# Patient Record
Sex: FEMALE | Race: WHITE | Employment: OTHER | URBAN - METROPOLITAN AREA
[De-identification: names, ages, dates, MRNs, and addresses within clinical notes are randomized per-mention and may not be internally consistent; named-entity substitution may affect disease eponyms.]

---

## 2017-01-02 NOTE — PROGRESS NOTES
[address]  [facility] [address]  [phone]  Dept: [phone]    PRE-OP EVALUATION:  Today's date: 2017    Milena Hamilton (: 1940) presents for pre-operative evaluation assessment as requested by Dr. Rodriguez.  She requires evaluation and anesthesia risk assessment prior to undergoing surgery/procedure for treatment of bilateral cataracts.  Proposed procedure: Cataract Surgery - Both Eyes    Date of Surgery/ Procedure:  and   Time of Surgery/ Procedure: to be determined  Hospital/Surgical Facility: [facility]  Primary Physician: Chuck Streeter  Type of Anesthesia Anticipated: to be determined    Patient has a Health Care Directive or Living Will:  YES    1. NO - Do you have a history of heart attack, stroke, stent, bypass or surgery on an artery in the head, neck, heart or legs?  2. NO - Do you ever have any pain or discomfort in your chest?  3. NO - Do you have a history of  Heart Failure?  4. NO - Are you troubled by shortness of breath when: walking on the level, up a slight hill or at night?  5. NO - Do you currently have a cold, bronchitis or other respiratory infection?  6. NO - Do you have a cough, shortness of breath or wheezing?  7. NO - Do you sometimes get pains in the calves of your legs when you walk?  8. NO - Do you or anyone in your family have previous history of blood clots?  9. NO - Do you or does anyone in your family have a serious bleeding problem such as prolonged bleeding following surgeries or cuts?  10. NO - Have you ever had problems with anemia or been told to take iron pills?  11. NO - Have you had any abnormal blood loss such as black, tarry or bloody stools, or abnormal vaginal bleeding?  12. NO - Have you ever had a blood transfusion?  13. NO - Have you or any of your relatives ever had problems with anesthesia?  14. NO - Do you have sleep apnea, excessive snoring or daytime drowsiness?  15. NO -  Do you have any prosthetic heart valves?  16. NO - Do you have prosthetic joints?  17. NO - Is there any chance that you may be pregnant?      HPI:                                                      Brief HPI related to upcoming procedure:   Patient will be undergoing cataract surgery on the left eye on 1/16/17 and the right eye on 1/30/16 due to worsening vision.     See problem list for active medical problems.  Problems all longstanding and stable, except as noted/documented.  See ROS for pertinent symptoms related to these conditions.                                                                                     MEDICAL HISTORY:                                                      Patient Active Problem List    Diagnosis Date Noted     Hypothyroidism, unspecified type 11/30/2016     Priority: Medium     Premature atrial beats 11/30/2016     Priority: Medium     Community acquired pneumonia 05/08/2015     Priority: Medium     Benign skin lesion 03/06/2015     Priority: Medium     History of diverticulitis - s/p left hemicolectomy 11/30/2013     Priority: Medium     Diarrhea 11/30/2013     Priority: Medium     Anxiety 10/24/2010     Priority: Medium     HTN, goal below 140/90 07/10/2014     Raynaud's disease 06/01/2014     Health Care Home 12/30/2013     Status:  Closed  Care Coordinator:  Tasha Vasquez RN    See Letters for H Care Plan             Chronic constipation 06/18/2013     Sacroiliac dysfunction 10/09/2012     Advanced directives, counseling/discussion 10/03/2011     Advance Directive Problem List Overview:   Name Relationship Phone    Primary Health Care Agent            Alternative Health Care Agent          Discussed advance care planning with patient; information given to patient to review. 10/3/2011          DDD (degenerative disc disease), cervical 08/10/2011     Hyperlipidemia LDL goal <130 09/29/2010     Osteopenia 04/01/2010     recheck DEXA in 2 years       Gastritis 03/15/2010      Panic disorder without agoraphobia 10/08/2004     Tinnitus 09/13/2004     Problem list name updated by automated process. Provider to review       Family history of diabetes mellitus 09/09/2002     Lichen planus       Past Medical History   Diagnosis Date     Lichen planus      vulvar     Pure hypercholesterolemia      low LDLs     Panic disorder without agoraphobia      Unspecified essential hypertension      Osteopenia 4/2010     recheck DEXA in 2 years     Raynaud's disease 6/2014     Past Surgical History   Procedure Laterality Date     Hc removal of ovary/tube(s)       Salpingo-Oophorectomy, bilateral     C appendectomy       C vag hyst,rmv tube/ovary  1984     cervix removed     C nonspecific procedure       Right inferior oblique recession, 14 mm     Colonoscopy  10/17/2007     normal, recheck in 10 years     C removal gallbladder  2/9/2010     lap     Eye surgery  8/01     right eye muscle repair     Hysterectomy, pap no longer indicated       Colonoscopy N/A 8/27/2014     Procedure: COLONOSCOPY;  Surgeon: Raffi Montiel MD;  Location: [facility]     Laparoscopic assisted colectomy left (descending) N/A 10/13/2014     Procedure: LAPAROSCOPIC ASSISTED COLECTOMY LEFT (DESCENDING);  Surgeon: Raffi Montiel MD;  Location: [facility]     Combined cystoscopy, insert catheter ureter Bilateral 10/13/2014     Procedure: COMBINED CYSTOSCOPY, INSERT CATHETER URETER;  Surgeon: Abdoulaye Odell MD;  Location: [facility]     Inject epidural transforaminal Right 6/9/2016     Procedure: INJECT EPIDURAL TRANSFORAMINAL;  Surgeon: Antonio Dasilva MD;  Location: [facility]     Inject epidural transforaminal Right 10/13/2016     Procedure: INJECT EPIDURAL TRANSFORAMINAL;  Surgeon: Antonio Dasilva MD;  Location: [facility]     Current Outpatient Prescriptions   Medication Sig Dispense Refill     simvastatin (ZOCOR) 20 MG tablet Take 0.5 tablets (10 mg) by mouth At Bedtime DUE FOR FASTING LABS 45 tablet 2     levothyroxine (SYNTHROID,  LEVOTHROID) 50 MCG tablet Take 1 tablet (50 mcg) by mouth daily 90 tablet 0     latanoprost (XALATAN) 0.005 % ophthalmic solution Place 1 drop into both eyes At Bedtime       ALPRAZolam (XANAX) 0.25 MG tablet Take 1 tablet (0.25 mg) by mouth nightly as needed for sleep or anxiety 30 tablet 3     omeprazole (PRILOSEC) 40 MG capsule TAKE ONE CAPSULE BY MOUTH ONCE DAILY 90 capsule 2     metoprolol (LOPRESSOR) 25 MG tablet Take 0.5 tablets (12.5 mg) by mouth daily 45 tablet 4     Probiotic Product (MISC INTESTINAL ERIC REGULAT) TABS Take 1 tablet by mouth 2 times daily (Patient taking differently: Take 1 tablet by mouth daily ) 30 tablet 0     Dentifrices (BIOTENE DRY MOUTH) PSTE Toothpaste and mouthwash       fish oil-omega-3 fatty acids (FISH OIL) 1000 MG capsule Take 2 g by mouth daily.       THERAPEUTIC MULTIVITAMIN OR 1 TABLET DAILY       ASPIRIN 81 MG OR TABS 1 TABLET DAILY       gabapentin (NEURONTIN) 100 MG capsule        sertraline (ZOLOFT) 25 MG tablet Take 1 tablet for 2 weeks and if tolerating well can increase to 2 tablets daily 60 tablet 2     OTC products: NSAIDS (Advil)    Allergies   Allergen Reactions     Azithromycin Diarrhea     Ciprofloxacin Other (See Comments)     Cipro, dizziness and abdominal pain     Penicillins      stomach upset, diarrhea     Sulfa Drugs      hives      Latex Allergy: NO    Social History   Substance Use Topics     Smoking status: Never Smoker      Smokeless tobacco: Never Used     Alcohol Use: No     History   Drug Use No     REVIEW OF SYSTEMS:                                                    C: NEGATIVE for fever, chills, change in weight  I: NEGATIVE for worrisome rashes, moles or lesions  E: +Worsening vision.   E/M: NEGATIVE for ear, mouth and throat problems  R: NEGATIVE for significant cough or SOB  CV: NEGATIVE for chest pain, palpitations or peripheral edema  GI: NEGATIVE for nausea, abdominal pain, heartburn, or change in bowel habits  : NEGATIVE for frequency,  "dysuria, or hematuria  M: +Low back pain.  N: NEGATIVE for weakness, dizziness or paresthesias  E: NEGATIVE for temperature intolerance, skin/hair changes  H: NEGATIVE for bleeding problems  P: NEGATIVE for changes in mood or affect    EXAM:                                                    /76 mmHg  Pulse 77  Temp(Src) 97.5  F (36.4  C) (Temporal)  Resp 12  Ht 5' 1.75\" (1.568 m)  Wt 138 lb (62.596 kg)  BMI 25.46 kg/m2  SpO2 99%    GENERAL APPEARANCE: healthy, alert and no distress     EYES: EOMI, - PERRL     HENT: ear canals and TM's normal and nose and mouth without ulcers or lesions     NECK: no adenopathy, no asymmetry, masses, or scars and thyroid normal to palpation     RESP: lungs clear to auscultation - no rales, rhonchi or wheezes     CV: regular rate and rhythm, normal S1 S2, no S3 or S4 and no murmur, click or rub     ABDOMEN:  soft, nontender, no HSM or masses and bowel sounds normal     MS: extremities normal- no gross deformities noted, no evidence of inflammation in joints, FROM in all extremities.     SKIN: no suspicious lesions or rashes     NEURO: Normal strength and tone, mentation intact and speech normal. Cranial nerves II-XII are grossly intact. DTRs are 2+/4 throughout and symmetric. Gait is stable.      PSYCH: mentation appears normal. and affect normal/bright     LYMPHATICS: No axillary, cervical, inguinal, or supraclavicular nodes    DIAGNOSTICS:                                                    No labs or EKG required for low risk surgery (cataract, skin procedure, breast biopsy, etc)    Recent Labs   Lab Test  03/25/16   0827  03/23/16   0625   HGB  13.0  13.2   PLT  172  160   NA  140  141   POTASSIUM  3.6  3.6   CR  0.69  0.72      IMPRESSION:                                                    Reason for surgery/procedure: bilateral cataracts  Diagnosis/reason for consult: pre-operative clearance    The proposed surgical procedure is considered LOW risk.    REVISED " CARDIAC RISK INDEX  The patient has the following serious cardiovascular risks for perioperative complications such as (MI, PE, VFib and 3  AV Block):  No serious cardiac risks  INTERPRETATION: 0 risks: Class I (very low risk - 0.4% complication rate)    The patient has the following additional risks for perioperative complications:  No identified additional risks      ICD-10-CM    1. Preop general physical exam Z01.818    2. Bilateral cataracts H26.9    3. Hypothyroidism, unspecified type E03.9    4. HTN, goal below 140/90 I10        RECOMMENDATIONS:                                                        Anticoagulant or Antiplatelet Medication Use  ASPIRIN: Discontinue ASA 7-10 days prior to procedure to reduce bleeding risk.  It should be resumed post-operatively.  NSAIDS: Ibuprofen (Motrin): Stop 5 days prior to surgery  Stop fish oil 10 days prior to surgery.      APPROVAL GIVEN to proceed with proposed procedure, without further diagnostic evaluation       Signed Electronically by: Chuck Streeter PA-C    Copy of this evaluation report is provided to requesting physician.    [facility] Preop Guidelines

## 2017-01-02 NOTE — PATIENT INSTRUCTIONS
Before Your Surgery      Call your surgeon if there is any change in your health. This includes signs of a cold or flu (such as a sore throat, runny nose, cough, rash or fever).    Do not smoke, drink alcohol or take over the counter medicine (unless your surgeon or primary care doctor tells you to) for the 24 hours before and after surgery.    If you take prescribed drugs: Follow your doctor s orders about which medicines to take and which to stop until after surgery. Stop the aspirin and fish oil until after surgery and hold the Advil 5 days before surgery.    Eating and drinking prior to surgery: follow the instructions from your surgeon    Take a shower or bath the night before surgery. Use the soap your surgeon gave you to gently clean your skin. If you do not have soap from your surgeon, use your regular soap. Do not shave or scrub the surgery site.  Wear clean pajamas and have clean sheets on your bed.

## 2017-01-06 ENCOUNTER — OFFICE VISIT (OUTPATIENT)
Dept: FAMILY MEDICINE | Facility: OTHER | Age: 77
End: 2017-01-06
Payer: COMMERCIAL

## 2017-01-06 VITALS
RESPIRATION RATE: 12 BRPM | HEIGHT: 62 IN | HEART RATE: 77 BPM | SYSTOLIC BLOOD PRESSURE: 128 MMHG | DIASTOLIC BLOOD PRESSURE: 76 MMHG | BODY MASS INDEX: 25.4 KG/M2 | OXYGEN SATURATION: 99 % | TEMPERATURE: 97.5 F | WEIGHT: 138 LBS

## 2017-01-06 DIAGNOSIS — H26.9 BILATERAL CATARACTS: ICD-10-CM

## 2017-01-06 DIAGNOSIS — Z01.818 PREOP GENERAL PHYSICAL EXAM: Primary | ICD-10-CM

## 2017-01-06 DIAGNOSIS — E03.9 HYPOTHYROIDISM, UNSPECIFIED TYPE: ICD-10-CM

## 2017-01-06 DIAGNOSIS — I10 HTN, GOAL BELOW 140/90: ICD-10-CM

## 2017-01-06 PROCEDURE — 99214 OFFICE O/P EST MOD 30 MIN: CPT | Performed by: PHYSICIAN ASSISTANT

## 2017-01-06 RX ORDER — GABAPENTIN 100 MG/1
CAPSULE ORAL
COMMUNITY
Start: 2016-05-24 | End: 2017-03-22

## 2017-01-06 ASSESSMENT — PAIN SCALES - GENERAL: PAINLEVEL: SEVERE PAIN (6)

## 2017-01-06 NOTE — MR AVS SNAPSHOT
After Visit Summary   1/6/2017    Milena Hamilton    MRN: [id number]           Patient Information     Date Of Birth          1940        Visit Information        Provider Department      1/6/2017 9:00 AM Chuck Streeter PA-C [facility]        Today's Diagnoses     Preop general physical exam    -  1       Care Instructions      Before Your Surgery      Call your surgeon if there is any change in your health. This includes signs of a cold or flu (such as a sore throat, runny nose, cough, rash or fever).    Do not smoke, drink alcohol or take over the counter medicine (unless your surgeon or primary care doctor tells you to) for the 24 hours before and after surgery.    If you take prescribed drugs: Follow your doctor s orders about which medicines to take and which to stop until after surgery. Stop the aspirin and fish oil until after surgery and hold the Advil 5 days before surgery.    Eating and drinking prior to surgery: follow the instructions from your surgeon    Take a shower or bath the night before surgery. Use the soap your surgeon gave you to gently clean your skin. If you do not have soap from your surgeon, use your regular soap. Do not shave or scrub the surgery site.  Wear clean pajamas and have clean sheets on your bed.         Follow-ups after your visit        Who to contact     If you have questions or need follow up information about today's clinic visit or your schedule please contact [facility] directly at [phone].  Normal or non-critical lab and imaging results will be communicated to you by MyChart, letter or phone within 4 business days after the clinic has received the results. If you do not hear from us within 7 days, please contact the clinic through MyChart or phone. If you have a critical or abnormal lab result, we will notify you by phone as soon as possible.  Submit refill requests through [facility] or call your pharmacy and  "they will forward the refill request to us. Please allow 3 business days for your refill to be completed.          Additional Information About Your Visit        MyChart Information     [email or web address] lets you send messages to your doctor, view your test results, renew your prescriptions, schedule appointments and more. To sign up, go to [email or web address] . Click on \"Log in\" on the left side of the screen, which will take you to the Welcome page. Then click on \"Sign up Now\" on the right side of the page.     You will be asked to enter the access code listed below, as well as some personal information. Please follow the directions to create your username and password.     Your access code is: [email or web address]  Expires: 2017  9:14 AM     Your access code will  in 90 days. If you need help or a new code, please call your [facility] clinic or [phone].        Care EveryWhere ID     This is your [facility] EveryWhere ID. This could be used by other organizations to access your [facility] medical records  [id number]        Your Vitals Were     Pulse Temperature Respirations Height BMI (Body Mass Index) Pulse Oximetry    77 97.5  F (36.4  C) (Temporal) 12 5' 1.75\" (1.568 m) 25.46 kg/m2 99%       Blood Pressure from Last 3 Encounters:   17 128/76   16 124/78   10/05/16 124/62    Weight from Last 3 Encounters:   17 138 lb (62.596 kg)   16 136 lb 6.4 oz (61.871 kg)   10/05/16 138 lb (62.596 kg)              Today, you had the following     No orders found for display         Today's Medication Changes          These changes are accurate as of: 17  9:14 AM.  If you have any questions, ask your nurse or doctor.               These medicines have changed or have updated prescriptions.        Dose/Directions    Misc Intestinal Lyndsey Regulat Tabs   This may have changed:  when to take this   Used for:  Diarrhea        Dose:  1 tablet   Take 1 tablet by mouth 2 times daily   Quantity:  30 tablet "   Refills:  0                Primary Care Provider Office Phone # Fax #    Chuck Ashwin Streeter PA-C [phone]       [facility] [address]  [facility] [address]        Thank you!     Thank you for choosing [facility]  for your care. Our goal is always to provide you with excellent care. Hearing back from our patients is one way we can continue to improve our services. Please take a few minutes to complete the written survey that you may receive in the mail after your visit with us. Thank you!             Your Updated Medication List - Protect others around you: Learn how to safely use, store and throw away your medicines at www.disposemymeds.org.          This list is accurate as of: 1/6/17  9:14 AM.  Always use your most recent med list.                   Brand Name Dispense Instructions for use    ALPRAZolam 0.25 MG tablet    XANAX    30 tablet    Take 1 tablet (0.25 mg) by mouth nightly as needed for sleep or anxiety       aspirin 81 MG tablet      1 TABLET DAILY       BIOTENE DRY MOUTH Pste      Toothpaste and mouthwash       fish oil-omega-3 fatty acids 1000 MG capsule      Take 2 g by mouth daily.       gabapentin 100 MG capsule    NEURONTIN         latanoprost 0.005 % ophthalmic solution    XALATAN     Place 1 drop into both eyes At Bedtime       levothyroxine 50 MCG tablet    SYNTHROID/LEVOTHROID    90 tablet    Take 1 tablet (50 mcg) by mouth daily       metoprolol 25 MG tablet    LOPRESSOR    45 tablet    Take 0.5 tablets (12.5 mg) by mouth daily       Misc Intestinal Lyndsey Regulat Tabs     30 tablet    Take 1 tablet by mouth 2 times daily       omeprazole 40 MG capsule    priLOSEC    90 capsule    TAKE ONE CAPSULE BY MOUTH ONCE DAILY       sertraline 25 MG tablet    ZOLOFT    60 tablet    Take 1 tablet for 2 weeks and if tolerating well can increase to 2 tablets daily       simvastatin 20 MG tablet    ZOCOR    45 tablet    Take 0.5 tablets (10 mg)  by mouth At Bedtime DUE FOR FASTING LABS       THERAPEUTIC MULTIVITAMIN PO      1 TABLET DAILY

## 2017-01-06 NOTE — NURSING NOTE
"Chief Complaint   Patient presents with     Pre-Op Exam     Panel Management     honoring choices       Initial /76 mmHg  Pulse 77  Temp(Src) 97.5  F (36.4  C) (Temporal)  Resp 12  Ht 5' 1.75\" (1.568 m)  Wt 138 lb (62.596 kg)  BMI 25.46 kg/m2  SpO2 99% Estimated body mass index is 25.46 kg/(m^2) as calculated from the following:    Height as of this encounter: 5' 1.75\" (1.568 m).    Weight as of this encounter: 138 lb (62.596 kg).  BP completed using cuff size: regular    Grace Manuel CMA      "

## 2017-01-24 DIAGNOSIS — F41.9 ANXIETY: Primary | ICD-10-CM

## 2017-01-24 NOTE — TELEPHONE ENCOUNTER
Alprazolam 0.25 mg      Last Written Prescription Date:  10/5/2016  Last Fill Quantity: 30,   # refills: 3  Last Office Visit with [email or web address] or  Health prescribing provider: 01/06/2017  Future Office visit:       Routing refill request to provider for review/approval because:  Drug not on the [facility], [facility] [[email or web address]] or  Health refill protocol or controlled substance

## 2017-01-25 RX ORDER — ALPRAZOLAM 0.25 MG
TABLET ORAL
Qty: 30 TABLET | Refills: 0 | Status: SHIPPED | OUTPATIENT
Start: 2017-01-25 | End: 2017-03-22

## 2017-01-25 NOTE — TELEPHONE ENCOUNTER
Will provide 1 refill but she needs to follow up on her anxiety in the clinic for further refills. Placed in bin.    Chuck Streeter PA-C

## 2017-01-30 DIAGNOSIS — I10 HTN, GOAL BELOW 140/90: Primary | ICD-10-CM

## 2017-01-30 NOTE — TELEPHONE ENCOUNTER
metoprolol (LOPRESSOR) 25 MG tablet      Last Written Prescription Date: 1/15/16  Last Fill Quantity: 45, # refills: 4    Last Office Visit with [facility] or [facility] prescribing provider:  11/30/16   Future Office Visit:        BP Readings from Last 3 Encounters:   01/06/17 128/76   11/30/16 124/78   10/05/16 124/62

## 2017-01-31 RX ORDER — METOPROLOL TARTRATE 25 MG/1
12.5 TABLET, FILM COATED ORAL DAILY
Qty: 45 TABLET | Refills: 2 | Status: SHIPPED | OUTPATIENT
Start: 2017-01-31 | End: 2017-10-24

## 2017-02-15 ENCOUNTER — OFFICE VISIT (OUTPATIENT)
Dept: FAMILY MEDICINE | Facility: OTHER | Age: 77
End: 2017-02-15
Payer: COMMERCIAL

## 2017-02-15 VITALS
TEMPERATURE: 97.7 F | RESPIRATION RATE: 20 BRPM | BODY MASS INDEX: 25.63 KG/M2 | DIASTOLIC BLOOD PRESSURE: 64 MMHG | HEART RATE: 98 BPM | WEIGHT: 139 LBS | OXYGEN SATURATION: 96 % | SYSTOLIC BLOOD PRESSURE: 120 MMHG

## 2017-02-15 DIAGNOSIS — B97.89 VIRAL RESPIRATORY ILLNESS: Primary | ICD-10-CM

## 2017-02-15 DIAGNOSIS — J98.8 VIRAL RESPIRATORY ILLNESS: Primary | ICD-10-CM

## 2017-02-15 PROCEDURE — 99213 OFFICE O/P EST LOW 20 MIN: CPT | Performed by: PHYSICIAN ASSISTANT

## 2017-02-15 RX ORDER — BENZONATATE 100 MG/1
100 CAPSULE ORAL 3 TIMES DAILY PRN
Qty: 30 CAPSULE | Refills: 0 | Status: SHIPPED | OUTPATIENT
Start: 2017-02-15 | End: 2017-03-22

## 2017-02-15 ASSESSMENT — PAIN SCALES - GENERAL: PAINLEVEL: NO PAIN (0)

## 2017-02-15 NOTE — NURSING NOTE
"No chief complaint on file.      Initial /64 (BP Location: Right arm, Patient Position: Chair, Cuff Size: Adult Regular)  Pulse 98  Temp 97.7  F (36.5  C) (Temporal)  Resp 20  Wt 139 lb (63 kg)  SpO2 96%  BMI 25.63 kg/m2 Estimated body mass index is 25.63 kg/(m^2) as calculated from the following:    Height as of 1/6/17: 5' 1.75\" (1.568 m).    Weight as of this encounter: 139 lb (63 kg).  Medication Reconciliation: complete     Grace Manuel CMA      "

## 2017-02-15 NOTE — PATIENT INSTRUCTIONS
I believe your symptoms are due to a virus.  Drink plenty of fluids.  Take a daily antihistamine like Claritin or Zyrtec to help dry up the watery eyes.  Get plenty of rest.  Can use over the counter Mucinex daily to help with congestion.  May use ibuprofen and/or Tylenol to help with fevers/pain.  Will prescribe Tessalon pearls to help with cough.  Honey mixed with warm tea can also help with cough.   If symptoms are not improving within 7-10 days or you experience worsening fevers, chills, cough, or shortness of breath, you should be seen again.

## 2017-02-15 NOTE — MR AVS SNAPSHOT
After Visit Summary   2/15/2017    Milena Hamilton    MRN: [id number]           Patient Information     Date Of Birth          1940        Visit Information        Provider Department      2/15/2017 9:00 AM Chuck Streeter PA-C [facility]        Today's Diagnoses     Viral respiratory illness    -  1      Care Instructions    I believe your symptoms are due to a virus.  Drink plenty of fluids.  Take a daily antihistamine like Claritin or Zyrtec to help dry up the watery eyes.  Get plenty of rest.  Can use over the counter Mucinex daily to help with congestion.  May use ibuprofen and/or Tylenol to help with fevers/pain.  Will prescribe Tessalon pearls to help with cough.  Honey mixed with warm tea can also help with cough.   If symptoms are not improving within 7-10 days or you experience worsening fevers, chills, cough, or shortness of breath, you should be seen again.          Follow-ups after your visit        Who to contact     If you have questions or need follow up information about today's clinic visit or your schedule please contact [facility] directly at [phone].  Normal or non-critical lab and imaging results will be communicated to you by Enefgyhart, letter or phone within 4 business days after the clinic has received the results. If you do not hear from us within 7 days, please contact the clinic through Enefgyhart or phone. If you have a critical or abnormal lab result, we will notify you by phone as soon as possible.  Submit refill requests through Beroomers or call your pharmacy and they will forward the refill request to us. Please allow 3 business days for your refill to be completed.          Additional Information About Your Visit        MyChart Information     Beroomers lets you send messages to your doctor, view your test results, renew your prescriptions, schedule appointments and more. To sign up, go to [email or web address] . Click on  "\"Log in\" on the left side of the screen, which will take you to the Welcome page. Then click on \"Sign up Now\" on the right side of the page.     You will be asked to enter the access code listed below, as well as some personal information. Please follow the directions to create your username and password.     Your access code is: [email or web address]  Expires: 2017  9:14 AM     Your access code will  in 90 days. If you need help or a new code, please call your [place] clinic or [phone].        Care EveryWhere ID     This is your Care EveryWhere ID. This could be used by other organizations to access your [facility] medical records  [id number]        Your Vitals Were     Pulse Temperature Respirations Pulse Oximetry BMI (Body Mass Index)       98 97.7  F (36.5  C) (Temporal) 20 96% 25.63 kg/m2        Blood Pressure from Last 3 Encounters:   02/15/17 120/64   17 128/76   16 124/78    Weight from Last 3 Encounters:   02/15/17 139 lb (63 kg)   17 138 lb (62.6 kg)   16 136 lb 6.4 oz (61.9 kg)              Today, you had the following     No orders found for display         Today's Medication Changes          These changes are accurate as of: 2/15/17  9:20 AM.  If you have any questions, ask your nurse or doctor.               Start taking these medicines.        Dose/Directions    benzonatate 100 MG capsule   Commonly known as:  TESSALON   Used for:  Viral respiratory illness   Started by:  Chuck Streeter PA-C        Dose:  100 mg   Take 1 capsule (100 mg) by mouth 3 times daily as needed   Quantity:  30 capsule   Refills:  0         These medicines have changed or have updated prescriptions.        Dose/Directions    Misc Intestinal Lyndsey Regulat Tabs   This may have changed:  when to take this   Used for:  Diarrhea        Dose:  1 tablet   Take 1 tablet by mouth 2 times daily   Quantity:  30 tablet   Refills:  0            Where to get your medicines      These medications were " sent to [facility] - [place], [place] - [address]  [address], [facility] [address]     Phone:  [phone]     benzonatate 100 MG capsule                Primary Care Provider Office Phone # Fax #    Chuck Streeter PA-C [phone]       [facility] [address]  [facility] [address]        Thank you!     Thank you for choosing [facility]  for your care. Our goal is always to provide you with excellent care. Hearing back from our patients is one way we can continue to improve our services. Please take a few minutes to complete the written survey that you may receive in the mail after your visit with us. Thank you!             Your Updated Medication List - Protect others around you: Learn how to safely use, store and throw away your medicines at www.disposemymeds.org.          This list is accurate as of: 2/15/17  9:20 AM.  Always use your most recent med list.                   Brand Name Dispense Instructions for use    ALPRAZolam 0.25 MG tablet    XANAX    30 tablet    TAKE ONE TABLET BY MOUTH NIGHTLY AS NEEDED FOR SLEEP OR FOR ANXIETY       aspirin 81 MG tablet      1 TABLET DAILY       benzonatate 100 MG capsule    TESSALON    30 capsule    Take 1 capsule (100 mg) by mouth 3 times daily as needed       BIOTENE DRY MOUTH Pste      Toothpaste and mouthwash       fish oil-omega-3 fatty acids 1000 MG capsule      Take 2 g by mouth daily.       gabapentin 100 MG capsule    NEURONTIN         latanoprost 0.005 % ophthalmic solution    XALATAN     Place 1 drop into both eyes At Bedtime       levothyroxine 50 MCG tablet    SYNTHROID/LEVOTHROID    90 tablet    Take 1 tablet (50 mcg) by mouth daily       metoprolol 25 MG tablet    LOPRESSOR    45 tablet    Take 0.5 tablets (12.5 mg) by mouth daily       Misc Intestinal Lyndsey Regulat Tabs     30 tablet    Take 1 tablet by mouth 2 times daily       omeprazole 40 MG capsule    priLOSEC    90  capsule    TAKE ONE CAPSULE BY MOUTH ONCE DAILY       sertraline 25 MG tablet    ZOLOFT    60 tablet    Take 1 tablet for 2 weeks and if tolerating well can increase to 2 tablets daily       simvastatin 20 MG tablet    ZOCOR    45 tablet    Take 0.5 tablets (10 mg) by mouth At Bedtime DUE FOR FASTING LABS       THERAPEUTIC MULTIVITAMIN PO      1 TABLET DAILY

## 2017-02-15 NOTE — PROGRESS NOTES
SUBJECTIVE:                                                    Milena Hamilton is a 76 year old female who presents to clinic today for the following health issues:    HPI    Acute Illness   Acute illness concerns: cough  Onset: Since Friday    Fever: no    Chills/Sweats: no    Headache (location?): no    Sinus Pressure:no    Conjunctivitis:  no    Ear Pain: YES- starting to itch    Rhinorrhea: YES    Congestion: YES    Sore Throat: YES- due to coughing     Cough: YES-non-productive    Wheeze: no    Decreased Appetite: YES    Nausea: no    Vomiting: no    Diarrhea:  no    Dysuria/Freq.: no    Fatigue/Achiness: YES    Sick/Strep Exposure: YES- colds     Therapies Tried and outcome: robitussin DM, tylenol    Cough was initially productive but no anymore.  Robitussin helping somewhat.    Problem list and histories reviewed & adjusted, as indicated.  Additional history: none    Problem list, Medication list, Allergies, and Medical/Social/Surgical histories reviewed in EPIC and updated as appropriate.    ROS:  GENERAL: Denies fever, fatigue, weakness, weight gain, or weight loss.  HEENT: Eyes-Denies pain, redness, loss of vision, double or blurred vision.     Ears/Nose- +rhinorrhea, ears itching. Denies tinnitus, loss of hearing, epistaxis, decreased sense of smell. Denies loss of sense of taste, dry mouth, or sore throat.   CARDIOVASCULAR: Denies chest pain, shortness of breath, irregular heartbeats,  palpitations, or edema.  RESPIRATORY: +Non-productive cough. Denies hemoptysis and shortness of breath.    OBJECTIVE:                                                    /64 (BP Location: Right arm, Patient Position: Chair, Cuff Size: Adult Regular)  Pulse 98  Temp 97.7  F (36.5  C) (Temporal)  Resp 20  Wt 139 lb (63 kg)  SpO2 96%  BMI 25.63 kg/m2  Body mass index is 25.63 kg/(m^2).  GENERAL: healthy, alert and no distress  EYES: Eyes grossly normal to inspection, PERRL and conjunctivae and sclerae normal  HENT:  ear canals and TM's normal with small amount of serous fluid behind left TM, nasal mucosa is mildly erythematous  NECK: no adenopathy, no asymmetry, masses, or scars and thyroid normal to palpation  RESP: lungs clear to auscultation - no rales, rhonchi or wheezes  CV: regular rate and rhythm, normal S1 S2, no S3 or S4, no murmur, click or rub       ASSESSMENT/PLAN:                                                        ICD-10-CM    1. Viral respiratory illness J98.8 benzonatate (TESSALON) 100 MG capsule    B97.89        Symptoms consistent with a viral illness.  Encouraged to drink plenty of fluids.  Can take a daily antihistamine like Claritin or Zyrtec to help dry up the watery eyes and fluid in ears.  Get plenty of rest.  Can use over the counter Mucinex daily to help with congestion.  May use ibuprofen and/or Tylenol to help with fevers/pain.  Will prescribe Tessalon pearls to help with cough.  Honey mixed with warm tea can also help with cough.   Follow up if symptoms worsen or do not improve.     Chuck Streeter PA-C  [facility]

## 2017-02-20 ENCOUNTER — TELEPHONE (OUTPATIENT)
Dept: FAMILY MEDICINE | Facility: OTHER | Age: 77
End: 2017-02-20

## 2017-02-20 NOTE — TELEPHONE ENCOUNTER
S-(situation): Patient is calling today with continued nasal congestion and cough    B-(background): Was seen in clinic on 2/15/17 and diagnosed with a viral infection.    A-(assessment): Patient states she has yellow nasal discharge with right sided sinus pressure since Saturday. She continues to have a productive cough, but does not know the color of the sputum. She has been taking Claritin and using the honey with warm tea. She did not start Mucinex. She has not been doing a mitul pot. She has had one sinus infection in the past. She does feel slightly improved since last week, but continues to feel fatigued.     R-(recommendations):  Home care advice - Start Mucinex and [place] Pot. Continue to rest and push fluids. Call if symptoms worsen or fail to improve by Thursday.  Will comply with recommendation: YES   If further questions/concerns or if Sx do not improve, worsen or new Sx develop, call your PCP or [facility] as soon as possible.    NOTES:  Disposition was determined by the first positive assessment question, therefore all previous assessment questions were negative.     Guideline used:  Telephone Triage Protocols for Nurses, Fourth Edition, Kirsty Del Cid, RN, BSN

## 2017-02-20 NOTE — TELEPHONE ENCOUNTER
Spoke with the patient regarding the different brands of Mucinex. Discussed there are name brands and generic brands. No further questions at this time. Aislinn Castro RN

## 2017-02-20 NOTE — TELEPHONE ENCOUNTER
[facility] phone call message- patient reporting a symptom:    Symptom or request: cold sx     Duration (how long have symptoms been present): ongoing   Have you been treated for this before? Yes    Additional comments: Pt was seen last week and is not feeling any better. She is wondering if she could have a sinus inf? Please call.       Call taken on 2/20/2017 at 9:53 AM by Yumiko Brar

## 2017-02-23 ENCOUNTER — OFFICE VISIT (OUTPATIENT)
Dept: FAMILY MEDICINE | Facility: OTHER | Age: 77
End: 2017-02-23
Payer: COMMERCIAL

## 2017-02-23 ENCOUNTER — TELEPHONE (OUTPATIENT)
Dept: FAMILY MEDICINE | Facility: OTHER | Age: 77
End: 2017-02-23

## 2017-02-23 VITALS
WEIGHT: 138 LBS | TEMPERATURE: 97.5 F | DIASTOLIC BLOOD PRESSURE: 70 MMHG | HEART RATE: 79 BPM | OXYGEN SATURATION: 98 % | BODY MASS INDEX: 25.45 KG/M2 | RESPIRATION RATE: 16 BRPM | SYSTOLIC BLOOD PRESSURE: 136 MMHG

## 2017-02-23 DIAGNOSIS — J01.90 ACUTE SINUSITIS WITH SYMPTOMS > 10 DAYS: Primary | ICD-10-CM

## 2017-02-23 PROCEDURE — 99213 OFFICE O/P EST LOW 20 MIN: CPT | Performed by: PHYSICIAN ASSISTANT

## 2017-02-23 RX ORDER — DOXYCYCLINE 100 MG/1
100 CAPSULE ORAL 2 TIMES DAILY
Qty: 20 CAPSULE | Refills: 0 | Status: SHIPPED | OUTPATIENT
Start: 2017-02-23 | End: 2017-03-22

## 2017-02-23 RX ORDER — PREDNISONE 20 MG/1
20 TABLET ORAL DAILY
Qty: 3 TABLET | Refills: 0 | Status: SHIPPED | OUTPATIENT
Start: 2017-02-23 | End: 2017-03-22

## 2017-02-23 ASSESSMENT — PAIN SCALES - GENERAL: PAINLEVEL: SEVERE PAIN (6)

## 2017-02-23 NOTE — MR AVS SNAPSHOT
"              After Visit Summary   2/23/2017    Milena Hamilton    MRN: [id number]           Patient Information     Date Of Birth          1940        Visit Information        Provider Department      2/23/2017 3:00 PM Chuck Streeter PA-C [facility]        Today's Diagnoses     Acute sinusitis with symptoms > 10 days    -  1      Care Instructions    Will prescribe doxycycline to take twice daily for 10 days. Take a daily probiotic or yogurt while you are on this.  Will also prescribe a steroid to take once daily for 3 days to help with the sinus congestion/pressure. Take in the mornings with food.   Drink plenty of fluids.  You can try Flonase nasal spray to help with the nasal congestion as well.   Follow up if symptoms are not improving.          Follow-ups after your visit        Who to contact     If you have questions or need follow up information about today's clinic visit or your schedule please contact [facility] directly at [phone].  Normal or non-critical lab and imaging results will be communicated to you by PollVaultrhart, letter or phone within 4 business days after the clinic has received the results. If you do not hear from us within 7 days, please contact the clinic through Pepper Networkst or phone. If you have a critical or abnormal lab result, we will notify you by phone as soon as possible.  Submit refill requests through Uptake or call your pharmacy and they will forward the refill request to us. Please allow 3 business days for your refill to be completed.          Additional Information About Your Visit        PollVaultrharNewfield Design Information     Uptake lets you send messages to your doctor, view your test results, renew your prescriptions, schedule appointments and more. To sign up, go to [email or web address] . Click on \"Log in\" on the left side of the screen, which will take you to the Welcome page. Then click on \"Sign up Now\" on the right side of the page. "     You will be asked to enter the access code listed below, as well as some personal information. Please follow the directions to create your username and password.     Your access code is: [email or web address]  Expires: 2017  9:14 AM     Your access code will  in 90 days. If you need help or a new code, please call your [facility] clinic or [phone].        Care EveryWhere ID     This is your Care EveryWhere ID. This could be used by other organizations to access your [facility] medical records  [id number]        Your Vitals Were     Pulse Temperature Respirations Pulse Oximetry BMI (Body Mass Index)       79 97.5  F (36.4  C) (Temporal) 16 98% 25.45 kg/m2        Blood Pressure from Last 3 Encounters:   17 136/70   02/15/17 120/64   17 128/76    Weight from Last 3 Encounters:   17 138 lb (62.6 kg)   02/15/17 139 lb (63 kg)   17 138 lb (62.6 kg)              Today, you had the following     No orders found for display         Today's Medication Changes          These changes are accurate as of: 17  3:33 PM.  If you have any questions, ask your nurse or doctor.               Start taking these medicines.        Dose/Directions    doxycycline 100 MG capsule   Commonly known as:  VIBRAMYCIN   Used for:  Acute sinusitis with symptoms > 10 days   Started by:  Chuck Streeter PA-C        Dose:  100 mg   Take 1 capsule (100 mg) by mouth 2 times daily   Quantity:  20 capsule   Refills:  0       predniSONE 20 MG tablet   Commonly known as:  DELTASONE   Used for:  Acute sinusitis with symptoms > 10 days   Started by:  Chuck Streeter PA-C        Dose:  20 mg   Take 1 tablet (20 mg) by mouth daily   Quantity:  3 tablet   Refills:  0         These medicines have changed or have updated prescriptions.        Dose/Directions    Misc Intestinal Lyndsey Regulat Tabs   This may have changed:  when to take this   Used for:  Diarrhea        Dose:  1 tablet   Take 1 tablet by mouth 2 times  daily   Quantity:  30 tablet   Refills:  0            Where to get your medicines      These medications were sent to [facility] - [place], [place] - [address]  [address], [facility] [address]     Phone:  [phone]     doxycycline 100 MG capsule    predniSONE 20 MG tablet                Primary Care Provider Office Phone # Fax #    Chuck Streeter PA-C [phone]       [facility] [address]  [facility] [address]        Thank you!     Thank you for choosing [facility]  for your care. Our goal is always to provide you with excellent care. Hearing back from our patients is one way we can continue to improve our services. Please take a few minutes to complete the written survey that you may receive in the mail after your visit with us. Thank you!             Your Updated Medication List - Protect others around you: Learn how to safely use, store and throw away your medicines at www.disposemymeds.org.          This list is accurate as of: 2/23/17  3:33 PM.  Always use your most recent med list.                   Brand Name Dispense Instructions for use    ALPRAZolam 0.25 MG tablet    XANAX    30 tablet    TAKE ONE TABLET BY MOUTH NIGHTLY AS NEEDED FOR SLEEP OR FOR ANXIETY       aspirin 81 MG tablet      1 TABLET DAILY       benzonatate 100 MG capsule    TESSALON    30 capsule    Take 1 capsule (100 mg) by mouth 3 times daily as needed       BIOTENE DRY MOUTH Pste      Toothpaste and mouthwash       doxycycline 100 MG capsule    VIBRAMYCIN    20 capsule    Take 1 capsule (100 mg) by mouth 2 times daily       fish oil-omega-3 fatty acids 1000 MG capsule      Take 2 g by mouth daily.       gabapentin 100 MG capsule    NEURONTIN         latanoprost 0.005 % ophthalmic solution    XALATAN     Place 1 drop into both eyes At Bedtime       levothyroxine 50 MCG tablet    SYNTHROID/LEVOTHROID    90 tablet    Take 1 tablet (50 mcg) by mouth daily        metoprolol 25 MG tablet    LOPRESSOR    45 tablet    Take 0.5 tablets (12.5 mg) by mouth daily       Misc Intestinal Lyndsey Regulat Tabs     30 tablet    Take 1 tablet by mouth 2 times daily       omeprazole 40 MG capsule    priLOSEC    90 capsule    TAKE ONE CAPSULE BY MOUTH ONCE DAILY       predniSONE 20 MG tablet    DELTASONE    3 tablet    Take 1 tablet (20 mg) by mouth daily       sertraline 25 MG tablet    ZOLOFT    60 tablet    Take 1 tablet for 2 weeks and if tolerating well can increase to 2 tablets daily       simvastatin 20 MG tablet    ZOCOR    45 tablet    Take 0.5 tablets (10 mg) by mouth At Bedtime DUE FOR FASTING LABS       THERAPEUTIC MULTIVITAMIN PO      1 TABLET DAILY

## 2017-02-23 NOTE — PROGRESS NOTES
"  SUBJECTIVE:                                                    Milena Hamilton is a 76 year old female who presents to clinic today for the following health issues:    HPI    Patient was seen on 02/15 and diagnosed with a viral respiratory illness. She was given Tessalon Pearls, which patient states it \"made me sicker.\" She feels like she has not improved since her last visit. States she feels \"stuffed up in head, can't taste or smell anything.\" States she has no appetite, but eats \"because she is supposed to.\" Sinuses are tender with yellow drainage. Continues with a daily cough. Denies fevers or chills.     Problem list and histories reviewed & adjusted, as indicated.  Additional history: none    Problem list, Medication list, Allergies, and Medical/Social/Surgical histories reviewed in EPIC and updated as appropriate.    ROS:  GENERAL: Denies fever, fatigue, weakness, weight gain, or weight loss.  HEENT: Eyes-Denies pain, redness, loss of vision, double or blurred vision.     Ears/Nose- +sinus congestion/pressure. Denies tinnitus, loss of hearing, epistaxis, decreased sense of smell. Denies loss of sense of taste, dry mouth, or sore throat.   CARDIOVASCULAR: Denies chest pain, shortness of breath, irregular heartbeats,  palpitations, or edema.  RESPIRATORY: +Cough. Denies hemoptysis and shortness of breath.    OBJECTIVE:                                                    /70 (BP Location: Right arm, Patient Position: Chair, Cuff Size: Adult Regular)  Pulse 79  Temp 97.5  F (36.4  C) (Temporal)  Resp 16  Wt 138 lb (62.6 kg)  SpO2 98%  BMI 25.45 kg/m2  Body mass index is 25.45 kg/(m^2).  GENERAL: healthy, alert and no distress  EYES: Eyes grossly normal to inspection, PERRL and conjunctivae and sclerae normal  HENT: ear canals and TM's normal, nasal mucosa is erythematous and edematous with tenderness over the maxillary sinuses  NECK: no adenopathy, no asymmetry, masses, or scars and thyroid normal to " palpation  RESP: lungs clear to auscultation - no rales, rhonchi or wheezes  CV: regular rate and rhythm, normal S1 S2, no S3 or S4, no murmur, click or rub       ASSESSMENT/PLAN:                                                        ICD-10-CM    1. Acute sinusitis with symptoms > 10 days J01.90 doxycycline (VIBRAMYCIN) 100 MG capsule     predniSONE (DELTASONE) 20 MG tablet       Will prescribe doxycycline to take twice daily for 10 days. Instrructed to david a daily probiotic or yogurt while on this.  Will also prescribe prednisone to take once daily for 3 days to help with the sinus congestion/pressure. Instructed to take in the mornings with food.   Drink plenty of fluids.  Can  try Flonase nasal spray to help with the nasal congestion as well.   Follow up if symptoms are not improving.    Chuck Streeter PA-C  [facility]

## 2017-02-23 NOTE — NURSING NOTE
"Chief Complaint   Patient presents with     Cough       Initial /70 (BP Location: Right arm, Patient Position: Chair, Cuff Size: Adult Regular)  Pulse 79  Temp 97.5  F (36.4  C) (Temporal)  Resp 16  Wt 138 lb (62.6 kg)  SpO2 98%  BMI 25.45 kg/m2 Estimated body mass index is 25.45 kg/(m^2) as calculated from the following:    Height as of 1/6/17: 5' 1.75\" (1.568 m).    Weight as of this encounter: 138 lb (62.6 kg).  Medication Reconciliation: complete     Grace Manuel, BRIAN      "

## 2017-02-23 NOTE — PATIENT INSTRUCTIONS
Will prescribe doxycycline to take twice daily for 10 days. Take a daily probiotic or yogurt while you are on this.  Will also prescribe a steroid to take once daily for 3 days to help with the sinus congestion/pressure. Take in the mornings with food.   Drink plenty of fluids.  You can try Flonase nasal spray to help with the nasal congestion as well.   Follow up if symptoms are not improving.

## 2017-02-23 NOTE — TELEPHONE ENCOUNTER
I spoke with patient earlier this week - are you willing to work her in this afternoon for a recheck?    Stephani Del Cid, RN, BSN

## 2017-02-23 NOTE — TELEPHONE ENCOUNTER
Reason for call:  Same Day Appointment  Reason for Call:  Same Day Appointment, Requested Provider:  ITALIA Oliveros     PCP: Chuck Streeter    Reason for visit: sinus sx/cough    Duration of symptoms: 13 days    Have you been treated for this in the past? Yes    Additional comments: patient was seen last week still not feeling better would like to get worked in this afternoon.  Please call    Can we leave a detailed message on this number? YES    Phone number patient can be reached at: Cell number on file:    Telephone Information:   Mobile [phone]       Best Time: any    Call taken on 2/23/2017 at 9:31 AM by Inna Hodge

## 2017-03-21 NOTE — PROGRESS NOTES
SUBJECTIVE:                                                    Milena Hamilton is a 76 year old female who presents to clinic today for the following health issues:    HPI    Back Pain      Duration: 1.5 year        Specific cause: spinal stenosis     Description:   Location of pain: low back   Character of pain: leg feels like and has given out before. Pain in the back feels like someone has a knife in her back stabbing her.   Pain radiation:radiates into the right leg  New numbness or weakness in legs, not attributed to pain:  YES    Intensity: moderate    History:   Pain interferes with job: Not applicable  History of back problems: no prior back problems  Any previous MRI or X-rays: Yes- at [facility].    Sees a specialist for back pain:  [facility]   Therapies tried without relief: 2 JOSE's , chiropractor and Physical Therapy    Alleviating factors:   Improved by:at home exercises help but its only temporary, less than an hour     Precipitating factors:  Worsened by: Standing    Functional and Psychosocial Screen (Obdulia STarT Back):      Most recent score:    OBDULIA START BACK TOTAL SCORE 3/22/2017   Total Score (all 9) 8          Accompanying Signs & Symptoms:  Risk of Fracture:  None  Risk of Cauda Equina:  None  Risk of Infection:  None  Risk of Cancer:  None  Risk of Ankylosing Spondylitis:  Onset at age <35, male, AND morning back stiffness. no                  She has been seen for back pain for past mx years. She has been treated as stated with PT, Chiro, JOSE last done 10/16 stating moderate relief from these but there insurance has changed and is no longer able to go to [facility] for these. I will place referral for her to be seen by spine specialty through [facility]. She states that she has not wanted to investigate surgery however she is wanting to learn more about her choices for surgery.   She is using tylenol for pain states that her pain is manageable at this time.    Concern - collitis  "    Onset: Monday night    Description:   Pain, left side of abd. Feels like she needs to have a BM but hardly anything is there. Pain comes back in about 30 minutes or so     Intensity: moderate    Progression of Symptoms:  same         Previous history of similar problem:   Yes, had it in the past     Precipitating factors:   Worsened by: none     Alleviating factors:  Improved by: none        Therapies Tried and outcome: none    She has history of diverticulitis and subsequent left asya-colectomy. She has also had hyster, sanjuana, and appy. She was seen in ed 3/16 with dx of colitis and treated with flagyl with resolution of sx.   She is having 2 soft stools per day normally she has 4. Abdomen is soft, bowel sounds active, mild tenderness LLQ    She would also like to discuss anxiety today she states that she has been trying to wean off Xanax however has not been able to tolerate sertraline stated she did not take this medication for >2 wks before stopping. We reviewed that these longer acting medications need 4-6 weeks to produce relief of symptoms as this was discussed with her PCP during her visit on 11/30/16  She stated the sertraline caused stomach upset.       Problem list and histories reviewed & adjusted, as indicated.  Additional history: as documented    Labs reviewed in EPIC    ROS:  Constitutional, HEENT, cardiovascular, pulmonary, GI, , musculoskeletal, neuro, skin, endocrine and psych systems are negative, except as otherwise noted.    OBJECTIVE:                                                    /78 (BP Location: Left arm, Patient Position: Chair, Cuff Size: Adult Regular)  Pulse 62  Temp 98.2  F (36.8  C) (Oral)  Resp 16  Ht 5' 1.75\" (1.568 m)  Wt 135 lb (61.2 kg)  BMI 24.89 kg/m2  Body mass index is 24.89 kg/(m^2).  GENERAL: healthy, alert and no distress  EYES: Eyes grossly normal to inspection, PERRL and conjunctivae and sclerae normal  HENT: ear canals and TM's normal, nose and " mouth without ulcers or lesions  NECK: no adenopathy, no asymmetry, masses, or scars and thyroid normal to palpation  RESP: lungs clear to auscultation - no rales, rhonchi or wheezes  CV: regular rate and rhythm, normal S1 S2, no S3 or S4, no murmur, click or rub, no peripheral edema and peripheral pulses strong  ABDOMEN: soft, nontender, no hepatosplenomegaly, no masses and bowel sounds normal  MS: Lumber/Thoracic Spine Exam: Tender:  right parathoracic muscles, lumbar spinous processes, right para lumbar muscles  Range of Motion:  left lateral thoracic bending   decreased, painful, right lateral thoracic bending  full, left thoracic rotation  full, right thoracic rotation  full, lumbar flexion  full, lumbar extension  full, left lateral lumbar bending  full, right lateral lumbar bending  full, left lateral lumbar rotation  full, right lateral lumbar rotation  full  Strength:  5/5 full  Gait is stable  SKIN: no suspicious lesions or rashes  NEURO: Normal strength and tone, mentation intact and speech normal  BACK: no CVA tenderness, no paralumbar tenderness  PSYCH: mentation appears normal, affect normal/bright  LYMPH: no cervical, supraclavicular, axillary, or inguinal adenopathy    Diagnostic Test Results:  Results for orders placed or performed in visit on 03/22/17 (from the past 24 hour(s))   BASIC METABOLIC PANEL   Result Value Ref Range    Sodium 143 133 - 144 mmol/L    Potassium 4.3 3.4 - 5.3 mmol/L    Chloride 105 94 - 109 mmol/L    Carbon Dioxide 30 20 - 32 mmol/L    Anion Gap 8 3 - 14 mmol/L    Glucose 96 70 - 99 mg/dL    Urea Nitrogen 10 7 - 30 mg/dL    Creatinine 0.63 0.52 - 1.04 mg/dL    GFR Estimate >90  Non  GFR Calc   >60 mL/min/1.7m2    GFR Estimate If Black >90   GFR Calc   >60 mL/min/1.7m2    Calcium 8.8 8.5 - 10.1 mg/dL   CBC with platelets   Result Value Ref Range    WBC 4.3 4.0 - 11.0 10e9/L    RBC Count 4.23 3.8 - 5.2 10e12/L    Hemoglobin 13.1 11.7 - 15.7 g/dL     Hematocrit 41.5 35.0 - 47.0 %    MCV 98 78 - 100 fl    MCH 31.0 26.5 - 33.0 pg    MCHC 31.6 31.5 - 36.5 g/dL    RDW 13.0 10.0 - 15.0 %    Platelet Count 197 150 - 450 10e9/L   CRP, inflammation   Result Value Ref Range    CRP Inflammation <2.9 0.0 - 8.0 mg/L        ASSESSMENT/PLAN:                                                      1. Spinal stenosis of lumbar region  [facility] with possible epidural steroid injection and discussion regarding surgical choices  - ORTHO  REFERRAL    2. Diverticulosis of large intestine without hemorrhage  We'll check for current infection and inflammation due to history of diverticulosis.   - CBC with platelets  - CRP, inflammation  She was instructed to start on MiraLAX 1/4-1/2 capful daily in the morning until stool is soft.     3. MARINO (generalized anxiety disorder)  She is not interested in trying sertraline again. Discussed the length of time she will need to take this medication in order for it to be effective.   - busPIRone (BUSPAR) 7.5 MG tablet; Take 1 tablet (7.5 mg) by mouth 2 times daily  Dispense: 60 tablet; Refill: 0    4. Anxiety  She became tearful when discussing her plans of discontinuing or weaning off of Xanax that she has been on this medication for a long time we discussed that this is not a medication that is good for treatment for her anxiety for a longer period of time and that it would be best for her as she had very discussed with her PCP about weaning off.   ALPRAZolam (XANAX) 0.25 MG tablet; Take 1 tablet (0.25 mg) by mouth nightly as needed for anxiety (as needed for sleep)  Dispense: 30 tablet; Refill: 0    5. Hyperlipidemia LDL goal <130   she is due for screening this will be done today   BASIC METABOLIC PANEL    a total of 60 minutes was spent with patient today of which 50 minutes was spent talking about treatment plan related to her back pain anxiety and left lower quadrant pain she is not sure that  she will start taking the Buspar as this is a twice a day medication. She will follow up in clinic in 6 weeks with her primary.     See Patient Instructions    [address] [address].

## 2017-03-22 ENCOUNTER — OFFICE VISIT (OUTPATIENT)
Dept: FAMILY MEDICINE | Facility: OTHER | Age: 77
End: 2017-03-22
Payer: COMMERCIAL

## 2017-03-22 VITALS
RESPIRATION RATE: 16 BRPM | DIASTOLIC BLOOD PRESSURE: 78 MMHG | HEIGHT: 62 IN | BODY MASS INDEX: 24.84 KG/M2 | WEIGHT: 135 LBS | SYSTOLIC BLOOD PRESSURE: 120 MMHG | HEART RATE: 62 BPM | TEMPERATURE: 98.2 F

## 2017-03-22 DIAGNOSIS — I10 HTN, GOAL BELOW 140/90: ICD-10-CM

## 2017-03-22 DIAGNOSIS — E78.5 HYPERLIPIDEMIA LDL GOAL <130: ICD-10-CM

## 2017-03-22 DIAGNOSIS — K57.30 DIVERTICULOSIS OF LARGE INTESTINE WITHOUT HEMORRHAGE: ICD-10-CM

## 2017-03-22 DIAGNOSIS — M48.061 SPINAL STENOSIS OF LUMBAR REGION: Primary | ICD-10-CM

## 2017-03-22 DIAGNOSIS — F41.9 ANXIETY: ICD-10-CM

## 2017-03-22 DIAGNOSIS — F41.1 GAD (GENERALIZED ANXIETY DISORDER): ICD-10-CM

## 2017-03-22 LAB
ANION GAP SERPL CALCULATED.3IONS-SCNC: 8 MMOL/L (ref 3–14)
BUN SERPL-MCNC: 10 MG/DL (ref 7–30)
CALCIUM SERPL-MCNC: 8.8 MG/DL (ref 8.5–10.1)
CHLORIDE SERPL-SCNC: 105 MMOL/L (ref 94–109)
CO2 SERPL-SCNC: 30 MMOL/L (ref 20–32)
CREAT SERPL-MCNC: 0.63 MG/DL (ref 0.52–1.04)
CRP SERPL-MCNC: <2.9 MG/L (ref 0–8)
ERYTHROCYTE [DISTWIDTH] IN BLOOD BY AUTOMATED COUNT: 13 % (ref 10–15)
GFR SERPL CREATININE-BSD FRML MDRD: NORMAL ML/MIN/1.7M2
GLUCOSE SERPL-MCNC: 96 MG/DL (ref 70–99)
HCT VFR BLD AUTO: 41.5 % (ref 35–47)
HGB BLD-MCNC: 13.1 G/DL (ref 11.7–15.7)
MCH RBC QN AUTO: 31 PG (ref 26.5–33)
MCHC RBC AUTO-ENTMCNC: 31.6 G/DL (ref 31.5–36.5)
MCV RBC AUTO: 98 FL (ref 78–100)
PLATELET # BLD AUTO: 197 10E9/L (ref 150–450)
POTASSIUM SERPL-SCNC: 4.3 MMOL/L (ref 3.4–5.3)
RBC # BLD AUTO: 4.23 10E12/L (ref 3.8–5.2)
SODIUM SERPL-SCNC: 143 MMOL/L (ref 133–144)
WBC # BLD AUTO: 4.3 10E9/L (ref 4–11)

## 2017-03-22 PROCEDURE — 85027 COMPLETE CBC AUTOMATED: CPT | Performed by: NURSE PRACTITIONER

## 2017-03-22 PROCEDURE — 80048 BASIC METABOLIC PNL TOTAL CA: CPT | Performed by: NURSE PRACTITIONER

## 2017-03-22 PROCEDURE — 86140 C-REACTIVE PROTEIN: CPT | Performed by: NURSE PRACTITIONER

## 2017-03-22 PROCEDURE — 99215 OFFICE O/P EST HI 40 MIN: CPT | Performed by: NURSE PRACTITIONER

## 2017-03-22 PROCEDURE — 36415 COLL VENOUS BLD VENIPUNCTURE: CPT | Performed by: NURSE PRACTITIONER

## 2017-03-22 RX ORDER — BUSPIRONE HYDROCHLORIDE 7.5 MG/1
7.5 TABLET ORAL 3 TIMES DAILY
Qty: 90 TABLET | Refills: 1 | Status: SHIPPED | OUTPATIENT
Start: 2017-03-22 | End: 2017-03-22 | Stop reason: DRUGHIGH

## 2017-03-22 RX ORDER — BUSPIRONE HYDROCHLORIDE 7.5 MG/1
7.5 TABLET ORAL 2 TIMES DAILY
Qty: 60 TABLET | Refills: 0 | Status: SHIPPED | OUTPATIENT
Start: 2017-03-22 | End: 2017-05-03

## 2017-03-22 RX ORDER — ALPRAZOLAM 0.25 MG
0.25 TABLET ORAL
Qty: 30 TABLET | Refills: 0 | Status: SHIPPED | OUTPATIENT
Start: 2017-03-22 | End: 2017-07-11

## 2017-03-22 ASSESSMENT — PAIN SCALES - GENERAL: PAINLEVEL: SEVERE PAIN (6)

## 2017-03-22 NOTE — PATIENT INSTRUCTIONS
I will notify you of your lab results. Please start taking 1/2 capful of myralax for constipation until stools are soft consistency then may discontinue.     Please follow up with Amrik Streeter PA-C, in 6 weeks for evaluation of anxiety medication changes.     Thank you  Geni Delgado CNP

## 2017-03-22 NOTE — MR AVS SNAPSHOT
After Visit Summary   3/22/2017    Milena Hamilton    MRN: [id number]           Patient Information     Date Of Birth          1940        Visit Information        Provider Department      3/22/2017 8:00 AM Geni Delgado APRN CNP [facility]        Today's Diagnoses     Spinal stenosis of lumbar region    -  1    MARINO (generalized anxiety disorder)        Diverticulosis of large intestine without hemorrhage        Hyperlipidemia LDL goal <130        HTN, goal below 140/90        Anxiety          Care Instructions    I will notify you of your lab results. Please start taking 1/2 capful of myralax for constipation until stools are soft consistency then may discontinue.     Please follow up with Amrik Streeter PA-C, in 6 weeks for evaluation of anxiety medication changes.     Thank you  Geni Delgado CNP          Follow-ups after your visit        Additional Services     ORTHO  REFERRAL       [facility] is referring you to the Orthopedic  Services at [facility].       The  Representative will assist you in the coordination of your Orthopedic and Musculoskeletal Care as prescribed by your physician.    The  Representative will call you within 1 business day to help schedule your appointment, or you may contact the  Representative at:    All areas ~ [phone]     Type of Referral : Spine: Lumbar  **Choose Medical Spine Specialist (unless patient was seen by a Medical Spine Specialist within the past 6 months).**  Surgical Evaluation is advised if the patient presents with one or more of the following red flags: Evidence of Spinal Tumor, Infection or Fracture, Cauda Equina Syndrome, Sudden or Progressive Weakness, Loss of Bowel or Bladder Control, or any other documented emergent neurological condition resulting from a Lumbar Spinal Condition. Spine Surgeon        Timeframe requested: 1 -  "2 days    Coverage of these services is subject to the terms and limitations of your health insurance plan.  Please call member services at your health plan with any benefit or coverage questions.      If X-rays, CT or MRI's have been performed, please contact the facility where they were done to arrange for , prior to your scheduled appointment.  Please bring this referral request to your appointment and present it to your specialist.                  Who to contact     If you have questions or need follow up information about today's clinic visit or your schedule please contact [facility] [place] directly at [phone].  Normal or non-critical lab and imaging results will be communicated to you by Z[employer], letter or phone within 4 business days after the clinic has received the results. If you do not hear from us within 7 days, please contact the clinic through Zhaopinhart or phone. If you have a critical or abnormal lab result, we will notify you by phone as soon as possible.  Submit refill requests through [employer] or call your pharmacy and they will forward the refill request to us. Please allow 3 business days for your refill to be completed.          Additional Information About Your Visit        MyChart Information     [employer] lets you send messages to your doctor, view your test results, renew your prescriptions, schedule appointments and more. To sign up, go to [email or web address] . Click on \"Log in\" on the left side of the screen, which will take you to the Welcome page. Then click on \"Sign up Now\" on the right side of the page.     You will be asked to enter the access code listed below, as well as some personal information. Please follow the directions to create your username and password.     Your access code is: [email or web address]  Expires: 2017 10:14 AM     Your access code will  in 90 days. If you need help or a new code, please call your [facility] or [phone].      " "  Care EveryWhere ID     This is your Care EveryWhere ID. This could be used by other organizations to access your [facility] medical records  [id number]        Your Vitals Were     Pulse Temperature Respirations Height BMI (Body Mass Index)       62 98.2  F (36.8  C) (Oral) 16 5' 1.75\" (1.568 m) 24.89 kg/m2        Blood Pressure from Last 3 Encounters:   03/22/17 120/78   02/23/17 136/70   02/15/17 120/64    Weight from Last 3 Encounters:   03/22/17 135 lb (61.2 kg)   02/23/17 138 lb (62.6 kg)   02/15/17 139 lb (63 kg)              We Performed the Following     BASIC METABOLIC PANEL     CBC with platelets     CRP, inflammation     ORTHO  REFERRAL          Today's Medication Changes          These changes are accurate as of: 3/22/17  9:27 AM.  If you have any questions, ask your nurse or doctor.               Start taking these medicines.        Dose/Directions    busPIRone 7.5 MG tablet   Commonly known as:  BUSPAR   Used for:  MARINO (generalized anxiety disorder)   Started by:  Geni Delgado APRN CNP        Dose:  7.5 mg   Take 1 tablet (7.5 mg) by mouth 2 times daily   Quantity:  60 tablet   Refills:  0         These medicines have changed or have updated prescriptions.        Dose/Directions    ALPRAZolam 0.25 MG tablet   Commonly known as:  XANAX   This may have changed:  See the new instructions.   Used for:  Anxiety   Changed by:  Geni Delgado APRN CNP        Dose:  0.25 mg   Take 1 tablet (0.25 mg) by mouth nightly as needed for anxiety (as needed for sleep)   Quantity:  30 tablet   Refills:  0            Where to get your medicines      These medications [place] [facility] [place] [address]  [address] [address]     Phone:  [phone]     busPIRone 7.5 MG tablet         Some of these will need a paper prescription and others can be bought over the counter.  Ask your nurse if you have questions.     Bring a paper " prescription for each of these medications     ALPRAZolam 0.25 MG tablet                Primary Care Provider Office Phone # Fax #    Chuck Streeter PA-C [phone]       [facility] [address]  [place] [address]        Thank you!     Thank you for choosing [facility]  for your care. Our goal is always to provide you with excellent care. Hearing back from our patients is one way we can continue to improve our services. Please take a few minutes to complete the written survey that you may receive in the mail after your visit with us. Thank you!             Your Updated Medication List - Protect others around you: Learn how to safely use, store and throw away your medicines at www.disposemymeds.org.          This list is accurate as of: 3/22/17  9:27 AM.  Always use your most recent med list.                   Brand Name Dispense Instructions for use    ALPRAZolam 0.25 MG tablet    XANAX    30 tablet    Take 1 tablet (0.25 mg) by mouth nightly as needed for anxiety (as needed for sleep)       aspirin 81 MG tablet      1 TABLET DAILY       BIOTENE DRY MOUTH Pste      Toothpaste and mouthwash       busPIRone 7.5 MG tablet    BUSPAR    60 tablet    Take 1 tablet (7.5 mg) by mouth 2 times daily       fish oil-omega-3 fatty acids 1000 MG capsule      Take 2 g by mouth daily.       levothyroxine 50 MCG tablet    SYNTHROID/LEVOTHROID    90 tablet    Take 1 tablet (50 mcg) by mouth daily       metoprolol 25 MG tablet    LOPRESSOR    45 tablet    Take 0.5 tablets (12.5 mg) by mouth daily       Misc Intestinal Lyndsey Regulat Tabs     30 tablet    Take 1 tablet by mouth 2 times daily       simvastatin 20 MG tablet    ZOCOR    45 tablet    Take 0.5 tablets (10 mg) by mouth At Bedtime DUE FOR FASTING LABS       THERAPEUTIC MULTIVITAMIN PO      1 TABLET DAILY

## 2017-03-22 NOTE — NURSING NOTE
"Chief Complaint   Patient presents with     Back Pain     follow up     Panel Management     honoring j lusi cannon       Initial /78 (BP Location: Left arm, Patient Position: Chair, Cuff Size: Adult Regular)  Pulse 62  Temp 98.2  F (36.8  C) (Oral)  Resp 16  Ht 5' 1.75\" (1.568 m)  Wt 135 lb (61.2 kg)  BMI 24.89 kg/m2 Estimated body mass index is 24.89 kg/(m^2) as calculated from the following:    Height as of this encounter: 5' 1.75\" (1.568 m).    Weight as of this encounter: 135 lb (61.2 kg).  Medication Reconciliation: complete    "

## 2017-03-22 NOTE — PROGRESS NOTES
Please notify patient of test results. Your lab results are normal. Your blood count does not show active infection or inflammation. If you continue to have abdominal pain in the left lower area. Please return to clinic for further evaluation.     Thank you    Geni Delgado CNP

## 2017-03-23 ENCOUNTER — OFFICE VISIT (OUTPATIENT)
Dept: NEUROSURGERY | Facility: OTHER | Age: 77
End: 2017-03-23
Payer: COMMERCIAL

## 2017-03-23 VITALS — TEMPERATURE: 97.5 F | HEIGHT: 62 IN | WEIGHT: 135 LBS | BODY MASS INDEX: 24.84 KG/M2

## 2017-03-23 DIAGNOSIS — M54.16 LUMBAR RADICULOPATHY: Primary | ICD-10-CM

## 2017-03-23 PROCEDURE — 99214 OFFICE O/P EST MOD 30 MIN: CPT | Performed by: PHYSICIAN ASSISTANT

## 2017-03-23 RX ORDER — METHYLPREDNISOLONE 4 MG
TABLET, DOSE PACK ORAL
Qty: 21 TABLET | Refills: 0 | Status: SHIPPED | OUTPATIENT
Start: 2017-03-23 | End: 2017-05-03

## 2017-03-23 NOTE — NURSING NOTE
"Milena Hamilton is a 76 year old female who presents for:  Chief Complaint   Patient presents with     Consult     Neurologic Problem     spinal stenosis of lumbar spine        Initial Vitals:  Temp 97.5  F (36.4  C) (Skin)  Ht 5' 1.75\" (1.568 m)  Wt 135 lb (61.2 kg)  BMI 24.89 kg/m2 Estimated body mass index is 24.89 kg/(m^2) as calculated from the following:    Height as of this encounter: 5' 1.75\" (1.568 m).    Weight as of this encounter: 135 lb (61.2 kg).. Body surface area is 1.63 meters squared. BP completed using cuff size: NA (Not Taken)  Data Unavailable    Do you feel safe in your environment?  Yes  Do you need any refills today? No    Nursing Comments:         Addi Sheth    "

## 2017-03-23 NOTE — PROGRESS NOTES
Dr. Ru Cast  [facility]  Neurosurgery Clinic Visit      CC: right leg and low back pain    Primary care Provider: Chuck Streeter      Reason For Visit:   I was asked by Chuck Streeter PA-C to consult on the patient for low back and right leg pain.      HPI: Milena Hamilton is a 76 year old female who presents for evaluation of her chief complaint of low back and right leg pain. Symptoms have been present for more than 1 /12 years, with no event or injury. She describes severe low back pain, with pain radiating down the lateral aspect of her right thigh, as far as the ankle. Pain is present on a daily basis, and is exacerbated with ambulation, and relieved, slightly, with rest. She has had two injections, in June and October of 2016, with a couple weeks relief following each. Denies any changes in bowel or bladder function, or any new problems with balance or coordination.       Past Medical History:   Diagnosis Date     Lichen planus     vulvar     Osteopenia 4/2010    recheck DEXA in 2 years     Panic disorder without agoraphobia      Pure hypercholesterolemia     low LDLs     Raynaud's disease 6/2014     Unspecified essential hypertension        Past Medical History reviewed with patient during visit.    Past Surgical History:   Procedure Laterality Date     C APPENDECTOMY       C NONSPECIFIC PROCEDURE      Right inferior oblique recession, 14 mm     C REMOVAL GALLBLADDER  2/9/2010    lap     C VAG HYST,RMV TUBE/OVARY  1984    cervix removed     COLONOSCOPY  10/17/2007    normal, recheck in 10 years     COLONOSCOPY N/A 8/27/2014    Procedure: COLONOSCOPY;  Surgeon: Raffi Montiel MD;  Location:  GI     COMBINED CYSTOSCOPY, INSERT CATHETER URETER Bilateral 10/13/2014    Procedure: COMBINED CYSTOSCOPY, INSERT CATHETER URETER;  Surgeon: Abdoulaye Odell MD;  Location:  OR     EYE SURGERY  8/01    right eye muscle repair     HC REMOVAL OF OVARY/TUBE(S)       Salpingo-Oophorectomy, bilateral     HYSTERECTOMY, PAP NO LONGER INDICATED       INJECT EPIDURAL TRANSFORAMINAL Right 6/9/2016    Procedure: INJECT EPIDURAL TRANSFORAMINAL;  Surgeon: Antonio Dasilva MD;  Location: [facility] OR     INJECT EPIDURAL TRANSFORAMINAL Right 10/13/2016    Procedure: INJECT EPIDURAL TRANSFORAMINAL;  Surgeon: Antonio Dasilva MD;  Location: [facility] OR     LAPAROSCOPIC ASSISTED COLECTOMY LEFT (DESCENDING) N/A 10/13/2014    Procedure: LAPAROSCOPIC ASSISTED COLECTOMY LEFT (DESCENDING);  Surgeon: Raffi Montiel MD;  Location: [facility] OR     Past Surgical History reviewed with patient during visit.    Current Outpatient Prescriptions   Medication     methylPREDNISolone (MEDROL DOSEPAK) 4 MG tablet     ALPRAZolam (XANAX) 0.25 MG tablet     busPIRone (BUSPAR) 7.5 MG tablet     metoprolol (LOPRESSOR) 25 MG tablet     simvastatin (ZOCOR) 20 MG tablet     levothyroxine (SYNTHROID, LEVOTHROID) 50 MCG tablet     Probiotic Product (MISC INTESTINAL ERIC REGULAT) TABS     Dentifrices (BIOTENE DRY MOUTH) PSTE     fish oil-omega-3 fatty acids (FISH OIL) 1000 MG capsule     THERAPEUTIC MULTIVITAMIN OR     ASPIRIN 81 MG OR TABS     No current facility-administered medications for this visit.        Allergies   Allergen Reactions     Azithromycin Diarrhea     Ciprofloxacin Other (See Comments)     Cipro, dizziness and abdominal pain     Penicillins      stomach upset, diarrhea     Sulfa Drugs      hives       Social History     Social History     Marital status:      Spouse name: N/A     Number of children: 4     Years of education: N/A     Occupational History     dry cleaning      Social History Main Topics     Smoking status: Never Smoker     Smokeless tobacco: Never Used     Alcohol use No     Drug use: No     Sexual activity: No      Comment: not currently/hysterectomy     Other Topics Concern     Parent/Sibling W/ Cabg, Mi Or Angioplasty Before 65f 55m? No     Social History Narrative       Family History  "  Problem Relation Age of Onset     DIABETES Brother      HEART DISEASE Brother 50     AMI     DIABETES Brother      HEART DISEASE Brother      AMI, \"older\"     CEREBROVASCULAR DISEASE Brother      Psychotic Disorder Brother      , alcohol     CANCER Sister      breast cancer x2     Hypertension No family hx of      Breast Cancer No family hx of      Ovarian Cancer No family hx of      Prostate Cancer No family hx of      Mental Illness No family hx of      Anesthesia Reaction No family hx of      OSTEOPOROSIS No family hx of      Obesity No family hx of      Other Cancer No family hx of      Depression No family hx of      Anxiety Disorder No family hx of      MENTAL ILLNESS No family hx of      Substance Abuse No family hx of           ROS: 10 point ROS neg other than the symptoms noted above in the HPI.    Vital Signs: Temp 97.5  F (36.4  C) (Skin)  Ht 5' 1.75\" (1.568 m)  Wt 135 lb (61.2 kg)  BMI 24.89 kg/m2    Examination:  Constitutional:  Alert, well nourished, NAD.  HEENT: Normocephalic, atraumatic.   Pulmonary:  Without shortness of breath, normal effort.   Lymph: no lymphadenopathy to low back or LE.   Integumentary: Skin is free of rashes or lesions.   Cardiovascular:  No pitting edema of BLE.      Neurological:  Awake  Alert  Oriented x 3  Speech clear  Cranial nerves II - XII grossly intact  PERRL  EOMI  Face symmetric  Tongue midline  Motor exam   Shoulder Abduction:  Right:  5/5   Left:  5/5  Biceps:                      Right:  5/5   Left:  5/5  Triceps:                     Right:  5/5   Left:  5/5  Wrist Extensors:       Right:  5/5   Left:  5/5  Wrist Flexors:           Right:  5/5   Left:  5/5  Intrinsics:                   Right:  5/5   Left:  5/5  Hip Flexor:                Right: 5/5  Left:  5/5  Hip Adductor:             Right:  5/5  Left:  5/5  Hip Abductor:             Right:  5/5  Left:  5/5  Gastroc Soleus:        Right:  5/5  Left:  5/5  Tib/Ant:                      Right:  55  " Left:  5/5  EHL:                          Right:  5/5  Left:  5/5       Sensation normal to bilateral upper and lower extremities.    Reflexes are 2+ in the patellar and Achilles. There is no clonus. Downgoing Babinski.    Reflexes are 2+ in the brachial radialis and triceps. Negative Isabell sign bilaterally.    Finger to Nose smooth  Pronator drift negative  Musculoskeletal:  Gait: Able to stand from a seated position. Normal non-antalgic, non-myelopathic gait.  Able to heel/toe walk without loss of balance  Cervical examination reveals good range of motion.  No tenderness to palpation of the cervical spine or paraspinous muscles bilaterally.    Lumbar examination reveals no tenderness of the spine or paraspinous muscles.  Hip height is symmetrical. Negative SI joint, sciatic notch or greater trochanteric tenderness to palpation bilaterally.  Straight leg raise is negative bilaterally.      Imaging:   MRI of the lumbar spine from 3/16 was reviewed in the office today. It shows ddd at L4-5 and L5-S1, with disc collapse and foraminal stenosis at L5-S1 on the right.     Assessment/Plan:     Right leg radicular pain  Low back pain  L5-S1 disc collapse and foraminal stenosis    Milena Hamilton is a 76 year old female with at least one and a half years of right leg pain and low back pain. She has responded briefly to JOSE last year. Her MRI shows disc collapse at L4-5 and L5-S1, with foraminal stenosis at L5-S1 on the right. I did recommend a follow up with Dr. Cast to discuss surgical options. I also gave her a medrol dose pack. She voiced agreement and understanding.       Barrett Tomlinson PA-C  [facility]  [address] [address]    Tel [phone]  Pager [phone]

## 2017-03-23 NOTE — MR AVS SNAPSHOT
"              After Visit Summary   3/23/2017    Milena Hamilton    MRN: [id number]           Patient Information     Date Of Birth          1940        Visit Information        Provider Department      3/23/2017 9:50 AM Barrett Tomlinson PA-C [facility]        Today's Diagnoses     Lumbar radiculopathy    -  1       Follow-ups after your visit        Your next 10 appointments already scheduled     Mar 30, 2017 10:00 AM CDT   New Visit with Ru Cast MD   [facility]    [address], [address]  [facility] [id number] [phone]              Who to contact     If you have questions or need follow up information about today's clinic visit or your schedule please contact [facility] directly at [phone].  Normal or non-critical lab and imaging results will be communicated to you by MyChart, letter or phone within 4 business days after the clinic has received the results. If you do not hear from us within 7 days, please contact the clinic through MyChart or phone. If you have a critical or abnormal lab result, we will notify you by phone as soon as possible.  Submit refill requests through [employer] or call your pharmacy and they will forward the refill request to us. Please allow 3 business days for your refill to be completed.          Additional Information About Your Visit        MyChart Information     [employer] lets you send messages to your doctor, view your test results, renew your prescriptions, schedule appointments and more. To sign up, go to [email or web address] . Click on \"Log in\" on the left side of the screen, which will take you to the Welcome page. Then click on \"Sign up Now\" on the right side of the page.     You will be asked to enter the access code listed below, as well as some personal information. Please follow the directions to create your username and password.     Your access code is: " [email or web address]  Expires: 2017 10:14 AM     Your access code will  in 90 days. If you need help or a new code, please call your [place] clinic or [phone].        Care EveryWhere ID     This is your Care EveryWhere ID. This could be used by other organizations to access your [place] medical records  [id number]        Your Vitals Were     Temperature Height BMI (Body Mass Index)             97.5  F (36.4  C) (Skin) 5' 1.75\" (1.568 m) 24.89 kg/m2          Blood Pressure from Last 3 Encounters:   17 120/78   17 136/70   02/15/17 120/64    Weight from Last 3 Encounters:   17 135 lb (61.2 kg)   17 135 lb (61.2 kg)   17 138 lb (62.6 kg)              Today, you had the following     No orders found for display         Today's Medication Changes          These changes are accurate as of: 3/23/17 10:30 AM.  If you have any questions, ask your nurse or doctor.               Start taking these medicines.        Dose/Directions    methylPREDNISolone 4 MG tablet   Commonly known as:  MEDROL DOSEPAK   Used for:  Lumbar radiculopathy        Follow package instructions   Quantity:  21 tablet   Refills:  0            Where to get your medicines      These medications were sent to [facility] [address] [address]     Phone:  [phone]     methylPREDNISolone 4 MG tablet                Primary Care Provider Office Phone # Fax #    Chuck Streeter PA-C [phone]       [facility] [address]  [facility] [address]        Thank you!     Thank you for choosing [facility]  for your care. Our goal is always to provide you with excellent care. Hearing back from our patients is one way we can continue to improve our services. Please take a few minutes to complete the written survey that you may receive in the mail after your visit with us. Thank you!           "   Your Updated Medication List - Protect others around you: Learn how to safely use, store and throw away your medicines at www.disposemymeds.org.          This list is accurate as of: 3/23/17 10:30 AM.  Always use your most recent med list.                   Brand Name Dispense Instructions for use    ALPRAZolam 0.25 MG tablet    XANAX    30 tablet    Take 1 tablet (0.25 mg) by mouth nightly as needed for anxiety (as needed for sleep)       aspirin 81 MG tablet      1 TABLET DAILY       BIOTENE DRY MOUTH Pste      Toothpaste and mouthwash       busPIRone 7.5 MG tablet    BUSPAR    60 tablet    Take 1 tablet (7.5 mg) by mouth 2 times daily       fish oil-omega-3 fatty acids 1000 MG capsule      Take 2 g by mouth daily.       levothyroxine 50 MCG tablet    SYNTHROID/LEVOTHROID    90 tablet    Take 1 tablet (50 mcg) by mouth daily       methylPREDNISolone 4 MG tablet    MEDROL DOSEPAK    21 tablet    Follow package instructions       metoprolol 25 MG tablet    LOPRESSOR    45 tablet    Take 0.5 tablets (12.5 mg) by mouth daily       Misc Intestinal Lyndsey Regulat Tabs     30 tablet    Take 1 tablet by mouth 2 times daily       simvastatin 20 MG tablet    ZOCOR    45 tablet    Take 0.5 tablets (10 mg) by mouth At Bedtime DUE FOR FASTING LABS       THERAPEUTIC MULTIVITAMIN PO      1 TABLET DAILY

## 2017-03-24 ASSESSMENT — ANXIETY QUESTIONNAIRES
1. FEELING NERVOUS, ANXIOUS, OR ON EDGE: SEVERAL DAYS
7. FEELING AFRAID AS IF SOMETHING AWFUL MIGHT HAPPEN: MORE THAN HALF THE DAYS
3. WORRYING TOO MUCH ABOUT DIFFERENT THINGS: SEVERAL DAYS
6. BECOMING EASILY ANNOYED OR IRRITABLE: SEVERAL DAYS
IF YOU CHECKED OFF ANY PROBLEMS ON THIS QUESTIONNAIRE, HOW DIFFICULT HAVE THESE PROBLEMS MADE IT FOR YOU TO DO YOUR WORK, TAKE CARE OF THINGS AT HOME, OR GET ALONG WITH OTHER PEOPLE: NOT DIFFICULT AT ALL
GAD7 TOTAL SCORE: 10
5. BEING SO RESTLESS THAT IT IS HARD TO SIT STILL: SEVERAL DAYS
2. NOT BEING ABLE TO STOP OR CONTROL WORRYING: MORE THAN HALF THE DAYS

## 2017-03-24 ASSESSMENT — PATIENT HEALTH QUESTIONNAIRE - PHQ9: 5. POOR APPETITE OR OVEREATING: MORE THAN HALF THE DAYS

## 2017-03-25 ASSESSMENT — ANXIETY QUESTIONNAIRES: GAD7 TOTAL SCORE: 10

## 2017-03-29 ENCOUNTER — APPOINTMENT (OUTPATIENT)
Dept: CT IMAGING | Facility: CLINIC | Age: 77
End: 2017-03-29
Attending: FAMILY MEDICINE
Payer: MEDICARE

## 2017-03-29 ENCOUNTER — HOSPITAL ENCOUNTER (EMERGENCY)
Facility: CLINIC | Age: 77
Discharge: HOME | End: 2017-03-29
Attending: FAMILY MEDICINE | Admitting: FAMILY MEDICINE
Payer: MEDICARE

## 2017-03-29 VITALS
RESPIRATION RATE: 14 BRPM | HEIGHT: 62 IN | SYSTOLIC BLOOD PRESSURE: 132 MMHG | OXYGEN SATURATION: 97 % | HEART RATE: 71 BPM | WEIGHT: 134 LBS | BODY MASS INDEX: 24.66 KG/M2 | TEMPERATURE: 96.8 F | DIASTOLIC BLOOD PRESSURE: 68 MMHG

## 2017-03-29 DIAGNOSIS — K59.00 CONSTIPATION, UNSPECIFIED CONSTIPATION TYPE: ICD-10-CM

## 2017-03-29 LAB
ALBUMIN SERPL-MCNC: 3.9 G/DL (ref 3.4–5)
ALBUMIN UR-MCNC: NEGATIVE MG/DL
ALP SERPL-CCNC: 67 U/L (ref 40–150)
ALT SERPL W P-5'-P-CCNC: 28 U/L (ref 0–50)
ANION GAP SERPL CALCULATED.3IONS-SCNC: 10 MMOL/L (ref 3–14)
APPEARANCE UR: ABNORMAL
AST SERPL W P-5'-P-CCNC: 13 U/L (ref 0–45)
BACTERIA #/AREA URNS HPF: ABNORMAL /HPF
BASOPHILS # BLD AUTO: 0 10E9/L (ref 0–0.2)
BASOPHILS NFR BLD AUTO: 0.1 %
BILIRUB SERPL-MCNC: 0.5 MG/DL (ref 0.2–1.3)
BILIRUB UR QL STRIP: NEGATIVE
BUN SERPL-MCNC: 15 MG/DL (ref 7–30)
CALCIUM SERPL-MCNC: 9.1 MG/DL (ref 8.5–10.1)
CHLORIDE SERPL-SCNC: 102 MMOL/L (ref 94–109)
CO2 SERPL-SCNC: 30 MMOL/L (ref 20–32)
COLOR UR AUTO: YELLOW
CREAT SERPL-MCNC: 0.72 MG/DL (ref 0.52–1.04)
DIFFERENTIAL METHOD BLD: ABNORMAL
EOSINOPHIL # BLD AUTO: 0 10E9/L (ref 0–0.7)
EOSINOPHIL NFR BLD AUTO: 0.1 %
ERYTHROCYTE [DISTWIDTH] IN BLOOD BY AUTOMATED COUNT: 13.2 % (ref 10–15)
GFR SERPL CREATININE-BSD FRML MDRD: 79 ML/MIN/1.7M2
GLUCOSE SERPL-MCNC: 117 MG/DL (ref 70–99)
GLUCOSE UR STRIP-MCNC: NEGATIVE MG/DL
HCT VFR BLD AUTO: 44.7 % (ref 35–47)
HGB BLD-MCNC: 14.6 G/DL (ref 11.7–15.7)
HGB UR QL STRIP: NEGATIVE
IMM GRANULOCYTES # BLD: 0 10E9/L (ref 0–0.4)
IMM GRANULOCYTES NFR BLD: 0.3 %
KETONES UR STRIP-MCNC: NEGATIVE MG/DL
LEUKOCYTE ESTERASE UR QL STRIP: NEGATIVE
LIPASE SERPL-CCNC: 191 U/L (ref 73–393)
LYMPHOCYTES # BLD AUTO: 1.8 10E9/L (ref 0.8–5.3)
LYMPHOCYTES NFR BLD AUTO: 12.3 %
MCH RBC QN AUTO: 31.1 PG (ref 26.5–33)
MCHC RBC AUTO-ENTMCNC: 32.7 G/DL (ref 31.5–36.5)
MCV RBC AUTO: 95 FL (ref 78–100)
MONOCYTES # BLD AUTO: 1 10E9/L (ref 0–1.3)
MONOCYTES NFR BLD AUTO: 7.2 %
MUCOUS THREADS #/AREA URNS LPF: PRESENT /LPF
NEUTROPHILS # BLD AUTO: 11.4 10E9/L (ref 1.6–8.3)
NEUTROPHILS NFR BLD AUTO: 80 %
NITRATE UR QL: NEGATIVE
PH UR STRIP: 6 PH (ref 5–7)
PLATELET # BLD AUTO: 255 10E9/L (ref 150–450)
POTASSIUM SERPL-SCNC: 3.8 MMOL/L (ref 3.4–5.3)
PROT SERPL-MCNC: 7.5 G/DL (ref 6.8–8.8)
RBC # BLD AUTO: 4.7 10E12/L (ref 3.8–5.2)
RBC #/AREA URNS AUTO: <1 /HPF (ref 0–2)
SODIUM SERPL-SCNC: 142 MMOL/L (ref 133–144)
SP GR UR STRIP: 1.03 (ref 1–1.03)
URN SPEC COLLECT METH UR: ABNORMAL
UROBILINOGEN UR STRIP-MCNC: 0.2 MG/DL (ref 0–2)
WBC # BLD AUTO: 14.3 10E9/L (ref 4–11)
WBC #/AREA URNS AUTO: 1 /HPF (ref 0–2)

## 2017-03-29 PROCEDURE — 74177 CT ABD & PELVIS W/CONTRAST: CPT

## 2017-03-29 PROCEDURE — 83690 ASSAY OF LIPASE: CPT | Performed by: FAMILY MEDICINE

## 2017-03-29 PROCEDURE — 96374 THER/PROPH/DIAG INJ IV PUSH: CPT | Mod: 59

## 2017-03-29 PROCEDURE — 25000125 ZZHC RX 250: Performed by: FAMILY MEDICINE

## 2017-03-29 PROCEDURE — 25500064 ZZH RX 255 OP 636: Performed by: FAMILY MEDICINE

## 2017-03-29 PROCEDURE — 25000128 H RX IP 250 OP 636: Performed by: FAMILY MEDICINE

## 2017-03-29 PROCEDURE — 99283 EMERGENCY DEPT VISIT LOW MDM: CPT | Performed by: FAMILY MEDICINE

## 2017-03-29 PROCEDURE — 80053 COMPREHEN METABOLIC PANEL: CPT | Performed by: FAMILY MEDICINE

## 2017-03-29 PROCEDURE — 99285 EMERGENCY DEPT VISIT HI MDM: CPT | Mod: 25

## 2017-03-29 PROCEDURE — 96375 TX/PRO/DX INJ NEW DRUG ADDON: CPT

## 2017-03-29 PROCEDURE — 81001 URINALYSIS AUTO W/SCOPE: CPT | Performed by: FAMILY MEDICINE

## 2017-03-29 PROCEDURE — 85025 COMPLETE CBC W/AUTO DIFF WBC: CPT | Performed by: FAMILY MEDICINE

## 2017-03-29 RX ORDER — ONDANSETRON 4 MG/1
4 TABLET, ORALLY DISINTEGRATING ORAL EVERY 8 HOURS PRN
Qty: 10 TABLET | Refills: 0 | Status: SHIPPED | OUTPATIENT
Start: 2017-03-29 | End: 2017-04-01

## 2017-03-29 RX ORDER — SODIUM CHLORIDE 9 MG/ML
INJECTION, SOLUTION INTRAVENOUS ONCE
Status: DISCONTINUED | OUTPATIENT
Start: 2017-03-29 | End: 2017-03-29 | Stop reason: HOSPADM

## 2017-03-29 RX ORDER — HYDROMORPHONE HYDROCHLORIDE 1 MG/ML
0.5 INJECTION, SOLUTION INTRAMUSCULAR; INTRAVENOUS; SUBCUTANEOUS
Status: DISCONTINUED | OUTPATIENT
Start: 2017-03-29 | End: 2017-03-29 | Stop reason: HOSPADM

## 2017-03-29 RX ORDER — IOPAMIDOL 755 MG/ML
500 INJECTION, SOLUTION INTRAVASCULAR ONCE
Status: COMPLETED | OUTPATIENT
Start: 2017-03-29 | End: 2017-03-29

## 2017-03-29 RX ORDER — ONDANSETRON 2 MG/ML
4 INJECTION INTRAMUSCULAR; INTRAVENOUS
Status: COMPLETED | OUTPATIENT
Start: 2017-03-29 | End: 2017-03-29

## 2017-03-29 RX ORDER — POLYETHYLENE GLYCOL 3350 17 G/17G
1 POWDER, FOR SOLUTION ORAL DAILY
Qty: 527 G | Refills: 0 | COMMUNITY
Start: 2017-03-29 | End: 2017-04-28

## 2017-03-29 RX ORDER — LIDOCAINE 40 MG/G
CREAM TOPICAL
Status: DISCONTINUED | OUTPATIENT
Start: 2017-03-29 | End: 2017-03-29 | Stop reason: HOSPADM

## 2017-03-29 RX ADMIN — IOPAMIDOL 66 ML: 755 INJECTION, SOLUTION INTRAVENOUS at 13:23

## 2017-03-29 RX ADMIN — ONDANSETRON 4 MG: 2 INJECTION, SOLUTION INTRAMUSCULAR; INTRAVENOUS at 12:25

## 2017-03-29 RX ADMIN — SODIUM CHLORIDE 60 ML: 9 INJECTION, SOLUTION INTRAVENOUS at 13:24

## 2017-03-29 RX ADMIN — HYDROMORPHONE HYDROCHLORIDE 0.5 MG: 1 INJECTION, SOLUTION INTRAMUSCULAR; INTRAVENOUS; SUBCUTANEOUS at 13:01

## 2017-03-29 NOTE — ED NOTES
Presents with complaints of LLQ abdominal pain. Patient reports nausea and vomiting. Brenda Cao RN

## 2017-03-29 NOTE — ED AVS SNAPSHOT
[facility] Emergency Department    [address] [place] [address]    Phone:  [phone]    Fax:  [phone]                                       Milena Hamilton   MRN: [id number]    Department:  [facility] Emergency Department   Date of Visit:  3/29/2017           Patient Information     Date Of Birth          1940        Your diagnoses for this visit were:     Constipation, unspecified constipation type        You were seen by Fritz oWod MD.      Follow-up Information     Schedule an appointment as soon as possible for a visit with Raffi Montiel MD.    Specialty:  General Surgery    Why:  For follow up on your ED stay    Contact information:    [place] [id number] [place] [id number] [phone]          Discharge Instructions       1.  Thank you so much for allowing us to care for you today.  I hope you start to feel better soon.    2.  Use some MiraLAX when you get home and repeat this in the morning if you have not had good results.  3.  Follow-up with Dr. Montiel next week if things are not improving.      Constipation (Adult)  Constipation means that you have bowel movements that are less frequent than usual. Stools often become very hard and difficult to pass.  Constipation is very common. At some point in life it affects almost everyone. Since everyone's bowel habits are different, what is constipation to one person may not be to another. Your healthcare provider may do tests to diagnose constipation. It depends on what he or she finds when evaluating you.    Symptoms of constipation include:    Abdominal pain    Bloating    Vomiting    Painful bowel movements    Itching, swelling, bleeding, or pain around the anus  Causes  Constipation can have many causes. These include:    Diet low in fiber    Too much dairy    Not drinking enough liquids    Lack of exercise or physical activity. This is especially true for older  adults.    Changes in lifestyle or daily routine, including pregnancy, aging, work, and travel    Frequent use or misuse of laxatives    Ignoring the urge to have a bowel movement or delaying it until later    Medicines, such as certain prescription pain medicines, iron supplements, antacids, certain antidepressants, and calcium supplements    Diseases like irritable bowel syndrome, bowel obstructions, stroke, diabetes, thyroid disease, Parkinson disease, hemorrhoids, and colon cancer  Complications  Potential complications of constipation can include:    Hemorrhoids    Rectal bleeding from hemorrhoids or anal fissures (skin tears)    Hernias    Dependency on laxatives    Chronic constipation    Fecal impaction    Bowel obstruction or perforation  Home care  All treatment should be done after talking with your healthcare provider. This is especially true if you have another medical problems, are taking prescription medicines, or are an older adult. Treatment most often involves lifestyle changes. You may also need medicines. Your healthcare provider will tell you which will work best for you. Follow the advice below to help avoid this problem in the future.  Lifestyle changes  These lifestyle changes can help prevent constipation:    Diet. Eat a high-fiber diet, with fresh fruit and vegetables, and reduce dairy intake, meats, and processed foods    Fluids. It's important to get enough fluids each day. Drink plenty of water when you eat more fiber. If you are on diet that limits the amount of fluid you can have, talk about this with your healthcare provider.    Regular exercise. Check with your healthcare provider first.  Medications  Take any medicines as directed. Some laxatives are safe to use only every now and then. Others can be taken on a regular basis. Talk with your doctor or pharmacist if you have questions.  Prescription pain medicines can cause constipation. If you are taking this kind of medicine, ask  your healthcare provider if you should also take a stool softener.  Medicines you may take to treat constipation include:    Fiber supplements    Stool softeners    Laxatives    Enemas    Rectal suppositories  Follow-up care  Follow up with your healthcare provider if symptoms don't get better in the next few days. You may need to have more tests or see a specialist.  Call 911  Call 911 if any of these occur:    Trouble breathing    Stiff, rigid abdomen that is severely painful to touch    Confusion    Fainting or loss of consciousness    Rapid heart rate    Chest pain  When to seek medical advice  Call your healthcare provider right away if any of these occur:    Fever over 100.4 F (38 C)    Failure to resume normal bowel movements    Pain in your abdomen or back gets worse    Nausea or vomiting    Swelling in your abdomen    Blood in the stool    Black, tarry stool    Involuntary weight loss    Weakness    8766-3499 [employer]. [address] [place] [address]. All rights reserved. This information is not intended as a substitute for professional medical care. Always follow your healthcare professional's instructions.          Anatomy of the Digestive System  Food gives the body the energy needed for life. The digestive system breaks food down into basic nutrients that can be used by the body. The digestive tract is a long, muscular tube that extends from the mouth through the stomach and intestines to the anus. As food moves along the digestive tract, it is digested (changed into substances that can be absorbed into the bloodstream). Certain organs (such as the liver, gallbladder, and pancreas) help with this digestion. Parts of food that cannot be digested are turned into stool, which is waste material that is passed out of the body.  Digestive system      The mouth takes in food, breaks it into pieces, and begins the process of digestion.    The esophagus moves food from the mouth to the  stomach.    The stomach breaks food down into a liquid mixture.    The liver makes bile that helps digest fat.    The gallbladder stores bile.    The pancreas makes enzymes that help in digestion.    The small intestine digests food further and absorbs nutrients. What is left is passed on to the colon as liquid waste.    The large intestine (colon) absorbs water, salt, and minerals from the waste, forming a solid stool.    The rectum stores stool until a bowel movement occurs.    The anus is the opening where stool leaves the body.    1341-0966 [employer]. [address] [place] [address]. All rights reserved. This information is not intended as a substitute for professional medical care. Always follow your healthcare professional's instructions.          Future Appointments        Provider Department Dept Phone Center    3/30/2017 10:00 AM Ru Cast MD [facility] [phone] [facility]      24 Hour Appointment Hotline       To make an appointment at any [facility], call [phone] ([phone]). If you don't have a family doctor or clinic, we will help you find one. [facility] are conveniently located to serve the needs of you and your family.             Review of your medicines      START taking        Dose / Directions Last dose taken    ondansetron 4 MG ODT tab   Commonly known as:  ZOFRAN ODT   Dose:  4 mg   Quantity:  10 tablet        Take 1 tablet (4 mg) by mouth every 8 hours as needed   Refills:  0        polyethylene glycol powder   Commonly known as:  MIRALAX   Dose:  1 capful   Quantity:  527 g        Take 17 g (1 capful) by mouth daily   Refills:  0          Our records show that you are taking the medicines listed below. If these are incorrect, please call your family doctor or clinic.        Dose / Directions Last dose taken    ALPRAZolam 0.25 MG tablet   Commonly known as:  XANAX   Dose:  0.25 mg   Quantity:  30 tablet        Take 1  tablet (0.25 mg) by mouth nightly as needed for anxiety (as needed for sleep)   Refills:  0        aspirin 81 MG tablet        1 TABLET DAILY   Refills:  0        BIOTENE DRY MOUTH Pste        Toothpaste and mouthwash   Refills:  0        busPIRone 7.5 MG tablet   Commonly known as:  BUSPAR   Dose:  7.5 mg   Quantity:  60 tablet        Take 1 tablet (7.5 mg) by mouth 2 times daily   Refills:  0        fish oil-omega-3 fatty acids 1000 MG capsule   Dose:  2 g        Take 2 g by mouth daily.   Refills:  0        levothyroxine 50 MCG tablet   Commonly known as:  SYNTHROID/LEVOTHROID   Dose:  50 mcg   Quantity:  90 tablet        Take 1 tablet (50 mcg) by mouth daily   Refills:  0        methylPREDNISolone 4 MG tablet   Commonly known as:  MEDROL DOSEPAK   Quantity:  21 tablet        Follow package instructions   Refills:  0        metoprolol 25 MG tablet   Commonly known as:  LOPRESSOR   Dose:  12.5 mg   Quantity:  45 tablet        Take 0.5 tablets (12.5 mg) by mouth daily   Refills:  2        Misc Intestinal Lyndsey Regulat Tabs   Dose:  1 tablet   Quantity:  30 tablet        Take 1 tablet by mouth 2 times daily   Refills:  0        simvastatin 20 MG tablet   Commonly known as:  ZOCOR   Dose:  10 mg   Quantity:  45 tablet        Take 0.5 tablets (10 mg) by mouth At Bedtime DUE FOR FASTING LABS   Refills:  2        THERAPEUTIC MULTIVITAMIN PO        1 TABLET DAILY   Refills:  0                Prescriptions were sent or printed at these locations (2 Prescriptions)                   [facility] [place] [address] [address]    Telephone:  [phone]   Fax:  [phone]   Hours:                  E-Prescribed (1 of 2)         ondansetron (ZOFRAN ODT) 4 MG ODT tab                 Not Printed or Sent (1 of 2)         polyethylene glycol (MIRALAX) powder                Procedures and tests performed during your visit     CBC with platelets differential    CT  "Abdomen Pelvis w Contrast    Comprehensive metabolic panel    Lipase    Peripheral IV catheter    Peripheral IV: Standard    Soap suds enema    UA with Microscopic      Orders Needing Specimen Collection     None      Pending Results     Date and Time Order Name Status Description    3/29/2017 1149 CT Abdomen Pelvis w Contrast Preliminary             Pending Culture Results     No orders found from 3/27/2017 to 3/30/2017.            Thank you for choosing [facility]       Thank you for choosing [facility] for your care. Our goal is always to provide you with excellent care. Hearing back from our patients is one way we can continue to improve our services. Please take a few minutes to complete the written survey that you may receive in the mail after you visit with us. Thank you!        [employer] Information     [facility] lets you send messages to your doctor, view your test results, renew your prescriptions, schedule appointments and more. To sign up, go to [email or web address] . Click on \"Log in\" on the left side of the screen, which will take you to the Welcome page. Then click on \"Sign up Now\" on the right side of the page.     You will be asked to enter the access code listed below, as well as some personal information. Please follow the directions to create your username and password.     Your access code is: [email or web address]  Expires: 2017 10:14 AM     Your access code will  in 90 days. If you need help or a new code, please call your [facility] clinic or [phone].        Care EveryWhere ID     This is your Care EveryWhere ID. This could be used by other organizations to access your [facility] medical records  [id number]        After Visit Summary       This is your record. Keep this with you and show to your community pharmacist(s) and doctor(s) at your next visit.                  "

## 2017-03-29 NOTE — ED AVS SNAPSHOT
[facility] Emergency Department    [address] [place] [address]    Phone:  [phone]    Fax:  [phone]                                       Milena Hamilton   MRN: [id number]    Department:  [facility] Emergency Department   Date of Visit:  3/29/2017           After Visit Summary Signature Page     I have received my discharge instructions, and my questions have been answered. I have discussed any challenges I see with this plan with the nurse or doctor.    ..........................................................................................................................................  Patient/Patient Representative Signature      ..........................................................................................................................................  Patient Representative Print Name and Relationship to Patient    ..................................................               ................................................  Date                                            Time    ..........................................................................................................................................  Reviewed by Signature/Title    ...................................................              ..............................................  Date                                                            Time

## 2017-03-29 NOTE — ED PROVIDER NOTES
"  History     Chief Complaint   Patient presents with     Abdominal Pain     Left sided abdominal pain.      HPI  Milena Hamilton is a 76 year old female who presents with left lower quadrant abdominal pain and she's had some nausea and vomiting today.  Patient states that this was similar to when she had diverticulitis and colitis in the past.  She denies any fevers or chills.  She denies any back pain.  She denies any dysuria or hematuria.    I have reviewed the Medications, Allergies, Past Medical and Surgical History, and Social History in the Epic system.    Review of Systems   All other systems reviewed and are negative.      Physical Exam   BP: 128/65  Pulse: 78  Temp: 97.5  F (36.4  C)  Resp: 16  Height: 156.8 cm (5' 1.75\")  Weight: 60.8 kg (134 lb)  SpO2: 99 %  Physical Exam   Constitutional: She appears well-developed and well-nourished. No distress.   HENT:   Head: Normocephalic and atraumatic.   Mouth/Throat: Oropharynx is clear and moist. No oropharyngeal exudate.   Eyes: Conjunctivae are normal.   Neck: Normal range of motion. Neck supple.   Cardiovascular: Normal rate, regular rhythm and normal heart sounds.  Exam reveals no gallop and no friction rub.    No murmur heard.  Pulmonary/Chest: Effort normal and breath sounds normal. No respiratory distress. She has no wheezes. She has no rales.   Abdominal: Soft. Bowel sounds are normal. She exhibits no distension. There is tenderness (llq). There is no rebound.   Skin: She is not diaphoretic.   Nursing note and vitals reviewed.      ED Course     ED Course     Procedures         Results for orders placed or performed during the hospital encounter of 03/29/17   CT Abdomen Pelvis w Contrast    Narrative    CT ABDOMEN AND PELVIS WITH CONTRAST 3/29/2017 1:33 PM    TECHNIQUE: Images from diaphragm to pubic symphysis 66 mL Isovue-370  IV contrast.  Radiation dose for this scan was reduced using automated exposure  control, adjustment of the mA and/or kV " according to patient size, or  iterative reconstruction technique.    HISTORY: Left lower quadrant abdominal pain. History of colitis and  diverticulitis.     COMPARISON: 3/25/2016 CT abdomen and pelvis.    FINDINGS:   Abdomen and Pelvis: Redemonstrated postoperative change with  anastomosis at the sigmoid colon. This segment of colon at the  anastomosis is now moderately distended with stool. The descending  colon now appears normal without evidence for bowel wall thickening.  There is some fluid identified in the cecum and ascending colon  without bowel wall thickening.    Lung bases clear. Few low dense liver lesions on series 2 image 20 are  stable since 12/24/2013 compatible with probable small hepatic cysts.  A tiny 0.3 cm low dense lesion at the peripheral anterior liver image  16 is too small to characterize but may be an additional cyst,  unchanged from 3/25/2016. Cholecystectomy clips. Normal-appearing  spleen, pancreas, adrenal glands and kidneys.    No periaortic or pelvic adenopathy or free fluid. Lumbar scoliosis  convex right, degenerative changes lower thoracic and lower lumbar  spine.      Impression    IMPRESSION:   1. No evidence for acute bowel inflammation or obstruction. There is  some focal prominent stool distending the sigmoid colon at the level  of the anastomosis. At this level the stool-filled sigmoid measures  7.9 cm transverse, 5.3 cm craniocaudal and 5.2 cm AP dimension. The  descending colon proximal to this level is not distended.  2. Stable small liver lesions since 3/25/2016, compatible with hepatic  cysts. Small anterior lesion is too small to characterize but likely a  tiny cyst.   UA with Microscopic   Result Value Ref Range    Color Urine Yellow     Appearance Urine Cloudy     Glucose Urine Negative NEG mg/dL    Bilirubin Urine Negative NEG    Ketones Urine Negative NEG mg/dL    Specific Gravity Urine 1.030 1.003 - 1.035    Blood Urine Negative NEG    pH Urine 6.0 5.0 - 7.0 pH     Protein Albumin Urine Negative NEG mg/dL    Urobilinogen mg/dL 0.2 0.0 - 2.0 mg/dL    Nitrite Urine Negative NEG    Leukocyte Esterase Urine Negative NEG    Source Midstream Urine     WBC Urine 1 0 - 2 /HPF    RBC Urine <1 0 - 2 /HPF    Bacteria Urine Few (A) NEG /HPF    Mucous Urine Present (A) NEG /LPF   CBC with platelets differential   Result Value Ref Range    WBC 14.3 (H) 4.0 - 11.0 10e9/L    RBC Count 4.70 3.8 - 5.2 10e12/L    Hemoglobin 14.6 11.7 - 15.7 g/dL    Hematocrit 44.7 35.0 - 47.0 %    MCV 95 78 - 100 fl    MCH 31.1 26.5 - 33.0 pg    MCHC 32.7 31.5 - 36.5 g/dL    RDW 13.2 10.0 - 15.0 %    Platelet Count 255 150 - 450 10e9/L    Diff Method Automated Method     % Neutrophils 80.0 %    % Lymphocytes 12.3 %    % Monocytes 7.2 %    % Eosinophils 0.1 %    % Basophils 0.1 %    % Immature Granulocytes 0.3 %    Absolute Neutrophil 11.4 (H) 1.6 - 8.3 10e9/L    Absolute Lymphocytes 1.8 0.8 - 5.3 10e9/L    Absolute Monocytes 1.0 0.0 - 1.3 10e9/L    Absolute Eosinophils 0.0 0.0 - 0.7 10e9/L    Absolute Basophils 0.0 0.0 - 0.2 10e9/L    Abs Immature Granulocytes 0.0 0 - 0.4 10e9/L   Comprehensive metabolic panel   Result Value Ref Range    Sodium 142 133 - 144 mmol/L    Potassium 3.8 3.4 - 5.3 mmol/L    Chloride 102 94 - 109 mmol/L    Carbon Dioxide 30 20 - 32 mmol/L    Anion Gap 10 3 - 14 mmol/L    Glucose 117 (H) 70 - 99 mg/dL    Urea Nitrogen 15 7 - 30 mg/dL    Creatinine 0.72 0.52 - 1.04 mg/dL    GFR Estimate 79 >60 mL/min/1.7m2    GFR Estimate If Black >90   GFR Calc   >60 mL/min/1.7m2    Calcium 9.1 8.5 - 10.1 mg/dL    Bilirubin Total 0.5 0.2 - 1.3 mg/dL    Albumin 3.9 3.4 - 5.0 g/dL    Protein Total 7.5 6.8 - 8.8 g/dL    Alkaline Phosphatase 67 40 - 150 U/L    ALT 28 0 - 50 U/L    AST 13 0 - 45 U/L   Lipase   Result Value Ref Range    Lipase 191 73 - 393 U/L     Medications   sodium chloride (PF) 0.9% PF flush 3 mL (not administered)   0.9% sodium chloride infusion (not administered)    lidocaine 1 % 1 mL (not administered)   lidocaine (LMX4) kit (not administered)   sodium chloride (PF) 0.9% PF flush 3 mL (not administered)   sodium chloride (PF) 0.9% PF flush 3 mL (not administered)   HYDROmorphone (PF) (DILAUDID) injection 0.5 mg (0.5 mg Intravenous Given 3/29/17 1301)   ondansetron (ZOFRAN) injection 4 mg (4 mg Intravenous Given 3/29/17 1225)   sodium chloride (PF) 0.9% PF flush 3 mL (3 mLs Intravenous Given 3/29/17 1323)   iopamidol (ISOVUE-370) solution 500 mL (66 mLs Intravenous Given 3/29/17 1323)   sodium chloride 0.9 % for CT scan flush dose 500 mL (60 mLs Intravenous Given 3/29/17 1324)     labs reviewed and were unremarkable except for a slightly elevated white blood cell count.  Because of her history addict proceed with a CT of her abdomen which was relatively unremarkable.  They did see an area where the anastomosis was that looked relatively dilated.  I then consult the patient surgeon, Dr. Montiel, he personally reviewed the CT and felt that dilation was secondary to the side to side anastomosis that he did for the surgery.  He states that it will look chronically wide open and it should be wide open in that area.  He recommended given the patient an enema here and discharging the patient on stool softener's.  I will have the patient follow-up next week there is no improvement.  I will also discharge the patient home with some Zofran.    Assessments & Plan (with Medical Decision Making)  constipation      I have reviewed the nursing notes.    I have reviewed the findings, diagnosis, plan and need for follow up with the patient.        3/29/2017   [facility] EMERGENCY DEPARTMENT     Fritz Wood MD  03/29/17 9154

## 2017-03-29 NOTE — DISCHARGE INSTRUCTIONS
1.  Thank you so much for allowing us to care for you today.  I hope you start to feel better soon.    2.  Use some MiraLAX when you get home and repeat this in the morning if you have not had good results.  3.  Follow-up with Dr. Montiel next week if things are not improving.      Constipation (Adult)  Constipation means that you have bowel movements that are less frequent than usual. Stools often become very hard and difficult to pass.  Constipation is very common. At some point in life it affects almost everyone. Since everyone's bowel habits are different, what is constipation to one person may not be to another. Your healthcare provider may do tests to diagnose constipation. It depends on what he or she finds when evaluating you.    Symptoms of constipation include:    Abdominal pain    Bloating    Vomiting    Painful bowel movements    Itching, swelling, bleeding, or pain around the anus  Causes  Constipation can have many causes. These include:    Diet low in fiber    Too much dairy    Not drinking enough liquids    Lack of exercise or physical activity. This is especially true for older adults.    Changes in lifestyle or daily routine, including pregnancy, aging, work, and travel    Frequent use or misuse of laxatives    Ignoring the urge to have a bowel movement or delaying it until later    Medicines, such as certain prescription pain medicines, iron supplements, antacids, certain antidepressants, and calcium supplements    Diseases like irritable bowel syndrome, bowel obstructions, stroke, diabetes, thyroid disease, Parkinson disease, hemorrhoids, and colon cancer  Complications  Potential complications of constipation can include:    Hemorrhoids    Rectal bleeding from hemorrhoids or anal fissures (skin tears)    Hernias    Dependency on laxatives    Chronic constipation    Fecal impaction    Bowel obstruction or perforation  Home care  All treatment should be done after talking with your healthcare  provider. This is especially true if you have another medical problems, are taking prescription medicines, or are an older adult. Treatment most often involves lifestyle changes. You may also need medicines. Your healthcare provider will tell you which will work best for you. Follow the advice below to help avoid this problem in the future.  Lifestyle changes  These lifestyle changes can help prevent constipation:    Diet. Eat a high-fiber diet, with fresh fruit and vegetables, and reduce dairy intake, meats, and processed foods    Fluids. It's important to get enough fluids each day. Drink plenty of water when you eat more fiber. If you are on diet that limits the amount of fluid you can have, talk about this with your healthcare provider.    Regular exercise. Check with your healthcare provider first.  Medications  Take any medicines as directed. Some laxatives are safe to use only every now and then. Others can be taken on a regular basis. Talk with your doctor or pharmacist if you have questions.  Prescription pain medicines can cause constipation. If you are taking this kind of medicine, ask your healthcare provider if you should also take a stool softener.  Medicines you may take to treat constipation include:    Fiber supplements    Stool softeners    Laxatives    Enemas    Rectal suppositories  Follow-up care  Follow up with your healthcare provider if symptoms don't get better in the next few days. You may need to have more tests or see a specialist.  Call 911  Call 911 if any of these occur:    Trouble breathing    Stiff, rigid abdomen that is severely painful to touch    Confusion    Fainting or loss of consciousness    Rapid heart rate    Chest pain  When to seek medical advice  Call your healthcare provider right away if any of these occur:    Fever over 100.4 F (38 C)    Failure to resume normal bowel movements    Pain in your abdomen or back gets worse    Nausea or vomiting    Swelling in your  abdomen    Blood in the stool    Black, tarry stool    Involuntary weight loss    Weakness    3056-5043 [facility]. [address] [place] [address] 12619. All rights reserved. This information is not intended as a substitute for professional medical care. Always follow your healthcare professional's instructions.          Anatomy of the Digestive System  Food gives the body the energy needed for life. The digestive system breaks food down into basic nutrients that can be used by the body. The digestive tract is a long, muscular tube that extends from the mouth through the stomach and intestines to the anus. As food moves along the digestive tract, it is digested (changed into substances that can be absorbed into the bloodstream). Certain organs (such as the liver, gallbladder, and pancreas) help with this digestion. Parts of food that cannot be digested are turned into stool, which is waste material that is passed out of the body.  Digestive system      The mouth takes in food, breaks it into pieces, and begins the process of digestion.    The esophagus moves food from the mouth to the stomach.    The stomach breaks food down into a liquid mixture.    The liver makes bile that helps digest fat.    The gallbladder stores bile.    The pancreas makes enzymes that help in digestion.    The small intestine digests food further and absorbs nutrients. What is left is passed on to the colon as liquid waste.    The large intestine (colon) absorbs water, salt, and minerals from the waste, forming a solid stool.    The rectum stores stool until a bowel movement occurs.    The anus is the opening where stool leaves the body.    4458-6241 [facility]. [address] [place] [address]117. All rights reserved. This information is not intended as a substitute for professional medical care. Always follow your healthcare professional's instructions.

## 2017-03-30 ENCOUNTER — OFFICE VISIT (OUTPATIENT)
Dept: NEUROSURGERY | Facility: OTHER | Age: 77
End: 2017-03-30
Payer: COMMERCIAL

## 2017-03-30 VITALS — TEMPERATURE: 97.9 F | HEIGHT: 62 IN | BODY MASS INDEX: 24.84 KG/M2 | WEIGHT: 135 LBS

## 2017-03-30 DIAGNOSIS — M51.9 DISORDER OF INTERVERTEBRAL DISC OF LUMBAR SPINE: Primary | ICD-10-CM

## 2017-03-30 PROCEDURE — 99213 OFFICE O/P EST LOW 20 MIN: CPT | Performed by: NEUROLOGICAL SURGERY

## 2017-03-30 NOTE — PROGRESS NOTES
HPI: Milena Hamilton is a 76 year old female who presents for evaluation of her chief complaint of low back and right leg pain. Symptoms have been present for more than 1 /12 years, with no event or injury. She describes severe low back pain, with pain radiating down the lateral aspect of her right thigh, as far as the ankle. Pain is present on a daily basis, and is exacerbated with ambulation, and relieved, slightly, with rest. She has had two injections, in June and October of 2016, with a couple weeks relief following each. Denies any changes in bowel or bladder function, or any new problems with balance or coordination.     She presents today for follow-up.  She continues to have intermittent, 8/10 sharp right-sided leg pain, with radiation to the dorsal foot.  Only alleviated by rest.      Past Medical History:   Diagnosis Date     Lichen planus     vulvar     Osteopenia 4/2010    recheck DEXA in 2 years     Panic disorder without agoraphobia      Pure hypercholesterolemia     low LDLs     Raynaud's disease 6/2014     Unspecified essential hypertension        Past Medical History reviewed with patient during visit.    Past Surgical History:   Procedure Laterality Date     C APPENDECTOMY       C NONSPECIFIC PROCEDURE      Right inferior oblique recession, 14 mm     C REMOVAL GALLBLADDER  2/9/2010    lap     C VAG HYST,RMV TUBE/OVARY  1984    cervix removed     COLONOSCOPY  10/17/2007    normal, recheck in 10 years     COLONOSCOPY N/A 8/27/2014    Procedure: COLONOSCOPY;  Surgeon: Raffi Montiel MD;  Location:  GI     COMBINED CYSTOSCOPY, INSERT CATHETER URETER Bilateral 10/13/2014    Procedure: COMBINED CYSTOSCOPY, INSERT CATHETER URETER;  Surgeon: Abdoulaye Odell MD;  Location:  OR     EYE SURGERY  8/01    right eye muscle repair     HC REMOVAL OF OVARY/TUBE(S)      Salpingo-Oophorectomy, bilateral     HYSTERECTOMY, PAP NO LONGER INDICATED       INJECT EPIDURAL TRANSFORAMINAL Right 6/9/2016     Procedure: INJECT EPIDURAL TRANSFORAMINAL;  Surgeon: Antonio Dasilva MD;  Location: [facility] OR     INJECT EPIDURAL TRANSFORAMINAL Right 10/13/2016    Procedure: INJECT EPIDURAL TRANSFORAMINAL;  Surgeon: Antonio Dasilva MD;  Location: [facility] OR     LAPAROSCOPIC ASSISTED COLECTOMY LEFT (DESCENDING) N/A 10/13/2014    Procedure: LAPAROSCOPIC ASSISTED COLECTOMY LEFT (DESCENDING);  Surgeon: Raffi Montiel MD;  Location: [facility] OR     Past Surgical History reviewed with patient during visit.    Current Outpatient Prescriptions   Medication     polyethylene glycol (MIRALAX) powder     ondansetron (ZOFRAN ODT) 4 MG ODT tab     methylPREDNISolone (MEDROL DOSEPAK) 4 MG tablet     ALPRAZolam (XANAX) 0.25 MG tablet     busPIRone (BUSPAR) 7.5 MG tablet     metoprolol (LOPRESSOR) 25 MG tablet     simvastatin (ZOCOR) 20 MG tablet     levothyroxine (SYNTHROID, LEVOTHROID) 50 MCG tablet     Probiotic Product (MISC INTESTINAL ERIC REGULAT) TABS     Dentifrices (BIOTENE DRY MOUTH) PSTE     fish oil-omega-3 fatty acids (FISH OIL) 1000 MG capsule     THERAPEUTIC MULTIVITAMIN OR     ASPIRIN 81 MG OR TABS     No current facility-administered medications for this visit.        Allergies   Allergen Reactions     Azithromycin Diarrhea     Ciprofloxacin Other (See Comments)     Cipro, dizziness and abdominal pain     Penicillins      stomach upset, diarrhea     Sulfa Drugs      hives       Social History     Social History     Marital status:      Spouse name: N/A     Number of children: 4     Years of education: N/A     Occupational History     dry cleaning      Social History Main Topics     Smoking status: Never Smoker     Smokeless tobacco: Never Used     Alcohol use No     Drug use: No     Sexual activity: No      Comment: not currently/hysterectomy     Other Topics Concern     Parent/Sibling W/ Cabg, Mi Or Angioplasty Before 65f 55m? No     Social History Narrative       Family History   Problem Relation Age of Onset     DIABETES Brother   "    HEART DISEASE Brother 50     AMI     DIABETES Brother      HEART DISEASE Brother      AMI, \"older\"     CEREBROVASCULAR DISEASE Brother      Psychotic Disorder Brother      , alcohol     CANCER Sister      breast cancer x2     Hypertension No family hx of      Breast Cancer No family hx of      Ovarian Cancer No family hx of      Prostate Cancer No family hx of      Mental Illness No family hx of      Anesthesia Reaction No family hx of      OSTEOPOROSIS No family hx of      Obesity No family hx of      Other Cancer No family hx of      Depression No family hx of      Anxiety Disorder No family hx of      MENTAL ILLNESS No family hx of      Substance Abuse No family hx of           ROS: 10 point ROS neg other than the symptoms noted above in the HPI.    Vital Signs: Temp 97.9  F (36.6  C) (Skin)  Ht 5' 1.75\" (1.568 m)  Wt 135 lb (61.2 kg)  BMI 24.89 kg/m2    Examination:  Constitutional:  Alert, well nourished, NAD.  HEENT: Normocephalic, atraumatic.   Pulmonary:  Without shortness of breath, normal effort.   Lymph: no lymphadenopathy to low back or LE.   Integumentary: Skin is free of rashes or lesions.   Cardiovascular:  No pitting edema of BLE.      Neurological:  Awake  Alert  Oriented x 3  Speech clear  Cranial nerves II - XII grossly intact  PERRL  EOMI  Face symmetric  Tongue midline  Motor exam   Shoulder Abduction:  Right:  5/5   Left:  55  Biceps:                      Right:  5   Left:  55  Triceps:                     Right:  5/5   Left:  5/5  Wrist Extensors:       Right:  5/5   Left:  5/5  Wrist Flexors:           Right:  5   Left:  5  Intrinsics:                   Right:  5   Left:  55  Hip Flexor:                Right: 55  Left:  55  Hip Adductor:             Right:  5/5  Left:  55  Hip Abductor:             Right:  5/5  Left:  5/5  Gastroc Soleus:        Right:  5  Left:  5  Tib/Ant:                      Right:  5  Left:  5  EHL:                          Right:    " Left:  5/5       Sensation normal to bilateral upper and lower extremities.    Reflexes are 2+ in the patellar and Achilles. There is no clonus. Downgoing Babinski.    Reflexes are 2+ in the brachial radialis and triceps. Negative Isabell sign bilaterally.    Finger to Nose smooth  Pronator drift negative  Musculoskeletal:  Gait: Able to stand from a seated position. Normal non-antalgic, non-myelopathic gait.  Able to heel/toe walk without loss of balance  Cervical examination reveals good range of motion.  No tenderness to palpation of the cervical spine or paraspinous muscles bilaterally.    Lumbar examination reveals no tenderness of the spine or paraspinous muscles.  Hip height is symmetrical. Negative SI joint, sciatic notch or greater trochanteric tenderness to palpation bilaterally.  Straight leg raise is negative bilaterally.      Imaging:   MRI of the lumbar spine from 3/16 was reviewed in the office today. It shows ddd at L4-5 and L5-S1, with disc collapse and foraminal stenosis at L5-S1 on the right.     Assessment/Plan:     Right leg radicular pain  Low back pain  L5-S1 disc collapse and foraminal stenosis    Milena Hamilton is a 76 year old female with at least one and a half years of right leg pain and low back pain. She has responded briefly to JOSE last year. Her MRI shows disc collapse at L4-5 and L5-S1, with foraminal stenosis at L5-S1 on the right.    We discussed her thoracolumbar scoliosis, with the severe degeneration from L4-S1  We discussed that due to her osteopenia, I think she is not a good surgical candidate for a larger corrective operation for her scoliosis  We discussed that if she desires surgical intervention, I would recommend an L4 to S1 anterior and posterior spinal fusion  We discussed the risk profile of such a surgery  She'll consider the severity of her pain, and if she wishes to proceed

## 2017-03-30 NOTE — NURSING NOTE
"Milena Hamilton is a 76 year old female who presents for:  Chief Complaint   Patient presents with     RECHECK     Neurologic Problem     lumbar, discuss surgical options        Initial Vitals:  Temp 97.9  F (36.6  C) (Skin)  Ht 5' 1.75\" (1.568 m)  Wt 135 lb (61.2 kg)  BMI 24.89 kg/m2 Estimated body mass index is 24.89 kg/(m^2) as calculated from the following:    Height as of this encounter: 5' 1.75\" (1.568 m).    Weight as of this encounter: 135 lb (61.2 kg).. Body surface area is 1.63 meters squared. BP completed using cuff size: NA (Not Taken)  Data Unavailable    Do you feel safe in your environment?  Yes  Do you need any refills today? No    Nursing Comments:         Addi Sheth    "

## 2017-03-30 NOTE — MR AVS SNAPSHOT
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  Normal or non-critical lab and imaging results will be communicated to you by Sanovashart, letter or phone within 4 business days after the clinic has received the results. If you do not hear from us within 7 days, please contact the clinic through Sanovashart or phone. If you have a critical or abnormal lab result, we will notify you by phone as soon as possible.  Submit refill requests through Pirate[employer]D or call your pharmacy and they will forward the refill request to us. Please allow 3 business days for your refill to be completed.          Additional Information About Your Visit        MyChart Information     [email or web address] lets you send messages to your doctor, view your test results, renew your prescriptions, schedule appointments and more. To sign up, go to [email or web address] . Click on \"Log in\" on the left side of the screen, which will take you to the Welcome page. Then click on \"Sign up Now\" on the right side of the page.     You will be asked to enter the access code listed below, as well as some personal information. Please follow the directions to create your username and password.     Your access code is: [email or web address]  Expires: 2017 10:14 AM     Your access code will  in 90 days. If you need help or a new code, please call your [facility] or [phone].        Care EveryWhere ID     This is your Care EveryWhere ID. This " "could be used by other organizations to access your [place] medical records  [id number]        Your Vitals Were     Temperature Height BMI (Body Mass Index)             97.9  F (36.6  C) (Skin) 5' 1.75\" (1.568 m) 24.89 kg/m2          Blood Pressure from Last 3 Encounters:   03/29/17 132/68   03/22/17 120/78   02/23/17 136/70    Weight from Last 3 Encounters:   03/30/17 135 lb (61.2 kg)   03/29/17 134 lb (60.8 kg)   03/23/17 135 lb (61.2 kg)              Today, you had the following     No orders found for display       Primary Care Provider Office Phone # Fax #    Chuck Streeter PA-C [phone]       [facility] [address]  [facility] [address]        Thank you!     Thank you for choosing [facility]  for your care. Our goal is always to provide you with excellent care. Hearing back from our patients is one way we can continue to improve our services. Please take a few minutes to complete the written survey that you may receive in the mail after your visit with us. Thank you!             Your Updated Medication List - Protect others around you: Learn how to safely use, store and throw away your medicines at www.disposemymeds.org.          This list is accurate as of: 3/30/17  1:18 PM.  Always use your most recent med list.                   Brand Name Dispense Instructions for use    ALPRAZolam 0.25 MG tablet    XANAX    30 tablet    Take 1 tablet (0.25 mg) by mouth nightly as needed for anxiety (as needed for sleep)       aspirin 81 MG tablet      1 TABLET DAILY       BIOTENE DRY MOUTH Pste      Toothpaste and mouthwash       busPIRone 7.5 MG tablet    BUSPAR    60 tablet    Take 1 tablet (7.5 mg) by mouth 2 times daily       fish oil-omega-3 fatty acids 1000 MG capsule      Take 2 g by mouth daily.       levothyroxine 50 MCG tablet    SYNTHROID/LEVOTHROID    90 tablet    Take 1 tablet (50 mcg) by mouth daily       methylPREDNISolone 4 MG tablet    " MEDROL DOSEPAK    21 tablet    Follow package instructions       metoprolol 25 MG tablet    LOPRESSOR    45 tablet    Take 0.5 tablets (12.5 mg) by mouth daily       Misc Intestinal Lyndsey Regulat Tabs     30 tablet    Take 1 tablet by mouth 2 times daily       ondansetron 4 MG ODT tab    ZOFRAN ODT    10 tablet    Take 1 tablet (4 mg) by mouth every 8 hours as needed       polyethylene glycol powder    MIRALAX    527 g    Take 17 g (1 capful) by mouth daily       simvastatin 20 MG tablet    ZOCOR    45 tablet    Take 0.5 tablets (10 mg) by mouth At Bedtime DUE FOR FASTING LABS       THERAPEUTIC MULTIVITAMIN PO      1 TABLET DAILY

## 2017-04-06 ENCOUNTER — TELEPHONE (OUTPATIENT)
Dept: PALLIATIVE MEDICINE | Facility: CLINIC | Age: 77
End: 2017-04-06

## 2017-04-06 NOTE — TELEPHONE ENCOUNTER
Incoming fax from [facility] for a SESI. Routing to scheduling coordinators to schedule with Dr Elana Rico at [place] location.    Telma [place]    [facility] [place]

## 2017-04-06 NOTE — TELEPHONE ENCOUNTER
Pre-screening Questions for Radiology Injections:    Injection to be done at which interventional clinic site? [facility]    Procedure ordered by [facility]    Procedure ordered? Lumbar Epidural Steroid Injection    What insurance would patient like us to bill for this procedure? [employer]      Worker's comp-Any injection DO NOT SCHEDULE and route to Carline Vasquez.      [employer] insurance - For SI joint injections, DO NOT SCHEDULE and route Carline Vasquez.      [employer]- MBB's must be scheduled at LEAST two weeks apart      [employer] - Any injection besides hip/shoulder/knee joint DO NOT SCHEDULE and route to Carline Vasquez. She will obtain PA and call pt back to schedule procedure or notify pt of denial.     Any chance of pregnancy? NO   If YES, do NOT schedule and route to RN pool    Is an  needed? No     Patient has a drive home? (mandatory) YES:     Is patient taking any blood thinners (plavix, coumadin, jantoven, warfarin, heparin, pradaxa or dabigatran )? No   If hold needed, do NOT schedule, route to RN pool     Is patient taking any aspirin products? Yes - Pt takes 81mg daily; instructed to hold 0 day(s) prior to procedure.      If more than 325mg/day do NOT schedule; route to RN pool     For CERVICAL procedures, hold all aspirin products for 6 days.      Does the patient have a bleeding or clotting disorder? No     If yes, okay to schedule AND route to RN nurse pool    **For any patients with platelet count <100, must be forwarded to provider**    Is patient diabetic?  No  If YES, have them bring their glucometer.    Does patient have an active infection or treated for one within the past week? No     Is patient currently taking any antibiotics?  No     For patients on chronic, preventative, or prophylactic antibiotics, procedures may be scheduled.     For patients on antibiotics for active or recent infection:    Hipolito Palomino [place], [place]-antibiotic course must have  been completed for 4 days    Kieran Cristobal-antibiotic course must have been completed for 7 days    Is patient currently taking any steroid medications? (i.e. Prednisone, Medrol)  No     For patients on steroid medications:    Hipolito Palomino [place], [place]-steroid course must have been completed for 4 days    Kieran Cristobal-steroid course must have been completed for 7 days    Reviewed with patient:  If you are started on any steroids or antibiotics between now and your appointment, you must contact us because it may affect our ability to perform your procedure.  Yes    Is patient actively being treated for cancer or immunocompromised, including the spleen having been removed? No    If YES, do NOT schedule and route to RN pool     **For Dr. Whitt patients without spleens should have the chart sent to her**    Are you able to get on and off an exam table with minimal or no assistance? Yes  If NO, do NOT schedule and route to RN pool    Are you able to roll over and lay on your stomach with minimal or no assistance? Yes  If NO, do NOT schedule and route to RN pool     Any allergies to contrast dye, iodine, shellfish, or numbing and steroid medications? No  If YES, route to RN pool AND add allergy information to appointment notes    Allergies: Azithromycin; Ciprofloxacin; Penicillins; and Sulfa drugs        Has the patient had a flu shot or any other vaccinations within 7 days before or after the procedure.  No       Does patient have an MRI/CT?  YES: MRI  (SI joint, hip injections, lumbar sympathetic blocks, and stellate ganglion blocks do not require an MRI)    Was the MRI done w/in the last 3 years?  Yes    Was MRI done at [facility]? Yes      If not, where was it done? N/A       If MRI was not done at [facility], [facility] or [facility] do NOT schedule and route to nursing.  If pt has an imaging disc, the injection may be scheduled but pt has to bring disc to appt. If they show up w/out disc the  injection cannot be done    Reminders (please tell patient if applicable):       Instructed pt to arrive 30 minutes early for IV start if this is for a cervical procedure, ALL sympathetic (stellate ganglion, hypogastric, or lumbar sympathetic block) and all sedation procedures (RFA, spinal cord stimulation trials).  Not Applicable    -IVs are not routinely placed for [place] and [place] cervical case       If NPO for sedation, informed patient that it is okay to take medications with sips of water (except if they are to hold blood thinners).  Not Applicable   *DO take blood pressure medication if it is prescribed*      If this is for a cervical JOSE, informed patient that aspirin needs to be held for 6 days.   Not Applicable      For all patients not having spinal cord stimulator (SCS) trials or radiofrequency ablations (RFAs), informed patient:  IV sedation is not provided for this procedure.  If you feel that an oral anti-anxiety medication is needed, you can discuss this further with your referring provider or primary care provider.  The Pain Clinic provider will discuss specifics of what the procedure includes at your appointment.  Most procedures last 10-20 minutes.  We use numbing medications to help with any discomfort during the procedure.  Not Applicable      Do not schedule procedures requiring IV placement in the first appointment of the day or first appointment after lunch.       For patients 85 or older we recommend having an adult stay w/ them for the remainder of the day.       Does the patient have any questions?  NO  Laurie Patel  [facility]

## 2017-04-10 ENCOUNTER — SURGERY (OUTPATIENT)
Age: 77
End: 2017-04-10

## 2017-04-10 ENCOUNTER — HOSPITAL ENCOUNTER (OUTPATIENT)
Dept: GENERAL RADIOLOGY | Facility: CLINIC | Age: 77
End: 2017-04-10
Attending: ANESTHESIOLOGY | Admitting: ANESTHESIOLOGY
Payer: MEDICARE

## 2017-04-10 ENCOUNTER — HOSPITAL ENCOUNTER (OUTPATIENT)
Facility: CLINIC | Age: 77
Discharge: HOME | End: 2017-04-10
Attending: ANESTHESIOLOGY | Admitting: ANESTHESIOLOGY
Payer: MEDICARE

## 2017-04-10 VITALS
SYSTOLIC BLOOD PRESSURE: 153 MMHG | TEMPERATURE: 98.2 F | DIASTOLIC BLOOD PRESSURE: 87 MMHG | OXYGEN SATURATION: 97 % | RESPIRATION RATE: 15 BRPM

## 2017-04-10 DIAGNOSIS — M48.061 LUMBAR STENOSIS: ICD-10-CM

## 2017-04-10 PROCEDURE — 64484 NJX AA&/STRD TFRM EPI L/S EA: CPT | Mod: RT | Performed by: ANESTHESIOLOGY

## 2017-04-10 PROCEDURE — S0020 INJECTION, BUPIVICAINE HYDRO: HCPCS | Performed by: ANESTHESIOLOGY

## 2017-04-10 PROCEDURE — 25000125 ZZHC RX 250: Performed by: ANESTHESIOLOGY

## 2017-04-10 PROCEDURE — 77003 FLUOROGUIDE FOR SPINE INJECT: CPT | Mod: TC

## 2017-04-10 PROCEDURE — 62323 NJX INTERLAMINAR LMBR/SAC: CPT | Performed by: ANESTHESIOLOGY

## 2017-04-10 PROCEDURE — 64483 NJX AA&/STRD TFRM EPI L/S 1: CPT | Mod: RT | Performed by: ANESTHESIOLOGY

## 2017-04-10 PROCEDURE — 25000128 H RX IP 250 OP 636: Performed by: ANESTHESIOLOGY

## 2017-04-10 PROCEDURE — 25500064 ZZH RX 255 OP 636: Performed by: ANESTHESIOLOGY

## 2017-04-10 RX ORDER — IOPAMIDOL 612 MG/ML
INJECTION, SOLUTION INTRATHECAL PRN
Status: DISCONTINUED | OUTPATIENT
Start: 2017-04-10 | End: 2017-04-10 | Stop reason: HOSPADM

## 2017-04-10 RX ORDER — DEXAMETHASONE SODIUM PHOSPHATE 10 MG/ML
INJECTION, SOLUTION INTRAMUSCULAR; INTRAVENOUS PRN
Status: DISCONTINUED | OUTPATIENT
Start: 2017-04-10 | End: 2017-04-10 | Stop reason: HOSPADM

## 2017-04-10 RX ORDER — METHYLPREDNISOLONE ACETATE 40 MG/ML
INJECTION, SUSPENSION INTRA-ARTICULAR; INTRALESIONAL; INTRAMUSCULAR; SOFT TISSUE PRN
Status: DISCONTINUED | OUTPATIENT
Start: 2017-04-10 | End: 2017-04-10 | Stop reason: HOSPADM

## 2017-04-10 RX ORDER — BUPIVACAINE HYDROCHLORIDE 2.5 MG/ML
INJECTION, SOLUTION EPIDURAL; INFILTRATION; INTRACAUDAL PRN
Status: DISCONTINUED | OUTPATIENT
Start: 2017-04-10 | End: 2017-04-10 | Stop reason: HOSPADM

## 2017-04-10 RX ADMIN — METHYLPREDNISOLONE ACETATE 40 MG: 40 INJECTION, SUSPENSION INTRA-ARTICULAR; INTRALESIONAL; INTRAMUSCULAR; SOFT TISSUE at 08:00

## 2017-04-10 RX ADMIN — BUPIVACAINE HYDROCHLORIDE 1 ML: 2.5 INJECTION, SOLUTION EPIDURAL; INFILTRATION; INTRACAUDAL; PERINEURAL at 08:00

## 2017-04-10 RX ADMIN — LIDOCAINE HYDROCHLORIDE 1 ML: 10 INJECTION, SOLUTION EPIDURAL; INFILTRATION; INTRACAUDAL; PERINEURAL at 08:05

## 2017-04-10 RX ADMIN — IOPAMIDOL 1 ML: 612 INJECTION, SOLUTION INTRATHECAL at 08:01

## 2017-04-10 RX ADMIN — LIDOCAINE HYDROCHLORIDE 1 ML: 10; .005 INJECTION, SOLUTION EPIDURAL; INFILTRATION; INTRACAUDAL; PERINEURAL at 08:00

## 2017-04-10 RX ADMIN — DEXAMETHASONE SODIUM PHOSPHATE 10 MG: 10 INJECTION, SOLUTION INTRAMUSCULAR; INTRAVENOUS at 08:00

## 2017-04-10 NOTE — IP AVS SNAPSHOT
MRN:[id number]                      After Visit Summary   4/10/2017    Milena Hamilton    MRN: [id number]           Thank you!     Thank you for choosing [facility] for your care. Our goal is always to provide you with excellent care. Hearing back from our patients is one way we can continue to improve our services. Please take a few minutes to complete the written survey that you may receive in the mail after you visit with us. Thank you!        Patient Information     Date Of Birth          1940        About your hospital stay     You were admitted on:  April 10, 2017 You last received care in the:  [facility] OR    You were discharged on:  April 10, 2017       Who to Call     For medical emergencies, please call 911.  For non-urgent questions about your medical care, please call your primary care provider or clinic, [phone]  For questions related to your surgery, please call your surgery clinic        Attending Provider     Provider Specialty    Raffi Wright MD ANESTHESIOLOGY-PAIN MEDICINE       Primary Care Provider Office Phone # Fax #    Chuck Ashwin Streeter PA-C [phone]       [facility] [address]  [facility] [address]        After Care Instructions     Discharge Instructions       [facility]   Procedure Discharge Instructions    Today you saw:    Dr. Raffi Whitt, Dr. Kathy Zuniga    You had an:  Epidural steroid injection   sacro-iliac joint injection   facet joint injection  hip injection  piriformis injection     Medications used:  Lidocaine   Bupivacaine   Dexamethasone Depo-medrol  Omnipaque  Ropivicaine   Kenalog   Omniscan   Normal saline        If you have received sedation before, during, or after your procedure, for the next 24 hours you shall NOT:   -Drive  -Operate machinery  -Drink alcohol  -Sign any  legal documents  Be cautious when walking. Numbness and/or weakness in the lower extremities may occur up to 6-8 hours after the procedure due to effect of the local anesthetic  Do not drive for 6 hours. The effect of the local anesthetic could slow your reflexes.   You may resume your regular activities after 24 hours  Avoid strenuous activity for the first 24 hours  You may shower, however avoid swimming, tub baths or hot tubs for 24 hours following your procedure  You may have a mild to moderate increase in pain for several days following the injection.  It may take up to 14 days for the steroid medication to start working although you may feel the effect as early as a few days after the procedure.     You may use ice packs for 10-15 minutes, 3 to 4 times a day at the injection site for comfort  Do not use heat to painful areas for 6 to 8 hours. This will give the local anesthetic time to wear off and prevent you from accidentally burning your skin.   You may use anti-inflammatory medications (such as Ibuprofen or Aleve or Advil) or Tylenol for pain control if necessary  If you were fasting, you may resume your normal diet and medications after the procedure  If you have diabetes, check your blood sugar more frequently than usual as your blood sugar may be higher than normal for 10-14 days following a steroid injection. Contact your doctor who manages your diabetes if your blood sugar is higher than usual  If you experience any of the following, call the pain center nursing line during work hours at [phone] or the after hours provider line at [phone]:  -Fever over 100 degree F  -Swelling, bleeding, redness, drainage, warmth at the injection site  -Progressive weakness or numbness in your legs or arms  -Loss of bowel or bladder function  -Unusual headache that is not relieved by Tylenol  -Unusual new onset of pain that is not improving    Phone #s:  Appointment line: [phone];  Nurse line:  "[phone]                  Pending Results     No orders found from 2017 to 2017.            Admission Information     Date & Time Provider Department Dept. Phone    4/10/2017 Raffi Wright MD [facility] [phone]      Your Vitals Were     Blood Pressure Temperature Respirations             151/79 98.2  F (36.8  C) (Oral) 18         MyChart Information     [employer] lets you send messages to your doctor, view your test results, renew your prescriptions, schedule appointments and more. To sign up, go to [email or web address] . Click on \"Log in\" on the left side of the screen, which will take you to the Welcome page. Then click on \"Sign up Now\" on the right side of the page.     You will be asked to enter the access code listed below, as well as some personal information. Please follow the directions to create your username and password.     Your access code is: [email or web address]  Expires: 2017  7:53 AM     Your access code will  in 90 days. If you need help or a new code, please call your [facility] clinic or [phone].        Care EveryWhere ID     This is your Care EveryWhere ID. This could be used by other organizations to access your [facility] medical records  [id number]           Review of your medicines      UNREVIEWED medicines. Ask your doctor about these medicines        Dose / Directions    ALPRAZolam 0.25 MG tablet   Commonly known as:  XANAX   Used for:  Anxiety        Dose:  0.25 mg   Take 1 tablet (0.25 mg) by mouth nightly as needed for anxiety (as needed for sleep)   Quantity:  30 tablet   Refills:  0       aspirin 81 MG tablet        1 TABLET DAILY   Refills:  0       busPIRone 7.5 MG tablet   Commonly known as:  BUSPAR   Used for:  MARINO (generalized anxiety disorder)        Dose:  7.5 mg   Take 1 tablet (7.5 mg) by mouth 2 times daily   Quantity:  60 tablet   Refills:  0       fish oil-omega-3 fatty acids 1000 MG capsule        Dose:  2 g   Take 2 g by mouth " daily.   Refills:  0       levothyroxine 50 MCG tablet   Commonly known as:  SYNTHROID/LEVOTHROID   Used for:  Acquired hypothyroidism        Dose:  50 mcg   Take 1 tablet (50 mcg) by mouth daily   Quantity:  90 tablet   Refills:  0       methylPREDNISolone 4 MG tablet   Commonly known as:  MEDROL DOSEPAK   Used for:  Lumbar radiculopathy        Follow package instructions   Quantity:  21 tablet   Refills:  0       metoprolol 25 MG tablet   Commonly known as:  LOPRESSOR   Used for:  HTN, goal below 140/90        Dose:  12.5 mg   Take 0.5 tablets (12.5 mg) by mouth daily   Quantity:  45 tablet   Refills:  2       Misc Intestinal Lyndsey Regulat Tabs   Used for:  Diarrhea        Dose:  1 tablet   Take 1 tablet by mouth 2 times daily   Quantity:  30 tablet   Refills:  0       polyethylene glycol powder   Commonly known as:  MIRALAX        Dose:  1 capful   Take 17 g (1 capful) by mouth daily   Quantity:  527 g   Refills:  0       simvastatin 20 MG tablet   Commonly known as:  ZOCOR   Used for:  Hyperlipidemia LDL goal <130        Dose:  10 mg   Take 0.5 tablets (10 mg) by mouth At Bedtime DUE FOR FASTING LABS   Quantity:  45 tablet   Refills:  2       THERAPEUTIC MULTIVITAMIN PO        1 TABLET DAILY   Refills:  0         CONTINUE these medicines which have NOT CHANGED        Dose / Directions    BIOTENE DRY MOUTH Pste        Toothpaste and mouthwash   Refills:  0                Protect others around you: Learn how to safely use, store and throw away your medicines at www.disposemymeds.org.             Medication List: This is a list of all your medications and when to take them. Check marks below indicate your daily home schedule. Keep this list as a reference.      Medications           Morning Afternoon Evening Bedtime As Needed    ALPRAZolam 0.25 MG tablet   Commonly known as:  XANAX   Take 1 tablet (0.25 mg) by mouth nightly as needed for anxiety (as needed for sleep)                                aspirin 81 MG  tablet   1 TABLET DAILY                                BIOTENE DRY MOUTH Pste   Toothpaste and mouthwash                                busPIRone 7.5 MG tablet   Commonly known as:  BUSPAR   Take 1 tablet (7.5 mg) by mouth 2 times daily                                fish oil-omega-3 fatty acids 1000 MG capsule   Take 2 g by mouth daily.                                levothyroxine 50 MCG tablet   Commonly known as:  SYNTHROID/LEVOTHROID   Take 1 tablet (50 mcg) by mouth daily                                methylPREDNISolone 4 MG tablet   Commonly known as:  MEDROL DOSEPAK   Follow package instructions                                metoprolol 25 MG tablet   Commonly known as:  LOPRESSOR   Take 0.5 tablets (12.5 mg) by mouth daily                                Misc Intestinal Lyndsey Regulat Tabs   Take 1 tablet by mouth 2 times daily                                polyethylene glycol powder   Commonly known as:  MIRALAX   Take 17 g (1 capful) by mouth daily                                simvastatin 20 MG tablet   Commonly known as:  ZOCOR   Take 0.5 tablets (10 mg) by mouth At Bedtime DUE FOR FASTING LABS                                THERAPEUTIC MULTIVITAMIN PO   1 TABLET DAILY

## 2017-04-10 NOTE — OP NOTE
Pre procedure Diagnosis: lumbar radiculopathy, lumbar degenerative disc disease   Post procedure Diagnosis: Same  Procedure performed: lumbar transforaminal epidural steroid injection at L4-5 and L5-S1 right, fluoroscopically guided, contrast controlled  Anesthesia: none  Complications: none  Operators: Raffi Rico MD     Indications:   Milena Hamilton is a 76 year old female was sent by Dr. Ru Cast for lumbar epidural steroid injection.  They have a history of chronic lower back pain with pain radiating down the right lower extremity laterally to the ankle.  Exam shows lumbar tenderness and a positive straight leg raise on the right with intact lower extremity strength and they have tried conservative treatment including physical therapy, activity modifications, medications, and previous interventional procedures.    MRI lumbar spine was done on 3/11/16 which showed   FINDINGS: The report is dictated assuming five lumbar-type vertebral bodies. Sagittal images demonstrate normal vertebral body height. Bone marrow signal is unremarkable. Tip of the conus medullaris and cauda equina are unremarkable. Mild-to-moderate right convex lumbar scoliosis centered at the L2-L3 level.     T12-L1: No disc herniation or stenosis. Facet joints are  unremarkable.     L1-L2: No disc herniation or stenosis. Facet joints are unremarkable.        L2-L3: No disc herniation or stenosis. Mild facet degenerative  changes.     L3-L4: Mild broad-based disc bulge. Mild central stenosis. Neural foramina are patent. Mild facet degenerative changes.     L4-L5: Mild-to-moderate facet degenerative changes. Broad-based disc bulge. Mild-to-moderate central stenosis. Mild right foraminal stenosis. Left neural foramen is patent.     L5-S1: Advanced degenerative disc disease with mild disc bulge. No central stenosis. Moderate to severe right foraminal stenosis. Left neural foramen is patent.     Paraspinous soft tissues:  Unremarkable.         IMPRESSION:   1. At L3-L4 there is mild central stenosis.  2. At L4-L5 there is mild-to-moderate central stenosis. Mild right  foraminal stenosis.  3. At L5-S1 there is moderate-to-severe right foraminal stenosis.  4. Mild-to-moderate right convex lumbar scoliosis centered at the L2-L3 level.    Options/alternatives, benefits and risks were discussed with the patient including bleeding, infection, tissue trauma, numbness, weakness, paralysis, spinal cord injury, radiation exposure, headache and reaction to medications. Questions were answered to her satisfaction and she agrees to proceed. Voluntary informed consent was obtained and signed.     Vitals were reviewed: Yes  /87  Temp 98.2  F (36.8  C) (Oral)  Resp 15  SpO2 97%  Allergies were reviewed:  Yes   Medications were reviewed:  Yes   Pre-procedure pain score: 8/10    Procedure:  After getting informed consent, patient was brought into the procedure suite and was placed in a prone position on the procedure table.   A Pause for the Cause was performed.  Patient was prepped and draped in sterile fashion.     After identifying the right L4-5 and L5-S1 neuroforamen, the C-arm was rotated to a right lateral oblique angle.  A total of 1 ml of Lidocaine 1% was used to anesthetize the skin and the needle track at both skin entry sites coaxial with the fluoroscopy beam, and overriding the superior aspect of the neuroforamen at both levels.  27 gauge 3.5 inch spinal needles were advanced under intermittent fluoroscopy until they entered the foramen superiorly beneath the pedicles at both levels.  Aspiration was negative for blood or CSF.    The needle positions were then inspected from anteroposterior and lateral views, and the needles adjusted appropriately.  A total of 1.5 ml of Isovue-300 was injected, confirming appropriate position, with spread into the nerve root sheath and the epidural space, with no intravascular uptake. 1.5 ml was  wasted    Then, after repeated negative aspiration, each level was injected with 2.5 ml of a combination of Depomedrol 40 mg, Decadron 5 mg, 0.5% bupivacaine 1 ml diluted with with 2.5 ml of normal saline for a total injectate volume of 5 ml and the needles were flushed with lidocaine and removed.    During the procedure, there was a paresthesia described as a pressure sensation in the normal pain distribution with instillation of medication.  Hemostasis was achieved, the area was cleaned, and bandaids were placed when appropriate.  The patient tolerated the procedure well, and was taken to the recovery room.    Images were saved to PACS.    Post-procedure pain score: 0/10  Follow-up includes:   -f/u phone call in one week  -f/u with referring provider    Raffi Rico MD  [facility]

## 2017-04-10 NOTE — IP AVS SNAPSHOT
[facility] OR    [address] [place] [address] [address] [place] [address]    Phone:  [phone]                                       After Visit Summary   4/10/2017    Milena Hamilton    MRN: [id number]           After Visit Summary Signature Page     I have received my discharge instructions, and my questions have been answered. I have discussed any challenges I see with this plan with the nurse or doctor.    ..........................................................................................................................................  Patient/Patient Representative Signature      ..........................................................................................................................................  Patient Representative Print Name and Relationship to Patient    ..................................................               ................................................  Date                                            Time    ..........................................................................................................................................  Reviewed by Signature/Title    ...................................................              ..............................................  Date                                                            Time

## 2017-04-25 DIAGNOSIS — K21.9 GASTROESOPHAGEAL REFLUX DISEASE, ESOPHAGITIS PRESENCE NOT SPECIFIED: Primary | ICD-10-CM

## 2017-04-25 DIAGNOSIS — E03.9 ACQUIRED HYPOTHYROIDISM: ICD-10-CM

## 2017-04-25 NOTE — TELEPHONE ENCOUNTER
synthroid     Last Written Prescription Date: 10/26/16  Last Quantity: 90, # refills: 0  Last Office Visit with [facility], [facility] or [facility] prescribing provider: 03/22/17        TSH   Date Value Ref Range Status   11/30/2016 1.37 0.40 - 4.00 mU/L Final

## 2017-04-27 RX ORDER — LEVOTHYROXINE SODIUM 50 UG/1
TABLET ORAL
Qty: 90 TABLET | Refills: 1 | Status: SHIPPED | OUTPATIENT
Start: 2017-04-27 | End: 2017-10-24

## 2017-04-27 NOTE — TELEPHONE ENCOUNTER
Left message for patient to call back. Please see message below and clarify if patient is still taking Synthroid.  Grace Manuel, CMA

## 2017-04-27 NOTE — TELEPHONE ENCOUNTER
I recommend she continue with the current dose and complete updated thyroid labs in 2 months. I have no problem with her decreasing omeprazole to 20 mg, thanks.    Chuck Streeter PA-C

## 2017-04-27 NOTE — TELEPHONE ENCOUNTER
Pt returned the call and was read the message.  She needs the omeprazole new 20mg dose called to her pharmacy.

## 2017-04-27 NOTE — TELEPHONE ENCOUNTER
Patient still taking synthroid.  She is wondering if she can decrease the dosage?   Also, she is wondering if she can decrease the dosage of omeprazole from 40 mg to 20 mg.  Please advise.  Thank you

## 2017-04-27 NOTE — TELEPHONE ENCOUNTER
Routing refill request to provider for review/approval because:  A break in medication  Vivi Machado RN

## 2017-04-28 PROBLEM — E03.9 ACQUIRED HYPOTHYROIDISM: Status: ACTIVE | Noted: 2017-04-28

## 2017-04-28 NOTE — TELEPHONE ENCOUNTER
Omperazole 20mg      Last Written Prescription Date: 7/7/16  Last Fill Quantity: 90,  # refills: 2   Last Office Visit with [facility], [facility] or [facility] prescribing provider: 3/22/17

## 2017-05-03 ENCOUNTER — OFFICE VISIT (OUTPATIENT)
Dept: FAMILY MEDICINE | Facility: OTHER | Age: 77
End: 2017-05-03
Payer: COMMERCIAL

## 2017-05-03 VITALS
RESPIRATION RATE: 16 BRPM | DIASTOLIC BLOOD PRESSURE: 68 MMHG | TEMPERATURE: 98 F | HEIGHT: 62 IN | WEIGHT: 136 LBS | SYSTOLIC BLOOD PRESSURE: 118 MMHG | HEART RATE: 68 BPM | BODY MASS INDEX: 25.03 KG/M2

## 2017-05-03 DIAGNOSIS — K59.00 CONSTIPATION, UNSPECIFIED CONSTIPATION TYPE: Primary | ICD-10-CM

## 2017-05-03 DIAGNOSIS — K64.4 EXTERNAL HEMORRHOIDS: ICD-10-CM

## 2017-05-03 PROCEDURE — 99213 OFFICE O/P EST LOW 20 MIN: CPT | Performed by: NURSE PRACTITIONER

## 2017-05-03 RX ORDER — POLYETHYLENE GLYCOL 3350 17 G/17G
1 POWDER, FOR SOLUTION ORAL DAILY PRN
Qty: 510 G | Refills: 0 | Status: SHIPPED | OUTPATIENT
Start: 2017-05-03 | End: 2017-05-03

## 2017-05-03 ASSESSMENT — PAIN SCALES - GENERAL: PAINLEVEL: MODERATE PAIN (4)

## 2017-05-03 NOTE — PATIENT INSTRUCTIONS
Please start miralax today 1 capful (tsp) adjust dose for soft stool consistency then stay at that amount. Continue to use preparation H and may use tucks pads for discomfort or pain.     Call if symptoms worsen or do not improve with this treatment plan and I will refer you to GI or you may see Dr. Montiel for further evaluation as we discussed.     Thank you  Geni Delgado CNP

## 2017-05-03 NOTE — MR AVS SNAPSHOT
"              After Visit Summary   5/3/2017    Milena Hamilton    MRN: [id number]           Patient Information     Date Of Birth          1940        Visit Information        Provider Department      5/3/2017 12:00 PM Geni Delgado APRN CNP [facility]        Today's Diagnoses     Constipation, unspecified constipation type    -  1    External hemorrhoids          Care Instructions    Please start miralax today 1 capful (tsp) adjust dose for soft stool consistency then stay at that amount. Continue to use preparation H and may use tucks pads for discomfort or pain.     Call if symptoms worsen or do not improve with this treatment plan and I will refer you to GI or you may see Dr. Montiel for further evaluation as we discussed.     Thank you  Geni Delgado CNP           Follow-ups after your visit        Who to contact     If you have questions or need follow up information about today's clinic visit or your schedule please contact [facility] directly at [phone].  Normal or non-critical lab and imaging results will be communicated to you by [employer], letter or phone within 4 business days after the clinic has received the results. If you do not hear from us within 7 days, please contact the clinic through [employer] or phone. If you have a critical or abnormal lab result, we will notify you by phone as soon as possible.  Submit refill requests through [employer] or call your pharmacy and they will forward the refill request to us. Please allow 3 business days for your refill to be completed.          Additional Information About Your Visit        [employer] Information     [employer] lets you send messages to your doctor, view your test results, renew your prescriptions, schedule appointments and more. To sign up, go to [email or web address] . Click on \"Log in\" on the left side of the screen, which will take you to the Welcome page. Then click on \"Sign up Now\" on the " "right side of the page.     You will be asked to enter the access code listed below, as well as some personal information. Please follow the directions to create your username and password.     Your access code is: [email or web address]  Expires: 2017  7:53 AM     Your access code will  in 90 days. If you need help or a new code, please call your [place] clinic or [phone].        Care EveryWhere ID     This is your Care EveryWhere ID. This could be used by other organizations to access your [place] medical records  [id number]        Your Vitals Were     Pulse Temperature Respirations Height BMI (Body Mass Index)       68 98  F (36.7  C) (Oral) 16 5' 1.75\" (1.568 m) 25.08 kg/m2        Blood Pressure from Last 3 Encounters:   17 118/68   04/10/17 153/87   17 132/68    Weight from Last 3 Encounters:   17 136 lb (61.7 kg)   17 135 lb (61.2 kg)   17 134 lb (60.8 kg)              Today, you had the following     No orders found for display       Primary Care Provider Office Phone # Fax #    Chuck Streeter PA-C [phone]       [address]  [facility] [address]        Thank you!     Thank you for choosing [facility]  for your care. Our goal is always to provide you with excellent care. Hearing back from our patients is one way we can continue to improve our services. Please take a few minutes to complete the written survey that you may receive in the mail after your visit with us. Thank you!             Your Updated Medication List - Protect others around you: Learn how to safely use, store and throw away your medicines at www.disposemymeds.org.          This list is accurate as of: 5/3/17 12:34 PM.  Always use your most recent med list.                   Brand Name Dispense Instructions for use    ALPRAZolam 0.25 MG tablet    XANAX    30 tablet    Take 1 tablet (0.25 mg) by mouth nightly as needed for anxiety " (as needed for sleep)       aspirin 81 MG tablet      1 TABLET DAILY       BIOTENE DRY MOUTH Pste      Toothpaste and mouthwash       fish oil-omega-3 fatty acids 1000 MG capsule      Take 2 g by mouth daily.       levothyroxine 50 MCG tablet    SYNTHROID/LEVOTHROID    90 tablet    TAKE 1 TABLET (50 MCG) BY MOUTH DAILY       metoprolol 25 MG tablet    LOPRESSOR    45 tablet    Take 0.5 tablets (12.5 mg) by mouth daily       Misc Intestinal Lyndsey Regulat Tabs     30 tablet    Take 1 tablet by mouth 2 times daily       omeprazole 20 MG CR capsule    priLOSEC    90 capsule    TAKE ONE CAPSULE BY MOUTH ONCE DAILY       simvastatin 20 MG tablet    ZOCOR    45 tablet    Take 0.5 tablets (10 mg) by mouth At Bedtime DUE FOR FASTING LABS       THERAPEUTIC MULTIVITAMIN PO      1 TABLET DAILY

## 2017-05-03 NOTE — PROGRESS NOTES
"  SUBJECTIVE:                                                    Milena Hamilton is a 77 year old female who presents to clinic today for the following health issues:      HPI    Hemorrhoids     Onset: Monday     Description:   Pain: YES and burning   Itching: no     Accompanying Signs & Symptoms:  Blood streaked toilet paper: YES- much more blood than just streaks  Blood in stool: no   Changes in stool pattern: no, stools are very back and forth    History:   Any previous GI studies done:Colonoscopy-normal  Family History of colon cancer: no     Precipitating factors:   Has a history of hemorrhoids off and on through adult maldonado. May have lifted something to heavy over the weekend     Alleviating factors:  Preparation H suppositories and cream      Therapies Tried and outcome: Preparation H suppositories and cream     Blood is on toilet paper not present in stool or toilet    Recent constipation, hard stools     Has had surgery for diverticulitis, colectomy left descending 2014 has been doing well with no further diverticulitis symptoms     Problem list and histories reviewed & adjusted, as indicated.  Additional history: as documented    ROS:  Constitutional, HEENT, cardiovascular, pulmonary, GI, , musculoskeletal, neuro, skin, endocrine and psych systems are negative, except as otherwise noted.    OBJECTIVE:                                                    /68 (BP Location: Left arm, Patient Position: Chair, Cuff Size: Adult Regular)  Pulse 68  Temp 98  F (36.7  C) (Oral)  Resp 16  Ht 5' 1.75\" (1.568 m)  Wt 136 lb (61.7 kg)  BMI 25.08 kg/m2  Body mass index is 25.08 kg/(m^2).  GENERAL: healthy, alert and no distress  NECK: no adenopathy, no asymmetry, masses, or scars and thyroid normal to palpation  RESP: lungs clear to auscultation - no rales, rhonchi or wheezes  CV: regular rate and rhythm, normal S1 S2, no S3 or S4, no murmur, click or rub, no peripheral edema and peripheral pulses " strong  ABDOMEN: soft, nontender, no hepatosplenomegaly, no masses and bowel sounds normal   (female): rectal exam normal without masses and rectal exam small 1.5 mm red papule no oozing, bleeding or internal rectal mass, fissures noted.  MS: no gross musculoskeletal defects noted, no edema    Diagnostic Test Results:  none      ASSESSMENT/PLAN:                                                      1. Constipation, unspecified constipation type  Will have her start stool softener miralax as she has this at home starting with 1 capful daily and adjusting dose as needed to soft stool consistency.      2. External hemorrhoids  Continue preparation H may use tucks if needed.     If she continues symptoms will refer her to Dr. Montiel (gen surg) she liked Dr. Montiel or GI for further evaluation may need flexible sigmoidoscopy to rule out internal hemorrhoids.       Patient Instructions   Please start miralax today 1 capful (tsp) adjust dose for soft stool consistency then stay at that amount. Continue to use preparation H and may use tucks pads for discomfort or pain.     Call if symptoms worsen or do not improve with this treatment plan and I will refer you to GI or you may see Dr. Montiel for further evaluation as we discussed.     Thank you  Geni Delgado, [facility]

## 2017-07-11 ENCOUNTER — OFFICE VISIT (OUTPATIENT)
Dept: FAMILY MEDICINE | Facility: OTHER | Age: 77
End: 2017-07-11
Payer: COMMERCIAL

## 2017-07-11 VITALS
RESPIRATION RATE: 16 BRPM | HEART RATE: 73 BPM | OXYGEN SATURATION: 96 % | WEIGHT: 137.6 LBS | BODY MASS INDEX: 25.37 KG/M2 | TEMPERATURE: 98 F | SYSTOLIC BLOOD PRESSURE: 146 MMHG | DIASTOLIC BLOOD PRESSURE: 76 MMHG

## 2017-07-11 DIAGNOSIS — M53.3 SACROILIAC DYSFUNCTION: ICD-10-CM

## 2017-07-11 DIAGNOSIS — F41.9 ANXIETY: ICD-10-CM

## 2017-07-11 DIAGNOSIS — M50.30 DDD (DEGENERATIVE DISC DISEASE), CERVICAL: Primary | ICD-10-CM

## 2017-07-11 PROCEDURE — 99214 OFFICE O/P EST MOD 30 MIN: CPT | Performed by: PHYSICIAN ASSISTANT

## 2017-07-11 RX ORDER — ESCITALOPRAM OXALATE 10 MG/1
10 TABLET ORAL DAILY
Qty: 30 TABLET | Refills: 1 | Status: SHIPPED | OUTPATIENT
Start: 2017-07-11 | End: 2017-08-10

## 2017-07-11 RX ORDER — METHYLPREDNISOLONE 4 MG
TABLET, DOSE PACK ORAL
Qty: 21 TABLET | Refills: 0 | Status: SHIPPED | OUTPATIENT
Start: 2017-07-11 | End: 2017-08-10

## 2017-07-11 RX ORDER — ALPRAZOLAM 0.25 MG
0.25 TABLET ORAL
Qty: 30 TABLET | Refills: 0 | Status: SHIPPED | OUTPATIENT
Start: 2017-07-11 | End: 2018-01-15

## 2017-07-11 ASSESSMENT — PAIN SCALES - GENERAL: PAINLEVEL: MODERATE PAIN (5)

## 2017-07-11 NOTE — PROGRESS NOTES
SUBJECTIVE:                                                    Milena Hamilton is a 77 year old female who presents to clinic today for the following health issues:      HPI    Back Pain       Duration: a year         Specific cause: none    Description:   Location of pain: low back right  Character of pain: sharp  Pain radiation:radiates into the right buttocks, radiates into the right leg and radiates into the right foot  New numbness or weakness in legs, not attributed to pain:  no     Intensity: Currently5/10, At its worst 10/10, walk 10/10 laying 2/10 sititng 5/10     History:   Pain interferes with job: Not applicable  History of back problems: no prior back problems  Any previous MRI or X-rays: Yes- at [place].  Date March 11,2016  Sees a specialist for back pain:  , [facility] orthopedic   Therapies tried without relief: chiropractor and Physical Therapy    Alleviating factors:   Improved by: ibuprofen fo 10 days , cold and heat      Precipitating factors:  Worsened by: Sitting and Walking    Functional and Psychosocial Screen (Obdulia STarT Back):         - Prednisone helped in the past   - Was supposed to get follow up with neurosurgery         Accompanying Signs & Symptoms:  Risk of Fracture:  None  Risk of Cauda Equina:  None  Risk of Infection:  None  Risk of Cancer:  None  Risk of Ankylosing Spondylitis:  Onset at age <35, male, AND morning back stiffness. no         Anxiety Follow-Up    Status since last visit: Worsened     Other associated symptoms: panic attack, hyperventilating     Complicating factors:   Significant life event: son and grandson living with them   Current substance abuse: None  Depression symptoms: No    - Xanax, doesn't use daily, only as needed   - PCP wanted to take her off   - Saturday night had a very bad attack   - Maybe 3/week     - Sertraline was very expensive, upset stomach   - Buspar didn't help       MARINO-7 SCORE 10/5/2016 11/30/2016 3/24/2017   Total Score -  - -   Total Score 4 12 10       GAD7        Problem list and histories reviewed & adjusted, as indicated.  Additional history: as documented    {ACUTE Problem SUPERLIST - brief histories:621339}    {HIST REVIEW/ LINKS 2:119874}    {PROVIDER CHARTING PREFERENCE:084442}

## 2017-07-11 NOTE — NURSING NOTE
"Chief Complaint   Patient presents with     Back Pain     Anxiety       Initial /76  Pulse 73  Temp 98  F (36.7  C) (Oral)  Resp 16  Wt 137 lb 9.6 oz (62.4 kg)  SpO2 96%  BMI 25.37 kg/m2 Estimated body mass index is 25.37 kg/(m^2) as calculated from the following:    Height as of 5/3/17: 5' 1.75\" (1.568 m).    Weight as of this encounter: 137 lb 9.6 oz (62.4 kg).  Medication Reconciliation: complete    "

## 2017-07-11 NOTE — MR AVS SNAPSHOT
"              After Visit Summary   7/11/2017    Milena Hamilton    MRN: [id number]           Patient Information     Date Of Birth          1940        Visit Information        Provider Department      7/11/2017 2:00 PM Sandy Alvarado, [facility]        Today's Diagnoses     DDD (degenerative disc disease), cervical    -  1    Sacroiliac dysfunction        Anxiety          Care Instructions    - Start Lexapro 1 tablet in the morning   - Recheck ~4 weeks   - Xanax as needed           Follow-ups after your visit        Who to contact     If you have questions or need follow up information about today's clinic visit or your schedule please contact [facility] directly at [phone].  Normal or non-critical lab and imaging results will be communicated to you by [employer], letter or phone within 4 business days after the clinic has received the results. If you do not hear from us within 7 days, please contact the clinic through [employer] or phone. If you have a critical or abnormal lab result, we will notify you by phone as soon as possible.  Submit refill requests through [employer] or call your pharmacy and they will forward the refill request to us. Please allow 3 business days for your refill to be completed.          Additional Information About Your Visit        [employer] Information     [employer] lets you send messages to your doctor, view your test results, renew your prescriptions, schedule appointments and more. To sign up, go to [email or web address] . Click on \"Log in\" on the left side of the screen, which will take you to the Welcome page. Then click on \"Sign up Now\" on the right side of the page.     You will be asked to enter the access code listed below, as well as some personal information. Please follow the directions to create your username and password.     Your access code is: [email or web address]  Expires: 10/9/2017  2:43 PM     Your access code will "  in 90 days. If you need help or a new code, please call your [place] clinic or [phone].        Care EveryWhere ID     This is your Care EveryWhere ID. This could be used by other organizations to access your [place] medical records  [id number]        Your Vitals Were     Pulse Temperature Respirations Pulse Oximetry BMI (Body Mass Index)       73 98  F (36.7  C) (Oral) 16 96% 25.37 kg/m2        Blood Pressure from Last 3 Encounters:   17 146/76   17 118/68   04/10/17 153/87    Weight from Last 3 Encounters:   17 137 lb 9.6 oz (62.4 kg)   17 136 lb (61.7 kg)   17 135 lb (61.2 kg)              Today, you had the following     No orders found for display         Today's Medication Changes          These changes are accurate as of: 17  2:43 PM.  If you have any questions, ask your nurse or doctor.               Start taking these medicines.        Dose/Directions    escitalopram 10 MG tablet   Commonly known as:  LEXAPRO   Used for:  Anxiety   Started by:  Sandy Alvarado PA-C        Dose:  10 mg   Take 1 tablet (10 mg) by mouth daily   Quantity:  30 tablet   Refills:  1       methylPREDNISolone 4 MG tablet   Commonly known as:  MEDROL DOSEPAK   Used for:  DDD (degenerative disc disease), cervical, Sacroiliac dysfunction   Started by:  Sandy Alvarado PA-C        Follow package instructions   Quantity:  21 tablet   Refills:  0         These medicines have changed or have updated prescriptions.        Dose/Directions    ALPRAZolam 0.25 MG tablet   Commonly known as:  XANAX   This may have changed:  reasons to take this   Used for:  Anxiety   Changed by:  Sandy Alvarado PA-C        Dose:  0.25 mg   Take 1 tablet (0.25 mg) by mouth nightly as needed for anxiety   Quantity:  30 tablet   Refills:  0            Where to get your medicines      These medications were sent to [facility], [facility], [place] -  [address]  [address], [facility] [address]     Phone:  [phone]     escitalopram 10 MG tablet    methylPREDNISolone 4 MG tablet         Some of these will need a paper prescription and others can be bought over the counter.  Ask your nurse if you have questions.     Bring a paper prescription for each of these medications     ALPRAZolam 0.25 MG tablet                Primary Care Provider Office Phone # Fax #    Chuck Streeter PA-C [phone]       [facility] [place] [address]  [facility] [address]        Equal Access to Services     [facility] LARISSA : Hadii dorothea ku hadasho Soomaali, waaxda luqadaha, qaybta kaalmada adeegyameghan, michelle jones . So [facility] [phone].    ATENCIÓN: Si habla español, tiene a miller disposición servicios gratuitos de asistencia lingüística. [facility] [phone].    We comply with applicable federal civil rights laws and [place] laws. We do not discriminate on the basis of race, color, national origin, age, disability sex, sexual orientation or gender identity.            Thank you!     Thank you for choosing [facility]  for your care. Our goal is always to provide you with excellent care. Hearing back from our patients is one way we can continue to improve our services. Please take a few minutes to complete the written survey that you may receive in the mail after your visit with us. Thank you!             Your Updated Medication List - Protect others around you: Learn how to safely use, store and throw away your medicines at www.disposemymeds.org.          This list is accurate as of: 7/11/17  2:43 PM.  Always use your most recent med list.                   Brand Name Dispense Instructions for use Diagnosis    ALPRAZolam 0.25 MG tablet    XANAX    30 tablet    Take 1 tablet (0.25 mg) by mouth nightly as needed for anxiety    Anxiety       aspirin 81 MG tablet      1 TABLET DAILY        BIOTENE DRY  MOUTH Pste      Toothpaste and mouthwash        escitalopram 10 MG tablet    LEXAPRO    30 tablet    Take 1 tablet (10 mg) by mouth daily    Anxiety       fish oil-omega-3 fatty acids 1000 MG capsule      Take 2 g by mouth daily.        levothyroxine 50 MCG tablet    SYNTHROID/LEVOTHROID    90 tablet    TAKE 1 TABLET (50 MCG) BY MOUTH DAILY    Acquired hypothyroidism       methylPREDNISolone 4 MG tablet    MEDROL DOSEPAK    21 tablet    Follow package instructions    DDD (degenerative disc disease), cervical, Sacroiliac dysfunction       metoprolol 25 MG tablet    LOPRESSOR    45 tablet    Take 0.5 tablets (12.5 mg) by mouth daily    HTN, goal below 140/90       Misc Intestinal Lyndsey Regulat Tabs     30 tablet    Take 1 tablet by mouth 2 times daily    Diarrhea       omeprazole 20 MG CR capsule    priLOSEC    90 capsule    TAKE ONE CAPSULE BY MOUTH ONCE DAILY    Gastroesophageal reflux disease, esophagitis presence not specified       simvastatin 20 MG tablet    ZOCOR    45 tablet    Take 0.5 tablets (10 mg) by mouth At Bedtime DUE FOR FASTING LABS    Hyperlipidemia LDL goal <130       THERAPEUTIC MULTIVITAMIN PO      1 TABLET DAILY

## 2017-07-13 NOTE — PROGRESS NOTES
SUBJECTIVE:                                                    Milena Hamilton is a 77 year old female who presents to clinic today for the following health issues:  HPI    Back Pain       Duration: a year         Specific cause: none    Description:   Location of pain: low back right  Character of pain: sharp  Pain radiation:radiates into the right buttocks, radiates into the right leg and radiates into the right foot  New numbness or weakness in legs, not attributed to pain:  no     Intensity: Currently5/10, At its worst 10/10, walk 10/10 laying 2/10 sititng 5/10     History:   Pain interferes with job: Not applicable  History of back problems: no prior back problems  Any previous MRI or X-rays: Yes- at [place].  Date March 11,2016  Sees a specialist for back pain:  , [facility] orthopedic   Therapies tried without relief: chiropractor and Physical Therapy    Alleviating factors:   Improved by: ibuprofen fo 10 days , cold and heat      Precipitating factors:  Worsened by: Sitting and Walking    Functional and Psychosocial Screen (Obdulia STarT Back):         - Prednisone helped in the past   - Was supposed to get follow up with neurosurgery         Accompanying Signs & Symptoms:  Risk of Fracture:  None  Risk of Cauda Equina:  None  Risk of Infection:  None  Risk of Cancer:  None  Risk of Ankylosing Spondylitis:  Onset at age <35, male, AND morning back stiffness. no         Anxiety Follow-Up    Status since last visit: Worsened     Other associated symptoms: panic attack, hyperventilating     Complicating factors:   Significant life event: son and grandson living with them   Current substance abuse: None  Depression symptoms: No    - Xanax, doesn't use daily, only as needed   - PCP wanted to take her off   - Saturday night had a very bad attack   - Maybe 3/week     - Sertraline was very expensive, upset stomach   - Buspar didn't help       MARINO-7 SCORE 10/5/2016 11/30/2016 3/24/2017   Total Score - - -    Total Score 4 12 10       GAD7    Problem list and histories reviewed & adjusted, as indicated.  Additional history: as documented    ROS:  Constitutional, HEENT, cardiovascular, pulmonary, gi and gu systems are negative, except as otherwise noted.    OBJECTIVE:   /76  Pulse 73  Temp 98  F (36.7  C) (Oral)  Resp 16  Wt 137 lb 9.6 oz (62.4 kg)  SpO2 96%  BMI 25.37 kg/m2  Body mass index is 25.37 kg/(m^2).  GENERAL APPEARANCE: healthy, alert and no distress  EYES: Eyes grossly normal to inspection, PERRLA, conjunctivae and sclerae without injection or discharge, EOM intact   MS: No musculoskeletal defects are noted and gait is age appropriate without ataxia   SKIN: No suspicious lesions or rashes, hydration status appears adeuqate with normal skin turgor   BACK: Deferred   PSYCH: Alert and oriented x3; speech- coherent , normal rate and volume; able to articulate logical thoughts, able to abstract reason, no tangential thoughts, no hallucinations or delusions, mentation appears normal, Mood is euthymic. Affect is anxious. Thought content is free of suicidal ideation, hallucinations, and delusions. Dress is adequate and upkept. Eye contact is good during conversation.       Diagnostic Test Results:  none     ASSESSMENT/PLAN:       ICD-10-CM    1. DDD (degenerative disc disease), cervical M50.30 methylPREDNISolone (MEDROL DOSEPAK) 4 MG tablet   2. Sacroiliac dysfunction M53.3 methylPREDNISolone (MEDROL DOSEPAK) 4 MG tablet   3. Anxiety F41.9 escitalopram (LEXAPRO) 10 MG tablet     ALPRAZolam (XANAX) 0.25 MG tablet     1 & 2. Back pain  - Discussed with patient that there is very little we can do for her in family practice now, she needs to continue to call and follow with her back specialist  - Will give small medrol dose back as this has helped patient in the past and hasn't had it since March, discussed risks of chronic use, reviewed use and side effects     3. Anxiety   - Reviewed with patient that this  is not a short term issue with her, chart reviewed shows chronic use of Xanax going years and years back, used to be on it daily  - Patient continues to state she doesn't see the problem of using 3 xanax per week, discussed risks of chronic benzo use including sedation and addiction  - Discussed need for daily controller medication, discussed SSRI use and side effects, patient has only failed on Buspar, did not  Zoloft due to cost (was $35 and states that #30 of Xanax is $4-5)   - Will have patient start Lexapro (low dose) discussed use and side effects   - Recheck in 1 month with me or PCP  - Discussed will refill Xanax but should be use as a bridge until a daily controller medication is working and tapered   - Refilled Xanax, reviewed use and side effects, ok for refill X1, should use <3 per week as she has been, do not increase use     The patient indicates understanding of these issues and agrees with the plan.    Follow up: 1 month for mood recheck and with specialist for her back           Sandy Alvarado PA-C  [facility]

## 2017-07-22 DIAGNOSIS — I10 HTN, GOAL BELOW 140/90: ICD-10-CM

## 2017-07-24 NOTE — TELEPHONE ENCOUNTER
metoprolol      Last Written Prescription Date: 01/31/17  Last Fill Quantity: 45, # refills: 2    Last Office Visit with G, P or [facility] prescribing provider:  07/11/17   Future Office Visit:    NA    BP Readings from Last 3 Encounters:   07/11/17 146/76   05/03/17 118/68   04/10/17 153/87

## 2017-07-25 RX ORDER — METOPROLOL TARTRATE 25 MG/1
TABLET, FILM COATED ORAL
Qty: 45 TABLET | OUTPATIENT
Start: 2017-07-25

## 2017-07-26 DIAGNOSIS — E03.9 ACQUIRED HYPOTHYROIDISM: ICD-10-CM

## 2017-07-27 DIAGNOSIS — E78.5 HYPERLIPIDEMIA LDL GOAL <130: ICD-10-CM

## 2017-07-27 NOTE — TELEPHONE ENCOUNTER
simvastatin (ZOCOR) 20 MG tablet     Last Written Prescription Date: 12/1/16  Last Fill Quantity: 45, # refills: 2  Last Office Visit with G, P or [facility] prescribing provider: 7/11/17       Lab Results   Component Value Date    CHOL 188 11/30/2016     Lab Results   Component Value Date    HDL 81 11/30/2016     Lab Results   Component Value Date    LDL 91 11/30/2016     Lab Results   Component Value Date    TRIG 80 11/30/2016     Lab Results   Component Value Date    CHOLHDLRATIO 2.2 11/12/2014

## 2017-07-27 NOTE — TELEPHONE ENCOUNTER
RN please review patient should have enough meds through October. Last Rx 04/27/17 #90 with 1 refill.

## 2017-07-28 RX ORDER — LEVOTHYROXINE SODIUM 50 UG/1
TABLET ORAL
Qty: 90 TABLET | Refills: 0 | OUTPATIENT
Start: 2017-07-28

## 2017-07-28 NOTE — TELEPHONE ENCOUNTER
Pending Prescriptions:                       Disp   Refills    levothyroxine (SYNTHROID/LEVOTHROID) 50 M*90 tab*             Sig: TAKE 1 TABLET (50 MCG) BY MOUTH DAILY    TSH   Date Value Ref Range Status   11/30/2016 1.37 0.40 - 4.00 mU/L Final   ]   will needs labs in Nov.,    Abhijit Dumont, RN, BSN

## 2017-07-28 NOTE — TELEPHONE ENCOUNTER
Please call patient.  How is she taking this? Then flag for provider to review if different then prescribed.    Abhijit Dumont RN, BSN

## 2017-07-31 NOTE — TELEPHONE ENCOUNTER
Patient states she is taking 1/4 of a tablet 3-4 times per week. And is taking 1/2 tablet the other days.  Aware she needs fasting labs. Pended, please sign.    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  Grace Manuel, [facility]

## 2017-08-01 RX ORDER — SIMVASTATIN 20 MG
TABLET ORAL
Qty: 45 TABLET | Refills: 3 | Status: SHIPPED | OUTPATIENT
Start: 2017-08-01 | End: 2018-06-18

## 2017-08-01 NOTE — TELEPHONE ENCOUNTER
Medication(s) reviewed and approved. Rx. was signed and fasting order placed.      Please notify that this has been done.      Please close the encounter when finished with it.     Sandy Alvarado PA-C

## 2017-08-03 NOTE — PROGRESS NOTES
"  SUBJECTIVE:                                                    Milena Hamilton is a 77 year old female who presents to clinic today for the following health issues:      HPI    Hyperlipidemia Follow-Up  Discuss taking simvastatin, worse after a certain age?    Rate your low fat/cholesterol diet?: fair    Taking statin?  Yes, possible muscle aches from statin and harder to walk    Other lipid medications/supplements?:  Fish oil/Omega 3, without side effects    - Taking 1/2 tablet (10 mg), can decrease to 1/4 (5 mg?)       Anxiety Follow-Up    Status since last visit: No change    Other associated symptoms:None    Complicating factors:   Significant life event: No   Current substance abuse: None  Depression symptoms: No  MARINO-7 SCORE 10/5/2016 11/30/2016 3/24/2017   Total Score - - -   Total Score 4 12 10     - Taking in night before bed   - Stomach upset sometimes, improving   - Xanax, taken once in the last month , couldn't sleep can't dturn brain off       Medication Followup of Omeprazole     Taking Medication as prescribed: yes    Side Effects:  None    Medication Helping Symptoms:  Yes    - Been on for years and years   - Had \"xrays\" but denies ever having scope/EGD  - States never did trial off, was put on and never came off   - Worried about long term side effects of this medication as well, things she heard on the news              Plan from last visit on 7/11/17  1 & 2. Back pain  - Discussed with patient that there is very little we can do for her in family practice now, she needs to continue to call and follow with her back specialist  - Will give small medrol dose back as this has helped patient in the past and hasn't had it since March, discussed risks of chronic use, reviewed use and side effects      3. Anxiety   - Reviewed with patient that this is not a short term issue with her, chart reviewed shows chronic use of Xanax going years and years back, used to be on it daily  - Patient continues to " state she doesn't see the problem of using 3 xanax per week, discussed risks of chronic benzo use including sedation and addiction  - Discussed need for daily controller medication, discussed SSRI use and side effects, patient has only failed on Buspar, did not  Zoloft due to cost (was $35 and states that #30 of Xanax is $4-5)   - Will have patient start Lexapro (low dose) discussed use and side effects   - Recheck in 1 month with me or PCP  - Discussed will refill Xanax but should be use as a bridge until a daily controller medication is working and tapered   - Refilled Xanax, reviewed use and side effects, ok for refill X1, should use <3 per week as she has been, do not increase use       Problem list and histories reviewed & adjusted, as indicated.  Additional history: as documented      Labs reviewed in EPIC    ROS:  Constitutional, HEENT, cardiovascular, pulmonary, gi and gu systems are negative, except as otherwise noted.      OBJECTIVE:   /58 (BP Location: Right arm, Patient Position: Chair, Cuff Size: Adult Regular)  Pulse 71  Temp 97.9  F (36.6  C) (Oral)  Resp 12  Wt 141 lb (64 kg)  SpO2 98%  BMI 26 kg/m2  Body mass index is 26 kg/(m^2).  GENERAL APPEARANCE: healthy, alert and no distress  EYES: Eyes grossly normal to inspection, PERRLA, conjunctivae and sclerae without injection or discharge, EOM intact    MS: No musculoskeletal defects are noted and gait is age appropriate without ataxia   SKIN: No suspicious lesions or rashes, hydration status appears adeuqate with normal skin turgor   PSYCH: Alert and oriented x3; speech- coherent , normal rate and volume; able to articulate logical thoughts, able to abstract reason, no tangential thoughts, no hallucinations or delusions, mentation appears normal, Mood is euthymic. Affect is appropriate for this mood state and bright. Thought content is free of suicidal ideation, hallucinations, and delusions. Dress is adequate and upkept. Eye  contact is good during conversation.       Diagnostic Test Results:  none     ASSESSMENT/PLAN:       ICD-10-CM    1. Anxiety F41.9 escitalopram (LEXAPRO) 10 MG tablet   2. Hyperlipidemia LDL goal <130 E78.5 CANCELED: Lipid panel reflex to direct LDL   3. Gastroesophageal reflux disease, esophagitis presence not specified K21.9      1. Anxiety   - Reviewed MARINO scores with patient, did show signs of improvement, additionally has been able to decrease Xanax use from 3-4x/week to once in the past month   - She continues to be in denial that this may be a chronic issue   - Was able to convince her that Lexapro was a safer choice even through has to be taken daily   - Reviewed use and side effects of Lexapro, patient agreed to continue   - Discussed can continue to use Xanax occasionally, expect no more than 3x/month, has #29 at home, so shouldn't need a refill, but if does, ok x1, reviewed use and side effects including sedation and addiction   - Will consider her now stable, recheck in 6 months     2. Lipids  - Patient continues to have a hard time admitting she needs any medications on a daily basis, reviewed with her lipids before the statin and what her last ones were last November 2016, LDL was at 90, at that time was taking 1/2 tablet Simvastatin 20 mg (or 10 mg total), wondering if she can go down to 1/4 tablet (5 mg) discussed that I need to await her lipids that were drawn today, if LDL <70 may consider decrease to 5 mg but need to see, discussed risks of uncontrolled lipids including heart attack and stroke   - Discussed safety profile of statin medication at length   - Will need refill of her statin once labs are back     3. GERD   - Appears patient was started on Omeprazole a long time ago for GERD but was never fully evaluated   - Recommend we do a trial off Omeprazole for 1-2 weeks to see if symptoms return   - May consider EGD if symptoms return, could also consider increased dose to 40 mg for 12 weeks and  trial off again or switch to H2RA blocker    The patient indicates understanding of these issues and agrees with the plan.    Follow up: pending labs, likely 6 months         Sandy Alvarado PA-C  [facility]

## 2017-08-10 ENCOUNTER — OFFICE VISIT (OUTPATIENT)
Dept: FAMILY MEDICINE | Facility: OTHER | Age: 77
End: 2017-08-10
Payer: COMMERCIAL

## 2017-08-10 VITALS
RESPIRATION RATE: 12 BRPM | BODY MASS INDEX: 26 KG/M2 | HEART RATE: 71 BPM | TEMPERATURE: 97.9 F | WEIGHT: 141 LBS | OXYGEN SATURATION: 98 % | DIASTOLIC BLOOD PRESSURE: 58 MMHG | SYSTOLIC BLOOD PRESSURE: 128 MMHG

## 2017-08-10 DIAGNOSIS — K21.9 GASTROESOPHAGEAL REFLUX DISEASE, ESOPHAGITIS PRESENCE NOT SPECIFIED: ICD-10-CM

## 2017-08-10 DIAGNOSIS — F41.9 ANXIETY: Primary | ICD-10-CM

## 2017-08-10 DIAGNOSIS — E78.5 HYPERLIPIDEMIA LDL GOAL <130: ICD-10-CM

## 2017-08-10 DIAGNOSIS — E03.9 ACQUIRED HYPOTHYROIDISM: ICD-10-CM

## 2017-08-10 LAB
CHOLEST SERPL-MCNC: 217 MG/DL
HDLC SERPL-MCNC: 96 MG/DL
LDLC SERPL CALC-MCNC: 107 MG/DL
NONHDLC SERPL-MCNC: 121 MG/DL
TRIGL SERPL-MCNC: 72 MG/DL
TSH SERPL DL<=0.005 MIU/L-ACNC: 1.89 MU/L (ref 0.4–4)

## 2017-08-10 PROCEDURE — 36415 COLL VENOUS BLD VENIPUNCTURE: CPT | Performed by: PHYSICIAN ASSISTANT

## 2017-08-10 PROCEDURE — 80061 LIPID PANEL: CPT | Performed by: PHYSICIAN ASSISTANT

## 2017-08-10 PROCEDURE — 99214 OFFICE O/P EST MOD 30 MIN: CPT | Performed by: PHYSICIAN ASSISTANT

## 2017-08-10 PROCEDURE — 84443 ASSAY THYROID STIM HORMONE: CPT | Performed by: PHYSICIAN ASSISTANT

## 2017-08-10 RX ORDER — ESCITALOPRAM OXALATE 10 MG/1
10 TABLET ORAL DAILY
Qty: 90 TABLET | Refills: 3 | Status: SHIPPED | OUTPATIENT
Start: 2017-08-10 | End: 2017-12-06

## 2017-08-10 ASSESSMENT — PAIN SCALES - GENERAL: PAINLEVEL: NO PAIN (0)

## 2017-08-10 ASSESSMENT — ANXIETY QUESTIONNAIRES
2. NOT BEING ABLE TO STOP OR CONTROL WORRYING: SEVERAL DAYS
6. BECOMING EASILY ANNOYED OR IRRITABLE: NOT AT ALL
7. FEELING AFRAID AS IF SOMETHING AWFUL MIGHT HAPPEN: NOT AT ALL
GAD7 TOTAL SCORE: 2
IF YOU CHECKED OFF ANY PROBLEMS ON THIS QUESTIONNAIRE, HOW DIFFICULT HAVE THESE PROBLEMS MADE IT FOR YOU TO DO YOUR WORK, TAKE CARE OF THINGS AT HOME, OR GET ALONG WITH OTHER PEOPLE: NOT DIFFICULT AT ALL
3. WORRYING TOO MUCH ABOUT DIFFERENT THINGS: SEVERAL DAYS
1. FEELING NERVOUS, ANXIOUS, OR ON EDGE: NOT AT ALL
5. BEING SO RESTLESS THAT IT IS HARD TO SIT STILL: NOT AT ALL

## 2017-08-10 ASSESSMENT — PATIENT HEALTH QUESTIONNAIRE - PHQ9
5. POOR APPETITE OR OVEREATING: NOT AT ALL
SUM OF ALL RESPONSES TO PHQ QUESTIONS 1-9: 1

## 2017-08-10 NOTE — MR AVS SNAPSHOT
"              After Visit Summary   8/10/2017    Milena Hamilton    MRN: [id number]           Patient Information     Date Of Birth          1940        Visit Information        Provider Department      8/10/2017 9:45 AM Sandy Alvarado, [facility]        Today's Diagnoses     Anxiety    -  1    Hyperlipidemia LDL goal <130          Care Instructions    - Omeprazole (Prilosec) - trial off medication for 1-2 weeks      If heartburn symptoms return, can restart     - Recheck 6 months             Follow-ups after your visit        Who to contact     If you have questions or need follow up information about today's clinic visit or your schedule please contact [facility] directly at [phone].  Normal or non-critical lab and imaging results will be communicated to you by MyChart, letter or phone within 4 business days after the clinic has received the results. If you do not hear from us within 7 days, please contact the clinic through MyChart or phone. If you have a critical or abnormal lab result, we will notify you by phone as soon as possible.  Submit refill requests through GlucoVista or call your pharmacy and they will forward the refill request to us. Please allow 3 business days for your refill to be completed.          Additional Information About Your Visit        MyChart Information     GlucoVista lets you send messages to your doctor, view your test results, renew your prescriptions, schedule appointments and more. To sign up, go to [email or web address] . Click on \"Log in\" on the left side of the screen, which will take you to the Welcome page. Then click on \"Sign up Now\" on the right side of the page.     You will be asked to enter the access code listed below, as well as some personal information. Please follow the directions to create your username and password.     Your access code is: [email or web address]  Expires: 10/9/2017  2:43 PM     Your access code " will  in 90 days. If you need help or a new code, please call your [place] clinic or [phone].        Care EveryWhere ID     This is your Care EveryWhere ID. This could be used by other organizations to access your [place] medical records  [id number]        Your Vitals Were     Pulse Temperature Respirations Pulse Oximetry BMI (Body Mass Index)       71 97.9  F (36.6  C) (Oral) 12 98% 26 kg/m2        Blood Pressure from Last 3 Encounters:   08/10/17 128/58   17 146/76   17 118/68    Weight from Last 3 Encounters:   08/10/17 141 lb (64 kg)   17 137 lb 9.6 oz (62.4 kg)   17 136 lb (61.7 kg)              Today, you had the following     No orders found for display         Where to get your medicines      These medications were sent to [facility] [address] [facility]  [address] [facility] [address]     Phone:  [phone]     escitalopram 10 MG tablet          Primary Care Provider Office Phone # Fax #    Chuck Streeter PA-C [phone]       [address] [facility] [address]  [facility] [address]        Equal Access to Services     ARNOLD DIAS : Hadii aad ku hadasho Soomaali, waaxda luqadaha, qaybta kaalmada adeegyada, waxay rylee hayjesus jones . So [facility] [phone].    ATENCIÓN: Si habla español, tiene a miller disposición servicios gratuitos de asistencia lingüística. donald al [phone].    We comply with applicable federal civil rights laws and [place] laws. We do not discriminate on the basis of race, color, national origin, age, disability sex, sexual orientation or gender identity.            Thank you!     Thank you for choosing [facility]  for your care. Our goal is always to provide you with excellent care. Hearing back from our patients is one way we can continue to improve our services. Please take a few minutes to complete the written survey that you may receive in the mail after your  visit with us. Thank you!             Your Updated Medication List - Protect others around you: Learn how to safely use, store and throw away your medicines at www.disposemymeds.org.          This list is accurate as of: 8/10/17 10:22 AM.  Always use your most recent med list.                   Brand Name Dispense Instructions for use Diagnosis    ALPRAZolam 0.25 MG tablet    XANAX    30 tablet    Take 1 tablet (0.25 mg) by mouth nightly as needed for anxiety    Anxiety       aspirin 81 MG tablet      1 TABLET DAILY        BIOTENE DRY MOUTH Pste      Toothpaste and mouthwash        escitalopram 10 MG tablet    LEXAPRO    90 tablet    Take 1 tablet (10 mg) by mouth daily    Anxiety       fish oil-omega-3 fatty acids 1000 MG capsule      Take 2 g by mouth daily.        levothyroxine 50 MCG tablet    SYNTHROID/LEVOTHROID    90 tablet    TAKE 1 TABLET (50 MCG) BY MOUTH DAILY    Acquired hypothyroidism       metoprolol 25 MG tablet    LOPRESSOR    45 tablet    Take 0.5 tablets (12.5 mg) by mouth daily    HTN, goal below 140/90       Misc Intestinal Lyndsey Regulat Tabs     30 tablet    Take 1 tablet by mouth 2 times daily    Diarrhea       omeprazole 20 MG CR capsule    priLOSEC    90 capsule    TAKE ONE CAPSULE BY MOUTH ONCE DAILY    Gastroesophageal reflux disease, esophagitis presence not specified       simvastatin 20 MG tablet    ZOCOR    45 tablet    TAKE 0.5 TABLETS (10 MG) BY MOUTH AT BEDTIME (DUE FOR FASTING LABS)    Hyperlipidemia LDL goal <130       THERAPEUTIC MULTIVITAMIN PO      1 TABLET DAILY

## 2017-08-10 NOTE — PATIENT INSTRUCTIONS
- Omeprazole (Prilosec) - trial off medication for 1-2 weeks      If heartburn symptoms return, can restart     - Recheck 6 months

## 2017-08-10 NOTE — NURSING NOTE
"Chief Complaint   Patient presents with     MOOD CHANGES     Panel Management     honoring daquan cannon       Initial /58 (BP Location: Right arm, Patient Position: Chair, Cuff Size: Adult Regular)  Pulse 71  Temp 97.9  F (36.6  C) (Oral)  Resp 12  Wt 141 lb (64 kg)  SpO2 98%  BMI 26 kg/m2 Estimated body mass index is 26 kg/(m^2) as calculated from the following:    Height as of 5/3/17: 5' 1.75\" (1.568 m).    Weight as of this encounter: 141 lb (64 kg).  Medication Reconciliation: complete  "

## 2017-08-11 ASSESSMENT — ANXIETY QUESTIONNAIRES: GAD7 TOTAL SCORE: 2

## 2017-09-26 ENCOUNTER — ALLIED HEALTH/NURSE VISIT (OUTPATIENT)
Dept: FAMILY MEDICINE | Facility: OTHER | Age: 77
End: 2017-09-26
Payer: COMMERCIAL

## 2017-09-26 DIAGNOSIS — Z23 NEED FOR PROPHYLACTIC VACCINATION AND INOCULATION AGAINST INFLUENZA: Primary | ICD-10-CM

## 2017-09-26 PROCEDURE — 90662 IIV NO PRSV INCREASED AG IM: CPT

## 2017-09-26 PROCEDURE — 99207 ZZC NO CHARGE NURSE ONLY: CPT

## 2017-09-26 PROCEDURE — 90471 IMMUNIZATION ADMIN: CPT

## 2017-09-26 NOTE — PROGRESS NOTES
Injectable Influenza Immunization Documentation    1.  Is the person to be vaccinated sick today?   No    2. Does the person to be vaccinated have an allergy to a component   of the vaccine?   No    3. Has the person to be vaccinated ever had a serious reaction   to influenza vaccine in the past?   No    4. Has the person to be vaccinated ever had Guillain-Barré syndrome?   No    Form completed by Ingrid Ha MA

## 2017-09-26 NOTE — MR AVS SNAPSHOT
"              After Visit Summary   2017    Milena Hamilton    MRN: [id number]           Patient Information     Date Of Birth          1940        Visit Information        Provider Department      2017 9:00 AM NL FLU SHOT [facility]        Today's Diagnoses     Need for prophylactic vaccination and inoculation against influenza    -  1       Follow-ups after your visit        Who to contact     If you have questions or need follow up information about today's clinic visit or your schedule please contact [facility] directly at [phone].  Normal or non-critical lab and imaging results will be communicated to you by MatrixVisionhart, letter or phone within 4 business days after the clinic has received the results. If you do not hear from us within 7 days, please contact the clinic through Mobile Pulset or phone. If you have a critical or abnormal lab result, we will notify you by phone as soon as possible.  Submit refill requests through Sanghvi or call your pharmacy and they will forward the refill request to us. Please allow 3 business days for your refill to be completed.          Additional Information About Your Visit        MyChart Information     Sanghvi lets you send messages to your doctor, view your test results, renew your prescriptions, schedule appointments and more. To sign up, go to [email or web address] . Click on \"Log in\" on the left side of the screen, which will take you to the Welcome page. Then click on \"Sign up Now\" on the right side of the page.     You will be asked to enter the access code listed below, as well as some personal information. Please follow the directions to create your username and password.     Your access code is: [email or web address]  Expires: 10/9/2017  2:43 PM     Your access code will  in 90 days. If you need help or a new code, please call your [facility] or [phone].        Care EveryWhere ID     This is your Care " EveryWhere ID. This could be used by other organizations to access your [place] medical records  [id number]         Blood Pressure from Last 3 Encounters:   08/10/17 128/58   07/11/17 146/76   05/03/17 118/68    Weight from Last 3 Encounters:   08/10/17 141 lb (64 kg)   07/11/17 137 lb 9.6 oz (62.4 kg)   05/03/17 136 lb (61.7 kg)              We Performed the Following     FLU VACCINE, INCREASED ANTIGEN, PRESV FREE, AGE 65+ [82507]     Vaccine Administration, Initial [85025]        Primary Care Provider Office Phone # Fax #    Chuck Streeter PA-C [phone]       [address] [address]        Equal Access to Services     ARNOLD DIAS : Hadii dorothea arnoldo Sotraci, waaxda luqadaha, qaybta kaalmada adeegyada, michelle jones . So [facility] [phone].    ATENCIÓN: Si habla español, tiene a miller disposición servicios gratuitos de asistencia lingüística. Llame al [phone].    We comply with applicable federal civil rights laws and [place] laws. We do not discriminate on the basis of race, color, national origin, age, disability sex, sexual orientation or gender identity.            Thank you!     Thank you for choosing [facility]  for your care. Our goal is always to provide you with excellent care. Hearing back from our patients is one way we can continue to improve our services. Please take a few minutes to complete the written survey that you may receive in the mail after your visit with us. Thank you!             Your Updated Medication List - Protect others around you: Learn how to safely use, store and throw away your medicines at www.disposemymeds.org.          This list is accurate as of: 9/26/17  9:26 AM.  Always use your most recent med list.                   Brand Name Dispense Instructions for use Diagnosis    ALPRAZolam 0.25 MG tablet    XANAX    30 tablet    Take 1 tablet (0.25 mg) by mouth nightly as needed for  anxiety    Anxiety       aspirin 81 MG tablet      1 TABLET DAILY        BIOTENE DRY MOUTH Pste      Toothpaste and mouthwash        escitalopram 10 MG tablet    LEXAPRO    90 tablet    Take 1 tablet (10 mg) by mouth daily    Anxiety       fish oil-omega-3 fatty acids 1000 MG capsule      Take 2 g by mouth daily.        levothyroxine 50 MCG tablet    SYNTHROID/LEVOTHROID    90 tablet    TAKE 1 TABLET (50 MCG) BY MOUTH DAILY    Acquired hypothyroidism       metoprolol 25 MG tablet    LOPRESSOR    45 tablet    Take 0.5 tablets (12.5 mg) by mouth daily    HTN, goal below 140/90       Misc Intestinal Lyndsey Regulat Tabs     30 tablet    Take 1 tablet by mouth 2 times daily    Diarrhea       omeprazole 20 MG CR capsule    priLOSEC    90 capsule    TAKE ONE CAPSULE BY MOUTH ONCE DAILY    Gastroesophageal reflux disease, esophagitis presence not specified       simvastatin 20 MG tablet    ZOCOR    45 tablet    TAKE 0.5 TABLETS (10 MG) BY MOUTH AT BEDTIME (DUE FOR FASTING LABS)    Hyperlipidemia LDL goal <130       THERAPEUTIC MULTIVITAMIN PO      1 TABLET DAILY

## 2017-09-29 ENCOUNTER — TRANSFERRED RECORDS (OUTPATIENT)
Dept: HEALTH INFORMATION MANAGEMENT | Facility: CLINIC | Age: 77
End: 2017-09-29

## 2017-10-24 DIAGNOSIS — I10 HTN, GOAL BELOW 140/90: ICD-10-CM

## 2017-10-24 DIAGNOSIS — E03.9 ACQUIRED HYPOTHYROIDISM: ICD-10-CM

## 2017-10-26 RX ORDER — METOPROLOL TARTRATE 25 MG/1
TABLET, FILM COATED ORAL
Qty: 45 TABLET | Refills: 0 | Status: SHIPPED | OUTPATIENT
Start: 2017-10-26 | End: 2017-12-06

## 2017-10-26 RX ORDER — LEVOTHYROXINE SODIUM 50 UG/1
TABLET ORAL
Qty: 90 TABLET | Refills: 0 | Status: SHIPPED | OUTPATIENT
Start: 2017-10-26 | End: 2018-01-22

## 2017-10-26 NOTE — TELEPHONE ENCOUNTER
levothyroxine (SYNTHROID/LEVOTHROID) 50 MCG tablet  Prescription approved per [facility] [place].    TSH   Date Value Ref Range Status   08/10/2017 1.89 0.40 - 4.00 mU/L Final     metoprolol (LOPRESSOR) 25 MG tablet  Prescription approved per [facility] [place].    BP Readings from Last 3 Encounters:   08/10/17 128/58   07/11/17 146/76   05/03/17 118/68     Per LOV 08/10/2017, due for follow up in 6 months, around 02/2018    Aislinn Castro RN, BSN

## 2017-11-30 ENCOUNTER — RADIANT APPOINTMENT (OUTPATIENT)
Dept: MAMMOGRAPHY | Facility: OTHER | Age: 77
End: 2017-11-30
Payer: COMMERCIAL

## 2017-11-30 DIAGNOSIS — Z12.31 VISIT FOR SCREENING MAMMOGRAM: ICD-10-CM

## 2017-11-30 PROCEDURE — G0202 SCR MAMMO BI INCL CAD: HCPCS | Mod: TC

## 2017-11-30 NOTE — PROGRESS NOTES
"SUBJECTIVE:   Milena Hamilton is a 77 year old female who presents for Preventive Visit.  Are you in the first 12 months of your Medicare coverage?  No    Physical   Annual:     Getting at least 3 servings of Calcium per day::  Yes    Bi-annual eye exam::  Yes    Dental care twice a year::  NO    Sleep apnea or symptoms of sleep apnea::  None    Diet::  Regular (no restrictions)    Frequency of exercise::  4-5 days/week    Duration of exercise::  15-30 minutes    Taking medications regularly::  Yes    Medication side effects::  None    Additional concerns today::  YES      She continues to have low back pain with right leg pain. She is following with orthopedics and will be undergoing a right SI joint injection in the near future.    She has occasional \"hot flashes\" for the past month or so. This happens only a few times per week and only lasts a few seconds. She had a hysterectomy in her 40's.     She has occasional vertigo that has happened twice over the past few weeks. The first time was when walking after awaking in the middle of the night and she felt lightheaded. The second time was when she looked up and started to notice a spinning sensation. She denies any nausea during these episodes.     COGNITIVE SCREEN  1) Repeat 3 items (Banana, Sunrise, Chair)    2) Clock draw: NORMAL  3) 3 item recall: Recalls 3 objects  Results: 3 items recalled: COGNITIVE IMPAIRMENT LESS LIKELY    Mini-CogTM Copyright S Monica. Licensed by the author for use in [facility]; reprinted with permission (sarah@.[facility]). All rights reserved.      Reviewed and updated as needed this visit by clinical staff  Tobacco  Allergies  Meds  Problems  Med Hx  Surg Hx  Fam Hx  Soc Hx          Reviewed and updated as needed this visit by Provider  Allergies  Problems        Social History   Substance Use Topics     Smoking status: Never Smoker     Smokeless tobacco: Never Used     Alcohol use No       The patient does not " drink >3 drinks per day nor >7 drinks per week.    Today's PHQ-2 Score:   PHQ-2 ( 1999 Pfizer) 12/6/2017   Q1: Little interest or pleasure in doing things 0   Q2: Feeling down, depressed or hopeless 0   PHQ-2 Score 0   Q1: Little interest or pleasure in doing things Not at all   Q2: Feeling down, depressed or hopeless Not at all   PHQ-2 Score 0       Do you feel safe in your environment - Yes    Do you have a Health Care Directive?: Yes: Patient states has Advance Directive and will bring in a copy to clinic.    Current providers sharing in care for this patient include:   Patient Care Team:  Chuck Streeter PA-C as PCP - General (Physician Assistant)      Hearing impairment: No    Ability to successfully perform activities of daily living: Yes, no assistance needed     Fall risk:  Fallen 2 or more times in the past year?: No  Any fall with injury in the past year?: No      Home safety:  none identified      The following health maintenance items are reviewed in Epic and correct as of today:  Health Maintenance   Topic Date Due     ADVANCE DIRECTIVE PLANNING Q5 YRS  10/03/2016     FALL RISK ASSESSMENT  11/30/2017     BMP Q1 YR  03/29/2018     LIPID MONITORING Q1 YEAR  08/10/2018     TETANUS IMMUNIZATION (SYSTEM ASSIGNED)  09/26/2018     DEXA Q2 YR  12/09/2018     INFLUENZA VACCINE (SYSTEM ASSIGNED)  Completed     PNEUMOCOCCAL  Completed       Review of Systems   Constitutional: Negative for chills and fever.   HENT: Negative for congestion, ear pain, hearing loss and sore throat.    Eyes: Negative for pain and visual disturbance.   Respiratory: Negative for cough and shortness of breath.    Cardiovascular: Negative for chest pain, palpitations and peripheral edema.   Gastrointestinal: Negative for abdominal pain, constipation, diarrhea, heartburn, hematochezia and nausea.   Genitourinary: Negative for dysuria, frequency, genital sores, hematuria, pelvic pain, urgency, vaginal bleeding and vaginal discharge.  "  Musculoskeletal: Positive for myalgias. Negative for arthralgias and joint swelling.   Skin: Negative for rash.   Neurological: Positive for dizziness. Negative for weakness, headaches and paresthesias.   Psychiatric/Behavioral: Negative for mood changes. The patient is nervous/anxious.      OBJECTIVE:   /64  Pulse 60  Temp 97.6  F (36.4  C) (Oral)  Resp 14  Ht 5' 2.21\" (1.58 m)  Wt 137 lb (62.1 kg)  BMI 24.89 kg/m2 Estimated body mass index is 24.89 kg/(m^2) as calculated from the following:    Height as of this encounter: 5' 2.21\" (1.58 m).    Weight as of this encounter: 137 lb (62.1 kg).  Physical Exam  GENERAL APPEARANCE: healthy, alert and no distress  EYES: Eyes grossly normal to inspection, PERRL and conjunctivae and sclerae normal  HENT: ear canals and TM's normal, nose and mouth without ulcers or lesions, oropharynx clear and oral mucous membranes moist  NECK: no adenopathy, no asymmetry, masses, or scars and thyroid normal to palpation  CV: regular rate and rhythm, normal S1 S2, no S3 or S4, no murmur, click or rub, no peripheral edema and peripheral pulses strong  ABDOMEN: soft, nontender, no hepatosplenomegaly, no masses and bowel sounds normal  MS: no musculoskeletal defects are noted. Moderate tenderness over the right SI joint.   SKIN: no suspicious lesions or rashes. Round, skin colored 4 mm papule over right nasal ala.   NEURO: Normal strength and tone, mentation intact and speech normal. Cranial nerves II-XII are grossly intact. DTRs are 2+/4 throughout and symmetric. Gait is mildly antalgic.   PSYCH: mentation appears normal and affect normal/bright      Results for orders placed or performed in visit on 12/06/17   TSH with free T4 reflex   Result Value Ref Range    TSH 1.18 0.40 - 4.00 mU/L       ASSESSMENT / PLAN:       ICD-10-CM    1. Encounter for routine adult health examination without abnormal findings Z00.00    2. HTN, goal below 140/90 I10 metoprolol (LOPRESSOR) 25 MG " tablet   3. Anxiety F41.9    4. Hot flashes R23.2 TSH with free T4 reflex     OFFICE/OUTPT VISIT,EST,LEVL II   5. Hypothyroidism, unspecified type E03.9 TSH with free T4 reflex   6. Lumbar back pain with radiculopathy affecting right lower extremity M54.17 CHIROPRACTIC REFERRAL     meloxicam (MOBIC) 7.5 MG tablet     OFFICE/OUTPT VISIT,EST,LEVL II   7. Benign paroxysmal positional vertigo, unspecified laterality H81.10 OFFICE/OUTPT VISIT,EST,LEVL II   8. Gastroesophageal reflux disease, esophagitis presence not specified K21.9    9. Skin lesion L98.9        2. Stable, continue current medications.    3. She had diarrhea with the Lexapro so she stopped this and her anxiety has been stable. There are still occasional episodes of anxiety where she takes a Xanax but this is rare, maybe 1-2 times per month and she had ~20 left. Will refill as needed as long as she continues to use sparingly. Long acting medication not necessary at this time.    4-5. TSH is stable. I recommend trying oral vitamin E to see if this helps her hot flashes. If not improving, she will let me know and we can discuss other options. Estrogen replacement would be a last resort.    6. She will continue to follow with [facility] for management of her back pain including an upcoming SI injection. I think she would benefit from daily Mobic instead of NSAIDs throughout the day so will try this. I will also send her for chiropractic treatment as this was previously helpful.    7. Modified Epley maneuver instructions given to patient. If symptoms not improving, she will let me know.    8. Continue with omeprazole as this seems to be the only medication that works for her reflex. Possible long-term side effects discussed.     9. Nasal lesion stable for many years and she was seen by a specialist in 2015 and biopsy felt to be unnecessary. She will continue to monitor for any changes.     Follow up annually.       End of Life Planning:  Patient currently has an  "advanced directive: Yes.  Practitioner is supportive of decision.    COUNSELING:  Reviewed preventive health counseling, as reflected in patient instructions       Regular exercise       Healthy diet/nutrition    BP Screening:   Last 3 BP Readings:    BP Readings from Last 3 Encounters:   12/06/17 130/64   08/10/17 128/58   07/11/17 146/76       Estimated body mass index is 24.89 kg/(m^2) as calculated from the following:    Height as of this encounter: 5' 2.21\" (1.58 m).    Weight as of this encounter: 137 lb (62.1 kg).     reports that she has never smoked. She has never used smokeless tobacco.        Appropriate preventive services were discussed with this patient, including applicable screening as appropriate for cardiovascular disease, diabetes, osteopenia/osteoporosis, and glaucoma.  As appropriate for age/gender, discussed screening for colorectal cancer, prostate cancer, breast cancer, and cervical cancer. Checklist reviewing preventive services available has been given to the patient.    Reviewed patients plan of care and provided an AVS. The Basic Care Plan (routine screening as documented in Health Maintenance) for Milena meets the Care Plan requirement. This Care Plan has been established and reviewed with the Patient.    Counseling Resources:  ATP IV Guidelines  Pooled Cohorts Equation Calculator  Breast Cancer Risk Calculator  FRAX Risk Assessment  ICSI Preventive Guidelines  Dietary Guidelines for Americans, 2010  USDA's MyPlate  ASA Prophylaxis  Lung CA Screening    Chuck Streeter PA-C  [facility] for HPI/ROS submitted by the patient on 12/6/2017   PHQ-2 Score: 0    "

## 2017-11-30 NOTE — PATIENT INSTRUCTIONS
Preventive Health Recommendations    Female Ages 65 +    Yearly exam:     See your health care provider every year in order to  o Review health changes.   o Discuss preventive care.    o Review your medicines if your doctor has prescribed any.      You no longer need a yearly Pap test unless you've had an abnormal Pap test in the past 10 years. If you have vaginal symptoms, such as bleeding or discharge, be sure to talk with your provider about a Pap test.      Every 1 to 2 years, have a mammogram.  If you are over 69, talk with your health care provider about whether or not you want to continue having screening mammograms.      Every 10 years, have a colonoscopy. Or, have a yearly FIT test (stool test). These exams will check for colon cancer.       Have a cholesterol test every 5 years, or more often if your doctor advises it.       Have a diabetes test (fasting glucose) every three years. If you are at risk for diabetes, you should have this test more often.       At age 65, have a bone density scan (DEXA) to check for osteoporosis (brittle bone disease).    Shots:    Get a flu shot each year.    Get a tetanus shot every 10 years.    Talk to your doctor about your pneumonia vaccines. There are now two you should receive - Pneumovax (PPSV 23) and Prevnar (PCV 13).    Talk to your doctor about the shingles vaccine.    Talk to your doctor about the hepatitis B vaccine.    Nutrition:     Eat at least 5 servings of fruits and vegetables each day.      Eat whole-grain bread, whole-wheat pasta and brown rice instead of white grains and rice.      Talk to your provider about Calcium and Vitamin D.     Lifestyle    Exercise at least 150 minutes a week (30 minutes a day, 5 days a week). This will help you control your weight and prevent disease.      Limit alcohol to one drink per day.      No smoking.       Wear sunscreen to prevent skin cancer.       See your dentist twice a year for an exam and cleaning.      See your  eye doctor every 1 to 2 years to screen for conditions such as glaucoma, macular degeneration and cataracts.      Skin lesion appears normal but let me know if anything changes.    For the hot flashes, will check thyroid. I also recommend a daily vitamin E supplement.  Continue with calcium + vitamin D.    Will try a daily anti-inflammatory called Mobic to take 1-2 tablets daily every morning to help with the back pain. Do not take ibuprofen or Aleve while on this.    Try the maneuvers to help with your dizzy spells. Make sure you are drinking plenty of water daily.    Follow up annually or as needed.

## 2017-12-06 ENCOUNTER — OFFICE VISIT (OUTPATIENT)
Dept: FAMILY MEDICINE | Facility: OTHER | Age: 77
End: 2017-12-06
Payer: COMMERCIAL

## 2017-12-06 VITALS
TEMPERATURE: 97.6 F | WEIGHT: 137 LBS | RESPIRATION RATE: 14 BRPM | HEART RATE: 60 BPM | DIASTOLIC BLOOD PRESSURE: 64 MMHG | BODY MASS INDEX: 25.21 KG/M2 | SYSTOLIC BLOOD PRESSURE: 130 MMHG | HEIGHT: 62 IN

## 2017-12-06 DIAGNOSIS — Z00.00 ENCOUNTER FOR ROUTINE ADULT HEALTH EXAMINATION WITHOUT ABNORMAL FINDINGS: Primary | ICD-10-CM

## 2017-12-06 DIAGNOSIS — K21.9 GASTROESOPHAGEAL REFLUX DISEASE, ESOPHAGITIS PRESENCE NOT SPECIFIED: ICD-10-CM

## 2017-12-06 DIAGNOSIS — F41.9 ANXIETY: ICD-10-CM

## 2017-12-06 DIAGNOSIS — L98.9 SKIN LESION: ICD-10-CM

## 2017-12-06 DIAGNOSIS — E03.9 HYPOTHYROIDISM, UNSPECIFIED TYPE: ICD-10-CM

## 2017-12-06 DIAGNOSIS — I10 HTN, GOAL BELOW 140/90: ICD-10-CM

## 2017-12-06 DIAGNOSIS — M54.16 LUMBAR BACK PAIN WITH RADICULOPATHY AFFECTING RIGHT LOWER EXTREMITY: ICD-10-CM

## 2017-12-06 DIAGNOSIS — H81.10 BENIGN PAROXYSMAL POSITIONAL VERTIGO, UNSPECIFIED LATERALITY: ICD-10-CM

## 2017-12-06 DIAGNOSIS — R23.2 HOT FLASHES: ICD-10-CM

## 2017-12-06 LAB — TSH SERPL DL<=0.005 MIU/L-ACNC: 1.18 MU/L (ref 0.4–4)

## 2017-12-06 PROCEDURE — 84443 ASSAY THYROID STIM HORMONE: CPT | Performed by: PHYSICIAN ASSISTANT

## 2017-12-06 PROCEDURE — 36415 COLL VENOUS BLD VENIPUNCTURE: CPT | Performed by: PHYSICIAN ASSISTANT

## 2017-12-06 PROCEDURE — 99212 OFFICE O/P EST SF 10 MIN: CPT | Mod: 25 | Performed by: PHYSICIAN ASSISTANT

## 2017-12-06 PROCEDURE — 99397 PER PM REEVAL EST PAT 65+ YR: CPT | Performed by: PHYSICIAN ASSISTANT

## 2017-12-06 RX ORDER — MELOXICAM 7.5 MG/1
7.5-15 TABLET ORAL DAILY
Qty: 60 TABLET | Refills: 1 | Status: SHIPPED | OUTPATIENT
Start: 2017-12-06 | End: 2018-02-27

## 2017-12-06 RX ORDER — METOPROLOL TARTRATE 25 MG/1
TABLET, FILM COATED ORAL
Qty: 45 TABLET | Refills: 3 | Status: SHIPPED | OUTPATIENT
Start: 2017-12-06 | End: 2018-12-12

## 2017-12-06 ASSESSMENT — ENCOUNTER SYMPTOMS
NERVOUS/ANXIOUS: 1
HEMATOCHEZIA: 0
PARESTHESIAS: 0
CHILLS: 0
DIZZINESS: 1
FREQUENCY: 0
SORE THROAT: 0
NAUSEA: 0
ARTHRALGIAS: 0
JOINT SWELLING: 0
COUGH: 0
MYALGIAS: 1
HEMATURIA: 0
PALPITATIONS: 0
EYE PAIN: 0
HEADACHES: 0
CONSTIPATION: 0
SHORTNESS OF BREATH: 0
DYSURIA: 0
ABDOMINAL PAIN: 0
HEARTBURN: 0
WEAKNESS: 0
FEVER: 0
DIARRHEA: 0

## 2017-12-06 ASSESSMENT — PAIN SCALES - GENERAL: PAINLEVEL: EXTREME PAIN (8)

## 2017-12-06 NOTE — NURSING NOTE
"Chief Complaint   Patient presents with     Physical     Panel Management     mychart, height, fall risk, honoring choices        Initial /64  Pulse 60  Temp 97.6  F (36.4  C) (Oral)  Resp 14  Ht 5' 2.21\" (1.58 m)  Wt 137 lb (62.1 kg)  BMI 24.89 kg/m2 Estimated body mass index is 24.89 kg/(m^2) as calculated from the following:    Height as of this encounter: 5' 2.21\" (1.58 m).    Weight as of this encounter: 137 lb (62.1 kg).  Medication Reconciliation: complete  "

## 2017-12-06 NOTE — MR AVS SNAPSHOT
After Visit Summary   12/6/2017    Milena Hamilton    MRN: [id number]           Patient Information     Date Of Birth          1940        Visit Information        Provider Department      12/6/2017 9:30 AM Chuck Streeter PA-C [facility]        Today's Diagnoses     Encounter for routine adult health examination without abnormal findings    -  1    HTN, goal below 140/90        Anxiety        Hot flashes        Hypothyroidism, unspecified type        Lumbar back pain with radiculopathy affecting right lower extremity        Benign paroxysmal positional vertigo, unspecified laterality        Gastroesophageal reflux disease, esophagitis presence not specified          Care Instructions      Preventive Health Recommendations    Female Ages 65 +    Yearly exam:     See your health care provider every year in order to  o Review health changes.   o Discuss preventive care.    o Review your medicines if your doctor has prescribed any.      You no longer need a yearly Pap test unless you've had an abnormal Pap test in the past 10 years. If you have vaginal symptoms, such as bleeding or discharge, be sure to talk with your provider about a Pap test.      Every 1 to 2 years, have a mammogram.  If you are over 69, talk with your health care provider about whether or not you want to continue having screening mammograms.      Every 10 years, have a colonoscopy. Or, have a yearly FIT test (stool test). These exams will check for colon cancer.       Have a cholesterol test every 5 years, or more often if your doctor advises it.       Have a diabetes test (fasting glucose) every three years. If you are at risk for diabetes, you should have this test more often.       At age 65, have a bone density scan (DEXA) to check for osteoporosis (brittle bone disease).    Shots:    Get a flu shot each year.    Get a tetanus shot every 10 years.    Talk to your doctor about your pneumonia vaccines.  There are now two you should receive - Pneumovax (PPSV 23) and Prevnar (PCV 13).    Talk to your doctor about the shingles vaccine.    Talk to your doctor about the hepatitis B vaccine.    Nutrition:     Eat at least 5 servings of fruits and vegetables each day.      Eat whole-grain bread, whole-wheat pasta and brown rice instead of white grains and rice.      Talk to your provider about Calcium and Vitamin D.     Lifestyle    Exercise at least 150 minutes a week (30 minutes a day, 5 days a week). This will help you control your weight and prevent disease.      Limit alcohol to one drink per day.      No smoking.       Wear sunscreen to prevent skin cancer.       See your dentist twice a year for an exam and cleaning.      See your eye doctor every 1 to 2 years to screen for conditions such as glaucoma, macular degeneration and cataracts.      Skin lesion appears normal but let me know if anything changes.    For the hot flashes, will check thyroid. I also recommend a daily vitamin E supplement.  Continue with calcium + vitamin D.    Will try a daily anti-inflammatory called Mobic to take 1-2 tablets daily every morning to help with the back pain. Do not take ibuprofen or Aleve while on this.    Try the maneuvers to help with your dizzy spells. Make sure you are drinking plenty of water daily.    Follow up annually or as needed.               Follow-ups after your visit        Additional Services     CHIROPRACTIC REFERRAL       Your provider has referred you to: [facility]    Please be aware that coverage of these services is subject to the terms and limitations of your health insurance plan.  Call member services at your health plan with any benefit or coverage questions.      Please bring the following to your appointment:    >>   Any x-rays, CTs or MRIs which have been performed.  Contact the facility where they were done to arrange for  prior to your scheduled appointment.    >>   List of current  "medications   >>   This referral request   >>   Any documents/labs given to you for this referral                  Who to contact     If you have questions or need follow up information about today's clinic visit or your schedule please contact [facility] directly at [phone].  Normal or non-critical lab and imaging results will be communicated to you by MyChart, letter or phone within 4 business days after the clinic has received the results. If you do not hear from us within 7 days, please contact the clinic through MyChart or phone. If you have a critical or abnormal lab result, we will notify you by phone as soon as possible.  Submit refill requests through [employer] or call your pharmacy and they will forward the refill request to us. Please allow 3 business days for your refill to be completed.          Additional Information About Your Visit        MyChart Information     [employer] lets you send messages to your doctor, view your test results, renew your prescriptions, schedule appointments and more. To sign up, go to [email or web address] . Click on \"Log in\" on the left side of the screen, which will take you to the Welcome page. Then click on \"Sign up Now\" on the right side of the page.     You will be asked to enter the access code listed below, as well as some personal information. Please follow the directions to create your username and password.     Your access code is: [email or web address]  Expires: 3/6/2018 10:14 AM     Your access code will  in 90 days. If you need help or a new code, please call your [facility] or [phone].        Care EveryWhere ID     This is your Care EveryWhere ID. This could be used by other organizations to access your [place] medical records  [id number]        Your Vitals Were     Pulse Temperature Respirations Height BMI (Body Mass Index)       60 97.6  F (36.4  C) (Oral) 14 5' 2.21\" (1.58 m) 24.89 kg/m2        Blood Pressure from Last 3 " Encounters:   12/06/17 130/64   08/10/17 128/58   07/11/17 146/76    Weight from Last 3 Encounters:   12/06/17 137 lb (62.1 kg)   08/10/17 141 lb (64 kg)   07/11/17 137 lb 9.6 oz (62.4 kg)              We Performed the Following     CHIROPRACTIC REFERRAL     TSH with free T4 reflex          Today's Medication Changes          These changes are accurate as of: 12/6/17 10:18 AM.  If you have any questions, ask your nurse or doctor.               Start taking these medicines.        Dose/Directions    meloxicam 7.5 MG tablet   Commonly known as:  MOBIC   Used for:  Lumbar back pain with radiculopathy affecting right lower extremity   Started by:  Chuck Srteeter PA-C        Dose:  7.5-15 mg   Take 1-2 tablets (7.5-15 mg) by mouth daily   Quantity:  60 tablet   Refills:  1         These medicines have changed or have updated prescriptions.        Dose/Directions    metoprolol 25 MG tablet   Commonly known as:  LOPRESSOR   This may have changed:  See the new instructions.   Used for:  HTN, goal below 140/90   Changed by:  Chuck Streeter PA-C        TAKE 1/2 TABLET (12.5 MG) BY MOUTH DAILY   Quantity:  45 tablet   Refills:  3         Stop taking these medicines if you haven't already. Please contact your care team if you have questions.     escitalopram 10 MG tablet   Commonly known as:  LEXAPRO   Stopped by:  Chuck Streeter PA-C                Where to get your medicines      These medications were sent to [address] [address]     Phone:  [phone]     meloxicam 7.5 MG tablet    metoprolol 25 MG tablet                Primary Care Provider Office Phone # Fax #    Chuck Streeter PA-C [phone]       [address]  [facility] [address]        Equal Access to Services     ARNOLD DIAS AH: Mekhi Rivera, waaxda luqadaha, qaybta michelle blackwell  alen jones ah. So [facility] [phone].    ATENCIÓN: Si antoinette montez, tiene a miller disposición servicios gratuitos de asistencia lingüística. Mignon valerio [phone].    We comply with applicable federal civil rights laws and [place] laws. We do not discriminate on the basis of race, color, national origin, age, disability, sex, sexual orientation, or gender identity.            Thank you!     Thank you for choosing [facility]  for your care. Our goal is always to provide you with excellent care. Hearing back from our patients is one way we can continue to improve our services. Please take a few minutes to complete the written survey that you may receive in the mail after your visit with us. Thank you!             Your Updated Medication List - Protect others around you: Learn how to safely use, store and throw away your medicines at www.disposemymeds.org.          This list is accurate as of: 12/6/17 10:18 AM.  Always use your most recent med list.                   Brand Name Dispense Instructions for use Diagnosis    ALPRAZolam 0.25 MG tablet    XANAX    30 tablet    Take 1 tablet (0.25 mg) by mouth nightly as needed for anxiety    Anxiety       aspirin 81 MG tablet      1 TABLET DAILY        BIOTENE DRY MOUTH Pste      Toothpaste and mouthwash        fish oil-omega-3 fatty acids 1000 MG capsule      Take 2 g by mouth daily.        levothyroxine 50 MCG tablet    SYNTHROID/LEVOTHROID    90 tablet    TAKE 1 TABLET (50 MCG) BY MOUTH DAILY    Acquired hypothyroidism       meloxicam 7.5 MG tablet    MOBIC    60 tablet    Take 1-2 tablets (7.5-15 mg) by mouth daily    Lumbar back pain with radiculopathy affecting right lower extremity       metoprolol 25 MG tablet    LOPRESSOR    45 tablet    TAKE 1/2 TABLET (12.5 MG) BY MOUTH DAILY    HTN, goal below 140/90       Misc Intestinal Lyndsey Regulat Tabs     30 tablet    Take 1 tablet by mouth 2 times daily    Diarrhea       omeprazole 20 MG CR capsule     priLOSEC    90 capsule    TAKE ONE CAPSULE BY MOUTH ONCE DAILY    Gastroesophageal reflux disease, esophagitis presence not specified       simvastatin 20 MG tablet    ZOCOR    45 tablet    TAKE 0.5 TABLETS (10 MG) BY MOUTH AT BEDTIME (DUE FOR FASTING LABS)    Hyperlipidemia LDL goal <130       THERAPEUTIC MULTIVITAMIN PO      1 TABLET DAILY

## 2018-01-15 ENCOUNTER — TELEPHONE (OUTPATIENT)
Dept: FAMILY MEDICINE | Facility: OTHER | Age: 78
End: 2018-01-15

## 2018-01-15 DIAGNOSIS — F41.9 ANXIETY: ICD-10-CM

## 2018-01-15 RX ORDER — ALPRAZOLAM 0.25 MG
0.25 TABLET ORAL
Qty: 30 TABLET | Refills: 0 | Status: SHIPPED | OUTPATIENT
Start: 2018-01-15 | End: 2018-03-02

## 2018-01-15 NOTE — TELEPHONE ENCOUNTER
Reason for Call:  Medication or medication refill:    Do you use a [facility]?  Name of the pharmacy and phone number for the current request:  [facility] - [phone]    Name of the medication requested: alprazolam    Other request: was told to contact clinic regarding getting refill.    Can we leave a detailed message on this number? YES    Phone number patient can be reached at: Home number on file [phone] (home)    Best Time: any    Call taken on 1/15/2018 at 2:29 PM by Katerin Chávez

## 2018-01-22 DIAGNOSIS — E03.9 ACQUIRED HYPOTHYROIDISM: ICD-10-CM

## 2018-01-24 RX ORDER — LEVOTHYROXINE SODIUM 50 UG/1
TABLET ORAL
Qty: 90 TABLET | Refills: 2 | Status: SHIPPED | OUTPATIENT
Start: 2018-01-24 | End: 2018-10-17

## 2018-01-24 NOTE — TELEPHONE ENCOUNTER
Synthroid:  Prescription approved per [facility] Refill Protocol.    Stephani Del Cid, RN, BSN

## 2018-01-31 ENCOUNTER — HOSPITAL ENCOUNTER (OUTPATIENT)
Dept: MRI IMAGING | Facility: CLINIC | Age: 78
Discharge: HOME | End: 2018-01-31
Attending: ORTHOPAEDIC SURGERY | Admitting: ORTHOPAEDIC SURGERY
Payer: MEDICARE

## 2018-01-31 DIAGNOSIS — M43.06 SPONDYLOLYSIS, LUMBAR REGION: ICD-10-CM

## 2018-01-31 DIAGNOSIS — M48.061 SPINAL STENOSIS, LUMBAR REGION, WITHOUT NEUROGENIC CLAUDICATION: ICD-10-CM

## 2018-01-31 DIAGNOSIS — M51.16 NEURITIS OR RADICULITIS DUE TO RUPTURE OF LUMBAR INTERVERTEBRAL DISC: ICD-10-CM

## 2018-01-31 PROCEDURE — 72148 MRI LUMBAR SPINE W/O DYE: CPT

## 2018-02-27 ENCOUNTER — APPOINTMENT (OUTPATIENT)
Dept: PHYSICAL THERAPY | Facility: CLINIC | Age: 78
End: 2018-02-27
Attending: EMERGENCY MEDICINE
Payer: MEDICARE

## 2018-02-27 ENCOUNTER — HOSPITAL ENCOUNTER (OUTPATIENT)
Facility: CLINIC | Age: 78
Setting detail: OBSERVATION
Discharge: HOME | End: 2018-02-28
Attending: EMERGENCY MEDICINE | Admitting: FAMILY MEDICINE
Payer: MEDICARE

## 2018-02-27 ENCOUNTER — APPOINTMENT (OUTPATIENT)
Dept: GENERAL RADIOLOGY | Facility: CLINIC | Age: 78
End: 2018-02-27
Attending: EMERGENCY MEDICINE
Payer: MEDICARE

## 2018-02-27 DIAGNOSIS — R11.2 INTRACTABLE VOMITING WITH NAUSEA, UNSPECIFIED VOMITING TYPE: ICD-10-CM

## 2018-02-27 DIAGNOSIS — K52.9 GASTROENTERITIS: ICD-10-CM

## 2018-02-27 DIAGNOSIS — R30.0 DYSURIA: ICD-10-CM

## 2018-02-27 PROBLEM — R11.10 INTRACTABLE VOMITING: Status: ACTIVE | Noted: 2018-02-27

## 2018-02-27 LAB
ALBUMIN SERPL-MCNC: 4.1 G/DL (ref 3.4–5)
ALBUMIN UR-MCNC: NEGATIVE MG/DL
ALP SERPL-CCNC: 67 U/L (ref 40–150)
ALT SERPL W P-5'-P-CCNC: 14 U/L (ref 0–50)
ANION GAP SERPL CALCULATED.3IONS-SCNC: 7 MMOL/L (ref 3–14)
APPEARANCE UR: CLEAR
AST SERPL W P-5'-P-CCNC: 10 U/L (ref 0–45)
BASOPHILS # BLD AUTO: 0 10E9/L (ref 0–0.2)
BASOPHILS NFR BLD AUTO: 0.4 %
BILIRUB SERPL-MCNC: 0.8 MG/DL (ref 0.2–1.3)
BILIRUB UR QL STRIP: NEGATIVE
BUN SERPL-MCNC: 9 MG/DL (ref 7–30)
CALCIUM SERPL-MCNC: 9.1 MG/DL (ref 8.5–10.1)
CHLORIDE SERPL-SCNC: 104 MMOL/L (ref 94–109)
CO2 SERPL-SCNC: 29 MMOL/L (ref 20–32)
COLOR UR AUTO: YELLOW
CREAT SERPL-MCNC: 0.72 MG/DL (ref 0.52–1.04)
CRP SERPL-MCNC: <2.9 MG/L (ref 0–8)
DIFFERENTIAL METHOD BLD: ABNORMAL
EOSINOPHIL # BLD AUTO: 0.1 10E9/L (ref 0–0.7)
EOSINOPHIL NFR BLD AUTO: 2.1 %
ERYTHROCYTE [DISTWIDTH] IN BLOOD BY AUTOMATED COUNT: 12.5 % (ref 10–15)
GFR SERPL CREATININE-BSD FRML MDRD: 79 ML/MIN/1.7M2
GLUCOSE SERPL-MCNC: 110 MG/DL (ref 70–99)
GLUCOSE UR STRIP-MCNC: NEGATIVE MG/DL
HCT VFR BLD AUTO: 48.1 % (ref 35–47)
HGB BLD-MCNC: 14.8 G/DL (ref 11.7–15.7)
HGB UR QL STRIP: ABNORMAL
IMM GRANULOCYTES # BLD: 0 10E9/L (ref 0–0.4)
IMM GRANULOCYTES NFR BLD: 0.2 %
KETONES UR STRIP-MCNC: NEGATIVE MG/DL
LEUKOCYTE ESTERASE UR QL STRIP: ABNORMAL
LIPASE SERPL-CCNC: 136 U/L (ref 73–393)
LYMPHOCYTES # BLD AUTO: 2.1 10E9/L (ref 0.8–5.3)
LYMPHOCYTES NFR BLD AUTO: 40.4 %
MCH RBC QN AUTO: 30.3 PG (ref 26.5–33)
MCHC RBC AUTO-ENTMCNC: 30.8 G/DL (ref 31.5–36.5)
MCV RBC AUTO: 99 FL (ref 78–100)
MONOCYTES # BLD AUTO: 0.5 10E9/L (ref 0–1.3)
MONOCYTES NFR BLD AUTO: 8.6 %
MUCOUS THREADS #/AREA URNS LPF: PRESENT /LPF
NEUTROPHILS # BLD AUTO: 2.5 10E9/L (ref 1.6–8.3)
NEUTROPHILS NFR BLD AUTO: 48.3 %
NITRATE UR QL: NEGATIVE
PH UR STRIP: 6 PH (ref 5–7)
PLATELET # BLD AUTO: 219 10E9/L (ref 150–450)
POTASSIUM SERPL-SCNC: 3.4 MMOL/L (ref 3.4–5.3)
PROT SERPL-MCNC: 7.8 G/DL (ref 6.8–8.8)
RBC # BLD AUTO: 4.88 10E12/L (ref 3.8–5.2)
RBC #/AREA URNS AUTO: <1 /HPF (ref 0–2)
SODIUM SERPL-SCNC: 140 MMOL/L (ref 133–144)
SOURCE: ABNORMAL
SP GR UR STRIP: 1.01 (ref 1–1.03)
UROBILINOGEN UR STRIP-MCNC: 0 MG/DL (ref 0–2)
WBC # BLD AUTO: 5.3 10E9/L (ref 4–11)
WBC #/AREA URNS AUTO: 3 /HPF (ref 0–2)

## 2018-02-27 PROCEDURE — 96376 TX/PRO/DX INJ SAME DRUG ADON: CPT | Performed by: EMERGENCY MEDICINE

## 2018-02-27 PROCEDURE — G0378 HOSPITAL OBSERVATION PER HR: HCPCS

## 2018-02-27 PROCEDURE — 97161 PT EVAL LOW COMPLEX 20 MIN: CPT | Mod: GP

## 2018-02-27 PROCEDURE — 96376 TX/PRO/DX INJ SAME DRUG ADON: CPT

## 2018-02-27 PROCEDURE — 81001 URINALYSIS AUTO W/SCOPE: CPT | Performed by: EMERGENCY MEDICINE

## 2018-02-27 PROCEDURE — 96375 TX/PRO/DX INJ NEW DRUG ADDON: CPT | Performed by: EMERGENCY MEDICINE

## 2018-02-27 PROCEDURE — 87506 IADNA-DNA/RNA PROBE TQ 6-11: CPT | Performed by: FAMILY MEDICINE

## 2018-02-27 PROCEDURE — 96374 THER/PROPH/DIAG INJ IV PUSH: CPT | Performed by: EMERGENCY MEDICINE

## 2018-02-27 PROCEDURE — 25000128 H RX IP 250 OP 636: Performed by: EMERGENCY MEDICINE

## 2018-02-27 PROCEDURE — 87086 URINE CULTURE/COLONY COUNT: CPT | Performed by: EMERGENCY MEDICINE

## 2018-02-27 PROCEDURE — 80053 COMPREHEN METABOLIC PANEL: CPT | Performed by: EMERGENCY MEDICINE

## 2018-02-27 PROCEDURE — 96375 TX/PRO/DX INJ NEW DRUG ADDON: CPT

## 2018-02-27 PROCEDURE — 25000132 ZZH RX MED GY IP 250 OP 250 PS 637: Mod: GY | Performed by: EMERGENCY MEDICINE

## 2018-02-27 PROCEDURE — 99285 EMERGENCY DEPT VISIT HI MDM: CPT | Mod: 25 | Performed by: EMERGENCY MEDICINE

## 2018-02-27 PROCEDURE — 40000193 ZZH STATISTIC PT WARD VISIT

## 2018-02-27 PROCEDURE — 83690 ASSAY OF LIPASE: CPT | Performed by: EMERGENCY MEDICINE

## 2018-02-27 PROCEDURE — 99219 ZZC INITIAL OBSERVATION CARE,LEVL II: CPT | Performed by: FAMILY MEDICINE

## 2018-02-27 PROCEDURE — 74019 RADEX ABDOMEN 2 VIEWS: CPT | Mod: TC

## 2018-02-27 PROCEDURE — 25000132 ZZH RX MED GY IP 250 OP 250 PS 637: Performed by: FAMILY MEDICINE

## 2018-02-27 PROCEDURE — 96361 HYDRATE IV INFUSION ADD-ON: CPT

## 2018-02-27 PROCEDURE — A9270 NON-COVERED ITEM OR SERVICE: HCPCS | Mod: GY | Performed by: EMERGENCY MEDICINE

## 2018-02-27 PROCEDURE — 99285 EMERGENCY DEPT VISIT HI MDM: CPT | Mod: Z6 | Performed by: EMERGENCY MEDICINE

## 2018-02-27 PROCEDURE — 85025 COMPLETE CBC W/AUTO DIFF WBC: CPT | Performed by: EMERGENCY MEDICINE

## 2018-02-27 PROCEDURE — 96361 HYDRATE IV INFUSION ADD-ON: CPT | Performed by: EMERGENCY MEDICINE

## 2018-02-27 PROCEDURE — 86140 C-REACTIVE PROTEIN: CPT | Performed by: EMERGENCY MEDICINE

## 2018-02-27 RX ORDER — METOCLOPRAMIDE HYDROCHLORIDE 5 MG/ML
5 INJECTION INTRAMUSCULAR; INTRAVENOUS ONCE
Status: COMPLETED | OUTPATIENT
Start: 2018-02-27 | End: 2018-02-27

## 2018-02-27 RX ORDER — METOCLOPRAMIDE 5 MG/1
5 TABLET ORAL EVERY 6 HOURS PRN
Status: DISCONTINUED | OUTPATIENT
Start: 2018-02-27 | End: 2018-02-28 | Stop reason: HOSPADM

## 2018-02-27 RX ORDER — NALOXONE HYDROCHLORIDE 0.4 MG/ML
.1-.4 INJECTION, SOLUTION INTRAMUSCULAR; INTRAVENOUS; SUBCUTANEOUS
Status: DISCONTINUED | OUTPATIENT
Start: 2018-02-27 | End: 2018-02-28 | Stop reason: HOSPADM

## 2018-02-27 RX ORDER — PROCHLORPERAZINE 25 MG
12.5 SUPPOSITORY, RECTAL RECTAL EVERY 12 HOURS PRN
Status: DISCONTINUED | OUTPATIENT
Start: 2018-02-27 | End: 2018-02-28 | Stop reason: HOSPADM

## 2018-02-27 RX ORDER — ONDANSETRON 2 MG/ML
4 INJECTION INTRAMUSCULAR; INTRAVENOUS EVERY 6 HOURS PRN
Status: DISCONTINUED | OUTPATIENT
Start: 2018-02-27 | End: 2018-02-28 | Stop reason: HOSPADM

## 2018-02-27 RX ORDER — METOPROLOL TARTRATE 25 MG/1
25 TABLET, FILM COATED ORAL 2 TIMES DAILY
Status: DISCONTINUED | OUTPATIENT
Start: 2018-02-27 | End: 2018-02-27

## 2018-02-27 RX ORDER — PROCHLORPERAZINE MALEATE 5 MG
5 TABLET ORAL EVERY 6 HOURS PRN
Status: DISCONTINUED | OUTPATIENT
Start: 2018-02-27 | End: 2018-02-28 | Stop reason: HOSPADM

## 2018-02-27 RX ORDER — LEVOFLOXACIN 500 MG/1
500 TABLET, FILM COATED ORAL DAILY
Qty: 7 TABLET | Refills: 0 | Status: SHIPPED | OUTPATIENT
Start: 2018-02-27 | End: 2018-02-28

## 2018-02-27 RX ORDER — LEVOTHYROXINE SODIUM 50 UG/1
50 TABLET ORAL DAILY
Status: DISCONTINUED | OUTPATIENT
Start: 2018-02-27 | End: 2018-02-28 | Stop reason: HOSPADM

## 2018-02-27 RX ORDER — HYDROMORPHONE HYDROCHLORIDE 1 MG/ML
0.2 INJECTION, SOLUTION INTRAMUSCULAR; INTRAVENOUS; SUBCUTANEOUS
Status: DISCONTINUED | OUTPATIENT
Start: 2018-02-27 | End: 2018-02-28 | Stop reason: HOSPADM

## 2018-02-27 RX ORDER — ACETAMINOPHEN 650 MG/1
650 SUPPOSITORY RECTAL EVERY 4 HOURS PRN
Status: DISCONTINUED | OUTPATIENT
Start: 2018-02-27 | End: 2018-02-28 | Stop reason: HOSPADM

## 2018-02-27 RX ORDER — HYDROMORPHONE HYDROCHLORIDE 1 MG/ML
0.5 INJECTION, SOLUTION INTRAMUSCULAR; INTRAVENOUS; SUBCUTANEOUS
Status: COMPLETED | OUTPATIENT
Start: 2018-02-27 | End: 2018-02-27

## 2018-02-27 RX ORDER — ONDANSETRON 4 MG/1
4 TABLET, ORALLY DISINTEGRATING ORAL EVERY 6 HOURS PRN
Status: DISCONTINUED | OUTPATIENT
Start: 2018-02-27 | End: 2018-02-28 | Stop reason: HOSPADM

## 2018-02-27 RX ORDER — ONDANSETRON 2 MG/ML
4 INJECTION INTRAMUSCULAR; INTRAVENOUS EVERY 30 MIN PRN
Status: DISCONTINUED | OUTPATIENT
Start: 2018-02-27 | End: 2018-02-27

## 2018-02-27 RX ORDER — ONDANSETRON 4 MG/1
4 TABLET, ORALLY DISINTEGRATING ORAL EVERY 8 HOURS PRN
Qty: 10 TABLET | Refills: 0 | Status: SHIPPED | OUTPATIENT
Start: 2018-02-27 | End: 2019-02-19

## 2018-02-27 RX ORDER — ASPIRIN 81 MG/1
81 TABLET ORAL DAILY
Status: DISCONTINUED | OUTPATIENT
Start: 2018-02-27 | End: 2018-02-28 | Stop reason: HOSPADM

## 2018-02-27 RX ORDER — ALPRAZOLAM 0.25 MG
0.25 TABLET ORAL
Status: DISCONTINUED | OUTPATIENT
Start: 2018-02-27 | End: 2018-02-28 | Stop reason: HOSPADM

## 2018-02-27 RX ORDER — SODIUM CHLORIDE 9 MG/ML
1000 INJECTION, SOLUTION INTRAVENOUS CONTINUOUS
Status: DISCONTINUED | OUTPATIENT
Start: 2018-02-27 | End: 2018-02-28

## 2018-02-27 RX ORDER — SODIUM CHLORIDE 9 MG/ML
INJECTION, SOLUTION INTRAVENOUS CONTINUOUS
Status: DISCONTINUED | OUTPATIENT
Start: 2018-02-27 | End: 2018-02-27

## 2018-02-27 RX ORDER — METOCLOPRAMIDE HYDROCHLORIDE 5 MG/ML
5 INJECTION INTRAMUSCULAR; INTRAVENOUS EVERY 6 HOURS PRN
Status: DISCONTINUED | OUTPATIENT
Start: 2018-02-27 | End: 2018-02-28 | Stop reason: HOSPADM

## 2018-02-27 RX ORDER — SIMVASTATIN 20 MG
20 TABLET ORAL AT BEDTIME
Status: DISCONTINUED | OUTPATIENT
Start: 2018-02-27 | End: 2018-02-27

## 2018-02-27 RX ORDER — ACETAMINOPHEN 325 MG/1
650 TABLET ORAL EVERY 4 HOURS PRN
Status: DISCONTINUED | OUTPATIENT
Start: 2018-02-27 | End: 2018-02-28 | Stop reason: HOSPADM

## 2018-02-27 RX ADMIN — SODIUM CHLORIDE 1000 ML: 9 INJECTION, SOLUTION INTRAVENOUS at 19:42

## 2018-02-27 RX ADMIN — ONDANSETRON 4 MG: 2 INJECTION INTRAMUSCULAR; INTRAVENOUS at 05:01

## 2018-02-27 RX ADMIN — METOPROLOL TARTRATE 12.5 MG: 25 TABLET ORAL at 21:23

## 2018-02-27 RX ADMIN — SODIUM CHLORIDE 500 ML: 9 INJECTION, SOLUTION INTRAVENOUS at 06:07

## 2018-02-27 RX ADMIN — HYDROMORPHONE HYDROCHLORIDE 0.5 MG: 1 INJECTION, SOLUTION INTRAMUSCULAR; INTRAVENOUS; SUBCUTANEOUS at 05:05

## 2018-02-27 RX ADMIN — PROCHLORPERAZINE EDISYLATE 5 MG: 5 INJECTION INTRAMUSCULAR; INTRAVENOUS at 14:34

## 2018-02-27 RX ADMIN — ONDANSETRON 4 MG: 2 INJECTION INTRAMUSCULAR; INTRAVENOUS at 06:03

## 2018-02-27 RX ADMIN — ALPRAZOLAM 0.25 MG: 0.25 TABLET ORAL at 21:23

## 2018-02-27 RX ADMIN — ONDANSETRON 4 MG: 2 INJECTION INTRAMUSCULAR; INTRAVENOUS at 13:50

## 2018-02-27 RX ADMIN — SODIUM CHLORIDE: 9 INJECTION, SOLUTION INTRAVENOUS at 10:36

## 2018-02-27 RX ADMIN — SODIUM CHLORIDE 1000 ML: 9 INJECTION, SOLUTION INTRAVENOUS at 05:01

## 2018-02-27 RX ADMIN — METOCLOPRAMIDE 5 MG: 5 INJECTION, SOLUTION INTRAMUSCULAR; INTRAVENOUS at 06:50

## 2018-02-27 ASSESSMENT — ENCOUNTER SYMPTOMS
DIARRHEA: 1
MYALGIAS: 1
FREQUENCY: 1
LIGHT-HEADEDNESS: 1
FEVER: 0
BACK PAIN: 1
DIAPHORESIS: 1
VOMITING: 0
ABDOMINAL PAIN: 1
HEADACHES: 1
CHILLS: 1
NAUSEA: 1
ABDOMINAL DISTENTION: 1

## 2018-02-27 NOTE — PLAN OF CARE
Problem: Patient Care Overview  Goal: Plan of Care/Patient Progress Review  Discharge Planner PT    Patient plan for discharge: return home    Current status: Pt is indep with ambulation short distances right now. She was able to maneuver her IV pole indep.10 to 15 feet x 2. She had stomach issues so further distance was not good for her. She toileted herself and then back tp bed per self.    Barriers to return to prior living situation: none    Recommendations for discharge: home with     Rationale for recommendations: She is close to baseline right now she is just nauseated and has stomach soreness. Her low back is baseline she states. Recommended a walker if she gets weaker especially at night. They agreed and said that they could get one in [facility]. No further PT Rx needed.         Entered by: Meg Menezes 02/27/2018 4:43 PM

## 2018-02-27 NOTE — PROGRESS NOTES
S-(situation): Patient registered to Observation. Patient arrived to room 265 via cart from ED @ 0830.    B-(background): N/V/D    A-(assessment): see VS f/s. Pt cautiously having ice chips only, refusing AM meds at this time.  Received Zofran this afternoon for nausea. Up to BR with SBA, unable to obtain stool sample due to urine contamination, had small formed BM.     R-(recommendations): Orders and observation goals reviewed with pt and spouse.    Nursing Observation criteria listed below was met:    Skin issues/needs documented:NA  Isolation needs addressed, if appropriate: Yes  Fall Prevention: Education given and documented: Yes  Education Assessment documented:Yes  Education Documented (Pre-existing chronic infection such as, MRSA/VRE need education on admission): Yes  New medication patient education completed and documented (Possible Side Effects of Common Medications handout): Yes  Home medications if not able to send immediately home with family stored here: NA  Reminder note placed in discharge instructions: NA  Patient has discharge needs (If yes, please explain): No

## 2018-02-27 NOTE — IP AVS SNAPSHOT
MRN:[id number]                      After Visit Summary   2/27/2018    Milena Hamilton    MRN: [id number]           Thank you!     Thank you for choosing [facility] for your care. Our goal is always to provide you with excellent care. Hearing back from our patients is one way we can continue to improve our services. Please take a few minutes to complete the written survey that you may receive in the mail after you visit with us. Thank you!        Patient Information     Date Of Birth          1940        About your hospital stay     You were admitted on:  February 27, 2018 You last received care in the:  [address] [facility]    You were discharged on:  February 28, 2018        Reason for your hospital stay       Nausea, vomiting, diarrhea thought to be caused by a viral infection with symptoms starting to improve and you are now keeping down fluids without difficulty                  Who to Call     For medical emergencies, please call 911.  For non-urgent questions about your medical care, please call your primary care provider or clinic, [phone]          Attending Provider     Provider Specialty    Jomar Wilson MD Emergency Medicine    Lenka Sanchez MD Family Practice       Primary Care Provider Office Phone # Fax #    Chuck Streeter PA-C [phone]      After Care Instructions     Activity       Your activity upon discharge: activity as tolerated            Diet       Follow this diet upon discharge:  Full Liquid Diet and bland, soft solid foods for the next 2-3 days, then advance slowly as you can tolerate                  Follow-up Appointments     Follow-up and recommended labs and tests        Follow up with primary care provider, Chuck Streeter, or one of his partners in 2 days for reassessment and determination if you can proceed with surgery next week.                  Your next 10 appointments already scheduled   "   Mar 02, 2018 10:00 AM CST   Pre-Op physical with FORREST Cabrera CNP   [facility]    [address]  [facility] [address]   [phone]              Pending Results     No orders found for last 3 day(s).            Statement of Approval     Ordered          18 1449  I have reviewed and agree with all the recommendations and orders detailed in this document.  EFFECTIVE NOW     Approved and electronically signed by:  Lenka Sanchez MD             Admission Information     Date & Time Provider Department Dept. Phone    2018 Lenka Sanchez MD [address] [facility] [phone]      Your Vitals Were     Blood Pressure Pulse Temperature Respirations Height Weight    150/70 (BP Location: Left arm) 75 98  F (36.7  C) (Oral) 20 1.575 m (5' 2.01\") 62.2 kg (137 lb 2 oz)    Pulse Oximetry BMI (Body Mass Index)                96% 25.07 kg/m2          [facility] Information     [facility] lets you send messages to your doctor, view your test results, renew your prescriptions, schedule appointments and more. To sign up, go to [email or web address] . Click on \"Log in\" on the left side of the screen, which will take you to the Welcome page. Then click on \"Sign up Now\" on the right side of the page.     You will be asked to enter the access code listed below, as well as some personal information. Please follow the directions to create your username and password.     Your access code is: [email or web address]  Expires: 3/6/2018 10:14 AM     Your access code will  in 90 days. If you need help or a new code, please call your [facility] clinic or [phone].        Care EveryWhere ID     This is your Care EveryWhere ID. This could be used by other organizations to access your [facility] medical records  [id number]        Equal Access to Services     ARNOLD DIAS AH: sheyla Galloway, anthony " michelle blackwell sabinejoanne jacquesrichard clineaan ah. Noni [facility] [phone].    ATENCIÓN: Si antoinette montez, tiene a miller disposición servicios gratuitos de asistencia lingüística. Mignon al [phone].    We comply with applicable federal civil rights laws and [place] laws. We do not discriminate on the basis of race, color, national origin, age, disability, sex, sexual orientation, or gender identity.               Review of your medicines      START taking        Dose / Directions    ondansetron 4 MG ODT tab   Commonly known as:  ZOFRAN ODT        Dose:  4 mg   Take 1 tablet (4 mg) by mouth every 8 hours as needed   Quantity:  10 tablet   Refills:  0       prochlorperazine 5 MG tablet   Commonly known as:  COMPAZINE        Dose:  5 mg   Take 1 tablet (5 mg) by mouth every 6 hours as needed for nausea or vomiting   Quantity:  5 tablet   Refills:  0         CONTINUE these medicines which have NOT CHANGED        Dose / Directions    ALPRAZolam 0.25 MG tablet   Commonly known as:  XANAX   Used for:  Anxiety        Dose:  0.25 mg   Take 1 tablet (0.25 mg) by mouth nightly as needed for anxiety   Quantity:  30 tablet   Refills:  0       aspirin 81 MG tablet        1 TABLET DAILY   Refills:  0       BIOTENE DRY MOUTH Pste        Toothpaste and mouthwash   Refills:  0       levothyroxine 50 MCG tablet   Commonly known as:  SYNTHROID/LEVOTHROID   Used for:  Acquired hypothyroidism        TAKE 1 TABLET (50 MCG) BY MOUTH DAILY   Quantity:  90 tablet   Refills:  2       metoprolol tartrate 25 MG tablet   Commonly known as:  LOPRESSOR   Used for:  HTN, goal below 140/90        TAKE 1/2 TABLET (12.5 MG) BY MOUTH DAILY   Quantity:  45 tablet   Refills:  3       omeprazole 20 MG CR capsule   Commonly known as:  priLOSEC   Used for:  Gastroesophageal reflux disease, esophagitis presence not specified        TAKE ONE CAPSULE BY MOUTH ONCE DAILY   Quantity:  90 capsule   Refills:  3       simvastatin 20 MG tablet   Commonly  known as:  ZOCOR   Used for:  Hyperlipidemia LDL goal <130        TAKE 0.5 TABLETS (10 MG) BY MOUTH AT BEDTIME (DUE FOR FASTING LABS)   Quantity:  45 tablet   Refills:  3       THERAPEUTIC MULTIVITAMIN PO        1 TABLET DAILY   Refills:  0            Where to get your medicines      These medications [place] [facility] [place] [address]  [address], [facility] [address]     Phone:  [phone]     prochlorperazine 5 MG tablet         Some of these will need a paper prescription and others can be bought over the counter. Ask your nurse if you have questions.     Bring a paper prescription for each of these medications     ondansetron 4 MG ODT tab                Protect others around you: Learn how to safely use, store and throw away your medicines at www.disposemymeds.org.             Medication List: This is a list of all your medications and when to take them. Check marks below indicate your daily home schedule. Keep this list as a reference.      Medications           Morning Afternoon Evening Bedtime As Needed    ALPRAZolam 0.25 MG tablet   Commonly known as:  XANAX   Take 1 tablet (0.25 mg) by mouth nightly as needed for anxiety   Last time this was given:  0.25 mg on 2/27/2018  9:23 PM                                   aspirin 81 MG tablet   1 TABLET DAILY                                   BIOTENE DRY MOUTH Pste   Toothpaste and mouthwash                                levothyroxine 50 MCG tablet   Commonly known as:  SYNTHROID/LEVOTHROID   TAKE 1 TABLET (50 MCG) BY MOUTH DAILY   Last time this was given:  50 mcg on 2/28/2018  6:03 AM                                   metoprolol tartrate 25 MG tablet   Commonly known as:  LOPRESSOR   TAKE 1/2 TABLET (12.5 MG) BY MOUTH DAILY   Last time this was given:  12.5 mg on 2/28/2018  9:05 AM                                   omeprazole 20 MG CR capsule   Commonly known as:  priLOSEC   TAKE ONE CAPSULE BY MOUTH ONCE DAILY  "  Last time this was given:  20 mg on 2/28/2018  9:05 AM                                   ondansetron 4 MG ODT tab   Commonly known as:  ZOFRAN ODT   Take 1 tablet (4 mg) by mouth every 8 hours as needed                                   prochlorperazine 5 MG tablet   Commonly known as:  COMPAZINE   Take 1 tablet (5 mg) by mouth every 6 hours as needed for nausea or vomiting                                   simvastatin 20 MG tablet   Commonly known as:  ZOCOR   TAKE 0.5 TABLETS (10 MG) BY MOUTH AT BEDTIME (DUE FOR FASTING LABS)                                   THERAPEUTIC MULTIVITAMIN PO   1 TABLET DAILY                                             More Information        Dysuria     Painful urination (dysuria) is often caused by a problem in the urinary tract.   Dysuria is pain felt during urination. It is often described as a burning. Learn more about this problem and how it can be treated.  What causes dysuria?  Possible causes include:    Infection with a bacteria or virus such as a urinary tract infection (UTI or a sexually transmitted infection (STI)    Sensitivity or allergy to chemicals such as those found in lotions and other products    Prostate or bladder problems    Radiation therapy to the pelvic area  How is dysuria diagnosed?  Your healthcare provider will examine you. He or she will ask about your symptoms and health. After talking with you and doing a physical exam, your healthcare provider may know what is causing your dysuria. He or she will usually request  a sample of your urine. Tests of your urine, or a \"urinalysis,\" are done. A urinalysis may include:    Looking at the urine sample (visual exam)    Checking for substances (chemical exam)    Looking at a small amount under a microscope (microscopic exam)  Some parts of the urinalysis may be done in the provider's office and some in a lab. And, the urine sample may be checked for bacteria and yeast (urine culture). Your healthcare provider " will tell you more about these tests if they are needed.  How is dysuria treated?  Treatment depends on the cause. If you have a bacterial infection, you may need antibiotics. You may be given medicines to make it easier for you to urinate and help relieve pain. Your healthcare provider can tell you more about your treatment options. Untreated, symptoms may get worse.  When to call your healthcare provider  Call the healthcare provider right away if you have any of the following:    Fever of 100.4 F (38 C) or higher     No improvement after three days of treatment    Trouble urinating because of pain    New or increased discharge from the vagina or penis    Rash or joint pain    Increased back or abdominal pain    Enlarged painful lymph nodes (lumps) in the groin   Date Last Reviewed: 1/1/2017 2000-2017 [employer]. [address] [place] [address]. All rights reserved. This information is not intended as a substitute for professional medical care. Always follow your healthcare professional's instructions.                Noninfectious Gastroenteritis (Ages 6 Years to Adult)    Gastroenteritis can cause nausea, vomiting, diarrhea, and abdominal cramping. This may occur as a result of food sensitivity, inflammation of your gastrointestinal tract, medicines, stress, or other causes not related to infection. Your symptoms will usually last from 1 to 3 days, but can last longer. Antibiotics are not effective, but simple home treatment will be helpful.  Home care  Medicine    You may use acetaminophen or NSAID medicines like ibuprofen or naproxen to control fever, unless another medicine is prescribed. (Note: If you have chronic liver or kidney disease, or ever had a stomach ulcer or gastrointestinalI bleeding, talk with your healthcare provider before using these medicines.) Aspirin should never be used in anyone under 18 years of age who is ill with a fever. It may cause severe liver damage.  Don't increase your NSAID medicines if you are already taking these medicines for another condition (like arthritis). Don't use NSAIDS if you are on aspirin (such as for heart disease, or after a stroke).    If medicines for diarrhea or vomiting are prescribed, take only as directed.  General care and preventing spread of the illness    If symptoms are severe, rest at home for the next 24 hours or until you feel better.    Hand washing with soap and water is the best way to prevent the spread of infection. Wash your hands after touching anyone who is sick.    Wash your hands after using the toilet and before meals. Clean the toilet after each use.    Caffeine, tobacco, and alcohol can make your diarrhea, cramping, and pain worse.  Diet    Water and clear liquids are important so you do not get dehydrated. Drink a small amount at a time.    Do not force yourself to eat, especially if you have cramps, vomiting, or diarrhea. When you finally decide to start eating, do not eat large amounts at a time, even if you are hungry.    If you eat, avoid fatty, greasy, spicy, or fried foods.    Do not eat dairy products if you have diarrhea; they can make the diarrhea worse.  During the first 24 hours (the first full day), follow the diet below:    Beverages: Water, clear liquids, soft drinks without caffeine, like ginger ale; mineral water (plain or flavored); decaffeinated tea and coffee.    Soups: Clear broth, consommé, and bouillon Sports drinks aren't a good choice because they have too much sugar and not enough electrolytes. In this case, commercially available products called oral rehydration solutions are best.    Desserts: Plain gelatin, popsicles, and fruit juice bars.  During the next 24 hours (the second day), you may add the following to the above if you have improved. If not, continue what you did the first day:    Hot cereal, plain toast, bread, rolls, crackers    Plain noodles, rice, mashed potatoes, chicken  noodle or rice soup    Unsweetened canned fruit (avoid pineapple), bananas    Limit caffeine and chocolate. No spices or seasonings except salt.  During the next 24 hours    Gradually resume a normal diet, as you feel better and your symptoms improve.    If at any time your symptoms start getting worse, go back to clear liquids until you feel better.  Food preparation    If you have diarrhea, you should not prepare food for others. When you  prepare food for yourself, wash your hands before and after.    Wash your hands after using cutting boards, countertops, and knives that have been in contact with raw food.    Keep uncooked meats away from cooked and ready-to-eat foods.  Follow-up care  Follow up with your healthcare provider if you are not improving over the next 2 to 3 days, or as advised. If a stool (diarrhea) sample was taken, call for the results as directed.  When to seek medical care  Call your healthcare provider right away if any of these occur:     Increasing abdominal pain or constant lower right abdominal pain    Continued vomiting (unable to keep liquids down)    Frequent diarrhea (more than 5 times a day)    Blood in vomit or stool (black or red color)    Inability to tolerate solid food after a few days.    Dark urine, reduced urine output    Weakness, dizziness    Drowsiness    Fever of 100.4 F (38.0 C) or higher, or as directed by your healthcare provider    New rash  Call 911  Call 911 if any of these occur:    Trouble breathing    Chest pain    Confusion    Severe drowsiness or trouble awakening    Seizure    Stiff neck  Date Last Reviewed: 11/16/2015 2000-2017 [employer]. [address] [place] [address], [place] [address]. All rights reserved. This information is not intended as a substitute for professional medical care. Always follow your healthcare professional's instructions.                Prochlorperazine tablets  Brand Name: Compazine  What is this medicine?  PROCHLORPERAZINE  (proe klor PER a zeen) helps to control severe nausea and vomiting. This medicine is also used to treat schizophrenia. It can also help patients who experience anxiety that is not due to psychological illness.  How should I use this medicine?  Take this medicine by mouth with a glass of water. Follow the directions on the prescription label. Take your doses at regular intervals. Do not take your medicine more often than directed. Do not stop taking this medicine suddenly. This can cause nausea, vomiting, and dizziness. Ask your doctor or health care professional for advice.  Talk to your pediatrician regarding the use of this medicine in children. Special care may be needed. While this drug may be prescribed for children as young as 2 years for selected conditions, precautions do apply.  What side effects may I notice from receiving this medicine?  Side effects that you should report to your doctor or health care professional as soon as possible:    blurred vision    breast enlargement in men or women    breast milk in women who are not breast-feeding    chest pain, fast or irregular heartbeat    confusion, restlessness    dark yellow or brown urine    difficulty breathing or swallowing    dizziness or fainting spells    drooling, shaking, movement difficulty (shuffling walk) or rigidity    fever, chills, sore throat    involuntary or uncontrollable movements of the eyes, mouth, head, arms, and legs    seizures    stomach area pain    unusually weak or tired    unusual bleeding or bruising    yellowing of skin or eyes  Side effects that usually do not require medical attention (report to your doctor or health care professional if they continue or are bothersome):    difficulty passing urine    difficulty sleeping    headache    sexual dysfunction    skin rash, or itching  What may interact with this medicine?  Do not take this medicine with any of the following medications:    amoxapine    antidepressants like  citalopram, escitalopram, fluoxetine, paroxetine, and sertraline    deferoxamine    dofetilide    maprotiline    tricyclic antidepressants like amitriptyline, clomipramine, imipramine, nortiptyline and others  This medicine may also interact with the following medications:    lithium    medicines for pain    phenytoin    propranolol    warfarin  What if I miss a dose?  If you miss a dose, take it as soon as you can. If it is almost time for your next dose, take only that dose. Do not take double or extra doses.  Where should I keep my medicine?  Keep out of the reach of children.  Store at room temperature between 15 and 30 degrees C (59 and 86 degrees F). Protect from light. Throw away any unused medicine after the expiration date.  What should I tell my health care provider before I take this medicine?  They need to know if you have any of these conditions:    blood disorders or disease    dementia    liver disease or jaundice    Parkinson's disease    uncontrollable movement disorder    an unusual or allergic reaction to prochlorperazine, other medicines, foods, dyes, or preservatives    pregnant or trying to get pregnant    breast-feeding  What should I watch for while using this medicine?  Visit your doctor or health care professional for regular checks on your progress.  You may get drowsy or dizzy. Do not drive, use machinery, or do anything that needs mental alertness until you know how this medicine affects you. Do not stand or sit up quickly, especially if you are an older patient. This reduces the risk of dizzy or fainting spells. Alcohol may interfere with the effect of this medicine. Avoid alcoholic drinks.  This medicine can reduce the response of your body to heat or cold. Dress warm in cold weather and stay hydrated in hot weather. If possible, avoid extreme temperatures like saunas, hot tubs, very hot or cold showers, or activities that can cause dehydration such as vigorous exercise.  This medicine  can make you more sensitive to the sun. Keep out of the sun. If you cannot avoid being in the sun, wear protective clothing and use sunscreen. Do not use sun lamps or tanning beds/booths.  Your mouth may get dry. Chewing sugarless gum or sucking hard candy, and drinking plenty of water may help. Contact your doctor if the problem does not go away or is severe.  NOTE:This sheet is a summary. It may not cover all possible information. If you have questions about this medicine, talk to your doctor, pharmacist, or health care provider. Copyright  2017 [employer]

## 2018-02-27 NOTE — ED PROVIDER NOTES
"  History     Chief Complaint   Patient presents with     Nausea, Vomiting, & Diarrhea     The history is provided by the patient, the spouse and medical records.     Milena Hamilton is a 77 year old female who sense to the ED for evaluation of abdominal pain, anorexia, nausea, diarrhea, and myalgias that have been ongoing for the last 4 days.  Patient denies any fever or chills but does have some lightheadedness.  She has had continuous nausea but has not vomited.  She has had some urinary frequency over the last 4 days.  Her abdominal pain is been primarily generalized and \"crampy\" in nature.    Problem List:    Patient Active Problem List    Diagnosis Date Noted     Acquired hypothyroidism 04/28/2017     Priority: Medium     Gastroesophageal reflux disease, esophagitis presence not specified 04/28/2017     Priority: Medium     Hypothyroidism, unspecified type 11/30/2016     Priority: Medium     Premature atrial beats 11/30/2016     Priority: Medium     Community acquired pneumonia 05/08/2015     Priority: Medium     Benign skin lesion 03/06/2015     Priority: Medium     HTN, goal below 140/90 07/10/2014     Priority: Medium     Raynaud's disease 06/01/2014     Priority: Medium     Health Care Home 12/30/2013     Priority: Medium     Status:  Closed  Care Coordinator:  Tasha Vasquez RN    See Letters for H Care Plan             History of diverticulitis - s/p left hemicolectomy 11/30/2013     Priority: Medium     Diarrhea 11/30/2013     Priority: Medium     Chronic constipation 06/18/2013     Priority: Medium     Sacroiliac dysfunction 10/09/2012     Priority: Medium     Advanced directives, counseling/discussion 10/03/2011     Priority: Medium     Advance Directive Problem List Overview:   Name Relationship Phone    Primary Health Care Agent            Alternative Health Care Agent          Discussed advance care planning with patient; information given to patient to review. 10/3/2011          DDD " (degenerative disc disease), cervical 08/10/2011     Priority: Medium     Anxiety 10/24/2010     Priority: Medium     Hyperlipidemia LDL goal <130 09/29/2010     Priority: Medium     Osteopenia 04/01/2010     Priority: Medium     recheck DEXA in 2 years       Gastritis 03/15/2010     Priority: Medium     Panic disorder without agoraphobia 10/08/2004     Priority: Medium     Tinnitus 09/13/2004     Priority: Medium     Problem list name updated by automated process. Provider to review       Family history of diabetes mellitus 09/09/2002     Priority: Medium     Lichen planus      Priority: Medium        Past Medical History:    Past Medical History:   Diagnosis Date     Lichen planus      Osteopenia 4/2010     Panic disorder without agoraphobia      Pure hypercholesterolemia      Raynaud's disease 6/2014     Unspecified essential hypertension        Past Surgical History:    Past Surgical History:   Procedure Laterality Date     C APPENDECTOMY       C NONSPECIFIC PROCEDURE      Right inferior oblique recession, 14 mm     C VAG HYST,RMV TUBE/OVARY  1984    cervix removed     COLONOSCOPY  10/17/2007    normal, recheck in 10 years     COLONOSCOPY N/A 8/27/2014    Procedure: COLONOSCOPY;  Surgeon: Raffi Montiel MD;  Location: [facility]     COMBINED CYSTOSCOPY, INSERT CATHETER URETER Bilateral 10/13/2014    Procedure: COMBINED CYSTOSCOPY, INSERT CATHETER URETER;  Surgeon: Abdoulaye Odell MD;  Location: [facility]     EYE SURGERY  8/01    right eye muscle repair     HC REMOVAL GALLBLADDER  2/9/2010    lap     HC REMOVAL OF OVARY/TUBE(S)      Salpingo-Oophorectomy, bilateral     HYSTERECTOMY, PAP NO LONGER INDICATED       INJECT EPIDURAL LUMBAR Right 4/10/2017    Procedure: INJECT EPIDURAL LUMBAR;  Surgeon: Raffi Wright MD;  Location: [facility]     INJECT EPIDURAL TRANSFORAMINAL Right 6/9/2016    Procedure: INJECT EPIDURAL TRANSFORAMINAL;  Surgeon: Antonio Dasilva MD;  Location: [facility]     INJECT EPIDURAL  "TRANSFORAMINAL Right 10/13/2016    Procedure: INJECT EPIDURAL TRANSFORAMINAL;  Surgeon: Antonio Dasilva MD;  Location: [facility]     LAPAROSCOPIC ASSISTED COLECTOMY LEFT (DESCENDING) N/A 10/13/2014    Procedure: LAPAROSCOPIC ASSISTED COLECTOMY LEFT (DESCENDING);  Surgeon: Raffi Montiel MD;  Location: [facility]       Family History:    Family History   Problem Relation Age of Onset     DIABETES Brother      HEART DISEASE Brother 50     AMI     DIABETES Brother      HEART DISEASE Brother      AMI, \"older\"     CEREBROVASCULAR DISEASE Brother      Psychotic Disorder Brother      , alcohol     CANCER Sister      breast cancer x2     Hypertension No family hx of      Breast Cancer No family hx of      Ovarian Cancer No family hx of      Prostate Cancer No family hx of      Mental Illness No family hx of      Anesthesia Reaction No family hx of      OSTEOPOROSIS No family hx of      Obesity No family hx of      Other Cancer No family hx of      Depression No family hx of      Anxiety Disorder No family hx of      MENTAL ILLNESS No family hx of      Substance Abuse No family hx of        Social History:  Marital Status:   [2]  Social History   Substance Use Topics     Smoking status: Never Smoker     Smokeless tobacco: Never Used     Alcohol use No        Medications:      levofloxacin (LEVAQUIN) 500 MG tablet   ondansetron (ZOFRAN ODT) 4 MG ODT tab   levothyroxine (SYNTHROID/LEVOTHROID) 50 MCG tablet   ALPRAZolam (XANAX) 0.25 MG tablet   metoprolol (LOPRESSOR) 25 MG tablet   simvastatin (ZOCOR) 20 MG tablet   omeprazole (PRILOSEC) 20 MG CR capsule   Dentifrices (BIOTENE DRY MOUTH) PSTE   THERAPEUTIC MULTIVITAMIN OR   ASPIRIN 81 MG OR TABS         Review of Systems   Constitutional: Positive for chills and diaphoresis. Negative for fever.   Gastrointestinal: Positive for abdominal distention, abdominal pain, diarrhea and nausea. Negative for vomiting.   Genitourinary: Positive for decreased urine volume and " "frequency.   Musculoskeletal: Positive for back pain and myalgias.   Neurological: Positive for light-headedness and headaches.   All other systems reviewed and are negative.      Physical Exam   BP: 164/83  Heart Rate: 86  Temp: 97.6  F (36.4  C)  Resp: 20  Height: 157.5 cm (5' 2\")  Weight: 60.3 kg (133 lb)  SpO2: 95 %      Physical Exam   Constitutional: She is oriented to person, place, and time. She appears well-developed. No distress.   HENT:   Head: Atraumatic.   Mouth/Throat: Mucous membranes are dry.   Eyes: EOM are normal. Pupils are equal, round, and reactive to light.   Neck: Normal range of motion. Neck supple.   Cardiovascular: Normal rate, normal heart sounds and intact distal pulses.    Pulmonary/Chest: Effort normal and breath sounds normal.   Abdominal: Soft. She exhibits no distension. Bowel sounds are increased. There is tenderness (Mild diffuse). There is no rebound and no guarding.   Musculoskeletal: Normal range of motion.   Lymphadenopathy:     She has no cervical adenopathy.   Neurological: She is alert and oriented to person, place, and time.   Skin: Skin is warm.   Psychiatric: She has a normal mood and affect.   Nursing note and vitals reviewed.      ED Course     ED Course     Procedures          Results for orders placed or performed during the hospital encounter of 02/27/18 (from the past 48 hour(s))   UA with Microscopic   Result Value Ref Range    Color Urine Yellow     Appearance Urine Clear     Glucose Urine Negative NEG^Negative mg/dL    Bilirubin Urine Negative NEG^Negative    Ketones Urine Negative NEG^Negative mg/dL    Specific Gravity Urine 1.006 1.003 - 1.035    Blood Urine Small (A) NEG^Negative    pH Urine 6.0 5.0 - 7.0 pH    Protein Albumin Urine Negative NEG^Negative mg/dL    Urobilinogen mg/dL 0.0 0.0 - 2.0 mg/dL    Nitrite Urine Negative NEG^Negative    Leukocyte Esterase Urine Trace (A) NEG^Negative    Source Midstream Urine     WBC Urine 3 (H) 0 - 2 /HPF    RBC Urine " <1 0 - 2 /HPF    Mucous Urine Present (A) NEG^Negative /LPF   CBC with platelets differential   Result Value Ref Range    WBC 5.3 4.0 - 11.0 10e9/L    RBC Count 4.88 3.8 - 5.2 10e12/L    Hemoglobin 14.8 11.7 - 15.7 g/dL    Hematocrit 48.1 (H) 35.0 - 47.0 %    MCV 99 78 - 100 fl    MCH 30.3 26.5 - 33.0 pg    MCHC 30.8 (L) 31.5 - 36.5 g/dL    RDW 12.5 10.0 - 15.0 %    Platelet Count 219 150 - 450 10e9/L    Diff Method Automated Method     % Neutrophils 48.3 %    % Lymphocytes 40.4 %    % Monocytes 8.6 %    % Eosinophils 2.1 %    % Basophils 0.4 %    % Immature Granulocytes 0.2 %    Absolute Neutrophil 2.5 1.6 - 8.3 10e9/L    Absolute Lymphocytes 2.1 0.8 - 5.3 10e9/L    Absolute Monocytes 0.5 0.0 - 1.3 10e9/L    Absolute Eosinophils 0.1 0.0 - 0.7 10e9/L    Absolute Basophils 0.0 0.0 - 0.2 10e9/L    Abs Immature Granulocytes 0.0 0 - 0.4 10e9/L   Comprehensive metabolic panel   Result Value Ref Range    Sodium 140 133 - 144 mmol/L    Potassium 3.4 3.4 - 5.3 mmol/L    Chloride 104 94 - 109 mmol/L    Carbon Dioxide 29 20 - 32 mmol/L    Anion Gap 7 3 - 14 mmol/L    Glucose 110 (H) 70 - 99 mg/dL    Urea Nitrogen 9 7 - 30 mg/dL    Creatinine 0.72 0.52 - 1.04 mg/dL    GFR Estimate 79 >60 mL/min/1.7m2    GFR Estimate If Black >90 >60 mL/min/1.7m2    Calcium 9.1 8.5 - 10.1 mg/dL    Bilirubin Total 0.8 0.2 - 1.3 mg/dL    Albumin 4.1 3.4 - 5.0 g/dL    Protein Total 7.8 6.8 - 8.8 g/dL    Alkaline Phosphatase 67 40 - 150 U/L    ALT 14 0 - 50 U/L    AST 10 0 - 45 U/L   Lipase   Result Value Ref Range    Lipase 136 73 - 393 U/L   CRP inflammation   Result Value Ref Range    CRP Inflammation <2.9 0.0 - 8.0 mg/L   XR Abdomen 2 Views    Narrative    XR ABDOMEN 2 VW  2/27/2018 5:36 AM     INDICATION: Vomiting, diarrhea.    COMPARISON: CT 3/29/2017.      Impression    IMPRESSION: Nonspecific bowel gas pattern. No evidence of bowel  obstruction or free air. Surgical clips in the right upper quadrant.  Scoliosis convex to the right in the  lumbar spine.    ELAINE COX MD                Assessments & Plan (with Medical Decision Making)  Milena Hamilton is a 77-year-old female presenting to the ED for evaluation of abdominal pain, distention, nausea, diarrhea, and myalgias over the last 4 days.  The patient reports that she has not eaten in the last 3 days because it seems to make her nausea much worse.  She has been trying to push fluids but even this can be challenging at times.  Initially when her symptoms began she was having some urinary frequency but this is since subsided.  It is unclear whether it has improved due to dehydration.  The  reports that the patient slept for most of the day yesterday and she feels significant fatigue, dizziness, body aches, and is quite nauseous although she has not vomited.  Her exam is remarkable for dry mucous membranes.  Her abdomen seems mildly distended with hyperactive bowel sounds.  There is generalized mild diffuse tenderness without guarding or rebound.  There is no CVA tenderness to percussion.  She currently is not tachycardic.  Labs were obtained and show normal CBC and conference of metabolic panel.  Her urinalysis is suggestive of UTI with positive leukocyte esterase and 3 WBCs with no RBCs.  A urine culture is pending.  This most likely represents a combination of problems including a viral gastroenteritis which we will treat with ongoing oral ondansetron and an early UTI which we will treat with levofloxacin.  I did inform her that a urine culture is pending so if it turns out that she is resistant to the levofloxacin she will be contacted with an alternative antibiotic.  Unfortunately, just at the time of discharge patient became more nauseated secondary to the Dilaudid which she received for pain and this necessitated additional ondansetron IV.  Despite this, she still has significant intractable vomiting necessitating the use of Reglan.  At this point, she still has intractable  vomiting and I have arranged for her to come in under observation for further care.  I discussed the case with Dr. Pineda who will pass it onto the day crew.  I placed orders for the patient to be admitted.  Patient is appreciative of this and agrees with the plan.     I have reviewed the nursing notes.    I have reviewed the findings, diagnosis, plan and need for follow up with the patient.       New Prescriptions    LEVOFLOXACIN (LEVAQUIN) 500 MG TABLET    Take 1 tablet (500 mg) by mouth daily    ONDANSETRON (ZOFRAN ODT) 4 MG ODT TAB    Take 1 tablet (4 mg) by mouth every 8 hours as needed       Final diagnoses:   Gastroenteritis   Dysuria   Intractable vomiting with nausea, unspecified vomiting type       2/27/2018   [facility] EMERGENCY DEPARTMENT     Jomar Wilson MD  02/27/18 [id number]

## 2018-02-27 NOTE — ED NOTES
ED Nursing criteria listed below was addressed during verbal handoff:     Abnormal vitals: Yes  Abnormal results: Yes  Med Reconciliation completed: Yes  Meds given in ED: Yes  Any Overdue Meds: Yes  Core Measures: N/A  Isolation: N/A  Special needs: N/A  Skin assessment: Yes    Observation Patient  Education provided: Yes

## 2018-02-27 NOTE — IP AVS SNAPSHOT
[address] [facility]    [address] [facility]     [place] [address]    Phone:  [phone]                                       After Visit Summary   2/27/2018    Milena Hamilton    MRN: [id number]           After Visit Summary Signature Page     I have received my discharge instructions, and my questions have been answered. I have discussed any challenges I see with this plan with the nurse or doctor.    ..........................................................................................................................................  Patient/Patient Representative Signature      ..........................................................................................................................................  Patient Representative Print Name and Relationship to Patient    ..................................................               ................................................  Date                                            Time    ..........................................................................................................................................  Reviewed by Signature/Title    ...................................................              ..............................................  Date                                                            Time

## 2018-02-28 VITALS
OXYGEN SATURATION: 96 % | BODY MASS INDEX: 25.23 KG/M2 | SYSTOLIC BLOOD PRESSURE: 150 MMHG | RESPIRATION RATE: 20 BRPM | DIASTOLIC BLOOD PRESSURE: 70 MMHG | HEART RATE: 75 BPM | TEMPERATURE: 98 F | WEIGHT: 137.13 LBS | HEIGHT: 62 IN

## 2018-02-28 LAB
ANION GAP SERPL CALCULATED.3IONS-SCNC: 6 MMOL/L (ref 3–14)
BACTERIA SPEC CULT: NO GROWTH
BUN SERPL-MCNC: 5 MG/DL (ref 7–30)
C COLI+JEJUNI+LARI FUSA STL QL NAA+PROBE: NOT DETECTED
CALCIUM SERPL-MCNC: 8.3 MG/DL (ref 8.5–10.1)
CHLORIDE SERPL-SCNC: 109 MMOL/L (ref 94–109)
CO2 SERPL-SCNC: 28 MMOL/L (ref 20–32)
CREAT SERPL-MCNC: 0.62 MG/DL (ref 0.52–1.04)
EC STX1 GENE STL QL NAA+PROBE: NOT DETECTED
EC STX2 GENE STL QL NAA+PROBE: NOT DETECTED
ENTERIC PATHOGEN COMMENT: NORMAL
ERYTHROCYTE [DISTWIDTH] IN BLOOD BY AUTOMATED COUNT: 12.4 % (ref 10–15)
GFR SERPL CREATININE-BSD FRML MDRD: >90 ML/MIN/1.7M2
GLUCOSE SERPL-MCNC: 100 MG/DL (ref 70–99)
HCT VFR BLD AUTO: 40.2 % (ref 35–47)
HGB BLD-MCNC: 12.4 G/DL (ref 11.7–15.7)
Lab: NORMAL
MCH RBC QN AUTO: 30.6 PG (ref 26.5–33)
MCHC RBC AUTO-ENTMCNC: 30.8 G/DL (ref 31.5–36.5)
MCV RBC AUTO: 99 FL (ref 78–100)
NOROV GI+II ORF1-ORF2 JNC STL QL NAA+PR: NOT DETECTED
PLATELET # BLD AUTO: 192 10E9/L (ref 150–450)
POTASSIUM SERPL-SCNC: 3.5 MMOL/L (ref 3.4–5.3)
RBC # BLD AUTO: 4.05 10E12/L (ref 3.8–5.2)
RVA NSP5 STL QL NAA+PROBE: NOT DETECTED
SALMONELLA SP RPOD STL QL NAA+PROBE: NOT DETECTED
SHIGELLA SP+EIEC IPAH STL QL NAA+PROBE: NOT DETECTED
SODIUM SERPL-SCNC: 143 MMOL/L (ref 133–144)
SPECIMEN SOURCE: NORMAL
V CHOL+PARA RFBL+TRKH+TNAA STL QL NAA+PR: NOT DETECTED
WBC # BLD AUTO: 6.1 10E9/L (ref 4–11)
Y ENTERO RECN STL QL NAA+PROBE: NOT DETECTED

## 2018-02-28 PROCEDURE — G0378 HOSPITAL OBSERVATION PER HR: HCPCS

## 2018-02-28 PROCEDURE — 99217 ZZC OBSERVATION CARE DISCHARGE: CPT | Performed by: FAMILY MEDICINE

## 2018-02-28 PROCEDURE — 80048 BASIC METABOLIC PNL TOTAL CA: CPT | Performed by: FAMILY MEDICINE

## 2018-02-28 PROCEDURE — 96361 HYDRATE IV INFUSION ADD-ON: CPT

## 2018-02-28 PROCEDURE — 85027 COMPLETE CBC AUTOMATED: CPT | Performed by: FAMILY MEDICINE

## 2018-02-28 PROCEDURE — 36415 COLL VENOUS BLD VENIPUNCTURE: CPT | Performed by: FAMILY MEDICINE

## 2018-02-28 PROCEDURE — 25000132 ZZH RX MED GY IP 250 OP 250 PS 637: Mod: GY | Performed by: EMERGENCY MEDICINE

## 2018-02-28 PROCEDURE — 25000128 H RX IP 250 OP 636: Performed by: EMERGENCY MEDICINE

## 2018-02-28 PROCEDURE — A9270 NON-COVERED ITEM OR SERVICE: HCPCS | Mod: GY | Performed by: EMERGENCY MEDICINE

## 2018-02-28 PROCEDURE — 25000132 ZZH RX MED GY IP 250 OP 250 PS 637: Performed by: FAMILY MEDICINE

## 2018-02-28 RX ORDER — PROCHLORPERAZINE MALEATE 5 MG
5 TABLET ORAL EVERY 6 HOURS PRN
Qty: 5 TABLET | Refills: 0 | Status: SHIPPED | OUTPATIENT
Start: 2018-02-28 | End: 2018-06-18

## 2018-02-28 RX ADMIN — METOPROLOL TARTRATE 12.5 MG: 25 TABLET ORAL at 09:05

## 2018-02-28 RX ADMIN — OMEPRAZOLE 20 MG: 20 CAPSULE, DELAYED RELEASE ORAL at 09:05

## 2018-02-28 RX ADMIN — SODIUM CHLORIDE 1000 ML: 9 INJECTION, SOLUTION INTRAVENOUS at 05:27

## 2018-02-28 RX ADMIN — ASPIRIN 81 MG: 81 TABLET, COATED ORAL at 09:05

## 2018-02-28 RX ADMIN — LEVOTHYROXINE SODIUM 50 MCG: 50 TABLET ORAL at 06:03

## 2018-02-28 NOTE — DISCHARGE SUMMARY
[facility] Discharge Summary    Milena Hamilton MRN# [id number]   Age: 77 year old YOB: 1940     Date of Admission:  2/27/2018  Date of Discharge::  2/28/2018  Admitting Physician:  Lenka Sanchez MD  Discharge Physician:  Lenka Sanchez MD    Home clinic: [facility]          Admission Diagnoses:   Dysuria [R30.0]  Gastroenteritis [K52.9]  Intractable vomiting with nausea, unspecified vomiting type [R11.2]          Discharge Diagnosis:   Active Problems:    Gastroenteritis    Assessment: Likely viral in nature, with symptoms starting to improve during this observation stay without intervention.  Patient is tolerating full liquid diet without difficulty and is able to maintain hydration with significant reduction in her stooling pattern.    Plan: Patient will discharge home with as needed antiemetics, she will push the fluids and will follow up in 2 days at the [facility] as previously arranged.  She is supposed to have surgery on her back next week, and discussed that she will need to have reevaluation and  full resolution of the symptoms prior to clearance for surgery being given      Hyperlipidemia LDL goal <130    Assessment:  On Zocor    Plan: Discharge without change      HTN, goal below 140/90    Assessment: Well-controlled on home Lopressor    Plan: Discharge without change      Hypothyroidism, unspecified type    Assessment: On Synthroid    Plan: Discharge without change    # Discharge Pain Plan:   - Patient currently has NO PAIN and is not being prescribed pain medications on discharge.            Procedures:   No procedures performed during this admission          Medications Prior to Admission:     No prescriptions prior to admission.             Discharge Medications:     Discharge Medication List as of 2/28/2018  3:03 PM      START taking these medications    Details   prochlorperazine (COMPAZINE) 5 MG tablet Take 1 tablet (5 mg) by  mouth every 6 hours as needed for nausea or vomiting, Disp-5 tablet, R-0, E-Prescribe      ondansetron (ZOFRAN ODT) 4 MG ODT tab Take 1 tablet (4 mg) by mouth every 8 hours as needed, Disp-10 tablet, R-0, Local Print         CONTINUE these medications which have NOT CHANGED    Details   levothyroxine (SYNTHROID/LEVOTHROID) 50 MCG tablet TAKE 1 TABLET (50 MCG) BY MOUTH DAILY, Disp-90 tablet, R-2, E-Prescribe      ALPRAZolam (XANAX) 0.25 MG tablet Take 1 tablet (0.25 mg) by mouth nightly as needed for anxiety, Disp-30 tablet, R-0, Local Print      metoprolol (LOPRESSOR) 25 MG tablet TAKE 1/2 TABLET (12.5 MG) BY MOUTH DAILY, Disp-45 tablet, R-3, E-PrescribeKeep on file      simvastatin (ZOCOR) 20 MG tablet TAKE 0.5 TABLETS (10 MG) BY MOUTH AT BEDTIME (DUE FOR FASTING LABS), Disp-45 tablet, R-3, E-PrescribePatient stated she is taking this medication differently per the doctor.  Please send updated prescription.      omeprazole (PRILOSEC) 20 MG CR capsule TAKE ONE CAPSULE BY MOUTH ONCE DAILY, Disp-90 capsule, R-3, E-Prescribe      Dentifrices (BIOTENE DRY MOUTH) PSTE Toothpaste and mouthwash, Historical      THERAPEUTIC MULTIVITAMIN OR 1 TABLET DAILY, Historical      ASPIRIN 81 MG OR TABS 1 TABLET DAILY, Historical                   Consultations:   No consultations were requested during this admission          Brief History of Illness:    Patient is a 77-year-old female who presented with a four-day history of nausea, vomiting, diarrhea consistent with viral gastroenteritis.  Workup revealed a possible urinary tract infection the patient did have multiple loose formed stools in the emergency department and was unable to take in any oral intake, therefore decision was made to place patient on observation status secondary to dehydration and inability to take adequate oral intake.            Hospital Course:   Patient was observed and given as needed antiemetics and did progressively improve during her observation stay  and was able to take a clear liquid diet and advance to full liquid diet without difficulty with resolution of nausea and vomiting and significant reduction of diarrhea.  Enteric stool testing was performed and was negative.  Urine culture has return no growth, indicating that UTI is not present and no further antibiotics are needed.  Patient is discharged home in stable condition.           Physical Exam:   Vitals were reviewed  Constitutional:   awake, alert, cooperative, no apparent distress, and appears stated age     Lungs:   No increased work of breathing, good air exchange, clear to auscultation bilaterally, no crackles or wheezing     Cardiovascular:   Normal apical impulse, regular rate and rhythm, normal S1 and S2, no S3 or S4, and no murmur noted     Abdomen:   Bowel sounds present and normal in auscultation, abdomen soft and non-tender     Neurologic:   Awake, alert, oriented to name, place and time.       Skin:   normal skin color, texture, turgor              Discharge Instructions and Follow-Up:   Discharge diet: Clear to full liquid diet for 2-3 days, then slowly advance to a regular diet as tolerated   Discharge activity: Activity as tolerated   Discharge follow-up: Follow up with [facility] in 2 days as scheduled           Discharge Disposition:   Discharged to home      Attestation:  I have reviewed today's vital signs, notes, medications, labs and imaging.    More than 30 minutes was spent on this discharge.      Lenka Sanchez MD    Note: Chart documentation done in part with Dragon Voice Recognition software. Although reviewed after completion, some word and grammatical errors may remain.

## 2018-02-28 NOTE — H&P
[facility]    History and Physical  Hospitalist       Date of Admission:  2/27/2018    Assessment & Plan   Milena Hamilton is a 77 year old female who presents with abdominal pain associated nausea, vomiting, diarrhea over the past 4 days.  In the emergency department workup was largely non-specific with normal lab work and abdominal x-ray showing no signs of obstruction.  She possibly might have a mild UTI.  Symptoms and presentation are consistent with some type of viral gastroenteritis.  This point she seems mildly dehydrated and will be registered observation for IV hydration, use of anti-emetics and control of pain.  Will place on enteric precautions and will attempt to get stool sample for culture.  For now we will not start any antibiotics and will hope that she improved over the next 24 hours and can be discharged home.    Active Problems:    Gastroenteritis    Assessment: Presenting with abdominal pain, nausea, vomiting, diarrhea worsening over the past 4 days.  She denies fever, chills, blood and lab work in the emergency department was largely  normal with x-ray not showing any signs of obstruction.    Plan: Registered observation with symptomatic care including IV fluids, antiemetics.  Will place on enteric precautions and will attempt to get stool sample for further studies.    HTN, goal below 140/90    Assessment: Controlled, taking Lopressor at home.    Plan: Continue    Hypothyroidism, unspecified type    Assessment: Taking thyroid replacement    Plan: No change    Hyperlipidemia LDL goal <130    Assessment: Taking Zocor    Plan: Hold until discharge  # Pain Assessment:   Current Pain Score 2/27/2018 4/10/2017 4/10/2017   Patient currently in pain? - denies denies   Pain score (0-10) 6 - -   Pain location - - -   Pain descriptors - - -   - Milena is experiencing pain due to gastroenteritis with abdominal cramping. Pain management was discussed with Milena and her  spouse and the plan was created in a collaborative fashion.  Milena's response to the current recommendations: engaged  - Opioid regimen: Has hydromorphone ordered.  - Response to opioid medications: Reduction of symptoms   - Bowel regimen: not needed  - Pharmacologic adjuvants: Acetaminophen      DVT Prophylaxis: Low Risk/Ambulatory with no VTE prophylaxis indicated  Code Status: Full Code    Disposition: Expected discharge in 1-2 days once symptoms improve, able to tolerate diet.    Kristian Pineda MD    Primary Care Physician   Chuck Streeter    Chief Complaint   77-year-old with four-day history of worsening abdominal pain associated nausea/vomiting and diarrhea    History is obtained from the patient, electronic health record, emergency department physician and patient's spouse    History of Present Illness   Milena Hamilton is a 77 year old female who presents with 4 day history of abdominal pain.  Described as a crampy epigastric hiatal pain.  Initially was associate with nausea which then progressed to vomiting and then has been having loose stools.  She denies any blood in the emesis or stools.  She denies fever, chills.  She is felt somewhat lightheaded and is been more fatigued.  Over the past several days also had some urinary frequency, but denies any dysuria.  She denies any recent travel, recent infectious exposure.  She denies any recent use of antibiotics.    Past Medical History    I have reviewed this patient's medical history and updated it with pertinent information if needed.   Past Medical History:   Diagnosis Date     Lichen planus     vulvar     Osteopenia 4/2010    recheck DEXA in 2 years     Panic disorder without agoraphobia      Pure hypercholesterolemia     low LDLs     Raynaud's disease 6/2014     Unspecified essential hypertension        Past Surgical History   I have reviewed this patient's surgical history and updated it with pertinent information if needed.  Past Surgical  History:   Procedure Laterality Date     C APPENDECTOMY       C NONSPECIFIC PROCEDURE      Right inferior oblique recession, 14 mm     C VAG HYST,RMV TUBE/OVARY  1984    cervix removed     COLONOSCOPY  10/17/2007    normal, recheck in 10 years     COLONOSCOPY N/A 2014    Procedure: COLONOSCOPY;  Surgeon: Raffi Montiel MD;  [facility]     COMBINED CYSTOSCOPY, INSERT CATHETER URETER Bilateral 10/13/2014    Procedure: COMBINED CYSTOSCOPY, INSERT CATHETER URETER;  Surgeon: Abdoulaye Odell MD;  [facility]     EYE SURGERY      right eye muscle repair     HC REMOVAL GALLBLADDER  2010    lap     HC REMOVAL OF OVARY/TUBE(S)      Salpingo-Oophorectomy, bilateral     HYSTERECTOMY, PAP NO LONGER INDICATED       INJECT EPIDURAL LUMBAR Right 4/10/2017    Procedure: INJECT EPIDURAL LUMBAR;  Surgeon: Raffi Wright MD;  [facility]     INJECT EPIDURAL TRANSFORAMINAL Right 2016    Procedure: INJECT EPIDURAL TRANSFORAMINAL;  Surgeon: Antonio Dasilva MD;  [facility]     INJECT EPIDURAL TRANSFORAMINAL Right 10/13/2016    Procedure: INJECT EPIDURAL TRANSFORAMINAL;  Surgeon: Antonio Dasilva MD;  [facility]     LAPAROSCOPIC ASSISTED COLECTOMY LEFT (DESCENDING) N/A 10/13/2014    Procedure: LAPAROSCOPIC ASSISTED COLECTOMY LEFT (DESCENDING);  Surgeon: Raffi Montiel MD;  [facility]       Prior to Admission Medications   Prior to Admission Medications   Prescriptions Last Dose Informant Patient Reported? Taking?   ALPRAZolam (XANAX) 0.25 MG tablet Past Week at Unknown time  No Yes   Sig: Take 1 tablet (0.25 mg) by mouth nightly as needed for anxiety   ASPIRIN 81 MG OR TABS Past Week at Unknown time  Yes Yes   Si TABLET DAILY   Dentifrices (BIOTENE DRY MOUTH) PSTE Past Week at Unknown time  Yes Yes   Sig: Toothpaste and mouthwash   THERAPEUTIC MULTIVITAMIN OR Past Week at Unknown time  Yes Yes   Si TABLET DAILY   levothyroxine (SYNTHROID/LEVOTHROID) 50 MCG tablet  "2018 at 0800  No Yes   Sig: TAKE 1 TABLET (50 MCG) BY MOUTH DAILY   metoprolol (LOPRESSOR) 25 MG tablet 2018 at 0800  No Yes   Sig: TAKE 1/2 TABLET (12.5 MG) BY MOUTH DAILY   omeprazole (PRILOSEC) 20 MG CR capsule Past Week at Unknown time  No Yes   Sig: TAKE ONE CAPSULE BY MOUTH ONCE DAILY   simvastatin (ZOCOR) 20 MG tablet Past Week at Unknown time  No Yes   Sig: TAKE 0.5 TABLETS (10 MG) BY MOUTH AT BEDTIME (DUE FOR FASTING LABS)      Facility-Administered Medications: None     Allergies   Allergies   Allergen Reactions     Azithromycin Diarrhea     Ciprofloxacin Other (See Comments)     Cipro, dizziness and abdominal pain     Penicillins      stomach upset, diarrhea     Sulfa Drugs      hives       Social History   I have reviewed this patient's social history and updated it with pertinent information if needed. Milena Hamilton  reports that she has never smoked. She has never used smokeless tobacco. She reports that she does not drink alcohol or use illicit drugs.    Family History   I have reviewed this patient's family history and updated it with pertinent information if needed.   Family History   Problem Relation Age of Onset     DIABETES Brother      HEART DISEASE Brother 50     AMI     DIABETES Brother      HEART DISEASE Brother      AMI, \"older\"     CEREBROVASCULAR DISEASE Brother      Psychotic Disorder Brother      , alcohol     CANCER Sister      breast cancer x2     Hypertension No family hx of      Breast Cancer No family hx of      Ovarian Cancer No family hx of      Prostate Cancer No family hx of      Mental Illness No family hx of      Anesthesia Reaction No family hx of      OSTEOPOROSIS No family hx of      Obesity No family hx of      Other Cancer No family hx of      Depression No family hx of      Anxiety Disorder No family hx of      MENTAL ILLNESS No family hx of      Substance Abuse No family hx of        Review of Systems   The 10 point Review of Systems is negative other " than noted in the HPI or here.     Physical Exam   Temp: 97.8  F (36.6  C) Temp src: Oral BP: 159/62 Pulse: 69 Heart Rate: 69 Resp: 20 SpO2: 95 % O2 Device: None (Room air)    Vital Signs with Ranges  Temp:  [96.2  F (35.7  C)-97.8  F (36.6  C)] 97.8  F (36.6  C)  Pulse:  [69-79] 69  Heart Rate:  [69-86] 69  Resp:  [20] 20  BP: (148-188)/() 159/62  SpO2:  [93 %-97 %] 95 %  137 lbs 2.02 oz    Constitutional: Lying in bed, looking mildly uncomfortable  Eyes: Pupils are equal reactive.  Normal EOM, no signs of scleral icterus  HEENT: Mouth and throat appear normal  Respiratory: Lungs are clear  Cardiovascular: Regular rate and rhythm, no murmur heard  GI: Slight tenderness in the epigastric region, no signs of rebound or guarding.  No masses  Lymph/Hematologic: Cervical nodes negative  Genitourinary: Not examined  Skin: No lesions or rashes  Musculoskeletal:  no signs of deformity.  Moves arms and legs easily  Neurologic: Alert, oriented, cranial nerves normal.  No signs of focal losses  Psychiatric: Alert, oriented, seems mildly depressed    Data   Data reviewed today:  I personally reviewed the abdominal x-ray image(s) showing No signs of obstruction, nonspecific pattern.    Recent Labs  Lab 02/27/18  0445   WBC 5.3   HGB 14.8   MCV 99         POTASSIUM 3.4   CHLORIDE 104   CO2 29   BUN 9   CR 0.72   ANIONGAP 7   ANU 9.1   *   ALBUMIN 4.1   PROTTOTAL 7.8   BILITOTAL 0.8   ALKPHOS 67   ALT 14   AST 10   LIPASE 136       Recent Results (from the past 24 hour(s))   XR Abdomen 2 Views    Narrative    XR ABDOMEN 2 VW  2/27/2018 5:36 AM     INDICATION: Vomiting, diarrhea.    COMPARISON: CT 3/29/2017.      Impression    IMPRESSION: Nonspecific bowel gas pattern. No evidence of bowel  obstruction or free air. Surgical clips in the right upper quadrant.  Scoliosis convex to the right in the lumbar spine.    ELAINE COX MD

## 2018-02-28 NOTE — PROGRESS NOTES
S-(situation): Patient discharged to home via w/c with .    B-(background): Observation goals met     A-(assessment): pt continues to tolerate full liquid diet without complications, pt verbalized understanding of slowly advancing diet in next 24hrs. No new concerns at this time.    R-(recommendations): Discharge instructions reviewed with pt and .. Listed belongings gathered and returned to patient.  Patient Education resolved: Yes  New medications-Pt. Has been educated about reason of use and side effects Yes  Home and hospital acquired medications returned to patient NA  Medication Bin checked and emptied on discharge Yes

## 2018-02-28 NOTE — PLAN OF CARE
Problem: Gastrointestinal Condition Comorbidity  Goal: Gastrointestinal Condition  Patient comorbidity will be monitored for signs and symptoms of Gastrointestinal condition.  Problems will be absent, minimized or managed by discharge/transition of care.   Outcome: Therapy, progress toward functional goals is gradual  Pt had episode of small loose mucousy stool, specimen obtained to lab. Pt up independently to BR. Continues to cautiously intake of clear liquids. Had emesis earlier, 50 ml of bile contents. Compazine given with relief. Pt did rest after. Will continue with POC.

## 2018-02-28 NOTE — PROGRESS NOTES
"[address]  [place] [address]  [phone]  Dept: [phone]    PRE-OP EVALUATION:  Today's date: 3/2/2018    Milena Hamilton (: 1940) presents for pre-operative evaluation assessment as requested by Dr. Broderick.  She requires evaluation and anesthesia risk assessment prior to undergoing surgery/procedure for treatment of lumbar and sacral spondylitis and stenosis.   Do not have information related to procedure other than what patient states \"procedure will be done with a scope\" procedure to be done at [facility].     Patient states time is at 1130 and she is to arrive at 1030.    Fax number for surgical facility: [phone]  Primary Physician: Chuck Streeter  Type of Anesthesia Anticipated: to be determined    Patient has a Health Care Directive or Living Will:  YES     Preop Questions 3/2/2018   Who is doing your surgery? dr broderick gabi   What are you having done? back   Date of Surgery/Procedure:    Facility or Hospital where procedure/surgery will be performed: [facility]   1.  Do you have a history of Heart attack, stroke, stent, coronary bypass surgery, or other heart surgery? No   2.  Do you ever have any pain or discomfort in your chest? No   3.  Do you have a history of  Heart Failure? No   4.   Are you troubled by shortness of breath when:  walking on a level surface, or up a slight hill, or at night? No   5.  Do you currently have a cold, bronchitis or other respiratory infection? No   6.  Do you have a cough, shortness of breath, or wheezing? No   7.  Do you sometimes get pains in the calves of your legs when you walk? No   8. Do you or anyone in your family have previous history of blood clots? No   9.  Do you or does anyone in your family have a serious bleeding problem such as prolonged bleeding following surgeries or cuts? No   10. Have you ever had problems with anemia or been told to take iron pills? No "   11. Have you had any abnormal blood loss such as black, tarry or bloody stools, or abnormal vaginal bleeding? No   12. Have you ever had a blood transfusion? No   13. Have you or any of your relatives ever had problems with anesthesia? No   14. Do you have sleep apnea, excessive snoring or daytime drowsiness? No   15. Do you have any prosthetic heart valves? No   16. Do you have prosthetic joints? No   17. Is there any chance that you may be pregnant? No         HPI:     HPI related to upcoming procedure: lumbar stenosis        See problem list for active medical problems.  Problems all longstanding and stable, except as noted/documented.  See ROS for pertinent symptoms related to these conditions.                                                                                                  .  HYPERTENSION - Patient has longstanding history of HTN , currently denies any symptoms referable to elevated blood pressure. Specifically denies chest pain, palpitations, dyspnea, orthopnea, PND or peripheral edema. Blood pressure readings have been in normal range. Current medication regimen is as listed below. Patient denies any side effects of medication.                                                                                                                                                                                          .  HYPERLIPIDEMIA - Patient has a long history of significant Hyperlipidemia requiring medication for treatment with recent good control. Patient reports no problems or side effects with the medication.                                                                                                                                                       .    MEDICAL HISTORY:     Patient Active Problem List    Diagnosis Date Noted     Intractable vomiting 02/27/2018     Priority: Medium     Gastroenteritis 02/27/2018     Priority: Medium     After cataract, left eye 09/18/2017     Priority:  Medium     Acquired hypothyroidism 04/28/2017     Priority: Medium     Gastroesophageal reflux disease, esophagitis presence not specified 04/28/2017     Priority: Medium     Cortical age-related cataract 01/16/2017     Priority: Medium     Cortical senile cataract 01/16/2017     Priority: Medium     Hypothyroidism, unspecified type 11/30/2016     Priority: Medium     Premature atrial beats 11/30/2016     Priority: Medium     Community acquired pneumonia 05/08/2015     Priority: Medium     Benign skin lesion 03/06/2015     Priority: Medium     HTN (hypertension) 07/10/2014     Priority: Medium     Raynaud's disease 06/01/2014     Priority: Medium     Health Care Home 12/30/2013     Priority: Medium     Status:  Closed  Care Coordinator:  Tasha Vasquez RN    See Letters for [facility] Care Plan             History of diverticulitis - s/p left hemicolectomy 11/30/2013     Priority: Medium     Diarrhea 11/30/2013     Priority: Medium     Chronic constipation 06/18/2013     Priority: Medium     Sacroiliac dysfunction 10/09/2012     Priority: Medium     Advanced directives, counseling/discussion 10/03/2011     Priority: Medium     Advance Directive Problem List Overview:   Name Relationship Phone    Primary Health Care Agent            Alternative Health Care Agent          Discussed advance care planning with patient; information given to patient to review. 10/3/2011          DDD (degenerative disc disease), cervical 08/10/2011     Priority: Medium     Anxiety 10/24/2010     Priority: Medium     Hyperlipidemia 09/29/2010     Priority: Medium     Osteopenia 04/01/2010     Priority: Medium     recheck DEXA in 2 years       Gastritis 03/15/2010     Priority: Medium     Panic disorder without agoraphobia 10/08/2004     Priority: Medium     Tinnitus 09/13/2004     Priority: Medium     Problem list name updated by automated process. Provider to review       Family history of diabetes mellitus 09/09/2002     Priority: Medium      Lichen planus      Priority: Medium      Past Medical History:   Diagnosis Date     Lichen planus     vulvar     Osteopenia 4/2010    recheck DEXA in 2 years     Panic disorder without agoraphobia      Pure hypercholesterolemia     low LDLs     Raynaud's disease 6/2014     Unspecified essential hypertension      Past Surgical History:   Procedure Laterality Date     C APPENDECTOMY       C NONSPECIFIC PROCEDURE      Right inferior oblique recession, 14 mm     C VAG HYST,RMV TUBE/OVARY  1984    cervix removed     COLONOSCOPY  10/17/2007    normal, recheck in 10 years     COLONOSCOPY N/A 8/27/2014    Procedure: COLONOSCOPY;  Surgeon: Raffi Montiel MD;  [facility]     COMBINED CYSTOSCOPY, INSERT CATHETER URETER Bilateral 10/13/2014    Procedure: COMBINED CYSTOSCOPY, INSERT CATHETER URETER;  Surgeon: Abdoluaye Odell MD;  [facility]     EYE SURGERY  8/01    right eye muscle repair     HC REMOVAL GALLBLADDER  2/9/2010    lap     HC REMOVAL OF OVARY/TUBE(S)      Salpingo-Oophorectomy, bilateral     HYSTERECTOMY, PAP NO LONGER INDICATED       INJECT EPIDURAL LUMBAR Right 4/10/2017    Procedure: INJECT EPIDURAL LUMBAR;  Surgeon: Raffi Wright MD;  [facility]     INJECT EPIDURAL TRANSFORAMINAL Right 6/9/2016    Procedure: INJECT EPIDURAL TRANSFORAMINAL;  Surgeon: Antonio Dasilva MD;  [facility]     INJECT EPIDURAL TRANSFORAMINAL Right 10/13/2016    Procedure: INJECT EPIDURAL TRANSFORAMINAL;  Surgeon: Antonio Dasilva MD;  [facility]     LAPAROSCOPIC ASSISTED COLECTOMY LEFT (DESCENDING) N/A 10/13/2014    Procedure: LAPAROSCOPIC ASSISTED COLECTOMY LEFT (DESCENDING);  Surgeon: Raffi Montiel MD;  [facility]     Current Outpatient Prescriptions   Medication Sig Dispense Refill     prochlorperazine (COMPAZINE) 5 MG tablet Take 1 tablet (5 mg) by mouth every 6 hours as needed for nausea or vomiting 5 tablet 0     ondansetron (ZOFRAN ODT) 4 MG ODT tab Take 1 tablet (4 mg) by mouth  "every 8 hours as needed 10 tablet 0     levothyroxine (SYNTHROID/LEVOTHROID) 50 MCG tablet TAKE 1 TABLET (50 MCG) BY MOUTH DAILY 90 tablet 2     ALPRAZolam (XANAX) 0.25 MG tablet Take 1 tablet (0.25 mg) by mouth nightly as needed for anxiety 30 tablet 0     metoprolol (LOPRESSOR) 25 MG tablet TAKE 1/2 TABLET (12.5 MG) BY MOUTH DAILY 45 tablet 3     simvastatin (ZOCOR) 20 MG tablet TAKE 0.5 TABLETS (10 MG) BY MOUTH AT BEDTIME (DUE FOR FASTING LABS) 45 tablet 3     omeprazole (PRILOSEC) 20 MG CR capsule TAKE ONE CAPSULE BY MOUTH ONCE DAILY 90 capsule 3     Dentifrices (BIOTENE DRY MOUTH) PSTE Toothpaste and mouthwash       THERAPEUTIC MULTIVITAMIN OR 1 TABLET DAILY       ASPIRIN 81 MG OR TABS 1 TABLET DAILY       OTC products: no recent use of OTC ASA, NSAIDS or Steroids and Aspirin    Allergies   Allergen Reactions     Azithromycin Diarrhea     Ciprofloxacin Other (See Comments)     Cipro, dizziness and abdominal pain     Penicillins      stomach upset, diarrhea     Sulfa Drugs      hives      Latex Allergy: NO    Social History   Substance Use Topics     Smoking status: Never Smoker     Smokeless tobacco: Never Used     Alcohol use No     History   Drug Use No       REVIEW OF SYSTEMS:   Constitutional, neuro, ENT, endocrine, pulmonary, cardiac, gastrointestinal, genitourinary, musculoskeletal, integument and psychiatric systems are negative, except as otherwise noted.    EXAM:   /70 (BP Location: Right arm, Patient Position: Chair, Cuff Size: Adult Regular)  Pulse 64  Temp 98.2  F (36.8  C) (Oral)  Resp 16  Ht 5' 1.02\" (1.55 m)  Wt 132 lb 6.4 oz (60.1 kg)  BMI 25 kg/m2    GENERAL APPEARANCE: healthy, alert and no distress     EYES: EOMI, PERRL     HENT: ear canals and TM's normal and nose and mouth without ulcers or lesions     NECK: no adenopathy, no asymmetry, masses, or scars and thyroid normal to palpation     RESP: lungs clear to auscultation - no rales, rhonchi or wheezes     CV: regular rates and " rhythm, normal S1 S2, no S3 or S4 and no murmur, click or rub     ABDOMEN:  soft, nontender, no HSM or masses and bowel sounds normal     MS: extremities normal- no gross deformities noted, normal muscle tone and gait limp due to radiculopathy of lumbar pain     SKIN: no suspicious lesions or rashes     NEURO: Normal strength and tone, sensory exam grossly normal, mentation intact and speech normal     PSYCH: mentation appears normal, anxious, judgment and insight intact and worried     LYMPHATICS: No cervical adenopathy    DIAGNOSTICS:   EKG: appears normal, NSR, normal axis, normal intervals, no acute ST/T changes c/w ischemia, no LVH by voltage criteria, unchanged from previous tracings    Recent Labs   Lab Test  02/28/18   0513  02/27/18   0445   HGB  12.4  14.8   PLT  192  219   NA  143  140   POTASSIUM  3.5  3.4   CR  0.62  0.72        IMPRESSION:   Reason for surgery/procedure: lumbar stenosis  Diagnosis/reason for consult: Clearance for anesthesia    The proposed surgical procedure is considered LOW risk.    REVISED CARDIAC RISK INDEX  The patient has the following serious cardiovascular risks for perioperative complications such as (MI, PE, VFib and 3  AV Block):  No serious cardiac risks  INTERPRETATION: 0 risks: Class I (very low risk - 0.4% complication rate)    The patient has the following additional risks for perioperative complications:  No identified additional risks      ICD-10-CM    1. Preop general physical exam Z01.818    2. Spinal stenosis of lumbar region, unspecified whether neurogenic claudication present M48.061        RECOMMENDATIONS:         --Patient is to take all scheduled medications on the day of surgery EXCEPT for modifications listed below. Starting today hold any ibuprofen, aleve, advil, or motrin, aspirin.   May take synthroid, metoprolol, simvastatin in am with a small sip of water if after 3:30 am.         APPROVAL GIVEN to proceed with proposed procedure, without further  diagnostic evaluation       Signed Electronically by: FORREST Arroyo CNP    Copy of this evaluation report is provided to requesting physician.    [facility]    [address] [id number] [phone]  Dept: [phone]

## 2018-02-28 NOTE — PLAN OF CARE
Problem: Gastrointestinal Condition Comorbidity  Goal: Gastrointestinal Condition  Patient comorbidity will be monitored for signs and symptoms of Gastrointestinal condition.  Problems will be absent, minimized or managed by discharge/transition of care.   Outcome: Improving  Tolerated toast for breakfast. Denies nausea/abd pain reported. Up ambulating in halls independently. Reported small loose stool x2 this morning.

## 2018-02-28 NOTE — PLAN OF CARE
Problem: Patient Care Overview  Goal: Plan of Care/Patient Progress Review  Outcome: Therapy, progress toward functional goals as expected  S-(situation): shift note    B-(background): Abdominal pain/nausea    A-(assessment): Pt is A&O.  VSS.   Afebrile.  Pt is up and walking in hallway.  Went to BR x3, had 2 stools, very small amount of yellowish, brown stool.  No nausea reported.  Denies abdominal pain.  Bowel sounds present.      R-(recommendations): Will cont to monitor the above.

## 2018-02-28 NOTE — PATIENT INSTRUCTIONS
Before Your Surgery      Call your surgeon if there is any change in your health. This includes signs of a cold or flu (such as a sore throat, runny nose, cough, rash or fever).    Do not smoke, drink alcohol or take over the counter medicine (unless your surgeon or primary care doctor tells you to) for the 24 hours before and after surgery.    If you take prescribed drugs: Follow your doctor s orders about which medicines to take and which to stop until after surgery.    Eating and drinking prior to surgery: follow the instructions from your surgeon    Take a shower or bath the night before surgery. Use the soap your surgeon gave you to gently clean your skin. If you do not have soap from your surgeon, use your regular soap. Do not shave or scrub the surgery site.  Wear clean pajamas and have clean sheets on your bed.     Before Your Surgery      Call your surgeon if there is any change in your health. This includes signs of a cold or flu (such as a sore throat, runny nose, cough, rash or fever).    Do not smoke, drink alcohol or take over the counter medicine (unless your surgeon or primary care doctor tells you to) for the 24 hours before and after surgery.    If you take prescribed drugs: Follow your doctor s orders about which medicines to take and which to stop until after surgery.    Eating and drinking prior to surgery: follow the instructions from your surgeon  Take a shower or bath the night before surgery. Use the soap your surgeon gave you to gently clean your skin. If you do not have soap from your surgeon, use your regular soap. Do not shave or scrub the surgery site.  Wear clean pajamas and have clean sheets on your bed.   Iron Supplements  Most people think of iron as a metal used to make pots, frying pans, and soup kettles. This same metal (or mineral) also plays a vital role in the body. Iron helps the blood cells carry oxygen. When you don t get enough iron, you may feel tired and lack energy.  Over time, without enough iron, your body makes fewer red blood cells. This can cause a condition called iron-deficiency anemia. Since your body doesn t make iron, you must get it from foods or supplements.     Food sources of iron  Iron is found in a few types of foods. Good sources include:  Red meat, poultry, fish, eggs  Dried fruits (especially raisins, prunes, figs)  Legumes such as dried beans and lentils  Breads and cereals with iron added  Blackstrap molasses  Spinach  Foods cooked in cast-iron pans. This is especially true of acidic foods, such as tomatoes and bacliio.   Why use a supplement?  Women often need additional iron because they lose blood during their menstrual period. But even grown men and boys may need a supplement at some point. You may need an iron supplement if any of the following is true for you:  I rarely eat foods that are high in iron (such as red meat, poultry, dried beans, and foods with iron added).  I am a vegetarian and I rarely eat legumes (dried beans, peas, lentils).  I have heavy menstrual periods.  I am pregnant or breastfeeding.  I have been diagnosed with iron-deficiency anemia.  If you take iron  Here are some tips to help you get the most from an iron supplement:  Take it with vitamin C for better absorption.  Don t take an iron supplement with milk. The calcium in milk limits iron absorption.  Iron supplements can cause constipation. To prevent this, drink plenty of water, eat high-fiber foods, and exercise often. Also try an iron supplement with an added stool softener.  Eat a healthy diet to get all the nutrients your body needs.  Recommended iron intake  The amount of iron each person needs is different, and varies by age. Women who are pregnant or breastfeeding need extra iron. But taking more than the suggested amount is not always healthy. Your healthcare provider can help you choose the right amount of iron for you.   Date Last Reviewed: 6/1/2017 2000-2017 The  [employer]. [address] [place] [address], [place] [address]. All rights reserved. This information is not intended as a substitute for professional medical care. Always follow your healthcare professional's instructions.      Starting today hold any ibuprofen, aleve, advil, or motrin, aspirin.   May take synthroid, metoprolol, simvastatin in am with a small sip of water if after 3:30 am.     Thank you  Geni Delgado CNP

## 2018-03-01 ENCOUNTER — TELEPHONE (OUTPATIENT)
Dept: FAMILY MEDICINE | Facility: OTHER | Age: 78
End: 2018-03-01

## 2018-03-01 NOTE — TELEPHONE ENCOUNTER
Next 5 appointments (look out 90 days)     Mar 02, 2018 10:00 AM CST   [facility] with [facility]   [facility]    [address] [place] [id number]   [phone]                No need for outreach at this time.     Stephani Del Cid, RN, BSN

## 2018-03-01 NOTE — PROGRESS NOTES
02/27/18 1300   Quick Adds   Type of Visit Initial PT Evaluation   Living Environment   Lives With spouse   Living Arrangements house   Home Accessibility no concerns   Number of Stairs to Enter Home 3   Stair Railings at Home outside, present on right side   Transportation Available car   Self-Care   Dominant Hand right   Usual Activity Tolerance moderate   Current Activity Tolerance fair   Regular Exercise yes   Exercise Amount/Frequency 20 mins   Equipment Currently Used at Home none   Functional Level Prior   Ambulation 0-->independent   Transferring 0-->independent   Toileting 0-->independent   Bathing 0-->independent   Dressing 0-->independent   Eating 0-->independent   Communication 0-->understands/communicates without difficulty   Fall history within last six months no   General Information   Onset of Illness/Injury or Date of Surgery - Date 02/23/18   Referring Physician Dr. Jomar Wilson   Patient/Family Goals Statement return home but feel better   Pertinent History of Current Problem (include personal factors and/or comorbidities that impact the POC) no previous history of this illness   Precautions/Limitations no known precautions/limitations   Cognitive Status Examination   Orientation orientation to person, place and time   Level of Consciousness alert   Follows Commands and Answers Questions 100% of the time   Personal Safety and Judgment intact   Memory intact   Pain Assessment   Patient Currently in Pain No   Integumentary/Edema   Integumentary/Edema no deficits were identifed   Posture    Posture Not impaired   Range of Motion (ROM)   ROM Quick Adds No deficits were identified   Strength   Strength Comments general weakness related ot illness but she has symetrical strength of all limbs   Bed Mobility   Bed Mobility Comments indep   Transfer Skills   Transfer Comments indep   Gait   Gait Comments indep without device   Balance   Balance Comments good   Sensory Examination   Sensory  "Perception Comments burning to the  ankle from low back issues   Coordination   Coordination no deficits were identified   Muscle Tone   Muscle Tone no deficits were identified   Clinical Impression   Criteria for Skilled Therapeutic Intervention evaluation only   Clinical Presentation Stable/Uncomplicated   Patient, Family & other staff in agreement with plan of care Yes   [facility] TM \"6 Clicks\"   2016, Trustees of [facility], under license to [employer].  All rights reserved.   6 Clicks Short Forms Basic Mobility Inpatient Short Form   [facility]  \"6 Clicks\" V.2 Basic Mobility Inpatient Short Form   1. Turning from your back to your side while in a flat bed without using bedrails? 4 - None   2. Moving from lying on your back to sitting on the side of a flat bed without using bedrails? 4 - None   3. Moving to and from a bed to a chair (including a wheelchair)? 4 - None   4. Standing up from a chair using your arms (e.g., wheelchair, or bedside chair)? 4 - None   5. To walk in hospital room? 4 - None   6. Climbing 3-5 steps with a railing? 3 - A Little   Basic Mobility Raw Score (Score out of 24.Lower scores equate to lower levels of function) 23   Total Evaluation Time   Total Evaluation Time (Minutes) 30     "

## 2018-03-01 NOTE — TELEPHONE ENCOUNTER
RN to call for hospital follow up:    Reason for follow up: Milena F Joy appeared on our list for being seen in an Emergency Room or a recent Hospital discharge.    Admitting date: 2/27/18  Discharge date: 2/28/18  Location: [facility]  Reason for visit: Gastroenteritis    May Kenyon RN

## 2018-03-02 ENCOUNTER — OFFICE VISIT (OUTPATIENT)
Dept: FAMILY MEDICINE | Facility: OTHER | Age: 78
End: 2018-03-02
Payer: COMMERCIAL

## 2018-03-02 VITALS
BODY MASS INDEX: 25 KG/M2 | HEART RATE: 64 BPM | TEMPERATURE: 98.2 F | SYSTOLIC BLOOD PRESSURE: 128 MMHG | RESPIRATION RATE: 16 BRPM | HEIGHT: 61 IN | DIASTOLIC BLOOD PRESSURE: 70 MMHG | WEIGHT: 132.4 LBS

## 2018-03-02 DIAGNOSIS — I10 ESSENTIAL HYPERTENSION: ICD-10-CM

## 2018-03-02 DIAGNOSIS — Z01.818 PREOP GENERAL PHYSICAL EXAM: Primary | ICD-10-CM

## 2018-03-02 DIAGNOSIS — F41.9 ANXIETY: ICD-10-CM

## 2018-03-02 DIAGNOSIS — M48.061 SPINAL STENOSIS OF LUMBAR REGION, UNSPECIFIED WHETHER NEUROGENIC CLAUDICATION PRESENT: ICD-10-CM

## 2018-03-02 PROBLEM — H26.40 AFTER CATARACT, LEFT EYE: Status: ACTIVE | Noted: 2017-09-18

## 2018-03-02 PROBLEM — H25.019 CORTICAL AGE-RELATED CATARACT: Status: ACTIVE | Noted: 2017-01-16

## 2018-03-02 PROBLEM — H25.019 CORTICAL SENILE CATARACT: Status: ACTIVE | Noted: 2017-01-16

## 2018-03-02 PROCEDURE — 99215 OFFICE O/P EST HI 40 MIN: CPT | Performed by: NURSE PRACTITIONER

## 2018-03-02 PROCEDURE — 93000 ELECTROCARDIOGRAM COMPLETE: CPT | Performed by: NURSE PRACTITIONER

## 2018-03-02 RX ORDER — ALPRAZOLAM 0.25 MG
0.25 TABLET ORAL
Qty: 15 TABLET | Refills: 0 | Status: SHIPPED | OUTPATIENT
Start: 2018-03-02 | End: 2018-04-25

## 2018-03-02 NOTE — MR AVS SNAPSHOT
After Visit Summary   3/2/2018    Milena Hamilton    MRN: [id number]           Patient Information     Date Of Birth          1940        Visit Information        Provider Department      3/2/2018 10:00 AM Geni Delgado APRN [facility]        Today's Diagnoses     Preop general physical exam    -  1    Spinal stenosis of lumbar region, unspecified whether neurogenic claudication present        Essential hypertension        Anxiety          Care Instructions      Before Your Surgery      Call your surgeon if there is any change in your health. This includes signs of a cold or flu (such as a sore throat, runny nose, cough, rash or fever).    Do not smoke, drink alcohol or take over the counter medicine (unless your surgeon or primary care doctor tells you to) for the 24 hours before and after surgery.    If you take prescribed drugs: Follow your doctor s orders about which medicines to take and which to stop until after surgery.    Eating and drinking prior to surgery: follow the instructions from your surgeon    Take a shower or bath the night before surgery. Use the soap your surgeon gave you to gently clean your skin. If you do not have soap from your surgeon, use your regular soap. Do not shave or scrub the surgery site.  Wear clean pajamas and have clean sheets on your bed.     Before Your Surgery      Call your surgeon if there is any change in your health. This includes signs of a cold or flu (such as a sore throat, runny nose, cough, rash or fever).    Do not smoke, drink alcohol or take over the counter medicine (unless your surgeon or primary care doctor tells you to) for the 24 hours before and after surgery.    If you take prescribed drugs: Follow your doctor s orders about which medicines to take and which to stop until after surgery.    Eating and drinking prior to surgery: follow the instructions from your surgeon  Take a shower or bath the night  before surgery. Use the soap your surgeon gave you to gently clean your skin. If you do not have soap from your surgeon, use your regular soap. Do not shave or scrub the surgery site.  Wear clean pajamas and have clean sheets on your bed.   Iron Supplements  Most people think of iron as a metal used to make pots, frying pans, and soup kettles. This same metal (or mineral) also plays a vital role in the body. Iron helps the blood cells carry oxygen. When you don t get enough iron, you may feel tired and lack energy. Over time, without enough iron, your body makes fewer red blood cells. This can cause a condition called iron-deficiency anemia. Since your body doesn t make iron, you must get it from foods or supplements.     Food sources of iron  Iron is found in a few types of foods. Good sources include:  Red meat, poultry, fish, eggs  Dried fruits (especially raisins, prunes, figs)  Legumes such as dried beans and lentils  Breads and cereals with iron added  Blackstrap molasses  Spinach  Foods cooked in cast-iron pans. This is especially true of acidic foods, such as tomatoes and bacilio.   Why use a supplement?  Women often need additional iron because they lose blood during their menstrual period. But even grown men and boys may need a supplement at some point. You may need an iron supplement if any of the following is true for you:  I rarely eat foods that are high in iron (such as red meat, poultry, dried beans, and foods with iron added).  I am a vegetarian and I rarely eat legumes (dried beans, peas, lentils).  I have heavy menstrual periods.  I am pregnant or breastfeeding.  I have been diagnosed with iron-deficiency anemia.  If you take iron  Here are some tips to help you get the most from an iron supplement:  Take it with vitamin C for better absorption.  Don t take an iron supplement with milk. The calcium in milk limits iron absorption.  Iron supplements can cause constipation. To prevent this, drink  plenty of water, eat high-fiber foods, and exercise often. Also try an iron supplement with an added stool softener.  Eat a healthy diet to get all the nutrients your body needs.  Recommended iron intake  The amount of iron each person needs is different, and varies by age. Women who are pregnant or breastfeeding need extra iron. But taking more than the suggested amount is not always healthy. Your healthcare provider can help you choose the right amount of iron for you.   Date Last Reviewed: 6/1/2017 2000-2017 [employer]. [address] [place] [address]. All rights reserved. This information is not intended as a substitute for professional medical care. Always follow your healthcare professional's instructions.      Starting today hold any ibuprofen, aleve, advil, or motrin, aspirin.   May take synthroid, metoprolol, simvastatin in am with a small sip of water if after 3:30 am.     Thank you  Geni Delgado CNP            Follow-ups after your visit        Who to contact     If you have questions or need follow up information about today's clinic visit or your schedule please contact [facility] directly at [phone].  Normal or non-critical lab and imaging results will be communicated to you by Kids Notehart, letter or phone within 4 business days after the clinic has received the results. If you do not hear from us within 7 days, please contact the clinic through [employer] Note[employer] or phone. If you have a critical or abnormal lab result, we will notify you by phone as soon as possible.  Submit refill requests through P[employer]eline or call your pharmacy and they will forward the refill request to us. Please allow 3 business days for your refill to be completed.          Additional Information About Your Visit        MyChart Information     P[employer]eline lets you send messages to your doctor, view your test results, renew your prescriptions, schedule appointments and more. To sign up, go to  [email or web address] . Click on \"Log in\" on the left side of the screen, which will take you to the Welcome page. Then click on \"Sign up Now\" on the right side of the page.     You will be asked to enter the access code listed below, as well as some personal information. Please follow the directions to create your username and password.     Your access code is: [email or web address]  Expires: 3/6/2018 10:14 AM     Your access code will  in 90 days. If you need help or a new code, please call your [facility] clinic or [phone].        Care EveryWhere ID     This is your Care EveryWhere ID. This could be used by other organizations to access your [facility] medical records  [id number]        Your Vitals Were     Pulse Temperature Respirations Height BMI (Body Mass Index)       64 98.2  F (36.8  C) (Oral) 16 5' 1.02\" (1.55 m) 25 kg/m2        Blood Pressure from Last 3 Encounters:   18 128/70   18 150/70   17 130/64    Weight from Last 3 Encounters:   18 132 lb 6.4 oz (60.1 kg)   18 137 lb 2 oz (62.2 kg)   17 137 lb (62.1 kg)              We Performed the Following     EKG 12-lead complete w/read - Clinics          Where to get your medicines      Some of these will need a paper prescription and others can be bought over the counter.  Ask your nurse if you have questions.     Bring a paper prescription for each of these medications     ALPRAZolam 0.25 MG tablet          Primary Care Provider Office Phone # Fax #    Chuck Streeter PA-C [phone]       [address] [facility] [address]  [facility] [address]        Equal Access to Services     [facility] LARISSA : Mekhi Rivera, sheyla young, michelle gauthier. [facility] [phone].    ATENCIÓN: Si habla español, tiene a miller disposición servicios gratuitos de asistencia lingüística. Llame al [phone].    We comply with applicable federal civil rights laws and " [place] laws. We do not discriminate on the basis of race, color, national origin, age, disability, sex, sexual orientation, or gender identity.            Thank you!     Thank you for choosing [facility]  for your care. Our goal is always to provide you with excellent care. Hearing back from our patients is one way we can continue to improve our services. Please take a few minutes to complete the written survey that you may receive in the mail after your visit with us. Thank you!             Your Updated Medication List - Protect others around you: Learn how to safely use, store and throw away your medicines at www.disposemymeds.org.          This list is accurate as of 3/2/18 11:01 AM.  Always use your most recent med list.                   Brand Name Dispense Instructions for use Diagnosis    ALPRAZolam 0.25 MG tablet    XANAX    15 tablet    Take 1 tablet (0.25 mg) by mouth nightly as needed for anxiety    Anxiety       aspirin 81 MG tablet      1 TABLET DAILY        BIOTENE DRY MOUTH Pste      Toothpaste and mouthwash        levothyroxine 50 MCG tablet    SYNTHROID/LEVOTHROID    90 tablet    TAKE 1 TABLET (50 MCG) BY MOUTH DAILY    Acquired hypothyroidism       metoprolol tartrate 25 MG tablet    LOPRESSOR    45 tablet    TAKE 1/2 TABLET (12.5 MG) BY MOUTH DAILY    HTN, goal below 140/90       omeprazole 20 MG CR capsule    priLOSEC    90 capsule    TAKE ONE CAPSULE BY MOUTH ONCE DAILY    Gastroesophageal reflux disease, esophagitis presence not specified       ondansetron 4 MG ODT tab    ZOFRAN ODT    10 tablet    Take 1 tablet (4 mg) by mouth every 8 hours as needed        prochlorperazine 5 MG tablet    COMPAZINE    5 tablet    Take 1 tablet (5 mg) by mouth every 6 hours as needed for nausea or vomiting    Gastroenteritis       simvastatin 20 MG tablet    ZOCOR    45 tablet    TAKE 0.5 TABLETS (10 MG) BY MOUTH AT BEDTIME (DUE FOR FASTING LABS)    Hyperlipidemia LDL goal <130        THERAPEUTIC MULTIVITAMIN PO      1 TABLET DAILY

## 2018-03-02 NOTE — NURSING NOTE
"Chief Complaint   Patient presents with     Pre-Op Exam       Initial /70 (BP Location: Right arm, Patient Position: Chair, Cuff Size: Adult Regular)  Pulse 64  Temp 98.2  F (36.8  C) (Oral)  Resp 16  Ht 5' 1.02\" (1.55 m)  Wt 132 lb 6.4 oz (60.1 kg)  BMI 25 kg/m2 Estimated body mass index is 25 kg/(m^2) as calculated from the following:    Height as of this encounter: 5' 1.02\" (1.55 m).    Weight as of this encounter: 132 lb 6.4 oz (60.1 kg).  Medication Reconciliation: complete    "

## 2018-03-02 NOTE — PROGRESS NOTES
Results discussed with patient in clinic. States understanding of these results.    Geni Delgado CNP

## 2018-03-26 ENCOUNTER — VIRTUAL VISIT (OUTPATIENT)
Dept: FAMILY MEDICINE | Facility: OTHER | Age: 78
End: 2018-03-26
Payer: COMMERCIAL

## 2018-03-26 DIAGNOSIS — R30.0 DYSURIA: Primary | ICD-10-CM

## 2018-03-26 DIAGNOSIS — R30.0 DYSURIA: ICD-10-CM

## 2018-03-26 LAB
ALBUMIN UR-MCNC: NEGATIVE MG/DL
APPEARANCE UR: CLEAR
BILIRUB UR QL STRIP: NEGATIVE
COLOR UR AUTO: YELLOW
GLUCOSE UR STRIP-MCNC: NEGATIVE MG/DL
HGB UR QL STRIP: NEGATIVE
KETONES UR STRIP-MCNC: NEGATIVE MG/DL
LEUKOCYTE ESTERASE UR QL STRIP: NEGATIVE
NITRATE UR QL: NEGATIVE
PH UR STRIP: 5 PH (ref 5–7)
SOURCE: NORMAL
SP GR UR STRIP: <=1.005 (ref 1–1.03)
UROBILINOGEN UR STRIP-ACNC: 0.2 EU/DL (ref 0.2–1)

## 2018-03-26 PROCEDURE — 98966 PH1 ASSMT&MGMT NQHP 5-10: CPT | Performed by: NURSE PRACTITIONER

## 2018-03-26 PROCEDURE — 81003 URINALYSIS AUTO W/O SCOPE: CPT | Performed by: NURSE PRACTITIONER

## 2018-03-26 NOTE — PROGRESS NOTES
"Milena Hamilton is a 77 year old female who is being evaluated via a telephone visit.      The patient has been notified of following:     \"This telephone visit will be conducted via a call between you and your physician/provider. We have found that certain health care needs can be provided without the need for a physical exam.  This service lets us provide the care you need with a short phone conversation.  If a prescription is necessary we can send it directly to your pharmacy.  If lab work is needed we can place an order for that and you can then stop by our lab to have the test done at a later time.    We will bill your insurance company for this service.  Please check with your medical insurance if this type of visit is covered. You may be responsible for the cost of this type of visit if insurance coverage is denied.  The typical cost is $30 (10min), $59 (11-20min) and $85 (21-30min).  Most often these visits are shorter than 10 minutes.    If during the course of the call the physician/provider feels a telephone visit is not appropriate, you will not be charged for this service.\"       Consent has been obtained for this service by care team member: yes.   See the scanned image in the medical record.    Milena Hamilton complains of  UTI      I have reviewed and updated the patient's Past Medical History, Social History, Family History and Medication List.    ALLERGIES  Azithromycin; Ciprofloxacin; Penicillins; and Sulfa drugs    Tanya Bhakta  (MA signature)    Additional provider notes: patient states she feels like she has to urinate frequently. Denies pain with urination, blood, or bowel changes. States frequency is her only symptoms. Urine analysis negative today. Recommend follow up in clinic if symptoms do not improve for further evaluation.      Assessment/Plan:  (R30.0) Dysuria  (primary encounter diagnosis)  Comment: negative U/A   Plan: *UA reflex to Microscopic and Culture ([facility]         and " [facility] (except [place] and         [place]), CANCELED: *UA reflex to Microscopic         and Culture ([facility] and [facility] (except        [place] and [place])              I have reviewed the note as documented above.  This accurately captures the substance of my conversation with the patient,  Geni Delgado CNP      Total time of call between patient and provider was 5 minutes

## 2018-03-26 NOTE — MR AVS SNAPSHOT
"              After Visit Summary   3/26/2018    Milena Hamilton    MRN: [id number]           Patient Information     Date Of Birth          1940        Visit Information        Provider Department      3/26/2018 5:10 PM Geni Delgado APRN CNP [facility]        Today's Diagnoses     Dysuria    -  1       Follow-ups after your visit        Follow-up notes from your care team     Return in about 6 weeks (around 5/7/2018).      Your next 10 appointments already scheduled     Mar 26, 2018  5:10 PM CDT   Telephone Visit with FORREST Cabrera CNP   [facility]    [address]  [facility] [address] [phone]           Note: this is not an onsite visit; there is no need to come to the facility.              Who to contact     If you have questions or need follow up information about today's clinic visit or your schedule please contact [facility] directly at [phone].  Normal or non-critical lab and imaging results will be communicated to you by [employer], letter or phone within 4 business days after the clinic has received the results. If you do not hear from us within 7 days, please contact the clinic through [employer] or phone. If you have a critical or abnormal lab result, we will notify you by phone as soon as possible.  Submit refill requests through [facility] or call your pharmacy and they will forward the refill request to us. Please allow 3 business days for your refill to be completed.          Additional Information About Your Visit        [employer] Information     [facility] lets you send messages to your doctor, view your test results, renew your prescriptions, schedule appointments and more. To sign up, go to [email or web address] . Click on \"Log in\" on the left side of the screen, which will take you to the Welcome page. Then click on \"Sign up Now\" on the right side of the page.     You " will be asked to enter the access code listed below, as well as some personal information. Please follow the directions to create your username and password.     Your access code is: [email or web address]  Expires: 2018  4:32 PM     Your access code will  in 90 days. If you need help or a new code, please call your [place] clinic or [phone].        Care EveryWhere ID     This is your Care EveryWhere ID. This could be used by other organizations to access your [place] medical records  [id number]         Blood Pressure from Last 3 Encounters:   18 128/70   18 150/70   17 130/64    Weight from Last 3 Encounters:   18 132 lb 6.4 oz (60.1 kg)   18 137 lb 2 oz (62.2 kg)   17 137 lb (62.1 kg)               Primary Care Provider Office Phone # Fax #    Chuck Streeter PA-C [phone]       [address] [place] [address]        Equal Access to Services     [facility]: Hadii aad ku hadasho Soomaali, waaxda luqadaha, qaybta kaalmada americo, michelle jones . So [facility] [phone].    ATENCIÓN: Si habla español, tiene a miller disposición servicios gratuitos de asistencia lingüística. Mignon al [phone].    We comply with applicable federal civil rights laws and [place] laws. We do not discriminate on the basis of race, color, national origin, age, disability, sex, sexual orientation, or gender identity.            Thank you!     Thank you for choosing [facility]  for your care. Our goal is always to provide you with excellent care. Hearing back from our patients is one way we can continue to improve our services. Please take a few minutes to complete the written survey that you may receive in the mail after your visit with us. Thank you!             Your Updated Medication List - Protect others around you: Learn how to safely use, store and throw away your medicines at www.disposemymeds.org.           This list is accurate as of 3/26/18  4:32 PM.  Always use your most recent med list.                   Brand Name Dispense Instructions for use Diagnosis    ALPRAZolam 0.25 MG tablet    XANAX    15 tablet    Take 1 tablet (0.25 mg) by mouth nightly as needed for anxiety    Anxiety       aspirin 81 MG tablet      1 TABLET DAILY        BIOTENE DRY MOUTH Pste      Toothpaste and mouthwash        levothyroxine 50 MCG tablet    SYNTHROID/LEVOTHROID    90 tablet    TAKE 1 TABLET (50 MCG) BY MOUTH DAILY    Acquired hypothyroidism       metoprolol tartrate 25 MG tablet    LOPRESSOR    45 tablet    TAKE 1/2 TABLET (12.5 MG) BY MOUTH DAILY    HTN, goal below 140/90       omeprazole 20 MG CR capsule    priLOSEC    90 capsule    TAKE ONE CAPSULE BY MOUTH ONCE DAILY    Gastroesophageal reflux disease, esophagitis presence not specified       prochlorperazine 5 MG tablet    COMPAZINE    5 tablet    Take 1 tablet (5 mg) by mouth every 6 hours as needed for nausea or vomiting    Gastroenteritis       simvastatin 20 MG tablet    ZOCOR    45 tablet    TAKE 0.5 TABLETS (10 MG) BY MOUTH AT BEDTIME (DUE FOR FASTING LABS)    Hyperlipidemia LDL goal <130       THERAPEUTIC MULTIVITAMIN PO      1 TABLET DAILY

## 2018-04-16 ENCOUNTER — HOSPITAL ENCOUNTER (EMERGENCY)
Facility: CLINIC | Age: 78
Discharge: HOME | End: 2018-04-16
Attending: FAMILY MEDICINE | Admitting: FAMILY MEDICINE
Payer: MEDICARE

## 2018-04-16 VITALS
HEART RATE: 72 BPM | RESPIRATION RATE: 16 BRPM | SYSTOLIC BLOOD PRESSURE: 150 MMHG | TEMPERATURE: 97.2 F | OXYGEN SATURATION: 99 % | DIASTOLIC BLOOD PRESSURE: 70 MMHG

## 2018-04-16 DIAGNOSIS — K59.03 DRUG-INDUCED CONSTIPATION: ICD-10-CM

## 2018-04-16 PROCEDURE — 25000132 ZZH RX MED GY IP 250 OP 250 PS 637: Mod: GY | Performed by: FAMILY MEDICINE

## 2018-04-16 PROCEDURE — 99283 EMERGENCY DEPT VISIT LOW MDM: CPT | Performed by: FAMILY MEDICINE

## 2018-04-16 PROCEDURE — 99283 EMERGENCY DEPT VISIT LOW MDM: CPT | Mod: Z6 | Performed by: FAMILY MEDICINE

## 2018-04-16 RX ORDER — POLYETHYLENE GLYCOL 3350 17 G/17G
POWDER, FOR SOLUTION ORAL
COMMUNITY
Start: 2018-04-16 | End: 2018-12-19

## 2018-04-16 RX ADMIN — SODIUM PHOSPHATE 1 ENEMA: 7; 19 ENEMA RECTAL at 02:41

## 2018-04-16 NOTE — ED AVS SNAPSHOT
[facility] Emergency Department    [address] [place] [address]    Phone:  [phone]    Fax:  [phone]                                       Milena Hamilton   MRN: [id number]    Department:  [facility] Emergency Department   Date of Visit:  4/16/2018           After Visit Summary Signature Page     I have received my discharge instructions, and my questions have been answered. I have discussed any challenges I see with this plan with the nurse or doctor.    ..........................................................................................................................................  Patient/Patient Representative Signature      ..........................................................................................................................................  Patient Representative Print Name and Relationship to Patient    ..................................................               ................................................  Date                                            Time    ..........................................................................................................................................  Reviewed by Signature/Title    ...................................................              ..............................................  Date                                                            Time

## 2018-04-16 NOTE — ED PROVIDER NOTES
History     Chief Complaint   Patient presents with     Constipation     HPI  Milena Hamilton is a 77 year old female who resents to the ED this morning with constipation.  She had back surgery about 5 weeks ago with Dr. Devin Lundberg.  She was on narcotic pain medication afterwards and has been off of those for about a week.  Did have some problems with constipation while on the meds.  Has only been passing small hard marbles.  Has not had any significant bowel movement since Friday, 3 days ago.  Her pain comes in waves.  She has had no nausea or vomiting.  No fevers.  Took a Dulcolax tablet last night and has been using stool softeners without relief.    Problem List:    Patient Active Problem List    Diagnosis Date Noted     Intractable vomiting 02/27/2018     Priority: Medium     Gastroenteritis 02/27/2018     Priority: Medium     After cataract, left eye 09/18/2017     Priority: Medium     Acquired hypothyroidism 04/28/2017     Priority: Medium     Gastroesophageal reflux disease, esophagitis presence not specified 04/28/2017     Priority: Medium     Cortical age-related cataract 01/16/2017     Priority: Medium     Cortical senile cataract 01/16/2017     Priority: Medium     Hypothyroidism, unspecified type 11/30/2016     Priority: Medium     Premature atrial beats 11/30/2016     Priority: Medium     Community acquired pneumonia 05/08/2015     Priority: Medium     Benign skin lesion 03/06/2015     Priority: Medium     HTN (hypertension) 07/10/2014     Priority: Medium     Raynaud's disease 06/01/2014     Priority: Medium     Health Care Home 12/30/2013     Priority: Medium     Status:  Closed  Care Coordinator:  Tasha Vasquez RN    See Letters for [facility] Care Plan             History of diverticulitis - s/p left hemicolectomy 11/30/2013     Priority: Medium     Diarrhea 11/30/2013     Priority: Medium     Chronic constipation 06/18/2013     Priority: Medium     Sacroiliac dysfunction 10/09/2012     Priority:  Medium     Advanced directives, counseling/discussion 10/03/2011     Priority: Medium     Advance Directive Problem List Overview:   Name Relationship Phone    Primary Health Care Agent            Alternative Health Care Agent          Discussed advance care planning with patient; information given to patient to review. 10/3/2011          DDD (degenerative disc disease), cervical 08/10/2011     Priority: Medium     Anxiety 10/24/2010     Priority: Medium     Hyperlipidemia 09/29/2010     Priority: Medium     Osteopenia 04/01/2010     Priority: Medium     recheck DEXA in 2 years       Gastritis 03/15/2010     Priority: Medium     Panic disorder without agoraphobia 10/08/2004     Priority: Medium     Tinnitus 09/13/2004     Priority: Medium     Problem list name updated by automated process. Provider to review       Family history of diabetes mellitus 09/09/2002     Priority: Medium     Lichen planus      Priority: Medium        Past Medical History:    Past Medical History:   Diagnosis Date     Lichen planus      Osteopenia 4/2010     Panic disorder without agoraphobia      Pure hypercholesterolemia      Raynaud's disease 6/2014     Unspecified essential hypertension        Past Surgical History:    Past Surgical History:   Procedure Laterality Date     C APPENDECTOMY       C NONSPECIFIC PROCEDURE      Right inferior oblique recession, 14 mm     C VAG HYST,RMV TUBE/OVARY  1984    cervix removed     COLONOSCOPY  10/17/2007    normal, recheck in 10 years     COLONOSCOPY N/A 8/27/2014    Procedure: COLONOSCOPY;  Surgeon: Raffi Montiel MD;  Location:  GI     COMBINED CYSTOSCOPY, INSERT CATHETER URETER Bilateral 10/13/2014    Procedure: COMBINED CYSTOSCOPY, INSERT CATHETER URETER;  Surgeon: Abdoulaye Odell MD;  Location:  OR     EYE SURGERY  8/01    right eye muscle repair     HC REMOVAL GALLBLADDER  2/9/2010    lap     HC REMOVAL OF OVARY/TUBE(S)      Salpingo-Oophorectomy, bilateral     HYSTERECTOMY, PAP  "NO LONGER INDICATED       INJECT EPIDURAL LUMBAR Right 4/10/2017    Procedure: INJECT EPIDURAL LUMBAR;  Surgeon: Raffi Wright MD;  Location: [facility] OR     INJECT EPIDURAL TRANSFORAMINAL Right 2016    Procedure: INJECT EPIDURAL TRANSFORAMINAL;  Surgeon: Antonio Dasilva MD;  Location: [facility] OR     INJECT EPIDURAL TRANSFORAMINAL Right 10/13/2016    Procedure: INJECT EPIDURAL TRANSFORAMINAL;  Surgeon: Antonio Dasilva MD;  Location: [facility] OR     LAPAROSCOPIC ASSISTED COLECTOMY LEFT (DESCENDING) N/A 10/13/2014    Procedure: LAPAROSCOPIC ASSISTED COLECTOMY LEFT (DESCENDING);  Surgeon: Raffi Montiel MD;  Location: [facility] OR       Family History:    Family History   Problem Relation Age of Onset     DIABETES Brother      HEART DISEASE Brother 50     AMI     DIABETES Brother      HEART DISEASE Brother      AMI, \"older\"     CEREBROVASCULAR DISEASE Brother      Psychotic Disorder Brother      , alcohol     CANCER Sister      breast cancer x2     Hypertension No family hx of      Breast Cancer No family hx of      Ovarian Cancer No family hx of      Prostate Cancer No family hx of      Mental Illness No family hx of      Anesthesia Reaction No family hx of      OSTEOPOROSIS No family hx of      Obesity No family hx of      Other Cancer No family hx of      Depression No family hx of      Anxiety Disorder No family hx of      MENTAL ILLNESS No family hx of      Substance Abuse No family hx of        Social History:  Marital Status:   [2]  Social History   Substance Use Topics     Smoking status: Never Smoker     Smokeless tobacco: Never Used     Alcohol use No        Medications:      polyethylene glycol (MIRALAX) powder   sodium phosphate (FLEET ENEMA) 7-19 GM/118ML rectal enema   ALPRAZolam (XANAX) 0.25 MG tablet   prochlorperazine (COMPAZINE) 5 MG tablet   levothyroxine (SYNTHROID/LEVOTHROID) 50 MCG tablet   metoprolol (LOPRESSOR) 25 MG tablet   simvastatin (ZOCOR) 20 MG tablet   omeprazole (PRILOSEC) 20 MG CR " capsule   Dentifrices (BIOTENE DRY MOUTH) PSTE   THERAPEUTIC MULTIVITAMIN OR   ASPIRIN 81 MG OR TABS         Review of Systems   All other systems reviewed and are negative.      Physical Exam   BP: (!) 166/93  Pulse: 103  Temp: 97.2  F (36.2  C)  Resp: 20  SpO2: 99 %      Physical Exam   Constitutional: She is oriented to person, place, and time. She appears well-developed and well-nourished. No distress.   HENT:   Head: Normocephalic.   Eyes: EOM are normal.   Neck: Neck supple.   Cardiovascular: Normal rate and regular rhythm.    No murmur heard.  Pulmonary/Chest: Effort normal. No respiratory distress.   Abdominal: Soft. There is no tenderness.   Musculoskeletal: She exhibits no edema or tenderness.        Legs:  Neurological: She is alert and oriented to person, place, and time. No cranial nerve deficit.   Skin: Skin is warm and dry. No rash noted.   Psychiatric: She has a normal mood and affect.       ED Course (with Medical Decision Making)      Her abdomen is soft and nontender.  We will start with a Fleet enema and see if that helps.  If not, we will try a pink lady.    She got a large plug out with the Fleet enema.  She already feels better and was wondering if she could just go home and use MiraLAX to finish things up.  I think that certainly fine.  She does have a bit of a reddened area right at the superior aspect of the gluteal cleft.  It does not hurt.  She could try some yeast cream on that area and watch that it does not develop into a pressure sore.  Her back incision looks good.         ED Course     Procedures               Critical Care time:  none               No results found for this or any previous visit (from the past 24 hour(s)).    Medications   sodium phosphate (FLEET ENEMA) 1 enema (1 enema Rectal Given 4/16/18 7660)       Assessments & Plan     I have reviewed the nursing notes.    I have reviewed the findings, diagnosis, plan and need for follow up with the patient.       New  "Prescriptions    POLYETHYLENE GLYCOL (MIRALAX) POWDER    1 capful mixed in 8 oz of fluid up to every 15 minutes as needed to \"clean out\".  Titrate to effect.    SODIUM PHOSPHATE (FLEET ENEMA) 7-19 GM/118ML RECTAL ENEMA    Place 1 Bottle (1 enema) rectally once as needed       Final diagnoses:   Drug-induced constipation - from pain meds after back surgery       4/16/2018   [facility] EMERGENCY DEPARTMENT     Brandon Bettencourt MD  04/16/18 0306       Brandon Bettencourt MD  04/16/18 0308    "

## 2018-04-16 NOTE — DISCHARGE INSTRUCTIONS
"You may use the MiraLAX as needed to \"clean out.    You can also repeat a Fleet enema if needed.  I am hopeful that this will no longer be a problem now that you are off of the narcotic pain medications.  Please recheck in clinic if persistent problems or return to the ED if worse/concerns.  You may apply some yeast cream to the red area on your back side twice a day.  Lamisil, Monistat, Lotrimin, etc.  Try to keep pressure off that area also.  It was nice visiting with both of you again this morning.  I am glad you are feeling better.    Thank you for choosing [facility]. We appreciate the opportunity to meet your urgent medical needs. Please let us know if we could have done anything to make your stay more satisfying.    After discharge, please closely monitor for any new or worsening symptoms. Return to the Emergency Department if you develop any acute worsening signs or symptoms.    If you had lab work, cultures or imaging studies done during your stay, the final results may still be pending. We will call you if your plan of care needs to change. However, if you are not improving as expected, please follow up with your primary care provider or clinic.     Start any prescription medications that were prescribed to you and take them as directed.     Please see additional handouts that may be pertinent to your condition.  \  \  "

## 2018-04-25 DIAGNOSIS — F41.9 ANXIETY: ICD-10-CM

## 2018-04-25 NOTE — TELEPHONE ENCOUNTER
Reason for Call:  Medication or medication refill:    Do you use a [facility]?  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 YES    Phone number patient can be reached at: Home number on file [phone] (home)    Best Time: any    Call taken on 4/25/2018 at 1:35 PM by [place]

## 2018-04-26 RX ORDER — ALPRAZOLAM 0.25 MG
0.25 TABLET ORAL
Qty: 15 TABLET | Refills: 0 | Status: SHIPPED | OUTPATIENT
Start: 2018-04-26 | End: 2018-06-05

## 2018-05-30 ENCOUNTER — THERAPY VISIT (OUTPATIENT)
Dept: PHYSICAL THERAPY | Facility: CLINIC | Age: 78
End: 2018-05-30
Payer: COMMERCIAL

## 2018-05-30 DIAGNOSIS — Z98.890 H/O LUMBOSACRAL SPINE SURGERY: ICD-10-CM

## 2018-05-30 DIAGNOSIS — G89.29 CHRONIC RIGHT-SIDED LOW BACK PAIN WITH RIGHT-SIDED SCIATICA: Primary | ICD-10-CM

## 2018-05-30 DIAGNOSIS — M54.41 CHRONIC RIGHT-SIDED LOW BACK PAIN WITH RIGHT-SIDED SCIATICA: Primary | ICD-10-CM

## 2018-05-30 PROCEDURE — 97110 THERAPEUTIC EXERCISES: CPT | Mod: GP | Performed by: PHYSICAL THERAPIST

## 2018-05-30 PROCEDURE — 97161 PT EVAL LOW COMPLEX 20 MIN: CPT | Mod: GP | Performed by: PHYSICAL THERAPIST

## 2018-05-30 NOTE — LETTER
[facility]  [address]  [facility] [address] [phone]    May 31, 2018    Re: Milena Hamilton   :   1940  MRN:  [id number]   REFERRING PHYSICIAN:   Gutierrez Broderick    [facility]  Date of Initial Evaluation:  18  Visits:  Rxs Used: 1  Reason for Referral:     Chronic right-sided low back pain with right-sided sciatica  H/O lumbosacral spine surgery    EVALUATION SUMMARY    [facility]  Subjective:  Patient is a 78 year old female presenting with rehab back hpi. The history is provided by the patient. No  was used.   Milena Hamilton is a 78 year old female with a lumbar condition.  Condition occurred with:  Other reason.  Condition occurred: for unknown reasons.  This is a chronic condition  Pt has had low back pain for at least 6 years; Had exacerbation of sx 2016. Trialed PT, Chiropractic, and cortisone injections w/o improvement. 3/9/18: S/P R L5 hemilaminectomy, medial facetectomy, and R L5-S1 and S1 foraminotomy; R L5-S1 foraminal mircrodiscectomy.    Patient reports pain:  SI joint right.  Radiates to:  Gluteals right, thigh right, knee right, lower leg right and foot right.  Pain is described as burning and is intermittent and reported as 5/10 (5/10 current/ worst).  Associated symptoms:  Loss of motion/stiffness, loss of strength, tingling and numbness. Pain is worse in the A.M..  Symptoms are exacerbated by bending (first walking after rest) and relieved by analgesics and rest.  Since onset symptoms are gradually improving (since surgery).  Special tests:  MRI.  Previous treatment includes chiropractic and physical therapy.  There was no improvement following previous treatment.  General health as reported by patient is fair.  Pertinent medical history includes:  High blood pressure and thyroid  problems.  Medical allergies: yes.  Surgical history: Other surgeries include:  Other (gallbladder removal 2010; L sided colon removal); Low back surgery in 1960.  Current medications:  Thyroid medication, high blood pressure medication and pain medication (Tylenol).  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        Barriers include:  None as reported by the patient.  Red flags:  None as reported by the patient.         Objective:  Standing Alignment:    Lumbar:  Convex scoliosis R           Lumbar/SI Evaluation  ROM:    AROM Lumbar:   Flexion:            Mid lower leg ++pull low back  Ext:                    Mod limitation +pain   Side Bend:        Left:  To mid thigh ++pull R    Right:  To knee  Rotation:           Left:  Mod limitation + pain R    Right:  Mod limitation  Side Glide:        Left:     Right:     Strength: Lower Abdominal MMT 2/5    Lumbar Myotomes:    L5 (Great Toe Ext): Right: 2   S1 (Toe Raise):  Left: 2    Right: 2       Hip Evaluation    Hip Strength:    Flexion:   Left: 4+/5   Pain:  Right: 3+/5   Pain:  Abduction:  Left: 4/5     Pain:Right: 3/5    Pain:    Assessment/Plan:    Patient is a 78 year old female with lumbar complaints.    Patient has the following significant findings with corresponding treatment plan.                Diagnosis 1:  LBP w/ lumbar radiculopathy S/P surgery  Pain -  hot/cold therapy, manual therapy, self management, education and home program  Decreased ROM/flexibility - manual therapy, therapeutic exercise, therapeutic activity and home program  Decreased strength - therapeutic exercise, therapeutic activities and home program  Impaired muscle performance - neuro re-education and home program  Decreased function - therapeutic activities and home program  Impaired posture - neuro re-education, therapeutic activities and home program    Therapy Evaluation  Codes:   1) History comprised of:   Personal factors that impact the plan of care:      Age and Time since onset of symptoms.    Comorbidity factors that impact the plan of care are:      High blood pressure.     Medications impacting care: High blood pressure.  2) Examination of Body Systems comprised of:   Body structures and functions that impact the plan of care:      Lumbar spine.   Activity limitations that impact the plan of care are:      Bending, Cooking, Driving, Lifting, Sitting, Standing, Walking and Working.  3) Clinical presentation characteristics are:   Stable/Uncomplicated.  4) Decision-Making    Low complexity using standardized patient assessment instrument and/or measureable assessment of functional outcome.  Cumulative Therapy Evaluation is: Low complexity.    Previous and current functional limitations:  (See Goal Flow Sheet for this information)    Short term and Long term goals: (See Goal Flow Sheet for this information)     Communication ability:  Patient appears to be able to clearly communicate and understand verbal and written communication and follow directions correctly.  Treatment Explanation - The following has been discussed with the patient:   RX ordered/plan of care  Anticipated outcomes  Possible risks and side effects  This patient would benefit from PT intervention to resume normal activities.   Rehab potential is good.    Frequency:  1 X week, once daily  Duration:  for 8 weeks  Discharge Plan:  Achieve all LTG.  Independent in home treatment program.  Reach maximal therapeutic benefit.      Thank you for your referral.    INQUIRIES  Therapist: Amanda Hilligoss DPRAFFAELE  [facility]  [address] [place] [address] [address]  [facility] [address]  Phone: [phone]  Fax: [phone]

## 2018-05-30 NOTE — MR AVS SNAPSHOT
After Visit Summary   5/30/2018    Milena Hamilton    MRN: [id number]           Patient Information     Date Of Birth          1940        Visit Information        Provider Department      5/30/2018 12:50 PM Hilligoss, Amanda K, PT [facility]        Today's Diagnoses     Chronic right-sided low back pain with right-sided sciatica    -  1    H/O lumbosacral spine surgery           Follow-ups after your visit        Your next 10 appointments already scheduled     Jun 06, 2018 12:50 PM CDT   CHEL Spine with Amanda K Hilligoss, PT   [facility] ([facility]  )    [address]  [facility] [id number] [phone]            Jun 13, 2018  2:10 PM CDT   CHEL Spine with Amanda K Hilligoss, PT   [facility] ([facility]  )    [address]  [facility] [id number] [phone]            Jun 19, 2018  1:30 PM CDT   CHEL Spine with Raine Roldan, PT   [facility] ([facility]  )    [address]  [facility] [id number] [phone]            Jun 27, 2018  1:30 PM CDT   CHEL Spine with Amanda K Hilligoss, PT   [facility] ([facility]  )    [address]  [facility] [id number] [phone]            Jul 05, 2018  1:50 PM CDT   CHEL Spine with Amanda K Hilligoss, PT   [facility]  [facility] ([facility]  )    [address]  [facility] [id number] [phone]              Who to contact     If you have questions or need follow up information about today's clinic visit or your schedule please contact [place] [facility] directly at [phone].  Normal or non-critical lab and imaging results will be  "communicated to you by MyChart, letter or phone within 4 business days after the clinic has received the results. If you do not hear from us within 7 days, please contact the clinic through [employer] or phone. If you have a critical or abnormal lab result, we will notify you by phone as soon as possible.  Submit refill requests through [employer] or call your pharmacy and they will forward the refill request to us. Please allow 3 business days for your refill to be completed.          Additional Information About Your Visit        [employer] Information     [employer] lets you send messages to your doctor, view your test results, renew your prescriptions, schedule appointments and more. To sign up, go to [email or web address] . Click on \"Log in\" on the left side of the screen, which will take you to the Welcome page. Then click on \"Sign up Now\" on the right side of the page.     You will be asked to enter the access code listed below, as well as some personal information. Please follow the directions to create your username and password.     Your access code is: [email or web address]  Expires: 2018  4:32 PM     Your access code will  in 90 days. If you need help or a new code, please call your [place] clinic or [phone].        Care EveryWhere ID     This is your Care EveryWhere ID. This could be used by other organizations to access your [place] medical records  [id number]         Blood Pressure from Last 3 Encounters:   18 150/70   18 128/70   18 150/70    Weight from Last 3 Encounters:   18 60.1 kg (132 lb 6.4 oz)   18 62.2 kg (137 lb 2 oz)   17 62.1 kg (137 lb)              We Performed the Following     HC PT EVAL, LOW COMPLEXITY     CHEL INITIAL EVAL REPORT     THERAPEUTIC EXERCISES        Primary Care Provider Office Phone # Fax #    Chuck Streeter PA-C [phone]       [address] [facility] [address]  [facility] [address]        Equal Access to Services     [facility] " GAAR : Hadii aad ku sudhir Rivera, waaxda luqadaha, qaybta kaalmada adecelestino, michelle rylee sabinejoanne march nainfrancesca jones . So [facility] [phone].    ATENCIÓN: Si habla español, tiene a miller disposición servicios gratuitos de asistencia lingüística. Llame al [phone].    We comply with applicable federal civil rights laws and [place] laws. We do not discriminate on the basis of race, color, national origin, age, disability, sex, sexual orientation, or gender identity.            Thank you!     Thank you for choosing [place] [facility]  for your care. Our goal is always to provide you with excellent care. Hearing back from our patients is one way we can continue to improve our services. Please take a few minutes to complete the written survey that you may receive in the mail after your visit with us. Thank you!             Your Updated Medication List - Protect others around you: Learn how to safely use, store and throw away your medicines at www.disposemymeds.org.          This list is accurate as of 5/30/18  8:08 PM.  Always use your most recent med list.                   Brand Name Dispense Instructions for use Diagnosis    ALPRAZolam 0.25 MG tablet    XANAX    15 tablet    Take 1 tablet (0.25 mg) by mouth nightly as needed for anxiety    Anxiety       aspirin 81 MG tablet      1 TABLET DAILY        BIOTENE DRY MOUTH Pste      Toothpaste and mouthwash        levothyroxine 50 MCG tablet    SYNTHROID/LEVOTHROID    90 tablet    TAKE 1 TABLET (50 MCG) BY MOUTH DAILY    Acquired hypothyroidism       metoprolol tartrate 25 MG tablet    LOPRESSOR    45 tablet    TAKE 1/2 TABLET (12.5 MG) BY MOUTH DAILY    HTN, goal below 140/90       omeprazole 20 MG CR capsule    priLOSEC    90 capsule    TAKE ONE CAPSULE BY MOUTH ONCE DAILY    Gastroesophageal reflux disease, esophagitis presence not specified       polyethylene glycol powder    MIRALAX     1 capful mixed in 8 oz of  "fluid up to every 15 minutes as needed to \"clean out\".  Titrate to effect.    Drug-induced constipation       prochlorperazine 5 MG tablet    COMPAZINE    5 tablet    Take 1 tablet (5 mg) by mouth every 6 hours as needed for nausea or vomiting    Gastroenteritis       simvastatin 20 MG tablet    ZOCOR    45 tablet    TAKE 0.5 TABLETS (10 MG) BY MOUTH AT BEDTIME (DUE FOR FASTING LABS)    Hyperlipidemia LDL goal <130       sodium phosphate 7-19 GM/118ML rectal enema      Place 1 Bottle (1 enema) rectally once as needed    Drug-induced constipation       THERAPEUTIC MULTIVITAMIN PO      1 TABLET DAILY          "

## 2018-05-30 NOTE — PROGRESS NOTES
[place] [facility] Initial Evaluation  Subjective:  Patient is a 78 year old female presenting with rehab back hpi. The history is provided by the patient. No  was used.   Milena Hamilton is a 78 year old female with a lumbar condition.  Condition occurred with:  Other reason.  Condition occurred: for unknown reasons.  This is a chronic condition  Pt has had low back pain for at least 6 years; Had exacerbation of sx February 2016. Trialed PT, Chiropractic, and cortisone injections w/o improvement. 3/9/18: S/P R L5 hemilaminectomy, medial facetectomy, and R L5-S1 and S1 foraminotomy; R L5-S1 foraminal mircrodiscectomy.    Patient reports pain:  SI joint right.  Radiates to:  Gluteals right, thigh right, knee right, lower leg right and foot right.  Pain is described as burning and is intermittent and reported as 5/10 (5/10 current/ worst).  Associated symptoms:  Loss of motion/stiffness, loss of strength, tingling and numbness. Pain is worse in the A.M..  Symptoms are exacerbated by bending (first walking after rest) and relieved by analgesics and rest.  Since onset symptoms are gradually improving (since surgery).  Special tests:  MRI.  Previous treatment includes chiropractic and physical therapy.  There was no improvement following previous treatment.  General health as reported by patient is fair.  Pertinent medical history includes:  High blood pressure and thyroid problems.  Medical allergies: yes.  Surgical history: Other surgeries include:  Other (gallbladder removal 2010; L sided colon removal); Low back surgery in 1960.  Current medications:  Thyroid medication, high blood pressure medication and pain medication (Tylenol).  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        Barriers include:  None as reported by the patient.    Red flags:  None as reported by the  patient.                        Objective:  Standing Alignment:        Lumbar:  Convex scoliosis R                           Lumbar/SI Evaluation  ROM:    AROM Lumbar:   Flexion:            Mid lower leg ++pull low back  Ext:                    Mod limitation +pain   Side Bend:        Left:  To mid thigh ++pull R    Right:  To knee  Rotation:           Left:  Mod limitation + pain R    Right:  Mod limitation  Side Glide:        Left:     Right:         Strength: Lower Abdominal MMT 2/5  Lumbar Myotomes:          L5 (Great Toe Ext): Right: 2   S1 (Toe Raise):  Left: 2    Right: 2                                                    Hip Evaluation    Hip Strength:    Flexion:   Left: 4+/5   Pain:  Right: 3+/5   Pain:                      Abduction:  Left: 4/5     Pain:Right: 3/5    Pain:                                 General     ROS    Assessment/Plan:    Patient is a 78 year old female with lumbar complaints.    Patient has the following significant findings with corresponding treatment plan.                Diagnosis 1:  LBP w/ lumbar radiculopathy S/P surgery  Pain -  hot/cold therapy, manual therapy, self management, education and home program  Decreased ROM/flexibility - manual therapy, therapeutic exercise, therapeutic activity and home program  Decreased strength - therapeutic exercise, therapeutic activities and home program  Impaired muscle performance - neuro re-education and home program  Decreased function - therapeutic activities and home program  Impaired posture - neuro re-education, therapeutic activities and home program    Therapy Evaluation Codes:   1) History comprised of:   Personal factors that impact the plan of care:      Age and Time since onset of symptoms.    Comorbidity factors that impact the plan of care are:      High blood pressure.     Medications impacting care: High blood pressure.  2) Examination of Body Systems comprised of:   Body structures and functions that impact the plan of  care:      Lumbar spine.   Activity limitations that impact the plan of care are:      Bending, Cooking, Driving, Lifting, Sitting, Standing, Walking and Working.  3) Clinical presentation characteristics are:   Stable/Uncomplicated.  4) Decision-Making    Low complexity using standardized patient assessment instrument and/or measureable assessment of functional outcome.  Cumulative Therapy Evaluation is: Low complexity.    Previous and current functional limitations:  (See Goal Flow Sheet for this information)    Short term and Long term goals: (See Goal Flow Sheet for this information)     Communication ability:  Patient appears to be able to clearly communicate and understand verbal and written communication and follow directions correctly.  Treatment Explanation - The following has been discussed with the patient:   RX ordered/plan of care  Anticipated outcomes  Possible risks and side effects  This patient would benefit from PT intervention to resume normal activities.   Rehab potential is good.    Frequency:  1 X week, once daily  Duration:  for 8 weeks  Discharge Plan:  Achieve all LTG.  Independent in home treatment program.  Reach maximal therapeutic benefit.    Please refer to the daily flowsheet for treatment today, total treatment time and time spent performing 1:1 timed codes.

## 2018-06-05 DIAGNOSIS — F41.9 ANXIETY: ICD-10-CM

## 2018-06-05 NOTE — TELEPHONE ENCOUNTER
Requested Prescriptions   Pending Prescriptions Disp Refills     ALPRAZolam (XANAX) 0.25 MG tablet 15 tablet 0     Sig: Take 1 tablet (0.25 mg) by mouth nightly as needed for anxiety    There is no refill protocol information for this order      ALPRAZolam (XANAX) 0.25 MG tablet  Routing refill request to provider for review/approval because:  Drug not on the [facility] refill protocol   Aislinn Castro RN, BSN

## 2018-06-06 ENCOUNTER — THERAPY VISIT (OUTPATIENT)
Dept: PHYSICAL THERAPY | Facility: CLINIC | Age: 78
End: 2018-06-06
Payer: COMMERCIAL

## 2018-06-06 DIAGNOSIS — Z98.890 H/O LUMBOSACRAL SPINE SURGERY: ICD-10-CM

## 2018-06-06 DIAGNOSIS — G89.29 CHRONIC RIGHT-SIDED LOW BACK PAIN WITH RIGHT-SIDED SCIATICA: ICD-10-CM

## 2018-06-06 DIAGNOSIS — M54.41 CHRONIC RIGHT-SIDED LOW BACK PAIN WITH RIGHT-SIDED SCIATICA: ICD-10-CM

## 2018-06-06 PROCEDURE — 97140 MANUAL THERAPY 1/> REGIONS: CPT | Mod: GP | Performed by: PHYSICAL THERAPIST

## 2018-06-06 PROCEDURE — 97112 NEUROMUSCULAR REEDUCATION: CPT | Mod: GP | Performed by: PHYSICAL THERAPIST

## 2018-06-06 PROCEDURE — 97110 THERAPEUTIC EXERCISES: CPT | Mod: GP | Performed by: PHYSICAL THERAPIST

## 2018-06-06 RX ORDER — ALPRAZOLAM 0.25 MG
0.25 TABLET ORAL
Qty: 10 TABLET | Refills: 0 | Status: SHIPPED | OUTPATIENT
Start: 2018-06-06 | End: 2018-06-18

## 2018-06-06 NOTE — TELEPHONE ENCOUNTER
Pharmacy called back, they did not receive fax. Rx information given to [facility] pharmacy.  Sandra Cadet, CMA

## 2018-06-06 NOTE — TELEPHONE ENCOUNTER
Short refill placed in bin but needs f/u for further refills as I have not seen her in 6 months.    Chuck Streeter PA-C

## 2018-06-07 DIAGNOSIS — K21.9 GASTROESOPHAGEAL REFLUX DISEASE, ESOPHAGITIS PRESENCE NOT SPECIFIED: ICD-10-CM

## 2018-06-07 NOTE — TELEPHONE ENCOUNTER
"Requested Prescriptions   Pending Prescriptions Disp Refills     omeprazole (PRILOSEC) 20 MG CR capsule [Pharmacy Med Name: OMEPRAZOLE 20 MG CAPSULE DR] 90 capsule      Sig: TAKE ONE CAPSULE BY MOUTH ONCE DAILY    PPI Protocol Passed    6/7/2018  8:00 AM       Passed - Not on Clopidogrel (unless Pantoprazole ordered)       Passed - No diagnosis of osteoporosis on record       Passed - Recent (12 mo) or future (30 days) visit within the authorizing provider's specialty    Patient had office visit in the last 12 months or has a visit in the next 30 days with authorizing provider or within the authorizing provider's specialty.  See \"Patient Info\" tab in inbasket, or \"Choose Columns\" in Meds & Orders section of the refill encounter.           Passed - Patient is age 18 or older       Passed - No active pregnacy on record       Passed - No positive pregnancy test in past 12 months        Prescription approved per [facility] Refill Protocol.    May Kenyon RN    "

## 2018-06-13 ENCOUNTER — THERAPY VISIT (OUTPATIENT)
Dept: PHYSICAL THERAPY | Facility: CLINIC | Age: 78
End: 2018-06-13
Payer: COMMERCIAL

## 2018-06-13 DIAGNOSIS — M54.41 CHRONIC RIGHT-SIDED LOW BACK PAIN WITH RIGHT-SIDED SCIATICA: ICD-10-CM

## 2018-06-13 DIAGNOSIS — G89.29 CHRONIC RIGHT-SIDED LOW BACK PAIN WITH RIGHT-SIDED SCIATICA: ICD-10-CM

## 2018-06-13 DIAGNOSIS — Z98.890 H/O LUMBOSACRAL SPINE SURGERY: ICD-10-CM

## 2018-06-13 PROCEDURE — 97110 THERAPEUTIC EXERCISES: CPT | Mod: GP | Performed by: PHYSICAL THERAPIST

## 2018-06-13 PROCEDURE — 97112 NEUROMUSCULAR REEDUCATION: CPT | Mod: GP | Performed by: PHYSICAL THERAPIST

## 2018-06-13 NOTE — PROGRESS NOTES
"  SUBJECTIVE:   Milena Hamilton is a 78 year old female who presents to clinic today for the following health issues:      History of Present Illness     Depression & Anxiety Follow-up:     Depression/Anxiety:  Anxiety only    Status since last visit::  Worsened    Other associated symptoms of anxiety::  YES (\"sometimes feels like i need to jump out of my skin when i'm laying in bed at night\")    Significant life event::  YES    Current substance use::  None    Depression symptoms::  None       Today's PHQ-9         PHQ-9 Total Score:         PHQ-9 Q9 Suicidal ideation:       Thoughts of suicide or self harm:      Self-harm Plan:        Self-harm Action:          Safety concerns for self or others:       MARINO-7 Total Score: (P) 6     She has been taking Xanax slightly more often over the past few months, usually 3 days weekly to help with anxiety associated insomnia at night as this will help her mind calm down so she can sleep. She states she has been under more stress recently due to her son getting  and helping out with her grand-daughter's graduation. She denies any depression symptoms or thoughts of self harm.      Answers for HPI/ROS submitted by the patient on 6/18/2018   MARINO 7 TOTAL SCORE: 6    Concern - Foot Pain  Onset: 1 month    Description:   Right foot/ankle    Intensity: 4/10    Progression of Symptoms:  worsening    Accompanying Signs & Symptoms:  Swelling  Pain  \"just starting to get feeling back in my toes after my back surgery\"    Previous history of similar problem:   no    Precipitating factors:   Worsened by: swelling is worse with walking around a lot     Alleviating factors:  Improved by: elevation    Therapies Tried and outcome: NA    She has had swelling at the end of the day in her right foot since her back surgery 3 months ago. There is very minimal, if any, pain but it is more of a pressure in the ankle. She denies any redness or warmth. No recent fall or injury to the area. "     Patient also wants bump on nose to be looked at. She states it has been there quite a while. She states she was seen by a specialist a few years ago who said it was fine but wants to be sure it does not need to be removed.    Problem list and histories reviewed & adjusted, as indicated.  Additional history: none    ROS:  GENERAL: Denies fever, fatigue, weakness, weight gain, or weight loss.  CARDIOVASCULAR: Denies chest pain, shortness of breath, irregular heartbeats,  palpitations, or edema.  RESPIRATORY: Denies cough, hemoptysis, and shortness of breath.  MUSCULOSKELETAL: +Intermittent right foot swelling and tightness.   NEUROLOGIC: Denies headache, fainting, dizziness, memory loss, numbness, tingling, or seizures.  PSYCHIATRIC: +Anxiety, difficulty sleeping. Denies anxiety, mood swings, and thoughts of suicide.    OBJECTIVE:     /70 (BP Location: Right arm, Patient Position: Chair, Cuff Size: Adult Regular)  Pulse 65  Temp 97.3  F (36.3  C) (Temporal)  Resp 16  Wt 133 lb (60.3 kg)  SpO2 99%  BMI 25.11 kg/m2  Body mass index is 25.11 kg/(m^2).  GENERAL: healthy, alert and no distress  RESP: lungs clear to auscultation - no rales, rhonchi or wheezes  CV: regular rate and rhythm, normal S1 S2, no S3 or S4, no murmur, click or rub, no peripheral edema and peripheral pulses strong  MS: no gross musculoskeletal defects noted, no edema. No foot tenderness with full ankle range of motion.   NEURO: Normal strength and tone, mentation intact and speech normal. Cranial nerves II-XII are grossly intact. DTRs are 2+/4 throughout and symmetric. Gait is stable.   PSYCH: mentation appears normal, affect normal/bright  SKIN: 0.5 x 0.3 cm skin colored papule with small telangiectasias over the right nasal ala.     ASSESSMENT/PLAN:       ICD-10-CM    1. Anxiety F41.9 ALPRAZolam (XANAX) 0.25 MG tablet   2. Skin lesion L98.9 DERMATOLOGY REFERRAL   3. Foot swelling M79.89    4. Hyperlipidemia LDL goal <130 E78.5  simvastatin (ZOCOR) 20 MG tablet   5. Gastroesophageal reflux disease, esophagitis presence not specified K21.9 ranitidine (ZANTAC) 150 MG tablet         1. Her anxiety seems to only occur at night before bed and Xanax has been really helpful so she would like to continue this. She does not want to be on a daily medication as she does not have symptoms every single day. I discussed the potential addictive nature of this medication but I am comfortable with the low dose she is on as she does not need it every day. I will prescribe 30/month with 2 refills and recommend f/u in 3 months for recheck. I have completed a new CSA.    2. Right nasal lesion has grown since initial assessment by Dr. Menezes of General surgery in 2015 and is suspicious for BCC. Will send to [facility] for further evaluation and biopsy/excision.    3. No obvious pain or swelling on exam. Only occurs at the end of the day so I recommend elevation and compression with an ACE bandage at the end of the day and she should consider OTC orthotics. If not improving, she will let me know.    4. Will refill simvastatin.    5. I recommend she try Zantac due to potential long-term side effects of omeprazole.     Follow up in 3 months.     Chuck Streeter PA-C  [facility]

## 2018-06-18 ENCOUNTER — OFFICE VISIT (OUTPATIENT)
Dept: FAMILY MEDICINE | Facility: OTHER | Age: 78
End: 2018-06-18
Payer: COMMERCIAL

## 2018-06-18 VITALS
BODY MASS INDEX: 25.11 KG/M2 | WEIGHT: 133 LBS | SYSTOLIC BLOOD PRESSURE: 134 MMHG | OXYGEN SATURATION: 99 % | RESPIRATION RATE: 16 BRPM | TEMPERATURE: 97.3 F | DIASTOLIC BLOOD PRESSURE: 70 MMHG | HEART RATE: 65 BPM

## 2018-06-18 DIAGNOSIS — L98.9 SKIN LESION: ICD-10-CM

## 2018-06-18 DIAGNOSIS — F41.9 ANXIETY: Primary | ICD-10-CM

## 2018-06-18 DIAGNOSIS — M79.89 FOOT SWELLING: ICD-10-CM

## 2018-06-18 DIAGNOSIS — E78.5 HYPERLIPIDEMIA LDL GOAL <130: ICD-10-CM

## 2018-06-18 DIAGNOSIS — K21.9 GASTROESOPHAGEAL REFLUX DISEASE, ESOPHAGITIS PRESENCE NOT SPECIFIED: ICD-10-CM

## 2018-06-18 PROCEDURE — 99214 OFFICE O/P EST MOD 30 MIN: CPT | Performed by: PHYSICIAN ASSISTANT

## 2018-06-18 RX ORDER — SIMVASTATIN 20 MG
TABLET ORAL
Qty: 45 TABLET | Refills: 3 | Status: SHIPPED | OUTPATIENT
Start: 2018-06-18 | End: 2019-06-06

## 2018-06-18 RX ORDER — ALPRAZOLAM 0.25 MG
0.25 TABLET ORAL
Qty: 30 TABLET | Refills: 2 | Status: SHIPPED | OUTPATIENT
Start: 2018-06-18 | End: 2018-12-17

## 2018-06-18 ASSESSMENT — ANXIETY QUESTIONNAIRES
4. TROUBLE RELAXING: SEVERAL DAYS
3. WORRYING TOO MUCH ABOUT DIFFERENT THINGS: SEVERAL DAYS
GAD7 TOTAL SCORE: 6
2. NOT BEING ABLE TO STOP OR CONTROL WORRYING: SEVERAL DAYS
1. FEELING NERVOUS, ANXIOUS, OR ON EDGE: SEVERAL DAYS
7. FEELING AFRAID AS IF SOMETHING AWFUL MIGHT HAPPEN: NOT AT ALL
GAD7 TOTAL SCORE: 6
GAD7 TOTAL SCORE: 6
5. BEING SO RESTLESS THAT IT IS HARD TO SIT STILL: SEVERAL DAYS
6. BECOMING EASILY ANNOYED OR IRRITABLE: SEVERAL DAYS
7. FEELING AFRAID AS IF SOMETHING AWFUL MIGHT HAPPEN: NOT AT ALL

## 2018-06-18 NOTE — PATIENT INSTRUCTIONS
Will refill Xanax to use daily as needed for sleep and anxiety.  Follow up in 3 months for a recheck.    Will send you to dermatology for further evaluation of the spot on your nose as this is a suspicious for a low grade skin cancer such as basal cell carcinoma.  They will call to set this up.    I recommend elevation and compression over the right foot as needed at the end of the day.

## 2018-06-18 NOTE — LETTER
[facility]    06/18/18    Patient: Milena Hamilton  YOB: 1940  Medical Record Number: [id number]                                                                  Controlled Substance Agreement  I understand that my care provider has prescribed controlled substances (narcotics, tranquilizers, and/or stimulants) to help manage my condition(s).  I am taking this medicine to help me function or work.  I know that this is strong medicine.  It could have serious side effects and even cause a dependency on the drug.  If I stop these medicines suddenly, I could have severe withdrawal symptoms.    The risks, benefits, and side effects of these medication(s) were explained to me.  I agree that:  1. I will take part in other treatments as advised by my provider.  This may be psychiatry or counseling, physical therapy, behavioral therapy, group treatment, or a referral to a pain clinic.  I will reduce or stop my medicine when my provider tells me to do so.   2. I will take my medicines as prescribed.  I will not change the dose or schedule unless my provider tells me to.  There will be no refills if I  run out early.   I may be contacted at any time without warning and asked to complete a drug test or pill count.   3. I will keep all my appointments at the clinic.  If I miss appointments or fail to follow instructions, my provider may stop my medicine.  4. I will not ask other providers to prescribe controlled substances. And I will not accept controlled substances from other people. If I need another prescribed controlled substance for a new reason, I will notify my provider within one business day.  5. If I enroll in the [place] Medical Marijuana program, I will tell my provider.  I will also sign an agreement to share my medical records with my provider.  6. I will use one pharmacy to fill all of my controlled substance prescriptions.  If my prescription is mailed to my pharmacy, it may take  5 to 7 days for my medicine to be ready.  7. I understand that my provider, clinic care team, and pharmacy can track controlled substance prescriptions from other providers through a central database (prescription monitoring program).  8. I will bring in my list of medications (or my medicine bottles) each time I come to the clinic.  REV- 04/2016                                                                                                                                            Page 1 of 2      [facility]    06/18/18    Patient: Milena Hamilton  YOB: 1940  Medical Record Number: [id number]    9. Refills of controlled substances will be made only during office hours.  It is up to me to make sure that I do not run out of my medicines on weekends or holidays.    10. I am responsible for my prescriptions.  If the medicine is lost or stolen, it will not be replaced.   I also agree not to share these medicines with anyone.  11. I agree to not use ANY illegal or recreational drugs.  This includes marijuana, cocaine, bath salts or other drugs.  I agree not to use alcohol unless my provider says I may.  I agree to give urine samples whenever asked.  If I fail to give a urine sample, the provider may stop my medicine.     12. I will tell my nurse or provider right away if I become pregnant or have a new medical problem treated outside of [facility].  13. I understand that this medicine can affect my thinking and judgment.  It may be unsafe for me to drive, use machinery and do dangerous tasks.  I will not do any of these things until I know how the medicine affects me.  If my dose changes, I will wait to see how it affects me.  I will contact my provider if I have concerns about medicine side effects.  I understand that if I do not follow any of the conditions above, my prescriptions or treatment may be stopped.    I agree that my provider, clinic care team, and pharmacy may work with  any city, state or federal law enforcement agency that investigates the misuse, sale, or other diversion of my controlled medicine. I will allow my provider to discuss my care with or share a copy of this agreement with any other treating provider, pharmacy or emergency room where I receive care.  I agree to give up (waive) any right of privacy or confidentiality with respect to these authorizations.   I have read this agreement and have asked questions about anything I did not understand.   ___________________________________    ___________________________  Patient Signature                                                           Date and Time  ___________________________________     ____________________________  Witness                                                                            Date and Time  ___________________________________  Chuck Streeter PA-C  REV-  04/2016                                                                                                                                                                 Page 2 of 2

## 2018-06-18 NOTE — NURSING NOTE
"Chief Complaint   Patient presents with     Anxiety     Panel Management     honoring daquan cannon        Initial /70 (BP Location: Right arm, Patient Position: Chair, Cuff Size: Adult Regular)  Pulse 65  Temp 97.3  F (36.3  C) (Temporal)  Resp 16  Wt 133 lb (60.3 kg)  SpO2 99%  BMI 25.11 kg/m2 Estimated body mass index is 25.11 kg/(m^2) as calculated from the following:    Height as of 3/2/18: 5' 1.02\" (1.55 m).    Weight as of this encounter: 133 lb (60.3 kg).  Medication Reconciliation: complete    Grace Manuel CMA      "

## 2018-06-18 NOTE — MR AVS SNAPSHOT
After Visit Summary   6/18/2018    Milena Hamilton    MRN: [id number]           Patient Information     Date Of Birth          1940        Visit Information        Provider Department      6/18/2018 2:00 PM Chuck Streeter PA-C [facility]        Today's Diagnoses     Anxiety    -  1    Skin lesion        Foot swelling        Hyperlipidemia LDL goal <130        Gastroesophageal reflux disease, esophagitis presence not specified          Care Instructions    Will refill Xanax to use daily as needed for sleep and anxiety.  Follow up in 3 months for a recheck.    Will send you to dermatology for further evaluation of the spot on your nose as this is a suspicious for a low grade skin cancer such as basal cell carcinoma.  They will call to set this up.    I recommend elevation and compression over the right foot as needed at the end of the day.            Follow-ups after your visit        Additional Services     DERMATOLOGY REFERRAL       Your provider has referred you to: [facility]    Growing right nasal lesion, concerning for BCC    Please be aware that coverage of these services is subject to the terms and limitations of your health insurance plan.  Call member services at your health plan with any benefit or coverage questions.      Please bring the following with you to your appointment:    (1) Any X-Rays, CTs or MRIs which have been performed.  Contact the facility where they were done to arrange for  prior to your scheduled appointment.    (2) List of current medications  (3) This referral request   (4) Any documents/labs given to you for this referral                  Your next 10 appointments already scheduled     Jun 19, 2018  1:30 PM CDT   [facility] with Raine Roldan, PT   [facility] [facility] ([facility]  )    [address]  [facility] [address]   [phone]            Jun 27, 2018  1:30 PM  "CDT   CHEL Spine with Amanda K Hilligoss, PT   [place] [facility] ([facility]  )    [address]  [facility] [id number] [phone]            2018  1:50 PM CDT   CHEL Spine with Amanda K Hilligoss, PT   [facility] ([facility]  )    [address]  [facility] [id number] [phone]              Who to contact     If you have questions or need follow up information about today's clinic visit or your schedule please contact [facility] directly at [phone].  Normal or non-critical lab and imaging results will be communicated to you by MyChart, letter or phone within 4 business days after the clinic has received the results. If you do not hear from us within 7 days, please contact the clinic through MyChart or phone. If you have a critical or abnormal lab result, we will notify you by phone as soon as possible.  Submit refill requests through [employer] or call your pharmacy and they will forward the refill request to us. Please allow 3 business days for your refill to be completed.          Additional Information About Your Visit        [employer] Information     [employer] lets you send messages to your doctor, view your test results, renew your prescriptions, schedule appointments and more. To sign up, go to [email or web address] . Click on \"Log in\" on the left side of the screen, which will take you to the Welcome page. Then click on \"Sign up Now\" on the right side of the page.     You will be asked to enter the access code listed below, as well as some personal information. Please follow the directions to create your username and password.     Your access code is: [email or web address]  Expires: 2018  1:48 PM     Your access code will  in 90 days. If you need help or a new code, please call your [place] clinic or [phone].        Care EveryWhere ID     This is " your Care EveryWhere ID. This could be used by other organizations to access your [place] medical records  [id number]        Your Vitals Were     Pulse Temperature Respirations Pulse Oximetry BMI (Body Mass Index)       65 97.3  F (36.3  C) (Temporal) 16 99% 25.11 kg/m2        Blood Pressure from Last 3 Encounters:   06/18/18 134/70   04/16/18 150/70   03/02/18 128/70    Weight from Last 3 Encounters:   06/18/18 133 lb (60.3 kg)   03/02/18 132 lb 6.4 oz (60.1 kg)   02/27/18 137 lb 2 oz (62.2 kg)              We Performed the Following     DERMATOLOGY REFERRAL          Today's Medication Changes          These changes are accurate as of 6/18/18  2:31 PM.  If you have any questions, ask your nurse or doctor.               Start taking these medicines.        Dose/Directions    ranitidine 150 MG tablet   Commonly known as:  ZANTAC   Used for:  Gastroesophageal reflux disease, esophagitis presence not specified   Started by:  Chuck Streeter PA-C        Dose:  150 mg   Take 1 tablet (150 mg) by mouth 2 times daily   Quantity:  60 tablet   Refills:  11         Stop taking these medicines if you haven't already. Please contact your care team if you have questions.     prochlorperazine 5 MG tablet   Commonly known as:  COMPAZINE   Stopped by:  Chuck Streeter PA-C                Where to get your medicines      These medications were sent to [address] [address]     Phone:  [phone]     ranitidine 150 MG tablet    simvastatin 20 MG tablet         Some of these will need a paper prescription and others can be bought over the counter.  Ask your nurse if you have questions.     Bring a paper prescription for each of these medications     ALPRAZolam 0.25 MG tablet                Primary Care Provider Office Phone # Fax #    Chuck Streeter PA-C [phone]       [address]  [facility] [address]         Equal Access to Services     [facility] CenterPRICILA : Hadii aad ku hadsirishadonal Nonitraci, waaxda luqadaha, qaybta kaalmameghan driscoll, michelle barr. So [facility] [phone].    ATENCIÓN: Si habla español, tiene a miller disposición servicios gratuitos de asistencia lingüística. Paramjitame al [phone].    We comply with applicable federal civil rights laws and [place] laws. We do not discriminate on the basis of race, color, national origin, age, disability, sex, sexual orientation, or gender identity.            Thank you!     Thank you for choosing [facility]  for your care. Our goal is always to provide you with excellent care. Hearing back from our patients is one way we can continue to improve our services. Please take a few minutes to complete the written survey that you may receive in the mail after your visit with us. Thank you!             Your Updated Medication List - Protect others around you: Learn how to safely use, store and throw away your medicines at www.disposemymeds.org.          This list is accurate as of 6/18/18  2:31 PM.  Always use your most recent med list.                   Brand Name Dispense Instructions for use Diagnosis    ALPRAZolam 0.25 MG tablet    XANAX    30 tablet    Take 1 tablet (0.25 mg) by mouth nightly as needed for anxiety    Anxiety       aspirin 81 MG tablet      1 TABLET DAILY        BIOTENE DRY MOUTH Pste      Toothpaste and mouthwash        levothyroxine 50 MCG tablet    SYNTHROID/LEVOTHROID    90 tablet    TAKE 1 TABLET (50 MCG) BY MOUTH DAILY    Acquired hypothyroidism       metoprolol tartrate 25 MG tablet    LOPRESSOR    45 tablet    TAKE 1/2 TABLET (12.5 MG) BY MOUTH DAILY    HTN, goal below 140/90       omeprazole 20 MG CR capsule    priLOSEC    90 capsule    TAKE ONE CAPSULE BY MOUTH ONCE DAILY    Gastroesophageal reflux disease, esophagitis presence not specified       polyethylene glycol powder    MIRALAX     1 capful mixed in 8 oz  "of fluid up to every 15 minutes as needed to \"clean out\".  Titrate to effect.    Drug-induced constipation       ranitidine 150 MG tablet    ZANTAC    60 tablet    Take 1 tablet (150 mg) by mouth 2 times daily    Gastroesophageal reflux disease, esophagitis presence not specified       simvastatin 20 MG tablet    ZOCOR    45 tablet    TAKE 0.5 TABLETS (10 MG) BY MOUTH AT BEDTIME (DUE FOR FASTING LABS)    Hyperlipidemia LDL goal <130       sodium phosphate 7-19 GM/118ML rectal enema      Place 1 Bottle (1 enema) rectally once as needed    Drug-induced constipation       THERAPEUTIC MULTIVITAMIN PO      1 TABLET DAILY          "

## 2018-06-19 ASSESSMENT — ANXIETY QUESTIONNAIRES: GAD7 TOTAL SCORE: 6

## 2018-06-21 ENCOUNTER — THERAPY VISIT (OUTPATIENT)
Dept: PHYSICAL THERAPY | Facility: CLINIC | Age: 78
End: 2018-06-21
Payer: COMMERCIAL

## 2018-06-21 DIAGNOSIS — Z98.890 H/O LUMBOSACRAL SPINE SURGERY: ICD-10-CM

## 2018-06-21 DIAGNOSIS — M54.41 CHRONIC RIGHT-SIDED LOW BACK PAIN WITH RIGHT-SIDED SCIATICA: ICD-10-CM

## 2018-06-21 DIAGNOSIS — G89.29 CHRONIC RIGHT-SIDED LOW BACK PAIN WITH RIGHT-SIDED SCIATICA: ICD-10-CM

## 2018-06-21 PROCEDURE — 97110 THERAPEUTIC EXERCISES: CPT | Mod: GP | Performed by: PHYSICAL THERAPIST

## 2018-06-21 PROCEDURE — 97112 NEUROMUSCULAR REEDUCATION: CPT | Mod: GP | Performed by: PHYSICAL THERAPIST

## 2018-06-22 ENCOUNTER — TELEPHONE (OUTPATIENT)
Dept: FAMILY MEDICINE | Facility: OTHER | Age: 78
End: 2018-06-22

## 2018-06-22 NOTE — TELEPHONE ENCOUNTER
Patient called to check status on dermatology referral. Please fax to [facility] [place].     Referral faxed

## 2018-06-22 NOTE — TELEPHONE ENCOUNTER
Referral was faxed to [facility] in [place] and detailed message left with patient explaining this and that they should be calling her.

## 2018-06-27 ENCOUNTER — THERAPY VISIT (OUTPATIENT)
Dept: PHYSICAL THERAPY | Facility: CLINIC | Age: 78
End: 2018-06-27
Payer: COMMERCIAL

## 2018-06-27 DIAGNOSIS — M54.41 CHRONIC RIGHT-SIDED LOW BACK PAIN WITH RIGHT-SIDED SCIATICA: ICD-10-CM

## 2018-06-27 DIAGNOSIS — Z98.890 H/O LUMBOSACRAL SPINE SURGERY: ICD-10-CM

## 2018-06-27 DIAGNOSIS — G89.29 CHRONIC RIGHT-SIDED LOW BACK PAIN WITH RIGHT-SIDED SCIATICA: ICD-10-CM

## 2018-06-27 PROCEDURE — 97112 NEUROMUSCULAR REEDUCATION: CPT | Mod: GP | Performed by: PHYSICAL THERAPIST

## 2018-06-27 PROCEDURE — 97110 THERAPEUTIC EXERCISES: CPT | Mod: GP | Performed by: PHYSICAL THERAPIST

## 2018-07-05 ENCOUNTER — THERAPY VISIT (OUTPATIENT)
Dept: PHYSICAL THERAPY | Facility: CLINIC | Age: 78
End: 2018-07-05
Payer: COMMERCIAL

## 2018-07-05 DIAGNOSIS — G89.29 CHRONIC RIGHT-SIDED LOW BACK PAIN WITH RIGHT-SIDED SCIATICA: ICD-10-CM

## 2018-07-05 DIAGNOSIS — Z98.890 H/O LUMBOSACRAL SPINE SURGERY: ICD-10-CM

## 2018-07-05 DIAGNOSIS — M54.41 CHRONIC RIGHT-SIDED LOW BACK PAIN WITH RIGHT-SIDED SCIATICA: ICD-10-CM

## 2018-07-05 PROCEDURE — 97110 THERAPEUTIC EXERCISES: CPT | Mod: GP | Performed by: PHYSICAL THERAPIST

## 2018-07-05 PROCEDURE — 97112 NEUROMUSCULAR REEDUCATION: CPT | Mod: GP | Performed by: PHYSICAL THERAPIST

## 2018-07-05 NOTE — PROGRESS NOTES
Subjective:  HPI  Oswestry Score: 20 %                 Objective:  System    Physical Exam    General     ROS    Assessment/Plan:    DISCHARGE REPORT    Progress reporting period is from 5/30/18 to 7/5/18.       SUBJECTIVE  Pt states she had less pain over pat week. Does still have some pain w/ transfers, and prolonged positions. Has been walking 15-20 minuets/day. Feels she has much less pain than prior to surgery. Is ready to continue progressing independently for now.    Current Pain level: 3/10.     Initial Pain level: 5/10.   Changes in function:  Yes (See Goal flowsheet attached for changes in current functional level)  Adverse reaction to treatment or activity: None    OBJECTIVE  Lumbar AROM Flex to mid lower leg, Ext Mod limitation, SB L to knee, R to knee; Hamstring length: lacking 20 deg B; Hip strength Flex 5-/5 B, Abd 4/5 B, SLS <3 sec B     ASSESSMENT/PLAN  Updated problem list and treatment plan: Diagnosis 1:  LBP w/ lumbar radiculopathy S/P surgery  Pain -  self management and home program  Decreased ROM/flexibility - home program  Decreased strength - home program  Impaired muscle performance - home program  Decreased function - home program  Impaired posture - home program  STG/LTGs have been met or progress has been made towards goals:  Yes (See Goal flow sheet completed today.)  Assessment of Progress: The patient's condition is improving.  Patient is meeting short term goals and is progressing towards long term goals.  Self Management Plans:  Patient is independent in a home treatment program.  Patient is independent in self management of symptoms.  I have re-evaluated this patient and find that the nature, scope, duration and intensity of the therapy is appropriate for the medical condition of the patient.  Milena continues to require the following intervention to meet STG and LTG's:  PT intervention is no longer required to meet STG/LTG.    Recommendations:  This patient is ready to be  discharged from therapy and continue their home treatment program.    Please refer to the daily flowsheet for treatment today, total treatment time and time spent performing 1:1 timed codes.

## 2018-07-05 NOTE — LETTER
[facility]  [address]  [facility] [id number] [phone]    2018    Re: Milena Hamilton   :   1940  MRN:  [id number]   REFERRING PHYSICIAN:   Gutierrez Broderick    [facility]  Date of Initial Evaluation:  18  Visits:  Rxs Used: 6  Reason for Referral:     Chronic right-sided low back pain with right-sided sciatica  H/O lumbosacral spine surgery    Oswestry Score: 20 %                 DISCHARGE REPORT  Progress reporting period is from 18 to 18.       SUBJECTIVE  Pt states she had less pain over pat week. Does still have some pain w/ transfers, and prolonged positions. Has been walking 15-20 minuets/day. Feels she has much less pain than prior to surgery. Is ready to continue progressing independently for now.    Current Pain level: 3/10.     Initial Pain level: 5/10.   Changes in function:  Yes (See Goal flowsheet attached for changes in current functional level)  Adverse reaction to treatment or activity: None    OBJECTIVE  Lumbar AROM Flex to mid lower leg, Ext Mod limitation, SB L to knee, R to knee; Hamstring length: lacking 20 deg B; Hip strength Flex 5-/5 B, Abd 4/5 B, SLS <3 sec B     ASSESSMENT/PLAN  Updated problem list and treatment plan: Diagnosis 1:  LBP w/ lumbar radiculopathy S/P surgery  Pain -  self management and home program  Decreased ROM/flexibility - home program  Decreased strength - home program  Impaired muscle performance - home program  Decreased function - home program  Impaired posture - home program  STG/LTGs have been met or progress has been made towards goals:  Yes (See Goal flow sheet completed today.)  Assessment of Progress: The patient's condition is improving.  Patient is meeting short term goals and is progressing towards long term goals.  Self Management Plans:  Patient is independent in a home treatment program.  Patient is  independent in self management of symptoms.  I have re-evaluated this patient and find that the nature, scope, duration and intensity of the therapy is appropriate for the medical condition of the patient.  Milena continues to require the following intervention to meet STG and LTG's:    PT intervention is no longer required to meet STG/LTG.    Recommendations:  This patient is ready to be discharged from therapy and continue their home treatment program.        Thank you for your referral.    INQUIRIES  Therapist: Amanda Hilligoss DPT  [facility]  [address] [place], [place] [address]  [facility] [address]  Phone: [phone]  Fax: [phone]

## 2018-07-05 NOTE — MR AVS SNAPSHOT
"              After Visit Summary   2018    Milena Hamilton    MRN: [id number]           Patient Information     Date Of Birth          1940        Visit Information        Provider Department      2018 1:50 PM Hilligoss, Amanda K, PT [facility]        Today's Diagnoses     Chronic right-sided low back pain with right-sided sciatica        H/O lumbosacral spine surgery           Follow-ups after your visit        Who to contact     If you have questions or need follow up information about today's clinic visit or your schedule please contact [facility] [employer] [facility] directly at [phone].  Normal or non-critical lab and imaging results will be communicated to you by ModoPaymentshart, letter or phone within 4 business days after the clinic has received the results. If you do not hear from us within 7 days, please contact the clinic through ModoPaymentshart or phone. If you have a critical or abnormal lab result, we will notify you by phone as soon as possible.  Submit refill requests through  or call your pharmacy and they will forward the refill request to us. Please allow 3 business days for your refill to be completed.          Additional Information About Your Visit        MyChart Information      lets you send messages to your doctor, view your test results, renew your prescriptions, schedule appointments and more. To sign up, go to [email or web address] . Click on \"Log in\" on the left side of the screen, which will take you to the Welcome page. Then click on \"Sign up Now\" on the right side of the page.     You will be asked to enter the access code listed below, as well as some personal information. Please follow the directions to create your username and password.     Your access code is: [email or web address]  Expires: 2018  1:48 PM     Your access code will  in 90 days. If you need help or a new code, please " call your [facility] clinic or [phone].        Care EveryWhere ID     This is your Care EveryWhere ID. This could be used by other organizations to access your [facility] medical records  [id number]         Blood Pressure from Last 3 Encounters:   06/18/18 134/70   04/16/18 150/70   03/02/18 128/70    Weight from Last 3 Encounters:   06/18/18 60.3 kg (133 lb)   03/02/18 60.1 kg (132 lb 6.4 oz)   02/27/18 62.2 kg (137 lb 2 oz)              We Performed the Following     CHEL PROGRESS NOTES REPORT     NEUROMUSCULAR RE-EDUCATION     THERAPEUTIC EXERCISES        Primary Care Provider Office Phone # Fax #    Chuck Streeter PA-C [phone]       [address] [place] [address]        Equal Access to Services     [facility] LARISSA : Hadii aad ku hadasho Sotraci, waaxda luqadaha, qaybta kaalmada aderichardyameghan, michelle jones . So [facility] [phone].    ATENCIÓN: Si habla español, tiene a miller disposición servicios gratuitos de asistencia lingüística. Mignon al [phone].    We comply with applicable federal civil rights laws and [place] laws. We do not discriminate on the basis of race, color, national origin, age, disability, sex, sexual orientation, or gender identity.            Thank you!     Thank you for choosing [facility]  for your care. Our goal is always to provide you with excellent care. Hearing back from our patients is one way we can continue to improve our services. Please take a few minutes to complete the written survey that you may receive in the mail after your visit with us. Thank you!             Your Updated Medication List - Protect others around you: Learn how to safely use, store and throw away your medicines at www.disposemymeds.org.          This list is accurate as of 7/5/18  4:28 PM.  Always use your most recent med list.                   Brand Name Dispense Instructions for use Diagnosis     "ALPRAZolam 0.25 MG tablet    XANAX    30 tablet    Take 1 tablet (0.25 mg) by mouth nightly as needed for anxiety    Anxiety       aspirin 81 MG tablet      1 TABLET DAILY        BIOTENE DRY MOUTH Pste      Toothpaste and mouthwash        levothyroxine 50 MCG tablet    SYNTHROID/LEVOTHROID    90 tablet    TAKE 1 TABLET (50 MCG) BY MOUTH DAILY    Acquired hypothyroidism       metoprolol tartrate 25 MG tablet    LOPRESSOR    45 tablet    TAKE 1/2 TABLET (12.5 MG) BY MOUTH DAILY    HTN, goal below 140/90       polyethylene glycol powder    MIRALAX     1 capful mixed in 8 oz of fluid up to every 15 minutes as needed to \"clean out\".  Titrate to effect.    Drug-induced constipation       ranitidine 150 MG tablet    ZANTAC    60 tablet    Take 1 tablet (150 mg) by mouth 2 times daily    Gastroesophageal reflux disease, esophagitis presence not specified       simvastatin 20 MG tablet    ZOCOR    45 tablet    TAKE 0.5 TABLETS (10 MG) BY MOUTH AT BEDTIME (DUE FOR FASTING LABS)    Hyperlipidemia LDL goal <130       sodium phosphate 7-19 GM/118ML rectal enema      Place 1 Bottle (1 enema) rectally once as needed    Drug-induced constipation       THERAPEUTIC MULTIVITAMIN PO      1 TABLET DAILY          "

## 2018-07-12 ENCOUNTER — TRANSFERRED RECORDS (OUTPATIENT)
Dept: HEALTH INFORMATION MANAGEMENT | Facility: CLINIC | Age: 78
End: 2018-07-12

## 2018-07-23 ENCOUNTER — TELEPHONE (OUTPATIENT)
Dept: FAMILY MEDICINE | Facility: OTHER | Age: 78
End: 2018-07-23

## 2018-07-23 DIAGNOSIS — K21.9 GASTROESOPHAGEAL REFLUX DISEASE, ESOPHAGITIS PRESENCE NOT SPECIFIED: Primary | ICD-10-CM

## 2018-07-23 NOTE — TELEPHONE ENCOUNTER
"Patient switched to Zantac on 06/18 - Omeprazole discontinued by JM on 06/18.    Per OV: \"I recommend she try Zantac due to potential long-term side effects of omeprazole. \"    Will route to JM to review/advise.  Grace Manuel CMA    "

## 2018-07-23 NOTE — TELEPHONE ENCOUNTER
Reason for Call:  Medication or medication refill:    Do you use a [facility]?  Name of the pharmacy and phone number for the current request:  [facility] - [phone]    Name of the medication requested: omeprazole 20 mg  Other request: would like to change back to omeprazole. The current medication is not working for her. Please call her when done or with any questions.    Can we leave a detailed message on this number? YES    Phone number patient can be reached at: Home number on file [phone] (home)    Best Time: any    Call taken on 7/23/2018 at 3:23 PM by Katerin Chávez

## 2018-10-01 ENCOUNTER — ALLIED HEALTH/NURSE VISIT (OUTPATIENT)
Dept: FAMILY MEDICINE | Facility: OTHER | Age: 78
End: 2018-10-01
Payer: COMMERCIAL

## 2018-10-01 ENCOUNTER — TELEPHONE (OUTPATIENT)
Dept: FAMILY MEDICINE | Facility: OTHER | Age: 78
End: 2018-10-01

## 2018-10-01 DIAGNOSIS — Z23 NEED FOR PROPHYLACTIC VACCINATION AND INOCULATION AGAINST INFLUENZA: Primary | ICD-10-CM

## 2018-10-01 PROCEDURE — 99207 ZZC NO CHARGE NURSE ONLY: CPT

## 2018-10-01 PROCEDURE — 90662 IIV NO PRSV INCREASED AG IM: CPT

## 2018-10-01 PROCEDURE — G0008 ADMIN INFLUENZA VIRUS VAC: HCPCS

## 2018-10-01 NOTE — PROGRESS NOTES

## 2018-10-01 NOTE — TELEPHONE ENCOUNTER
Called patient to clarify message.  Patient states that when she picked up her simvastatin there was a note stating she should take CO Q10.  She wasn't sure why it was recommended and wanted to get Amrik Mckeon option on it.  Patient states that she is also due to see him in November and said she would speak to him about it at that time.      Will forward for Provider to review.    Raine Polo, BRIAN  October 1, 2018

## 2018-10-01 NOTE — MR AVS SNAPSHOT
"              After Visit Summary   10/1/2018    Milena Hamilton    MRN: [id number]           Patient Information     Date Of Birth          1940        Visit Information        Provider Department      10/1/2018 9:00 AM NL FLU SHOT [facility]        Today's Diagnoses     Need for prophylactic vaccination and inoculation against influenza    -  1       Follow-ups after your visit        Who to contact     If you have questions or need follow up information about today's clinic visit or your schedule please contact [facility] directly at [phone].  Normal or non-critical lab and imaging results will be communicated to you by ShowMe.tvhart, letter or phone within 4 business days after the clinic has received the results. If you do not hear from us within 7 days, please contact the clinic through [employer] Social[employer] or phone. If you have a critical or abnormal lab result, we will notify you by phone as soon as possible.  Submit refill requests through Abigail Stewart or call your pharmacy and they will forward the refill request to us. Please allow 3 business days for your refill to be completed.          Additional Information About Your Visit        MyChart Information     Abigail Stewart lets you send messages to your doctor, view your test results, renew your prescriptions, schedule appointments and more. To sign up, go to [email or web address] . Click on \"Log in\" on the left side of the screen, which will take you to the Welcome page. Then click on \"Sign up Now\" on the right side of the page.     You will be asked to enter the access code listed below, as well as some personal information. Please follow the directions to create your username and password.     Your access code is: [email or web address]  Expires: 2018  9:00 AM     Your access code will  in 90 days. If you need help or a new code, please call your [facility] or [phone].        Care EveryWhere ID     This is your Care " EveryWhere ID. This could be used by other organizations to access your [facility] medical records  [id number]         Blood Pressure from Last 3 Encounters:   06/18/18 134/70   04/16/18 150/70   03/02/18 128/70    Weight from Last 3 Encounters:   06/18/18 133 lb (60.3 kg)   03/02/18 132 lb 6.4 oz (60.1 kg)   02/27/18 137 lb 2 oz (62.2 kg)              We Performed the Following     FLU VACCINE, INCREASED ANTIGEN, PRESV FREE, AGE 65+ [34415]     Vaccine Administration, Initial [[id number]]        Primary Care Provider Office Phone # Fax #    Chuck Streeter PA-C [phone]       [address] [place] [address]        Equal Access to Services     ARNOLD DIAS : Hadii dorothea peguero Sotraci, waaxda luqadaha, qaybta kaalmada adeegyameghan, michelle jones . So [facility] [phone].    ATENCIÓN: Si habla español, tiene a miller disposición servicios gratuitos de asistencia lingüística. Mignon al [phone].    We comply with applicable federal civil rights laws and [place] laws. We do not discriminate on the basis of race, color, national origin, age, disability, sex, sexual orientation, or gender identity.            Thank you!     Thank you for choosing [facility]  for your care. Our goal is always to provide you with excellent care. Hearing back from our patients is one way we can continue to improve our services. Please take a few minutes to complete the written survey that you may receive in the mail after your visit with us. Thank you!             Your Updated Medication List - Protect others around you: Learn how to safely use, store and throw away your medicines at www.disposemymeds.org.          This list is accurate as of 10/1/18  9:00 AM.  Always use your most recent med list.                   Brand Name Dispense Instructions for use Diagnosis    ALPRAZolam 0.25 MG tablet    XANAX    30 tablet    Take 1 tablet (0.25 mg) by mouth nightly as needed  "for anxiety    Anxiety       aspirin 81 MG tablet      1 TABLET DAILY        BIOTENE DRY MOUTH Pste      Toothpaste and mouthwash        levothyroxine 50 MCG tablet    SYNTHROID/LEVOTHROID    90 tablet    TAKE 1 TABLET (50 MCG) BY MOUTH DAILY    Acquired hypothyroidism       metoprolol tartrate 25 MG tablet    LOPRESSOR    45 tablet    TAKE 1/2 TABLET (12.5 MG) BY MOUTH DAILY    HTN, goal below 140/90       omeprazole 20 MG CR capsule    priLOSEC    30 capsule    Take 1 capsule (20 mg) by mouth daily    Gastroesophageal reflux disease, esophagitis presence not specified       polyethylene glycol powder    MIRALAX     1 capful mixed in 8 oz of fluid up to every 15 minutes as needed to \"clean out\".  Titrate to effect.    Drug-induced constipation       simvastatin 20 MG tablet    ZOCOR    45 tablet    TAKE 0.5 TABLETS (10 MG) BY MOUTH AT BEDTIME (DUE FOR FASTING LABS)    Hyperlipidemia LDL goal <130       sodium phosphate 7-19 GM/118ML rectal enema      Place 1 Bottle (1 enema) rectally once as needed    Drug-induced constipation       THERAPEUTIC MULTIVITAMIN PO      1 TABLET DAILY          "

## 2018-10-01 NOTE — TELEPHONE ENCOUNTER
Reason for Call:  Other prescription    Detailed comments: pt states picked up medication cholesterol medication last Tuesday and there was a note in there from pharmacist about taking this medication due to better side effects. Pt wants to clarify some things with medication.     Phone Number Patient can be reached at: Home number on file [phone] (home)    Best Time: ANY    Can we leave a detailed message on this number? YES    Call taken on 10/1/2018 at 6:27 PM by Carrie Morales

## 2018-10-02 NOTE — TELEPHONE ENCOUNTER
As long as she is tolerating the simvastatin well, I would recommend she continue with this. Co-Q 10 is fine too but I wouldn't recommend this as an alternative to the simvastatin. Thanks.    Chuck Streeter PA-C

## 2018-10-17 DIAGNOSIS — E03.9 ACQUIRED HYPOTHYROIDISM: ICD-10-CM

## 2018-10-18 RX ORDER — LEVOTHYROXINE SODIUM 50 UG/1
TABLET ORAL
Qty: 90 TABLET | Refills: 0 | Status: SHIPPED | OUTPATIENT
Start: 2018-10-18 | End: 2018-12-12

## 2018-10-18 NOTE — TELEPHONE ENCOUNTER
Synthroid:  Prescription approved per [facility] Refill Protocol.    Stephani Del Cid, RN, BSN

## 2018-12-04 ENCOUNTER — RADIANT APPOINTMENT (OUTPATIENT)
Dept: MAMMOGRAPHY | Facility: OTHER | Age: 78
End: 2018-12-04
Payer: COMMERCIAL

## 2018-12-04 DIAGNOSIS — Z12.31 VISIT FOR SCREENING MAMMOGRAM: ICD-10-CM

## 2018-12-04 PROCEDURE — 77067 SCR MAMMO BI INCL CAD: CPT | Mod: TC

## 2018-12-06 NOTE — PROGRESS NOTES
"SUBJECTIVE:   Milena Hamilton is a 78 year old female who presents for Preventive Visit.    Are you in the first 12 months of your Medicare coverage?  No    Annual Wellness Visit     In general, how would you rate your overall health?  Fair    Frequency of exercise:  6-7 days/week    Duration of exercise:  15-30 minutes    Do you usually eat at least 4 servings of fruit and vegetables a day, include whole grains    & fiber and avoid regularly eating high fat or \"junk\" foods?  No    Taking medications regularly:  Yes    Medication side effects:  None    Ability to successfully perform activities of daily living:  No assistance needed    Home Safety:  No safety concerns identified    Hearing Impairment:  No hearing concerns    In the past 6 months, have you been bothered by leaking of urine?  No    In general, how would you rate your overall mental or emotional health?  Fair    PHQ-2 Total Score: 0    Additional concerns today:  No    Do you feel safe in your environment? Yes    Do you have a Health Care Directive? Yes: Advance Directive has been received and scanned.    She had lumbar surgery in March and was initially feeling better but over the past few months, her pain has been worsening again in the right lower and mid back area with radiation into the right thigh with occasional numbness as well. She has been performing home stretching exercises without much benefit. She uses occasional Tylenol, ibuprofen or heat with minimal relief. She states physical therapy was helpful after surgery and is wondering if she can do this again. She denies any extremity weakness or loss of bowel/bladder control.     Her anxiety and insomnia have been much improved so she rarely used the Xanax anymore.    Fall risk  Fallen 2 or more times in the past year?: No  Any fall with injury in the past year?: No    Cognitive Screening   1) Repeat 3 items (Leader, Season, Table)    2) Clock draw: NORMAL  3) 3 item recall: Recalls 3 " objects  Results: 3 items recalled: COGNITIVE IMPAIRMENT LESS LIKELY    Mini-CogTM Copyright CLARISSA Anderson. Licensed by the author for use in [facility]; reprinted with permission (sarah@.[facility]). All rights reserved.      Do you have sleep apnea, excessive snoring or daytime drowsiness?: no    Reviewed and updated as needed this visit by clinical staff  Tobacco  Allergies  Meds  Problems  Med Hx  Surg Hx  Fam Hx  Soc Hx        Reviewed and updated as needed this visit by Provider  Tobacco  Allergies  Meds  Problems  Med Hx  Surg Hx  Fam Hx        Social History     Tobacco Use     Smoking status: Never Smoker     Smokeless tobacco: Never Used   Substance Use Topics     Alcohol use: No       Alcohol Use 12/12/2018   If you drink alcohol do you typically have greater than 3 drinks per day OR greater than 7 drinks per week? No   No flowsheet data found.      Current providers sharing in care for this patient include:   Patient Care Team:  Chuck Streeter PA-C as PCP - General (Physician Assistant)    The following health maintenance items are reviewed in Epic and correct as of today:  Health Maintenance   Topic Date Due     DTAP/TDAP/TD IMMUNIZATION (3 - Td) 03/26/2009     ZOSTER IMMUNIZATION (2 of 3) 02/28/2012     ADVANCE DIRECTIVE PLANNING Q5 YRS  10/03/2016     LIPID MONITORING Q1 YEAR  08/10/2018     PHQ-9 Q1YR  08/10/2018     DEXA Q2 YR  12/09/2018     FALL RISK ASSESSMENT  12/06/2018     BMP Q1 YR  02/28/2019     MARINO QUESTIONNAIRE 1 YEAR  06/18/2019     INFLUENZA VACCINE  Completed     PNEUMOVAX IMMUNIZATION 65+ HIGH/HIGHEST RISK  Completed     IPV IMMUNIZATION  Aged Out     MENINGITIS IMMUNIZATION  Aged Out     Review of Systems  GENERAL: Denies fever, fatigue, weakness, weight gain, or weight loss.  HEENT: Eyes-Denies pain, redness, loss of vision, double or blurred vision.     Ears/Nose-Denies tinnitus, loss of hearing, epistaxis, decreased sense of smell, nasal congestion. Denies  "loss of sense of taste, dry mouth, or sore throat.   CARDIOVASCULAR: Denies chest pain, shortness of breath, irregular heartbeats,  palpitations, or edema.  RESPIRATORY: Denies cough, hemoptysis, and shortness of breath.  GASTROINTESTINAL: Denies nausea, vomiting, change in appetite, abdominal pain, diarrhea, or constipation.  GENITOURINARY: Denies increased frequency, urgency, dysuria, hematuria, or incontinence.   MUSCULOSKELETAL: +right low back pain.   SKIN: Denies easy bruising, redness, rash, or dry skin.   NEUROLOGIC: Denies headache, fainting, dizziness, memory loss, numbness, tingling, or seizures.  PSYCHIATRIC: Denies depression, anxiety, mood swings, and thoughts of suicide.  ENDOCRINE: Denies heat and cold intolerance, polydipsia, or polyuria.   HEMATOLOGIC/LYMPHATIC: Denies easy bleeding or lymphadenopathy.   ALLERGIC/IMMUNOLOGIC: Denies rhinitis, asthma, skin sensitivity.     OBJECTIVE:   /68   Pulse 84   Temp 97.1  F (36.2  C) (Temporal)   Resp 16   Ht 1.549 m (5' 1\")   Wt 61.2 kg (135 lb)   SpO2 97%   BMI 25.51 kg/m   Estimated body mass index is 25.51 kg/m  as calculated from the following:    Height as of this encounter: 1.549 m (5' 1\").    Weight as of this encounter: 61.2 kg (135 lb).  Physical Exam  GENERAL APPEARANCE: healthy, alert and no distress  EYES: Eyes grossly normal to inspection, PERRL and conjunctivae and sclerae normal  HENT: ear canals and TM's normal, nose and mouth without ulcers or lesions, oropharynx clear and oral mucous membranes moist  NECK: no adenopathy, no asymmetry, masses, or scars and thyroid normal to palpation  RESP: lungs clear to auscultation - no rales, rhonchi or wheezes  CV: regular rate and rhythm, normal S1 S2, no S3 or S4, no murmur, click or rub, no peripheral edema and peripheral pulses strong  ABDOMEN: soft, nontender, no hepatosplenomegaly, no masses and bowel sounds normal  MS: Mild rightward lumbar scoliosis. Tenderness over the right " lumbar paraspinal musculature. Negative straight leg raise bilaterally.   SKIN: no suspicious lesions or rashes  NEURO: Normal strength and tone, mentation intact and speech normal. Cranial nerves II-XII are grossly intact. DTRs are 2+/4 throughout and symmetric. Gait is stable.   PSYCH: mentation appears normal and affect normal/bright    ASSESSMENT / PLAN:       ICD-10-CM    1. Encounter for routine adult health examination without abnormal findings Z00.00    2. Gastroesophageal reflux disease, esophagitis presence not specified K21.9 ranitidine (ZANTAC) 150 MG tablet   3. Hypothyroidism, unspecified type E03.9 TSH with free T4 reflex   4. Essential hypertension I10 Basic metabolic panel  (Ca, Cl, CO2, Creat, Gluc, K, Na, BUN)   5. Hyperlipidemia, unspecified hyperlipidemia type E78.5 Lipid panel reflex to direct LDL Fasting   6. Scoliosis of lumbar spine, unspecified scoliosis type M41.9 [facility] CHIR[facility]RACTIC REFERRAL     OFFICE/OUTPT VISIT,EST,LEVL III   7. Lumbar back pain with radiculopathy affecting right lower extremity M54.16 [facility], [facility] REFERRAL     OFFICE/OUTPT VISIT,EST,LEVL III   8. Osteopenia, unspecified location M85.80 DX Hip/Pelvis/Spine   9. HTN, goal below 140/90 I10 metoprolol tartrate (LOPRESSOR) 25 MG tablet   10. Asymptomatic menopausal state  Z78.0 DX Hip/Pelvis/Spine   11. Need for shingles vaccine Z23 ZOSTER VACCINE RECOMBINANT ADJUVANTED IM NJX   12. Need for prophylactic vaccination with tetanus-diphtheria (Td) Z23 TDAP, IM (10 - 64 YRS) - Adacel   13. Anxiety F41.9        2. I discussed the potential long-term side effects of omeprazole so will place her on Zantac instead which she states has worked well in the past.    3. Updated TSH ordered. Will continue current dose of levothyroxine as long as TSH is stable.    4, 9. Stable, continue metoprolol.    5. Updated fasting lipid panel ordered. Continue simvastatin.    6-7. Due to continued low back and right leg  "pain since her lumbar surgery in March, will send her back to PT and chiropractic for further evaluation and treatment. I recommend she continue with ice, heat, ibuprofen and Tylenol along with home stretching. If pain is still not improving, will order an updated MRI.    8, 10. Updated DEXA scan ordered. Continue with a daily calcium/vitamin D supplement.    11-12. Vaccines administered by nursing staff.    13. Anxiety improved without recent use of Xanax but can provide a short refill if needed. CSA on file.    Follow up annually.       End of Life Planning:  Patient currently has an advanced directive: Yes.  Practitioner is supportive of decision.    COUNSELING:  Reviewed preventive health counseling, as reflected in patient instructions       Regular exercise       Healthy diet/nutrition    BP Readings from Last 1 Encounters:   12/12/18 120/68     Estimated body mass index is 25.51 kg/m  as calculated from the following:    Height as of this encounter: 1.549 m (5' 1\").    Weight as of this encounter: 61.2 kg (135 lb).    BP Screening:   Last 3 BP Readings:    BP Readings from Last 3 Encounters:   12/12/18 120/68   06/18/18 134/70   04/16/18 150/70            reports that  has never smoked. she has never used smokeless tobacco.      Appropriate preventive services were discussed with this patient, including applicable screening as appropriate for cardiovascular disease, diabetes, osteopenia/osteoporosis, and glaucoma.  As appropriate for age/gender, discussed screening for colorectal cancer, prostate cancer, breast cancer, and cervical cancer. Checklist reviewing preventive services available has been given to the patient.    Reviewed patients plan of care and provided an AVS. The Basic Care Plan (routine screening as documented in Health Maintenance) for Milena meets the Care Plan requirement. This Care Plan has been established and reviewed with the Patient.    Counseling Resources:  ATP IV Guidelines  Pooled " Cohorts Equation Calculator  Breast Cancer Risk Calculator  FRAX Risk Assessment  ICSI Preventive Guidelines  Dietary Guidelines for Americans, 2010  USDA's MyPlate  ASA Prophylaxis  Lung CA Screening    Chuck Streeter PA-C  [facility]

## 2018-12-06 NOTE — PATIENT INSTRUCTIONS
Preventive Health Recommendations    See your health care provider every year to    Review health changes.     Discuss preventive care.      Review your medicines if your doctor has prescribed any.      You no longer need a yearly Pap test unless you've had an abnormal Pap test in the past 10 years. If you have vaginal symptoms, such as bleeding or discharge, be sure to talk with your provider about a Pap test.      Every 1 to 2 years, have a mammogram.  If you are over 69, talk with your health care provider about whether or not you want to continue having screening mammograms.      Every 10 years, have a colonoscopy. Or, have a yearly FIT test (stool test). These exams will check for colon cancer.       Have a cholesterol test every 5 years, or more often if your doctor advises it.       Have a diabetes test (fasting glucose) every three years. If you are at risk for diabetes, you should have this test more often.       At age 65, have a bone density scan (DEXA) to check for osteoporosis (brittle bone disease).    Shots:    Get a flu shot each year.    Get a tetanus shot every 10 years.    Talk to your doctor about your pneumonia vaccines. There are now two you should receive - Pneumovax (PPSV 23) and Prevnar (PCV 13).    Talk to your pharmacist about the shingles vaccine.    Talk to your doctor about the hepatitis B vaccine.    Nutrition:     Eat at least 5 servings of fruits and vegetables each day.      Eat whole-grain bread, whole-wheat pasta and brown rice instead of white grains and rice.      Get adequate Calcium and Vitamin D.     Lifestyle    Exercise at least 150 minutes a week (30 minutes a day, 5 days a week). This will help you control your weight and prevent disease.      Limit alcohol to one drink per day.      No smoking.       Wear sunscreen to prevent skin cancer.       See your dentist twice a year for an exam and cleaning.      See your eye doctor every 1 to 2 years to screen for conditions  such as glaucoma, macular degeneration and cataracts.    Personalized Prevention Plan  You are due for the preventive services outlined below.  Your care team is available to assist you in scheduling these services.  If you have already completed any of these items, please share that information with your care team to update in your medical record.  Health Maintenance Due   Topic Date Due     Discuss Advance Directive Planning  10/03/2016     Cholesterol Lab - yearly  08/10/2018     Depression Assessment - yearly  08/10/2018     Tetanus Vaccine - every 10 years  09/26/2018     FALL RISK ASSESSMENT  12/06/2018     Osteoporosis Screening (Dexa) - every 2 years   12/09/2018       Will send you to physical therapy and chiropractic to help with your low back pain. They will call to schedule  If not helping, let me know and will order updated imaging.  Continue with home stretching, tylenol and ibuprofen as needed.  You can ice or heat.    Continue current medications.    Follow up annually.

## 2018-12-12 ENCOUNTER — OFFICE VISIT (OUTPATIENT)
Dept: FAMILY MEDICINE | Facility: OTHER | Age: 78
End: 2018-12-12
Payer: COMMERCIAL

## 2018-12-12 ENCOUNTER — TELEPHONE (OUTPATIENT)
Dept: FAMILY MEDICINE | Facility: OTHER | Age: 78
End: 2018-12-12

## 2018-12-12 VITALS
DIASTOLIC BLOOD PRESSURE: 68 MMHG | WEIGHT: 135 LBS | OXYGEN SATURATION: 97 % | HEART RATE: 84 BPM | SYSTOLIC BLOOD PRESSURE: 120 MMHG | BODY MASS INDEX: 25.49 KG/M2 | RESPIRATION RATE: 16 BRPM | HEIGHT: 61 IN | TEMPERATURE: 97.1 F

## 2018-12-12 DIAGNOSIS — Z23 NEED FOR PROPHYLACTIC VACCINATION WITH TETANUS-DIPHTHERIA (TD): ICD-10-CM

## 2018-12-12 DIAGNOSIS — I10 ESSENTIAL HYPERTENSION: ICD-10-CM

## 2018-12-12 DIAGNOSIS — I10 HTN, GOAL BELOW 140/90: ICD-10-CM

## 2018-12-12 DIAGNOSIS — M85.80 OSTEOPENIA, UNSPECIFIED LOCATION: ICD-10-CM

## 2018-12-12 DIAGNOSIS — F41.9 ANXIETY: ICD-10-CM

## 2018-12-12 DIAGNOSIS — E03.9 HYPOTHYROIDISM, UNSPECIFIED TYPE: ICD-10-CM

## 2018-12-12 DIAGNOSIS — E03.9 ACQUIRED HYPOTHYROIDISM: ICD-10-CM

## 2018-12-12 DIAGNOSIS — Z78.0 ASYMPTOMATIC MENOPAUSAL STATE: ICD-10-CM

## 2018-12-12 DIAGNOSIS — M54.16 LUMBAR BACK PAIN WITH RADICULOPATHY AFFECTING RIGHT LOWER EXTREMITY: ICD-10-CM

## 2018-12-12 DIAGNOSIS — Z23 NEED FOR SHINGLES VACCINE: ICD-10-CM

## 2018-12-12 DIAGNOSIS — K21.9 GASTROESOPHAGEAL REFLUX DISEASE, ESOPHAGITIS PRESENCE NOT SPECIFIED: ICD-10-CM

## 2018-12-12 DIAGNOSIS — Z00.00 ENCOUNTER FOR ROUTINE ADULT HEALTH EXAMINATION WITHOUT ABNORMAL FINDINGS: Primary | ICD-10-CM

## 2018-12-12 DIAGNOSIS — E78.5 HYPERLIPIDEMIA, UNSPECIFIED HYPERLIPIDEMIA TYPE: ICD-10-CM

## 2018-12-12 DIAGNOSIS — M41.9 SCOLIOSIS OF LUMBAR SPINE, UNSPECIFIED SCOLIOSIS TYPE: ICD-10-CM

## 2018-12-12 LAB
ANION GAP SERPL CALCULATED.3IONS-SCNC: 6 MMOL/L (ref 3–14)
BUN SERPL-MCNC: 10 MG/DL (ref 7–30)
CALCIUM SERPL-MCNC: 9.1 MG/DL (ref 8.5–10.1)
CHLORIDE SERPL-SCNC: 104 MMOL/L (ref 94–109)
CHOLEST SERPL-MCNC: 190 MG/DL
CO2 SERPL-SCNC: 32 MMOL/L (ref 20–32)
CREAT SERPL-MCNC: 0.67 MG/DL (ref 0.52–1.04)
GFR SERPL CREATININE-BSD FRML MDRD: 86 ML/MIN/1.7M2
GLUCOSE SERPL-MCNC: 103 MG/DL (ref 70–99)
HDLC SERPL-MCNC: 82 MG/DL
LDLC SERPL CALC-MCNC: 89 MG/DL
NONHDLC SERPL-MCNC: 108 MG/DL
POTASSIUM SERPL-SCNC: 4.1 MMOL/L (ref 3.4–5.3)
SODIUM SERPL-SCNC: 142 MMOL/L (ref 133–144)
TRIGL SERPL-MCNC: 93 MG/DL
TSH SERPL DL<=0.005 MIU/L-ACNC: 1.43 MU/L (ref 0.4–4)

## 2018-12-12 PROCEDURE — 84443 ASSAY THYROID STIM HORMONE: CPT | Performed by: PHYSICIAN ASSISTANT

## 2018-12-12 PROCEDURE — 90750 HZV VACC RECOMBINANT IM: CPT | Performed by: PHYSICIAN ASSISTANT

## 2018-12-12 PROCEDURE — 90472 IMMUNIZATION ADMIN EACH ADD: CPT | Performed by: PHYSICIAN ASSISTANT

## 2018-12-12 PROCEDURE — 36415 COLL VENOUS BLD VENIPUNCTURE: CPT | Performed by: PHYSICIAN ASSISTANT

## 2018-12-12 PROCEDURE — 99213 OFFICE O/P EST LOW 20 MIN: CPT | Mod: 25 | Performed by: PHYSICIAN ASSISTANT

## 2018-12-12 PROCEDURE — 99397 PER PM REEVAL EST PAT 65+ YR: CPT | Mod: 25 | Performed by: PHYSICIAN ASSISTANT

## 2018-12-12 PROCEDURE — 80048 BASIC METABOLIC PNL TOTAL CA: CPT | Performed by: PHYSICIAN ASSISTANT

## 2018-12-12 PROCEDURE — 80061 LIPID PANEL: CPT | Performed by: PHYSICIAN ASSISTANT

## 2018-12-12 PROCEDURE — 90715 TDAP VACCINE 7 YRS/> IM: CPT | Performed by: PHYSICIAN ASSISTANT

## 2018-12-12 PROCEDURE — 90471 IMMUNIZATION ADMIN: CPT | Performed by: PHYSICIAN ASSISTANT

## 2018-12-12 RX ORDER — LEVOTHYROXINE SODIUM 50 UG/1
TABLET ORAL
Qty: 90 TABLET | Refills: 3 | Status: SHIPPED | OUTPATIENT
Start: 2018-12-12 | End: 2019-12-13

## 2018-12-12 RX ORDER — METOPROLOL TARTRATE 25 MG/1
TABLET, FILM COATED ORAL
Qty: 45 TABLET | Refills: 3 | Status: SHIPPED | OUTPATIENT
Start: 2018-12-12 | End: 2019-12-13

## 2018-12-12 ASSESSMENT — MIFFLIN-ST. JEOR: SCORE: 1034.74

## 2018-12-12 ASSESSMENT — ACTIVITIES OF DAILY LIVING (ADL): CURRENT_FUNCTION: NO ASSISTANCE NEEDED

## 2018-12-12 NOTE — NURSING NOTE
"Chief Complaint   Patient presents with     Physical     Panel Management     honoring choices, lipid, phq/carol, tetanus, fall risk, dexa       Initial /68   Pulse 84   Temp 97.1  F (36.2  C) (Temporal)   Resp 16   Ht 5' 1\" (1.549 m)   Wt 135 lb (61.2 kg)   SpO2 97%   BMI 25.51 kg/m   Estimated body mass index is 25.51 kg/m  as calculated from the following:    Height as of this encounter: 5' 1\" (1.549 m).    Weight as of this encounter: 135 lb (61.2 kg).  Medication Reconciliation: complete    Grace Manuel, BRIAN      "

## 2018-12-12 NOTE — ADDENDUM NOTE
Addended by: FANNY DOWNS on: 12/12/2018 02:48 PM     Modules accepted: Orders    
No significant past surgical history

## 2018-12-12 NOTE — NURSING NOTE
Screening Questionnaire for Adult Immunization    Are you sick today?   No   Do you have allergies to medications, food, a vaccine component or latex?   No   Have you ever had a serious reaction after receiving a vaccination?   No   Do you have a long-term health problem with heart disease, lung disease, asthma, kidney disease, metabolic disease (e.g. diabetes), anemia, or other blood disorder?   No   Do you have cancer, leukemia, HIV/AIDS, or any other immune system problem?   No   In the past 3 months, have you taken medications that affect  your immune system, such as prednisone, other steroids, or anticancer drugs; drugs for the treatment of rheumatoid arthritis, Crohn s disease, or psoriasis; or have you had radiation treatments?   No   Have you had a seizure, or a brain or other nervous system problem?   No   During the past year, have you received a transfusion of blood or blood     products, or been given immune (gamma) globulin or antiviral drug?   No   For women: Are you pregnant or is there a chance you could become        pregnant during the next month?   No   Have you received any vaccinations in the past 4 weeks?   No     Immunization questionnaire answers were all negative.      Per orders of CECILIO, injection of tdap, shingles given by Grace Manuel. Patient instructed to remain in clinic for 15 minutes afterwards, and to report any adverse reaction to me immediately.    Prior to injection verified patient identity using patient's name and date of birth. Grace Manuel, CMA    Screening performed by Grace Manuel on 12/12/2018 at 9:58 AM.

## 2018-12-12 NOTE — TELEPHONE ENCOUNTER
----- Message from Chuck Streeter PA-C sent at 12/12/2018  2:44 PM CST -----  Milena,    Your glucose was slightly elevated so continue to work on a low sugar, low carb diet with routine exercise. Your cholesterol, kidney function, electrolytes and TSH are all normal. We will continue with the current dose of levothyroxine and I will send over refills. Thanks!    Chuck Streeter PA-C

## 2018-12-12 NOTE — TELEPHONE ENCOUNTER
Patient left before getting AVS and information about Dexa. (mailed out AVS). Left message for patient to call back. See message below.    Patient is scheduled at [facility] on Wednesday, December 19th at 10:00 am (arrive at 9:45 AM).

## 2018-12-17 DIAGNOSIS — F41.9 ANXIETY: ICD-10-CM

## 2018-12-17 RX ORDER — ALPRAZOLAM 0.25 MG
TABLET ORAL
Qty: 30 TABLET | Refills: 0 | Status: SHIPPED | OUTPATIENT
Start: 2018-12-17 | End: 2018-12-19

## 2018-12-17 NOTE — TELEPHONE ENCOUNTER
Refilled for 30 tabs.Rx placed in bin. I saw her last week and she states she rarely takes this anymore so I would anticipate 30 tabs to last her 3-6 months.    Chuck Streeter PA-C

## 2018-12-17 NOTE — TELEPHONE ENCOUNTER
Requested Prescriptions   Pending Prescriptions Disp Refills     ALPRAZolam (XANAX) 0.25 MG tablet [Pharmacy Med Name: ALPRAZOLAM 0.25 MG TABLET] 30 tablet      Sig: TAKE ONE TABLET BY MOUTH nightly AT bedtime as needed FOR anxiety    There is no refill protocol information for this order      ALPRAZolam (XANAX) 0.25 MG tablet      Last Written Prescription Date:  06/18/2018  Last Fill Quantity: 30,   # refills: 2  Last Office Visit: 12/12/2018  Future Office visit:       Routing refill request to provider for review/approval because:  Drug not on the [facility], [facility] or [facility] refill protocol or controlled substance  Aislinn Castro RN, BSN

## 2018-12-18 ENCOUNTER — TELEPHONE (OUTPATIENT)
Dept: FAMILY MEDICINE | Facility: OTHER | Age: 78
End: 2018-12-18

## 2018-12-18 NOTE — TELEPHONE ENCOUNTER
Left detailed message for patient to return call and that due to timing probably not related.  However has she tried benadryl or a hydrocortisone cream for the itching?    Abhijit Dumont, RN, BSN

## 2018-12-18 NOTE — TELEPHONE ENCOUNTER
Reason for call:  Patient reporting a symptom    Symptom or request: rash on arm that itches    Duration (how long have symptoms been present): sunday    Have you been treated for this before? No    Additional comments: patient had some shots last Wednesday and is wondering if it is from that?     Phone Number patient can be reached at:  Home number on file [phone] (home)    Best Time:  any    Can we leave a detailed message on this number:  YES    Call taken on 12/18/2018 at 9:50 AM by Katerin Chávez

## 2018-12-19 ENCOUNTER — OFFICE VISIT (OUTPATIENT)
Dept: FAMILY MEDICINE | Facility: OTHER | Age: 78
End: 2018-12-19
Payer: COMMERCIAL

## 2018-12-19 ENCOUNTER — HOSPITAL ENCOUNTER (OUTPATIENT)
Dept: BONE DENSITY | Facility: CLINIC | Age: 78
Discharge: HOME | End: 2018-12-19
Attending: PHYSICIAN ASSISTANT | Admitting: PHYSICIAN ASSISTANT
Payer: MEDICARE

## 2018-12-19 VITALS
RESPIRATION RATE: 16 BRPM | WEIGHT: 135 LBS | HEART RATE: 82 BPM | SYSTOLIC BLOOD PRESSURE: 120 MMHG | TEMPERATURE: 97.7 F | HEIGHT: 61 IN | DIASTOLIC BLOOD PRESSURE: 60 MMHG | BODY MASS INDEX: 25.49 KG/M2

## 2018-12-19 DIAGNOSIS — M85.80 OSTEOPENIA, UNSPECIFIED LOCATION: ICD-10-CM

## 2018-12-19 DIAGNOSIS — L03.114 CELLULITIS OF LEFT UPPER EXTREMITY: Primary | ICD-10-CM

## 2018-12-19 DIAGNOSIS — Z78.0 ASYMPTOMATIC MENOPAUSAL STATE: ICD-10-CM

## 2018-12-19 PROCEDURE — 99213 OFFICE O/P EST LOW 20 MIN: CPT | Performed by: NURSE PRACTITIONER

## 2018-12-19 PROCEDURE — 77080 DXA BONE DENSITY AXIAL: CPT

## 2018-12-19 RX ORDER — CLINDAMYCIN HCL 300 MG
300 CAPSULE ORAL 3 TIMES DAILY
Qty: 30 CAPSULE | Refills: 0 | Status: SHIPPED | OUTPATIENT
Start: 2018-12-19 | End: 2019-02-19

## 2018-12-19 ASSESSMENT — MIFFLIN-ST. JEOR: SCORE: 1034.74

## 2018-12-19 ASSESSMENT — PAIN SCALES - GENERAL: PAINLEVEL: SEVERE PAIN (6)

## 2018-12-19 NOTE — TELEPHONE ENCOUNTER
Milena Hamilton is a 78 year old female who calls with rash.    NURSING ASSESSMENT:  Description:  I spoke with the pt who states she has a itchy rash. Started by should and now down to elbow. Was red but now faded. Slightly raised. Left arm. Had 2 shots in that arm. Rash starts below area she received the injection. Was warm to the touch. Pt thinks she got to injections in the left arm and one in the right but documentation only shows 2 in the left. Maybe the lab draw was done in right arm?  Onset/duration:  Sunday    Allergies:   Allergies   Allergen Reactions     Azithromycin Diarrhea     Ciprofloxacin Other (See Comments)     Cipro, dizziness and abdominal pain     Penicillins      stomach upset, diarrhea     Sulfa Drugs      hives     RECOMMENDED DISPOSITION:  Pt already had appt scheduled for today  Will comply with recommendation: Yes     Next 5 appointments (look out 90 days)    Dec 19, 2018  1:20 PM CST  [facility] [address] [facility] [address] [phone]        If further questions/concerns or if symptoms do not improve, worsen or new symptoms develop, call your PCP or [facility] as soon as possible.      Guideline used:  Telephone Triage Protocols for Nurses, Fifth Edition, Kirsty Ruiz RN

## 2018-12-19 NOTE — PATIENT INSTRUCTIONS
Patient Education     Discharge Instructions for Cellulitis  You have been diagnosed with cellulitis. This is an infection in the deepest layer of the skin. In some cases, the infection also affects the muscle. Cellulitis is caused by bacteria. The bacteria can enter the body through broken skin. This can happen with a cut, scratch, animal bite, or an insect bite that has been scratched. You may have been treated in the hospital with antibiotics and fluids. You will likely be given a prescription for antibiotics to take at home. This sheet will help you take care of yourself at home.  Home care  When you are home:    Take the prescribed antibiotic medicine you are given as directed until it is gone. Take it even if you feel better. It treats the infection and stops it from returning. Not taking all the medicine can make future infections hard to treat.    Keep the infected area clean.    When possible, raise the infected area above the level of your heart. This helps keep swelling down.    Talk with your healthcare provider if you are in pain. Ask what kind of over-the-counter medicine you can take for pain.    Apply clean bandages as advised.    Take your temperature once a day for a week.    Wash your hands often to prevent spreading the infection.  In the future, wash your hands before and after you touch cuts, scratches, or bandages. This will help prevent infection.   When to call your healthcare provider  Call your healthcare provider immediately if you have any of the following:    Difficulty or pain when moving the joints above or below the infected area    Discharge or pus draining from the area    Fever of 100.4 F (38 C) or higher, or as directed by your healthcare provider    Pain that gets worse in or around the infected     Redness that gets worse in or around the infected area, particularly if the area of redness expands to a wider area    Shaking chills    Swelling of the infected area    Vomiting    Date Last Reviewed: 8/1/2016 2000-2018 [employer]. [address] [place] [address], [place] [address]. All rights reserved. This information is not intended as a substitute for professional medical care. Always follow your healthcare professional's instructions.    Please take antibiotic with a probiotic or yogurt (3 small containers) daily not directly with the antibiotic for optimal good bacterial protection.

## 2018-12-19 NOTE — PROGRESS NOTES
"  SUBJECTIVE:   Milena Hamilton is a 78 year old female who presents to clinic today for the following health issues:      HPI  Rash-patient states she received vaccines last week at the clinic, there are redness in the left arm started 3 days later after the vaccines. Pt is unsure if related to the vaccines (given on 12/12/18).    Onset: 4 days now    Description:   Location: left arm   Character: red, intermittent pain in the left arm \"achy pain\"   Itching (Pruritis): YES    Progression of Symptoms:  Improving, the redness has decreased     Accompanying Signs & Symptoms:  Fever: no   Body aches or joint pain: no   Sore throat symptoms: no   Recent cold symptoms: no     History:   Previous similar rash: no     Precipitating factors:   Exposure to similar rash: no   New exposures:  Shingrix vaccine, tdap vaccine was given in the left arm   Recent travel: no     Alleviating factors:  None     Therapies Tried and outcome: anti-itching cream - it help with the itching, tylenol for the pain    Started with small area of erythema has now tripled in size mild pain arm feels \"funny\" denies fever or malaise.     Problem list and histories reviewed & adjusted, as indicated.  Additional history: as documented        Patient Active Problem List   Diagnosis     Lichen planus     Family history of diabetes mellitus     Tinnitus     Panic disorder without agoraphobia     Gastritis     Osteopenia     Hyperlipidemia     Anxiety     DDD (degenerative disc disease), cervical     Advanced directives, counseling/discussion     Sacroiliac dysfunction     Chronic constipation     History of diverticulitis - s/p left hemicolectomy     Diarrhea     Health Care Home     Raynaud's disease     HTN (hypertension)     Benign skin lesion     Community acquired pneumonia     Hypothyroidism, unspecified type     Premature atrial beats     Acquired hypothyroidism     Gastroesophageal reflux disease, esophagitis presence not specified     " Intractable vomiting     Gastroenteritis     After cataract, left eye     Cortical age-related cataract     Cortical senile cataract     Chronic right-sided low back pain with right-sided sciatica     H/O lumbosacral spine surgery     Scoliosis of lumbar spine, unspecified scoliosis type     Past Surgical History:   Procedure Laterality Date     C APPENDECTOMY       C NONSPECIFIC PROCEDURE      Right inferior oblique recession, 14 mm     C VAG HYST,RMV TUBE/OVARY  1984    cervix removed     COLONOSCOPY  10/17/2007    normal, recheck in 10 years     COLONOSCOPY N/A 8/27/2014    Procedure: COLONOSCOPY;  Surgeon: Raffi Montiel MD;  Location: [facility]     COMBINED CYSTOSCOPY, INSERT CATHETER URETER Bilateral 10/13/2014    Procedure: COMBINED CYSTOSCOPY, INSERT CATHETER URETER;  Surgeon: Abdoulaye Odell MD;  Location: [facility]     EYE SURGERY  8/01    right eye muscle repair     HC REMOVAL GALLBLADDER  2/9/2010    lap     HC REMOVAL OF OVARY/TUBE(S)      Salpingo-Oophorectomy, bilateral     HYSTERECTOMY, PAP NO LONGER INDICATED       INJECT EPIDURAL LUMBAR Right 4/10/2017    Procedure: INJECT EPIDURAL LUMBAR;  Surgeon: Raffi Wright MD;  Location: [facility]     INJECT EPIDURAL TRANSFORAMINAL Right 6/9/2016    Procedure: INJECT EPIDURAL TRANSFORAMINAL;  Surgeon: Antonio Dasilva MD;  Location: [facility]     INJECT EPIDURAL TRANSFORAMINAL Right 10/13/2016    Procedure: INJECT EPIDURAL TRANSFORAMINAL;  Surgeon: Antonio Dasilva MD;  Location: [facility]     LAPAROSCOPIC ASSISTED COLECTOMY LEFT (DESCENDING) N/A 10/13/2014    Procedure: LAPAROSCOPIC ASSISTED COLECTOMY LEFT (DESCENDING);  Surgeon: Raffi Montiel MD;  Location: [facility]       Social History     Tobacco Use     Smoking status: Never Smoker     Smokeless tobacco: Never Used   Substance Use Topics     Alcohol use: No     Family History   Problem Relation Age of Onset     Diabetes Brother      Heart Disease Brother 50        AMI     Diabetes Brother      Heart  "Disease Brother         AMI, \"older\"     Cerebrovascular Disease Brother      Psychotic Disorder Brother         , alcohol     Cancer Sister         breast cancer x2     Hypertension No family hx of      Breast Cancer No family hx of      Ovarian Cancer No family hx of      Prostate Cancer No family hx of      Mental Illness No family hx of      Anesthesia Reaction No family hx of      Osteoporosis No family hx of      Obesity No family hx of      Other Cancer No family hx of      Depression No family hx of      Anxiety Disorder No family hx of      Mental Illness No family hx of      Substance Abuse No family hx of          Current Outpatient Medications   Medication Sig Dispense Refill     ASPIRIN 81 MG OR TABS 1 TABLET DAILY       calcium carbonate-vitamin D (OSCAL W/D) 500-200 MG-UNIT tablet Take 1 tablet by mouth 2 times daily       clindamycin (CLEOCIN) 300 MG capsule Take 1 capsule (300 mg) by mouth 3 times daily for 10 days 30 capsule 0     Dentifrices (BIOTENE DRY MOUTH) PSTE Toothpaste and mouthwash       levothyroxine (SYNTHROID/LEVOTHROID) 50 MCG tablet TAKE 1 TABLET (50 MCG) BY MOUTH DAILY 90 tablet 3     metoprolol tartrate (LOPRESSOR) 25 MG tablet TAKE 1/2 TABLET (12.5 MG) BY MOUTH DAILY 45 tablet 3     ranitidine (ZANTAC) 150 MG tablet Take 1 tablet (150 mg) by mouth 2 times daily 180 tablet 3     simvastatin (ZOCOR) 20 MG tablet TAKE 0.5 TABLETS (10 MG) BY MOUTH AT BEDTIME (DUE FOR FASTING LABS) 45 tablet 3     THERAPEUTIC MULTIVITAMIN OR 1 TABLET DAILY       Allergies   Allergen Reactions     Azithromycin Diarrhea     Ciprofloxacin Other (See Comments)     Cipro, dizziness and abdominal pain     Penicillins      stomach upset, diarrhea     Sulfa Drugs      hives     BP Readings from Last 3 Encounters:   18 120/60   18 120/68   18 134/70    Wt Readings from Last 3 Encounters:   18 61.2 kg (135 lb)   18 61.2 kg (135 lb)   18 60.3 kg (133 lb)                " "  Labs reviewed in EPIC    ROS:  Constitutional, HEENT, cardiovascular, pulmonary, gi and gu systems are negative, except as otherwise noted.    OBJECTIVE:     /60   Pulse 82   Temp 97.7  F (36.5  C) (Oral)   Resp 16   Ht 1.549 m (5' 1\")   Wt 61.2 kg (135 lb)   BMI 25.51 kg/m    Body mass index is 25.51 kg/m .  GENERAL: healthy, alert and no distress  NECK: no adenopathy, no asymmetry, masses, or scars and thyroid normal to palpation  RESP: lungs clear to auscultation - no rales, rhonchi or wheezes  CV: regular rate and rhythm, normal S1 S2, no S3 or S4, no murmur, click or rub, no peripheral edema and peripheral pulses strong  MS: no gross musculoskeletal defects noted, no edema  SKIN: left upper extremity skin marker used area of erythema 3 inches by 5 inches, mild warmth, mild pruritis, mild inflammation. No drainage.     ASSESSMENT/PLAN:     1. Cellulitis of left upper extremity  Recommend treatment as prescribed reviewed home instructions noted if symptoms worsen and do not begin to improve in 24-48 hours return to clinic or if having fever and malaise ED.   - clindamycin (CLEOCIN) 300 MG capsule; Take 1 capsule (300 mg) by mouth 3 times daily for 10 days  Dispense: 30 capsule; Refill: 0    Patient Instructions       Patient Education     Discharge Instructions for Cellulitis  You have been diagnosed with cellulitis. This is an infection in the deepest layer of the skin. In some cases, the infection also affects the muscle. Cellulitis is caused by bacteria. The bacteria can enter the body through broken skin. This can happen with a cut, scratch, animal bite, or an insect bite that has been scratched. You may have been treated in the hospital with antibiotics and fluids. You will likely be given a prescription for antibiotics to take at home. This sheet will help you take care of yourself at home.  Home care  When you are home:    Take the prescribed antibiotic medicine you are given as directed " until it is gone. Take it even if you feel better. It treats the infection and stops it from returning. Not taking all the medicine can make future infections hard to treat.    Keep the infected area clean.    When possible, raise the infected area above the level of your heart. This helps keep swelling down.    Talk with your healthcare provider if you are in pain. Ask what kind of over-the-counter medicine you can take for pain.    Apply clean bandages as advised.    Take your temperature once a day for a week.    Wash your hands often to prevent spreading the infection.  In the future, wash your hands before and after you touch cuts, scratches, or bandages. This will help prevent infection.   When to call your healthcare provider  Call your healthcare provider immediately if you have any of the following:    Difficulty or pain when moving the joints above or below the infected area    Discharge or pus draining from the area    Fever of 100.4 F (38 C) or higher, or as directed by your healthcare provider    Pain that gets worse in or around the infected     Redness that gets worse in or around the infected area, particularly if the area of redness expands to a wider area    Shaking chills    Swelling of the infected area    Vomiting   Date Last Reviewed: 8/1/2016 2000-2018 [employer] [address] [place] [address], [place] [address]. All rights reserved. This information is not intended as a substitute for professional medical care. Always follow your healthcare professional's instructions.    Please take antibiotic with a probiotic or yogurt (3 small containers) daily not directly with the antibiotic for optimal good bacterial protection.                  FORREST [facility]

## 2018-12-28 ENCOUNTER — THERAPY VISIT (OUTPATIENT)
Dept: PHYSICAL THERAPY | Facility: CLINIC | Age: 78
End: 2018-12-28
Attending: PHYSICIAN ASSISTANT
Payer: COMMERCIAL

## 2018-12-28 DIAGNOSIS — M54.16 LUMBAR BACK PAIN WITH RADICULOPATHY AFFECTING RIGHT LOWER EXTREMITY: ICD-10-CM

## 2018-12-28 DIAGNOSIS — M41.9 SCOLIOSIS OF LUMBAR SPINE, UNSPECIFIED SCOLIOSIS TYPE: ICD-10-CM

## 2018-12-28 DIAGNOSIS — R29.3 POSTURE ABNORMALITY: ICD-10-CM

## 2018-12-28 DIAGNOSIS — M54.16 LUMBAR RADICULOPATHY: ICD-10-CM

## 2018-12-28 PROBLEM — M54.41 CHRONIC RIGHT-SIDED LOW BACK PAIN WITH RIGHT-SIDED SCIATICA: Status: RESOLVED | Noted: 2018-05-30 | Resolved: 2018-12-28

## 2018-12-28 PROBLEM — G89.29 CHRONIC RIGHT-SIDED LOW BACK PAIN WITH RIGHT-SIDED SCIATICA: Status: RESOLVED | Noted: 2018-05-30 | Resolved: 2018-12-28

## 2018-12-28 PROBLEM — Z98.890 H/O LUMBOSACRAL SPINE SURGERY: Status: RESOLVED | Noted: 2018-05-30 | Resolved: 2018-12-28

## 2018-12-28 PROCEDURE — 97110 THERAPEUTIC EXERCISES: CPT | Mod: GP | Performed by: PHYSICAL THERAPIST

## 2018-12-28 PROCEDURE — 97161 PT EVAL LOW COMPLEX 20 MIN: CPT | Mod: GP | Performed by: PHYSICAL THERAPIST

## 2018-12-28 PROCEDURE — 97112 NEUROMUSCULAR REEDUCATION: CPT | Mod: GP | Performed by: PHYSICAL THERAPIST

## 2018-12-28 NOTE — LETTER
[facility] ATHLETIC [facility] PHYSICAL THERAPY  [address]  [facility] [id number] [phone]    2018    Re: Milnea Hamilton   :   1940  MRN:  [id number]   REFERRING PHYSICIAN:   Chuck Streeter    [facility] ATHLETIC [facility]    Date of Initial Evaluation:  ***  Visits:  Rxs Used: 1  Reason for Referral:     Scoliosis of lumbar spine, unspecified scoliosis type  Lumbar back pain with radiculopathy affecting right lower extremity  Lumbar radiculopathy  Posture abnormality    EVALUATION SUMMARY    [facility] Initial Evaluation  Subjective:  The history is provided by the patient. No  was used.   Milnea Hamilton is a 78 year old female with a lumbar condition.  Condition occurred with:  Degenerative joint disease.  Condition occurred: other.  This is a chronic condition  Pt has had low back pain for at least 6 years; Had exacerbation of sx 2016. Trialed PT, Chiropractic, and cortisone injections w/o improvement. 3/9/18: S/P R L5 hemilaminectomy, medial facetectomy, and R L5-S1 and S1 foraminotomy; R L5-S1 foraminal mircrodiscectomy. Did PT after this and sx were manageable. Primary complaint is sidelean, intermittent sx down R LE, and occasional low back pain..    Patient reports pain:  SI joint right.  Radiates to:  Gluteals right and thigh right.  Pain is described as aching and is intermittent and reported as 4/10.  Associated symptoms:  Loss of motion/stiffness, loss of strength, loss of balance, numbness and tingling (numbness and tingling into R foot). Pain is worse in the P.M..  Symptoms are exacerbated by standing, walking and other (static positions) and relieved by rest and NSAID's.  Since onset symptoms are gradually improving.  Special testing: none since prior to surgery.  Previous treatment includes physical therapy, chiropractic and surgery.  There was moderate  improvement following previous treatment.                            General health as reported by patient is fair.  Pertinent medical history includes:  High blood pressure and thyroid problems.  Medical allergies: yes.  Surgical history: Other surgeries include:  Other (gallbladder removal 2010; L sided colon removal); Low back surgery in 1960.  Current medications:  Thyroid medication, high blood pressure medication and pain medication (Tylenol).  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         Barriers include:  None as reported by the patient.     Red flags:  None as reported by the patient.                    Objective:  Standing Alignment:        Lumbar:  Lateral shift R            Gait:      Deviations:  Lumbar:  Trunk lean RGeneral Deviations:  Base of support incr and stride length decr               Lumbar/SI Evaluation  ROM:    AROM Lumbar:   Flexion:            Mid lower leg ++pull R   Ext:                    75% limited   Side Bend:        Left:  To mid thigh    Right:  To knee  Rotation:           Left:  75% limited (compensates w/ pelvic rotation)    Right:  50% limitation  Side Glide:        Left:     Right:           Lumbar Myotomes:  normal                                                              Hip Evaluation    Hip Strength:    Flexion:   Left: 5/5   Pain:  Right: 5/5   Pain:                      Abduction:  Left: 5/5     Pain:Right: 4/5    Pain:                                 General     ROS    Assessment/Plan:    Patient is a 78 year old female with lumbar complaints.    Patient has the following significant findings with corresponding treatment plan.                Diagnosis 1:  R Sided low back pain w/ R sided radiculopathy  Pain -  hot/cold therapy, manual therapy, self management, education, directional preference exercise and home program  Decreased ROM/flexibility -  manual therapy, therapeutic exercise, therapeutic activity and home program  Decreased strength - therapeutic exercise, therapeutic activities and home program  Impaired gait - gait training and home program  Impaired muscle performance - neuro re-education and home program  Decreased function - therapeutic activities and home program  Impaired posture - neuro re-education, therapeutic activities and home program    Therapy Evaluation Codes:   1) History comprised of:   Personal factors that impact the plan of care:      Age, Living environment, Past/current experiences and Time since onset of symptoms.    Comorbidity factors that impact the plan of care are:      High blood pressure.     Medications impacting care: High blood pressure.  2) Examination of Body Systems comprised of:   Body structures and functions that impact the plan of care:      Lumbar spine.   Activity limitations that impact the plan of care are:      Bathing, Cooking, Dressing, Lifting, Sitting, Standing and Walking.  3) Clinical presentation characteristics are:   Stable/Uncomplicated.  4) Decision-Making    Low complexity using standardized patient assessment instrument and/or measureable assessment of functional outcome.  Cumulative Therapy Evaluation is: Low complexity.    Previous and current functional limitations:  (See Goal Flow Sheet for this information)    Short term and Long term goals: (See Goal Flow Sheet for this information)     Communication ability:  Patient appears to be able to clearly communicate and understand verbal and written communication and follow directions correctly.  Treatment Explanation - The following has been discussed with the patient:   RX ordered/plan of care  Anticipated outcomes  Possible risks and side effects  This patient would benefit from PT intervention to resume normal activities.   Rehab potential is good.    Frequency:  2 X a month, once daily  Duration:  for 2 months  Discharge Plan:  Achieve  all LTG.  Independent in home treatment program.  Reach maximal therapeutic benefit.    Please refer to the daily flowsheet for treatment today, total treatment time and time spent performing 1:1 timed codes.         Thank you for your referral.    INQUIRIES  Therapist:   [facility]  [address]  [facility] [address]  Phone: [phone]  Fax: [phone]

## 2018-12-28 NOTE — PROGRESS NOTES
[facility] Initial Evaluation  Subjective:  The history is provided by the patient. No  was used.   Milena Hamilton is a 78 year old female with a lumbar condition.  Condition occurred with:  Degenerative joint disease.  Condition occurred: other.  This is a chronic condition  Pt has had low back pain for at least 6 years; Had exacerbation of sx February 2016. Trialed PT, Chiropractic, and cortisone injections w/o improvement. 3/9/18: S/P R L5 hemilaminectomy, medial facetectomy, and R L5-S1 and S1 foraminotomy; R L5-S1 foraminal mircrodiscectomy. Did PT after this and sx were manageable. Primary complaint is sidelean, intermittent sx down R LE, and occasional low back pain..    Patient reports pain:  SI joint right.  Radiates to:  Gluteals right and thigh right.  Pain is described as aching and is intermittent and reported as 4/10.  Associated symptoms:  Loss of motion/stiffness, loss of strength, loss of balance, numbness and tingling (numbness and tingling into R foot). Pain is worse in the P.M..  Symptoms are exacerbated by standing, walking and other (static positions) and relieved by rest and NSAID's.  Since onset symptoms are gradually improving.  Special testing: none since prior to surgery.  Previous treatment includes physical therapy, chiropractic and surgery.  There was moderate improvement following previous treatment.                            General health as reported by patient is fair.  Pertinent medical history includes:  High blood pressure and thyroid problems.  Medical allergies: yes.  Surgical history: Other surgeries include:  Other (gallbladder removal 2010; L sided colon removal); Low back surgery in 1960.  Current medications:  Thyroid medication, high blood pressure medication and pain medication (Tylenol).  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         Barriers include:  None as reported by the patient.     Red flags:  None as reported by the patient.                    Objective:  Standing Alignment:        Lumbar:  Lateral shift R            Gait:      Deviations:  Lumbar:  Trunk lean RGeneral Deviations:  Base of support incr and stride length decr               Lumbar/SI Evaluation  ROM:    AROM Lumbar:   Flexion:            Mid lower leg ++pull R   Ext:                    75% limited   Side Bend:        Left:  To mid thigh    Right:  To knee  Rotation:           Left:  75% limited (compensates w/ pelvic rotation)    Right:  50% limitation  Side Glide:        Left:     Right:           Lumbar Myotomes:  normal                                                              Hip Evaluation    Hip Strength:    Flexion:   Left: 5/5   Pain:  Right: 5/5   Pain:                      Abduction:  Left: 5/5     Pain:Right: 4/5    Pain:                                 General     ROS    Assessment/Plan:    Patient is a 78 year old female with lumbar complaints.    Patient has the following significant findings with corresponding treatment plan.                Diagnosis 1:  R Sided low back pain w/ R sided radiculopathy  Pain -  hot/cold therapy, manual therapy, self management, education, directional preference exercise and home program  Decreased ROM/flexibility - manual therapy, therapeutic exercise, therapeutic activity and home program  Decreased strength - therapeutic exercise, therapeutic activities and home program  Impaired gait - gait training and home program  Impaired muscle performance - neuro re-education and home program  Decreased function - therapeutic activities and home program  Impaired posture - neuro re-education, therapeutic activities and home program    Therapy Evaluation Codes:   1) History comprised of:   Personal factors that impact the plan of care:      Age, Living environment, Past/current experiences and Time  since onset of symptoms.    Comorbidity factors that impact the plan of care are:      High blood pressure.     Medications impacting care: High blood pressure.  2) Examination of Body Systems comprised of:   Body structures and functions that impact the plan of care:      Lumbar spine.   Activity limitations that impact the plan of care are:      Bathing, Cooking, Dressing, Lifting, Sitting, Standing and Walking.  3) Clinical presentation characteristics are:   Stable/Uncomplicated.  4) Decision-Making    Low complexity using standardized patient assessment instrument and/or measureable assessment of functional outcome.  Cumulative Therapy Evaluation is: Low complexity.    Previous and current functional limitations:  (See Goal Flow Sheet for this information)    Short term and Long term goals: (See Goal Flow Sheet for this information)     Communication ability:  Patient appears to be able to clearly communicate and understand verbal and written communication and follow directions correctly.  Treatment Explanation - The following has been discussed with the patient:   RX ordered/plan of care  Anticipated outcomes  Possible risks and side effects  This patient would benefit from PT intervention to resume normal activities.   Rehab potential is good.    Frequency:  2 X a month, once daily  Duration:  for 2 months  Discharge Plan:  Achieve all LTG.  Independent in home treatment program.  Reach maximal therapeutic benefit.    Please refer to the daily flowsheet for treatment today, total treatment time and time spent performing 1:1 timed codes.

## 2019-01-11 ENCOUNTER — THERAPY VISIT (OUTPATIENT)
Dept: PHYSICAL THERAPY | Facility: CLINIC | Age: 79
End: 2019-01-11
Payer: COMMERCIAL

## 2019-01-11 DIAGNOSIS — M54.16 LUMBAR RADICULOPATHY: ICD-10-CM

## 2019-01-11 DIAGNOSIS — R29.3 POSTURE ABNORMALITY: ICD-10-CM

## 2019-01-11 DIAGNOSIS — M54.41 RIGHT-SIDED LOW BACK PAIN WITH RIGHT-SIDED SCIATICA, UNSPECIFIED CHRONICITY: ICD-10-CM

## 2019-01-11 PROCEDURE — 97530 THERAPEUTIC ACTIVITIES: CPT | Mod: GP | Performed by: PHYSICAL THERAPIST

## 2019-01-11 PROCEDURE — 97110 THERAPEUTIC EXERCISES: CPT | Mod: GP | Performed by: PHYSICAL THERAPIST

## 2019-01-11 PROCEDURE — 97112 NEUROMUSCULAR REEDUCATION: CPT | Mod: GP | Performed by: PHYSICAL THERAPIST

## 2019-01-21 ENCOUNTER — TELEPHONE (OUTPATIENT)
Dept: FAMILY MEDICINE | Facility: OTHER | Age: 79
End: 2019-01-21

## 2019-01-21 NOTE — PROGRESS NOTES
"  SUBJECTIVE:   Milena Hamilton is a 78 year old female who presents to clinic today for the following health issues:      HPI  {ACUTE Problem - superbrief histories:175583}  Problem list and histories reviewed & adjusted, as indicated.  Additional history: {NONE - AS DOCUMENTED:483420::\"as documented\"}    {ACUTE Problem SUPERLIST - brief histories:424670}    {HIST REVIEW/ LINKS 2:691040}    {PROVIDER CHARTING PREFERENCE:727176}  "

## 2019-01-22 NOTE — TELEPHONE ENCOUNTER
Patient has visit with me Wednesday for bladder troubles. Please triage. Should this wait until Wednesday?     FORREST Joyner CNP

## 2019-01-23 ENCOUNTER — OFFICE VISIT (OUTPATIENT)
Dept: FAMILY MEDICINE | Facility: OTHER | Age: 79
End: 2019-01-23
Payer: COMMERCIAL

## 2019-01-23 VITALS
TEMPERATURE: 98.2 F | SYSTOLIC BLOOD PRESSURE: 145 MMHG | HEART RATE: 72 BPM | RESPIRATION RATE: 16 BRPM | BODY MASS INDEX: 25.32 KG/M2 | WEIGHT: 134 LBS | DIASTOLIC BLOOD PRESSURE: 72 MMHG

## 2019-01-23 DIAGNOSIS — B35.1 TOENAIL FUNGUS: ICD-10-CM

## 2019-01-23 DIAGNOSIS — R10.2 VAGINAL PAIN: ICD-10-CM

## 2019-01-23 DIAGNOSIS — R30.0 DYSURIA: Primary | ICD-10-CM

## 2019-01-23 DIAGNOSIS — N81.11 CYSTOCELE, MIDLINE: ICD-10-CM

## 2019-01-23 PROCEDURE — 99214 OFFICE O/P EST MOD 30 MIN: CPT | Performed by: NURSE PRACTITIONER

## 2019-01-23 ASSESSMENT — PAIN SCALES - GENERAL: PAINLEVEL: NO PAIN (0)

## 2019-01-23 NOTE — TELEPHONE ENCOUNTER
Next 5 appointments (look out 90 days)    Jan 23, 2019  1:00 PM CST  Office Visit with FORREST Cruz CNP  [facility] ([facility] [address]  [facility] [address]  [phone]        Amy Ruiz, RN, BSN

## 2019-01-23 NOTE — PROGRESS NOTES
"  SUBJECTIVE:   Milena Hamilton is a 78 year old female who presents to clinic today for the following health issues:      HPI  Concern -   Prolapsed Bladder per patient  Onset:  4 years ago    Description:     \" something comes out of Vagina when I have a BM    Intensity: moderate    Progression of Symptoms:  worsening    Accompanying Signs & Symptoms:  None    Previous history of similar problem:     She was supposed to see GYN several years ago    Precipitating factors:   Worsened by: Bearing down    Alleviating factors:  None           Problem list and histories reviewed & adjusted, as indicated.  Additional history: as documented    Right Big Toe Nail --Discoloring toenail     Ongoing        Current Outpatient Medications   Medication Sig Dispense Refill     ASPIRIN 81 MG OR TABS 1 TABLET DAILY       calcium carbonate-vitamin D (OSCAL W/D) 500-200 MG-UNIT tablet Take 1 tablet by mouth 2 times daily       Dentifrices (BIOTENE DRY MOUTH) PSTE Toothpaste and mouthwash       levothyroxine (SYNTHROID/LEVOTHROID) 50 MCG tablet TAKE 1 TABLET (50 MCG) BY MOUTH DAILY 90 tablet 3     metoprolol tartrate (LOPRESSOR) 25 MG tablet TAKE 1/2 TABLET (12.5 MG) BY MOUTH DAILY 45 tablet 3     simvastatin (ZOCOR) 20 MG tablet TAKE 0.5 TABLETS (10 MG) BY MOUTH AT BEDTIME (DUE FOR FASTING LABS) 45 tablet 3     THERAPEUTIC MULTIVITAMIN OR 1 TABLET DAILY       ranitidine (ZANTAC) 150 MG tablet Take 1 tablet (150 mg) by mouth 2 times daily (Patient not taking: Reported on 2/19/2019) 180 tablet 3     BP Readings from Last 3 Encounters:   02/19/19 120/64   01/31/19 124/70   01/23/19 145/72    Wt Readings from Last 3 Encounters:   02/19/19 61.5 kg (135 lb 9.6 oz)   01/23/19 60.8 kg (134 lb)   12/19/18 61.2 kg (135 lb)                    ROS:  RESP: NEGATIVE for significant cough or SOB  CV: NEGATIVE for chest pain, palpitations or peripheral edema    OBJECTIVE:     /72   Pulse 72   Temp 98.2  F (36.8  C)   Resp 16   Wt 60.8 kg " (134 lb)   BMI 25.32 kg/m    Body mass index is 25.32 kg/m .  GENERAL: healthy, alert and no distress  RESP: lungs clear to auscultation - no rales, rhonchi or wheezes  CV: regular rate and rhythm, normal S1 S2, no S3 or S4, no murmur, click or rub, no peripheral edema and peripheral pulses strong  ABDOMEN: soft, nontender, no hepatosplenomegaly, no masses and bowel sounds normal   (female): possible cystocele present, normal external per area without any lesions or erythema.   SKIN: slight yellow discoloring of right big toe without signs of cellulitis or ingrown toenail  NEURO: Normal strength and tone, mentation intact and speech normal  PSYCH: mentation appears normal, affect normal/bright    Diagnostic Test Results:  none     ASSESSMENT/PLAN:         ICD-10-CM    1. Dysuria R30.0 CANCELED: *UA reflex to Microscopic and Culture ([facility] (except [place] and [place])     CANCELED: *UA reflex to Microscopic and Culture ([facility] (except [place] and [place])   2. Cystocele, midline N81.11 CANCELED: URO/GYN REFERRAL   3. Vaginal pain R10.2 OB/GYN REFERRAL   4. Toenail fungus B35.1      Possible Cystocele felt on vaginal exam, recommend further follow up with urology to evaluate as this could also be a prolapsed uterus although feels more of bladder in relation to symptoms. I had a long discussion with patient in regards to this and my concerns.     As for her toenail fungus I did give her some at home remedies for this, if continues to not improve return to clinic.         The patient indicates understanding of these issues and agrees with the plan.    Patient Instructions   For your Toes:   Recommend one of the two recommendations for soaking  Make a solution by mixing 1 part of white vinegar with 2 parts of warm water. Soak the infected toenail in this solution for 10 to 15 minutes. Rinse it off and rid the affected area of any remnant moisture by thoroughly drying  it. Repeat twice daily until the fungus is gone.    We could consider topical although this is usually not as effective as it often does not penetrate the nailbed.     Follow up with uro/gynecology for further evaluation.     FORREST Joyner CNP, APRN CNP  [facility]

## 2019-01-23 NOTE — PATIENT INSTRUCTIONS
For your Toes:   Recommend one of the two recommendations for soaking  Make a solution by mixing 1 part of white vinegar with 2 parts of warm water. Soak the infected toenail in this solution for 10 to 15 minutes. Rinse it off and rid the affected area of any remnant moisture by thoroughly drying it. Repeat twice daily until the fungus is gone.    We could consider topical although this is usually not as effective as it often does not penetrate the nailbed.     Follow up with uro/gynecology for further evaluation.     FORREST Joyner CNP

## 2019-01-24 ENCOUNTER — TELEPHONE (OUTPATIENT)
Dept: FAMILY MEDICINE | Facility: OTHER | Age: 79
End: 2019-01-24

## 2019-01-24 NOTE — TELEPHONE ENCOUNTER
Reason for Call:  She has questions about yesterday's appointment    Detailed comments: please call to discuss    Phone Number Patient can be reached at: Home number on file [phone] (home)    Best Time: anytime    Can we leave a detailed message on this number? YES    Call taken on 1/24/2019 at 8:20 AM by Lulu Romero

## 2019-01-24 NOTE — TELEPHONE ENCOUNTER
"She states she had a pelvic exam yesterday - she was wondering \"did Jannette say anything about Cancer? I can't remember.\"    Note is not completed yet, so not sure. Patient informed KV not in today and will return tomorrow to review this message.  Grace Manuel, [facility]    "

## 2019-01-25 ENCOUNTER — THERAPY VISIT (OUTPATIENT)
Dept: PHYSICAL THERAPY | Facility: CLINIC | Age: 79
End: 2019-01-25
Payer: COMMERCIAL

## 2019-01-25 DIAGNOSIS — R29.3 POSTURE ABNORMALITY: ICD-10-CM

## 2019-01-25 DIAGNOSIS — M54.41 RIGHT-SIDED LOW BACK PAIN WITH RIGHT-SIDED SCIATICA, UNSPECIFIED CHRONICITY: ICD-10-CM

## 2019-01-25 DIAGNOSIS — M54.16 LUMBAR RADICULOPATHY: ICD-10-CM

## 2019-01-25 PROCEDURE — 97110 THERAPEUTIC EXERCISES: CPT | Mod: GP | Performed by: PHYSICAL THERAPIST

## 2019-01-25 PROCEDURE — 97112 NEUROMUSCULAR REEDUCATION: CPT | Mod: GP | Performed by: PHYSICAL THERAPIST

## 2019-01-25 NOTE — TELEPHONE ENCOUNTER
I apologize for any confusion. There was no discussion of cancer and I do not have any concerns for this at this time.     Prolapse of our vaginal organs is not uncommon in females, in fact it happens quite frequently especially in women in her age category. Her straining of stool (constipation) definitely could be a contributor to this, history of a hysterectomy, weak muscles in that area are all things that can contribute to this. I would like her to see urology to make sure there are not other contributing factors such as bladder organ involvement. The urologist will evaluate for both vaginal and bladder involvement. I hope this helps.       FORREST Joyner CNP

## 2019-01-25 NOTE — TELEPHONE ENCOUNTER
Called and spoke with patient regarding message below.  Patient verbalized understanding.  Brenda Serrano CMA ([facility])

## 2019-01-31 ENCOUNTER — OFFICE VISIT (OUTPATIENT)
Dept: UROLOGY | Facility: OTHER | Age: 79
End: 2019-01-31
Payer: COMMERCIAL

## 2019-01-31 VITALS — RESPIRATION RATE: 16 BRPM | HEART RATE: 76 BPM | DIASTOLIC BLOOD PRESSURE: 70 MMHG | SYSTOLIC BLOOD PRESSURE: 124 MMHG

## 2019-01-31 DIAGNOSIS — R35.0 URINARY FREQUENCY: ICD-10-CM

## 2019-01-31 DIAGNOSIS — N81.11 CYSTOCELE, MIDLINE: Primary | ICD-10-CM

## 2019-01-31 PROCEDURE — 99203 OFFICE O/P NEW LOW 30 MIN: CPT | Performed by: UROLOGY

## 2019-01-31 NOTE — PROGRESS NOTES
S: Patient is a pleasant 78-year-old  female who is request to be seen by Jannette Smith for a consultation with regard to patient's vaginal prolapse.  Patient has had vaginal prolapse symptoms for last 4 years.  Lately, it seems to get little worse.  She notices vaginal bulging especially after bowel movements.  She has not required to push it back in yet.  She had no problem with bowel movements.  She has some urinary frequency however they are not bothersome.  She has no dysuria or hematuria.  She has no incontinence.  She is status post hysterectomy.  She had 4 vaginal delivery.  Current Outpatient Medications   Medication Sig Dispense Refill     ASPIRIN 81 MG OR TABS 1 TABLET DAILY       calcium carbonate-vitamin D (OSCAL W/D) 500-200 MG-UNIT tablet Take 1 tablet by mouth 2 times daily       Dentifrices (BIOTENE DRY MOUTH) PSTE Toothpaste and mouthwash       levothyroxine (SYNTHROID/LEVOTHROID) 50 MCG tablet TAKE 1 TABLET (50 MCG) BY MOUTH DAILY 90 tablet 3     metoprolol tartrate (LOPRESSOR) 25 MG tablet TAKE 1/2 TABLET (12.5 MG) BY MOUTH DAILY 45 tablet 3     ranitidine (ZANTAC) 150 MG tablet Take 1 tablet (150 mg) by mouth 2 times daily 180 tablet 3     simvastatin (ZOCOR) 20 MG tablet TAKE 0.5 TABLETS (10 MG) BY MOUTH AT BEDTIME (DUE FOR FASTING LABS) 45 tablet 3     THERAPEUTIC MULTIVITAMIN OR 1 TABLET DAILY       Allergies   Allergen Reactions     Azithromycin Diarrhea     Ciprofloxacin Other (See Comments)     Cipro, dizziness and abdominal pain     Penicillins      stomach upset, diarrhea     Sulfa Drugs      hives     Past Medical History:   Diagnosis Date     Lichen planus     vulvar     Osteopenia 4/2010    recheck DEXA in 2 years     Panic disorder without agoraphobia      Pure hypercholesterolemia     low LDLs     Raynaud's disease 6/2014     Unspecified essential hypertension      Past Surgical History:   Procedure Laterality Date     C APPENDECTOMY       C NONSPECIFIC PROCEDURE      Right  "inferior oblique recession, 14 mm     C VAG HYST,RMV TUBE/OVARY  1984    cervix removed     COLONOSCOPY  10/17/2007    normal, recheck in 10 years     COLONOSCOPY N/A 2014    Procedure: COLONOSCOPY;  Surgeon: Raffi Montiel MD;  Location: [facility]     COMBINED CYSTOSCOPY, INSERT CATHETER URETER Bilateral 10/13/2014    Procedure: COMBINED CYSTOSCOPY, INSERT CATHETER URETER;  Surgeon: Abdoulaye Odell MD;  Location[facility]     EYE SURGERY      right eye muscle repair     HC REMOVAL GALLBLADDER  2010    lap     HC REMOVAL OF OVARY/TUBE(S)      Salpingo-Oophorectomy, bilateral     HYSTERECTOMY, PAP NO LONGER INDICATED       INJECT EPIDURAL LUMBAR Right 4/10/2017    Procedure: INJECT EPIDURAL LUMBAR;  Surgeon: Raffi Wright MD;  [facility]     INJECT EPIDURAL TRANSFORAMINAL Right 2016    Procedure: INJECT EPIDURAL TRANSFORAMINAL;  Surgeon: Antonio Dasilva MD;  [facility]     INJECT EPIDURAL TRANSFORAMINAL Right 10/13/2016    Procedure: INJECT EPIDURAL TRANSFORAMINAL;  Surgeon: Antonio Dasilva MD;  [facility]     LAPAROSCOPIC ASSISTED COLECTOMY LEFT (DESCENDING) N/A 10/13/2014    Procedure: LAPAROSCOPIC ASSISTED COLECTOMY LEFT (DESCENDING);  Surgeon: Raffi Montiel MD;  [facility]      Family History   Problem Relation Age of Onset     Diabetes Brother      Heart Disease Brother 50        AMI     Diabetes Brother      Heart Disease Brother         AMI, \"older\"     Cerebrovascular Disease Brother      Psychotic Disorder Brother         , alcohol     Cancer Sister         breast cancer x2     Hypertension No family hx of      Breast Cancer No family hx of      Ovarian Cancer No family hx of      Prostate Cancer No family hx of      Mental Illness No family hx of      Anesthesia Reaction No family hx of      Osteoporosis No family hx of      Obesity No family hx of      Other Cancer No family hx of      Depression No family hx of      Anxiety Disorder No family hx of  "     Mental Illness No family hx of      Substance Abuse No family hx of      Social History     Socioeconomic History     Marital status:      Spouse name: None     Number of children: 4     Years of education: None     Highest education level: None   Social Needs     Financial resource strain: None     Food insecurity - worry: None     Food insecurity - inability: None     Transportation needs - medical: None     Transportation needs - non-medical: None   Occupational History     Occupation: dry cleaning   Tobacco Use     Smoking status: Never Smoker     Smokeless tobacco: Never Used   Substance and Sexual Activity     Alcohol use: No     Drug use: No     Sexual activity: No     Partners: Male     Birth control/protection: Surgical     Comment: not currently/hysterectomy   Other Topics Concern     Parent/sibling w/ CABG, MI or angioplasty before 65F 55M? No   Social History Narrative     None       REVIEW OF SYSTEMS  =================  C: NEGATIVE for fever, chills, change in weight  I: NEGATIVE for worrisome rashes, moles or lesions  E/M: NEGATIVE for ear, mouth and throat problems  R: NEGATIVE for significant cough or SHORTNESS OF BREATH  CV:  NEGATIVE for chest pain, palpitations or peripheral edema  GI: NEGATIVE for nausea, abdominal pain, heartburn, or change in bowel habits  NEURO: NEGATIVE numbness/weakness  : see HPI  PSYCH: NEGATIVE depression/anxiety  LYmph: no new enlarged lymph nodes  Ortho: no new trauma/movements      Physical Exam:  /70 (BP Location: Right arm, Patient Position: Chair, Cuff Size: Adult Large)   Pulse 76   Resp 16    Patient is pleasant, in no acute distress, good general condition.  Heart:  negative, PMI normal  Lung: no evidence of respiratory distress    Abdomen: Soft, nondistended, non tender. No masses. No rebound or guarding.   Exam: Normal urethra.  Grade 2-3 cystocele.  Atrophic vaginitis.  No vaginal discharge.  Bladder scan less than 100 mL.  Skin: Warm  and dry.  No redness.  Neuro: grossly normal  Musculaskeletal: moving all extremities  Psych normal mood and affect  Musculoskeletal  moving all extremities  Hematologic/Lymphatic/Immunologic: normal ant/post cervical, axillary, supraclavicular and inguinal nodes    Assessment/Plan:   Pleasant 78-year-old  female with cystocele and urinary frequency.  Symptoms are not bothersome.  I discussed treatment options with patient and  at length today.  Observation versus surgical options versus pessary were discussed.  She is not bothered by her symptoms right now therefore I would recommend observation.  She can follow-up to see me in the future if her prolapse worsen.  She was reassured.

## 2019-02-08 ENCOUNTER — THERAPY VISIT (OUTPATIENT)
Dept: PHYSICAL THERAPY | Facility: CLINIC | Age: 79
End: 2019-02-08
Payer: COMMERCIAL

## 2019-02-08 DIAGNOSIS — R29.3 POSTURE ABNORMALITY: ICD-10-CM

## 2019-02-08 DIAGNOSIS — M54.41 RIGHT-SIDED LOW BACK PAIN WITH RIGHT-SIDED SCIATICA, UNSPECIFIED CHRONICITY: ICD-10-CM

## 2019-02-08 DIAGNOSIS — M54.16 LUMBAR RADICULOPATHY: ICD-10-CM

## 2019-02-08 PROCEDURE — 97112 NEUROMUSCULAR REEDUCATION: CPT | Mod: GP | Performed by: PHYSICAL THERAPIST

## 2019-02-08 PROCEDURE — 97110 THERAPEUTIC EXERCISES: CPT | Mod: GP | Performed by: PHYSICAL THERAPIST

## 2019-02-08 PROCEDURE — 97530 THERAPEUTIC ACTIVITIES: CPT | Mod: GP | Performed by: PHYSICAL THERAPIST

## 2019-02-08 NOTE — PROGRESS NOTES
Subjective:  HPI                    Objective:  System    Physical Exam    General     ROS    Assessment/Plan:    DISCHARGE  REPORT    Progress reporting period is from 12/28/18 to 2/8/19.       SUBJECTIVE  Patient has completed 4 PT visits for this episode of care. She states she has been working on LE strengthening and increasing stride length. Has continued to perform sideglides regularly. Notes she can self correct posture but feel that this is fatiguing. Has not been having any radicular pain, and pain in low back has been minimal w/ her usual ADLs. Plans to continue independently w/ HEP for now.     Current pain level is 0/10  .     Initial Pain level: 4/10.   Changes in function:  Yes (See Goal flowsheet attached for changes in current functional level)  Adverse reaction to treatment or activity: None    OBJECTIVE  Continues to have R lateral trunk shift in standing and w/ ambulation, able to correct w/ cuing; Trunk positioning improves if she works on increasing gait speed. 6 min walk test: 1055 ft; sit<> 30 sec: >13     ASSESSMENT/PLAN  Updated problem list and treatment plan: Diagnosis 1:  R Sided Low back pain w/ R sided radiculopathy  Pain -  self management and home program  Decreased ROM/flexibility - home program  Decreased strength - home program  Impaired gait - home program  Impaired muscle performance - home program  Decreased function - home program  Impaired posture - home program  STG/LTGs have been met or progress has been made towards goals:  Yes (See Goal flow sheet completed today.)  Assessment of Progress: Patient is meeting short term goals and is progressing towards long term goals.  Self Management Plans:  Patient has been instructed in a home treatment program.  Patient is independent in self management of symptoms.  I have re-evaluated this patient and find that PT intervention is no longer required to meet STG/LTG if Milena continues to perform HEP regularly and continues to  work on posture correction with strategies given by PT.    Recommendations:  This patient is ready to be discharged from therapy and continue their home treatment program.    Please refer to the daily flowsheet for treatment today, total treatment time and time spent performing 1:1 timed codes.

## 2019-02-08 NOTE — LETTER
[facility]  [address]  [facility] [id number] [phone]    2019    Re: Milena Hamilton   :   1940  MRN:  [id number]   REFERRING PHYSICIAN:   Chuck Streeter    [facility]    Date of Initial Evaluation:  ***  Visits:  Rxs Used: 4  Reason for Referral:     Lumbar radiculopathy  Posture abnormality  Right-sided low back pain with right-sided sciatica, unspecified chronicity    EVALUATION SUMMARY    Subjective:  HPI                    Objective:  System    Physical Exam    General     ROS    Assessment/Plan:    DISCHARGE  REPORT    Progress reporting period is from 18 to 19.       SUBJECTIVE  Patient has completed 4 PT visits for this episode of care. She states she has been working on LE strengthening and increasing stride length. Has continued to perform sideglides regularly. Notes she can self correct posture but feel that this is fatiguing. Has not been having any radicular pain, and pain in low back has been minimal w/ her usual ADLs. Plans to continue independently w/ HEP for now.     Current pain level is 0/10  .     Initial Pain level: 4/10.   Changes in function:  Yes (See Goal flowsheet attached for changes in current functional level)  Adverse reaction to treatment or activity: None    OBJECTIVE  Continues to have R lateral trunk shift in standing and w/ ambulation, able to correct w/ cuing; Trunk positioning improves if she works on increasing gait speed. 6 min walk test: 1055 ft; sit<> 30 sec: >13     ASSESSMENT/PLAN  Updated problem list and treatment plan: Diagnosis 1:  R Sided Low back pain w/ R sided radiculopathy  Pain -  self management and home program  Decreased ROM/flexibility - home program  Decreased strength - home program  Impaired gait - home program  Impaired muscle performance - home program  Decreased function - home  program  Impaired posture - home program  STG/LTGs have been met or progress has been made towards goals:  Yes (See Goal flow sheet completed today.)  Assessment of Progress: Patient is meeting short term goals and is progressing towards long term goals.  Self Management Plans:  Patient has been instructed in a home treatment program.  Patient is independent in self management of symptoms.  I have re-evaluated this patient and find that PT intervention is no longer required to meet STG/LTG if Milena continues to perform HEP regularly and continues to work on posture correction with strategies given by PT.    Recommendations:  This patient is ready to be discharged from therapy and continue their home treatment program.      Thank you for your referral.    INQUIRIES  Therapist:    [facility]  [address] [place] [address] [address]  [facility] [address]  Phone: [phone]  Fax: [phone]

## 2019-02-19 ENCOUNTER — OFFICE VISIT (OUTPATIENT)
Dept: FAMILY MEDICINE | Facility: CLINIC | Age: 79
End: 2019-02-19
Payer: COMMERCIAL

## 2019-02-19 VITALS
DIASTOLIC BLOOD PRESSURE: 64 MMHG | HEIGHT: 61 IN | SYSTOLIC BLOOD PRESSURE: 120 MMHG | OXYGEN SATURATION: 98 % | HEART RATE: 67 BPM | TEMPERATURE: 97.8 F | BODY MASS INDEX: 25.6 KG/M2 | RESPIRATION RATE: 12 BRPM | WEIGHT: 135.6 LBS

## 2019-02-19 DIAGNOSIS — R07.0 THROAT PAIN: Primary | ICD-10-CM

## 2019-02-19 DIAGNOSIS — J06.9 UPPER RESPIRATORY TRACT INFECTION, UNSPECIFIED TYPE: ICD-10-CM

## 2019-02-19 LAB
DEPRECATED S PYO AG THROAT QL EIA: NORMAL
SPECIMEN SOURCE: NORMAL

## 2019-02-19 PROCEDURE — 87880 STREP A ASSAY W/OPTIC: CPT | Performed by: NURSE PRACTITIONER

## 2019-02-19 PROCEDURE — 99213 OFFICE O/P EST LOW 20 MIN: CPT | Performed by: NURSE PRACTITIONER

## 2019-02-19 PROCEDURE — 87081 CULTURE SCREEN ONLY: CPT | Performed by: NURSE PRACTITIONER

## 2019-02-19 ASSESSMENT — ANXIETY QUESTIONNAIRES
3. WORRYING TOO MUCH ABOUT DIFFERENT THINGS: NOT AT ALL
5. BEING SO RESTLESS THAT IT IS HARD TO SIT STILL: NOT AT ALL
4. TROUBLE RELAXING: NOT AT ALL
GAD7 TOTAL SCORE: 0
2. NOT BEING ABLE TO STOP OR CONTROL WORRYING: NOT AT ALL
7. FEELING AFRAID AS IF SOMETHING AWFUL MIGHT HAPPEN: NOT AT ALL
6. BECOMING EASILY ANNOYED OR IRRITABLE: NOT AT ALL
GAD7 TOTAL SCORE: 0
1. FEELING NERVOUS, ANXIOUS, OR ON EDGE: NOT AT ALL
7. FEELING AFRAID AS IF SOMETHING AWFUL MIGHT HAPPEN: NOT AT ALL
GAD7 TOTAL SCORE: 0

## 2019-02-19 ASSESSMENT — PATIENT HEALTH QUESTIONNAIRE - PHQ9
SUM OF ALL RESPONSES TO PHQ QUESTIONS 1-9: 0
10. IF YOU CHECKED OFF ANY PROBLEMS, HOW DIFFICULT HAVE THESE PROBLEMS MADE IT FOR YOU TO DO YOUR WORK, TAKE CARE OF THINGS AT HOME, OR GET ALONG WITH OTHER PEOPLE: NOT DIFFICULT AT ALL
SUM OF ALL RESPONSES TO PHQ QUESTIONS 1-9: 0

## 2019-02-19 ASSESSMENT — MIFFLIN-ST. JEOR: SCORE: 1037.46

## 2019-02-19 NOTE — PROGRESS NOTES
SUBJECTIVE:     History of Present Illness   Frequency of exercise:  6-7 days/week  Duration of exercise:  15-30 minutes  Taking medications regularly:  Yes  Medication side effects:  Other    Acute Illness   Acute illness concerns: swollen glands   Onset: 5 days     Fever: no     Chills/Sweats: YES, chills.     Headache (location?): no     Sinus Pressure:no    Conjunctivitis:  no    Ear Pain: YES, bilateral.     Rhinorrhea: no    Congestion: no     Sore Throat: YES, very dry and hurts to swallow.     Cough: YES, dry cough.     Wheeze: no     Decreased Appetite: no     Nausea: no     Vomiting: no     Diarrhea:  no     Dysuria/Freq.: no     Fatigue/Achiness: YES, both.     Sick/Strep Exposure: no      Therapies Tried and outcome: Ibuprofen prn      Hyperlipidemia Follow-Up      Rate your low fat/cholesterol diet?: fair    Taking statin?  Yes, no muscle aches from statin    Other lipid medications/supplements?:  none    Hypertension Follow-up      Outpatient blood pressures are not being checked.    Low Salt Diet: no added salt    Hypothyroidism Follow-up      Since last visit, patient describes the following symptoms: Weight stable, no hair loss, no skin changes, no constipation, no loose stools    Milena Hamilton is a 78 year old female who presents to clinic today for the following health issues:  She feels like there is something in her glands, she has had issues with her salivary glands in the past.  She noticed it last week when her mouth felt very dry, she had to drink a lot of water to be able to swallow food. Patient mentions she had her labs completed in December. She has a living will on file. She is hoping that she does not have strep throat. It especially hurts when she swallows. Positive for chills. Denies fevers. She tries to drink a lot of water during the day. She has been taking Ibuprofen as needed. Patient mentions that she manipulates her lymph and touches them a lot.     Problem list and  histories reviewed & adjusted, as indicated.  Additional history: as documented    Patient Active Problem List   Diagnosis     Lichen planus     Family history of diabetes mellitus     Tinnitus     Panic disorder without agoraphobia     Gastritis     Osteopenia     Hyperlipidemia     Anxiety     DDD (degenerative disc disease), cervical     Advanced directives, counseling/discussion     Sacroiliac dysfunction     Chronic constipation     History of diverticulitis - s/p left hemicolectomy     Diarrhea     Health Care Home     Raynaud's disease     HTN (hypertension)     Benign skin lesion     Community acquired pneumonia     Hypothyroidism, unspecified type     Premature atrial beats     Acquired hypothyroidism     Gastroesophageal reflux disease, esophagitis presence not specified     Intractable vomiting     Gastroenteritis     After cataract, left eye     Cortical age-related cataract     Cortical senile cataract     Scoliosis of lumbar spine, unspecified scoliosis type     Past Surgical History:   Procedure Laterality Date     C APPENDECTOMY       C NONSPECIFIC PROCEDURE      Right inferior oblique recession, 14 mm     C VAG HYST,RMV TUBE/OVARY  1984    cervix removed     COLONOSCOPY  10/17/2007    normal, recheck in 10 years     COLONOSCOPY N/A 8/27/2014    Procedure: COLONOSCOPY;  Surgeon: Raffi Montiel MD;  Location:  GI     COMBINED CYSTOSCOPY, INSERT CATHETER URETER Bilateral 10/13/2014    Procedure: COMBINED CYSTOSCOPY, INSERT CATHETER URETER;  Surgeon: Abdoulaye Odell MD;  Location:  OR     EYE SURGERY  8/01    right eye muscle repair     HC REMOVAL GALLBLADDER  2/9/2010    lap     HC REMOVAL OF OVARY/TUBE(S)      Salpingo-Oophorectomy, bilateral     HYSTERECTOMY, PAP NO LONGER INDICATED       INJECT EPIDURAL LUMBAR Right 4/10/2017    Procedure: INJECT EPIDURAL LUMBAR;  Surgeon: Raffi Wright MD;  Location:  OR     INJECT EPIDURAL TRANSFORAMINAL Right 6/9/2016    Procedure: INJECT  "EPIDURAL TRANSFORAMINAL;  Surgeon: Antonio Dasilva MD;  [facility]     INJECT EPIDURAL TRANSFORAMINAL Right 10/13/2016    Procedure: INJECT EPIDURAL TRANSFORAMINAL;  Surgeon: Antonio Dasilva MD;  [facility]     LAPAROSCOPIC ASSISTED COLECTOMY LEFT (DESCENDING) N/A 10/13/2014    Procedure: LAPAROSCOPIC ASSISTED COLECTOMY LEFT (DESCENDING);  Surgeon: Raffi Montiel MD;  [facility]       Social History     Tobacco Use     Smoking status: Never Smoker     Smokeless tobacco: Never Used   Substance Use Topics     Alcohol use: No     Family History   Problem Relation Age of Onset     Diabetes Brother      Heart Disease Brother 50        AMI     Diabetes Brother      Heart Disease Brother         AMI, \"older\"     Cerebrovascular Disease Brother      Psychotic Disorder Brother         , alcohol     Cancer Sister         breast cancer x2     Hypertension No family hx of      Breast Cancer No family hx of      Ovarian Cancer No family hx of      Prostate Cancer No family hx of      Mental Illness No family hx of      Anesthesia Reaction No family hx of      Osteoporosis No family hx of      Obesity No family hx of      Other Cancer No family hx of      Depression No family hx of      Anxiety Disorder No family hx of      Mental Illness No family hx of      Substance Abuse No family hx of          Current Outpatient Medications   Medication Sig Dispense Refill     ASPIRIN 81 MG OR TABS 1 TABLET DAILY       calcium carbonate-vitamin D (OSCAL W/D) 500-200 MG-UNIT tablet Take 1 tablet by mouth 2 times daily       Dentifrices (BIOTENE DRY MOUTH) PSTE Toothpaste and mouthwash       levothyroxine (SYNTHROID/LEVOTHROID) 50 MCG tablet TAKE 1 TABLET (50 MCG) BY MOUTH DAILY 90 tablet 3     metoprolol tartrate (LOPRESSOR) 25 MG tablet TAKE 1/2 TABLET (12.5 MG) BY MOUTH DAILY 45 tablet 3     simvastatin (ZOCOR) 20 MG tablet TAKE 0.5 TABLETS (10 MG) BY MOUTH AT BEDTIME (DUE FOR FASTING LABS) 45 tablet 3     THERAPEUTIC " MULTIVITAMIN OR 1 TABLET DAILY       ranitidine (ZANTAC) 150 MG tablet Take 1 tablet (150 mg) by mouth 2 times daily (Patient not taking: Reported on 2/19/2019) 180 tablet 3     Allergies   Allergen Reactions     Azithromycin Diarrhea     Ciprofloxacin Other (See Comments)     Cipro, dizziness and abdominal pain     Penicillins      stomach upset, diarrhea     Sulfa Drugs      hives     Recent Labs   Lab Test 12/12/18  0956 02/28/18  0513 02/27/18  0445 12/06/17  1029 08/10/17  0758 03/29/17  1145  11/30/16  1031  03/23/16  0625   LDL 89  --   --   --  107*  --   --  91  --   --    HDL 82  --   --   --  96  --   --  81  --   --    TRIG 93  --   --   --  72  --   --  80  --   --    ALT  --   --  14  --   --  28  --   --   --  19   CR 0.67 0.62 0.72  --   --  0.72   < >  --    < > 0.72   GFRESTIMATED 86 >90 79  --   --  79   < >  --    < > 79   GFRESTBLACK >90 >90 >90  --   --  >90  African American GFR Calc     < >  --    < > >90   GFR Calc     POTASSIUM 4.1 3.5 3.4  --   --  3.8   < >  --    < > 3.6   TSH 1.43  --   --  1.18 1.89  --   --  1.37  --   --     < > = values in this interval not displayed.      BP Readings from Last 3 Encounters:   02/19/19 120/64   01/31/19 124/70   01/23/19 145/72    Wt Readings from Last 3 Encounters:   02/19/19 61.5 kg (135 lb 9.6 oz)   01/23/19 60.8 kg (134 lb)   12/19/18 61.2 kg (135 lb)        Labs reviewed in EPIC    ROS:  Constitutional, neuro, ENT, endocrine, pulmonary, cardiac, gastrointestinal, genitourinary, musculoskeletal, integument and psychiatric systems are negative, except as otherwise noted.    This document serves as a record of the services and decisions personally performed and made by Rosemarie Cardoza CNP. It was created on his/her behalf by Misti Yan, junior medical scribe. The creation of this document is based the provider's statements to the medical scribes.    Danielle Yan 2:23 PM, February 19, 2019    OBJECTIVE:        "                                             /64   Pulse 67   Temp 97.8  F (36.6  C) (Oral)   Resp 12   Ht 1.549 m (5' 1\")   Wt 61.5 kg (135 lb 9.6 oz)   LMP  (LMP Unknown)   SpO2 98%   Breastfeeding? No   BMI 25.62 kg/m    Body mass index is 25.62 kg/m .  GENERAL APPEARANCE: healthy, alert and no distress  HENT: ear canals and TM's normal and nose and mouth without ulcers or lesions  NECK: no adenopathy, no asymmetry, masses, or scars and thyroid normal to palpation  RESP: lungs clear to auscultation - no rales, rhonchi or wheezes  CV: regular rates and rhythm, normal S1 S2, no S3 or S4 and no murmur, click or rub  LYMPHATICS: no cervical adenopathy  SKIN: no suspicious lesions or rashes  PSYCH: mentation appears normal and affect normal/bright    Diagnostic Test Results:  Results for orders placed or performed in visit on 02/19/19 (from the past 24 hour(s))   Strep, Rapid Screen   Result Value Ref Range    Specimen Description Throat     Rapid Strep A Screen       NEGATIVE: No Group A streptococcal antigen detected by immunoassay, await culture report.        ASSESSMENT/PLAN:                                                        ICD-10-CM    1. Throat pain R07.0 Strep, Rapid Screen     Beta strep group A culture   2. Upper respiratory tract infection, unspecified type J06.9      Throat pain- strep throat culture ordered -negative. Recommended watchful waiting. Advised home care.     Advised she discontinues manipulating her lymph nodes to help them heal quicker.     Recommended she takes Tylenol instead of Ibuprofen, as this has impact on kidneys.     Pt. Had questions about nocturia, discussed Detrol -like meds. Pt. Declines wanting further eval due to not wanting more medications/SA/meds. Denies UTI symptoms. Re-eval when pt. Requests.     Follow up with Provider in 1 year for a physical exam or PRN.    The information in this document, created by the medical scribe for me, accurately reflects " the services I personally performed and the decisions made by me. I have reviewed and approved this document for accuracy prior to leaving the patient care area.  Rosemarie Cardoza CNP  2:45 PM, February 19, 2019    FORREST Nolasco CNP  [facility]

## 2019-02-20 LAB
BACTERIA SPEC CULT: NORMAL
SPECIMEN SOURCE: NORMAL

## 2019-02-20 ASSESSMENT — ANXIETY QUESTIONNAIRES: GAD7 TOTAL SCORE: 0

## 2019-02-20 ASSESSMENT — PATIENT HEALTH QUESTIONNAIRE - PHQ9: SUM OF ALL RESPONSES TO PHQ QUESTIONS 1-9: 0

## 2019-04-05 NOTE — PROGRESS NOTES
SUBJECTIVE:   Milena Hamilton is a 78 year old female who presents to clinic today for the following health issues:    HPI     Back Pain       Duration: 2 years - but back has been bad for two weeks        Specific cause: none    Description:   Location of pain: low back   Character of pain: sharp  Pain radiation:radiates into the right leg  New numbness or weakness in legs, not attributed to pain:  YES    Intensity: Currently 8/10    History:   Pain interferes with job: Not applicable  History of back problems: YES - had surgery previously  Any previous MRI or X-rays: Yes- at [place].  Date 01/2018  Sees a specialist for back pain:  YES - Dr. Broderick at [facility]   Therapies tried without relief: MagniLife supplement, 4 per day for the past 2 days with some relief    Alleviating factors:   Improved by: opioids from Dr. Broderick - laying down helps     Precipitating factors:  Worsened by: Bending, Standing, Sitting and Walking      Accompanying Signs & Symptoms:  Risk of Fracture:  None  Risk of Cauda Equina:  None  Risk of Infection:  None  Risk of Cancer:  None  Risk of Ankylosing Spondylitis:  Onset at age <35, male, AND morning back stiffness. no       She had a right L5 hemilaminectomy with right L5-S1 foraminotomies and microdiscectomy with Dr. Gutierrez Broderick on March 8, 2018. She has done fairly well since then although has had some residual right low back pain and occasional right lower extremity pain. Over the past few weeks, the pain has intensified in her right low back with worsening pain, numbness and tingling down the right lower extremity to the foot. She is having difficulty walking at times due to the pain and it is waking her up at night. She had some pain medications left from her surgery last year that she has been taking which have helped but she ran out. She is not sure what medication it was. She also bought a MagniLife back pain supplement that she started on Saturday  "with minimal relief. She has been more stressed and anxious recently as her 's brother just passed away and they were both very close to him. She denies any recent fall or injury. She has been doing her home stretching exercises diligently.      Problem list and histories reviewed & adjusted, as indicated.  Additional history: none    ROS:  GENERAL: Denies fever, fatigue, weakness, weight gain, or weight loss.  CARDIOVASCULAR: Denies chest pain, shortness of breath, irregular heartbeats, palpitations, or edema.  RESPIRATORY: Denies cough, hemoptysis, and shortness of breath.  MUSCULOSKELETAL: +Right low back pain, right leg pain.   NEUROLOGIC: +Right leg numbness and tingling. Denies headache, fainting, dizziness, memory loss, or seizures.    OBJECTIVE:     /84   Pulse 67   Temp 97.8  F (36.6  C) (Temporal)   Resp 16   Ht 1.549 m (5' 1\")   Wt 60.8 kg (134 lb)   LMP  (LMP Unknown)   SpO2 100%   BMI 25.32 kg/m    Body mass index is 25.32 kg/m .  GENERAL: healthy, alert and no distress  MS: no gross musculoskeletal defects noted. Tenderness over the right lower lateral lumbar paraspinal area. Positive straight leg raise on the right. Negative Jennifer testing.   NEURO: Normal strength and tone, mentation intact and speech normal. Cranial nerves II-XII are grossly intact. DTRs are 2+/4 throughout and symmetric. Gait slowed and minimally antalgic but stable.     ASSESSMENT/PLAN:       ICD-10-CM    1. Chronic right-sided low back pain with right-sided sciatica M54.41 methylPREDNISolone (MEDROL DOSEPAK) 4 MG tablet therapy pack    G89.29 HYDROcodone-acetaminophen (NORCO) 5-325 MG tablet   2. Anxiety F41.9 ALPRAZolam (XANAX) 0.25 MG tablet         Patient with worsening right low back pain and right lower extremity pain over the past few weeks, similar in distribution to her previous pain prior to her lumbar surgery 1 year ago. She likely has some nerve inflammation/irritation which is exacerbated from her " increased stress due to the death of a loved one. Will prescribe a Medrol dose pack to take as directed with food to help with the pain and inflammation. I will also provide a short, one time prescription of Norco 5/325 to take as needed for severe pain. I recommend she continue with home stretching and strengthening exercises and also recommend heat to help loosen up her lower spinal muscles. If symptoms are not improving over the next few weeks, she will let me know and will order an updated lumbar MRI along with PT in either [place] or [place] as she did not feel the previous PT at [facility] was very helpful.    2. Xanax refilled to use as needed for sleep and anxiety. Her last 30 day prescription lasted over 3 months. CSA on file.      Chuck Streeter PA-C  [facility]

## 2019-04-08 ENCOUNTER — OFFICE VISIT (OUTPATIENT)
Dept: FAMILY MEDICINE | Facility: OTHER | Age: 79
End: 2019-04-08
Payer: COMMERCIAL

## 2019-04-08 VITALS
DIASTOLIC BLOOD PRESSURE: 84 MMHG | WEIGHT: 134 LBS | HEIGHT: 61 IN | TEMPERATURE: 97.8 F | SYSTOLIC BLOOD PRESSURE: 122 MMHG | OXYGEN SATURATION: 100 % | BODY MASS INDEX: 25.3 KG/M2 | RESPIRATION RATE: 16 BRPM | HEART RATE: 67 BPM

## 2019-04-08 DIAGNOSIS — F41.9 ANXIETY: ICD-10-CM

## 2019-04-08 DIAGNOSIS — M54.41 CHRONIC RIGHT-SIDED LOW BACK PAIN WITH RIGHT-SIDED SCIATICA: Primary | ICD-10-CM

## 2019-04-08 DIAGNOSIS — G89.29 CHRONIC RIGHT-SIDED LOW BACK PAIN WITH RIGHT-SIDED SCIATICA: Primary | ICD-10-CM

## 2019-04-08 PROCEDURE — 99214 OFFICE O/P EST MOD 30 MIN: CPT | Performed by: PHYSICIAN ASSISTANT

## 2019-04-08 RX ORDER — HYDROCODONE BITARTRATE AND ACETAMINOPHEN 5; 325 MG/1; MG/1
1 TABLET ORAL EVERY 6 HOURS PRN
Qty: 10 TABLET | Refills: 0 | Status: SHIPPED | OUTPATIENT
Start: 2019-04-08 | End: 2019-05-30

## 2019-04-08 RX ORDER — ALPRAZOLAM 0.25 MG
TABLET ORAL
Qty: 30 TABLET | Refills: 0 | Status: SHIPPED | OUTPATIENT
Start: 2019-04-08 | End: 2019-08-20

## 2019-04-08 RX ORDER — METHYLPREDNISOLONE 4 MG
TABLET, DOSE PACK ORAL
Qty: 21 TABLET | Refills: 0 | Status: SHIPPED | OUTPATIENT
Start: 2019-04-08 | End: 2019-05-30

## 2019-04-08 ASSESSMENT — PAIN SCALES - GENERAL: PAINLEVEL: EXTREME PAIN (8)

## 2019-04-08 ASSESSMENT — MIFFLIN-ST. JEOR: SCORE: 1030.2

## 2019-04-08 NOTE — PATIENT INSTRUCTIONS
Will place you on a steroid back to take as directed with food to help with the pain and inflammation.  I will also prescribe Norco to take as needed for severe pain but this will be a 1 time prescription as this can be addictive.  Do not take this with the alprazolam.  Continue with home stretching and I also recommend heat.  If not improving over the next few weeks, let me know and we may need to order an updated MRI or physical therapy.    Will refill alprazolam to use as needed for anxiety.

## 2019-04-22 ENCOUNTER — TELEPHONE (OUTPATIENT)
Dept: FAMILY MEDICINE | Facility: OTHER | Age: 79
End: 2019-04-22

## 2019-04-22 DIAGNOSIS — G89.29 CHRONIC RIGHT-SIDED LOW BACK PAIN WITH RIGHT-SIDED SCIATICA: Primary | ICD-10-CM

## 2019-04-22 DIAGNOSIS — M54.41 CHRONIC RIGHT-SIDED LOW BACK PAIN WITH RIGHT-SIDED SCIATICA: Primary | ICD-10-CM

## 2019-04-22 NOTE — TELEPHONE ENCOUNTER
Lm informing patient orders were placed and MRI for [place] [phone] or [place] [phone]- and that PT will call her to schedule for [place] or [place].

## 2019-04-22 NOTE — TELEPHONE ENCOUNTER
Reason for Call: Request for an order or referral:    Order or referral being requested: orders    Date needed: as soon as possible    Has the patient been seen by the PCP for this problem? YES    Additional comments: Patient was seen and was told if not improving to call you and you will order MRI and PT in [place] or  [place].  Her symptoms are not improving and would to do both of them.     Phone number Patient can be reached at:  Home number on file [phone] (home) or Cell number on file:    Telephone Information:   Mobile [phone]       Best Time:  any    Can we leave a detailed message on this number?  YES    Call taken on 4/22/2019 at 12:36 PM by Inna Hodge

## 2019-04-22 NOTE — TELEPHONE ENCOUNTER
Will route to  to review/place orders as appropriate   Thea Pickens CMA      Copied from LOV 4/8/19:     If symptoms are not improving over the next few weeks, she will let me know and will order an updated lumbar MRI along with PT in either [place] or [place] as she did not feel the previous PT at [facility] was very helpful.

## 2019-04-25 ENCOUNTER — TELEPHONE (OUTPATIENT)
Dept: FAMILY MEDICINE | Facility: OTHER | Age: 79
End: 2019-04-25

## 2019-04-25 ENCOUNTER — HOSPITAL ENCOUNTER (OUTPATIENT)
Dept: MRI IMAGING | Facility: CLINIC | Age: 79
Discharge: HOME | End: 2019-04-25
Attending: PHYSICIAN ASSISTANT | Admitting: PHYSICIAN ASSISTANT
Payer: COMMERCIAL

## 2019-04-25 DIAGNOSIS — M54.41 CHRONIC RIGHT-SIDED LOW BACK PAIN WITH RIGHT-SIDED SCIATICA: ICD-10-CM

## 2019-04-25 DIAGNOSIS — G89.29 CHRONIC RIGHT-SIDED LOW BACK PAIN WITH RIGHT-SIDED SCIATICA: ICD-10-CM

## 2019-04-25 LAB
CREAT BLD-MCNC: 0.7 MG/DL (ref 0.52–1.04)
GFR SERPL CREATININE-BSD FRML MDRD: 81 ML/MIN/{1.73_M2}

## 2019-04-25 PROCEDURE — 72158 MRI LUMBAR SPINE W/O & W/DYE: CPT

## 2019-04-25 PROCEDURE — 82565 ASSAY OF CREATININE: CPT

## 2019-04-25 PROCEDURE — 25500064 ZZH RX 255 OP 636: Performed by: RADIOLOGY

## 2019-04-25 PROCEDURE — A9585 GADOBUTROL INJECTION: HCPCS | Performed by: RADIOLOGY

## 2019-04-25 RX ORDER — GADOBUTROL 604.72 MG/ML
7.5 INJECTION INTRAVENOUS ONCE
Status: COMPLETED | OUTPATIENT
Start: 2019-04-25 | End: 2019-04-25

## 2019-04-25 RX ADMIN — GADOBUTROL 6 ML: 604.72 INJECTION INTRAVENOUS at 10:01

## 2019-04-25 NOTE — TELEPHONE ENCOUNTER
CECILIO:Please call pt.      pt called back extremely confused she sounded like she was in tears on the phone. She stated she didn't want to have back surgery I explained you wanted her to do PT like you guys had already discussed and seemed very concerned about having to see a surgeon as she has already seen one and she doesn't need another back surgery. Pt would feel most comfortable if JM called her as she states she trusts him he is her doctor.     Lesly Campoverde MA

## 2019-04-26 NOTE — TELEPHONE ENCOUNTER
I spoke with Milena and told her that the reason I said she should maybe see a surgeon again if pain is not improving is due to discuss possible injections again, not to necessarily undergo any more surgery because she would like to avoid more surgery and I would like her to avoid this as well. Will try PT, she starts Tuesday, and if not improving, will consider another JOSE with Dr. Dasilva.     Chuck Streeter PA-C

## 2019-04-30 ENCOUNTER — HOSPITAL ENCOUNTER (OUTPATIENT)
Dept: PHYSICAL THERAPY | Facility: CLINIC | Age: 79
Setting detail: THERAPIES SERIES
End: 2019-04-30
Attending: PHYSICIAN ASSISTANT
Payer: COMMERCIAL

## 2019-04-30 DIAGNOSIS — G89.29 CHRONIC RIGHT-SIDED LOW BACK PAIN WITH RIGHT-SIDED SCIATICA: ICD-10-CM

## 2019-04-30 DIAGNOSIS — M54.41 CHRONIC RIGHT-SIDED LOW BACK PAIN WITH RIGHT-SIDED SCIATICA: ICD-10-CM

## 2019-04-30 PROCEDURE — 97110 THERAPEUTIC EXERCISES: CPT | Mod: GP

## 2019-04-30 PROCEDURE — 97162 PT EVAL MOD COMPLEX 30 MIN: CPT | Mod: GP

## 2019-04-30 NOTE — PROGRESS NOTES
04/30/19 1526   General Information   Type of Visit Initial OP Ortho PT Evaluation   Start of Care Date 04/30/19   Referring Physician Chuck Streeter PA-C   Patient/Family Goals Statement To reduce her low back pain   Orders Evaluate and Treat   Date of Order 04/22/19   Insurance Type Medicare   Medical Diagnosis Chronic Right-Sided low back pain with right sided sciatica   Surgical/Medical history reviewed Yes   Precautions/Limitations no known precautions/limitations   General Information Comments Patient arrived to PT wanted answers regarding what her MRI showed, what was performed in her surgery and antomically/physiologically how can her pain be explained. Because so many questions were answered approximately 90% of todays visits was verbal discussion and education with limited physical tests being performed.       Present No   Body Part(s)   Body Part(s) Lumbar Spine/SI   Presentation and Etiology   Impairments A. Pain;D. Decreased ROM;E. Decreased flexibility;F. Decreased strength and endurance;G. Impaired balance;H. Impaired gait;J. Burning;K. Numbness;L. Tingling   Functional Limitations perform activities of daily living;perform desired leisure / sports activities;perform required work activities   Symptom Location Right side low back and down the right lower extremities   How/Where did it occur From insidious onset   Onset date of current episode/exacerbation 01/01/15   Chronicity Chronic   Pain rating (0-10 point scale) Worst (/10)   Worst (/10) 10   Pain quality A. Sharp;D. Burning;E. Shooting;F. Stabbing   Frequency of pain/symptoms A. Constant   Pain/symptoms exacerbated by A. Sitting;B. Walking;C. Lifting;D. Carrying;G. Certain positions;I. Bending;J. ADL;K. Home tasks;L. Work tasks   Pain/symptoms eased by I. OTC medication(s)   Progression of symptoms since onset: Worsened   Current / Previous Interventions   Diagnostic Tests: MRI   MRI Results Results   MRI results Four  lumbar vertebra, right L4-S1 lateral stenosis, degenerative disc disease and L3-L4/L4-S1 moderate facet arthropathy   Prior Level of Function   Prior Level of Function-Mobility Ind   Prior Level of Function-ADLs Ind   Current Level of Function   Current Community Support Family/friend caregiver   Fall Risk Screen   Fall screen completed by PT   Have you fallen 2 or more times in the past year? No   Have you fallen and had an injury in the past year? No   Is patient a fall risk? No   Lumbar Spine/SI Objective Findings   Observation Patient ambulates with a right lateral shift of the trunk/spine   Integumentary Unremarkable   Posture Patient ambulates with a right lateral shift of the spine. No evidence of this in sitting or supine   Gait/Locomotion Patient ambulates with a right lateral shift of the trunk/spine   Flexion ROM Limited   Extension ROM Limited   SLR Positive on RLE   Repeated Extension Prone Does not tolerate prone due to pain   Slump Test Positive on RLE   Planned Therapy Interventions   Planned Therapy Interventions ADL retraining;balance training;fine motor coordination training;gait training;joint mobilization;manual therapy;neuromuscular re-education;ROM;strengthening;stretching   Planned Therapy Interventions Comment Emphasis on Pain Reduction followed by restoring function   Planned Modality Interventions   Planned Modality Interventions Ultrasound;Traction;TENS;Hot packs;Electrical stimulation;Cryotherapy;Biofeedback   Planned Modality Interventions Comments PRN Only   Clinical Impression   Criteria for Skilled Therapeutic Interventions Met yes, treatment indicated   PT Diagnosis Right sided LBP   Influenced by the following impairments Right sided low back pain and radicular pain limiting her ROM   Functional limitations due to impairments Patient cannot perform the same task (sitting, standing, walking, etc) repetitively without experience eventual pain   Clinical Presentation Evolving/Changing    Clinical Presentation Rationale Based on observation, evaluation, medical history and clinical reasoning   Clinical Decision Making (Complexity) Moderate complexity   Therapy Frequency 1 time/week   Predicted Duration of Therapy Intervention (days/wks) 12-16 weeks   Risk & Benefits of therapy have been explained Yes   Patient, Family & other staff in agreement with plan of care Yes   Clinical Impression Comments Patient presents with chronic right sided low back pain that has no responded to chiropractic care, previous physical therapy, surgery, pain medication or steroids. Her symptoms are right sided and radiate down to her right toes down an L4 dermatome. Patient's MRI indicates severe lateral stenosis, severe degenerative disc disease, moderate facet arthropathy and only 4 lumbar spine vertebra. The evaluation consisted primarily of all verbal discussion about her condition and education on her surgery, MRI and symptoms.    ORTHO GOALS   PT Ortho Eval Goals 1;2;3   Ortho Goal 1   Goal Identifier Walking   Goal Description Patient will be able to walk one mile without experiencing an increase in pain   Target Date 07/28/19   Ortho Goal 2   Goal Identifier Obdulia/JOEY   Goal Description Patient's Obdulia/JOEY scores will reduce to 2 or less and 10 or less respectively.   Target Date 07/28/19   Ortho Goal 3   Goal Identifier ROM   Goal Description Patient will have full and painfree ROM in her lumbar spine   Target Date 07/28/19   Total Evaluation Time   PT Eval, Moderate Complexity Minutes (64341) 59   Therapy Certification   Certification date from 04/30/19   Certification date to 07/28/19   Medical Diagnosis Right sided low back pain with sciatica

## 2019-04-30 NOTE — PROGRESS NOTES
[facility]          OUTPATIENT PHYSICAL THERAPY ORTHOPEDIC EVALUATION  PLAN OF TREATMENT FOR OUTPATIENT REHABILITATION  (COMPLETE FOR INITIAL CLAIMS ONLY)  Patient's Last Name, First Name, M.I.  YOB: 1940  DanielzainMilena MAK    Provider s Name:  [facility]   Medical Record No.  [id number]   Start of Care Date:  04/30/19   Onset Date:  01/01/15   Type:     _X__PT   ___OT   ___SLP Medical Diagnosis:  Right sided low back pain with sciatica     PT Diagnosis:  Right sided LBP   Visits from SOC:  1      _________________________________________________________________________________  Plan of Treatment/Functional Goals:  ADL retraining, balance training, fine motor coordination training, gait training, joint mobilization, manual therapy, neuromuscular re-education, ROM, strengthening, stretching  Emphasis on Pain Reduction followed by restoring function  Ultrasound, Traction, TENS, Hot packs, Electrical stimulation, Cryotherapy, Biofeedback  PRN Only  Goals  Goal Identifier: Walking  Goal Description: Patient will be able to walk one mile without experiencing an increase in pain  Target Date: 07/28/19    Goal Identifier: Obdulia/JOEY  Goal Description: Patient's Obdulia/JOEY scores will reduce to 2 or less and 10 or less respectively.  Target Date: 07/28/19    Goal Identifier: ROM  Goal Description: Patient will have full and painfree ROM in her lumbar spine  Target Date: 07/28/19                                                           Therapy Frequency:  1 time/week  Predicted Duration of Therapy Intervention:  12-16 weeks    Dilshad Calix, PT                 I CERTIFY THE NEED FOR THESE SERVICES FURNISHED UNDER        THIS PLAN OF TREATMENT AND WHILE UNDER MY CARE     (Physician co-signature of this document indicates review and certification of the therapy plan).                       Certification Date From:  04/30/19   Certification Date To:   07/28/19    Referring Provider:  Chuck Streeter PA-C    Initial Assessment        See Epic Evaluation Start of Care Date: 04/30/19

## 2019-05-09 ENCOUNTER — HOSPITAL ENCOUNTER (OUTPATIENT)
Dept: PHYSICAL THERAPY | Facility: CLINIC | Age: 79
Setting detail: THERAPIES SERIES
End: 2019-05-09
Attending: PHYSICIAN ASSISTANT
Payer: COMMERCIAL

## 2019-05-09 PROCEDURE — 97530 THERAPEUTIC ACTIVITIES: CPT | Mod: GP

## 2019-05-09 PROCEDURE — 97110 THERAPEUTIC EXERCISES: CPT | Mod: GP

## 2019-05-10 ENCOUNTER — HOSPITAL ENCOUNTER (OUTPATIENT)
Dept: PHYSICAL THERAPY | Facility: CLINIC | Age: 79
Setting detail: THERAPIES SERIES
End: 2019-05-10
Attending: PHYSICIAN ASSISTANT
Payer: COMMERCIAL

## 2019-05-10 PROCEDURE — 97110 THERAPEUTIC EXERCISES: CPT | Mod: GP

## 2019-05-15 ENCOUNTER — HOSPITAL ENCOUNTER (OUTPATIENT)
Dept: PHYSICAL THERAPY | Facility: CLINIC | Age: 79
Setting detail: THERAPIES SERIES
End: 2019-05-15
Attending: PHYSICIAN ASSISTANT
Payer: COMMERCIAL

## 2019-05-15 PROCEDURE — 97112 NEUROMUSCULAR REEDUCATION: CPT | Mod: GP

## 2019-05-15 PROCEDURE — 97110 THERAPEUTIC EXERCISES: CPT | Mod: GP

## 2019-05-22 ENCOUNTER — HOSPITAL ENCOUNTER (OUTPATIENT)
Dept: PHYSICAL THERAPY | Facility: CLINIC | Age: 79
Setting detail: THERAPIES SERIES
End: 2019-05-22
Attending: PHYSICIAN ASSISTANT
Payer: COMMERCIAL

## 2019-05-22 PROCEDURE — 97110 THERAPEUTIC EXERCISES: CPT | Mod: GP

## 2019-05-22 PROCEDURE — 97010 HOT OR COLD PACKS THERAPY: CPT | Mod: GP

## 2019-05-24 ENCOUNTER — HOSPITAL ENCOUNTER (OUTPATIENT)
Dept: PHYSICAL THERAPY | Facility: CLINIC | Age: 79
Setting detail: THERAPIES SERIES
End: 2019-05-24
Attending: PHYSICIAN ASSISTANT
Payer: COMMERCIAL

## 2019-05-24 PROCEDURE — 97530 THERAPEUTIC ACTIVITIES: CPT | Mod: GP

## 2019-05-24 PROCEDURE — 97110 THERAPEUTIC EXERCISES: CPT | Mod: GP

## 2019-05-28 ENCOUNTER — HOSPITAL ENCOUNTER (OUTPATIENT)
Dept: PHYSICAL THERAPY | Facility: CLINIC | Age: 79
Setting detail: THERAPIES SERIES
End: 2019-05-28
Attending: PHYSICIAN ASSISTANT
Payer: COMMERCIAL

## 2019-05-28 PROCEDURE — 97110 THERAPEUTIC EXERCISES: CPT | Mod: GP

## 2019-05-28 PROCEDURE — 97010 HOT OR COLD PACKS THERAPY: CPT | Mod: GP

## 2019-05-30 ENCOUNTER — TELEPHONE (OUTPATIENT)
Dept: FAMILY MEDICINE | Facility: OTHER | Age: 79
End: 2019-05-30

## 2019-05-30 ENCOUNTER — HOSPITAL ENCOUNTER (OUTPATIENT)
Dept: PHYSICAL THERAPY | Facility: CLINIC | Age: 79
Setting detail: THERAPIES SERIES
End: 2019-05-30
Attending: PHYSICIAN ASSISTANT
Payer: COMMERCIAL

## 2019-05-30 ENCOUNTER — OFFICE VISIT (OUTPATIENT)
Dept: FAMILY MEDICINE | Facility: OTHER | Age: 79
End: 2019-05-30
Payer: COMMERCIAL

## 2019-05-30 VITALS
SYSTOLIC BLOOD PRESSURE: 130 MMHG | RESPIRATION RATE: 16 BRPM | OXYGEN SATURATION: 98 % | WEIGHT: 135.5 LBS | BODY MASS INDEX: 24.01 KG/M2 | HEART RATE: 76 BPM | DIASTOLIC BLOOD PRESSURE: 52 MMHG | HEIGHT: 63 IN | TEMPERATURE: 98.3 F

## 2019-05-30 DIAGNOSIS — R07.0 THROAT PAIN: Primary | ICD-10-CM

## 2019-05-30 DIAGNOSIS — J36 PERITONSILLAR ABSCESS: ICD-10-CM

## 2019-05-30 LAB
DEPRECATED S PYO AG THROAT QL EIA: NORMAL
SPECIMEN SOURCE: NORMAL

## 2019-05-30 PROCEDURE — 87081 CULTURE SCREEN ONLY: CPT | Performed by: FAMILY MEDICINE

## 2019-05-30 PROCEDURE — 97110 THERAPEUTIC EXERCISES: CPT | Mod: GP

## 2019-05-30 PROCEDURE — 99213 OFFICE O/P EST LOW 20 MIN: CPT | Performed by: FAMILY MEDICINE

## 2019-05-30 PROCEDURE — 87880 STREP A ASSAY W/OPTIC: CPT | Performed by: FAMILY MEDICINE

## 2019-05-30 PROCEDURE — 97750 PHYSICAL PERFORMANCE TEST: CPT | Mod: GP

## 2019-05-30 RX ORDER — CLINDAMYCIN HCL 300 MG
300 CAPSULE ORAL 4 TIMES DAILY
Qty: 40 CAPSULE | Refills: 0 | Status: SHIPPED | OUTPATIENT
Start: 2019-05-30 | End: 2019-06-13

## 2019-05-30 ASSESSMENT — MIFFLIN-ST. JEOR: SCORE: 1055.81

## 2019-05-30 NOTE — PROGRESS NOTES
Subjective     Milena Hamilton is a 79 year old female who presents to clinic today for the following health issues:    HPI   Concern - Swollen Gland on right side of neck   Onset: 2 weeks     Description:   Painful, warm to the touch, sore throat     Intensity: mild    Progression of Symptoms:  worsening    Accompanying Signs & Symptoms:  Patient states it hurts more as the day goes on.     Previous history of similar problem:   NA    Precipitating factors:   Worsened by: NA    Alleviating factors:  Improved by: Tylenol   Therapies Tried and outcome: Tylenol has helped     She has a sore and swollen neck on the right side from her mid-mandible to her right ear.     Patient Active Problem List   Diagnosis     Lichen planus     Family history of diabetes mellitus     Tinnitus     Panic disorder without agoraphobia     Gastritis     Osteopenia     Hyperlipidemia     Anxiety     DDD (degenerative disc disease), cervical     Advanced directives, counseling/discussion     Sacroiliac dysfunction     Chronic constipation     History of diverticulitis - s/p left hemicolectomy     Diarrhea     Health Care Home     Raynaud's disease     HTN (hypertension)     Benign skin lesion     Community acquired pneumonia     Hypothyroidism, unspecified type     Premature atrial beats     Acquired hypothyroidism     Gastroesophageal reflux disease, esophagitis presence not specified     Intractable vomiting     Gastroenteritis     After cataract, left eye     Cortical age-related cataract     Cortical senile cataract     Scoliosis of lumbar spine, unspecified scoliosis type     Past Surgical History:   Procedure Laterality Date     C APPENDECTOMY       C NONSPECIFIC PROCEDURE      Right inferior oblique recession, 14 mm     C VAG HYST,RMV TUBE/OVARY  1984    cervix removed     COLONOSCOPY  10/17/2007    normal, recheck in 10 years     COLONOSCOPY N/A 8/27/2014    Procedure: COLONOSCOPY;  Surgeon: Raffi Montiel MD;  Location:  GI  "    COMBINED CYSTOSCOPY, INSERT CATHETER URETER Bilateral 10/13/2014    Procedure: COMBINED CYSTOSCOPY, INSERT CATHETER URETER;  Surgeon: Abdoulaye Odell MD;  Location: [facility] OR     EYE SURGERY      right eye muscle repair     HC REMOVAL GALLBLADDER  2010    lap     HC REMOVAL OF OVARY/TUBE(S)      Salpingo-Oophorectomy, bilateral     HYSTERECTOMY, PAP NO LONGER INDICATED       INJECT EPIDURAL LUMBAR Right 4/10/2017    Procedure: INJECT EPIDURAL LUMBAR;  Surgeon: Raffi Wright MD;  Location: [facility] OR     INJECT EPIDURAL TRANSFORAMINAL Right 2016    Procedure: INJECT EPIDURAL TRANSFORAMINAL;  Surgeon: Antonio Dasilva MD;  Location: [facility] OR     INJECT EPIDURAL TRANSFORAMINAL Right 10/13/2016    Procedure: INJECT EPIDURAL TRANSFORAMINAL;  Surgeon: Antonio Dasilva MD;  Location: [facility] OR     LAPAROSCOPIC ASSISTED COLECTOMY LEFT (DESCENDING) N/A 10/13/2014    Procedure: LAPAROSCOPIC ASSISTED COLECTOMY LEFT (DESCENDING);  Surgeon: Raffi Montiel MD;  Location: [facility] OR       Social History     Tobacco Use     Smoking status: Never Smoker     Smokeless tobacco: Never Used   Substance Use Topics     Alcohol use: No     Family History   Problem Relation Age of Onset     Diabetes Brother      Heart Disease Brother 50        AMI     Diabetes Brother      Heart Disease Brother         AMI, \"older\"     Cerebrovascular Disease Brother      Psychotic Disorder Brother         , alcohol     Cancer Sister         breast cancer x2     Hypertension No family hx of      Breast Cancer No family hx of      Ovarian Cancer No family hx of      Prostate Cancer No family hx of      Mental Illness No family hx of      Anesthesia Reaction No family hx of      Osteoporosis No family hx of      Obesity No family hx of      Other Cancer No family hx of      Depression No family hx of      Anxiety Disorder No family hx of      Mental Illness No family hx of      Substance Abuse No family hx of            Reviewed and updated as " "needed this visit by Provider         Review of Systems   ROS COMP: Constitutional, HEENT, cardiovascular, pulmonary, GI, , musculoskeletal, neuro, skin, endocrine and psych systems are negative, except as otherwise noted.      Objective    /52 (BP Location: Right arm, Patient Position: Chair, Cuff Size: Adult Regular)   Pulse 76   Temp 98.3  F (36.8  C) (Temporal)   Resp 16   Ht 1.588 m (5' 2.5\")   Wt 61.5 kg (135 lb 8 oz)   LMP  (LMP Unknown)   SpO2 98%   Breastfeeding? No   BMI 24.39 kg/m    Body mass index is 24.39 kg/m .  Physical Exam   GENERAL: healthy, alert and no distress  EYES: Eyes grossly normal to inspection, conjunctivae and sclerae normal  HENT: ear canals and TM's normal, Mouth with small abscess and swelling noted on right peritonsillar region.  No uvela deviation, but still small abscess noted  NECK: moderate adenopathy R>L, no asymmetry, masses, or scars  MS: no gross musculoskeletal defects noted, no edema  SKIN: no suspicious lesions or rashes to visible skin  NEURO: Normal strength and tone, mentation intact and speech normal  PSYCH: mentation appears normal, affect normal/bright    Diagnostic Test Results:  Results for orders placed or performed in visit on 05/30/19 (from the past 24 hour(s))   Strep, Rapid Screen   Result Value Ref Range    Specimen Description Throat     Rapid Strep A Screen       NEGATIVE: No Group A streptococcal antigen detected by immunoassay, await culture report.         Assessment & Plan       ICD-10-CM    1. Throat pain R07.0 Strep, Rapid Screen     Beta strep group A culture   2. Peritonsillar abscess J36      Peritonsillar abscess:  Strep test performed today to identify if swelling is caused by an abscess from strep, the rapid result was negative. Will attempt a course of antibiotics to shrink or resolve the abscess as patient is terribly worried about discussion about surgery generally needed and urgency for ENT referral.  She wanted a chance to " try antibiotics due to mild symptoms, discussed if we try this, she needs to be prepared in case of worsening for the emergency department to help treat and that ENT referral would be made for early next week in case she is not improved which is my concern. Referral given to ENT for surgical intervention options, in case the antibiotics fail to improve the abscess. Clindamycin 300 mg every 6 hours prescribed today.     Return in about 2 weeks (around 6/13/2019) for follow-up if symptoms persist or fail to improve.    This document serves as a record of the services and decisions personally performed and made by Raine Tesfaye MD. It was created on her behalf by Long Lucero, a trained medical scribe. The creation of this document is based the provider's statements to the medical scribe.  Long Lucero, May 30, 2019 9:27 AM    The information in this document, created by the medical scribe for me, accurately reflects the services I personally performed and the decisions made by me. I have reviewed and approved this document for accuracy.    Raine Tesfaye MD, MD  [facility]

## 2019-05-30 NOTE — PROGRESS NOTES
Subjective     Milena Hamilton is a 79 year old female who presents to clinic today for the following health issues:    HPI     Concern - Swollen Gland right side of nec/peritonsillar abscess- Follow up  Onset: x 4 weeks    Description:   Better (had a root canal on Monday)    Intensity: mild    Progression of Symptoms:  improving    Accompanying Signs & Symptoms:  None    Previous history of similar problem:   NA    Precipitating factors:   Worsened by: NA    Alleviating factors:  Improved by: Tylenol    Therapies Tried and outcome: Marie, Did see Dentist on Monday.      She was diagnosed with a right sided peritonsillar abscess on 5/30 with Dr. Tesfaye and was given clindamycin for treatment and told to see ENT but she wanted to try the antibiotics first before seeing ENT. She finished the antibiotics on Saturday and states her symptoms have completely resolved. She did miss a few doses of antibiotics because she states it was causing diarrhea but this has since resolved. She denies any throat pain, swelling, fevers, or chills.     She has been noticing a decreased urinary stream over the past few months and it is taking longer to completely empty her bladder as she will often stop urinating and then start again. She denies any pain or incontinence. She does have a large cystocele and was seen for this by Dr. Wilson in January but elected continued observation at that time.     Reviewed and updated as needed this visit by Provider  Tobacco  Allergies  Meds  Problems  Med Hx  Surg Hx  Fam Hx      Review of Systems   GENERAL: Denies fever, fatigue, weakness, weight gain, or weight loss.  HEENT: Eyes-Denies pain, redness, loss of vision, double or blurred vision.     Ears/Nose-Denies tinnitus, loss of hearing, epistaxis, decreased sense of smell, nasal congestion. Denies loss of sense of taste, dry mouth, or sore throat.   CARDIOVASCULAR: Denies chest pain, shortness of breath, irregular heartbeats,   "palpitations, or edema.  RESPIRATORY: Denies cough, hemoptysis, and shortness of breath.  GASTROINTESTINAL: Denies nausea, vomiting, change in appetite, abdominal pain, diarrhea, or constipation.  GENITOURINARY: +Cystocele, urinary retention. Denies increased frequency, urgency, dysuria, hematuria, or incontinence.       Objective    /64 (BP Location: Right arm, Patient Position: Chair, Cuff Size: Adult Regular)   Pulse 70   Temp 97.5  F (36.4  C) (Temporal)   Resp 16   Ht 1.588 m (5' 2.5\")   Wt 60.7 kg (133 lb 12.8 oz)   LMP  (LMP Unknown)   SpO2 98%   BMI 24.08 kg/m    Body mass index is 24.08 kg/m .  Physical Exam   GENERAL: healthy, alert and no distress  ENT: +Pharynx is clear with no signs of swelling, erythema, or abscess. No cervical adenopathy.   RESP: lungs clear to auscultation - no rales, rhonchi or wheezes  CV: regular rate and rhythm, normal S1 S2, no S3 or S4, no murmur, click or rub  ABDOMEN: soft, nontender, no hepatosplenomegaly, no masses and bowel sounds normal  PSYCH: mentation appears normal, affect normal/bright        Assessment & Plan       ICD-10-CM    1. Peritonsillar abscess J36    2. Urinary retention R33.9    3. Cystocele, midline N81.11         1. Peritonsillar abscess has resolved completely with no evidence of any adenopathy. She will monitor for any recurrent symptosm.    2-3. Recent symptoms of urinary retention. She his able to empty her bladder completely but it takes longer. This is consistent with secondary symptoms due to her large cystocele. I recommend she stay well hydrated and make sure she continues to empty her bladder completely. She states it is not overly bothersome but if this becomes more of an issue, I recommend she see Dr. Wilson again to discuss other options such as surgery or a pessary as she previously discussed with him.       Chuck Streeter PA-C  [facility]"

## 2019-05-30 NOTE — PROGRESS NOTES
Outpatient Physical Therapy Progress Note     Patient: Milena Hamilton  : 1940    Beginning/End Dates of Reporting Period:  2019 to 2019    Referring Provider: Dr. Chuck Streeter    Therapy Diagnosis: Low Back Pain with Right Sided Radicular Pain     Client Self Report: Patient reports that the day following her last therapy session (Wednesday 3/29), patient reported waking up without pain for the first time in a long time. This did not sustain however and her back returned.    Objective Measurements:  Objective Measure: Obdulia  Details: 4  Objective Measure: JOEY  Details: 10  Objective Measure: Hart Balance  Details: 54/56                        Outcome Measures (most recent score):  Obdulia STarT Sub-Score (Q5-9): 2  Obdulia STarT Total Score (all 9): 4  Oswestry Score (%): 22.22 %    Goals:  Goal Identifier Walking   Goal Description Patient will be able to walk one mile without experiencing an increase in pain   Target Date 19   Date Met      Progress: Patient has not attempted to ambulate one mile yet, however still feels limited in her ambulation distance. She did ambulate 700 feet in therapy without symptoms.     Goal Identifier Obdulia/JOEY   Goal Description Patient's Obdulia/JOEY scores will reduce to 2 or less and 10 or less respectively.   Target Date 19   Date Met      Progress: Patient reduced Obdulia score to 4 and JOEY to 10 demonstrating functional improvement.     Goal Identifier ROM   Goal Description Patient will have full and painfree ROM in her lumbar spine   Target Date 19   Date Met      Progress: Patient has improved total AROM of the lumbar spine as well as only experiences some right sided pain with left side bending.        Progress Toward Goals:   Progress this reporting period: Patient overall has improved motion and improved function. She still has some low back pain and some radicular symptoms. She reports she isn't where she wants to be quite yet, however she  has demonstrated great progress and shows no signs of slowing down her progress.          Plan:  Continue therapy per current plan of care.    Discharge:  No

## 2019-05-30 NOTE — PATIENT INSTRUCTIONS
If your symptoms do not improve, or are worsening, follow-up with ENT.  If your symptoms improve, you can cancel your appointment with ENT

## 2019-05-30 NOTE — TELEPHONE ENCOUNTER
Patient is scheduled to see ENT on 6-5 at 900 am in [place]. She did not want to keep this appt as she stated she would see how she feels. I advised her to keep the appt and call on Tuesday if she did not think she needed to be seen. Patient agreed. Patient stated she will call either way to let us know.

## 2019-05-31 LAB
BACTERIA SPEC CULT: NORMAL
SPECIMEN SOURCE: NORMAL

## 2019-06-06 DIAGNOSIS — E78.5 HYPERLIPIDEMIA LDL GOAL <130: ICD-10-CM

## 2019-06-06 RX ORDER — SIMVASTATIN 20 MG
TABLET ORAL
Qty: 45 TABLET | Refills: 1 | Status: SHIPPED | OUTPATIENT
Start: 2019-06-06 | End: 2019-12-06

## 2019-06-06 NOTE — TELEPHONE ENCOUNTER
Zocor  Prescription approved per [facility].    Stephani Del Cid, RN, BSN

## 2019-06-12 ENCOUNTER — HOSPITAL ENCOUNTER (OUTPATIENT)
Dept: PHYSICAL THERAPY | Facility: CLINIC | Age: 79
Setting detail: THERAPIES SERIES
End: 2019-06-12
Attending: PHYSICIAN ASSISTANT
Payer: COMMERCIAL

## 2019-06-12 ENCOUNTER — TELEPHONE (OUTPATIENT)
Dept: FAMILY MEDICINE | Facility: OTHER | Age: 79
End: 2019-06-12

## 2019-06-12 PROCEDURE — 97110 THERAPEUTIC EXERCISES: CPT | Mod: GP

## 2019-06-12 NOTE — TELEPHONE ENCOUNTER
You placed a referral for patient to ENT on 5/30/19.  Patient has not scheduled as of yet.      Please review and forward to team if follow up with the patient is needed.     Thank you!  [facility]/Clinic Referrals [facility]

## 2019-06-13 ENCOUNTER — OFFICE VISIT (OUTPATIENT)
Dept: FAMILY MEDICINE | Facility: OTHER | Age: 79
End: 2019-06-13
Payer: COMMERCIAL

## 2019-06-13 VITALS
BODY MASS INDEX: 23.71 KG/M2 | RESPIRATION RATE: 16 BRPM | SYSTOLIC BLOOD PRESSURE: 122 MMHG | WEIGHT: 133.8 LBS | OXYGEN SATURATION: 98 % | DIASTOLIC BLOOD PRESSURE: 64 MMHG | HEIGHT: 63 IN | TEMPERATURE: 97.5 F | HEART RATE: 70 BPM

## 2019-06-13 DIAGNOSIS — R33.9 URINARY RETENTION: ICD-10-CM

## 2019-06-13 DIAGNOSIS — J36 PERITONSILLAR ABSCESS: Primary | ICD-10-CM

## 2019-06-13 DIAGNOSIS — N81.11 CYSTOCELE, MIDLINE: ICD-10-CM

## 2019-06-13 PROCEDURE — 99213 OFFICE O/P EST LOW 20 MIN: CPT | Performed by: PHYSICIAN ASSISTANT

## 2019-06-13 ASSESSMENT — MIFFLIN-ST. JEOR: SCORE: 1048.1

## 2019-06-13 ASSESSMENT — PAIN SCALES - GENERAL: PAINLEVEL: NO PAIN (0)

## 2019-06-13 NOTE — PATIENT INSTRUCTIONS
The throat infection is gone.    The urinary symptoms are likely due to your cystocele (prolapse of the bladder) that you have.  Stay well hydrated throughout the day and make sure you are completely emptying each time.    If worsening, will have you see Dr. Wilson again.    Follow up in 6 months for an annual visit.

## 2019-06-20 ENCOUNTER — HOSPITAL ENCOUNTER (OUTPATIENT)
Dept: PHYSICAL THERAPY | Facility: CLINIC | Age: 79
Setting detail: THERAPIES SERIES
End: 2019-06-20
Attending: PHYSICIAN ASSISTANT
Payer: COMMERCIAL

## 2019-06-20 PROCEDURE — 97140 MANUAL THERAPY 1/> REGIONS: CPT | Mod: GP

## 2019-06-20 PROCEDURE — 97110 THERAPEUTIC EXERCISES: CPT | Mod: GP

## 2019-06-26 ENCOUNTER — HOSPITAL ENCOUNTER (OUTPATIENT)
Dept: PHYSICAL THERAPY | Facility: CLINIC | Age: 79
Setting detail: THERAPIES SERIES
End: 2019-06-26
Attending: PHYSICIAN ASSISTANT
Payer: COMMERCIAL

## 2019-06-26 PROCEDURE — 97110 THERAPEUTIC EXERCISES: CPT | Mod: GP

## 2019-07-10 ENCOUNTER — HOSPITAL ENCOUNTER (OUTPATIENT)
Dept: PHYSICAL THERAPY | Facility: CLINIC | Age: 79
Setting detail: THERAPIES SERIES
End: 2019-07-10
Attending: PHYSICIAN ASSISTANT
Payer: COMMERCIAL

## 2019-07-10 PROCEDURE — 97110 THERAPEUTIC EXERCISES: CPT | Mod: GP

## 2019-07-10 PROCEDURE — 97530 THERAPEUTIC ACTIVITIES: CPT | Mod: GP

## 2019-07-12 NOTE — PROGRESS NOTES
Subjective     Milena Hamilton is a 79 year old female who presents to clinic today for the following health issues:    HPI   URINARY TRACT SYMPTOMS-- Frequency  Onset: 2 months    Description:   Painful urination (Dysuria): no   Blood in urine (Hematuria): no   Delay in urine (Hesitency): YES    Intensity: mild, moderate    Progression of Symptoms:  worsening    Accompanying Signs & Symptoms:  Fever/chills: no   Flank pain no   Nausea and vomiting: no   Any vaginal symptoms: none  Abdominal/Pelvic Pain: no     History:   History of frequent UTI's: YES  --  Just a few  History of kidney stones: no   Sexually Active: no   Possibility of pregnancy: No    Precipitating factors:   None    Feels it could be more overactive bladder. Last night or two she has been sleeping through the night previously was having issues. Saw Dr. Wilson for cystocele and urinary frequency. Unless bothersome not to treat. She notes it is not any worse just wanted to check for infection. She has noted to drink a lot of water lately so could be this as well. Urine looked ok. No blood.     Therapies Tried and outcome:   NA  Acute Illness   Acute illness concerns:   Sinus drainage  Onset:   2 weeks    Fever: no     Chills/Sweats: no     Headache (location?): no     Sinus Pressure:YES    Conjunctivitis:  no    Ear Pain:   Had a little blood in left ear    Rhinorrhea: YES    Congestion: no     Sore Throat: no      Cough: no    Wheeze: no     Decreased Appetite: no     Nausea: no     Vomiting: no     Diarrhea:  no     Dysuria/Freq.:  Yes frequency    Fatigue/Achiness: no     Sick/Strep Exposure: no      Therapies Tried and outcome: Claritin       Current Outpatient Medications   Medication Sig Dispense Refill     ALPRAZolam (XANAX) 0.25 MG tablet TAKE ONE TABLET BY MOUTH nightly AT bedtime as needed FOR anxiety 30 tablet 0     ASPIRIN 81 MG OR TABS 1 TABLET DAILY       calcium carbonate-vitamin D (OSCAL W/D) 500-200 MG-UNIT tablet Take 1 tablet by  mouth 2 times daily       Dentifrices (BIOTENE DRY MOUTH) PSTE Toothpaste and mouthwash       fluticasone (FLONASE) 50 MCG/ACT nasal spray Spray 2 sprays into both nostrils daily 9.9 mL 1     levothyroxine (SYNTHROID/LEVOTHROID) 50 MCG tablet TAKE 1 TABLET (50 MCG) BY MOUTH DAILY 90 tablet 3     loratadine (CLARITIN) 10 MG tablet Take 10 mg by mouth daily       metoprolol tartrate (LOPRESSOR) 25 MG tablet TAKE 1/2 TABLET (12.5 MG) BY MOUTH DAILY 45 tablet 3     omeprazole (PRILOSEC) 20 MG DR capsule Take 20 mg by mouth daily  11     simvastatin (ZOCOR) 20 MG tablet TAKE 1/2 TABLET (10mg) BY MOUTH AT BEDTIME 45 tablet 1     THERAPEUTIC MULTIVITAMIN OR 1 TABLET DAILY       BP Readings from Last 3 Encounters:   07/17/19 146/77   06/13/19 122/64   05/30/19 130/52    Wt Readings from Last 3 Encounters:   07/17/19 61 kg (134 lb 8 oz)   06/13/19 60.7 kg (133 lb 12.8 oz)   05/30/19 61.5 kg (135 lb 8 oz)                    Reviewed and updated as needed this visit by Provider         Review of Systems   Constitutional: Negative.    Respiratory: Negative.    Cardiovascular: Negative.             Objective    /77   Pulse 77   Temp 98  F (36.7  C)   Resp 16   Wt 61 kg (134 lb 8 oz)   LMP  (LMP Unknown)   SpO2 97%   BMI 24.21 kg/m    Body mass index is 24.21 kg/m .  Physical Exam   Constitutional: She is oriented to person, place, and time. She appears well-developed.   HENT:   Right Ear: Tympanic membrane, external ear and ear canal normal.   Left Ear: Tympanic membrane, external ear and ear canal normal.   Nose: Mucosal edema and rhinorrhea present. Right sinus exhibits no maxillary sinus tenderness and no frontal sinus tenderness. Left sinus exhibits no maxillary sinus tenderness and no frontal sinus tenderness.   Mouth/Throat: Oropharynx is clear and moist.   Eyes: Pupils are equal, round, and reactive to light.   Neck: Normal range of motion. Neck supple.   Cardiovascular: Normal rate, regular rhythm and normal  heart sounds.   Pulmonary/Chest: Effort normal and breath sounds normal.   Abdominal: Soft. Normal appearance. There is no tenderness.   Neurological: She is alert and oriented to person, place, and time.   Skin: Skin is warm and dry.        No results found for this or any previous visit (from the past 24 hour(s)).        Assessment & Plan     1. Urinary frequency  - UA negative  - Symptoms likely related to Cystocele. She saw urology back in January. Advised if these symptoms become worsening/bothersome to be seen by Dr. Wilson for interventions. She notes that she wanted to make sure she did not have an infection. I will culture her urine today and advised if any worsening symptoms, dizziness, back pain or fevers to go in to be evaluated. Patient was receptive to this plan today.   - *UA reflex to Microscopic and Culture ([facility] [place] (except [place] and [place])  - Urine Microscopic  - Urine Culture Aerobic Bacterial    2. Post-nasal drip  - Sinus infection that is improving. Continues to have some post nasal drainage  - Discussed Flonase and use. Discussed saline nasal rinse prior to flonase use.   - Offered antibiotics, she feels that this is likely only some post nasal drainage and would like to try this first.   - RTC if worsening symptoms.   - fluticasone (FLONASE) 50 MCG/ACT nasal spray; Spray 2 sprays into both nostrils daily  Dispense: 9.9 mL; Refill: 1       The patient indicates understanding of these issues and agrees with the plan.    Patient Instructions   - Start Flonase 2 sprays daily  - Recommend saline nasal rinse prior to flonase use  - Continue Claritin daily  - Recommend follow up with urology if your urinary symptoms start to become bothersome  - Will culture your urine today.       FORREST Joyner CNP  Questions or concerns please feel free to send me a [employer] message or call me  Phone : [phone]          Return in about 3 days (around 7/20/2019) for If not  improved.    FORREST Joyner [facility]

## 2019-07-17 ENCOUNTER — HOSPITAL ENCOUNTER (OUTPATIENT)
Dept: PHYSICAL THERAPY | Facility: CLINIC | Age: 79
Setting detail: THERAPIES SERIES
End: 2019-07-17
Attending: PHYSICIAN ASSISTANT
Payer: COMMERCIAL

## 2019-07-17 ENCOUNTER — OFFICE VISIT (OUTPATIENT)
Dept: FAMILY MEDICINE | Facility: OTHER | Age: 79
End: 2019-07-17
Payer: COMMERCIAL

## 2019-07-17 VITALS
BODY MASS INDEX: 24.21 KG/M2 | RESPIRATION RATE: 16 BRPM | TEMPERATURE: 98 F | DIASTOLIC BLOOD PRESSURE: 77 MMHG | WEIGHT: 134.5 LBS | SYSTOLIC BLOOD PRESSURE: 146 MMHG | OXYGEN SATURATION: 97 % | HEART RATE: 77 BPM

## 2019-07-17 DIAGNOSIS — R09.82 POST-NASAL DRIP: ICD-10-CM

## 2019-07-17 DIAGNOSIS — R35.0 URINARY FREQUENCY: Primary | ICD-10-CM

## 2019-07-17 LAB
ALBUMIN UR-MCNC: NEGATIVE MG/DL
APPEARANCE UR: CLEAR
BILIRUB UR QL STRIP: NEGATIVE
COLOR UR AUTO: YELLOW
GLUCOSE UR STRIP-MCNC: NEGATIVE MG/DL
HGB UR QL STRIP: NEGATIVE
KETONES UR STRIP-MCNC: NEGATIVE MG/DL
LEUKOCYTE ESTERASE UR QL STRIP: ABNORMAL
NITRATE UR QL: NEGATIVE
PH UR STRIP: 5 PH (ref 5–7)
RBC #/AREA URNS AUTO: NORMAL /HPF
SOURCE: ABNORMAL
SP GR UR STRIP: 1.01 (ref 1–1.03)
UROBILINOGEN UR STRIP-ACNC: 0.2 EU/DL (ref 0.2–1)
WBC #/AREA URNS AUTO: NORMAL /HPF

## 2019-07-17 PROCEDURE — 97110 THERAPEUTIC EXERCISES: CPT | Mod: GP

## 2019-07-17 PROCEDURE — 87086 URINE CULTURE/COLONY COUNT: CPT | Performed by: NURSE PRACTITIONER

## 2019-07-17 PROCEDURE — 97530 THERAPEUTIC ACTIVITIES: CPT | Mod: GP

## 2019-07-17 PROCEDURE — 81001 URINALYSIS AUTO W/SCOPE: CPT | Performed by: NURSE PRACTITIONER

## 2019-07-17 PROCEDURE — 99214 OFFICE O/P EST MOD 30 MIN: CPT | Performed by: NURSE PRACTITIONER

## 2019-07-17 RX ORDER — FLUTICASONE PROPIONATE 50 MCG
2 SPRAY, SUSPENSION (ML) NASAL DAILY
Qty: 9.9 ML | Refills: 1 | Status: SHIPPED | OUTPATIENT
Start: 2019-07-17 | End: 2020-12-28

## 2019-07-17 RX ORDER — LORATADINE 10 MG/1
10 TABLET ORAL DAILY
COMMUNITY
End: 2020-05-01

## 2019-07-17 ASSESSMENT — PAIN SCALES - GENERAL: PAINLEVEL: MODERATE PAIN (4)

## 2019-07-17 NOTE — PATIENT INSTRUCTIONS
- Start Flonase 2 sprays daily  - Recommend saline nasal rinse prior to flonase use  - Continue Claritin daily  - Recommend follow up with urology if your urinary symptoms start to become bothersome  - Will culture your urine today.       FORREST Joyner CNP  Questions or concerns please feel free to send me a [employer] message or call me  Phone : [phone]

## 2019-07-18 LAB
BACTERIA SPEC CULT: NORMAL
Lab: NORMAL
SPECIMEN SOURCE: NORMAL

## 2019-07-22 ASSESSMENT — ENCOUNTER SYMPTOMS
CONSTITUTIONAL NEGATIVE: 1
CARDIOVASCULAR NEGATIVE: 1
RESPIRATORY NEGATIVE: 1

## 2019-07-30 ENCOUNTER — HOSPITAL ENCOUNTER (OUTPATIENT)
Dept: PHYSICAL THERAPY | Facility: CLINIC | Age: 79
Setting detail: THERAPIES SERIES
End: 2019-07-30
Attending: PHYSICIAN ASSISTANT
Payer: COMMERCIAL

## 2019-07-30 PROCEDURE — 97110 THERAPEUTIC EXERCISES: CPT | Mod: GP

## 2019-07-30 PROCEDURE — 97530 THERAPEUTIC ACTIVITIES: CPT | Mod: GP

## 2019-08-07 ENCOUNTER — HOSPITAL ENCOUNTER (OUTPATIENT)
Dept: PHYSICAL THERAPY | Facility: CLINIC | Age: 79
Setting detail: THERAPIES SERIES
End: 2019-08-07
Attending: PHYSICIAN ASSISTANT
Payer: COMMERCIAL

## 2019-08-07 PROCEDURE — 97530 THERAPEUTIC ACTIVITIES: CPT | Mod: GP

## 2019-08-07 PROCEDURE — 97110 THERAPEUTIC EXERCISES: CPT | Mod: GP

## 2019-08-07 NOTE — ADDENDUM NOTE
Encounter addended by: Dilshad Calix, PT on: 8/7/2019 2:26 PM   Actions taken: Flowsheet data copied forward, Sign clinical note, Flowsheet accepted

## 2019-08-07 NOTE — PROGRESS NOTES
[facility]          OUTPATIENT PHYSICAL THERAPY ORTHOPEDIC EVALUATION  PLAN OF TREATMENT FOR OUTPATIENT REHABILITATION  (COMPLETE FOR INITIAL CLAIMS ONLY)  Patient's Last Name, First Name, M.I.  YOB: 1940  Milena Hamilton    Provider s Name:  [facility]   Medical Record No.  [id number]   Start of Care Date:   4/30/2019   Onset Date:   1/1/2015   Type:     _X__PT   ___OT   ___SLP Medical Diagnosis:   Right Sided Low Back Pain with Right Sciatica     PT Diagnosis:   Right Low Back Pain, Right Hip Pain and Right Sciatica   Visits from SOC:  1      _________________________________________________________________________________  Plan of Treatment/Functional Goals: Continue plan of care with emphasis on walking longer distances without symptoms as well as improving all functional abilities such as stair ambulation and fall prevention through the incorporate of dynamic/functional mobility exercises.              Goals  Goal Identifier: Walking  Goal Description: Patient will be able to walk one mile without experiencing an increase in pain  Target Date: 07/28/19    Goal Identifier: Obdulia/JOEY  Goal Description: Patient's Obdulia/JOEY scores will reduce to 2 or less and 10 or less respectively.  Target Date: 07/28/19    Goal Identifier: ROM  Goal Description: Patient will have full and painfree ROM in her lumbar spine  Target Date: 07/28/19                                                           Therapy Frequency:   1x a week  Predicted Duration of Therapy Intervention:   6-8 weeks    Dilshad Calix, PT                 I CERTIFY THE NEED FOR THESE SERVICES FURNISHED UNDER        THIS PLAN OF TREATMENT AND WHILE UNDER MY CARE     (Physician co-signature of this document indicates review and certification of the therapy plan).                       Certification Date From:    7/17/2019  Certification Date To:   9/17/2019     Referring Provider:  Chuck Streeter PA-C      Initial Assessment        See Epic Evaluation

## 2019-08-16 ENCOUNTER — HOSPITAL ENCOUNTER (OUTPATIENT)
Dept: PHYSICAL THERAPY | Facility: CLINIC | Age: 79
Setting detail: THERAPIES SERIES
End: 2019-08-16
Attending: PHYSICIAN ASSISTANT
Payer: COMMERCIAL

## 2019-08-16 PROCEDURE — 97530 THERAPEUTIC ACTIVITIES: CPT | Mod: GP

## 2019-08-16 PROCEDURE — 97110 THERAPEUTIC EXERCISES: CPT | Mod: GP

## 2019-08-20 ENCOUNTER — TELEPHONE (OUTPATIENT)
Dept: FAMILY MEDICINE | Facility: OTHER | Age: 79
End: 2019-08-20

## 2019-08-20 DIAGNOSIS — F41.9 ANXIETY: ICD-10-CM

## 2019-08-20 NOTE — LETTER
[address]  [facility] [address]  [phone]        August 22, 2019    Milena Hamilton  [address] [address] [place] [id number]          Dear Milena,    We received a refill request to fill your Xanax and Norco, the Xanax was sent to your pharmacy but an office visit would be required to receive a prescription for the Norco. Please contact the clinic to schedule an appointment or with any questions or concerns.     Sincerely,      Your [facility] Care Team

## 2019-08-20 NOTE — TELEPHONE ENCOUNTER
Norco      Last Written Prescription Date:  4/8/2019  Last Fill Quantity: 10,   # refills: 0  Last Office Visit: 7/17/2019  Future Office visit:       Routing refill request to provider for review/approval because:  Drug not on the [facility], [facility] or [facility] refill protocol or controlled substance  Drug not active on patient's medication list    Xanax      Last Written Prescription Date:  4/8/2019  Last Fill Quantity: 30,   # refills: 0  Last Office Visit: 7/17/2019  Future Office visit:       Routing refill request to provider for review/approval because:  Drug not on the [facility], [facility] or [facility] refill protocol or controlled substance    Amy Sara, RN, BSN

## 2019-08-21 ENCOUNTER — HOSPITAL ENCOUNTER (OUTPATIENT)
Dept: PHYSICAL THERAPY | Facility: CLINIC | Age: 79
Setting detail: THERAPIES SERIES
End: 2019-08-21
Attending: PHYSICIAN ASSISTANT
Payer: COMMERCIAL

## 2019-08-21 PROCEDURE — 97750 PHYSICAL PERFORMANCE TEST: CPT | Mod: GP

## 2019-08-21 PROCEDURE — 97530 THERAPEUTIC ACTIVITIES: CPT | Mod: GP

## 2019-08-21 PROCEDURE — 97110 THERAPEUTIC EXERCISES: CPT | Mod: GP

## 2019-08-21 RX ORDER — HYDROCODONE BITARTRATE AND ACETAMINOPHEN 5; 325 MG/1; MG/1
TABLET ORAL
Qty: 10 TABLET | OUTPATIENT
Start: 2019-08-21

## 2019-08-21 RX ORDER — ALPRAZOLAM 0.25 MG
TABLET ORAL
Qty: 30 TABLET | Refills: 0 | Status: SHIPPED | OUTPATIENT
Start: 2019-08-21 | End: 2020-01-31

## 2019-08-21 NOTE — TELEPHONE ENCOUNTER
Xanax sent electronically but cannot fill Norco without an Rx as this was a 1 time prescription.    Chuck Streeter PA-C

## 2019-08-22 NOTE — PROGRESS NOTES
"Subjective     Milena Hamilton is a 79 year old female who presents to clinic today for the following health issues:    HPI     Patient in to follow up on Norco. (This is not on her current medication list?) She is wondering if she can take Tylenol daily? She states her back pain is better, but it's still bothersome.    Chronic/Recurring Back Pain Follow Up      Where is your back pain located? (Select all that apply) low back     How would you describe your back pain?  \"when at a minimum, it's a dull ache. But sometimes it can be a sharp pain.\" She doesn' ttake the medication daily - sometimes she only takes a half tablet, sometimes a whole one.    Where does your back pain spread? the right knee    Since your last clinic visit for back pain, how has your pain changed? gradually improving    Does your back pain interfere with your job? Not applicable    Since your last visit, have you tried any new treatment? No    She continues to undergo physical therapy which has helped but there are still times where she has a lot of pain in the right lower back into the leg. She underwent a a right L4-S1 (only 4 lumbar vertebrae) hemilaminectomy and right L4-S1 foraminotomies with microdiscectomy with Dr. Broderick in 2018 and did quite well afterwards but then the pain started to return. She had an updated MRI in April which revealed continued severe right L4-S1 foraminal stenosis and severe L3-L3 degenerative disc disease. She has not found much relief with over the counter medications. She was prescribed a short course of Norco 5/325 mg in April which she uses sparingly, 1/2 tablet as needed which helps when she has severe pain. She still has 1/2 tablet left and is wondering if she can have a refill to use as needed. She has tried gabapentin but has side effects of diarrhea. She is Yarsani about her home therapy exercises. She denies any lower extremity numbness or tingling.     Patient also wants to discuss dry mouth. She " "has had dry mouth and dry eyes for many years but her dry mouth especially seems to be worse over the past few months. She will wake up in the middle of the night and can barely swallow because her mouth is so dry. She seems to have improved symptoms throughout the day. She has been using Biotene toothpaste and mouthwash twice daily with minimal relief. She uses as needed lubricating eye drops.  She denies increased thirst or urinary frequency.     Patient also wants left elbow to be looked at. There is a spot on it, similar to one she's had on her nose. \"It has probably gotten a little bigger and it has a white spot.\" She wants to make sure it is not cancer as she has a history of basal cell carcinoma of her nose removed last year.     Reviewed and updated as needed this visit by Provider  Tobacco  Allergies  Meds  Problems  Med Hx  Surg Hx  Fam Hx       Review of Systems   GENERAL: Denies fever, fatigue, weakness, weight gain, or weight loss.  ENT: +Dry mouth.   CARDIOVASCULAR: Denies chest pain, shortness of breath, irregular heartbeats, palpitations, or edema.  RESPIRATORY: Denies cough, hemoptysis, and shortness of breath.  MUSCULOSKELETAL: +Low back pain, right lower extremity pain.   SKIN: +Rough lesion of left below.   NEUROLOGIC: Denies headache, fainting, dizziness, memory loss, numbness, tingling, or seizures.  PSYCHIATRIC: Denies depression, anxiety, mood swings, and thoughts of suicide.      Objective    /76   Pulse 75   Temp 97.3  F (36.3  C) (Temporal)   Resp 16   Ht 1.588 m (5' 2.5\")   Wt 61.7 kg (136 lb)   LMP  (LMP Unknown)   SpO2 97%   BMI 24.48 kg/m    Body mass index is 24.48 kg/m .  Physical Exam   GENERAL: healthy, alert and no distress  HENT: ear canals and TM's normal, nose and mouth without ulcers or lesions. Normal salivary pooling noted below tongue.   RESP: lungs clear to auscultation - no rales, rhonchi or wheezes  CV: regular rate and rhythm, normal S1 S2, no S3 or " S4, no murmur, click or rub, no peripheral edema   MS: no gross musculoskeletal defects noted. Tenderness over the right lower lumbar paraspinal musculature. Negative straight leg raise bilaterally.   SKIN: 0.4 x 0.2 skin colored papule with scaly, rough white scabbing.   NEURO: Normal strength and tone, mentation intact and speech normal. Cranial nerves II-XII are grossly intact. DTRs are 2+/4 throughout and symmetric. Gait is stable.   PSYCH: mentation appears normal, affect normal/bright      Assessment & Plan       ICD-10-CM    1. Chronic right-sided low back pain with right-sided sciatica M54.41 HYDROcodone-acetaminophen (NORCO) 5-325 MG tablet    G89.29 Drug  Screen Comprehensive , Urine with Reported Meds ([facility]) (Pain Care Package)   2. Dry mouth R68.2 Anti Nuclear Marcia IgG by IFA with Reflex     SSA Ro ALBARO Antibody IgG     Rheumatoid factor     ESR: Erythrocyte sedimentation rate     CRP, inflammation     CBC with platelets and differential     Comprehensive metabolic panel (BMP + Alb, Alk Phos, ALT, AST, Total. Bili, TP)     TSH with free T4 reflex     SSB La ALBARO Antibody IgG   3. Dry eyes H04.123 Anti Nuclear Marcia IgG by IFA with Reflex     SSA Ro ALBARO Antibody IgG     Rheumatoid factor     ESR: Erythrocyte sedimentation rate     CRP, inflammation     CBC with platelets and differential     Comprehensive metabolic panel (BMP + Alb, Alk Phos, ALT, AST, Total. Bili, TP)     TSH with free T4 reflex     SSB La ALBARO Antibody IgG   4. Hypothyroidism, unspecified type E03.9 CBC with platelets and differential     TSH with free T4 reflex   5. Essential hypertension I10 CBC with platelets and differential   6. Actinic keratosis L57.0 DESTRUCT PREMALIGNANT LESION, FIRST        1. She has continued chronic low back pain with recent MRI revealing continued L4-S1 foraminal stenosis with severe DDD at L3-L4. Physical therapy has provided some benefit so I recommend she continue with home exercises. I discussed  non-narcotic medications but she does not tolerate gabapentin and I want to avoid amitriptyline due to the anti-cholinergic side effects. She has made 10 tablets of Norco 5/325 mg last from April until now. I am okay refill the Norco to take as needed for severe pain, 10 tablets ever 3 months. I have completed a new CSA with her today along with an updated UDS. PDMP is normal. If pain worsens, will refer back to neurosurgery versus try lumbar ESIs again. She is in agreement with this plan. Follow up in 3 months for annual physical and recheck.     2-3. She has a history of chronic dry mouth and dry eyes, worse recently. Her salivary pooling appears normal but given her chronic symptoms, Sjogren's/Sicca syndrome need to be ruled out so will order labs for further evaluation. In the meantime, she should continue with Biotene mouthwash along with staying well hydrated. She has lubricating eyedrops to use as needed.    4. Updated TSH ordered.    5. Stable, continue current medications.    6. Left elbow lesion consistent with an AK but cannot rule out a BCC or SCC. I discussed the premalignant nature of this lesion. This was treated with 2, 5 second freeze thaw cycles of cryotherapy. She was instructed on blister formation and if this is not gone in 1 month, she should be seen again for a biopsy.       Chuck Streeter PA-C  [facility]

## 2019-08-28 ENCOUNTER — HOSPITAL ENCOUNTER (OUTPATIENT)
Dept: PHYSICAL THERAPY | Facility: CLINIC | Age: 79
Setting detail: THERAPIES SERIES
End: 2019-08-28
Attending: PHYSICIAN ASSISTANT
Payer: COMMERCIAL

## 2019-08-28 PROCEDURE — 97140 MANUAL THERAPY 1/> REGIONS: CPT | Mod: GP

## 2019-08-28 PROCEDURE — 97110 THERAPEUTIC EXERCISES: CPT | Mod: GP

## 2019-09-11 ENCOUNTER — OFFICE VISIT (OUTPATIENT)
Dept: FAMILY MEDICINE | Facility: OTHER | Age: 79
End: 2019-09-11
Payer: COMMERCIAL

## 2019-09-11 VITALS
SYSTOLIC BLOOD PRESSURE: 122 MMHG | HEART RATE: 75 BPM | DIASTOLIC BLOOD PRESSURE: 76 MMHG | OXYGEN SATURATION: 97 % | WEIGHT: 136 LBS | RESPIRATION RATE: 16 BRPM | HEIGHT: 63 IN | TEMPERATURE: 97.3 F | BODY MASS INDEX: 24.1 KG/M2

## 2019-09-11 DIAGNOSIS — L57.0 ACTINIC KERATOSIS: ICD-10-CM

## 2019-09-11 DIAGNOSIS — R68.2 DRY MOUTH: ICD-10-CM

## 2019-09-11 DIAGNOSIS — G89.29 CHRONIC RIGHT-SIDED LOW BACK PAIN WITH RIGHT-SIDED SCIATICA: Primary | ICD-10-CM

## 2019-09-11 DIAGNOSIS — M54.41 CHRONIC RIGHT-SIDED LOW BACK PAIN WITH RIGHT-SIDED SCIATICA: Primary | ICD-10-CM

## 2019-09-11 DIAGNOSIS — H04.123 DRY EYES: ICD-10-CM

## 2019-09-11 DIAGNOSIS — I10 ESSENTIAL HYPERTENSION: ICD-10-CM

## 2019-09-11 DIAGNOSIS — E03.9 HYPOTHYROIDISM, UNSPECIFIED TYPE: ICD-10-CM

## 2019-09-11 LAB
ALBUMIN SERPL-MCNC: 3.8 G/DL (ref 3.4–5)
ALP SERPL-CCNC: 63 U/L (ref 40–150)
ALT SERPL W P-5'-P-CCNC: 18 U/L (ref 0–50)
ANION GAP SERPL CALCULATED.3IONS-SCNC: 8 MMOL/L (ref 3–14)
AST SERPL W P-5'-P-CCNC: 13 U/L (ref 0–45)
BASOPHILS # BLD AUTO: 0 10E9/L (ref 0–0.2)
BASOPHILS NFR BLD AUTO: 0.3 %
BILIRUB SERPL-MCNC: 0.5 MG/DL (ref 0.2–1.3)
BUN SERPL-MCNC: 13 MG/DL (ref 7–30)
CALCIUM SERPL-MCNC: 8.8 MG/DL (ref 8.5–10.1)
CHLORIDE SERPL-SCNC: 103 MMOL/L (ref 94–109)
CO2 SERPL-SCNC: 30 MMOL/L (ref 20–32)
CREAT SERPL-MCNC: 0.83 MG/DL (ref 0.52–1.04)
CRP SERPL-MCNC: <2.9 MG/L (ref 0–8)
DIFFERENTIAL METHOD BLD: NORMAL
EOSINOPHIL # BLD AUTO: 0.1 10E9/L (ref 0–0.7)
EOSINOPHIL NFR BLD AUTO: 1.7 %
ERYTHROCYTE [DISTWIDTH] IN BLOOD BY AUTOMATED COUNT: 12.5 % (ref 10–15)
ERYTHROCYTE [SEDIMENTATION RATE] IN BLOOD BY WESTERGREN METHOD: 9 MM/H (ref 0–30)
GFR SERPL CREATININE-BSD FRML MDRD: 68 ML/MIN/{1.73_M2}
GLUCOSE SERPL-MCNC: 103 MG/DL (ref 70–99)
HCT VFR BLD AUTO: 42 % (ref 35–47)
HGB BLD-MCNC: 13.4 G/DL (ref 11.7–15.7)
LYMPHOCYTES # BLD AUTO: 2.2 10E9/L (ref 0.8–5.3)
LYMPHOCYTES NFR BLD AUTO: 37.6 %
MCH RBC QN AUTO: 30.9 PG (ref 26.5–33)
MCHC RBC AUTO-ENTMCNC: 31.9 G/DL (ref 31.5–36.5)
MCV RBC AUTO: 97 FL (ref 78–100)
MONOCYTES # BLD AUTO: 0.4 10E9/L (ref 0–1.3)
MONOCYTES NFR BLD AUTO: 7.3 %
NEUTROPHILS # BLD AUTO: 3 10E9/L (ref 1.6–8.3)
NEUTROPHILS NFR BLD AUTO: 53.1 %
PLATELET # BLD AUTO: 209 10E9/L (ref 150–450)
POTASSIUM SERPL-SCNC: 4 MMOL/L (ref 3.4–5.3)
PROT SERPL-MCNC: 7.3 G/DL (ref 6.8–8.8)
RBC # BLD AUTO: 4.33 10E12/L (ref 3.8–5.2)
SODIUM SERPL-SCNC: 141 MMOL/L (ref 133–144)
TSH SERPL DL<=0.005 MIU/L-ACNC: 0.82 MU/L (ref 0.4–4)
WBC # BLD AUTO: 5.7 10E9/L (ref 4–11)

## 2019-09-11 PROCEDURE — 36415 COLL VENOUS BLD VENIPUNCTURE: CPT | Performed by: PHYSICIAN ASSISTANT

## 2019-09-11 PROCEDURE — 86038 ANTINUCLEAR ANTIBODIES: CPT | Performed by: PHYSICIAN ASSISTANT

## 2019-09-11 PROCEDURE — 84443 ASSAY THYROID STIM HORMONE: CPT | Performed by: PHYSICIAN ASSISTANT

## 2019-09-11 PROCEDURE — 99000 SPECIMEN HANDLING OFFICE-LAB: CPT | Performed by: PHYSICIAN ASSISTANT

## 2019-09-11 PROCEDURE — 80307 DRUG TEST PRSMV CHEM ANLYZR: CPT | Mod: 90 | Performed by: PHYSICIAN ASSISTANT

## 2019-09-11 PROCEDURE — 17000 DESTRUCT PREMALG LESION: CPT | Performed by: PHYSICIAN ASSISTANT

## 2019-09-11 PROCEDURE — 80053 COMPREHEN METABOLIC PANEL: CPT | Performed by: PHYSICIAN ASSISTANT

## 2019-09-11 PROCEDURE — 85652 RBC SED RATE AUTOMATED: CPT | Performed by: PHYSICIAN ASSISTANT

## 2019-09-11 PROCEDURE — 86431 RHEUMATOID FACTOR QUANT: CPT | Performed by: PHYSICIAN ASSISTANT

## 2019-09-11 PROCEDURE — 86235 NUCLEAR ANTIGEN ANTIBODY: CPT | Performed by: PHYSICIAN ASSISTANT

## 2019-09-11 PROCEDURE — 99214 OFFICE O/P EST MOD 30 MIN: CPT | Mod: 25 | Performed by: PHYSICIAN ASSISTANT

## 2019-09-11 PROCEDURE — 86140 C-REACTIVE PROTEIN: CPT | Performed by: PHYSICIAN ASSISTANT

## 2019-09-11 PROCEDURE — 85025 COMPLETE CBC W/AUTO DIFF WBC: CPT | Performed by: PHYSICIAN ASSISTANT

## 2019-09-11 RX ORDER — HYDROCODONE BITARTRATE AND ACETAMINOPHEN 5; 325 MG/1; MG/1
1 TABLET ORAL EVERY 6 HOURS PRN
Qty: 10 TABLET | Refills: 0 | Status: SHIPPED | OUTPATIENT
Start: 2019-09-11 | End: 2020-02-20

## 2019-09-11 ASSESSMENT — MIFFLIN-ST. JEOR: SCORE: 1058.08

## 2019-09-11 NOTE — LETTER
[facility]  09/11/19    Patient: Milena Hamilton  YOB: 1940  Medical Record Number: [id number]                                                                  Opioid / Opioid Plus Controlled Substance Agreement    I understand that my care provider has prescribed an opioid (narcotic) controlled substance to help manage my condition(s). I am taking this medicine to help me function or work. I know this is strong medicine, and that it can cause serious side effects. Opioid medicine can be sedating, addicting and may cause a dependency on the drug. They can affect my ability to drive or think, and cause depression. They need to be taken exactly as prescribed. Combining opioids with certain medicines or chemicals (such as cocaine, sedatives and tranquilizers, sleeping pills, meth) can be dangerous or even fatal. Also, if I stop opioids suddenly, I may have severe withdrawal symptoms. Last, I understand that opioids do not work for all types of pain nor for all patients. If not helpful, I may be asked to stop them.    Xanax 0.25 mg- 30 tabs q 3-6 months  Hydrocodone 5/325 mg- 10 tabs q 3 months    The risks, benefits, and side effects of these medicine(s) were explained to me. I agree that:    1. I will take part in other treatments as advised by my care team. This may be psychiatry or counseling, physical therapy, behavioral therapy, group treatment or a referral to a pain clinic. I will reduce or stop my medicine when my care team tells me to do so.  2. I will take my medicines as prescribed. I will not change the dose or schedule unless my care team tells me to. There will be no refills if I  run out early.   I may be contactedwithout warning and asked to complete a urine drug test or pill count at any time.   3. I will keep all my appointments, and understand this is part of the monitoring of opioids. My care team may require an office visit for EVERY opioid/controlled substance  refill. If I miss appointments or don t follow instructions, my care team may stop my medicine.  4. I will not ask other providers to prescribe controlled substances, and I will not accept controlled substances from other people. If I need another prescribed controlled substance for a new reason, I will tell my care team within 1 business day.  5. I will use one pharmacy to fill all of my controlled substance prescriptions, and it is up to me to make sure that I do not run out of my medicines on weekends or holidays. If my care team is willing to refill my opioid prescription without a visit, I must request refills only during office hours, refills may take up to 3 days to process, and it may take up to 5 to 7 days for my medicine to be mailed and ready at my pharmacy. Prescriptions will not be mailed anywhere except my pharmacy.        [id number]  Rev 12/18         Registration to scan to EHR                             Page 1 of 2               Controlled Substance Agreement [facility]  09/11/19  Patient: Milena Hamilton  YOB: 1940  Medical Record Number: [id number]                                                                  6. I am responsible for my prescriptions. If the medicine/prescription is lost or stolen, it will not be replaced. I also agree not to share controlled substance medicines with anyone.  7. I agree to not use ANY illegal or recreational drugs. This includes marijuana, cocaine, bath salts or other drugs. I agree not to use alcohol unless my care team says I may.          I agree to give urine samples whenever asked. If I don t give a urine sample, the care team may stop my medicine.    8. If I enroll in the [place] Medical Marijuana program, I will tell my care team. I will also sign an agreement to share my medical records with my care team.   9. I will bring in my list of medicines (or my medicine bottles) each time I come to the clinic.   10. I  will tell my care team right away if I become pregnant or have a new medical problem treated outside of my regular clinic.  11. I understand that this medicine can affect my thinking and judgment. It may be unsafe for me to drive, use machinery and do dangerous tasks. I will not do any of these things until I know how the medicine affects me. If my dose changes, I will wait to see how it affects me. I will contact my care team if I have concerns about medicine side effects.    I understand that if I do not follow any of the conditions above, my prescriptions or treatment may be stopped.      I agree that my provider, clinic care team, and pharmacy may work with any city, state or federal law enforcement agency that investigates the misuse, sale, or other diversion of my controlled medicine. I will allow my provider to discuss my care with or share a copy of this agreement with any other treating provider, pharmacy or emergency room where I receive care. I agree to give up (waive) any right of privacy or confidentiality with respect to these consents.     I have read this agreement and have asked questions about anything I did not understand.      ________________________________________________________________________  Patient signature - Date/Time -  Milena Hamilton                                      ________________________________________________________________________  Witness signature                                                            ________________________________________________________________________  Provider signature - Chuck Streeter PA-C      [id number]  Rev 12/18         Registration to scan to EHR                         Page 2 of 2                   Controlled Substance Agreement Opioid

## 2019-09-11 NOTE — PATIENT INSTRUCTIONS
Will order labs to further evaluate a potential cause for your dry eyes and dry mouth.  Continue with the Biotene mouthwash.  Drink plenty of fluids.    Will prescribe the Norco to use as needed, 1/2 tablet for severe low back pain.  Will not fill more than every 3 months.  Continue with home exercises along with heat or ice as needed.    The area on your arm may blister but allow it to pop on its own.  If the spot is not gone in 1 month, let me know.    Consider Pepcid or Zantac instead of omeprazole.    Follow up in 3 months.    Mammogram -   [place] - [phone]

## 2019-09-12 LAB
ENA SS-A IGG SER IA-ACNC: <0.2 AI (ref 0–0.9)
ENA SS-B IGG SER IA-ACNC: <0.2 AI (ref 0–0.9)
RHEUMATOID FACT SER NEPH-ACNC: <20 IU/ML (ref 0–20)

## 2019-09-13 ENCOUNTER — HOSPITAL ENCOUNTER (OUTPATIENT)
Dept: PHYSICAL THERAPY | Facility: CLINIC | Age: 79
Setting detail: THERAPIES SERIES
End: 2019-09-13
Attending: PHYSICIAN ASSISTANT
Payer: COMMERCIAL

## 2019-09-13 PROCEDURE — 97110 THERAPEUTIC EXERCISES: CPT | Mod: GP

## 2019-09-13 NOTE — PROGRESS NOTES
Outpatient Physical Therapy Progress Note     Patient: Milena Hamilton  : 1940    Beginning/End Dates of Reporting Period:  2019 to 2019    Referring Provider: Amrik Streeter PA-C    Therapy Diagnosis: Low Back Pain     Client Self Report: Patient reports she met with the MD and her visit went well.     Objective Measurements:  Objective Measure: Obdulia  Details: 3  Objective Measure: JOEY  Details: 10  Objective Measure: Hart Balance  Details: 53/56  Objective Measure: 6 MWT  Details: 1084  Objective Measure: AROM  Details: Full Lumbar Flexion, Full Lateral Trunk Flexion to the right, Mild Lateral Trunk Flexion limitation to the left and mild lumbar extension limitation  Objective Measure: Walking without rest  Details: Patient ambulated 15 minutes straight without walking      Outcome Measures (most recent score):  Obdulia STarT Sub-Score (Q5-9): 1  Obdulia STarT Total Score (all 9): 3  Oswestry Score (%): 20 %    Progress Toward Goals:   Progress this reporting period: Patient has improved her Obdulia score yet again and overall is managing her low back pain now. Her life is nearly unlimited due to her back pain except for lifting heavy objects. Therefor, therapy was extended for three more visits to improve the patient's tolerance to lifting heavy loads and improve core strengthening. Once this goal is accomplished she will be ready for discharge.          Plan:  Continue therapy per current plan of care.  Changes to goals: Patient will be able to lift 20 lbs off of the ground and carry it 25 feet without feeling pain, soreness or symptoms in her low back.    Discharge:  No

## 2019-09-14 DIAGNOSIS — K21.9 GASTROESOPHAGEAL REFLUX DISEASE, ESOPHAGITIS PRESENCE NOT SPECIFIED: ICD-10-CM

## 2019-09-14 LAB
ANA SER QL IF: NEGATIVE
PAIN DRUG SCR UR W RPTD MEDS: NORMAL

## 2019-09-16 ENCOUNTER — TELEPHONE (OUTPATIENT)
Dept: FAMILY MEDICINE | Facility: OTHER | Age: 79
End: 2019-09-16

## 2019-09-16 NOTE — TELEPHONE ENCOUNTER
[facility]  Routing refill request to provider for review/approval because:  Medication is reported/historical.     Next 5 appointments (look out 90 days)    Dec 13, 2019  1:00 PM CST  PHYSICAL with Chuck Streeter PA-C  [facility] [address] [place] [id number]  [phone]        Stephani Del Cid, RN, BSN

## 2019-09-16 NOTE — TELEPHONE ENCOUNTER
----- Message from Chuck Streeter PA-C sent at 9/16/2019  7:50 AM CDT -----  Please call and notify patient that all of her labs are normal. There is no evidence for an autoimmune disorder like Sjogren's to explain her dry mouth. If this continued worsen, will consider ENT referral.    Chuck Streeter PA-C

## 2019-09-18 DIAGNOSIS — M54.41 CHRONIC RIGHT-SIDED LOW BACK PAIN WITH RIGHT-SIDED SCIATICA: ICD-10-CM

## 2019-09-18 DIAGNOSIS — G89.29 CHRONIC RIGHT-SIDED LOW BACK PAIN WITH RIGHT-SIDED SCIATICA: ICD-10-CM

## 2019-09-18 RX ORDER — HYDROCODONE BITARTRATE AND ACETAMINOPHEN 5; 325 MG/1; MG/1
1 TABLET ORAL EVERY 6 HOURS PRN
Qty: 10 TABLET | OUTPATIENT
Start: 2019-09-18

## 2019-09-18 NOTE — TELEPHONE ENCOUNTER
Pending Prescriptions:                       Disp   Refills    HYDROcodone-acetaminophen (NORCO) 5-325 M*10 tab*             Sig: Take 1 tablet by mouth every 6 hours as needed           for pain        Last Written Prescription Date:  9/11/19  Last Fill Quantity: 10,   # refills: 0  Last Office Visit: 9/11/19  Future Office visit:    Next 5 appointments (look out 90 days)    Dec 13, 2019  1:00 PM CST  PHYSICAL with Chuck Streeter PA-C  [facility] [address] [place] [id number]  [phone]           Routing refill request to provider for review/approval because:  Drug not on the [facility], [facility] or  Health refill protocol or controlled substance    Jazzmine Mayers, RN, BSN

## 2019-09-18 NOTE — TELEPHONE ENCOUNTER
As we discussed at her last visit, this medication was prescribed to be used very sparingly, no more than 10 tablets every 3 months and it was just filled 1 week ago so cannot provide a refill at this time.    Chuck Streeter PA-C

## 2019-09-19 NOTE — TELEPHONE ENCOUNTER
LM for patient to return phone call to clinic about message below.  Brenda Serrano CMA ([facility])

## 2019-09-19 NOTE — TELEPHONE ENCOUNTER
Pt had called back and was given the message. She isn't sure why there was a refill request because she did not need one at the time.

## 2019-10-08 ENCOUNTER — HOSPITAL ENCOUNTER (OUTPATIENT)
Dept: PHYSICAL THERAPY | Facility: CLINIC | Age: 79
Setting detail: THERAPIES SERIES
End: 2019-10-08
Attending: PHYSICIAN ASSISTANT
Payer: COMMERCIAL

## 2019-10-08 PROCEDURE — 97110 THERAPEUTIC EXERCISES: CPT | Mod: GP

## 2019-10-08 PROCEDURE — 97530 THERAPEUTIC ACTIVITIES: CPT | Mod: GP

## 2019-10-10 ENCOUNTER — IMMUNIZATION (OUTPATIENT)
Dept: FAMILY MEDICINE | Facility: OTHER | Age: 79
End: 2019-10-10
Payer: COMMERCIAL

## 2019-10-10 PROCEDURE — 90662 IIV NO PRSV INCREASED AG IM: CPT

## 2019-10-10 PROCEDURE — G0008 ADMIN INFLUENZA VIRUS VAC: HCPCS

## 2019-10-22 ENCOUNTER — HOSPITAL ENCOUNTER (OUTPATIENT)
Dept: PHYSICAL THERAPY | Facility: CLINIC | Age: 79
Setting detail: THERAPIES SERIES
End: 2019-10-22
Attending: PHYSICIAN ASSISTANT
Payer: COMMERCIAL

## 2019-10-22 PROCEDURE — 97110 THERAPEUTIC EXERCISES: CPT | Mod: GP

## 2019-11-05 ENCOUNTER — HOSPITAL ENCOUNTER (OUTPATIENT)
Dept: PHYSICAL THERAPY | Facility: CLINIC | Age: 79
Setting detail: THERAPIES SERIES
End: 2019-11-05
Attending: PHYSICIAN ASSISTANT
Payer: COMMERCIAL

## 2019-11-05 PROCEDURE — 97750 PHYSICAL PERFORMANCE TEST: CPT | Mod: GP

## 2019-11-05 PROCEDURE — 97110 THERAPEUTIC EXERCISES: CPT | Mod: GP

## 2019-11-05 NOTE — PROGRESS NOTES
Outpatient Physical Therapy Discharge Note     Patient: Milena Hamilton  : 1940    Beginning/End Dates of Reporting Period:  2019 to 2019    Referring Provider: Amrik Streeter PA-C    Therapy Diagnosis: Low Back Pain with Scoliosis     Client Self Report: Patient reports she feels she's doing pretty good and she doesn't have too many complaints. She knows that if she overdoes something it will hurt, but she treats this as a signal to stop.    Objective Measurements:  Objective Measure: Obdulia  Details: 0  Objective Measure: JOEY  Details: 4  Objective Measure: Hart Balance  Details: 55/56  Objective Measure: 6 MWT  Details: 1040  Objective Measure: AROM  Details: Full Lumbar Extension, Full Lumbar Flexion, Full Lateral Trunk Flexion to the right, Mild Lateral Trunk Flexion limitation to the left and mild lumbar rotation to the left  Objective Measure: Walking without rest  Details: Patient ambulated 1500 feet without rest.      Outcome Measures (most recent score):  Obdulia STarT Sub-Score (Q5-9): 0  Obdulia STarT Total Score (all 9): 0  Oswestry Score (%): 8 %    Goals:  Goal Identifier Walking   Goal Description Patient will be able to walk one mile without experiencing an increase in pain   Target Date 10/28/19   Date Met      Progress:     Goal Identifier Obdulia/JOEY   Goal Description Patient's Obdulia/JOEY scores will reduce to 2 or less and 10 or less respectively.   Target Date 19   Date Met  19   Progress:     Goal Identifier ROM   Goal Description Patient will have full and painfree ROM in her lumbar spine   Target Date 10/28/19   Date Met  19   Progress:     Goal Identifier Lifting   Goal Description Patient will be able to lift 20 lbs off of the ground and carry it 25 feet without feeling pain, soreness or symptoms in her low back.   Target Date 10/22/19   Date Met  19   Progress:     Goal Identifier     Goal Description     Target Date     Date Met      Progress:     Goal  Identifier     Goal Description     Target Date     Date Met      Progress:     Goal Identifier     Goal Description     Target Date     Date Met      Progress:     Goal Identifier     Goal Description     Target Date     Date Met      Progress:     Progress Toward Goals:   Progress this reporting period: Overall, patient has improved function with nearly no limitations due to low back pain. She accomplished nearly all her goals except her ability to ambulate 1 mile but this is more so due to her right lower extremity fatigue. Patient is extremely pleased with her progress and is ready for discharge. Patient is also independent with her home program and self-care to handle her back pain on her own if she engages in a symptom-increasing activity.          Plan:  Discharge from therapy.    Discharge:    Reason for Discharge: Patient has met most goals. Patient no longer needs skilled therapy.    Equipment Issued: None    Discharge Plan: Patient to continue home program.

## 2019-11-29 NOTE — PATIENT INSTRUCTIONS
Preventive Health Recommendations    See your health care provider every year to    Review health changes.     Discuss preventive care.      Review your medicines if your doctor has prescribed any.      You no longer need a yearly Pap test unless you've had an abnormal Pap test in the past 10 years. If you have vaginal symptoms, such as bleeding or discharge, be sure to talk with your provider about a Pap test.      Every 1 to 2 years, have a mammogram.  If you are over 69, talk with your health care provider about whether or not you want to continue having screening mammograms.      Every 10 years, have a colonoscopy. Or, have a yearly FIT test (stool test). These exams will check for colon cancer.       Have a cholesterol test every 5 years, or more often if your doctor advises it.       Have a diabetes test (fasting glucose) every three years. If you are at risk for diabetes, you should have this test more often.       At age 65, have a bone density scan (DEXA) to check for osteoporosis (brittle bone disease).    Shots:    Get a flu shot each year.    Get a tetanus shot every 10 years.    Talk to your doctor about your pneumonia vaccines. There are now two you should receive - Pneumovax (PPSV 23) and Prevnar (PCV 13).    Talk to your pharmacist about the shingles vaccine.    Talk to your doctor about the hepatitis B vaccine.    Nutrition:     Eat at least 5 servings of fruits and vegetables each day.      Eat whole-grain bread, whole-wheat pasta and brown rice instead of white grains and rice.      Get adequate about Calcium and Vitamin D.     Lifestyle    Exercise at least 150 minutes a week (30 minutes a day, 5 days a week). This will help you control your weight and prevent disease.      Limit alcohol to one drink per day.      No smoking.       Wear sunscreen to prevent skin cancer.       See your dentist twice a year for an exam and cleaning.      See your eye doctor every 1 to 2 years to screen for  conditions such as glaucoma, macular degeneration, cataracts, etc.    Follow up in 6 months.

## 2019-11-29 NOTE — PROGRESS NOTES
"SUBJECTIVE:   Milena Hamilton is a 79 year old female who presents for Preventive Visit.    Are you in the first 12 months of your Medicare coverage?  No    Healthy Habits:    In general, how would you rate your overall health?  Good    Frequency of exercise:  2-3 days/week    Duration of exercise:  30-45 minutes    Do you usually eat at least 4 servings of fruit and vegetables a day, include whole grains    & fiber and avoid regularly eating high fat or \"junk\" foods?  Yes    Taking medications regularly:  Yes    Barriers to taking medications:  None    Medication side effects:  None    Ability to successfully perform activities of daily living:  No assistance needed    Home Safety:  No safety concerns identified    Hearing Impairment:  No hearing concerns    In the past 6 months, have you been bothered by leaking of urine?  No    In general, how would you rate your overall mental or emotional health?  Good      PHQ-2 Total Score:    Additional concerns today:  No    Her rib pain is improving from her fracture.    Do you feel safe in your environment? Yes    Have you ever done Advance Care Planning? (For example, a Health Directive, POLST, or a discussion with a medical provider or your loved ones about your wishes): Yes, advance care planning is on file.    Fall risk  Fallen 2 or more times in the past year?: No  Any fall with injury in the past year?: Yes    Cognitive Screening   1) Repeat 3 items (Leader, Season, Table)    2) Clock draw: NORMAL  3) 3 item recall: Recalls 3 objects  Results: 3 items recalled: COGNITIVE IMPAIRMENT LESS LIKELY    Mini-CogTM Copyright CLARISSA Anderson. Licensed by the author for use in [facility]; reprinted with permission ([email or web address]@.[email or web address]). All rights reserved.      Do you have sleep apnea, excessive snoring or daytime drowsiness?: no    Reviewed and updated as needed this visit by clinical staff  Tobacco  Allergies  Meds  Med Hx  Surg Hx  Fam Hx  Soc Hx        Reviewed and " updated as needed this visit by Provider  Tobacco  Allergies  Meds  Problems  Med Hx  Surg Hx  Fam Hx      Social History     Tobacco Use     Smoking status: Never Smoker     Smokeless tobacco: Never Used   Substance Use Topics     Alcohol use: No     If you drink alcohol do you typically have >3 drinks per day or >7 drinks per week? No    Alcohol Use 12/13/2019   Prescreen: >3 drinks/day or >7 drinks/week? -   Prescreen: >3 drinks/day or >7 drinks/week? No       Current providers sharing in care for this patient include:  Patient Care Team:  Chuck Streeter PA-C as PCP - General (Physician Assistant)  Chuck Streeter PA-C as Assigned PCP    The following health maintenance items are reviewed in Epic and correct as of today:  Health Maintenance   Topic Date Due     ADVANCE CARE PLANNING  10/03/2016     LIPID  12/12/2019     MEDICARE ANNUAL WELLNESS VISIT  12/12/2019     BMP  09/11/2020     URINE DRUG SCREEN  09/11/2020     FALL RISK ASSESSMENT  12/04/2020     DEXA  12/19/2020     DTAP/TDAP/TD IMMUNIZATION (4 - Td) 12/12/2028     PHQ-2  Completed     INFLUENZA VACCINE  Completed     PNEUMOCOCCAL IMMUNIZATION 65+ LOW/MEDIUM RISK  Completed     ZOSTER IMMUNIZATION  Completed     IPV IMMUNIZATION  Aged Out     MENINGITIS IMMUNIZATION  Aged Out       Review of Systems  GENERAL: Denies fever, fatigue, weakness, weight gain, or weight loss.  HEENT: Eyes-Denies pain, redness, loss of vision, double or blurred vision.     Ears/Nose-Denies tinnitus, loss of hearing, epistaxis, decreased sense of smell, nasal congestion. Denies loss of sense of taste, dry mouth, or sore throat.   CARDIOVASCULAR: Denies chest pain, shortness of breath, irregular heartbeats,  palpitations, or edema.  RESPIRATORY: Denies cough, hemoptysis, and shortness of breath.  GASTROINTESTINAL: Denies nausea, vomiting, change in appetite, abdominal pain, diarrhea, or constipation.  GENITOURINARY: Denies increased frequency, urgency,  "dysuria, hematuria, or incontinence.   MUSCULOSKELETAL: +Improving left lateral rib pain, chronic low back pain. Denies muscle weakness.  SKIN: Denies easy bruising, redness, rash, or dry skin.   NEUROLOGIC: Denies headache, fainting, dizziness, memory loss, numbness, tingling, or seizures.  PSYCHIATRIC: Denies depression, anxiety, mood swings, and thoughts of suicide.  ENDOCRINE: Denies heat and cold intolerance, polydipsia, or polyuria.   HEMATOLOGIC/LYMPHATIC: Denies easy bleeding or lymphadenopathy.   ALLERGIC/IMMUNOLOGIC: Denies rhinitis, asthma, skin sensitivity.     OBJECTIVE:   /80   Pulse 69   Temp 98  F (36.7  C) (Temporal)   Resp 16   Ht 1.588 m (5' 2.5\")   Wt 61.7 kg (136 lb)   LMP  (LMP Unknown)   SpO2 97%   BMI 24.48 kg/m   Estimated body mass index is 24.48 kg/m  as calculated from the following:    Height as of this encounter: 1.588 m (5' 2.5\").    Weight as of this encounter: 61.7 kg (136 lb).  Physical Exam  GENERAL APPEARANCE: healthy, alert and no distress  EYES: Eyes grossly normal to inspection, PERRL and conjunctivae and sclerae normal  HENT: ear canals and TM's normal, nose and mouth without ulcers or lesions, oropharynx clear and oral mucous membranes moist  NECK: no adenopathy, no asymmetry, masses, or scars and thyroid normal to palpation  RESP: lungs clear to auscultation - no rales, rhonchi or wheezes  CV: regular rate and rhythm, normal S1 S2, no S3 or S4, no murmur, click or rub, no peripheral edema and peripheral pulses strong  ABDOMEN: soft, nontender, no hepatosplenomegaly, no masses and bowel sounds normal  MS: no musculoskeletal defects are noted and gait is age appropriate without ataxia. Mild tenderness over the lumbar spinous processes and left, inferolateral ribs.   SKIN: no suspicious lesions or rashes  NEURO: Normal strength and tone, mentation intact and speech normal. Cranial nerves II-XII are grossly intact. DTRs are 2+/4 throughout and symmetric. Gait is " "stable.   PSYCH: mentation appears normal and affect normal/bright    ASSESSMENT / PLAN:       ICD-10-CM    1. Encounter for routine adult health examination without abnormal findings Z00.00    2. Acquired hypothyroidism E03.9 levothyroxine (SYNTHROID/LEVOTHROID) 50 MCG tablet   3. Hyperlipidemia LDL goal <130 E78.5 Lipid panel reflex to direct LDL Fasting     simvastatin (ZOCOR) 20 MG tablet   4. Chronic right-sided low back pain with right-sided sciatica M54.41     G89.29    5. HTN, goal below 140/90 I10 metoprolol tartrate (LOPRESSOR) 25 MG tablet       2. Continue current dose of levothyroxine.    3. Continue current dose of simvastatin. Updated fasting lipid panel ordered.    4. Continue with as needed Tylenol and ibuprofen with rare use of Norco. If should would like to pursue another lumbar JOSE, she will contact the clinic. CSA and UDS are up to date.    5. BP is stable. Continue metoprolol.    Follow up in 6 months for a pain recheck.     COUNSELING:  Reviewed preventive health counseling, as reflected in patient instructions       Regular exercise       Healthy diet/nutrition    Estimated body mass index is 24.48 kg/m  as calculated from the following:    Height as of this encounter: 1.588 m (5' 2.5\").    Weight as of this encounter: 61.7 kg (136 lb).         reports that she has never smoked. She has never used smokeless tobacco.      Appropriate preventive services were discussed with this patient, including applicable screening as appropriate for cardiovascular disease, diabetes, osteopenia/osteoporosis, and glaucoma.  As appropriate for age/gender, discussed screening for colorectal cancer, prostate cancer, breast cancer, and cervical cancer. Checklist reviewing preventive services available has been given to the patient.    Reviewed patients plan of care and provided an AVS. The Basic Care Plan (routine screening as documented in Health Maintenance) for Milena meets the Care Plan requirement. This " Care Plan has been established and reviewed with the Patient.    Counseling Resources:  ATP IV Guidelines  Pooled Cohorts Equation Calculator  Breast Cancer Risk Calculator  FRAX Risk Assessment  ICSI Preventive Guidelines  Dietary Guidelines for Americans, 2010  USDA's MyPlate  ASA Prophylaxis  Lung CA Screening    Chuck Streeter PA-C  [facility] [place]    Identified Health Risks:

## 2019-12-04 ENCOUNTER — ANCILLARY PROCEDURE (OUTPATIENT)
Dept: GENERAL RADIOLOGY | Facility: OTHER | Age: 79
End: 2019-12-04
Attending: PHYSICIAN ASSISTANT
Payer: COMMERCIAL

## 2019-12-04 ENCOUNTER — OFFICE VISIT (OUTPATIENT)
Dept: FAMILY MEDICINE | Facility: OTHER | Age: 79
End: 2019-12-04
Payer: COMMERCIAL

## 2019-12-04 VITALS
TEMPERATURE: 98.3 F | DIASTOLIC BLOOD PRESSURE: 84 MMHG | RESPIRATION RATE: 16 BRPM | OXYGEN SATURATION: 98 % | HEIGHT: 63 IN | HEART RATE: 87 BPM | SYSTOLIC BLOOD PRESSURE: 132 MMHG | WEIGHT: 136 LBS | BODY MASS INDEX: 24.1 KG/M2

## 2019-12-04 DIAGNOSIS — S22.32XA CLOSED FRACTURE OF ONE RIB OF LEFT SIDE, INITIAL ENCOUNTER: Primary | ICD-10-CM

## 2019-12-04 PROCEDURE — 99214 OFFICE O/P EST MOD 30 MIN: CPT | Performed by: PHYSICIAN ASSISTANT

## 2019-12-04 RX ORDER — HYDROCODONE BITARTRATE AND ACETAMINOPHEN 5; 325 MG/1; MG/1
1-2 TABLET ORAL EVERY 6 HOURS PRN
Qty: 10 TABLET | Refills: 0 | Status: SHIPPED | OUTPATIENT
Start: 2019-12-04 | End: 2020-02-20

## 2019-12-04 ASSESSMENT — MIFFLIN-ST. JEOR: SCORE: 1058.08

## 2019-12-04 ASSESSMENT — PAIN SCALES - GENERAL: PAINLEVEL: EXTREME PAIN (8)

## 2019-12-04 NOTE — PATIENT INSTRUCTIONS
You have a rib fracture.  Will prescribe a short course of Norco to take 1-2 tabs every 6 hours as needed for severe pain.  Can also take Tylenol and ibuprofen as needed.  Ice can be helpful.  Continue to take deep breaths.  Avoid bending and twisting.

## 2019-12-04 NOTE — PROGRESS NOTES
"Subjective     Milena Hamilton is a 79 year old female who presents to clinic today for the following health issues:    HPI   Concern - Side Pain  Onset: 10 days ago    Description:   Patient fell and hit left side on the bath tub - she said she was kneeling down on the floor and fell to the side    Intensity: 8/10    Progression of Symptoms:  same    Accompanying Signs & Symptoms:  Bruising  Pain right below ribs   \"When taking a deep breath, it hurts.\"  Nauseated     Previous history of similar problem:   no    Precipitating factors:   Worsened by: laying on it, bending    Alleviating factors:  Improved by: Tylenol     Therapies Tried and outcome: Tylenol with moderate relief, but when it wears off, the pain comes back. 1/2 tablet of Norco, ineffective.    Reviewed and updated as needed this visit by Provider   Allergies  Meds  Problems  Med Hx    Review of Systems   ROS COMP: Constitutional, HEENT, cardiovascular, pulmonary, gi and gu systems are negative, except as otherwise noted.      Objective    /84   Pulse 87   Temp 98.3  F (36.8  C) (Temporal)   Resp 16   Ht 1.588 m (5' 2.5\")   Wt 61.7 kg (136 lb)   LMP  (LMP Unknown)   SpO2 98%   BMI 24.48 kg/m    Body mass index is 24.48 kg/m .  Physical Exam   GENERAL: healthy, alert and no distress  RESP: lungs clear to auscultation - no rales, rhonchi or wheezes.   CV: regular rate and rhythm, normal S1 S2, no S3 or S4, no murmur, click or rub  ABDOMEN: soft, nontender, no hepatosplenomegaly, no masses and bowel sounds normal. LUQ pain along ribs.   MS: Ecchymosis and moderate/severe present over left anterolateral inferior ribs.     XR Chest/Ribs Left    IMPRESSION:   1. Mildly angulated anterolateral left ninth rib fracture. No other  fractures are seen. No pneumothorax.  2. No other evidence of acute cardiopulmonary disease is seen.         Assessment & Plan       ICD-10-CM    1. Closed fracture of one rib of left side, initial encounter S22.32XA " XR Ribs & Chest Left G/E 3 Views     HYDROcodone-acetaminophen (NORCO) 5-325 MG tablet     CANCELED: XR Ribs & Chest Left G/E 3 Views        1. X-ray reveals angulated fracture of left anterolateral 9th rib fracture. No pneumothorax. Treatment is supportive so will prescribe a short course of Norco to take every 6 hours as needed for severe pain. I recommend normal breathing, taking deep breaths when able along with rest, Tylenol, ibuprofen, and ice as needed. She should avoid bending and twisting.    Follow up in 1 week for annual physical.      Patient seen in conjunction with SHERLYN Hale PA-C  [facility]

## 2019-12-06 DIAGNOSIS — E78.5 HYPERLIPIDEMIA LDL GOAL <130: ICD-10-CM

## 2019-12-06 RX ORDER — SIMVASTATIN 20 MG
TABLET ORAL
Qty: 45 TABLET | Refills: 0 | Status: SHIPPED | OUTPATIENT
Start: 2019-12-06 | End: 2019-12-13

## 2019-12-06 NOTE — TELEPHONE ENCOUNTER
Pending Prescriptions:                       Disp   Refills    simvastatin (ZOCOR) 20 MG tablet [Pharmac*45 tab*0            Sig: TAKE 1/2 TABLET (10mg) BY MOUTH every night AT           BEDTIME    Prescription approved per [facility] Refill Protocol.    Amy Ruiz, RN, BSN

## 2019-12-13 ENCOUNTER — OFFICE VISIT (OUTPATIENT)
Dept: FAMILY MEDICINE | Facility: OTHER | Age: 79
End: 2019-12-13
Payer: COMMERCIAL

## 2019-12-13 VITALS
SYSTOLIC BLOOD PRESSURE: 132 MMHG | HEART RATE: 69 BPM | TEMPERATURE: 98 F | DIASTOLIC BLOOD PRESSURE: 80 MMHG | OXYGEN SATURATION: 97 % | WEIGHT: 136 LBS | BODY MASS INDEX: 24.1 KG/M2 | RESPIRATION RATE: 16 BRPM | HEIGHT: 63 IN

## 2019-12-13 DIAGNOSIS — I10 HTN, GOAL BELOW 140/90: ICD-10-CM

## 2019-12-13 DIAGNOSIS — M54.41 CHRONIC RIGHT-SIDED LOW BACK PAIN WITH RIGHT-SIDED SCIATICA: ICD-10-CM

## 2019-12-13 DIAGNOSIS — Z00.00 ENCOUNTER FOR ROUTINE ADULT HEALTH EXAMINATION WITHOUT ABNORMAL FINDINGS: Primary | ICD-10-CM

## 2019-12-13 DIAGNOSIS — G89.29 CHRONIC RIGHT-SIDED LOW BACK PAIN WITH RIGHT-SIDED SCIATICA: ICD-10-CM

## 2019-12-13 DIAGNOSIS — E78.5 HYPERLIPIDEMIA LDL GOAL <130: ICD-10-CM

## 2019-12-13 DIAGNOSIS — E03.9 ACQUIRED HYPOTHYROIDISM: ICD-10-CM

## 2019-12-13 LAB
CHOLEST SERPL-MCNC: 167 MG/DL
HDLC SERPL-MCNC: 77 MG/DL
LDLC SERPL CALC-MCNC: 68 MG/DL
NONHDLC SERPL-MCNC: 90 MG/DL
TRIGL SERPL-MCNC: 108 MG/DL

## 2019-12-13 PROCEDURE — 99397 PER PM REEVAL EST PAT 65+ YR: CPT | Performed by: PHYSICIAN ASSISTANT

## 2019-12-13 PROCEDURE — 36415 COLL VENOUS BLD VENIPUNCTURE: CPT | Performed by: PHYSICIAN ASSISTANT

## 2019-12-13 PROCEDURE — 80061 LIPID PANEL: CPT | Performed by: PHYSICIAN ASSISTANT

## 2019-12-13 RX ORDER — SIMVASTATIN 20 MG
TABLET ORAL
Qty: 45 TABLET | Refills: 3 | Status: SHIPPED | OUTPATIENT
Start: 2019-12-13 | End: 2020-12-09

## 2019-12-13 RX ORDER — METOPROLOL TARTRATE 25 MG/1
TABLET, FILM COATED ORAL
Qty: 45 TABLET | Refills: 3 | Status: SHIPPED | OUTPATIENT
Start: 2019-12-13 | End: 2020-12-09

## 2019-12-13 RX ORDER — LEVOTHYROXINE SODIUM 50 UG/1
TABLET ORAL
Qty: 90 TABLET | Refills: 3 | Status: SHIPPED | OUTPATIENT
Start: 2019-12-13 | End: 2020-12-21

## 2019-12-13 ASSESSMENT — ACTIVITIES OF DAILY LIVING (ADL): CURRENT_FUNCTION: NO ASSISTANCE NEEDED

## 2019-12-13 ASSESSMENT — MIFFLIN-ST. JEOR: SCORE: 1058.08

## 2019-12-18 ENCOUNTER — TELEPHONE (OUTPATIENT)
Dept: FAMILY MEDICINE | Facility: OTHER | Age: 79
End: 2019-12-18

## 2019-12-18 ENCOUNTER — ANCILLARY PROCEDURE (OUTPATIENT)
Dept: MAMMOGRAPHY | Facility: OTHER | Age: 79
End: 2019-12-18
Attending: PHYSICIAN ASSISTANT
Payer: COMMERCIAL

## 2019-12-18 DIAGNOSIS — Z12.31 VISIT FOR SCREENING MAMMOGRAM: ICD-10-CM

## 2019-12-18 DIAGNOSIS — R92.8 ABNORMAL MAMMOGRAM: Primary | ICD-10-CM

## 2019-12-18 PROCEDURE — 77063 BREAST TOMOSYNTHESIS BI: CPT | Mod: TC

## 2019-12-18 PROCEDURE — 77067 SCR MAMMO BI INCL CAD: CPT | Mod: TC

## 2019-12-18 NOTE — TELEPHONE ENCOUNTER
Patient informed and transferred to [facility] for scheduling. No one was available - she would like someone from here to schedule this tomorrow and call her with the date/time.     She prefers [facility]. Sometime after Alex - possibly Friday after Alex.

## 2019-12-18 NOTE — TELEPHONE ENCOUNTER
Please call and notify patient that there was a slight distortion in the left upper breast on mammogram so an ultrasound is recommended for further evaluation. Please help schedule.    Chuck Streeter PA-C

## 2019-12-19 NOTE — TELEPHONE ENCOUNTER
Notified patient we got her scheduled in [place] Monday 12/30 for diagnostic mammogram & ultrasound.

## 2019-12-30 ENCOUNTER — HOSPITAL ENCOUNTER (OUTPATIENT)
Dept: ULTRASOUND IMAGING | Facility: CLINIC | Age: 79
Discharge: HOME | End: 2019-12-30
Attending: PHYSICIAN ASSISTANT | Admitting: PHYSICIAN ASSISTANT
Payer: COMMERCIAL

## 2019-12-30 ENCOUNTER — HOSPITAL ENCOUNTER (OUTPATIENT)
Dept: MAMMOGRAPHY | Facility: CLINIC | Age: 79
End: 2019-12-30
Attending: PHYSICIAN ASSISTANT
Payer: COMMERCIAL

## 2019-12-30 DIAGNOSIS — R92.8 ABNORMAL MAMMOGRAM: ICD-10-CM

## 2019-12-30 PROCEDURE — 76642 ULTRASOUND BREAST LIMITED: CPT | Mod: LT

## 2019-12-30 PROCEDURE — G0279 TOMOSYNTHESIS, MAMMO: HCPCS

## 2020-01-31 DIAGNOSIS — F41.9 ANXIETY: ICD-10-CM

## 2020-01-31 RX ORDER — ALPRAZOLAM 0.25 MG
TABLET ORAL
Qty: 30 TABLET | Refills: 0 | Status: SHIPPED | OUTPATIENT
Start: 2020-01-31 | End: 2020-05-28

## 2020-01-31 NOTE — TELEPHONE ENCOUNTER
Pending Prescriptions:                       Disp   Refills    ALPRAZolam (XANAX) 0.25 MG tablet [Pharmac*30 tab*         Sig: TAKE ONE TABLET BY MOUTH Every night AT BEDTIME AS           NEEDED FOR anxiety    Last Written Prescription Date:  8/21/2019  Last Fill Quantity: 30,  # refills: 0   Last office visit: 12/13/2019 with prescribing provider:     Future Office Visit:      Routing refill request to provider for review/approval because:  Drug not on the G refill protocol     Amy Ruiz, MSN, RN

## 2020-02-13 ENCOUNTER — OFFICE VISIT (OUTPATIENT)
Dept: FAMILY MEDICINE | Facility: CLINIC | Age: 80
End: 2020-02-13
Payer: COMMERCIAL

## 2020-02-13 ENCOUNTER — TELEPHONE (OUTPATIENT)
Dept: FAMILY MEDICINE | Facility: OTHER | Age: 80
End: 2020-02-13

## 2020-02-13 VITALS
SYSTOLIC BLOOD PRESSURE: 132 MMHG | HEART RATE: 70 BPM | RESPIRATION RATE: 16 BRPM | DIASTOLIC BLOOD PRESSURE: 82 MMHG | OXYGEN SATURATION: 97 % | TEMPERATURE: 98 F

## 2020-02-13 DIAGNOSIS — K21.9 GASTROESOPHAGEAL REFLUX DISEASE, ESOPHAGITIS PRESENCE NOT SPECIFIED: ICD-10-CM

## 2020-02-13 DIAGNOSIS — I10 ESSENTIAL HYPERTENSION: ICD-10-CM

## 2020-02-13 DIAGNOSIS — M53.3 SI (SACROILIAC) JOINT DYSFUNCTION: Primary | ICD-10-CM

## 2020-02-13 PROCEDURE — 99214 OFFICE O/P EST MOD 30 MIN: CPT | Performed by: NURSE PRACTITIONER

## 2020-02-13 ASSESSMENT — PAIN SCALES - GENERAL: PAINLEVEL: WORST PAIN (10)

## 2020-02-13 NOTE — TELEPHONE ENCOUNTER
Patient calling to clarify if she should stop taking the Hydrocodone while taking the Aleve. Please advise

## 2020-02-13 NOTE — PATIENT INSTRUCTIONS
Use ice (not heat) on your right lower back. You can also use over the counter lidocaine patches on this area. There is also lidocaine cream that you can ask your pharmacist about if you do not want to use the patches.     You can take Aleve once per day in the morning with food. Do this for one week only and then stop, but you can stop sooner if it causes side effects or if you pain has improved.     I recommend you start physical therapy if symptoms don't improve within one week.

## 2020-02-13 NOTE — PROGRESS NOTES
"Subjective     Milena Hamilton is a 79 year old female who presents to clinic with her  today for the following health issues:    HPI   Back Pain     Duration: last 5 days         Specific cause: exercising     Description:   Location of pain: low back right  Character of pain: sharp and intermittent  Pain radiation:radiates into the right leg  New numbness or weakness in legs, not attributed to pain:  YES    Intensity: Currently 10/10    History:   Pain interferes with job: Not applicable - retired   History of back problems: previous spinal surgery - 2 years ago and disc disease   Any previous MRI or X-rays: Yes- at [facility].  Date 04/2019  Sees a specialist for back pain:  No  Therapies tried without relief: chiropractor and physical therapy (hasn't been to PT in 4 months)     Alleviating factors:   Improved by: acetaminophen (Tylenol), opioids and rest      Precipitating factors:  Worsened by: Bending, Standing and Walking    -Pt has arthritis and pain in her hips, had previous surgery. Finished physical therapy in November 2019. For the past month she has been doing some hip exercises and 5 days ago she was trying to \"overextend\" her hip to get a better stretch and she pulled something in her back. Is currently on hydrocodone for hip pain and uses a muscle relaxant prn. Applying warmth to her back helps. She has been taking Tylenol extra strength that seems to help just for a little bit. She is sleeping well.  -She has had lumbar epidurals in the past for pain management, hx of surgery on her lumbar spine. Has extensive back disease history.   -She states when she lays on her left hip it causes her right hip to hurt. Her  notes that when she walks her left hip seems higher than her right.  She seems to be able to participate in normal daily activities, but  notes that she had a hard time getting up from a seated position this morning.     -She has some numbness/tingling in her legs " originating from her lower back and radiating down. Notes that the past few days she has also felt a little nauseous, but is eating normally. Denies emesis.  -She walks daily, but isn't in any exercise program or gym.   -Denies urinary issues, bowel changes, recent long car rides  -She is not supposed to take Aleve because of her blood pressure.         Accompanying Signs & Symptoms:  Risk of Fracture:  Age >64  Risk of Cauda Equina:  None  Risk of Infection:  None  Risk of Cancer:  None  Risk of Ankylosing Spondylitis:  Onset at age <35, male, AND morning back stiffness. no       Patient Active Problem List   Diagnosis     Lichen planus     Family history of diabetes mellitus     Tinnitus     Panic disorder without agoraphobia     Gastritis     Osteopenia     Hyperlipidemia     Anxiety     DDD (degenerative disc disease), cervical     Advanced directives, counseling/discussion     Sacroiliac dysfunction     Chronic constipation     History of diverticulitis - s/p left hemicolectomy     Diarrhea     Health Care Home     Raynaud's disease     HTN (hypertension)     Benign skin lesion     Community acquired pneumonia     Hypothyroidism, unspecified type     Premature atrial beats     Acquired hypothyroidism     Gastroesophageal reflux disease, esophagitis presence not specified     Intractable vomiting     Gastroenteritis     After cataract, left eye     Cortical age-related cataract     Cortical senile cataract     Scoliosis of lumbar spine, unspecified scoliosis type     Past Surgical History:   Procedure Laterality Date     C APPENDECTOMY       C NONSPECIFIC PROCEDURE      Right inferior oblique recession, 14 mm     C VAG HYST,RMV TUBE/OVARY  1984    cervix removed     COLONOSCOPY  10/17/2007    normal, recheck in 10 years     COLONOSCOPY N/A 8/27/2014    Procedure: COLONOSCOPY;  Surgeon: Raffi Montiel MD;  [facility]     COMBINED CYSTOSCOPY, INSERT CATHETER URETER Bilateral 10/13/2014    Procedure:  "COMBINED CYSTOSCOPY, INSERT CATHETER URETER;  Surgeon: Abdoulaye Odell MD;  Location[facility]     EYE SURGERY      right eye muscle repair     HC REMOVAL GALLBLADDER  2010    lap     HC REMOVAL OF OVARY/TUBE(S)      Salpingo-Oophorectomy, bilateral     HYSTERECTOMY, PAP NO LONGER INDICATED       INJECT EPIDURAL LUMBAR Right 4/10/2017    Procedure: INJECT EPIDURAL LUMBAR;  Surgeon: Raffi Wright MD;  [facility]     INJECT EPIDURAL TRANSFORAMINAL Right 2016    Procedure: INJECT EPIDURAL TRANSFORAMINAL;  Surgeon: Antonio Dasilva MD;  [facility]     INJECT EPIDURAL TRANSFORAMINAL Right 10/13/2016    Procedure: INJECT EPIDURAL TRANSFORAMINAL;  Surgeon: Antonio Dasilva MD;  [facility]     LAPAROSCOPIC ASSISTED COLECTOMY LEFT (DESCENDING) N/A 10/13/2014    Procedure: LAPAROSCOPIC ASSISTED COLECTOMY LEFT (DESCENDING);  Surgeon: Raffi Montiel MD;  [facility]       Social History     Tobacco Use     Smoking status: Never Smoker     Smokeless tobacco: Never Used   Substance Use Topics     Alcohol use: No     Family History   Problem Relation Age of Onset     Diabetes Brother      Heart Disease Brother 50        AMI     Diabetes Brother      Heart Disease Brother         AMI, \"older\"     Cerebrovascular Disease Brother      Psychotic Disorder Brother         , alcohol     Cancer Sister         breast cancer x2     Hypertension No family hx of      Breast Cancer No family hx of      Ovarian Cancer No family hx of      Prostate Cancer No family hx of      Mental Illness No family hx of      Anesthesia Reaction No family hx of      Osteoporosis No family hx of      Obesity No family hx of      Other Cancer No family hx of      Depression No family hx of      Anxiety Disorder No family hx of      Mental Illness No family hx of      Substance Abuse No family hx of          Current Outpatient Medications   Medication Sig Dispense Refill     ALPRAZolam (XANAX) 0.25 MG tablet TAKE " ONE TABLET BY MOUTH Every night AT BEDTIME AS NEEDED FOR anxiety 30 tablet 0     Ascorbic Acid (VITAMIN C PO)        ASPIRIN 81 MG OR TABS 1 TABLET DAILY       calcium carbonate-vitamin D (OSCAL W/D) 500-200 MG-UNIT tablet Take 1 tablet by mouth 2 times daily       Dentifrices (BIOTENE DRY MOUTH) PSTE Toothpaste and mouthwash       HYDROcodone-acetaminophen (NORCO) 5-325 MG tablet Take 1 tablet by mouth every 6 hours as needed for pain 10 tablet 0     levothyroxine (SYNTHROID/LEVOTHROID) 50 MCG tablet TAKE 1 TABLET (50 MCG) BY MOUTH DAILY 90 tablet 3     loratadine (CLARITIN) 10 MG tablet Take 10 mg by mouth daily       metoprolol tartrate (LOPRESSOR) 25 MG tablet TAKE 1/2 TABLET (12.5 MG) BY MOUTH DAILY 45 tablet 3     omeprazole (PRILOSEC) 20 MG DR capsule Take 1 capsule by mouth daily 90 capsule 3     simvastatin (ZOCOR) 20 MG tablet TAKE 1/2 TABLET (10mg) BY MOUTH every night AT BEDTIME 45 tablet 3     THERAPEUTIC MULTIVITAMIN OR 1 TABLET DAILY       fluticasone (FLONASE) 50 MCG/ACT nasal spray Spray 2 sprays into both nostrils daily (Patient not taking: Reported on 2/13/2020) 9.9 mL 1     gabapentin (NEURONTIN) 100 MG capsule Take 1 capsule (100 mg) by mouth 3 times daily 20 capsule 0     omeprazole (PRILOSEC) 20 MG DR capsule Take 20 mg by mouth daily  11     oxyCODONE-acetaminophen (PERCOCET) 5-325 MG tablet 1 tablet twice a day for severe pain only 8 tablet 0     predniSONE (DELTASONE) 20 MG tablet Take 1 tablet (20 mg) by mouth 2 times daily for 5 days 10 tablet 0     Allergies   Allergen Reactions     Azithromycin Diarrhea     Ciprofloxacin Other (See Comments)     Cipro, dizziness and abdominal pain     Penicillins      stomach upset, diarrhea     Sulfa Drugs      hives     Recent Labs   Lab Test 12/13/19  1323 09/11/19  1427 04/25/19  0940 12/12/18  0956  02/27/18  0445  08/10/17  0758 03/29/17  1145   LDL 68  --   --  89  --   --   --  107*  --    HDL 77  --   --  82  --   --   --  96  --    TRIG 108   --   --  93  --   --   --  72  --    ALT  --  18  --   --   --  14  --   --  28   CR  --  0.83  --  0.67   < > 0.72  --   --  0.72   GFRESTIMATED  --  68 81 86   < > 79  --   --  79   GFRESTBLACK  --  79 >90 >90   < > >90  --   --  >90  African American GFR Calc     POTASSIUM  --  4.0  --  4.1   < > 3.4  --   --  3.8   TSH  --  0.82  --  1.43  --   --    < > 1.89  --     < > = values in this interval not displayed.      BP Readings from Last 3 Encounters:   02/16/20 (!) 156/76   02/13/20 132/82   12/13/19 132/80    Wt Readings from Last 3 Encounters:   12/13/19 61.7 kg (136 lb)   12/04/19 61.7 kg (136 lb)   09/11/19 61.7 kg (136 lb)                      Reviewed and updated as needed this visit by Provider  Tobacco  Allergies  Meds  Problems  Med Hx  Surg Hx  Fam Hx         Review of Systems   ROS COMP: Constitutional, HEENT, cardiovascular, pulmonary, gi and gu systems are negative, except as otherwise noted.    This document serves as a record of the services and decisions personally performed by RADHA MARI. It was created on his/her behalf by Joanne Coelho, a trained medical scribe. The creation of this document is based on the provider's statements to the medical scribe. Joanne Coelho, February 13, 2020 11:40 AM         Objective    /82 (BP Location: Left arm, Patient Position: Sitting, Cuff Size: Adult Regular)   Pulse 70   Temp 98  F (36.7  C) (Temporal)   Resp 16   LMP  (LMP Unknown)   SpO2 97%   There is no height or weight on file to calculate BMI.  Physical Exam   GENERAL: healthy, alert and no distress, pt using cane for ambulation   RESP: lungs clear to auscultation - no rales, rhonchi or wheezes  CV: regular rate and rhythm, normal S1 S2, no S3 or S4, no murmur, click or rub, no peripheral edema and peripheral pulses strong  MS: no gross musculoskeletal defects noted, no edema, right SI joint tenderness. Able to shift her weight and move in the exam room very well although she  asks for assistance with going from sitting to laying and laying from sitting, but utilizes her musculature very well.   BACK: no CVA tenderness, no paralumbar tenderness, no vertebral tenderness  NEURO: sensory exam grossly normal, mentation intact and speech normal, equal sensation to light touch bilateral LEs, patellar reflexes equal, strength and sensation in LEs normal, negative SLR on right   PSYCH: mentation appears normal, affect normal/bright      04/25/2019 MR Lumbar Spine   IMPRESSION:    1. There are actually only four lumbar type vertebral bodies, so the  operated level is L4-S1.  2. Status post right L4-S1 laminotomy, medial facetectomy and  foraminotomy, with expected postoperative enhancing granulation tissue  in the surgical bed. No evidence of disc herniation.  3. Subtle stress reaction in the right L4 pars interarticularis and  superior articular facet.  4. Right L4-S1 residual severe foraminal stenosis laterally.  5. L3-L4 severe degenerative disc disease, mild spinal canal stenosis  and moderate bilateral degenerative facet arthropathy.  6. L2-L3 mild degenerative disc disease and moderate bilateral  degenerative facet arthropathy.     PRASHANT BARR MD        Assessment & Plan       ICD-10-CM    1. SI (sacroiliac) joint dysfunction M53.3    2. Essential hypertension I10    3. Gastroesophageal reflux disease, esophagitis presence not specified K21.9       Discussed management of symptoms, including ice (not heat), lidocaine patches/cream, and Aleve once per day for 1 week. Start physical therapy if symptoms don't improve within one week. Pt and her  voice understanding of the plan.    Caution with meds due to BP and GERD. Pt. Concerned it is her spine, but has gait imbalance and discussed this contribution to her symptoms at length.     Low threshold for PT-patient was not wanting this at this time.     Patient Instructions   Use ice (not heat) on your right lower back. You can also use  over the counter lidocaine patches on this area. There is also lidocaine cream that you can ask your pharmacist about if you do not want to use the patches.     You can take Aleve once per day in the morning with food. Do this for one week only and then stop, but you can stop sooner if it causes side effects or if you pain has improved.     I recommend you start physical therapy if symptoms don't improve within one week.         Return in about 4 months (around 6/13/2020) for Recheck pain management with PCP .    The information in this document, created by the medical scribe for me, accurately reflects the services I personally performed and the decisions made by me. I have reviewed and approved this document for accuracy.   FORREST Nolasco CNP  [facility]

## 2020-02-16 ENCOUNTER — HOSPITAL ENCOUNTER (EMERGENCY)
Facility: CLINIC | Age: 80
Discharge: HOME | End: 2020-02-16
Attending: FAMILY MEDICINE | Admitting: FAMILY MEDICINE
Payer: COMMERCIAL

## 2020-02-16 VITALS
HEART RATE: 68 BPM | HEIGHT: 61 IN | DIASTOLIC BLOOD PRESSURE: 76 MMHG | OXYGEN SATURATION: 98 % | TEMPERATURE: 97.2 F | BODY MASS INDEX: 25.7 KG/M2 | RESPIRATION RATE: 16 BRPM | SYSTOLIC BLOOD PRESSURE: 156 MMHG

## 2020-02-16 DIAGNOSIS — M54.41 ACUTE RIGHT-SIDED LOW BACK PAIN WITH RIGHT-SIDED SCIATICA: ICD-10-CM

## 2020-02-16 PROCEDURE — 99283 EMERGENCY DEPT VISIT LOW MDM: CPT | Performed by: FAMILY MEDICINE

## 2020-02-16 PROCEDURE — 25000131 ZZH RX MED GY IP 250 OP 636 PS 637: Performed by: FAMILY MEDICINE

## 2020-02-16 PROCEDURE — 99284 EMERGENCY DEPT VISIT MOD MDM: CPT | Mod: Z6 | Performed by: FAMILY MEDICINE

## 2020-02-16 PROCEDURE — 25000132 ZZH RX MED GY IP 250 OP 250 PS 637: Performed by: FAMILY MEDICINE

## 2020-02-16 RX ORDER — OXYCODONE AND ACETAMINOPHEN 5; 325 MG/1; MG/1
TABLET ORAL
Qty: 8 TABLET | Refills: 0 | Status: SHIPPED | OUTPATIENT
Start: 2020-02-16 | End: 2020-05-01

## 2020-02-16 RX ORDER — OXYCODONE AND ACETAMINOPHEN 5; 325 MG/1; MG/1
1 TABLET ORAL ONCE
Status: COMPLETED | OUTPATIENT
Start: 2020-02-16 | End: 2020-02-16

## 2020-02-16 RX ORDER — PREDNISONE 20 MG/1
20 TABLET ORAL 2 TIMES DAILY
Qty: 10 TABLET | Refills: 0 | Status: SHIPPED | OUTPATIENT
Start: 2020-02-16 | End: 2020-03-05

## 2020-02-16 RX ORDER — PREDNISONE 20 MG/1
20 TABLET ORAL ONCE
Status: COMPLETED | OUTPATIENT
Start: 2020-02-16 | End: 2020-02-16

## 2020-02-16 RX ORDER — GABAPENTIN 100 MG/1
100 CAPSULE ORAL 3 TIMES DAILY
Qty: 20 CAPSULE | Refills: 0 | Status: SHIPPED | OUTPATIENT
Start: 2020-02-16 | End: 2020-05-01

## 2020-02-16 RX ADMIN — OXYCODONE HYDROCHLORIDE AND ACETAMINOPHEN 1 TABLET: 5; 325 TABLET ORAL at 12:01

## 2020-02-16 RX ADMIN — PREDNISONE 20 MG: 20 TABLET ORAL at 12:01

## 2020-02-16 ASSESSMENT — ENCOUNTER SYMPTOMS
BACK PAIN: 1
FEVER: 0
NECK PAIN: 0
FATIGUE: 0
CONFUSION: 0
WEAKNESS: 0
FLANK PAIN: 0
DIZZINESS: 0
FREQUENCY: 0
EYE REDNESS: 0
ABDOMINAL PAIN: 0
DIFFICULTY URINATING: 0
COUGH: 0
RHINORRHEA: 0
HEMATURIA: 0

## 2020-02-16 NOTE — ED NOTES
Patient went for a walk with nurse. Tolerated well. Reports mild discomfort on lower right lower back. Patients  went to the car and got her cane. Patient ambulated well with her cane. Patient feels ready to go home.   Updated Dr Verdugoor.

## 2020-02-16 NOTE — ED AVS SNAPSHOT
[facility] Emergency Department  [address] [place] [address]  Phone:  [phone]  Fax:  [phone]                                    Milena Hamilton   MRN: [id number]    Department:  [facility] Emergency Department   Date of Visit:  2/16/2020           After Visit Summary Signature Page    I have received my discharge instructions, and my questions have been answered. I have discussed any challenges I see with this plan with the nurse or doctor.    ..........................................................................................................................................  Patient/Patient Representative Signature      ..........................................................................................................................................  Patient Representative Print Name and Relationship to Patient    ..................................................               ................................................  Date                                   Time    ..........................................................................................................................................  Reviewed by Signature/Title    ...................................................              ..............................................  Date                                               Time          22EPIC Rev 08/18

## 2020-02-16 NOTE — ED PROVIDER NOTES
History     Chief Complaint   Patient presents with     Back Pain     HPI  Milena Hamilton is a 79 year old female who presents to the emergency department with intractable back pain.  The patient recently saw her doctor for this and he placed her on 1 Aleve a day and lidocaine patches.  She also has some hydrocodone at home which she has tried with no benefit.  She was doing some gentle stretching exercises when she had sudden severe right-sided low back pain which radiates down her right leg.  She has history of back surgery.  She is also had a history of epidural injections.  MRI shows degenerative disc disease, prior surgery,    Allergies:  Allergies   Allergen Reactions     Azithromycin Diarrhea     Ciprofloxacin Other (See Comments)     Cipro, dizziness and abdominal pain     Penicillins      stomach upset, diarrhea     Sulfa Drugs      hives       Problem List:      Patient Active Problem List   Diagnosis     Lichen planus     Family history of diabetes mellitus     Tinnitus     Panic disorder without agoraphobia     Gastritis     Osteopenia     Hyperlipidemia     Anxiety     DDD (degenerative disc disease), cervical     Advanced directives, counseling/discussion     Sacroiliac dysfunction     Chronic constipation     History of diverticulitis - s/p left hemicolectomy     Diarrhea     Health Care Home     Raynaud's disease     HTN (hypertension)     Benign skin lesion     Community acquired pneumonia     Hypothyroidism, unspecified type     Premature atrial beats     Acquired hypothyroidism     Gastroesophageal reflux disease, esophagitis presence not specified     Intractable vomiting     Gastroenteritis     After cataract, left eye     Cortical age-related cataract     Cortical senile cataract     Scoliosis of lumbar spine, unspecified scoliosis type          Past Medical History:    Past Medical History:   Diagnosis Date     Lichen planus      Osteopenia 4/2010     Panic disorder without agoraphobia   "    Pure hypercholesterolemia      Raynaud's disease 2014     Unspecified essential hypertension        Past Surgical History:    Past Surgical History:   Procedure Laterality Date     C APPENDECTOMY       C NONSPECIFIC PROCEDURE      Right inferior oblique recession, 14 mm     C VAG HYST,RMV TUBE/OVARY  1984    cervix removed     COLONOSCOPY  10/17/2007    normal, recheck in 10 years     COLONOSCOPY N/A 2014    Procedure: COLONOSCOPY;  Surgeon: Raffi Montiel MD;  [facility]     COMBINED CYSTOSCOPY, INSERT CATHETER URETER Bilateral 10/13/2014    Procedure: COMBINED CYSTOSCOPY, INSERT CATHETER URETER;  Surgeon: Abdoulaye Odell MD;  [facility]     EYE SURGERY      right eye muscle repair     HC REMOVAL GALLBLADDER  2010    lap     HC REMOVAL OF OVARY/TUBE(S)      Salpingo-Oophorectomy, bilateral     HYSTERECTOMY, PAP NO LONGER INDICATED       INJECT EPIDURAL LUMBAR Right 4/10/2017    Procedure: INJECT EPIDURAL LUMBAR;  Surgeon: Raffi Wright MD;  [facility]     INJECT EPIDURAL TRANSFORAMINAL Right 2016    Procedure: INJECT EPIDURAL TRANSFORAMINAL;  Surgeon: Antonio Dasilva MD;  [facility]     INJECT EPIDURAL TRANSFORAMINAL Right 10/13/2016    Procedure: INJECT EPIDURAL TRANSFORAMINAL;  Surgeon: Antonio Dasilva MD;  [facility]     LAPAROSCOPIC ASSISTED COLECTOMY LEFT (DESCENDING) N/A 10/13/2014    Procedure: LAPAROSCOPIC ASSISTED COLECTOMY LEFT (DESCENDING);  Surgeon: Raffi Montiel MD;  [facility]       Family History:    Family History   Problem Relation Age of Onset     Diabetes Brother      Heart Disease Brother 50        AMI     Diabetes Brother      Heart Disease Brother         AMI, \"older\"     Cerebrovascular Disease Brother      Psychotic Disorder Brother         , alcohol     Cancer Sister         breast cancer x2     Hypertension No family hx of      Breast Cancer No family hx of      Ovarian Cancer No family hx of      Prostate " Cancer No family hx of      Mental Illness No family hx of      Anesthesia Reaction No family hx of      Osteoporosis No family hx of      Obesity No family hx of      Other Cancer No family hx of      Depression No family hx of      Anxiety Disorder No family hx of      Mental Illness No family hx of      Substance Abuse No family hx of        Social History:  Marital Status:   [2]  Social History     Tobacco Use     Smoking status: Never Smoker     Smokeless tobacco: Never Used   Substance Use Topics     Alcohol use: No     Drug use: No        Medications:    gabapentin (NEURONTIN) 100 MG capsule  oxyCODONE-acetaminophen (PERCOCET) 5-325 MG tablet  predniSONE (DELTASONE) 20 MG tablet  ALPRAZolam (XANAX) 0.25 MG tablet  Ascorbic Acid (VITAMIN C PO)  ASPIRIN 81 MG OR TABS  calcium carbonate-vitamin D (OSCAL W/D) 500-200 MG-UNIT tablet  Dentifrices (BIOTENE DRY MOUTH) PSTE  fluticasone (FLONASE) 50 MCG/ACT nasal spray  HYDROcodone-acetaminophen (NORCO) 5-325 MG tablet  levothyroxine (SYNTHROID/LEVOTHROID) 50 MCG tablet  loratadine (CLARITIN) 10 MG tablet  metoprolol tartrate (LOPRESSOR) 25 MG tablet  omeprazole (PRILOSEC) 20 MG DR capsule  omeprazole (PRILOSEC) 20 MG DR capsule  simvastatin (ZOCOR) 20 MG tablet  THERAPEUTIC MULTIVITAMIN OR          Review of Systems   Constitutional: Negative for fatigue and fever.   HENT: Negative for rhinorrhea.    Eyes: Negative for redness.   Respiratory: Negative for cough.    Cardiovascular: Negative for chest pain.   Gastrointestinal: Negative for abdominal pain.   Genitourinary: Negative for difficulty urinating, flank pain, frequency and hematuria.   Musculoskeletal: Positive for back pain. Negative for neck pain.   Skin: Negative for rash.   Allergic/Immunologic: Negative for immunocompromised state.   Neurological: Negative for dizziness and weakness.   Psychiatric/Behavioral: Negative for confusion.   All other systems reviewed and are negative.      Physical Exam  "  BP: (!) 164/72  Pulse: 79  Temp: 97.2  F (36.2  C)  Resp: 16  Height: 154.9 cm (5' 1\")  SpO2: 98 %      Physical Exam  Vitals signs and nursing note reviewed.   Constitutional:       Appearance: She is normal weight.   HENT:      Head: Normocephalic and atraumatic.      Nose: Nose normal.   Eyes:      Conjunctiva/sclera: Conjunctivae normal.   Neck:      Musculoskeletal: Normal range of motion.   Cardiovascular:      Rate and Rhythm: Normal rate and regular rhythm.   Pulmonary:      Effort: Pulmonary effort is normal.      Breath sounds: Normal breath sounds.   Abdominal:      General: Abdomen is flat.   Musculoskeletal:      Comments: Some low back tenderness and right buttock tenderness on the right.  Strength to lower extremities is equal.  Good strong plantarflexion and plantar extension.  Sensation to light touch is a little less on the right leg as compared to the left especially the dorsum of the foot.  Knee flexion and extension is intact strong and equal.  Reflexes are brisk and equal   Skin:     General: Skin is warm and dry.      Capillary Refill: Capillary refill takes less than 2 seconds.   Neurological:      General: No focal deficit present.      Mental Status: She is alert and oriented to person, place, and time.   Psychiatric:         Mood and Affect: Mood normal.         Behavior: Behavior normal.         ED Course        Procedures               Critical Care time:  none               No results found for this or any previous visit (from the past 24 hour(s)).    Medications   oxyCODONE-acetaminophen (PERCOCET) 5-325 MG per tablet 1 tablet (1 tablet Oral Given 2/16/20 1201)   predniSONE (DELTASONE) tablet 20 mg (20 mg Oral Given 2/16/20 1201)       Assessments & Plan (with Medical Decision Making)   MDM--79-year-old female with a long history of back difficulties related to chronic degenerative disc disease and prior surgery at L4 S1.  She has a exacerbation of her back pain with right-sided " sciatica.  She is in requesting better pain control.  She was getting epidural injections prior to her surgery with good pain control.  She has had no real problems since her surgery 2 years ago.  She is experiencing some numbness with the sciatica in her right leg but there is no weakness and reflexes are brisk and equal.  Patient was given 20 mg of prednisone and a Percocet oral.  I discussed other pharmacological measures.  I recommended continuing the Aleve 1 pill a day for 1 week.  She can take plain Tylenol for mild to moderate pain.  She should only take the Percocet for severe pain and only twice a day.  She should increase her MiraLAX to 2 capfuls a day.  Also discussed and started her on gabapentin at a low starting dose of 100 mg.  She needs to contact her clinic to set up a epidural if she is not improving with these conservative measures.  Patient and  are comfortable with his evaluation treatment and discharge plan and she is discharged in somewhat improved condition.    I have reviewed the nursing notes.    I have reviewed the findings, diagnosis, plan and need for follow up with the patient.       New Prescriptions    GABAPENTIN (NEURONTIN) 100 MG CAPSULE    Take 1 capsule (100 mg) by mouth 3 times daily    OXYCODONE-ACETAMINOPHEN (PERCOCET) 5-325 MG TABLET    1 tablet twice a day for severe pain only    PREDNISONE (DELTASONE) 20 MG TABLET    Take 1 tablet (20 mg) by mouth 2 times daily for 5 days       Final diagnoses:   Acute right-sided low back pain with right-sided sciatica       2/16/2020   [facility] EMERGENCY DEPARTMENT     NahomyAntonio MD  02/16/20 2351       NahomyAntonio MD  02/16/20 1320       NahomyAntonio MD  02/16/20 [id number]

## 2020-02-16 NOTE — DISCHARGE INSTRUCTIONS
Continue Aleve 1 tablet for 1 week.  Take plain Tylenol for moderate pain.  Take Percocet-oxycodone for severe pain only up to twice a day.  Start gabapentin 100 mg 3 times a day.  Start prednisone 20 mg twice a day.

## 2020-02-16 NOTE — ED TRIAGE NOTES
Lower back pain more on the right. Radiating down right leg. Has history of back problems. Saw MD on Thursday and was given aleve and patches. Pain so bad this am it was almost impossible to walk. Pt needed much assist getting into bed. Stand and pivot from

## 2020-02-17 NOTE — PROGRESS NOTES
"Subjective     Milena Hoff is a 79 year old female who presents to clinic today for the following health issues:    HPI     ED/UC Followup:    Facility:  [facility]   Date of visit: 02/16/2020  Reason for visit: Back Pain  Current Status: Patient states that her pain has not subsided. Having trouble walking, also is having a very hard time getting out of the chair. Right leg has been going numb        Her pain has slightly improved since Sunday but she still has moderate/severe pain in the right low back with intermittent numbness down the lateral right leg to the foot. She is almost 2 years out from a right L4-S1 lumbar laminectomy, foraminotomy and microdiscectomy in March 2018 with Dr. Broderick. Pain is similar to pain she had before surgery and had overall been fairly well controlled after undergoing PT in [place] this past fall until this past weekend. She did see Rosemarie Cardoza in [place] recently for some right SI joint pain but lidocaine patches and Aleve have not been helping. She has been taking prednisone, Aleve and as needed Percocet as prescribed from the ED. It is slowly becoming easier to walk. She denies any loss of bowel/bladder control or any saddle anaesthesia. She is interested in trying physical therapy again and would also be interested in an lumbar epidural steroid injection if PT is not helpful.       ER note: \"Milena Hamilton is a 79 year old female who presents to the emergency department with intractable back pain.  The patient recently saw her doctor for this and he placed her on 1 Aleve a day and lidocaine patches.  She also has some hydrocodone at home which she has tried with no benefit.  She was doing some gentle stretching exercises when she had sudden severe right-sided low back pain which radiates down her right leg.  She has history of back surgery.  She is also had a history of epidural injections.  MRI shows degenerative disc disease, prior " "surgery,\"  \"MDM--79-year-old female with a long history of back difficulties related to chronic degenerative disc disease and prior surgery at L4 S1.  She has a exacerbation of her back pain with right-sided sciatica.  She is in requesting better pain control.  She was getting epidural injections prior to her surgery with good pain control.  She has had no real problems since her surgery 2 years ago.  She is experiencing some numbness with the sciatica in her right leg but there is no weakness and reflexes are brisk and equal.  Patient was given 20 mg of prednisone and a Percocet oral.  I discussed other pharmacological measures.  I recommended continuing the Aleve 1 pill a day for 1 week.  She can take plain Tylenol for mild to moderate pain.  She should only take the Percocet for severe pain and only twice a day.  She should increase her MiraLAX to 2 capfuls a day.  Also discussed and started her on gabapentin at a low starting dose of 100 mg.  She needs to contact her clinic to set up a epidural if she is not improving with these conservative measures.  Patient and  are comfortable with his evaluation treatment and discharge plan and she is discharged in somewhat improved condition.\"      Reviewed and updated as needed this visit by Provider  Allergies  Meds  Problems  Med Hx     Review of Systems   GENERAL: Denies fever, fatigue, weakness, weight gain, or weight loss.  RESPIRATORY: Denies cough, hemoptysis, and shortness of breath.  GASTROINTESTINAL: Denies nausea, vomiting, change in appetite, abdominal pain, diarrhea, or constipation.  GENITOURINARY: Denies increased frequency, urgency, dysuria, hematuria, or incontinence.   MUSCULOSKELETAL: +Right low back pain.   NEUROLOGIC: +Right leg numbness and tingling. Denies headache, fainting, dizziness, memory loss, or seizures.    Objective    /74 (BP Location: Right arm, Patient Position: Chair, Cuff Size: Adult Regular)   Pulse 68   Temp 98.8  F " (37.1  C) (Temporal)   Resp 16   Wt 62.4 kg (137 lb 9.6 oz)   LMP  (LMP Unknown)   SpO2 98%   Breastfeeding No   BMI 26.00 kg/m    Body mass index is 26 kg/m .  Physical Exam   GENERAL: healthy, alert and no distress  RESP: lungs clear to auscultation - no rales, rhonchi or wheezes  CV: regular rate and rhythm, normal S1 S2, no S3 or S4, no murmur, click or rub  MS: no gross musculoskeletal defects noted. Mild/moderate tenderness over the lower lumbar spinous processes and right SI joint. Positive Jennifer and straight leg testing on the right.   NEURO: Normal strength and tone, mentation intact and speech normal. Cranial nerves II-XII are grossly intact. DTRs are 2+/4 throughout and symmetric. Gait is slightly antalgic but stable.   PSYCH: mentation appears normal, affect normal/bright        Assessment & Plan       ICD-10-CM    1. SI (sacroiliac) joint dysfunction M53.3 gabapentin 300 MG PO capsule     PHYSICAL THERAPY REFERRAL     cyclobenzaprine 5 MG PO tablet   2. Lumbar radiculopathy M54.16 gabapentin 300 MG PO capsule     PHYSICAL THERAPY REFERRAL     cyclobenzaprine 5 MG PO tablet   3. DDD (degenerative disc disease), lumbar M51.36 gabapentin 300 MG PO capsule     PHYSICAL THERAPY REFERRAL     cyclobenzaprine 5 MG PO tablet        Exacerbation of chronic low back pain s/p lumbar laminectomy in 2018 as noted above. Lumbar MRI from last April revealed residual severe right lateral L4-S1 stenosis with degenerative disc disease at L2-L3 and L3-L4. She also has new right SI joint pain. I recommend she restart PT in [place] as this was previously beneficial so referral has been placed. She will finish up her prednisone and continue with as needed Percocet for severe pain. Will refill her previously prescribed Norco when needed but she was instructed to use this sparingly. She can also continue with Aleve although states this has not been very beneficial. Will increase gabapentin to 300 mg nightly for 1 week  (along with 100 mg morning dose) then 300 mg twice daily thereafter as she is tolerating the 100 mg TID dose without issues. Will plan on follow up in 8 weeks and if pain is not improving or worsening, will order a lumbar JOSE versus a right SI joint injection.    Addendum: She is medically cleared for a lumbar JOSE with Dr. Dasilva.     Greater than 50% of 25 minute visit spent on counseling and plan of care regarding the above.     Chuck Streeter PA-C  [facility]

## 2020-02-20 ENCOUNTER — OFFICE VISIT (OUTPATIENT)
Dept: FAMILY MEDICINE | Facility: OTHER | Age: 80
End: 2020-02-20
Payer: COMMERCIAL

## 2020-02-20 VITALS
RESPIRATION RATE: 16 BRPM | OXYGEN SATURATION: 98 % | HEART RATE: 68 BPM | WEIGHT: 137.6 LBS | TEMPERATURE: 98.8 F | BODY MASS INDEX: 26 KG/M2 | SYSTOLIC BLOOD PRESSURE: 132 MMHG | DIASTOLIC BLOOD PRESSURE: 74 MMHG

## 2020-02-20 DIAGNOSIS — M54.16 LUMBAR RADICULOPATHY: ICD-10-CM

## 2020-02-20 DIAGNOSIS — M53.3 SI (SACROILIAC) JOINT DYSFUNCTION: Primary | ICD-10-CM

## 2020-02-20 DIAGNOSIS — M51.369 DDD (DEGENERATIVE DISC DISEASE), LUMBAR: ICD-10-CM

## 2020-02-20 PROCEDURE — 99214 OFFICE O/P EST MOD 30 MIN: CPT | Performed by: PHYSICIAN ASSISTANT

## 2020-02-20 RX ORDER — GABAPENTIN 300 MG/1
CAPSULE ORAL
Qty: 60 CAPSULE | Refills: 2 | Status: SHIPPED | OUTPATIENT
Start: 2020-02-20 | End: 2020-05-01

## 2020-02-20 RX ORDER — CYCLOBENZAPRINE HCL 5 MG
5 TABLET ORAL
Qty: 30 TABLET | Refills: 1 | Status: SHIPPED | OUTPATIENT
Start: 2020-02-20 | End: 2020-05-01

## 2020-02-20 NOTE — PATIENT INSTRUCTIONS
Continue with the prednisone along with as needed Percocet. Do not mix this with the Norco. Will refill [place] if needed.  Continue with Aleve as needed.  Will change gabapentin to 300 mg nightly (along with 100 mg in the morning) for 1 week then increase to 300 mg twice daily. (cut out the afternoon 100 mg dose).  Will prescribe Flexeril to help loosen up the muscles. Take at night as it can cause drowsiness.  I recommend heat and home stretching.  Will restart physical therapy in [place]. They will call to schedule.     Follow up in 8 weeks for a recheck. If not improving, will order an injection.

## 2020-02-24 ENCOUNTER — TELEPHONE (OUTPATIENT)
Dept: FAMILY MEDICINE | Facility: OTHER | Age: 80
End: 2020-02-24

## 2020-02-24 DIAGNOSIS — M51.369 DDD (DEGENERATIVE DISC DISEASE), LUMBAR: ICD-10-CM

## 2020-02-24 DIAGNOSIS — M54.16 LUMBAR RADICULOPATHY: Primary | ICD-10-CM

## 2020-02-24 NOTE — TELEPHONE ENCOUNTER
"Patient on  schedule for Wednesday for steroid injection in back.  does not do steroid injections.     In last OV note, he said \"Will plan on follow up in 8 weeks and if pain is not improving or worsening, will order a lumbar JOSE versus a right SI joint injection.\"    Per CECILIO, he would like this triaged. (Likely to see if pain is worsening or having any new symptoms.)    We will have to reschedule her as well.  "

## 2020-02-25 NOTE — TELEPHONE ENCOUNTER
Returned patient's call. Pain is slightly improved, but not great.  Lower back, a bit off to the R low back  8/10 now, 10/10 when it shoots down her leg  Radiates down R leg, all the way to her toes intermittently  Triggers of shooting pain: bending over, walking, changing positions from standing to sitting and sitting to standing  Using a cane to walk to try prevent the shooting pain    Gabapentin makes her vision blurry, so she hasn't taken it often.  She has not taken Flexeril yet.  Last night, she took Tylenol and a Xanax to sleep, had first night of good sleep in 1 week  - reviewed instructions for gabapentin and flexiril, take PRN in evening  - encouraged her to take these as directed for her back pain    She takes Tylenol PRN during the day for the pain, thinks it is not helpful.  She would like to schedule an injection for ASAP.    Cancelled tomorrow's office visit with CECILIO, as she was expecting to receive an injection by CECILIO at this visit. Explained we will update CECILIO with her request for an injection order and contact her today to discuss the plan. She verbalizes understanding.    Routed to CECILIO.  Jazzmine Mayers, SHERRILLN, RN, PHN

## 2020-02-25 NOTE — TELEPHONE ENCOUNTER
Patient called and her pain is getting worse and she would like the injection- ordered. Will have RN call her to get more information.

## 2020-02-25 NOTE — TELEPHONE ENCOUNTER
Tried contacting [facility] to set up multiple times and line just puts you on never ending hold [phone] option 2. Will have to try again tomorrow.

## 2020-02-25 NOTE — TELEPHONE ENCOUNTER
Recommend lumbar JOSE (injection) with Dr. Dasilva in [place] as soon as possible. Please help schedule. Will addend recent OV note clearing her for this procedure.    Chuck Streeter PA-C

## 2020-02-26 ENCOUNTER — TELEPHONE (OUTPATIENT)
Dept: FAMILY MEDICINE | Facility: OTHER | Age: 80
End: 2020-02-26

## 2020-02-26 ENCOUNTER — TELEPHONE (OUTPATIENT)
Dept: SURGERY | Facility: CLINIC | Age: 80
End: 2020-02-26

## 2020-02-26 NOTE — TELEPHONE ENCOUNTER
Pt scheduled for LESI  Date:3/13/20  Time:1:30am  Dr. Dasilva    Instructed pt to have H&P and  for procedure.    *added to call wait list, if any cancellations patient wants a call.

## 2020-02-26 NOTE — TELEPHONE ENCOUNTER
Called Patient, there was a cancellation 2/27/20, Patient states she is available to come in.   New date/time     Date:2/27/20  Time:10:00am  Dr. Dasilva    Instructed pt to have H&P and  for procedure.

## 2020-02-26 NOTE — TELEPHONE ENCOUNTER
Please review/advise - are you able to addend this note for that or does she need to come in for a pre-op.  Grace Manuel, CMA

## 2020-02-26 NOTE — TELEPHONE ENCOUNTER
Reason for Call:  Other appointment    Detailed comments: patient has injection on 3-13 and is wondering if you she could have her exam from 2-20 as a pre op for it.    Phone Number Patient can be reached at: Home number on file [phone] (home)    Best Time: any    Can we leave a detailed message on this number? YES    Call taken on 2/26/2020 at 9:28 AM by Katerin Chávez

## 2020-02-27 ENCOUNTER — ANESTHESIA (OUTPATIENT)
Dept: SURGERY | Facility: CLINIC | Age: 80
End: 2020-02-27
Payer: COMMERCIAL

## 2020-02-27 ENCOUNTER — HOSPITAL ENCOUNTER (OUTPATIENT)
Dept: GENERAL RADIOLOGY | Facility: CLINIC | Age: 80
End: 2020-02-27
Attending: ANESTHESIOLOGY | Admitting: ANESTHESIOLOGY
Payer: COMMERCIAL

## 2020-02-27 ENCOUNTER — ANESTHESIA EVENT (OUTPATIENT)
Dept: SURGERY | Facility: CLINIC | Age: 80
End: 2020-02-27
Payer: COMMERCIAL

## 2020-02-27 ENCOUNTER — HOSPITAL ENCOUNTER (OUTPATIENT)
Facility: CLINIC | Age: 80
Discharge: HOME | End: 2020-02-27
Attending: ANESTHESIOLOGY | Admitting: ANESTHESIOLOGY
Payer: COMMERCIAL

## 2020-02-27 VITALS
HEART RATE: 84 BPM | RESPIRATION RATE: 16 BRPM | DIASTOLIC BLOOD PRESSURE: 85 MMHG | TEMPERATURE: 97.8 F | SYSTOLIC BLOOD PRESSURE: 148 MMHG

## 2020-02-27 DIAGNOSIS — M51.369 DDD (DEGENERATIVE DISC DISEASE), LUMBAR: ICD-10-CM

## 2020-02-27 DIAGNOSIS — M54.16 LUMBAR RADICULOPATHY: ICD-10-CM

## 2020-02-27 PROCEDURE — 25000128 H RX IP 250 OP 636: Performed by: ANESTHESIOLOGY

## 2020-02-27 PROCEDURE — 25000125 ZZHC RX 250: Performed by: NURSE ANESTHETIST, CERTIFIED REGISTERED

## 2020-02-27 PROCEDURE — 25000128 H RX IP 250 OP 636: Performed by: NURSE ANESTHETIST, CERTIFIED REGISTERED

## 2020-02-27 PROCEDURE — 64483 NJX AA&/STRD TFRM EPI L/S 1: CPT | Mod: RT | Performed by: ANESTHESIOLOGY

## 2020-02-27 PROCEDURE — 64484 NJX AA&/STRD TFRM EPI L/S EA: CPT | Mod: RT

## 2020-02-27 PROCEDURE — 37000008 ZZH ANESTHESIA TECHNICAL FEE, 1ST 30 MIN: Performed by: ANESTHESIOLOGY

## 2020-02-27 PROCEDURE — 40000277 XR SURGERY CARM FLUORO LESS THAN 5 MIN W STILLS: Mod: TC

## 2020-02-27 PROCEDURE — 62323 NJX INTERLAMINAR LMBR/SAC: CPT | Performed by: ANESTHESIOLOGY

## 2020-02-27 PROCEDURE — 64484 NJX AA&/STRD TFRM EPI L/S EA: CPT | Mod: RT | Performed by: ANESTHESIOLOGY

## 2020-02-27 RX ORDER — LIDOCAINE HYDROCHLORIDE 20 MG/ML
INJECTION, SOLUTION INFILTRATION; PERINEURAL PRN
Status: DISCONTINUED | OUTPATIENT
Start: 2020-02-27 | End: 2020-02-27

## 2020-02-27 RX ORDER — ONDANSETRON 4 MG/1
4 TABLET, ORALLY DISINTEGRATING ORAL EVERY 30 MIN PRN
Status: CANCELLED | OUTPATIENT
Start: 2020-02-27

## 2020-02-27 RX ORDER — ONDANSETRON 2 MG/ML
4 INJECTION INTRAMUSCULAR; INTRAVENOUS EVERY 30 MIN PRN
Status: CANCELLED | OUTPATIENT
Start: 2020-02-27

## 2020-02-27 RX ORDER — ALBUTEROL SULFATE 0.83 MG/ML
2.5 SOLUTION RESPIRATORY (INHALATION) EVERY 4 HOURS PRN
Status: CANCELLED | OUTPATIENT
Start: 2020-02-27

## 2020-02-27 RX ORDER — NALOXONE HYDROCHLORIDE 0.4 MG/ML
.1-.4 INJECTION, SOLUTION INTRAMUSCULAR; INTRAVENOUS; SUBCUTANEOUS
Status: CANCELLED | OUTPATIENT
Start: 2020-02-27 | End: 2020-02-28

## 2020-02-27 RX ORDER — IOPAMIDOL 612 MG/ML
INJECTION, SOLUTION INTRATHECAL PRN
Status: DISCONTINUED | OUTPATIENT
Start: 2020-02-27 | End: 2020-02-27 | Stop reason: HOSPADM

## 2020-02-27 RX ORDER — HYDRALAZINE HYDROCHLORIDE 20 MG/ML
2.5-5 INJECTION INTRAMUSCULAR; INTRAVENOUS EVERY 10 MIN PRN
Status: CANCELLED | OUTPATIENT
Start: 2020-02-27

## 2020-02-27 RX ORDER — SODIUM CHLORIDE, SODIUM LACTATE, POTASSIUM CHLORIDE, CALCIUM CHLORIDE 600; 310; 30; 20 MG/100ML; MG/100ML; MG/100ML; MG/100ML
INJECTION, SOLUTION INTRAVENOUS CONTINUOUS
Status: DISCONTINUED | OUTPATIENT
Start: 2020-02-27 | End: 2020-02-27 | Stop reason: HOSPADM

## 2020-02-27 RX ORDER — TRIAMCINOLONE ACETONIDE 40 MG/ML
INJECTION, SUSPENSION INTRA-ARTICULAR; INTRAMUSCULAR PRN
Status: DISCONTINUED | OUTPATIENT
Start: 2020-02-27 | End: 2020-02-27 | Stop reason: HOSPADM

## 2020-02-27 RX ORDER — PROPOFOL 10 MG/ML
INJECTION, EMULSION INTRAVENOUS PRN
Status: DISCONTINUED | OUTPATIENT
Start: 2020-02-27 | End: 2020-02-27

## 2020-02-27 RX ADMIN — PROPOFOL 30 MG: 10 INJECTION, EMULSION INTRAVENOUS at 10:44

## 2020-02-27 RX ADMIN — PROPOFOL 50 MG: 10 INJECTION, EMULSION INTRAVENOUS at 10:42

## 2020-02-27 RX ADMIN — PROPOFOL 30 MG: 10 INJECTION, EMULSION INTRAVENOUS at 10:47

## 2020-02-27 RX ADMIN — LIDOCAINE HYDROCHLORIDE 5 ML: 10 INJECTION, SOLUTION EPIDURAL; INFILTRATION; INTRACAUDAL; PERINEURAL at 09:54

## 2020-02-27 RX ADMIN — LIDOCAINE HYDROCHLORIDE 40 MG: 20 INJECTION, SOLUTION INFILTRATION; PERINEURAL at 10:42

## 2020-02-27 ASSESSMENT — LIFESTYLE VARIABLES: TOBACCO_USE: 0

## 2020-02-27 NOTE — OP NOTE
PRIMARY PROBLEM: Low back pain and right leg pain    PROCEDURE: Right L4-5 and right L5-S1  Transforaminal Epidural Steroid Injections with fluoroscopic guidance and contrast.     PROCEDURE DETAILS: After written informed consent was obtained from the patient, the patient was escorted to the procedure room.  The patient was placed in the prone position.  A  time out  was conducted to verify patient identity, procedure to be performed, side, site, allergies and any special requirements.  The skin over the thoracolumbar region was prepped and draped in normal sterile fashion. Fluoroscopy was used to identify the neural foramen in AP view and the skin was anesthetized with 2 mL of 1% lidocaine with bicarbonate buffer. A 22-gauge Quincke spinal needle was advanced through this location and advanced under fluoroscopic guidance towards the neural foramen.  The target zone was the 6 o clock position of the pedicle.   Prior to entering the foramen, the depth of the needle was gauged with a lateral view on fluoroscopy. While still in a lateral view, the needle was slowly advanced to avoid injury to the spinal nerve.  Then, in the oblique view (approximately 28 degrees), after negative aspiration, 1.5 mL of Omnipaque contrast dye was injected revealing epidural spread without evidence of intravascular or intrathecal spread.  Then a 3cc solution of 20 mg of Triamcinolone in 2.5 mL of  Preservative-Free saline was slowly injected into the epidural space at each segment.  There is very good flow of medication around the right L4-5 laminotomy into the central epidural space.  There was some obvious epidural scarring at the right L4-5 foraminal opening.  At the L5-S1 level there was a significant amount of degeneration at this level but I did have flow of medication over the L5 nerve root and into her central epidural space.  After injection of the medication, as the needle tip was withdrawn, it was flushed with local anesthetic.    The patient was monitored with blood pressure and pulse oximetry machines with the assistance of an RN throughout the procedure.  The patient was alert and responsive to questions throughout the procedure.   The patient tolerated the procedure well and was observed in the post-procedural area.  The patient was dismissed without apparent complications.     DIAGNOSIS:  1.  Right lumbosacral radiculopathy secondary to severe lateral recess stenosis at L4-5 and L5-S1    PLAN:  1. Performed right L4-5 and L5-S1  transforaminal epidural steroid injections.  2. The patient was instructed to follow-up per Dr. Dasilva's instructions.      Antonio Dasilva MD  Diplomate of the American Board of Anesthesiology, Pain Medicine

## 2020-02-27 NOTE — ANESTHESIA PREPROCEDURE EVALUATION
Anesthesia Pre-Procedure Evaluation    Patient: Milena Hamilton   MRN: [id number] : 1940          Preoperative Diagnosis: Lumbar radiculopathy [M54.16]  DDD (degenerative disc disease), lumbar [M51.36]    Procedure(s):  Right Lumbar 4- Sacral 1 Epidural Steroid Injection    Past Medical History:   Diagnosis Date     Lichen planus     vulvar     Osteopenia 2010    recheck DEXA in 2 years     Panic disorder without agoraphobia      Pure hypercholesterolemia     low LDLs     Raynaud's disease 2014     Unspecified essential hypertension      Past Surgical History:   Procedure Laterality Date     C APPENDECTOMY       C NONSPECIFIC PROCEDURE      Right inferior oblique recession, 14 mm     C VAG HYST,RMV TUBE/OVARY  1984    cervix removed     COLONOSCOPY  10/17/2007    normal, recheck in 10 years     COLONOSCOPY N/A 2014    Procedure: COLONOSCOPY;  Surgeon: Raffi Montiel MD;  Location:  GI     COMBINED CYSTOSCOPY, INSERT CATHETER URETER Bilateral 10/13/2014    Procedure: COMBINED CYSTOSCOPY, INSERT CATHETER URETER;  Surgeon: Abdoulaye Odell MD;  Location:  OR     EYE SURGERY      right eye muscle repair     HC REMOVAL GALLBLADDER  2010    lap     HC REMOVAL OF OVARY/TUBE(S)      Salpingo-Oophorectomy, bilateral     HYSTERECTOMY, PAP NO LONGER INDICATED       INJECT EPIDURAL LUMBAR Right 4/10/2017    Procedure: INJECT EPIDURAL LUMBAR;  Surgeon: Raffi Wright MD;  Location: [facility] OR     INJECT EPIDURAL TRANSFORAMINAL Right 2016    Procedure: INJECT EPIDURAL TRANSFORAMINAL;  Surgeon: Antonio Dasilva MD;  Location: [facility] OR     INJECT EPIDURAL TRANSFORAMINAL Right 10/13/2016    Procedure: INJECT EPIDURAL TRANSFORAMINAL;  Surgeon: Antonio Dasilva MD;  Location: [facility] OR     LAPAROSCOPIC ASSISTED COLECTOMY LEFT (DESCENDING) N/A 10/13/2014    Procedure: LAPAROSCOPIC ASSISTED COLECTOMY LEFT (DESCENDING);  Surgeon: Raffi Montiel MD;  Location: [facility] OR       Anesthesia Evaluation     .  Pt has had prior anesthetic. Type: General and MAC    No history of anesthetic complications          ROS/MED HX    ENT/Pulmonary:  - neg pulmonary ROS    (-) tobacco use   Neurologic:  - neg neurologic ROS     Cardiovascular: Comment: Raynaud's disease    (+) Dyslipidemia, hypertension----. : . . . :. . Previous cardiac testing date:results:date: results:ECG reviewed date:3-2-18 results:Appears SR with some underlying shaking date: results:          METS/Exercise Tolerance:     Hematologic:  - neg hematologic  ROS       Musculoskeletal:   (+)  other musculoskeletal- SI joint dysfunction, Lumbar radiculopathy, DDD      GI/Hepatic:     (+) GERD Asymptomatic on medication, Other GI/Hepatic history of diverticulitis s/p left hemicolectomy      Renal/Genitourinary:  - ROS Renal section negative       Endo:     (+) thyroid problem hypothyroidism, .      Psychiatric: Comment: Panic disorder with agoraphobia    (+) psychiatric history anxiety and other (comment)      Infectious Disease:  - neg infectious disease ROS       Malignancy:      - no malignancy   Other:    - neg other ROS                      Physical Exam  Normal systems: cardiovascular, pulmonary and dental    Airway   Mallampati: II  TM distance: >3 FB  Neck ROM: limited    Dental     Cardiovascular   Rhythm and rate: regular and normal      Pulmonary    breath sounds clear to auscultation            Lab Results   Component Value Date    WBC 5.7 09/11/2019    HGB 13.4 09/11/2019    HCT 42.0 09/11/2019     09/11/2019    CRP <2.9 09/11/2019    SED 9 09/11/2019     09/11/2019    POTASSIUM 4.0 09/11/2019    CHLORIDE 103 09/11/2019    CO2 30 09/11/2019    BUN 13 09/11/2019    CR 0.83 09/11/2019     (H) 09/11/2019    ANU 8.8 09/11/2019    PHOS 3.7 05/29/2015    MAG 2.1 05/29/2015    ALBUMIN 3.8 09/11/2019    PROTTOTAL 7.3 09/11/2019    ALT 18 09/11/2019    AST 13 09/11/2019    ALKPHOS 63 09/11/2019    BILITOTAL 0.5 09/11/2019    LIPASE 136  "02/27/2018    AMYLASE 59 05/29/2013    TSH 0.82 09/11/2019    T4 1.14 01/12/2016       Preop Vitals  BP Readings from Last 3 Encounters:   02/20/20 132/74   02/16/20 (!) 156/76   02/13/20 132/82    Pulse Readings from Last 3 Encounters:   02/20/20 68   02/16/20 68   02/13/20 70      Resp Readings from Last 3 Encounters:   02/20/20 16   02/16/20 16   02/13/20 16    SpO2 Readings from Last 3 Encounters:   02/20/20 98%   02/16/20 98%   02/13/20 97%      Temp Readings from Last 1 Encounters:   02/20/20 98.8  F (37.1  C) (Temporal)    Ht Readings from Last 1 Encounters:   02/16/20 1.549 m (5' 1\")      Wt Readings from Last 1 Encounters:   02/20/20 62.4 kg (137 lb 9.6 oz)    Estimated body mass index is 26 kg/m  as calculated from the following:    Height as of 2/16/20: 1.549 m (5' 1\").    Weight as of 2/20/20: 62.4 kg (137 lb 9.6 oz).       Anesthesia Plan      History & Physical Review  History and physical reviewed and following examination; no interval change.    ASA Status:  2 .    NPO Status:  > 8 hours    Plan for MAC with Propofol and Intravenous induction. Reason for MAC:  Deep or markedly invasive procedure (G8)         Postoperative Care      Consents  Anesthetic plan, risks, benefits and alternatives discussed with:  Patient or representative and Patient.  Use of blood products discussed: No .   .                 FORREST Monae CRNA  "

## 2020-02-27 NOTE — ANESTHESIA CARE TRANSFER NOTE
Patient: Milena Hamilton    Procedure(s):  Right Lumbar 4- Sacral 1 Epidural Steroid Injection    Diagnosis: Lumbar radiculopathy [M54.16]  DDD (degenerative disc disease), lumbar [M51.36]  Diagnosis Additional Information: No value filed.    Anesthesia Type:   MAC     Note:  Airway :Nasal Cannula  Patient transferred to:Phase II  Handoff Report: Identifed the Patient, Identified the Reponsible Provider, Reviewed the pertinent medical history, Discussed the surgical course, Reviewed Intra-OP anesthesia mangement and issues during anesthesia, Set expectations for post-procedure period and Allowed opportunity for questions and acknowledgement of understanding      Vitals: (Last set prior to Anesthesia Care Transfer)    CRNA VITALS  2/27/2020 1022 - 2/27/2020 1108      2/27/2020             Pulse:  90    SpO2:  (!) 88 %    Resp Rate (observed):  (!) 5                Electronically Signed By: FORREST Rendon CRNA  February 27, 2020  11:08 AM

## 2020-02-27 NOTE — OR NURSING
Verbal report given to iLlian HARMON who will assume phase 2 recoverry.      Pt post-op MAC for pain injection per .

## 2020-02-27 NOTE — ANESTHESIA POSTPROCEDURE EVALUATION
Patient: Milena Hamilton    Procedure(s):  Right Lumbar 4- Sacral 1 Epidural Steroid Injection    Diagnosis:Lumbar radiculopathy [M54.16]  DDD (degenerative disc disease), lumbar [M51.36]  Diagnosis Additional Information: No value filed.    Anesthesia Type:  MAC    Note:  Anesthesia Post Evaluation    Patient location during evaluation: Phase 2  Patient participation: Able to fully participate in evaluation  Level of consciousness: awake  Pain management: adequate  Airway patency: patent  Cardiovascular status: acceptable and hemodynamically stable  Respiratory status: acceptable, room air and nonlabored ventilation  Hydration status: stable  PONV: none     Anesthetic complications: None    Comments: Patient was happy with the anesthesia care received and no anesthesia related complications were noted.  I will follow up with the patient again if it is needed.        Last vitals:  Vitals:    02/27/20 0943 02/27/20 1104   BP: (!) 154/79    Resp: 16 16   Temp: 97.8  F (36.6  C)          Electronically Signed By: FORREST Rendon CRNA  February 27, 2020  11:08 AM

## 2020-02-27 NOTE — DISCHARGE INSTRUCTIONS
Home Care Instructions                Procedure: Epidural injection or joint injection    Activity:    Rest today    Do not work today    Resume normal activity tomorrow    Pain:    You may experience soreness at the injection site for 1 to 3 days.    You may use an ice pack for 20 minutes every 2 hours for the first 24 hours    You may use a heating pad after the first 24 hours    You may use Tylenol  (acetaminophen) every 4 hours or other pain medicines as directed by your physician    Safety  Sedation medicine, if given may remain active for many hours.    It is important for the next 24 hours that you do not:    Drive a car    Operate machines or power tools    Consume alcohol, including beer    Sign any important papers or legal documents    You may experience numbness radiating into your legs or arms, (depending on the procedure location)  This numbness may last several hours.  Until the numb sensation returns to normal please use caution in walking, climbing stairs, stepping out of your vehicle, etc.    Common side effects of steroids:  Not everyone will experience corticosteroid side effects. If side effects are experienced they will gradually subside in the 7-10 day period following an injection.    Most common side effects include:    Flushed face and/or chest    Feeling of warmth, particularly in face but could be overall feeling of warmth    Increased blood sugar in diabetic patients    Menstrual irregularities may occur.  If taking hormone based birth control an alternate method of birth control is recommended    Sleep disturbances and/or mood swings are possible    Leg cramps    Please contact us if you have:  Severe pain   Fever more than 101.5 degrees Fahrenheit  Signs of infection (redness, swelling or drainage)      If you have questions during normal business hours (8am-5pm Monday-Friday) contact the [facility] at [phone]. If you need help after hours, we recommend that you go to a  hospital emergency room or dial 911.

## 2020-03-05 ENCOUNTER — OFFICE VISIT (OUTPATIENT)
Dept: FAMILY MEDICINE | Facility: OTHER | Age: 80
End: 2020-03-05
Payer: COMMERCIAL

## 2020-03-05 VITALS
DIASTOLIC BLOOD PRESSURE: 74 MMHG | HEIGHT: 61 IN | TEMPERATURE: 97.9 F | SYSTOLIC BLOOD PRESSURE: 126 MMHG | WEIGHT: 136 LBS | HEART RATE: 80 BPM | RESPIRATION RATE: 14 BRPM | OXYGEN SATURATION: 94 % | BODY MASS INDEX: 25.68 KG/M2

## 2020-03-05 DIAGNOSIS — L60.0 INGROWN NAIL: Primary | ICD-10-CM

## 2020-03-05 DIAGNOSIS — M20.41 HAMMER TOE OF RIGHT FOOT: ICD-10-CM

## 2020-03-05 PROCEDURE — 99213 OFFICE O/P EST LOW 20 MIN: CPT | Performed by: PHYSICIAN ASSISTANT

## 2020-03-05 ASSESSMENT — MIFFLIN-ST. JEOR: SCORE: 1034.27

## 2020-03-05 ASSESSMENT — PAIN SCALES - GENERAL: PAINLEVEL: SEVERE PAIN (7)

## 2020-03-05 NOTE — PROGRESS NOTES
"Subjective     Milena Hamilton is a 79 year old female who presents to clinic today for the following health issues:    HPI     Gout/ single inflamed joint   Onset: years ago, worsening lately     Description:   Location: big toe - right foot  Joint Swelling: YES  Redness: YES- the tip of it   Pain: YES    Intensity: 7-8/10 most of the time    Progression of Symptoms:  worsening    Accompanying Signs & Symptoms:  Fevers: no     History:   Trauma to the area: YES- broke it years ago but didn't do anything about it   Previous history of gout: no    Recent illness:  no     Precipitating factors:   Diet-rich in purine: no  Alcohol use: no   Diuretic use: no     Alleviating factors:  NA    Therapies Tried and outcome: someone told her vinegar and hot water - sometimes it has given her relief but lately it doesn't do anything.    She has had these symptoms recur intermittently over the years and most recently, the right great toe has been painful since Saturday. She denies any recent injury to the area but does state her right second kris constantly rubs against her right great toe which caused pain. The great toe has some redness over the tip of the toe and is tender to touch. No warmth or drainage.     Reviewed and updated as needed this visit by Provider  Allergies  Meds  Problems     Review of Systems   GENERAL: Denies fever, fatigue, weakness, weight gain, or weight loss.  MUSCULOSKELETAL: +Right great toe pain.   SKIN: +Right great toe redness.     Objective    /74   Pulse 80   Temp 97.9  F (36.6  C) (Temporal)   Resp 14   Ht 1.549 m (5' 1\")   Wt 61.7 kg (136 lb)   LMP  (LMP Unknown)   SpO2 94%   BMI 25.70 kg/m    Body mass index is 25.7 kg/m .  Physical Exam   GENERAL: healthy, alert and no distress  MS/SKIN: Erythema without warmth over the distal right great toe and lateral great toe with moderate tenderness over these 2 areas. No tenderness over the nail or medial nail border. Minimally ingrowing " nail over the lateral great toe border. No discharge. Full toe range of motion. Right second toe with hammertoe deformity and constant rubbing against lateral right great toe.     Assessment & Plan       ICD-10-CM    1. Ingrown nail L60.0 PODIATRY/FOOT & ANKLE SURGERY REFERRAL   2. Hammer toe of right foot M20.41 PODIATRY/FOOT & ANKLE SURGERY REFERRAL        No obvious toe infection but there is a minimally ingrowing right great toenail over the lateral nail border along with a second toe hammertoe deformity causing constant rubbing which is a source of pain.  I recommend she see podiatry for further evaluation.  In the meantime, I recommend a gauze or bandage between the first and second toes so a folded 3 x 3 gauze was placed between the first and second toes to allow for padding to decrease pain.  I recommend soaking the foot/toe in warm, soapy water.  Continue with Tylenol as needed.         Chuck Streeter PA-C  [facility]

## 2020-03-05 NOTE — PATIENT INSTRUCTIONS
I am concerned you have an ingrowing toenail along with a hammer toe rubbing against the big toe.  No evidence for infection.  I recommend warm water or Epsom salt soaks.  Wear the gauze between the toes.  Will refer you to podiatry and they will call to schedule.

## 2020-04-30 ENCOUNTER — TELEPHONE (OUTPATIENT)
Dept: FAMILY MEDICINE | Facility: OTHER | Age: 80
End: 2020-04-30

## 2020-04-30 NOTE — TELEPHONE ENCOUNTER
Spoke with diana.  Blurred vision started pretty much right after she starting taking the increased gabapentin.  Dizziness started around that same time.  Does goes away during the day, but usually will be later in the afternoon. No falls during this time.     Started the increased gabapentin mid march. Blurriness is slowly getting worse.      She is currently taking Gabapentin: 1x 300 mg in am and 1x 300 mg at night.      She does have a phone visit scheduled for tomorrow but she I wondering if you just wanted her to decrease the gabapentin?    Please advise    Abhijit Dumont, RN, BSN

## 2020-04-30 NOTE — TELEPHONE ENCOUNTER
Attempted patient, unable to LM. Ultimately she needs a visit to discuss, but the blurry vision should be triaged.     Stephani Del Cid, RN, BSN

## 2020-04-30 NOTE — TELEPHONE ENCOUNTER
Okay to decrease gabapentin to just one daily for now and will discuss further weaning and her symptoms during phone visit tomorrow.    Chuck Streeter PA-C

## 2020-04-30 NOTE — TELEPHONE ENCOUNTER
Reason for Call:  Other call back    Detailed comments: Pt called and is wondering if she can stop taking the gabapentin. She said she is getting blurred vision and she doesn't think its really working. Please Advise thank you    Phone Number Patient can be reached at: Home number on file [phone] (home)    Best Time: anytime    Can we leave a detailed message on this number? YES    Call taken on 4/30/2020 at 9:35 AM by Tanya Licea  ;

## 2020-04-30 NOTE — PROGRESS NOTES
"Milena Hamilton is a 80 year old female who is being evaluated via a billable telephone visit.      The patient has been notified of following:     \"This telephone visit will be conducted via a call between you and your physician/provider. We have found that certain health care needs can be provided without the need for a physical exam.  This service lets us provide the care you need with a short phone conversation.  If a prescription is necessary we can send it directly to your pharmacy.  If lab work is needed we can place an order for that and you can then stop by our lab to have the test done at a later time.    Telephone visits are billed at different rates depending on your insurance coverage. During this emergency period, for some insurers they may be billed the same as an in-person visit.  Please reach out to your insurance provider with any questions.    If during the course of the call the physician/provider feels a telephone visit is not appropriate, you will not be charged for this service.\"    Patient has given verbal consent for Telephone visit?  Yes    How would you like to obtain your AVS? Mail a copy    Subjective      Milena Hamilton is a 80 year old female who presents to clinic today for the following health issues:     HPI    Social History     Tobacco Use     Smoking status: Never Smoker     Smokeless tobacco: Never Used   Substance Use Topics     Alcohol use: No     Drug use: No     MARINO-7 SCORE 8/10/2017 6/18/2018 2/19/2019   Total Score - - -   Total Score - 6 (mild anxiety) 0 (minimal anxiety)   Total Score 2 6 0     PHQ 10/5/2016 8/10/2017 2/19/2019   PHQ-9 Total Score 2 1 0   Q9: Thoughts of better off dead/self-harm past 2 weeks Not at all Not at all Not at all        Chronic Pain Follow-Up    Where in your body do you have pain? Low back pain- right side   How has your pain affected your ability to work? Not applicable  Which of these pain treatments have you tried since your last " clinic visit? Physical Therapy  How well are you sleeping? Fair  How has your mood been since your last visit? Better  Have you had a significant life event? No  Other aggravating factors: prolonged sitting  Taking medication as directed? Yes    Her back and right leg pain had improved after her lumbar JOSE in February but it is starting to come back again and can be moderate/severe at times. She has not been doing her home exercises like she knows she should and is interested in trying physical therapy again is this was previously beneficial. She has not been taking Flexeril recently but had noted benefit when taking this in the past. She rarely takes Norco 5/325 mg and is actually out of this but still has a few tablets of Percocet 5/325 mg from her ED visit in Februrary. She states that she has noticed fairly frequent blurred vision and lightheadedness/dizziness since increased her gabapentin to 300 mg twice daily in February. She had a recent eye exam with her optometrist and states everything looked great. She decreased the dose to one 300 mg tablet nightly yesterday and symptoms are somewhat improved today.       PHQ-9 SCORE 10/5/2016 8/10/2017 2/19/2019   PHQ-9 Total Score - - -   PHQ-9 Total Score MyChart - - 0   PHQ-9 Total Score 2 1 0     MARINO-7 SCORE 8/10/2017 6/18/2018 2/19/2019   Total Score - - -   Total Score - 6 (mild anxiety) 0 (minimal anxiety)   Total Score 2 6 0     No flowsheet data found.  Encounter-Level CSA - 06/18/2018:    Controlled Substance Agreement - Scan on 6/22/2018  1:52 PM: CONTROLLED SUBSTANCE AGREEMENT     Encounter-Level CSA - 10/05/2016:    Controlled Substance Agreement - Scan on 10/13/2016 11:51 AM: CONTROLLED SUBSTANCE AGREEMENT     Patient-Level CSA:    There are no patient-level csa.          Reviewed and updated as needed this visit by Provider  Allergies  Meds  Problems  Med Hx     Review of Systems   GENERAL: Denies fever, fatigue, weakness, weight gain, or weight  loss.  CARDIOVASCULAR: Denies chest pain, shortness of breath, irregular heartbeats, palpitations, or edema.  RESPIRATORY: Denies cough, hemoptysis, and shortness of breath.  MUSCULOSKELETAL: +Low back pain, intermittent right leg pain.   NEUROLOGIC: Denies headache, fainting, dizziness, memory loss, numbness, tingling, or seizures.  PSYCHIATRIC: Denies depression, anxiety, mood swings, and thoughts of suicide.      Objective   Reported vitals:  LMP  (LMP Unknown)    General: alert and no distress over the phone  PSYCH: Alert and oriented times 3; coherent speech, normal   rate and volume, able to articulate logical thoughts, able   to abstract reason, no tangential thoughts, no hallucinations   or delusions  Her affect is normal  RESP: No cough, no audible wheezing, able to talk in full sentences  Remainder of exam unable to be completed due to telephone visits    Assessment/Plan:    ICD-10-CM    1. Lumbar radiculopathy  M54.16 cyclobenzaprine (FLEXERIL) 5 MG tablet     [facility]   2. SI (sacroiliac) joint dysfunction  M53.3 cyclobenzaprine (FLEXERIL) 5 MG tablet     [facility]   3. DDD (degenerative disc disease), lumbar  M51.36 cyclobenzaprine (FLEXERIL) 5 MG tablet     [facility]   4. Blurred vision  H53.8        Right low back and lower extremity pain have been worsening lately as her JOSE has been wearing off.  Will refill Flexeril 5 mg to restart nightly as needed as this has previously been very beneficial for muscle spasms.  I recommend Tylenol, ice, and heat as needed along with restarting home stretching exercises.  Will place referral to [facility] to restart physical therapy as this was previously beneficial.  She does not want a refill of Norco at this time as she has a few tablets of Percocet left that she takes 1/2 tablet of for severe pain.  I recommend she avoid taking this within 4 hours of taking Xanax as she still  takes this occasionally at night.  Given her blurred vision and lightheadedness which began after her gabapentin increased, will continue at 1 capsule nightly for 1 week and then she can stop completely. If symptoms not improving, she will contact the clinic.  Follow up in 3 months for a clinic recheck.       Phone call duration:  14 minutes    Chuck Streeter PA-C

## 2020-05-01 ENCOUNTER — VIRTUAL VISIT (OUTPATIENT)
Dept: FAMILY MEDICINE | Facility: OTHER | Age: 80
End: 2020-05-01
Payer: COMMERCIAL

## 2020-05-01 DIAGNOSIS — M51.369 DDD (DEGENERATIVE DISC DISEASE), LUMBAR: ICD-10-CM

## 2020-05-01 DIAGNOSIS — M53.3 SI (SACROILIAC) JOINT DYSFUNCTION: ICD-10-CM

## 2020-05-01 DIAGNOSIS — M54.16 LUMBAR RADICULOPATHY: Primary | ICD-10-CM

## 2020-05-01 DIAGNOSIS — H53.8 BLURRED VISION: ICD-10-CM

## 2020-05-01 PROCEDURE — 99214 OFFICE O/P EST MOD 30 MIN: CPT | Mod: TEL | Performed by: PHYSICIAN ASSISTANT

## 2020-05-01 RX ORDER — CYCLOBENZAPRINE HCL 5 MG
5 TABLET ORAL
Qty: 30 TABLET | Refills: 1 | Status: SHIPPED | OUTPATIENT
Start: 2020-05-01 | End: 2021-06-14

## 2020-05-01 ASSESSMENT — PAIN SCALES - GENERAL: PAINLEVEL: MODERATE PAIN (5)

## 2020-05-01 NOTE — PATIENT INSTRUCTIONS
Will refill cyclobenzaprine (Flexeril) to take nightly as needed or muscle spasms.  Do not take this before driving.  I recommend restarting home stretching exercises along with use ice, heat and Tylenol as needed.    Will place a referral to [facility] physical therapy here in [place] and they will call to schedule.    Avoid taking the Percocet and Norco within 4 hours of taking Xanax.    Follow up in 3 months for a recheck.

## 2020-05-27 DIAGNOSIS — F41.9 ANXIETY: ICD-10-CM

## 2020-05-28 RX ORDER — ALPRAZOLAM 0.25 MG
TABLET ORAL
Qty: 30 TABLET | Refills: 0 | Status: SHIPPED | OUTPATIENT
Start: 2020-05-28 | End: 2020-07-30

## 2020-05-28 NOTE — TELEPHONE ENCOUNTER
LM refill sent- to contact pharmacy for .  Brenda Serrano CMA ([facility])

## 2020-05-28 NOTE — TELEPHONE ENCOUNTER
Routing refill request to provider for review/approval because:  Drug not on the [facility] refill protocol     Last Written Prescription Date:  1/31/20  Last Fill Quantity: 30,  # refills: 0   Last office visit: 3/5/2020 with prescribing provider:     Future Office Visit:   Next 5 appointments (look out 90 days)    Jul 30, 2020 11:00 AM CDT  Office Visit with Chuck Streeter PA-C  [facility] ([facility] [address] [place] [id number]  [phone]         Stephani Del Cid, RN, BSN

## 2020-06-02 ENCOUNTER — TELEPHONE (OUTPATIENT)
Dept: FAMILY MEDICINE | Facility: OTHER | Age: 80
End: 2020-06-02

## 2020-06-02 NOTE — TELEPHONE ENCOUNTER
ALPRAZolam (XANAX) 0.25 MG tablet  0 ordered               Summary: TAKE ONE TABLET BY MOUTH Every night AT BEDTIME AS NEEDED FOR anxiety, Disp-30 tablet,R-0, E-Prescribe   Start: 5/28/2020  Ord/Sold: 5/28/2020 (O)  Report  Adh:   Taking:   Long-term:   Pharmacy: [facility] - [place], [facility], [place] - [address] [place] [address] Dose History  Change       Patient Sig: TAKE ONE TABLET BY MOUTH Every night AT BEDTIME AS NEEDED FOR anxiety       Ordered on: 5/28/2020       Authorized by: FANNY DOWNS       Dispense: 30 tablet       Admin Instructions: TAKE ONE TABLET BY MOUTH Every night AT BEDTIME AS NEEDED FOR anxiety       Prior Authorization: Pending

## 2020-06-04 ENCOUNTER — OFFICE VISIT (OUTPATIENT)
Dept: PODIATRY | Facility: CLINIC | Age: 80
End: 2020-06-04
Payer: COMMERCIAL

## 2020-06-04 VITALS
DIASTOLIC BLOOD PRESSURE: 70 MMHG | SYSTOLIC BLOOD PRESSURE: 128 MMHG | HEIGHT: 61 IN | BODY MASS INDEX: 25.68 KG/M2 | TEMPERATURE: 97.5 F | WEIGHT: 136 LBS

## 2020-06-04 DIAGNOSIS — M20.5X1 HALLUX LIMITUS OF RIGHT FOOT: Primary | ICD-10-CM

## 2020-06-04 DIAGNOSIS — M20.41 HAMMER TOE OF RIGHT FOOT: ICD-10-CM

## 2020-06-04 PROCEDURE — 99203 OFFICE O/P NEW LOW 30 MIN: CPT | Performed by: PODIATRIST

## 2020-06-04 ASSESSMENT — PAIN SCALES - GENERAL: PAINLEVEL: MODERATE PAIN (5)

## 2020-06-04 ASSESSMENT — MIFFLIN-ST. JEOR: SCORE: 1029.27

## 2020-06-04 NOTE — PROGRESS NOTES
HPI:  Milena Hamilton is a 80 year old female who is seen in consultation at the request of Chuck Streeter PA-C.    Pt presents for eval of:   (Onset, Location, L/R, Character, Treatments, Injury if yes)     Ongoing for 5 years, ingrown lateral Right great toenail. Right 2nd toe is also starting to overlap onto lateral Right great toe causing pressure. Possible Right great toe fracture many years ago.  Constant, redness, throbbing, tender, pain 5.   Soaking w/epsom salt and vinegar    Retired.    Review of Systems:  Patient denies fever, chills, rash, wound, stiffness, limping, numbness, weakness, heart burn, blood in stool, chest pain with activity, calf pain when walking, shortness of breath with activity, chronic cough, easy bleeding/bruising, swelling of ankles, excessive thirst, fatigue, depression, anxiety.  Patient admits only to symptoms noted in history.     Patient Active Problem List   Diagnosis     Lichen planus     Family history of diabetes mellitus     Tinnitus     Panic disorder without agoraphobia     Gastritis     Osteopenia     Hyperlipidemia     Anxiety     DDD (degenerative disc disease), cervical     Advanced directives, counseling/discussion     Sacroiliac dysfunction     Chronic constipation     History of diverticulitis - s/p left hemicolectomy     Diarrhea     Health Care Home     Raynaud's disease     HTN (hypertension)     Benign skin lesion     Community acquired pneumonia     Hypothyroidism, unspecified type     Premature atrial beats     Acquired hypothyroidism     Gastroesophageal reflux disease, esophagitis presence not specified     Intractable vomiting     Gastroenteritis     After cataract, left eye     Cortical age-related cataract     Cortical senile cataract     Scoliosis of lumbar spine, unspecified scoliosis type     PAST MEDICAL HISTORY:   Past Medical History:   Diagnosis Date     Lichen planus     vulvar     Osteopenia 4/2010    recheck DEXA in 2 years     Panic  disorder without agoraphobia      Pure hypercholesterolemia     low LDLs     Raynaud's disease 6/2014     Unspecified essential hypertension      PAST SURGICAL HISTORY:   Past Surgical History:   Procedure Laterality Date     C APPENDECTOMY       C NONSPECIFIC PROCEDURE      Right inferior oblique recession, 14 mm     C VAG HYST,RMV TUBE/OVARY  1984    cervix removed     COLONOSCOPY  10/17/2007    normal, recheck in 10 years     COLONOSCOPY N/A 8/27/2014    Procedure: COLONOSCOPY;  Surgeon: Raffi Montiel MD;  Location: [facility]     COMBINED CYSTOSCOPY, INSERT CATHETER URETER Bilateral 10/13/2014    Procedure: COMBINED CYSTOSCOPY, INSERT CATHETER URETER;  Surgeon: Abdoulaye Odell MD;  Location: [facility]     EYE SURGERY  8/01    right eye muscle repair     HC REMOVAL GALLBLADDER  2/9/2010    lap     HC REMOVAL OF OVARY/TUBE(S)      Salpingo-Oophorectomy, bilateral     HYSTERECTOMY, PAP NO LONGER INDICATED       INJECT EPIDURAL LUMBAR Right 4/10/2017    Procedure: INJECT EPIDURAL LUMBAR;  Surgeon: Raffi Wright MD;  Location: [facility]     INJECT EPIDURAL LUMBAR Right 2/27/2020    Procedure: Right Lumbar 4- Sacral 1 Epidural Steroid Injection;  Surgeon: Antonio Dasilva MD;  Location: [facility]     INJECT EPIDURAL TRANSFORAMINAL Right 6/9/2016    Procedure: INJECT EPIDURAL TRANSFORAMINAL;  Surgeon: Antonio Dasilva MD;  [facility]     INJECT EPIDURAL TRANSFORAMINAL Right 10/13/2016    Procedure: INJECT EPIDURAL TRANSFORAMINAL;  Surgeon: Antonio Dasilva MD;  [facility]     LAPAROSCOPIC ASSISTED COLECTOMY LEFT (DESCENDING) N/A 10/13/2014    Procedure: LAPAROSCOPIC ASSISTED COLECTOMY LEFT (DESCENDING);  Surgeon: Raffi Montiel MD;  Location: [facility]     MEDICATIONS:   Current Outpatient Medications:      ALPRAZolam (XANAX) 0.25 MG tablet, TAKE ONE TABLET BY MOUTH Every night AT BEDTIME AS NEEDED FOR anxiety, Disp: 30 tablet, Rfl: 0     Ascorbic Acid (VITAMIN C PO), , Disp: , Rfl:      ASPIRIN 81 MG OR TABS,  1 TABLET DAILY, Disp: , Rfl:      calcium carbonate-vitamin D (OSCAL W/D) 500-200 MG-UNIT tablet, Take 1 tablet by mouth 2 times daily, Disp: , Rfl:      cyclobenzaprine (FLEXERIL) 5 MG tablet, Take 1 tablet (5 mg) by mouth nightly as needed for muscle spasms, Disp: 30 tablet, Rfl: 1     Dentifrices (BIOTENE DRY MOUTH) PSTE, Toothpaste and mouthwash, Disp: , Rfl:      fluticasone (FLONASE) 50 MCG/ACT nasal spray, Spray 2 sprays into both nostrils daily, Disp: 9.9 mL, Rfl: 1     levothyroxine (SYNTHROID/LEVOTHROID) 50 MCG tablet, TAKE 1 TABLET (50 MCG) BY MOUTH DAILY, Disp: 90 tablet, Rfl: 3     metoprolol tartrate (LOPRESSOR) 25 MG tablet, TAKE 1/2 TABLET (12.5 MG) BY MOUTH DAILY, Disp: 45 tablet, Rfl: 3     omeprazole (PRILOSEC) 20 MG DR capsule, Take 1 capsule by mouth daily, Disp: 90 capsule, Rfl: 3     simvastatin (ZOCOR) 20 MG tablet, TAKE 1/2 TABLET (10mg) BY MOUTH every night AT BEDTIME, Disp: 45 tablet, Rfl: 3     THERAPEUTIC MULTIVITAMIN OR, 1 TABLET DAILY, Disp: , Rfl:   ALLERGIES:    Allergies   Allergen Reactions     Azithromycin Diarrhea     Ciprofloxacin Other (See Comments)     Cipro, dizziness and abdominal pain     Gabapentin Dizziness and Visual Disturbance     Penicillins      stomach upset, diarrhea     Sulfa Drugs      hives     SOCIAL HISTORY:   Social History     Socioeconomic History     Marital status:      Spouse name: Not on file     Number of children: 4     Years of education: Not on file     Highest education level: Not on file   Occupational History     Occupation: dry cleaning   Social Needs     Financial resource strain: Not on file     Food insecurity     Worry: Not on file     Inability: Not on file     Transportation needs     Medical: Not on file     Non-medical: Not on file   Tobacco Use     Smoking status: Never Smoker     Smokeless tobacco: Never Used   Substance and Sexual Activity     Alcohol use: No     Drug use: No     Sexual activity: Never     Partners: Male      "Birth control/protection: Surgical     Comment: not currently/hysterectomy   Lifestyle     Physical activity     Days per week: Not on file     Minutes per session: Not on file     Stress: Not on file   Relationships     Social connections     Talks on phone: Not on file     Gets together: Not on file     Attends Protestant service: Not on file     Active member of club or organization: Not on file     Attends meetings of clubs or organizations: Not on file     Relationship status: Not on file     Intimate partner violence     Fear of current or ex partner: Not on file     Emotionally abused: Not on file     Physically abused: Not on file     Forced sexual activity: Not on file   Other Topics Concern     Parent/sibling w/ CABG, MI or angioplasty before 65F 55M? No   Social History Narrative     Not on file     FAMILY HISTORY:   Family History   Problem Relation Age of Onset     Diabetes Brother      Heart Disease Brother 50        AMI     Diabetes Brother      Heart Disease Brother         AMI, \"older\"     Cerebrovascular Disease Brother      Psychotic Disorder Brother         , alcohol     Cancer Sister         breast cancer x2     Hypertension No family hx of      Breast Cancer No family hx of      Ovarian Cancer No family hx of      Prostate Cancer No family hx of      Mental Illness No family hx of      Anesthesia Reaction No family hx of      Osteoporosis No family hx of      Obesity No family hx of      Other Cancer No family hx of      Depression No family hx of      Anxiety Disorder No family hx of      Mental Illness No family hx of      Substance Abuse No family hx of      EXAM:Vitals: /70 (BP Location: Left arm, Patient Position: Sitting, Cuff Size: Adult Regular)   Temp 97.5  F (36.4  C) (Temporal)   Ht 1.549 m (5' 1\")   Wt 61.7 kg (136 lb)   LMP  (LMP Unknown)   BMI 25.70 kg/m    BMI= Body mass index is 25.7 kg/m .    General appearance: Patient is alert and fully cooperative with history " & exam.  No sign of distress is noted during the visit.     Psychiatric: Affect is pleasant & appropriate.  Patient appears motivated to improve health.     Respiratory: Breathing is regular & unlabored while sitting.     HEENT: Hearing is intact to spoken word.  Speech is clear.  No gross evidence of visual impairment that would impact ambulation.     Vascular: DP 1/4 & PT 1/4 left & right.  CFT about 3 seconds, positive digital hair growth bilateral.     Neurologic: Normal plantar response bilateral.  Intact protective threshold plus 10/10 applications of a 5.07 monofilament.       Dermatologic: Mildly diminished texture turgor tone about the integument.  Very subtle hyperkeratosis plantar first metatarsal head.  Erythema surrounding the medial lateral border of the right hallux nail.  Abrasions noted.     Musculoskeletal: Patient is ambulatory without assistive device or brace.  There is semi reducible contracture of the lesser digits.  Severe crepitus and range of motion throughout the right first MPJ total of 10 to 20 degrees total range of motion.  Upon weightbearing the hallux sits under the second toe nearly touching third toe leaving no room for the second toe.  Lesser toes are contracted.    Radiographs: None available today    Hemoglobin A1C (%)   Date Value   09/26/2008 5.4     Creatinine (mg/dL)   Date Value   09/11/2019 0.83   04/25/2019 0.7   12/12/2018 0.67   02/28/2018 0.62   02/27/2018 0.72   03/29/2017 0.72   03/22/2017 0.63     ASSESSMENT:       ICD-10-CM    1. Hallux limitus of right foot  M20.5X1    2. Hammer toe of right foot  M20.41         PLAN:    6/4/2020  It is difficult to determine what is bothering her more today on her exam.  She clearly has hallux limitus and limited hallux motion with valgus deformity of this arthritic right great toe joint.  This pushes the second toe up out of the way and nearly causes pressure against the third toe.  We discussed padding and splinting this but  with any weightbearing this is likely to not resolve her alignment issues.  We also discussed appropriate shoe gear to accommodate her deformities.  We also discussed debridement of the toenail and how to manage that over time.  She will attempt these conservative options and if this remains symptomatic follow-up for radiographs.  Written instructions were dispensed.      RTC as needed with questions or concerns.    Nathen Moon DPM

## 2020-06-04 NOTE — PATIENT INSTRUCTIONS
[employer], or 7-625-PEDIFIX.  They have many types of pads and splints.  Reliable shoe stores: To maximize your experience and provide the best possible fit.  Be sure to show them your foot concerns and tell them Dr. Moon sent you.      Stores listed in bold have only athletic shoes, and stores that are not bold are mostly casual or variety of shoes    [employer]  [address] [place] [address]  [phone]     [employer]  [address] [place] [address]  [phone]     [employer] [facility]  [address] [place] [address]  [phone]    [employer]  [address] [facility] [address]  [phone]    [employer]  [address] [place] [address]  [phone]    [employer]  [address] [place] [address]  [phone]                         [employer] Locations:     [address] [place] [address]   [phone]     [address] [place] [address] [address] [place] [address]   [phone]     [address] [place] [address]   [phone]     [address] [facility] [place] [address]   [phone]     [address] [place] [address]   [phone]     [address] [facility] [place] [address]   [phone]     [address] [facility] [address]   [place] [address]   [phone]     [address] [facility]. [address]   [place], [place] [address] [phone]             How to find reasonable shoes          The correct width    Correct Fitting    Correct Length      Foot Distortion    Posture Distortion                          Torsional Rigidity      Grasp behind the heel and underneath the foot and twist      Bad    Excessive torsion/twist in midfoot     Less torsion/twist in midfoot is better                   Heel Counter Rigidity      Grasp just above   midsole and squeeze      Bad    Soft heel counter      Good    Rigid Heel Counter      Flexion Rigidity      Grasp shoe and bend from forefoot  to rearfoot

## 2020-06-04 NOTE — LETTER
6/4/2020         RE: Milena Hamilton  [address] [facility] [address]        Dear Colleague,    Thank you for referring your patient, Milena Hamilton, to the [facility]. Please see a copy of my visit note below.    HPI:  Milena Hamilton is a 80 year old female who is seen in consultation at the request of Chuck Streeter PA-C.    Pt presents for eval of:   (Onset, Location, L/R, Character, Treatments, Injury if yes)     Ongoing for 5 years, ingrown lateral Right great toenail. Right 2nd toe is also starting to overlap onto lateral Right great toe causing pressure. Possible Right great toe fracture many years ago.  Constant, redness, throbbing, tender, pain 5.   Soaking w/epsom salt and vinegar    Retired.    Review of Systems:  Patient denies fever, chills, rash, wound, stiffness, limping, numbness, weakness, heart burn, blood in stool, chest pain with activity, calf pain when walking, shortness of breath with activity, chronic cough, easy bleeding/bruising, swelling of ankles, excessive thirst, fatigue, depression, anxiety.  Patient admits only to symptoms noted in history.     Patient Active Problem List   Diagnosis     Lichen planus     Family history of diabetes mellitus     Tinnitus     Panic disorder without agoraphobia     Gastritis     Osteopenia     Hyperlipidemia     Anxiety     DDD (degenerative disc disease), cervical     Advanced directives, counseling/discussion     Sacroiliac dysfunction     Chronic constipation     History of diverticulitis - s/p left hemicolectomy     Diarrhea     Health Care Home     Raynaud's disease     HTN (hypertension)     Benign skin lesion     Community acquired pneumonia     Hypothyroidism, unspecified type     Premature atrial beats     Acquired hypothyroidism     Gastroesophageal reflux disease, esophagitis presence not specified     Intractable vomiting     Gastroenteritis     After cataract, left eye     Cortical age-related cataract      Cortical senile cataract     Scoliosis of lumbar spine, unspecified scoliosis type     PAST MEDICAL HISTORY:   Past Medical History:   Diagnosis Date     Lichen planus     vulvar     Osteopenia 4/2010    recheck DEXA in 2 years     Panic disorder without agoraphobia      Pure hypercholesterolemia     low LDLs     Raynaud's disease 6/2014     Unspecified essential hypertension      PAST SURGICAL HISTORY:   Past Surgical History:   Procedure Laterality Date     C APPENDECTOMY       C NONSPECIFIC PROCEDURE      Right inferior oblique recession, 14 mm     C VAG HYST,RMV TUBE/OVARY  1984    cervix removed     COLONOSCOPY  10/17/2007    normal, recheck in 10 years     COLONOSCOPY N/A 8/27/2014    Procedure: COLONOSCOPY;  Surgeon: Raffi Montiel MD;  Location: [facility]     COMBINED CYSTOSCOPY, INSERT CATHETER URETER Bilateral 10/13/2014    Procedure: COMBINED CYSTOSCOPY, INSERT CATHETER URETER;  Surgeon: Abdoulaye Odell MD;  Location: [facility]     EYE SURGERY  8/01    right eye muscle repair     HC REMOVAL GALLBLADDER  2/9/2010    lap     HC REMOVAL OF OVARY/TUBE(S)      Salpingo-Oophorectomy, bilateral     HYSTERECTOMY, PAP NO LONGER INDICATED       INJECT EPIDURAL LUMBAR Right 4/10/2017    Procedure: INJECT EPIDURAL LUMBAR;  Surgeon: Raffi Wright MD;  Location[facility]     INJECT EPIDURAL LUMBAR Right 2/27/2020    Procedure: Right Lumbar 4- Sacral 1 Epidural Steroid Injection;  Surgeon: Antonio Dasilva MD;  Location[facility]     INJECT EPIDURAL TRANSFORAMINAL Right 6/9/2016    Procedure: INJECT EPIDURAL TRANSFORAMINAL;  Surgeon: Antonio Dasilva MD;  [facility]     INJECT EPIDURAL TRANSFORAMINAL Right 10/13/2016    Procedure: INJECT EPIDURAL TRANSFORAMINAL;  Surgeon: Antonio Dasilva MD;  [facility]     LAPAROSCOPIC ASSISTED COLECTOMY LEFT (DESCENDING) N/A 10/13/2014    Procedure: LAPAROSCOPIC ASSISTED COLECTOMY LEFT (DESCENDING);  Surgeon: Raffi Montiel MD;  Location: [facility]     MEDICATIONS:    Current Outpatient Medications:      ALPRAZolam (XANAX) 0.25 MG tablet, TAKE ONE TABLET BY MOUTH Every night AT BEDTIME AS NEEDED FOR anxiety, Disp: 30 tablet, Rfl: 0     Ascorbic Acid (VITAMIN C PO), , Disp: , Rfl:      ASPIRIN 81 MG OR TABS, 1 TABLET DAILY, Disp: , Rfl:      calcium carbonate-vitamin D (OSCAL W/D) 500-200 MG-UNIT tablet, Take 1 tablet by mouth 2 times daily, Disp: , Rfl:      cyclobenzaprine (FLEXERIL) 5 MG tablet, Take 1 tablet (5 mg) by mouth nightly as needed for muscle spasms, Disp: 30 tablet, Rfl: 1     Dentifrices (BIOTENE DRY MOUTH) PSTE, Toothpaste and mouthwash, Disp: , Rfl:      fluticasone (FLONASE) 50 MCG/ACT nasal spray, Spray 2 sprays into both nostrils daily, Disp: 9.9 mL, Rfl: 1     levothyroxine (SYNTHROID/LEVOTHROID) 50 MCG tablet, TAKE 1 TABLET (50 MCG) BY MOUTH DAILY, Disp: 90 tablet, Rfl: 3     metoprolol tartrate (LOPRESSOR) 25 MG tablet, TAKE 1/2 TABLET (12.5 MG) BY MOUTH DAILY, Disp: 45 tablet, Rfl: 3     omeprazole (PRILOSEC) 20 MG DR capsule, Take 1 capsule by mouth daily, Disp: 90 capsule, Rfl: 3     simvastatin (ZOCOR) 20 MG tablet, TAKE 1/2 TABLET (10mg) BY MOUTH every night AT BEDTIME, Disp: 45 tablet, Rfl: 3     THERAPEUTIC MULTIVITAMIN OR, 1 TABLET DAILY, Disp: , Rfl:   ALLERGIES:    Allergies   Allergen Reactions     Azithromycin Diarrhea     Ciprofloxacin Other (See Comments)     Cipro, dizziness and abdominal pain     Gabapentin Dizziness and Visual Disturbance     Penicillins      stomach upset, diarrhea     Sulfa Drugs      hives     SOCIAL HISTORY:   Social History     Socioeconomic History     Marital status:      Spouse name: Not on file     Number of children: 4     Years of education: Not on file     Highest education level: Not on file   Occupational History     Occupation: dry cleaning   Social Needs     Financial resource strain: Not on file     Food insecurity     Worry: Not on file     Inability: Not on file     Transportation needs      "Medical: Not on file     Non-medical: Not on file   Tobacco Use     Smoking status: Never Smoker     Smokeless tobacco: Never Used   Substance and Sexual Activity     Alcohol use: No     Drug use: No     Sexual activity: Never     Partners: Male     Birth control/protection: Surgical     Comment: not currently/hysterectomy   Lifestyle     Physical activity     Days per week: Not on file     Minutes per session: Not on file     Stress: Not on file   Relationships     Social connections     Talks on phone: Not on file     Gets together: Not on file     Attends Religion service: Not on file     Active member of club or organization: Not on file     Attends meetings of clubs or organizations: Not on file     Relationship status: Not on file     Intimate partner violence     Fear of current or ex partner: Not on file     Emotionally abused: Not on file     Physically abused: Not on file     Forced sexual activity: Not on file   Other Topics Concern     Parent/sibling w/ CABG, MI or angioplasty before 65F 55M? No   Social History Narrative     Not on file     FAMILY HISTORY:   Family History   Problem Relation Age of Onset     Diabetes Brother      Heart Disease Brother 50        AMI     Diabetes Brother      Heart Disease Brother         AMI, \"older\"     Cerebrovascular Disease Brother      Psychotic Disorder Brother         , alcohol     Cancer Sister         breast cancer x2     Hypertension No family hx of      Breast Cancer No family hx of      Ovarian Cancer No family hx of      Prostate Cancer No family hx of      Mental Illness No family hx of      Anesthesia Reaction No family hx of      Osteoporosis No family hx of      Obesity No family hx of      Other Cancer No family hx of      Depression No family hx of      Anxiety Disorder No family hx of      Mental Illness No family hx of      Substance Abuse No family hx of      EXAM:Vitals: /70 (BP Location: Left arm, Patient Position: Sitting, Cuff Size: " "Adult Regular)   Temp 97.5  F (36.4  C) (Temporal)   Ht 1.549 m (5' 1\")   Wt 61.7 kg (136 lb)   LMP  (LMP Unknown)   BMI 25.70 kg/m    BMI= Body mass index is 25.7 kg/m .    General appearance: Patient is alert and fully cooperative with history & exam.  No sign of distress is noted during the visit.     Psychiatric: Affect is pleasant & appropriate.  Patient appears motivated to improve health.     Respiratory: Breathing is regular & unlabored while sitting.     HEENT: Hearing is intact to spoken word.  Speech is clear.  No gross evidence of visual impairment that would impact ambulation.     Vascular: DP 1/4 & PT 1/4 left & right.  CFT about 3 seconds, positive digital hair growth bilateral.     Neurologic: Normal plantar response bilateral.  Intact protective threshold plus 10/10 applications of a 5.07 monofilament.       Dermatologic: Mildly diminished texture turgor tone about the integument.  Very subtle hyperkeratosis plantar first metatarsal head.  Erythema surrounding the medial lateral border of the right hallux nail.  Abrasions noted.     Musculoskeletal: Patient is ambulatory without assistive device or brace.  There is semi reducible contracture of the lesser digits.  Severe crepitus and range of motion throughout the right first MPJ total of 10 to 20 degrees total range of motion.  Upon weightbearing the hallux sits under the second toe nearly touching third toe leaving no room for the second toe.  Lesser toes are contracted.    Radiographs: None available today    Hemoglobin A1C (%)   Date Value   09/26/2008 5.4     Creatinine (mg/dL)   Date Value   09/11/2019 0.83   04/25/2019 0.7   12/12/2018 0.67   02/28/2018 0.62   02/27/2018 0.72   03/29/2017 0.72   03/22/2017 0.63     ASSESSMENT:       ICD-10-CM    1. Hallux limitus of right foot  M20.5X1    2. Hammer toe of right foot  M20.41         PLAN:    6/4/2020  It is difficult to determine what is bothering her more today on her exam.  She clearly " has hallux limitus and limited hallux motion with valgus deformity of this arthritic right great toe joint.  This pushes the second toe up out of the way and nearly causes pressure against the third toe.  We discussed padding and splinting this but with any weightbearing this is likely to not resolve her alignment issues.  We also discussed appropriate shoe gear to accommodate her deformities.  We also discussed debridement of the toenail and how to manage that over time.  She will attempt these conservative options and if this remains symptomatic follow-up for radiographs.  Written instructions were dispensed.      RTC as needed with questions or concerns.    Nathen Moon DPM          Again, thank you for allowing me to participate in the care of your patient.        Sincerely,        Nathen Moon DPM

## 2020-07-01 ENCOUNTER — THERAPY VISIT (OUTPATIENT)
Dept: PHYSICAL THERAPY | Facility: CLINIC | Age: 80
End: 2020-07-01
Attending: PHYSICIAN ASSISTANT
Payer: COMMERCIAL

## 2020-07-01 DIAGNOSIS — M54.16 LUMBAR RADICULOPATHY: ICD-10-CM

## 2020-07-01 DIAGNOSIS — M54.41 CHRONIC RIGHT-SIDED LOW BACK PAIN WITH RIGHT-SIDED SCIATICA: ICD-10-CM

## 2020-07-01 DIAGNOSIS — G89.29 CHRONIC RIGHT-SIDED LOW BACK PAIN WITH RIGHT-SIDED SCIATICA: ICD-10-CM

## 2020-07-01 DIAGNOSIS — M53.3 SI (SACROILIAC) JOINT DYSFUNCTION: ICD-10-CM

## 2020-07-01 DIAGNOSIS — M51.369 DDD (DEGENERATIVE DISC DISEASE), LUMBAR: ICD-10-CM

## 2020-07-01 PROCEDURE — 97161 PT EVAL LOW COMPLEX 20 MIN: CPT | Mod: GP | Performed by: PHYSICAL THERAPIST

## 2020-07-01 PROCEDURE — 97110 THERAPEUTIC EXERCISES: CPT | Mod: GP | Performed by: PHYSICAL THERAPIST

## 2020-07-01 NOTE — PROGRESS NOTES
[facility] Initial Evaluation  Subjective:  The history is provided by the patient. No  was used.   Patient Health History  Milena Hamilton being seen for low back.     Problem began: 5/1/2020 (MD order).   Problem occurred: unknown   Pain is reported as 6/10 on pain scale.  General health as reported by patient is fair.  Pertinent medical history includes: cancer, high blood pressure and thyroid problems.   Red flags:  Cold/hot extremity.   Other medical allergies details: sulfa.   Surgeries include:  None.    Current medications:  High blood pressure medication and thyroid medication.    Current occupation is retired.      Other job/home tasks details: home tasks.                Therapist Generated HPI Evaluation  Problem details: She has been dealing with pain in her low back since 2016.  She is always having pain.  She used to have pain down her R leg to her lateral foot (not currently).  She does have some numbness in her R toes. Pain is worse with sitting, bending, walking too much (<1000').  No issues with sleeping.  Pain with first several steps of walking after getting out of bed.  Her pain will get better with walking a couple of minutes.  Denies pain with cough/sneezing.  Feels better with use of heat and Tylenol.  She had an epidural injection in Feb 2020 with 1 month relief  .         Type of problem:  Lumbar.    This is a chronic condition.  Condition occurred with:  Insidious onset.  Where condition occurred: for unknown reasons.  Patient reports pain:  Lumbar spine right.  Pain is described as sharp and aching and is constant.  Pain radiates to:  Gluteals right, lower leg right and foot right. Pain is the same all the time.  Since onset symptoms are unchanged.  Associated symptoms:  Loss of motion/stiffness, loss of strength and tingling. Symptoms are exacerbated by bending, lifting, sitting, standing and walking  and relieved by heat.    Previous treatment  includes physical therapy. There was moderate improvement following previous treatment.  Barriers include:  Stairs.                        Objective:  Milena Hamilton , : 1940, MRN: [id number]    Physical Therapy Objective Findings  Subjective information, goals, clinical impression, daily documentation and other information found in EPISODES tab.  Objective:     Lumbar Pain    Posture: lateral shift of shoulders to R, trunk flexed mildly  Gait:  antalgic and trunk flexed and leaned to R   Lumbar Range of Motion:  Flexion                                               90% - pain                                                                                                                            Extension 10% - feels better   Right Side Bending 33%   Left Side Bending 10%   Repeated extension- standing    Repeated flexion- standing    Repeated side glide hips to R     Correcting of lateral shift, improving lumbar ext ROM     Pelvic screen:                                                                         Positive                                            Negative                                             Standing Forward Bend  x   Gillet(March)  x   Supine to sit     Sacroiliac provocation test     Pubic symphysis provocation            -resisted hip add at 46     Other:       Hip Screen:                                                                  Positive                                             Negative                                             Hip ROM     Junito VALLEJO     Other:     Manual Muscle Testing (graded 0-5, measured at 0 degrees unless otherwise noted):                                                                              Right                                  Left                                                     Transversus Abdominus     -Navin Leg Lowering (deg)     Hip Flex L2 4- 4-   Hip Abd     Hip Add     Hip Ext     Knee Flex 4+ 4+   Knee  Ext L3 5 5   Ankle Dorsiflexion L4 4 5   Great Toe Extension L5 4 5   Ankle Plantar Flexion S1 4 5   Other:     (+ mild pain, ++ moderate pain, +++ severe pain)    Special Tests:                                                                     Positive                                             Negative                                             Sign of Buttock  x   SLR X R    Conor Test X B    Ely Test     Prone instability Test  x   Crossover SLR     Repeated extension prone     Other: LAURITA x 3 min    peripheralizing of pain into R buttock and leg     Flexibility:                                                              Right                                                 Left                                                      Hamstring SLR 60  SLR 70   Hip flexor Mod tightness Mod tightness   Quadricep Mod tightness Mod tightness   Ashely's     piriformis normal normal   Other:            Segmental Mobility: pain PA L5    Palpation: level ASIS, level PSIS    -If symptoms past hip, must do neuro testing  Dermatome/Sensory  Testing: decreased R L3, significant decreased R L4, L5, S1  Reflex Testing:                                                                 Right                                                  Left                                                     Patellar Tendon normal normal   Achilles Tendon decreased normal   Babinski         Assessment/Plan:    Patient is a 80 year old female with lumbar complaints.    Patient has the following significant findings with corresponding treatment plan.                Diagnosis 1:  Low back pain consistent with L5-S1 disc irritation, made worse by scoliosis    Pain -  hot/cold therapy, manual therapy, self management, education, directional preference exercise and home program  Decreased ROM/flexibility - manual therapy, therapeutic exercise, therapeutic activity and home program  Decreased strength - therapeutic exercise, therapeutic activities  and home program  Impaired gait - gait training and home program  Decreased function - therapeutic activities and home program    Therapy Evaluation Codes:   1) History comprised of:   Personal factors that impact the plan of care:      Age, Past/current experiences and Time since onset of symptoms.    Comorbidity factors that impact the plan of care are:      Cancer and High blood pressure.     Medications impacting care: High blood pressure.  2) Examination of Body Systems comprised of:   Body structures and functions that impact the plan of care:      Lumbar spine.   Activity limitations that impact the plan of care are:      Bathing, Bending, Dressing, Lifting, Sitting, Standing, Walking and Working.  3) Clinical presentation characteristics are:   Stable/Uncomplicated.  4) Decision-Making    Low complexity using standardized patient assessment instrument and/or measureable assessment of functional outcome.  Cumulative Therapy Evaluation is: Low complexity.    Previous and current functional limitations:  (See Goal Flow Sheet for this information)    Short term and Long term goals: (See Goal Flow Sheet for this information)     Communication ability:  Patient appears to be able to clearly communicate and understand verbal and written communication and follow directions correctly.  Treatment Explanation - The following has been discussed with the patient:   RX ordered/plan of care  Anticipated outcomes  Possible risks and side effects  This patient would benefit from PT intervention to resume normal activities.   Rehab potential is good.    Frequency:  1 X week, once daily  Duration:  for 6 weeks  Discharge Plan:  Achieve all LTG.  Independent in home treatment program.  Reach maximal therapeutic benefit.    Please refer to the daily flowsheet for treatment today, total treatment time and time spent performing 1:1 timed codes.     Jose Finley,PT, DPT, OCS

## 2020-07-09 ENCOUNTER — THERAPY VISIT (OUTPATIENT)
Dept: PHYSICAL THERAPY | Facility: CLINIC | Age: 80
End: 2020-07-09
Payer: COMMERCIAL

## 2020-07-09 DIAGNOSIS — G89.29 CHRONIC RIGHT-SIDED LOW BACK PAIN WITH RIGHT-SIDED SCIATICA: ICD-10-CM

## 2020-07-09 DIAGNOSIS — M54.41 CHRONIC RIGHT-SIDED LOW BACK PAIN WITH RIGHT-SIDED SCIATICA: ICD-10-CM

## 2020-07-09 PROCEDURE — 97110 THERAPEUTIC EXERCISES: CPT | Mod: GP | Performed by: PHYSICAL THERAPIST

## 2020-07-09 PROCEDURE — 97112 NEUROMUSCULAR REEDUCATION: CPT | Mod: GP | Performed by: PHYSICAL THERAPIST

## 2020-07-16 ENCOUNTER — THERAPY VISIT (OUTPATIENT)
Dept: PHYSICAL THERAPY | Facility: CLINIC | Age: 80
End: 2020-07-16
Payer: COMMERCIAL

## 2020-07-16 DIAGNOSIS — G89.29 CHRONIC RIGHT-SIDED LOW BACK PAIN WITH RIGHT-SIDED SCIATICA: ICD-10-CM

## 2020-07-16 DIAGNOSIS — M54.41 CHRONIC RIGHT-SIDED LOW BACK PAIN WITH RIGHT-SIDED SCIATICA: ICD-10-CM

## 2020-07-16 PROCEDURE — 97112 NEUROMUSCULAR REEDUCATION: CPT | Mod: GP | Performed by: PHYSICAL THERAPIST

## 2020-07-16 PROCEDURE — 97110 THERAPEUTIC EXERCISES: CPT | Mod: GP | Performed by: PHYSICAL THERAPIST

## 2020-07-24 ENCOUNTER — THERAPY VISIT (OUTPATIENT)
Dept: PHYSICAL THERAPY | Facility: CLINIC | Age: 80
End: 2020-07-24
Payer: COMMERCIAL

## 2020-07-24 DIAGNOSIS — G89.29 CHRONIC RIGHT-SIDED LOW BACK PAIN WITH RIGHT-SIDED SCIATICA: ICD-10-CM

## 2020-07-24 DIAGNOSIS — M54.41 CHRONIC RIGHT-SIDED LOW BACK PAIN WITH RIGHT-SIDED SCIATICA: ICD-10-CM

## 2020-07-24 PROCEDURE — 97112 NEUROMUSCULAR REEDUCATION: CPT | Mod: GP | Performed by: PHYSICAL THERAPIST

## 2020-07-24 PROCEDURE — 97140 MANUAL THERAPY 1/> REGIONS: CPT | Mod: GP | Performed by: PHYSICAL THERAPIST

## 2020-07-27 NOTE — PROGRESS NOTES
"Milena Hamilton is a 80 year old female who is being evaluated via a billable telephone visit.      The patient has been notified of following:     \"This telephone visit will be conducted via a call between you and your physician/provider. We have found that certain health care needs can be provided without the need for a physical exam.  This service lets us provide the care you need with a short phone conversation.  If a prescription is necessary we can send it directly to your pharmacy.  If lab work is needed we can place an order for that and you can then stop by our lab to have the test done at a later time.    Telephone visits are billed at different rates depending on your insurance coverage. During this emergency period, for some insurers they may be billed the same as an in-person visit.  Please reach out to your insurance provider with any questions.    If during the course of the call the physician/provider feels a telephone visit is not appropriate, you will not be charged for this service.\"    Patient has given verbal consent for Telephone visit?  Yes    What phone number would you like to be contacted at? [phone]     How would you like to obtain your AVS? Mail a copy    Subjective     Milena Hamilton is a 80 year old female who presents via phone visit today for the following health issues:    HPI    Hyperlipidemia Follow-Up      Are you regularly taking any medication or supplement to lower your cholesterol?  Simvastatin 10 MG.    Are you having muscle aches or other side effects that you think could be caused by your cholesterol lowering medication?  No    Hypertension Follow-up      Do you check your blood pressure regularly outside of the clinic?  Not currently. Patient stated she will get new batteries and start checking her blood pressures more.     Are you following a low salt diet? No    Are your blood pressures ever more than 140 on the top number (systolic) OR more   than 90 on the " bottom number (diastolic), for example 140/90? Yes    She restarted physical therapy and this has been helping with her low back pain. She is taking Tylenol as needed with some benefit and has Flexeril on hand but has not used this frequently. She is okay holding off on another epidural steroid injection at this point.    Her anxiety has been slightly worse lately due to COVID-19 so she has been taking the alprazolam slightly more frequently but 30 tablets are still lasting at least 2 months.    She has been noticing more vertigo lately, only when she bends over as she has fallen a few times due to the dizziness. She states it is like spinning sensation and she has had this in the past. She has been performing the modified home Epley maneuver with some benefit but it is not going away completely. She is wondering if there is anything else she can do to help.    Reviewed and updated as needed this visit by Provider  Allergies  Meds  Problems  Med Hx    Review of Systems   GENERAL: Denies fever, fatigue, weakness, weight gain, or weight loss.  CARDIOVASCULAR: Denies chest pain, shortness of breath, irregular heartbeats, palpitations, or edema.  RESPIRATORY: Denies cough, hemoptysis, and shortness of breath.  MUSCULOSKELETAL: +Low back pain.   NEUROLOGIC: +Dizziness. Denies headache, fainting, memory loss, numbness, tingling, or seizures.  PSYCHIATRIC: +Anxiety. Denies depression, mood swings, and thoughts of suicide.    Objective    General: alert and no distress over the phone  PSYCH: Alert and oriented times 3; coherent speech, normal   rate and volume, able to articulate logical thoughts, able   to abstract reason, no tangential thoughts, no hallucinations   or delusions. Her affect is normal  RESP: No cough, no audible wheezing, able to talk in full sentences  Remainder of exam unable to be completed due to telephone visits      Assessment/Plan:    ICD-10-CM    1. Lumbar radiculopathy  M54.16    2. Essential  hypertension  I10    3. Benign paroxysmal positional vertigo, unspecified laterality  H81.10 meclizine (ANTIVERT) 25 MG tablet   4. Anxiety  F41.9 ALPRAZolam (XANAX) 0.25 MG tablet   5. Gastroesophageal reflux disease, esophagitis presence not specified  K21.9 omeprazole (PRILOSEC) 20 MG DR capsule       1 Continue with physical therapy as this seems to be working well for her low back pain. Continue with up to 3000 mg of Tylenol daily along with home exercises. She has Flexeril on hand to use as needed. If worsening, can place referral for another lumbar JOSE with Dr. Dasilva.    2. Continue current medications.    3. I recommend she continue with the modified Epley at home along with drinking plenty of fluids. Will prescribe meclizine to use as needed for dizziness. If not improving, will refer for vestibular therapy.    4. Anxiety slightly worse over the past few months given the pandemic. Will refill Xanax as she still tries to use this sparingly. Will refill 30 tablets every 2-3 months as needed.     5. Continue omeprazole as she has tried H2 blockers without benefit.    Follow up in December for annual physical.      Phone call duration:  17 minutes    Chuck Streeter PA-C

## 2020-07-30 ENCOUNTER — VIRTUAL VISIT (OUTPATIENT)
Dept: FAMILY MEDICINE | Facility: OTHER | Age: 80
End: 2020-07-30
Payer: COMMERCIAL

## 2020-07-30 DIAGNOSIS — K21.9 GASTROESOPHAGEAL REFLUX DISEASE, ESOPHAGITIS PRESENCE NOT SPECIFIED: ICD-10-CM

## 2020-07-30 DIAGNOSIS — H81.10 BENIGN PAROXYSMAL POSITIONAL VERTIGO, UNSPECIFIED LATERALITY: ICD-10-CM

## 2020-07-30 DIAGNOSIS — I10 ESSENTIAL HYPERTENSION: ICD-10-CM

## 2020-07-30 DIAGNOSIS — M54.16 LUMBAR RADICULOPATHY: Primary | ICD-10-CM

## 2020-07-30 DIAGNOSIS — F41.9 ANXIETY: ICD-10-CM

## 2020-07-30 PROCEDURE — 99214 OFFICE O/P EST MOD 30 MIN: CPT | Mod: 95 | Performed by: PHYSICIAN ASSISTANT

## 2020-07-30 RX ORDER — MECLIZINE HYDROCHLORIDE 25 MG/1
25 TABLET ORAL 3 TIMES DAILY PRN
Qty: 30 TABLET | Refills: 1 | Status: SHIPPED | OUTPATIENT
Start: 2020-07-30 | End: 2020-12-24

## 2020-07-30 RX ORDER — ALPRAZOLAM 0.25 MG
TABLET ORAL
Qty: 30 TABLET | Refills: 0 | Status: SHIPPED | OUTPATIENT
Start: 2020-07-30 | End: 2020-10-11

## 2020-07-30 NOTE — PATIENT INSTRUCTIONS
Continue current medications.    Will add meclizine to take as needed for dizziness/vertigo.  Continue with home exercises.  Stay well hydrated.    Follow up in December for annual physical.

## 2020-07-31 ENCOUNTER — THERAPY VISIT (OUTPATIENT)
Dept: PHYSICAL THERAPY | Facility: CLINIC | Age: 80
End: 2020-07-31
Payer: COMMERCIAL

## 2020-07-31 DIAGNOSIS — M54.41 CHRONIC RIGHT-SIDED LOW BACK PAIN WITH RIGHT-SIDED SCIATICA: ICD-10-CM

## 2020-07-31 DIAGNOSIS — G89.29 CHRONIC RIGHT-SIDED LOW BACK PAIN WITH RIGHT-SIDED SCIATICA: ICD-10-CM

## 2020-07-31 PROCEDURE — 97110 THERAPEUTIC EXERCISES: CPT | Mod: GP | Performed by: PHYSICAL THERAPIST

## 2020-07-31 PROCEDURE — 97112 NEUROMUSCULAR REEDUCATION: CPT | Mod: GP | Performed by: PHYSICAL THERAPIST

## 2020-08-06 ENCOUNTER — THERAPY VISIT (OUTPATIENT)
Dept: PHYSICAL THERAPY | Facility: CLINIC | Age: 80
End: 2020-08-06
Payer: COMMERCIAL

## 2020-08-06 DIAGNOSIS — M54.41 CHRONIC RIGHT-SIDED LOW BACK PAIN WITH RIGHT-SIDED SCIATICA: ICD-10-CM

## 2020-08-06 DIAGNOSIS — G89.29 CHRONIC RIGHT-SIDED LOW BACK PAIN WITH RIGHT-SIDED SCIATICA: ICD-10-CM

## 2020-08-06 PROCEDURE — 97110 THERAPEUTIC EXERCISES: CPT | Mod: GP | Performed by: PHYSICAL THERAPIST

## 2020-08-06 PROCEDURE — 97112 NEUROMUSCULAR REEDUCATION: CPT | Mod: GP | Performed by: PHYSICAL THERAPIST

## 2020-08-06 NOTE — PROGRESS NOTES
Subjective:  HPI  Physical Exam  Oswestry Score: 32 %                 Objective:  System    Physical Exam    General     ROS    Assessment/Plan:    PROGRESS  REPORT    Progress reporting period is from 7/1/20 to 8/6/20.       SUBJECTIVE  Subjective changes noted by patient:  she is walking a little better, her legs will start to feel weak after walking 10 min, sitting limited to 30 min (max 60 min), standing limited to 30 min (max 60 min), riding in car limited to ~60 min, no issues with sleeping, she is still be careful on what she lifts, trying to keep it to less than 10#, she does not that she will lose her balance and fall forward if she bends forward in sitting or standing    Current Pain level: 5/10.     Previous pain level was  NA Initial Pain level: 6/10.   Changes in function:  Yes (See Goal flowsheet attached for changes in current functional level)  Adverse reaction to treatment or activity: activity - prolonged time on feet, prolonged sitting    OBJECTIVE  Changes noted in objective findings:    Objective: posture: mod lateral shift shoulders to R, AROM lumbar flex 90%, ext 10%, SB L 40% with pain & tightness, SB R 60%. MMT: hip flex: R 4/5, L 5/5, knee ext 5/5 B, knee flex: R 4+/5,  L5/5, ankle DF: R 4+/5, L 5/5, great toe ext: R 4/5, L 5/5, repeated side glides hips to R: improved lateral shift by ~80%,      ASSESSMENT/PLAN  Updated problem list and treatment plan: Diagnosis 1:  Low back pain consistent with L5-S1 disc level irritation with lateral shift shoulders to R    Pain -  hot/cold therapy, manual therapy, self management, education, directional preference exercise and home program  Decreased ROM/flexibility - manual therapy, therapeutic exercise, therapeutic activity and home program  Decreased strength - therapeutic exercise, therapeutic activities and home program  Decreased function - therapeutic activities and home program  Impaired posture - neuro re-education, therapeutic activities and  home program  STG/LTGs have been met or progress has been made towards goals:  Yes (See Goal flow sheet completed today.)    Assessment of Progress: The patient's condition is improving slower than expected.  Pt's lateral shift is improving well, but her strength continues to lag behind.  This is partly related to her pain tolerance with exercising.      Self Management Plans:  Patient has been instructed in a home treatment program.  I have re-evaluated this patient and find that the nature, scope, duration and intensity of the therapy is appropriate for the medical condition of the patient.  Milena continues to require the following intervention to meet STG and LTG's:  PT    Recommendations:  This patient would benefit from continued therapy.     Frequency:  1 X week, once daily  Duration:  for 6 weeks    Pt may benefit from further help for pain control to improve her tolerance to strengthening exercises.        Please refer to the daily flowsheet for treatment today, total treatment time and time spent performing 1:1 timed codes.      Jose Finley,PT, DPT, OCS

## 2020-08-14 ENCOUNTER — THERAPY VISIT (OUTPATIENT)
Dept: PHYSICAL THERAPY | Facility: CLINIC | Age: 80
End: 2020-08-14
Payer: COMMERCIAL

## 2020-08-14 DIAGNOSIS — M54.41 CHRONIC RIGHT-SIDED LOW BACK PAIN WITH RIGHT-SIDED SCIATICA: ICD-10-CM

## 2020-08-14 DIAGNOSIS — G89.29 CHRONIC RIGHT-SIDED LOW BACK PAIN WITH RIGHT-SIDED SCIATICA: ICD-10-CM

## 2020-08-14 PROCEDURE — 97112 NEUROMUSCULAR REEDUCATION: CPT | Mod: GP | Performed by: PHYSICAL THERAPIST

## 2020-08-14 PROCEDURE — 97110 THERAPEUTIC EXERCISES: CPT | Mod: GP | Performed by: PHYSICAL THERAPIST

## 2020-08-21 ENCOUNTER — THERAPY VISIT (OUTPATIENT)
Dept: PHYSICAL THERAPY | Facility: CLINIC | Age: 80
End: 2020-08-21
Payer: COMMERCIAL

## 2020-08-21 DIAGNOSIS — G89.29 CHRONIC RIGHT-SIDED LOW BACK PAIN WITH RIGHT-SIDED SCIATICA: ICD-10-CM

## 2020-08-21 DIAGNOSIS — M54.41 CHRONIC RIGHT-SIDED LOW BACK PAIN WITH RIGHT-SIDED SCIATICA: ICD-10-CM

## 2020-08-21 PROCEDURE — 97112 NEUROMUSCULAR REEDUCATION: CPT | Mod: GP | Performed by: PHYSICAL THERAPIST

## 2020-08-21 PROCEDURE — 97110 THERAPEUTIC EXERCISES: CPT | Mod: GP | Performed by: PHYSICAL THERAPIST

## 2020-08-28 ENCOUNTER — THERAPY VISIT (OUTPATIENT)
Dept: PHYSICAL THERAPY | Facility: CLINIC | Age: 80
End: 2020-08-28
Payer: COMMERCIAL

## 2020-08-28 DIAGNOSIS — M54.41 CHRONIC RIGHT-SIDED LOW BACK PAIN WITH RIGHT-SIDED SCIATICA: ICD-10-CM

## 2020-08-28 DIAGNOSIS — G89.29 CHRONIC RIGHT-SIDED LOW BACK PAIN WITH RIGHT-SIDED SCIATICA: ICD-10-CM

## 2020-08-28 PROCEDURE — 97110 THERAPEUTIC EXERCISES: CPT | Mod: GP | Performed by: PHYSICAL THERAPIST

## 2020-08-28 PROCEDURE — 97112 NEUROMUSCULAR REEDUCATION: CPT | Mod: GP | Performed by: PHYSICAL THERAPIST

## 2020-09-04 ENCOUNTER — THERAPY VISIT (OUTPATIENT)
Dept: PHYSICAL THERAPY | Facility: CLINIC | Age: 80
End: 2020-09-04
Payer: COMMERCIAL

## 2020-09-04 DIAGNOSIS — G89.29 CHRONIC RIGHT-SIDED LOW BACK PAIN WITH RIGHT-SIDED SCIATICA: ICD-10-CM

## 2020-09-04 DIAGNOSIS — M54.41 CHRONIC RIGHT-SIDED LOW BACK PAIN WITH RIGHT-SIDED SCIATICA: ICD-10-CM

## 2020-09-04 PROCEDURE — 97112 NEUROMUSCULAR REEDUCATION: CPT | Mod: CQ | Performed by: PHYSICAL THERAPY ASSISTANT

## 2020-09-04 PROCEDURE — 97110 THERAPEUTIC EXERCISES: CPT | Mod: CQ | Performed by: PHYSICAL THERAPY ASSISTANT

## 2020-09-11 ENCOUNTER — THERAPY VISIT (OUTPATIENT)
Dept: PHYSICAL THERAPY | Facility: CLINIC | Age: 80
End: 2020-09-11
Payer: COMMERCIAL

## 2020-09-11 DIAGNOSIS — M54.41 CHRONIC RIGHT-SIDED LOW BACK PAIN WITH RIGHT-SIDED SCIATICA: ICD-10-CM

## 2020-09-11 DIAGNOSIS — G89.29 CHRONIC RIGHT-SIDED LOW BACK PAIN WITH RIGHT-SIDED SCIATICA: ICD-10-CM

## 2020-09-11 PROCEDURE — 97112 NEUROMUSCULAR REEDUCATION: CPT | Mod: GP | Performed by: PHYSICAL THERAPIST

## 2020-09-11 PROCEDURE — 97110 THERAPEUTIC EXERCISES: CPT | Mod: GP | Performed by: PHYSICAL THERAPIST

## 2020-09-16 ENCOUNTER — ALLIED HEALTH/NURSE VISIT (OUTPATIENT)
Dept: FAMILY MEDICINE | Facility: OTHER | Age: 80
End: 2020-09-16
Payer: COMMERCIAL

## 2020-09-16 DIAGNOSIS — Z23 NEED FOR PROPHYLACTIC VACCINATION AND INOCULATION AGAINST INFLUENZA: Primary | ICD-10-CM

## 2020-09-16 PROCEDURE — 90662 IIV NO PRSV INCREASED AG IM: CPT

## 2020-09-16 PROCEDURE — 99207 ZZC NO CHARGE NURSE ONLY: CPT

## 2020-09-16 PROCEDURE — G0008 ADMIN INFLUENZA VIRUS VAC: HCPCS

## 2020-09-18 ENCOUNTER — THERAPY VISIT (OUTPATIENT)
Dept: PHYSICAL THERAPY | Facility: CLINIC | Age: 80
End: 2020-09-18
Payer: COMMERCIAL

## 2020-09-18 DIAGNOSIS — M54.41 CHRONIC RIGHT-SIDED LOW BACK PAIN WITH RIGHT-SIDED SCIATICA: ICD-10-CM

## 2020-09-18 DIAGNOSIS — G89.29 CHRONIC RIGHT-SIDED LOW BACK PAIN WITH RIGHT-SIDED SCIATICA: ICD-10-CM

## 2020-09-18 PROCEDURE — 97110 THERAPEUTIC EXERCISES: CPT | Mod: GP | Performed by: PHYSICAL THERAPIST

## 2020-09-18 PROCEDURE — 97112 NEUROMUSCULAR REEDUCATION: CPT | Mod: GP | Performed by: PHYSICAL THERAPIST

## 2020-09-18 NOTE — PROGRESS NOTES
Subjective:  HPI  Physical Exam  Oswestry Score: 28.89 %                 Objective:  System    Physical Exam    General     ROS    Assessment/Plan:    PROGRESS  REPORT    Progress reporting period is from 8/5/20 to 9/18/20.       SUBJECTIVE  Subjective changes noted by patient:  she is doing pretty well, her pain has been much less, she still has some pain every day, sleeping going well, standing limited to 45 min, walking limited 30 min    Current Pain level: 3/10(worst 5/10).     Previous pain level was  5-8/10 Initial Pain level: 6/10.   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 with lateral shift     Pain -  hot/cold therapy, manual therapy, self management, education and home program  Decreased ROM/flexibility - manual therapy, therapeutic exercise, therapeutic activity and home program  Decreased strength - therapeutic exercise, therapeutic activities and home program  Decreased function - therapeutic activities and home program  Impaired posture - neuro re-education, therapeutic activities and home program  STG/LTGs have been met or progress has been made towards goals:  Yes (See Goal flow sheet completed today.)  Assessment of Progress: The patient's condition is improving slowly due to significant deconditioning.  Continued falls risk due to gait and strength of lower body.  Self Management Plans:  Patient has been instructed in a home treatment  program.  I have re-evaluated this patient and find that the nature, scope, duration and intensity of the therapy is appropriate for the medical condition of the patient.  Milena continues to require the following intervention to meet STG and LTG's:  PT    Recommendations:  This patient would benefit from continued therapy.     Frequency:  1 X week, once daily  Duration:  for 6 weeks        Please refer to the daily flowsheet for treatment today, total treatment time and time spent performing 1:1 timed codes.    Jose Finley,PT, DPT, OCS

## 2020-09-25 ENCOUNTER — THERAPY VISIT (OUTPATIENT)
Dept: PHYSICAL THERAPY | Facility: CLINIC | Age: 80
End: 2020-09-25
Payer: COMMERCIAL

## 2020-09-25 DIAGNOSIS — M54.41 CHRONIC RIGHT-SIDED LOW BACK PAIN WITH RIGHT-SIDED SCIATICA: ICD-10-CM

## 2020-09-25 DIAGNOSIS — G89.29 CHRONIC RIGHT-SIDED LOW BACK PAIN WITH RIGHT-SIDED SCIATICA: ICD-10-CM

## 2020-09-25 PROCEDURE — 97110 THERAPEUTIC EXERCISES: CPT | Mod: GP | Performed by: PHYSICAL THERAPIST

## 2020-09-25 PROCEDURE — 97530 THERAPEUTIC ACTIVITIES: CPT | Mod: GP | Performed by: PHYSICAL THERAPIST

## 2020-09-30 ENCOUNTER — THERAPY VISIT (OUTPATIENT)
Dept: PHYSICAL THERAPY | Facility: CLINIC | Age: 80
End: 2020-09-30
Payer: COMMERCIAL

## 2020-09-30 DIAGNOSIS — M54.41 CHRONIC RIGHT-SIDED LOW BACK PAIN WITH RIGHT-SIDED SCIATICA: ICD-10-CM

## 2020-09-30 DIAGNOSIS — G89.29 CHRONIC RIGHT-SIDED LOW BACK PAIN WITH RIGHT-SIDED SCIATICA: ICD-10-CM

## 2020-09-30 PROCEDURE — 97110 THERAPEUTIC EXERCISES: CPT | Mod: GP | Performed by: PHYSICAL THERAPIST

## 2020-09-30 PROCEDURE — 97140 MANUAL THERAPY 1/> REGIONS: CPT | Mod: GP | Performed by: PHYSICAL THERAPIST

## 2020-10-09 ENCOUNTER — THERAPY VISIT (OUTPATIENT)
Dept: PHYSICAL THERAPY | Facility: CLINIC | Age: 80
End: 2020-10-09
Payer: COMMERCIAL

## 2020-10-09 DIAGNOSIS — G89.29 CHRONIC RIGHT-SIDED LOW BACK PAIN WITH RIGHT-SIDED SCIATICA: ICD-10-CM

## 2020-10-09 DIAGNOSIS — M54.41 CHRONIC RIGHT-SIDED LOW BACK PAIN WITH RIGHT-SIDED SCIATICA: ICD-10-CM

## 2020-10-09 DIAGNOSIS — F41.9 ANXIETY: ICD-10-CM

## 2020-10-09 PROCEDURE — 97112 NEUROMUSCULAR REEDUCATION: CPT | Mod: GP | Performed by: PHYSICAL THERAPIST

## 2020-10-09 PROCEDURE — 97110 THERAPEUTIC EXERCISES: CPT | Mod: GP | Performed by: PHYSICAL THERAPIST

## 2020-10-10 NOTE — TELEPHONE ENCOUNTER
Pending Prescriptions:                       Disp   Refills    ALPRAZolam (XANAX) 0.25 MG tablet [Pharmac*30 tab*0        Sig: TAKE ONE TABLET BY MOUTH Every night AT BEDTIME AS           NEEDED FOR ANXIETY    Routing refill request to provider for review/approval because:  Drug not on the [facility] refill protocol

## 2020-10-11 RX ORDER — ALPRAZOLAM 0.25 MG
TABLET ORAL
Qty: 30 TABLET | Refills: 0 | Status: SHIPPED | OUTPATIENT
Start: 2020-10-11 | End: 2020-12-04

## 2020-10-23 ENCOUNTER — THERAPY VISIT (OUTPATIENT)
Dept: PHYSICAL THERAPY | Facility: CLINIC | Age: 80
End: 2020-10-23
Payer: COMMERCIAL

## 2020-10-23 DIAGNOSIS — G89.29 CHRONIC RIGHT-SIDED LOW BACK PAIN WITH RIGHT-SIDED SCIATICA: ICD-10-CM

## 2020-10-23 DIAGNOSIS — M54.41 CHRONIC RIGHT-SIDED LOW BACK PAIN WITH RIGHT-SIDED SCIATICA: ICD-10-CM

## 2020-10-23 PROCEDURE — 97110 THERAPEUTIC EXERCISES: CPT | Mod: GP | Performed by: PHYSICAL THERAPIST

## 2020-10-23 PROCEDURE — 97112 NEUROMUSCULAR REEDUCATION: CPT | Mod: GP | Performed by: PHYSICAL THERAPIST

## 2020-10-30 ENCOUNTER — THERAPY VISIT (OUTPATIENT)
Dept: PHYSICAL THERAPY | Facility: CLINIC | Age: 80
End: 2020-10-30
Payer: COMMERCIAL

## 2020-10-30 DIAGNOSIS — G89.29 CHRONIC RIGHT-SIDED LOW BACK PAIN WITH RIGHT-SIDED SCIATICA: ICD-10-CM

## 2020-10-30 DIAGNOSIS — M54.41 CHRONIC RIGHT-SIDED LOW BACK PAIN WITH RIGHT-SIDED SCIATICA: ICD-10-CM

## 2020-10-30 PROCEDURE — 97110 THERAPEUTIC EXERCISES: CPT | Mod: GP | Performed by: PHYSICAL THERAPIST

## 2020-10-30 PROCEDURE — 97530 THERAPEUTIC ACTIVITIES: CPT | Mod: GP | Performed by: PHYSICAL THERAPIST

## 2020-11-06 ENCOUNTER — THERAPY VISIT (OUTPATIENT)
Dept: PHYSICAL THERAPY | Facility: CLINIC | Age: 80
End: 2020-11-06
Payer: COMMERCIAL

## 2020-11-06 DIAGNOSIS — G89.29 CHRONIC RIGHT-SIDED LOW BACK PAIN WITH RIGHT-SIDED SCIATICA: ICD-10-CM

## 2020-11-06 DIAGNOSIS — M54.41 CHRONIC RIGHT-SIDED LOW BACK PAIN WITH RIGHT-SIDED SCIATICA: ICD-10-CM

## 2020-11-06 PROCEDURE — 97110 THERAPEUTIC EXERCISES: CPT | Mod: GP | Performed by: PHYSICAL THERAPIST

## 2020-11-06 PROCEDURE — 97530 THERAPEUTIC ACTIVITIES: CPT | Mod: GP | Performed by: PHYSICAL THERAPIST

## 2020-11-06 NOTE — PROGRESS NOTES
"Subjective:  HPI  Physical Exam                    Objective:  System    Physical Exam    General     ROS    Assessment/Plan:    PROGRESS  REPORT    Progress reporting period is from 9/18/2020 to 11/6/2020    SUBJECTIVE  Subjective changes noted by patient:  Feels weaker on the left side when walking.  Subjective: back has been hurting since tuesday, mini-squats have been causing trouble, has been going out for walks 20 minutes a day     Current pain level is 4/10 Current Pain level: 4/10.     Previous pain level was  1/10 Initial Pain level: 6/10.   Changes in function:  Yes (See Goal flowsheet attached for changes in current functional level)  Adverse reaction to treatment or activity: activity - Patient states feeling more sore with doing parts of HEP.    OBJECTIVE  Changes noted in objective findings:  Yes, increased step height clearance to 7\"- repeated.  Objective: Lateral shift of 4 deg,     ASSESSMENT/PLAN  Updated problem list and treatment plan: Diagnosis 1:  Low back pain due to lateral shift.  Pain -  manual therapy, self management, education and home program  Decreased ROM/flexibility - manual therapy, therapeutic exercise and home program  Decreased strength - therapeutic exercise, therapeutic activities and home program  Impaired gait - gait training and home program  Impaired muscle performance - neuro re-education and home program  Decreased function - therapeutic activities and home program  STG/LTGs have been met or progress has been made towards goals:  Yes (See Goal flow sheet completed today.)  Assessment of Progress: The patient's condition is improving.  Self Management Plans:  Patient has been instructed in a home treatment program.  I have re-evaluated this patient and find that the nature, scope, duration and intensity of the therapy is appropriate for the medical condition of the patient.  Milena continues to require the following intervention to meet STG and LTG's:  " PT    Recommendations:  This patient would benefit from continued therapy.     Frequency:  1 X every two weeks, once daily  Duration:  for 4 weeks        Please refer to the daily flowsheet for treatment today, total treatment time and time spent performing 1:1 timed codes.      Victorino Malloy, SPT, Jose Finley, DPT OCS

## 2020-11-13 ENCOUNTER — THERAPY VISIT (OUTPATIENT)
Dept: PHYSICAL THERAPY | Facility: CLINIC | Age: 80
End: 2020-11-13
Payer: COMMERCIAL

## 2020-11-13 DIAGNOSIS — G89.29 CHRONIC RIGHT-SIDED LOW BACK PAIN WITH RIGHT-SIDED SCIATICA: ICD-10-CM

## 2020-11-13 DIAGNOSIS — M54.41 CHRONIC RIGHT-SIDED LOW BACK PAIN WITH RIGHT-SIDED SCIATICA: ICD-10-CM

## 2020-11-13 PROCEDURE — 97112 NEUROMUSCULAR REEDUCATION: CPT | Mod: GP | Performed by: PHYSICAL THERAPIST

## 2020-11-13 PROCEDURE — 97110 THERAPEUTIC EXERCISES: CPT | Mod: GP | Performed by: PHYSICAL THERAPIST

## 2020-11-20 ENCOUNTER — THERAPY VISIT (OUTPATIENT)
Dept: PHYSICAL THERAPY | Facility: CLINIC | Age: 80
End: 2020-11-20
Payer: COMMERCIAL

## 2020-11-20 DIAGNOSIS — G89.29 CHRONIC RIGHT-SIDED LOW BACK PAIN WITH RIGHT-SIDED SCIATICA: ICD-10-CM

## 2020-11-20 DIAGNOSIS — M54.41 CHRONIC RIGHT-SIDED LOW BACK PAIN WITH RIGHT-SIDED SCIATICA: ICD-10-CM

## 2020-11-20 PROCEDURE — 97110 THERAPEUTIC EXERCISES: CPT | Mod: GP | Performed by: PHYSICAL THERAPIST

## 2020-11-20 PROCEDURE — 97530 THERAPEUTIC ACTIVITIES: CPT | Mod: GP | Performed by: PHYSICAL THERAPIST

## 2020-12-04 ENCOUNTER — THERAPY VISIT (OUTPATIENT)
Dept: PHYSICAL THERAPY | Facility: CLINIC | Age: 80
End: 2020-12-04
Payer: COMMERCIAL

## 2020-12-04 DIAGNOSIS — F41.9 ANXIETY: ICD-10-CM

## 2020-12-04 DIAGNOSIS — M54.41 CHRONIC RIGHT-SIDED LOW BACK PAIN WITH RIGHT-SIDED SCIATICA: ICD-10-CM

## 2020-12-04 DIAGNOSIS — G89.29 CHRONIC RIGHT-SIDED LOW BACK PAIN WITH RIGHT-SIDED SCIATICA: ICD-10-CM

## 2020-12-04 PROCEDURE — 97110 THERAPEUTIC EXERCISES: CPT | Mod: GP | Performed by: PHYSICAL THERAPIST

## 2020-12-04 PROCEDURE — 97112 NEUROMUSCULAR REEDUCATION: CPT | Mod: GP | Performed by: PHYSICAL THERAPIST

## 2020-12-04 RX ORDER — ALPRAZOLAM 0.25 MG
TABLET ORAL
Qty: 30 TABLET | Refills: 0 | Status: SHIPPED | OUTPATIENT
Start: 2020-12-04 | End: 2020-12-28

## 2020-12-04 NOTE — TELEPHONE ENCOUNTER
Pending Prescriptions:                       Disp   Refills    ALPRAZolam (XANAX) 0.25 MG tablet [Pharmac*30 tab*0        Sig: TAKE ONE TABLET BY MOUTH AT BEDTIME AS NEEDED FOR           ANXIETY        Routing refill request to provider for review/approval because:  Drug not on the [facility] refill protocol   Last Seen: 07/2020  Last Refilled: 10/11/20 30Tablets/Capsules  Refills: 0  Next Visit: MOOKIE Sahu RN  December 4, 2020

## 2020-12-08 DIAGNOSIS — I10 HTN, GOAL BELOW 140/90: ICD-10-CM

## 2020-12-08 DIAGNOSIS — E78.5 HYPERLIPIDEMIA LDL GOAL <130: ICD-10-CM

## 2020-12-09 RX ORDER — SIMVASTATIN 20 MG
TABLET ORAL
Qty: 45 TABLET | Refills: 0 | Status: SHIPPED | OUTPATIENT
Start: 2020-12-09 | End: 2020-12-28

## 2020-12-09 RX ORDER — METOPROLOL TARTRATE 25 MG/1
TABLET, FILM COATED ORAL
Qty: 45 TABLET | Refills: 0 | Status: SHIPPED | OUTPATIENT
Start: 2020-12-09 | End: 2020-12-28

## 2020-12-09 NOTE — TELEPHONE ENCOUNTER
Pending Prescriptions:                       Disp   Refills    simvastatin (ZOCOR) 20 MG tablet [Pharmac*45 tab*0            Sig: TAKE 1/2 TABLET (10mg) BY MOUTH every night AT           BEDTIME    metoprolol tartrate (LOPRESSOR) 25 MG tab*45 tab*0            Sig: TAKE 1/2 TABLET (12.5 MG) BY MOUTH once daily    Medication is being filled for 1 time victorino refill only due to:  Patient is due for physical.     Please call and help schedule.  Thank you!          Aureloi Sahu RN  December 9, 2020

## 2020-12-11 ENCOUNTER — THERAPY VISIT (OUTPATIENT)
Dept: PHYSICAL THERAPY | Facility: CLINIC | Age: 80
End: 2020-12-11
Payer: COMMERCIAL

## 2020-12-11 DIAGNOSIS — M54.41 CHRONIC RIGHT-SIDED LOW BACK PAIN WITH RIGHT-SIDED SCIATICA: ICD-10-CM

## 2020-12-11 DIAGNOSIS — G89.29 CHRONIC RIGHT-SIDED LOW BACK PAIN WITH RIGHT-SIDED SCIATICA: ICD-10-CM

## 2020-12-11 PROCEDURE — 97110 THERAPEUTIC EXERCISES: CPT | Mod: GP | Performed by: PHYSICAL THERAPIST

## 2020-12-11 PROCEDURE — 97112 NEUROMUSCULAR REEDUCATION: CPT | Mod: GP | Performed by: PHYSICAL THERAPIST

## 2020-12-11 NOTE — TELEPHONE ENCOUNTER
Called and left voicemail for patient relaying message below. She has an appointment on 12/16/20 with PCP for annual exam.

## 2020-12-11 NOTE — PROGRESS NOTES
Subjective:  HPI  Physical Exam                    Objective:  System    Physical Exam    General     ROS    Assessment/Plan:    DISCHARGE REPORT    Progress reporting period is from 11/6/2020  to 12/11/2020.       SUBJECTIVE  Subjective changes noted by patient:  Wondering about exercises that can be done in bed to help with movement prior to getting out of bed.  Subjective: doing okay today, back has been bugging her in the morning, using cane in morning for balance     Current pain level is 1/10 Current Pain level: 1/10(2/10 worst it has been all week ).     Previous pain level was  1/10 Initial Pain level: 6/10.   Changes in function:  Yes (See Goal flowsheet attached for changes in current functional level)  Adverse reaction to treatment or activity: None    OBJECTIVE  Changes noted in objective findings:  Yes, increased muscular control in supine hip marches and increased side stepping ability vs previous session  Objective: 30 sec sit<>stand 12 reps,     lateral shift: 2 deg, lumbar ROM: flex 80%, ext 20%, R SB 30%, L SB 20%  Gait assessment: mild lateral shift shoulders to R with ambulation, better with verbal cuing    ASSESSMENT/PLAN  Updated problem list and treatment plan: Diagnosis 1:  Low back pain  Pain -  home program  Decreased ROM/flexibility - home program  Decreased strength - home program  Impaired gait - home program  Impaired muscle performance - home program  Impaired posture - home program  STG/LTGs have been met or progress has been made towards goals:  Yes (See Goal flow sheet completed today.)  Assessment of Progress: The patient's condition is improving.  Self Management Plans:  Patient is independent in a home treatment program.  I have re-evaluated this patient and find that the nature, scope, duration and intensity of the therapy is appropriate for the medical condition of the patient.  Milena continues to require the following intervention to meet STG and LTG's:  PT intervention is  no longer required to meet STG/LTG.    Recommendations:  This patient is ready to be discharged from therapy and continue their home treatment program.    Please refer to the daily flowsheet for treatment today, total treatment time and time spent performing 1:1 timed codes.    Victorino Malloy, KATE, Jose Finley, KANDYT OCS

## 2020-12-18 DIAGNOSIS — E03.9 ACQUIRED HYPOTHYROIDISM: ICD-10-CM

## 2020-12-21 RX ORDER — LEVOTHYROXINE SODIUM 50 UG/1
TABLET ORAL
Qty: 90 TABLET | Refills: 0 | Status: SHIPPED | OUTPATIENT
Start: 2020-12-21 | End: 2020-12-28

## 2020-12-21 NOTE — TELEPHONE ENCOUNTER
Prescription approved per [facility] Refill Protocol.    Next 5 appointments (look out 90 days)    Dec 28, 2020 11:30 AM  Office Visit with Chuck Streeter PA-C  [facility] ([facility] [address] [place] [id number]  [phone]          Carmelina Silverio RN

## 2020-12-23 NOTE — PROGRESS NOTES
Subjective     Milena Hamilton is a 80 year old female who presents to clinic today for the following health issues:    HPI         {SUPERLIST (Optional):109989}  {additonal problems for provider to add (Optional):864911}    Review of Systems   {ROS COMP (Optional):093338}      Objective    LMP  (LMP Unknown)   There is no height or weight on file to calculate BMI.  Physical Exam   {Exam List (Optional):526159}    {Diagnostic Test Results (Optional):935771}        {PROVIDER CHARTING PREFERENCE:484745}

## 2020-12-24 DIAGNOSIS — H81.10 BENIGN PAROXYSMAL POSITIONAL VERTIGO, UNSPECIFIED LATERALITY: ICD-10-CM

## 2020-12-24 RX ORDER — MECLIZINE HYDROCHLORIDE 25 MG/1
TABLET ORAL
Qty: 30 TABLET | Refills: 1 | Status: SHIPPED | OUTPATIENT
Start: 2020-12-24 | End: 2021-05-07

## 2020-12-24 NOTE — TELEPHONE ENCOUNTER
Prescription approved per [facility] Refill Protocol.  Aurelio Sahu RN  December 24, 2020

## 2020-12-28 ENCOUNTER — OFFICE VISIT (OUTPATIENT)
Dept: FAMILY MEDICINE | Facility: OTHER | Age: 80
End: 2020-12-28
Payer: COMMERCIAL

## 2020-12-28 VITALS
HEIGHT: 62 IN | DIASTOLIC BLOOD PRESSURE: 62 MMHG | WEIGHT: 136.4 LBS | OXYGEN SATURATION: 98 % | BODY MASS INDEX: 25.1 KG/M2 | SYSTOLIC BLOOD PRESSURE: 128 MMHG | RESPIRATION RATE: 16 BRPM | HEART RATE: 64 BPM | TEMPERATURE: 97.7 F

## 2020-12-28 DIAGNOSIS — M54.41 CHRONIC RIGHT-SIDED LOW BACK PAIN WITH RIGHT-SIDED SCIATICA: ICD-10-CM

## 2020-12-28 DIAGNOSIS — G89.29 CHRONIC RIGHT-SIDED LOW BACK PAIN WITH RIGHT-SIDED SCIATICA: ICD-10-CM

## 2020-12-28 DIAGNOSIS — M85.851 OSTEOPENIA OF BOTH HIPS: ICD-10-CM

## 2020-12-28 DIAGNOSIS — F41.9 ANXIETY: ICD-10-CM

## 2020-12-28 DIAGNOSIS — E03.9 ACQUIRED HYPOTHYROIDISM: ICD-10-CM

## 2020-12-28 DIAGNOSIS — Z00.00 ENCOUNTER FOR ROUTINE ADULT HEALTH EXAMINATION WITHOUT ABNORMAL FINDINGS: Primary | ICD-10-CM

## 2020-12-28 DIAGNOSIS — M79.89 SWELLING OF RIGHT FOOT: ICD-10-CM

## 2020-12-28 DIAGNOSIS — I10 HTN, GOAL BELOW 140/90: ICD-10-CM

## 2020-12-28 DIAGNOSIS — E78.5 HYPERLIPIDEMIA LDL GOAL <130: ICD-10-CM

## 2020-12-28 DIAGNOSIS — M85.852 OSTEOPENIA OF BOTH HIPS: ICD-10-CM

## 2020-12-28 DIAGNOSIS — Z11.59 NEED FOR HEPATITIS C SCREENING TEST: ICD-10-CM

## 2020-12-28 DIAGNOSIS — M54.16 LUMBAR RADICULOPATHY: ICD-10-CM

## 2020-12-28 LAB
ANION GAP SERPL CALCULATED.3IONS-SCNC: 3 MMOL/L (ref 3–14)
BUN SERPL-MCNC: 11 MG/DL (ref 7–30)
CALCIUM SERPL-MCNC: 9 MG/DL (ref 8.5–10.1)
CHLORIDE SERPL-SCNC: 106 MMOL/L (ref 94–109)
CHOLEST SERPL-MCNC: 191 MG/DL
CO2 SERPL-SCNC: 32 MMOL/L (ref 20–32)
CREAT SERPL-MCNC: 0.67 MG/DL (ref 0.52–1.04)
D DIMER PPP FEU-MCNC: 0.3 UG/ML FEU (ref 0–0.5)
GFR SERPL CREATININE-BSD FRML MDRD: 83 ML/MIN/{1.73_M2}
GLUCOSE SERPL-MCNC: 93 MG/DL (ref 70–99)
HDLC SERPL-MCNC: 73 MG/DL
LDLC SERPL CALC-MCNC: 93 MG/DL
NONHDLC SERPL-MCNC: 118 MG/DL
POTASSIUM SERPL-SCNC: 4 MMOL/L (ref 3.4–5.3)
SODIUM SERPL-SCNC: 141 MMOL/L (ref 133–144)
TRIGL SERPL-MCNC: 127 MG/DL
TSH SERPL DL<=0.005 MIU/L-ACNC: 0.89 MU/L (ref 0.4–4)

## 2020-12-28 PROCEDURE — 99000 SPECIMEN HANDLING OFFICE-LAB: CPT | Performed by: PHYSICIAN ASSISTANT

## 2020-12-28 PROCEDURE — 80307 DRUG TEST PRSMV CHEM ANLYZR: CPT | Mod: 90 | Performed by: PHYSICIAN ASSISTANT

## 2020-12-28 PROCEDURE — 99214 OFFICE O/P EST MOD 30 MIN: CPT | Mod: 25 | Performed by: PHYSICIAN ASSISTANT

## 2020-12-28 PROCEDURE — 80048 BASIC METABOLIC PNL TOTAL CA: CPT | Performed by: PHYSICIAN ASSISTANT

## 2020-12-28 PROCEDURE — 86803 HEPATITIS C AB TEST: CPT | Performed by: PHYSICIAN ASSISTANT

## 2020-12-28 PROCEDURE — 36415 COLL VENOUS BLD VENIPUNCTURE: CPT | Performed by: PHYSICIAN ASSISTANT

## 2020-12-28 PROCEDURE — 80061 LIPID PANEL: CPT | Performed by: PHYSICIAN ASSISTANT

## 2020-12-28 PROCEDURE — 99397 PER PM REEVAL EST PAT 65+ YR: CPT | Mod: 25 | Performed by: PHYSICIAN ASSISTANT

## 2020-12-28 PROCEDURE — 84443 ASSAY THYROID STIM HORMONE: CPT | Performed by: PHYSICIAN ASSISTANT

## 2020-12-28 PROCEDURE — 85379 FIBRIN DEGRADATION QUANT: CPT | Performed by: PHYSICIAN ASSISTANT

## 2020-12-28 RX ORDER — SIMVASTATIN 20 MG
TABLET ORAL
Qty: 45 TABLET | Refills: 3 | Status: SHIPPED | OUTPATIENT
Start: 2020-12-28 | End: 2021-03-09

## 2020-12-28 RX ORDER — ALPRAZOLAM 0.25 MG
TABLET ORAL
Qty: 30 TABLET | Refills: 2 | Status: SHIPPED | OUTPATIENT
Start: 2021-01-04 | End: 2021-07-07

## 2020-12-28 RX ORDER — METOPROLOL TARTRATE 25 MG/1
TABLET, FILM COATED ORAL
Qty: 45 TABLET | Refills: 3 | Status: SHIPPED | OUTPATIENT
Start: 2020-12-28 | End: 2021-03-09

## 2020-12-28 RX ORDER — LEVOTHYROXINE SODIUM 50 UG/1
TABLET ORAL
Qty: 90 TABLET | Refills: 0 | Status: SHIPPED | OUTPATIENT
Start: 2020-12-28 | End: 2021-03-15

## 2020-12-28 ASSESSMENT — ANXIETY QUESTIONNAIRES
6. BECOMING EASILY ANNOYED OR IRRITABLE: NOT AT ALL
7. FEELING AFRAID AS IF SOMETHING AWFUL MIGHT HAPPEN: NOT AT ALL
3. WORRYING TOO MUCH ABOUT DIFFERENT THINGS: SEVERAL DAYS
2. NOT BEING ABLE TO STOP OR CONTROL WORRYING: SEVERAL DAYS
5. BEING SO RESTLESS THAT IT IS HARD TO SIT STILL: NOT AT ALL
GAD7 TOTAL SCORE: 3
1. FEELING NERVOUS, ANXIOUS, OR ON EDGE: NOT AT ALL
IF YOU CHECKED OFF ANY PROBLEMS ON THIS QUESTIONNAIRE, HOW DIFFICULT HAVE THESE PROBLEMS MADE IT FOR YOU TO DO YOUR WORK, TAKE CARE OF THINGS AT HOME, OR GET ALONG WITH OTHER PEOPLE: NOT DIFFICULT AT ALL

## 2020-12-28 ASSESSMENT — PATIENT HEALTH QUESTIONNAIRE - PHQ9
SUM OF ALL RESPONSES TO PHQ QUESTIONS 1-9: 2
5. POOR APPETITE OR OVEREATING: SEVERAL DAYS

## 2020-12-28 ASSESSMENT — ACTIVITIES OF DAILY LIVING (ADL): CURRENT_FUNCTION: NO ASSISTANCE NEEDED

## 2020-12-28 ASSESSMENT — MIFFLIN-ST. JEOR: SCORE: 1046.96

## 2020-12-28 ASSESSMENT — PAIN SCALES - GENERAL: PAINLEVEL: NO PAIN (0)

## 2020-12-28 NOTE — PROGRESS NOTES
"SUBJECTIVE:   Milena Hamilton is a 80 year old female who presents for Preventive Visit.      Patient has been advised of split billing requirements and indicates understanding: Yes   Are you in the first 12 months of your Medicare coverage?  No    Healthy Habits:    In general, how would you rate your overall health?  Fair    Frequency of exercise:  6-7 days/week    Duration of exercise:  30-45 minutes    Do you usually eat at least 4 servings of fruit and vegetables a day, include whole grains    & fiber and avoid regularly eating high fat or \"junk\" foods?  Yes    Taking medications regularly:  Yes    Barriers to taking medications:  None    Medication side effects:  None    Ability to successfully perform activities of daily living:  No assistance needed    Home Safety:  No safety concerns identified    Hearing Impairment:  No hearing concerns    In the past 6 months, have you been bothered by leaking of urine?  No    In general, how would you rate your overall mental or emotional health?  Good      PHQ-2 Total Score:    Additional concerns today:  Yes (rigth foot swelling on same side as low back pain )    She has had intermittent right foot swelling for the past 6 months, worse towards the end of the day after walking on it. She has occasional mild right great toe pain and right lateral shin pain. She denies leg swelling, redness or warmth.    She continues to experiencing chronic low back and right leg pain. She had a lumbar JOSE in February which has worked quite well but pain is starting to increase again. She is unsure if she wants another injection yet. She is taking Tylenol as needed.    Do you feel safe in your environment? Yes    Have you ever done Advance Care Planning? (For example, a Health Directive, POLST, or a discussion with a medical provider or your loved ones about your wishes): Yes, advance care planning is on file.    Fall risk  Fallen 2 or more times in the past year?: No  Any fall with " injury in the past year?: No    Cognitive Screening   1) Repeat 3 items (Leader, Season, Table)    2) Clock draw: NORMAL  3) 3 item recall: Recalls 3 objects  Results: 3 items recalled: COGNITIVE IMPAIRMENT LESS LIKELY    Mini-CogTM Copyright CLARISSA Anderson. Licensed by the author for use in [facility]; reprinted with permission (sarah@[facility]). All rights reserved.      Reviewed and updated as needed this visit by clinical staff  Tobacco  Allergies  Meds  Problems  Med Hx  Surg Hx  Fam Hx  Soc Hx        Reviewed and updated as needed this visit by Provider  Tobacco  Allergies  Meds  Problems  Med Hx  Surg Hx  Fam Hx       Social History     Tobacco Use     Smoking status: Never Smoker     Smokeless tobacco: Never Used   Substance Use Topics     Alcohol use: No       Alcohol Use 12/13/2019   Prescreen: >3 drinks/day or >7 drinks/week? -   Prescreen: >3 drinks/day or >7 drinks/week? No     Hyperlipidemia Follow-Up      Are you regularly taking any medication or supplement to lower your cholesterol?   Yes- Zocor    Are you having muscle aches or other side effects that you think could be caused by your cholesterol lowering medication?  No    Hypertension Follow-up      Do you check your blood pressure regularly outside of the clinic? Yes     Are you following a low salt diet? Yes    Are your blood pressures ever more than 140 on the top number (systolic) OR more   than 90 on the bottom number (diastolic), for example 140/90? No    Anxiety Follow-Up    How are you doing with your anxiety since your last visit? Up and down     Are you having other symptoms that might be associated with anxiety? No    Have you had a significant life event? OTHER: COVID     Are you feeling depressed? No    Do you have any concerns with your use of alcohol or other drugs? No    Social History     Tobacco Use     Smoking status: Never Smoker     Smokeless tobacco: Never Used   Substance Use Topics     Alcohol use: No      Drug use: No     MARINO-7 SCORE 6/18/2018 2/19/2019 12/28/2020   Total Score - - -   Total Score 6 (mild anxiety) 0 (minimal anxiety) -   Total Score 6 0 3     PHQ 8/10/2017 2/19/2019 12/28/2020   PHQ-9 Total Score 1 0 2   Q9: Thoughts of better off dead/self-harm past 2 weeks Not at all Not at all Not at all       Hypothyroidism Follow-up      Since last visit, patient describes the following symptoms: dry skin, constipation, loose stools, anxiety and hair loss      Current providers sharing in care for this patient include:     Patient Care Team:  Chuck Streeter PA-C as PCP - General (Physician Assistant)  Chuck Streeter PA-C as Assigned PCP  Nathen Moon DPM as Assigned Musculoskeletal Provider    The following health maintenance items are reviewed in Epic and correct as of today:    Review of Systems  GENERAL: Denies fever, fatigue, weakness, weight gain, or weight loss.  HEENT: Eyes-Denies pain, redness, loss of vision, double or blurred vision.     Ears/Nose-Denies tinnitus, loss of hearing, epistaxis, decreased sense of smell, nasal congestion. Denies loss of sense of taste, dry mouth, or sore throat.   CARDIOVASCULAR: Denies chest pain, shortness of breath, irregular heartbeats, palpitations, or edema.  RESPIRATORY: Denies cough, hemoptysis, and shortness of breath.  GASTROINTESTINAL: Denies nausea, vomiting, change in appetite, abdominal pain, diarrhea, or constipation.  GENITOURINARY: Denies increased frequency, urgency, dysuria, hematuria, or incontinence.   MUSCULOSKELETAL: +Low back pain, right leg pain.  SKIN: Denies easy bruising, redness, rash, or dry skin.   NEUROLOGIC: Denies headache, fainting, dizziness, memory loss, numbness, tingling, or seizures.  PSYCHIATRIC: Denies depression, anxiety, mood swings, and thoughts of suicide.  ENDOCRINE: Denies heat and cold intolerance, polydipsia, or polyuria.   HEMATOLOGIC/LYMPHATIC: Denies easy bleeding or lymphadenopathy.  "  ALLERGIC/IMMUNOLOGIC: Denies rhinitis, asthma, skin sensitivity.     OBJECTIVE:   /62   Pulse 64   Temp 97.7  F (36.5  C) (Temporal)   Resp 16   Ht 1.575 m (5' 2\")   Wt 61.9 kg (136 lb 6.4 oz)   LMP  (LMP Unknown)   SpO2 98%   BMI 24.95 kg/m   Estimated body mass index is 24.95 kg/m  as calculated from the following:    Height as of this encounter: 1.575 m (5' 2\").    Weight as of this encounter: 61.9 kg (136 lb 6.4 oz).  Physical Exam  GENERAL APPEARANCE: healthy, alert and no distress  EYES: Eyes grossly normal to inspection, PERRL and conjunctivae and sclerae normal  HENT: ear canals and TM's normal, nose and mouth without ulcers or lesions, oropharynx clear and oral mucous membranes moist  NECK: no adenopathy, no asymmetry, masses, or scars and thyroid normal to palpation  RESP: lungs clear to auscultation - no rales, rhonchi or wheezes  CV: regular rate and rhythm, normal S1 S2, no S3 or S4, no murmur, click or rub, no peripheral edema and peripheral pulses strong  ABDOMEN: soft, nontender, no hepatosplenomegaly, no masses and bowel sounds normal  MS: no musculoskeletal defects are noted and gait is age appropriate without ataxia  SKIN: no suspicious lesions or rashes  NEURO: Normal strength and tone, mentation intact and speech normal. Cranial nerves II-XII are grossly intact. DTRs are 2+/4 throughout and symmetric. Gait is stable.   PSYCH: mentation appears normal and affect normal/bright    ASSESSMENT / PLAN:       ICD-10-CM    1. Encounter for routine adult health examination without abnormal findings  Z00.00    2. Swelling of right foot  M79.89 D dimer, quantitative   3. Lumbar radiculopathy  M54.16    4. Chronic right-sided low back pain with right-sided sciatica  M54.41 Drug  Screen Comprehensive , Urine with Reported Meds (MedTox) (Pain Care Package)    G89.29    5. Acquired hypothyroidism  E03.9 TSH with free T4 reflex     levothyroxine (SYNTHROID/LEVOTHROID) 50 MCG tablet   6. " Hyperlipidemia LDL goal <130  E78.5 Lipid panel reflex to direct LDL Fasting     simvastatin (ZOCOR) 20 MG tablet   7. HTN, goal below 140/90  I10 BASIC METABOLIC PANEL     metoprolol tartrate (LOPRESSOR) 25 MG tablet   8. Anxiety  F41.9 Drug  Screen Comprehensive , Urine with Reported Meds (MedTox) (Pain Care Package)     ALPRAZolam (XANAX) 0.25 MG tablet   9. Osteopenia of both hips  M85.851 DEXA HIP/PELVIS/SPINE - Future    M85.852    10. Need for hepatitis C screening test  Z11.59 Hepatitis C Screen Reflex to HCV RNA Quant and Genotype       2. No obvious swelling on exam but given her subjective swelling with lateral shin/calf pain, will order a D dimer to rule out a clot. If elevated, will order US. I recommend frequent elevation and consider compression stockings.    3-4. Continued chronic low back pain and right leg pain. She continues with home stretching and Tylenol as needed. Can try NSAIDs and if she would like another lumbar JOSE within 30 days of this visit, she is medically cleared and I can place an order if needed.     Addendum: Lumbar JOSE is scheduled for 1/29/21 which is just over 30 days from this visit but she is still medically cleared for the procedure.    5. Updated TSH ordered. Continue levothyroxine.    6. Continue simvastatin. Updated non-fasting lipid panel ordered.    7. Updated BMP ordered. Continue metoprolol.    8. Continue Xanax as needed. 30 tablets last 1-2 months. New CSA and UDS completed. Follow up in 6 months.    9. Updated DEXA scan ordered. Continue calcium and vitamin D.    10. Hepatitis C screening ordered.    Mammogram scheduled for tomorrow. Had an asymmetry last year that was normal upon US.    Follow up in 6 months.        Patient has been advised of split billing requirements and indicates understanding: Yes  COUNSELING:  Reviewed preventive health counseling, as reflected in patient instructions       Regular exercise       Healthy diet/nutrition    Estimated body mass  "index is 24.95 kg/m  as calculated from the following:    Height as of this encounter: 1.575 m (5' 2\").    Weight as of this encounter: 61.9 kg (136 lb 6.4 oz).      She reports that she has never smoked. She has never used smokeless tobacco.      Appropriate preventive services were discussed with this patient, including applicable screening as appropriate for cardiovascular disease, diabetes, osteopenia/osteoporosis, and glaucoma.  As appropriate for age/gender, discussed screening for colorectal cancer, prostate cancer, breast cancer, and cervical cancer. Checklist reviewing preventive services available has been given to the patient.    Reviewed patients plan of care and provided an AVS. The Basic Care Plan (routine screening as documented in Health Maintenance) for Milena meets the Care Plan requirement. This Care Plan has been established and reviewed with the Patient.    Counseling Resources:  ATP IV Guidelines  Pooled Cohorts Equation Calculator  Breast Cancer Risk Calculator  Breast Cancer: Medication to Reduce Risk  FRAX Risk Assessment  ICSI Preventive Guidelines  Dietary Guidelines for Americans, 2010  USDA's MyPlate  ASA Prophylaxis  Lung CA Screening    [facility]    Identified Health Risks:  "

## 2020-12-28 NOTE — PATIENT INSTRUCTIONS
Continue current medications.    Let me know if you want another injection.    Keep leg elevated to prevent swelling. Can consider compression stockings.    Follow up in 6 months.

## 2020-12-28 NOTE — LETTER
[facility]  12/28/20    Patient: Milena Hamilton  YOB: 1940  Medical Record Number: [id number]  CSN: [id number]                                                                              Non-opioid Controlled Substance Agreement    I understand that my care provider has prescribed a controlled substance to help manage my condition(s). I am taking this medicine to help me function or work. I know this is strong medicine, and that it can cause serious side effects. Controlled substances can be sedating, addicting and may cause a dependency on the drug. They can affect my ability to drive or think, and cause depression. They need to be taken exactly as prescribed. Combining controlled substances with certain medicines or chemicals (such as cocaine, sedatives and tranquilizers, sleeping pills, meth) can be dangerous or even fatal. Also, if I stop controlled substances suddenly, I may have severe withdrawal symptoms.  If not helpful, I may be asked to stop them.    The risks, benefits, and side effects of these medicine(s) were explained to me. I agree that:    1. I will take part in other treatments as advised by my care team. This may be psychiatry or counseling, physical therapy, behavioral therapy, group treatment or a referral to a pain clinic. I will reduce or stop my medicine when my care team tells me to do so.  2. I will take my medicines as prescribed. I will not change the dose or schedule unless my care team tells me to. There will be no refills if I  run out early.   I may be contactedwithout warning and asked to complete a urine drug test or pill count at any time.   3. I will keep all my appointments, and understand this is part of the monitoring of controlled substances. My care team may require an office visit for EVERY controlled substance refill. If I miss appointments or don t follow instructions, my care team may stop my medicine.  4. I will not ask other  providers to prescribe controlled substances, and I will not accept controlled substances from other people. If I need another prescribed controlled substance for a new reason, I will tell my care team within 1 business day.  5. I will use one pharmacy to fill all of my controlled substance prescriptions, and it is up to me to make sure that I do not run out of my medicines on weekends or holidays. If my care team is willing to refill my controlled substance prescription without a visit, I must request refills only during office hours, refills may take up to 3 days to process, and it may take up to 5 to 7 days for my medicine to be mailed and ready at my pharmacy. Prescriptions will not be mailed anywhere except my pharmacy.    6. I am responsible for my prescriptions. If the medicine/prescription is lost or stolen, it will not be replaced. I also agree not to share controlled substance medicines with anyone.              [facility]  12/28/20  Patient:  Milena Hamilton  YOB: 1940  Medical Record Number: [id number]  [facility]: [id number]    7. I agree to not use ANY illegal or recreational drugs. This includes marijuana, cocaine, bath salts or other drugs. I agree not to use alcohol unless my care team says I may. I agree to give urine samples whenever asked. If I don t give a urine sample, the care team may stop my medicine.    8. If I enroll in the [place] Medical Marijuana program, I will tell my care team. I will also sign an agreement to share my medical records with my care team.    9. I will bring in my list of medicines (or my medicine bottles) each time I come to the clinic.   10. I will tell my care team right away if I become pregnant or have a new medical problem treated outside of my regular clinic.  11. I understand that this medicine can affect my thinking and judgment. It may be unsafe for me to drive, use machinery and do dangerous tasks. I will not do any of these  things until I know how the medicine affects me. If my dose changes, I will wait to see how it affects me. I will contact my care team if I have concerns about medicine side effects.    I understand that if I do not follow any of the conditions above, my prescriptions or treatment may be stopped.      I agree that my provider, clinic care team, and pharmacy may work with any city, state or federal law enforcement agency that investigates the misuse, sale, or other diversion of my controlled medicine. I will allow my provider to discuss my care with or share a copy of this agreement with any other treating provider, pharmacy or emergency room where I receive care. I agree to give up (waive) any right of privacy or confidentiality with respect to these consents.   I have read this agreement and have asked questions about anything I did not understand.    ____________________________________________________    ____________  ________  Patient signature                                                         Date      Time    ____________________________________________________     ____________  ________  Witness                                                          Date      Time    ____________________________________________________  Provider signature

## 2020-12-29 LAB — HCV AB SERPL QL IA: NONREACTIVE

## 2020-12-29 ASSESSMENT — ANXIETY QUESTIONNAIRES: GAD7 TOTAL SCORE: 3

## 2020-12-30 LAB — PAIN DRUG SCR UR W RPTD MEDS: NORMAL

## 2021-01-07 ENCOUNTER — HOSPITAL ENCOUNTER (OUTPATIENT)
Dept: MAMMOGRAPHY | Facility: CLINIC | Age: 81
End: 2021-01-07
Attending: PHYSICIAN ASSISTANT
Payer: COMMERCIAL

## 2021-01-07 ENCOUNTER — HOSPITAL ENCOUNTER (OUTPATIENT)
Dept: BONE DENSITY | Facility: CLINIC | Age: 81
End: 2021-01-07
Attending: PHYSICIAN ASSISTANT
Payer: COMMERCIAL

## 2021-01-07 DIAGNOSIS — M85.851 OSTEOPENIA OF BOTH HIPS: ICD-10-CM

## 2021-01-07 DIAGNOSIS — M85.852 OSTEOPENIA OF BOTH HIPS: ICD-10-CM

## 2021-01-07 DIAGNOSIS — Z12.31 VISIT FOR SCREENING MAMMOGRAM: ICD-10-CM

## 2021-01-07 PROCEDURE — 77063 BREAST TOMOSYNTHESIS BI: CPT

## 2021-01-07 PROCEDURE — 77080 DXA BONE DENSITY AXIAL: CPT

## 2021-01-07 NOTE — PROGRESS NOTES
Appropriate assistive devices provided during their visit. na(Yes, No, N/A) na (list device)    Exam table and/or cart  placed in the lowest position. y (Yes, No, N/A)    Brakes on tables/carts/wheelchairs used at all times. na (Yes, No, N/A)    Non slip footwear applied. y (Yes, No, NA)    Patient was accompanied by staff throughout visit. y (Yes, No, N/A)    Equipment safety straps used. na (Yes, No, N/A)    Assist with toileting. na (Yes, No, N/A)

## 2021-01-14 ENCOUNTER — OFFICE VISIT (OUTPATIENT)
Dept: FAMILY MEDICINE | Facility: OTHER | Age: 81
End: 2021-01-14
Payer: COMMERCIAL

## 2021-01-14 VITALS
OXYGEN SATURATION: 98 % | TEMPERATURE: 98.4 F | SYSTOLIC BLOOD PRESSURE: 134 MMHG | BODY MASS INDEX: 25.76 KG/M2 | HEART RATE: 70 BPM | WEIGHT: 140 LBS | HEIGHT: 62 IN | DIASTOLIC BLOOD PRESSURE: 72 MMHG

## 2021-01-14 DIAGNOSIS — M79.674 PAIN OF TOE OF RIGHT FOOT: Primary | ICD-10-CM

## 2021-01-14 LAB
ALBUMIN SERPL-MCNC: 3.9 G/DL (ref 3.4–5)
ALP SERPL-CCNC: 67 U/L (ref 40–150)
ALT SERPL W P-5'-P-CCNC: 19 U/L (ref 0–50)
ANION GAP SERPL CALCULATED.3IONS-SCNC: 2 MMOL/L (ref 3–14)
AST SERPL W P-5'-P-CCNC: 12 U/L (ref 0–45)
BASOPHILS # BLD AUTO: 0 10E9/L (ref 0–0.2)
BASOPHILS NFR BLD AUTO: 0.3 %
BILIRUB SERPL-MCNC: 0.5 MG/DL (ref 0.2–1.3)
BUN SERPL-MCNC: 15 MG/DL (ref 7–30)
CALCIUM SERPL-MCNC: 9 MG/DL (ref 8.5–10.1)
CHLORIDE SERPL-SCNC: 106 MMOL/L (ref 94–109)
CO2 SERPL-SCNC: 32 MMOL/L (ref 20–32)
CREAT SERPL-MCNC: 0.62 MG/DL (ref 0.52–1.04)
DIFFERENTIAL METHOD BLD: NORMAL
EOSINOPHIL # BLD AUTO: 0.1 10E9/L (ref 0–0.7)
EOSINOPHIL NFR BLD AUTO: 2.1 %
ERYTHROCYTE [DISTWIDTH] IN BLOOD BY AUTOMATED COUNT: 12.3 % (ref 10–15)
GFR SERPL CREATININE-BSD FRML MDRD: 85 ML/MIN/{1.73_M2}
GLUCOSE SERPL-MCNC: 111 MG/DL (ref 70–99)
HCT VFR BLD AUTO: 40.9 % (ref 35–47)
HGB BLD-MCNC: 12.9 G/DL (ref 11.7–15.7)
LYMPHOCYTES # BLD AUTO: 2.2 10E9/L (ref 0.8–5.3)
LYMPHOCYTES NFR BLD AUTO: 38.5 %
MCH RBC QN AUTO: 31.2 PG (ref 26.5–33)
MCHC RBC AUTO-ENTMCNC: 31.5 G/DL (ref 31.5–36.5)
MCV RBC AUTO: 99 FL (ref 78–100)
MONOCYTES # BLD AUTO: 0.5 10E9/L (ref 0–1.3)
MONOCYTES NFR BLD AUTO: 8.5 %
NEUTROPHILS # BLD AUTO: 2.9 10E9/L (ref 1.6–8.3)
NEUTROPHILS NFR BLD AUTO: 50.6 %
PLATELET # BLD AUTO: 220 10E9/L (ref 150–450)
POTASSIUM SERPL-SCNC: 3.9 MMOL/L (ref 3.4–5.3)
PROT SERPL-MCNC: 7.3 G/DL (ref 6.8–8.8)
RBC # BLD AUTO: 4.14 10E12/L (ref 3.8–5.2)
SODIUM SERPL-SCNC: 140 MMOL/L (ref 133–144)
URATE SERPL-MCNC: 2.7 MG/DL (ref 2.6–6)
WBC # BLD AUTO: 5.8 10E9/L (ref 4–11)

## 2021-01-14 PROCEDURE — 80053 COMPREHEN METABOLIC PANEL: CPT | Performed by: PHYSICIAN ASSISTANT

## 2021-01-14 PROCEDURE — 84550 ASSAY OF BLOOD/URIC ACID: CPT | Performed by: PHYSICIAN ASSISTANT

## 2021-01-14 PROCEDURE — 85025 COMPLETE CBC W/AUTO DIFF WBC: CPT | Performed by: PHYSICIAN ASSISTANT

## 2021-01-14 PROCEDURE — 99213 OFFICE O/P EST LOW 20 MIN: CPT | Performed by: PHYSICIAN ASSISTANT

## 2021-01-14 PROCEDURE — 36415 COLL VENOUS BLD VENIPUNCTURE: CPT | Performed by: PHYSICIAN ASSISTANT

## 2021-01-14 RX ORDER — IBUPROFEN 600 MG/1
600 TABLET, FILM COATED ORAL EVERY 6 HOURS PRN
Qty: 100 TABLET | Refills: 3 | Status: SHIPPED | OUTPATIENT
Start: 2021-01-14 | End: 2021-08-27

## 2021-01-14 ASSESSMENT — MIFFLIN-ST. JEOR: SCORE: 1063.29

## 2021-01-18 ENCOUNTER — TELEPHONE (OUTPATIENT)
Dept: FAMILY MEDICINE | Facility: OTHER | Age: 81
End: 2021-01-18

## 2021-01-18 DIAGNOSIS — G89.29 CHRONIC RIGHT-SIDED LOW BACK PAIN WITH RIGHT-SIDED SCIATICA: ICD-10-CM

## 2021-01-18 DIAGNOSIS — M54.41 CHRONIC RIGHT-SIDED LOW BACK PAIN WITH RIGHT-SIDED SCIATICA: ICD-10-CM

## 2021-01-18 DIAGNOSIS — M54.16 LUMBAR RADICULOPATHY: Primary | ICD-10-CM

## 2021-01-18 DIAGNOSIS — E03.9 ACQUIRED HYPOTHYROIDISM: ICD-10-CM

## 2021-01-18 NOTE — TELEPHONE ENCOUNTER
Spoke with patient.  She would like the injection in her back again.  Please place orders.    SHERRILL DowdN, RN, PHN  [facility] [place] [facility] [place]  January 18, 2021

## 2021-01-18 NOTE — TELEPHONE ENCOUNTER
Order placed. Please schedule with Dr. Dasilva in [place]. She is medically cleared from her exam last month.    Chuck Streeter PA-C

## 2021-01-19 ENCOUNTER — TELEPHONE (OUTPATIENT)
Facility: CLINIC | Age: 81
End: 2021-01-19

## 2021-01-19 DIAGNOSIS — Z11.59 ENCOUNTER FOR SCREENING FOR OTHER VIRAL DISEASES: Primary | ICD-10-CM

## 2021-01-19 NOTE — TELEPHONE ENCOUNTER
Pt scheduled for JOSE  Date: 1/29/21  Time: 0930   Dr. Dasilva    Instructed pt to have H&P and  for procedure.  Patient informed of COVID testing process.

## 2021-01-19 NOTE — TELEPHONE ENCOUNTER
I did not see her on 28 December.  Her primary care provider did.      It was more than 30 days between the last time we did the exam and her surgical intervention.  We cannot update this and she will need a new preoperative exam.  Please help her schedule and arrange for this.    My last visit with her from 14 January 2021 will not qualify as a preoperative exam.      Electronically signed:    Twin Perry PA-C

## 2021-01-20 NOTE — TELEPHONE ENCOUNTER
The note from 12/28 already states she is medically cleared for the injection so she is good to go.    Chuck Streeter PA-C

## 2021-01-20 NOTE — TELEPHONE ENCOUNTER
I called [facility] to let them know of JM message- because patient is scheduled for 1/29/21 for the injection it's 1 day over of the 30 days. So they would need you to addend your note stating patient is still cleared for injection.

## 2021-01-25 ENCOUNTER — OFFICE VISIT (OUTPATIENT)
Dept: LAB | Facility: OTHER | Age: 81
End: 2021-01-25
Attending: ANESTHESIOLOGY
Payer: COMMERCIAL

## 2021-01-25 DIAGNOSIS — Z11.59 ENCOUNTER FOR SCREENING FOR OTHER VIRAL DISEASES: ICD-10-CM

## 2021-01-25 LAB
LABORATORY COMMENT REPORT: NORMAL
SARS-COV-2 RNA RESP QL NAA+PROBE: NEGATIVE
SARS-COV-2 RNA RESP QL NAA+PROBE: NORMAL
SPECIMEN SOURCE: NORMAL
SPECIMEN SOURCE: NORMAL

## 2021-01-25 PROCEDURE — U0005 INFEC AGEN DETEC AMPLI PROBE: HCPCS | Performed by: ANESTHESIOLOGY

## 2021-01-25 PROCEDURE — U0003 INFECTIOUS AGENT DETECTION BY NUCLEIC ACID (DNA OR RNA); SEVERE ACUTE RESPIRATORY SYNDROME CORONAVIRUS 2 (SARS-COV-2) (CORONAVIRUS DISEASE [COVID-19]), AMPLIFIED PROBE TECHNIQUE, MAKING USE OF HIGH THROUGHPUT TECHNOLOGIES AS DESCRIBED BY CMS-2020-01-R: HCPCS | Performed by: ANESTHESIOLOGY

## 2021-01-29 ENCOUNTER — HOSPITAL ENCOUNTER (OUTPATIENT)
Dept: GENERAL RADIOLOGY | Facility: CLINIC | Age: 81
End: 2021-01-29
Attending: ANESTHESIOLOGY | Admitting: ANESTHESIOLOGY
Payer: COMMERCIAL

## 2021-01-29 ENCOUNTER — HOSPITAL ENCOUNTER (OUTPATIENT)
Facility: CLINIC | Age: 81
Discharge: HOME | End: 2021-01-29
Attending: ANESTHESIOLOGY | Admitting: ANESTHESIOLOGY
Payer: COMMERCIAL

## 2021-01-29 ENCOUNTER — ANESTHESIA (OUTPATIENT)
Dept: SURGERY | Facility: CLINIC | Age: 81
End: 2021-01-29
Payer: COMMERCIAL

## 2021-01-29 ENCOUNTER — ANESTHESIA EVENT (OUTPATIENT)
Dept: SURGERY | Facility: CLINIC | Age: 81
End: 2021-01-29
Payer: COMMERCIAL

## 2021-01-29 VITALS
TEMPERATURE: 97.8 F | HEIGHT: 62 IN | DIASTOLIC BLOOD PRESSURE: 77 MMHG | SYSTOLIC BLOOD PRESSURE: 151 MMHG | OXYGEN SATURATION: 99 % | HEART RATE: 72 BPM | RESPIRATION RATE: 16 BRPM | WEIGHT: 140 LBS | BODY MASS INDEX: 25.76 KG/M2

## 2021-01-29 DIAGNOSIS — M54.16 LUMBAR RADICULOPATHY: ICD-10-CM

## 2021-01-29 PROCEDURE — 250N000011 HC RX IP 250 OP 636: Performed by: ANESTHESIOLOGY

## 2021-01-29 PROCEDURE — 64483 NJX AA&/STRD TFRM EPI L/S 1: CPT | Mod: RT | Performed by: ANESTHESIOLOGY

## 2021-01-29 PROCEDURE — 62323 NJX INTERLAMINAR LMBR/SAC: CPT | Performed by: ANESTHESIOLOGY

## 2021-01-29 PROCEDURE — 250N000011 HC RX IP 250 OP 636: Performed by: NURSE ANESTHETIST, CERTIFIED REGISTERED

## 2021-01-29 PROCEDURE — 250N000009 HC RX 250: Performed by: NURSE ANESTHETIST, CERTIFIED REGISTERED

## 2021-01-29 PROCEDURE — 370N000017 HC ANESTHESIA TECHNICAL FEE, PER MIN: Performed by: ANESTHESIOLOGY

## 2021-01-29 PROCEDURE — 999N000179 XR SURGERY CARM FLUORO LESS THAN 5 MIN W STILLS: Mod: TC

## 2021-01-29 RX ORDER — PROPOFOL 10 MG/ML
INJECTION, EMULSION INTRAVENOUS PRN
Status: DISCONTINUED | OUTPATIENT
Start: 2021-01-29 | End: 2021-01-29

## 2021-01-29 RX ORDER — LIDOCAINE HYDROCHLORIDE 20 MG/ML
INJECTION, SOLUTION INFILTRATION; PERINEURAL PRN
Status: DISCONTINUED | OUTPATIENT
Start: 2021-01-29 | End: 2021-01-29

## 2021-01-29 RX ORDER — IOPAMIDOL 612 MG/ML
INJECTION, SOLUTION INTRATHECAL PRN
Status: DISCONTINUED | OUTPATIENT
Start: 2021-01-29 | End: 2021-01-29 | Stop reason: HOSPADM

## 2021-01-29 RX ORDER — LIDOCAINE 40 MG/G
CREAM TOPICAL
Status: DISCONTINUED | OUTPATIENT
Start: 2021-01-29 | End: 2021-01-29 | Stop reason: HOSPADM

## 2021-01-29 RX ORDER — TRIAMCINOLONE ACETONIDE 40 MG/ML
INJECTION, SUSPENSION INTRA-ARTICULAR; INTRAMUSCULAR PRN
Status: DISCONTINUED | OUTPATIENT
Start: 2021-01-29 | End: 2021-01-29 | Stop reason: HOSPADM

## 2021-01-29 RX ADMIN — PROPOFOL 30 MG: 10 INJECTION, EMULSION INTRAVENOUS at 09:19

## 2021-01-29 RX ADMIN — LIDOCAINE HYDROCHLORIDE 40 MG: 20 INJECTION, SOLUTION INFILTRATION; PERINEURAL at 09:14

## 2021-01-29 RX ADMIN — PROPOFOL 20 MG: 10 INJECTION, EMULSION INTRAVENOUS at 09:23

## 2021-01-29 RX ADMIN — PROPOFOL 50 MG: 10 INJECTION, EMULSION INTRAVENOUS at 09:14

## 2021-01-29 RX ADMIN — PROPOFOL 20 MG: 10 INJECTION, EMULSION INTRAVENOUS at 09:21

## 2021-01-29 RX ADMIN — PROPOFOL 30 MG: 10 INJECTION, EMULSION INTRAVENOUS at 09:18

## 2021-01-29 ASSESSMENT — MIFFLIN-ST. JEOR: SCORE: 1063.29

## 2021-01-29 NOTE — DISCHARGE INSTRUCTIONS
Home Care Instructions                Procedure: Epidural injection or joint injection    Activity:    Rest today    Do not work today    Resume normal activity tomorrow    Pain:    You may experience soreness at the injection site for 1 to 3 days.    You may use an ice pack for 20 minutes every 2 hours for the first 24 hours    You may use a heating pad after the first 24 hours    You may use Tylenol  (acetaminophen) every 4 hours or other pain medicines as directed by your physician    Safety  Sedation medicine, if given may remain active for many hours.    It is important for the next 24 hours that you do not:    Drive a car    Operate machines or power tools    Consume alcohol, including beer    Sign any important papers or legal documents    You may experience numbness radiating into your legs or arms, (depending on the procedure location)  This numbness may last several hours.  Until the numb sensation returns to normal please use caution in walking, climbing stairs, stepping out of your vehicle, etc.    Common side effects of steroids:  Not everyone will experience corticosteroid side effects. If side effects are experienced they will gradually subside in the 7-10 day period following an injection.    Most common side effects include:    Flushed face and/or chest    Feeling of warmth, particularly in face but could be overall feeling of warmth    Increased blood sugar in diabetic patients    Menstrual irregularities may occur.  If taking hormone based birth control an alternate method of birth control is recommended    Sleep disturbances and/or mood swings are possible    Leg cramps    Please contact us if you have:  Severe pain   Fever more than 101.5 degrees Fahrenheit  Signs of infection (redness, swelling or drainage)      If you have questions during normal business hours (8am-5pm Monday-Friday) contact the [facility] at [phone]. If you need help after hours, we recommend that you go to a  hospital emergency room or dial 911.

## 2021-01-29 NOTE — ANESTHESIA PREPROCEDURE EVALUATION
Anesthesia Pre-Procedure Evaluation    Patient: Milena Hamilton   MRN: [id number] : 1940        Preoperative Diagnosis: Lumbar radiculopathy [M54.16]  Chronic right-sided low back pain with right-sided sciatica [M54.41, G89.29]   Procedure : Procedure(s):  INJECTION, SPINE, LUMBAR,4-SACRAL 1  EPIDURAL     Past Medical History:   Diagnosis Date     Lichen planus     vulvar     Osteopenia 2010    recheck DEXA in 2 years     Panic disorder without agoraphobia      Pure hypercholesterolemia     low LDLs     Raynaud's disease 2014     Unspecified essential hypertension       Past Surgical History:   Procedure Laterality Date     C APPENDECTOMY       C VAG HYST,RMV TUBE/OVARY  1984    cervix removed     COLONOSCOPY  10/17/2007    normal, recheck in 10 years     COLONOSCOPY N/A 2014    Procedure: COLONOSCOPY;  Surgeon: Raffi Montiel MD;  Location: [facility]     COMBINED CYSTOSCOPY, INSERT CATHETER URETER Bilateral 10/13/2014    Procedure: COMBINED CYSTOSCOPY, INSERT CATHETER URETER;  Surgeon: Abdoulaye Odell MD;  Location:  OR     EYE SURGERY      right eye muscle repair     HC REMOVAL GALLBLADDER  2010    lap     HC REMOVAL OF OVARY/TUBE(S)      Salpingo-Oophorectomy, bilateral     HYSTERECTOMY, PAP NO LONGER INDICATED       INJECT EPIDURAL LUMBAR Right 4/10/2017    Procedure: INJECT EPIDURAL LUMBAR;  Surgeon: Raffi Wright MD;  Location: [facility]     INJECT EPIDURAL LUMBAR Right 2020    Procedure: Right Lumbar 4- Sacral 1 Epidural Steroid Injection;  Surgeon: Antonio Dasilva MD;  Location: [facility]     INJECT EPIDURAL TRANSFORAMINAL Right 2016    Procedure: INJECT EPIDURAL TRANSFORAMINAL;  Surgeon: Antonio Dasilva MD;  Location[facility]     INJECT EPIDURAL TRANSFORAMINAL Right 10/13/2016    Procedure: INJECT EPIDURAL TRANSFORAMINAL;  Surgeon: Antonio Dasilva MD;  Location[facility]     LAPAROSCOPIC ASSISTED COLECTOMY LEFT (DESCENDING) N/A 10/13/2014    Procedure: LAPAROSCOPIC  ASSISTED COLECTOMY LEFT (DESCENDING);  Surgeon: Raffi Montiel MD;  Location: [facility]     ZZC NONSPECIFIC PROCEDURE      Right inferior oblique recession, 14 mm      Allergies   Allergen Reactions     Azithromycin Diarrhea     Ciprofloxacin Other (See Comments)     Cipro, dizziness and abdominal pain     Gabapentin Dizziness and Visual Disturbance     Penicillins      stomach upset, diarrhea     Sulfa Drugs      hives      Social History     Tobacco Use     Smoking status: Never Smoker     Smokeless tobacco: Never Used   Substance Use Topics     Alcohol use: No      Wt Readings from Last 1 Encounters:   01/14/21 63.5 kg (140 lb)        Anesthesia Evaluation            ROS/MED HX  ENT/Pulmonary:  - neg pulmonary ROS     Neurologic:  - neg neurologic ROS     Cardiovascular:     (+) Dyslipidemia hypertension-----    METS/Exercise Tolerance:     Hematologic:  - neg hematologic  ROS     Musculoskeletal: Comment: Low back pain      GI/Hepatic:     (+) GERD, Asymptomatic on medication,     Renal/Genitourinary:  - neg Renal ROS     Endo:     (+) thyroid problem, hypothyroidism,     Psychiatric/Substance Use:  - neg psychiatric ROS     Infectious Disease: Comment: Cellulitis of toe - neg infectious disease ROS     Malignancy:  - neg malignancy ROS     Other:  - neg other ROS    (+) , H/O Chronic Pain,        Physical Exam    Airway        Mallampati: II   TM distance: > 3 FB   Neck ROM: full   Mouth opening: > 3 cm    Respiratory Devices and Support         Dental  no notable dental history         Cardiovascular   cardiovascular exam normal          Pulmonary   pulmonary exam normal                OUTSIDE LABS:  CBC:   Lab Results   Component Value Date    WBC 5.8 01/14/2021    WBC 5.7 09/11/2019    HGB 12.9 01/14/2021    HGB 13.4 09/11/2019    HCT 40.9 01/14/2021    HCT 42.0 09/11/2019     01/14/2021     09/11/2019     BMP:   Lab Results   Component Value Date     01/14/2021     12/28/2020     POTASSIUM 3.9 01/14/2021    POTASSIUM 4.0 12/28/2020    CHLORIDE 106 01/14/2021    CHLORIDE 106 12/28/2020    CO2 32 01/14/2021    CO2 32 12/28/2020    BUN 15 01/14/2021    BUN 11 12/28/2020    CR 0.62 01/14/2021    CR 0.67 12/28/2020     (H) 01/14/2021    GLC 93 12/28/2020     COAGS: No results found for: PTT, INR, FIBR  POC:   Lab Results   Component Value Date     (H) 10/15/2014     HEPATIC:   Lab Results   Component Value Date    ALBUMIN 3.9 01/14/2021    PROTTOTAL 7.3 01/14/2021    ALT 19 01/14/2021    AST 12 01/14/2021    ALKPHOS 67 01/14/2021    BILITOTAL 0.5 01/14/2021     OTHER:   Lab Results   Component Value Date    LACT 1.2 03/23/2016    A1C 5.4 09/26/2008    ANU 9.0 01/14/2021    PHOS 3.7 05/29/2015    MAG 2.1 05/29/2015    LIPASE 136 02/27/2018    AMYLASE 59 05/29/2013    TSH 0.89 12/28/2020    T4 1.14 01/12/2016    CRP <2.9 09/11/2019    SED 9 09/11/2019       Anesthesia Plan     History & Physical Review      ASA Status:  2. NPO Status:  NPO Appropriate. .  Plan for MAC with Intravenous and Propofol induction. Reason for MAC:  Deep or markedly invasive procedure (G8).            Consents  Anesthesia Plan(s) and associated risks, benefits, and realistic alternatives discussed.    Questions answered and patient/representative(s) expressed understanding.    Discussed with:  Patient.       Extended Intubation/Ventilatory Support Discussed No No     Use of blood products discussed: No.          Postoperative Care            FORREST Bhakta CRNA

## 2021-01-29 NOTE — ANESTHESIA POSTPROCEDURE EVALUATION
Patient: Milena Hamilton    Procedure(s):  INJECTION, SPINE,  sacral 1 EPIDURAL    Diagnosis:Lumbar radiculopathy [M54.16]  Chronic right-sided low back pain with right-sided sciatica [M54.41, G89.29]  Diagnosis Additional Information: No value filed.    Anesthesia Type:  MAC    Note:  Disposition: Outpatient   Postop Pain Control: Uneventful            Sign Out: Well controlled pain   PONV: No   Neuro/Psych: Uneventful            Sign Out: Acceptable/Baseline neuro status   Airway/Respiratory: Uneventful            Sign Out: Acceptable/Baseline resp. status   CV/Hemodynamics: Uneventful            Sign Out: Acceptable CV status   Other NRE: NONE   DID A NON-ROUTINE EVENT OCCUR? No    Event details/Postop Comments:  Pt was happy with anesthesia care.  No complications.  I will follow up with the pt if needed.         Last vitals:  Vitals:    01/29/21 0842 01/29/21 0927   BP: (!) 170/81 115/63   Pulse:  70   Resp: 16 20   Temp: 97.8  F (36.6  C)    SpO2:  99%       Electronically Signed By: FORREST Bhakta CRNA  January 29, 2021  9:40 AM

## 2021-01-29 NOTE — ANESTHESIA CARE TRANSFER NOTE
Patient: Milena Hamilton    Procedure(s):  INJECTION, SPINE,  sacral 1 EPIDURAL    Diagnosis: Lumbar radiculopathy [M54.16]  Chronic right-sided low back pain with right-sided sciatica [M54.41, G89.29]  Diagnosis Additional Information: No value filed.    Anesthesia Type:   MAC     Note:    Oropharynx: oropharynx clear of all foreign objects and spontaneously breathing  Level of Consciousness: awake  Oxygen Supplementation: nasal cannula  Level of Supplemental Oxygen: 2  Independent Airway: airway patency satisfactory and stable  Dentition: dentition unchanged  Vital Signs Stable: post-procedure vital signs reviewed and stable  Report to RN Given: handoff report given  Patient transferred to: Phase II    Handoff Report: Identifed the Patient, Identified the Reponsible Provider, Reviewed the pertinent medical history, Discussed the surgical course, Reviewed Intra-OP anesthesia mangement and issues during anesthesia, Set expectations for post-procedure period and Allowed opportunity for questions and acknowledgement of understanding      Vitals: (Last set prior to Anesthesia Care Transfer)  CRNA VITALS  1/29/2021 0855 - 1/29/2021 0925      1/29/2021             Pulse:  79    SpO2:  99 %    Resp Rate (observed):  17        Electronically Signed By: FORREST Bhakta CRNA  January 29, 2021  9:25 AM

## 2021-01-29 NOTE — OP NOTE
PRIMARY PROBLEM: Low back pain and right leg pain    PROCEDURE: Attempted L5-S1 epidural with abandonment of that level and completion of the right S1 Transforaminal Epidural Steroid Injection with fluoroscopic guidance and contrast.     PROCEDURE DETAILS: After written informed consent was obtained from the patient, the patient was escorted to the procedure room.  The patient was placed in the prone position.  A  time out  was conducted to verify patient identity, procedure to be performed, side, site, allergies and any special requirements.  The skin over the thoracolumbar region was prepped and draped in normal sterile fashion. Fluoroscopy was used to identify the neural foramen in AP view and the skin was anesthetized with 2 mL of 1% lidocaine with bicarbonate buffer.  I then attempted to do advance a 25-gauge Quincke needle in towards her right L5-S1 foraminal opening however there was a very large mass bridging her right L5 transverse process to her right sacral ala.  This is a bone mass osteophyte complex.  The needle also seem to be engaging scar tissue in the foraminal opening.  I noticed this when I tried to inject contrast which continue to show vascular uptake as well as not proper epidural spread.  I felt it was unsafe to inject therefore the needle was removed.  A separate 25-gauge 3-1/2 inch Quincke needle was advanced through her right S1 sacral foramina.  It was walked past the sacral border in the lateral fluoroscopic image.  In the AP image Omnipaque contrast was injected and this time it showed proper spread over the right S1 nerve root and spread up to the L5-S1 segment.  There was some void of contrast flowing over the right L5 nerve root indicating scarring in the L5-S1 foraminal opening.  I did follow this up though because us felt it was safe to inject with 5 cc of solution containing 40 mg of triamcinolone with 4 cc of preservative-free normal saline.  There was a washout throughout the  L5-S1 central epidural space as well as over the S1 and S2 nerve roots but there was some lack of flow throughout the scarred epidural space at the L5-S1 foraminal opening.  The patient was monitored with blood pressure and pulse oximetry machines with the assistance of an RN throughout the procedure.  The patient was alert and responsive to questions throughout the procedure.   The patient tolerated the procedure well and was observed in the post-procedural area.  The patient was dismissed without apparent complications.     BLOOD LOSS: < 5 cc    DIAGNOSIS:  1.  Epidural scarring at the L5-S1 foraminal opening  2.  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  Appears to be right S1 radiculopathy    PLAN:  1. Performed a right sacral 1  transforaminal epidural steroid injection.  2. The patient was instructed to follow-up per Dr. Dasilva's instructions.  I think if she has a partial response to this I would repeat the injection.  She does have a far right lateral disc herniation at L5-S1 but I was unable to inject to this area due to scar tissue and vascular uptake.  She also is a very large osteophyte that is bridging her transverse process to her sacral ala.  I am not sure if this is causing any significant problems but it does make doing the injection more difficult.  Because of this I was unable to complete an injection at the L5-S1 level.  I did get good flow of medication over the S1 nerve root so she should see how she does with the injection and if there is a partial response repeat the injection.  If no response at all then I would recommend she sees Dr. Cast for a consult.    Antonio Dasilva MD  Diplomate of the American Board of Anesthesiology, Pain Medicine

## 2021-02-03 ENCOUNTER — IMMUNIZATION (OUTPATIENT)
Dept: PEDIATRICS | Facility: CLINIC | Age: 81
End: 2021-02-03
Payer: COMMERCIAL

## 2021-02-03 PROCEDURE — 91300 PR COVID VAC PFIZER DIL RECON 30 MCG/0.3 ML IM: CPT

## 2021-02-03 PROCEDURE — 0001A PR COVID VAC PFIZER DIL RECON 30 MCG/0.3 ML IM: CPT

## 2021-02-10 NOTE — PROGRESS NOTES
Demond Abbott is a 80 year old who presents for the following health issues:    HPI     Concern - lower back pain  Onset: 2 weeks  Description: had injection done 2 weeks ago and was told she could continue with exercises pt thinks it might have made the pain worse  Intensity: moderate, severe  Progression of Symptoms:  worsening  Accompanying Signs & Symptoms: sharp and stabbing  Previous history of similar problem: yes  Precipitating factors:        Worsened by: movement and overuse  Alleviating factors:        Improved by: rest and heat  Therapies tried and outcome: heat and rest give relief    She underwent a right S1 transforaminal epidural steroid injection with Dr. Dasilva on 1/29/21 as he had to abandon the L5-S1 epidural due to scarring and severe osteophytosis at this level. The injection helped for a few days but then she starts back up with her home stretching exercises and did all of them in one day which has caused worsening low back pain and right leg pain and paraesthesias down the leg to the pinky toe over the past few weeks. She has been using heat and rest with intermittent benefit. She is wondering if anything else can be done for the pain. She is s/p right L4-S1 laminotomy, medial facetectomy and foraminotomy in 2018. Her last lumbar MRI was 2019.    Review of Systems   GENERAL: Denies fever, fatigue, weakness, weight gain, or weight loss.  CARDIOVASCULAR: Denies chest pain, shortness of breath, irregular heartbeats, palpitations, or edema.  RESPIRATORY: Denies cough, hemoptysis, and shortness of breath.  MUSCULOSKELETAL: +Worsening right low back and right leg pain.  NEUROLOGIC: +Right leg paraesthesias. Denies headache, fainting, dizziness, memory loss, or seizures.    Objective    /72   Pulse 75   Temp 98.4  F (36.9  C) (Temporal)   Resp 12   Wt 63 kg (139 lb)   LMP  (LMP Unknown)   SpO2 96%   BMI 25.42 kg/m    Body mass index is 25.42 kg/m .  Physical Exam   GENERAL:  healthy, alert and no distress  RESP: lungs clear to auscultation - no rales, rhonchi or wheezes  CV: regular rate and rhythm, normal S1 S2, no S3 or S4, no murmur, click or rub  MS: no gross musculoskeletal defects noted. Tenderness over the right lower lumbar paraspinal musculature. Positive straight leg raise on the right.  NEURO: Normal strength and tone, mentation intact and speech normal. Cranial nerves II-XII are grossly intact. DTRs are 2+/4 throughout and symmetric. Gait is antalgic.       Assessment & Plan       ICD-10-CM    1. Lumbosacral radiculopathy at S1  M54.17 MR Lumbar Spine w/o Contrast     HYDROcodone-acetaminophen (NORCO) 5-325 MG tablet   2. Chronic right-sided low back pain with right-sided sciatica  M54.41 MR Lumbar Spine w/o Contrast    G89.29 HYDROcodone-acetaminophen (NORCO) 5-325 MG tablet       1-2. Worsening right low back pain and right leg pain/paraesthesias since overdoing it with her home exercise. I recommend she perform these exercises as tolerated and will add a short dose of Norco 5/325 mg to take as needed for severe pain. Instructed not to take within 4 hours of Xanax. Her lumbar MRI in 2019 along with recently imaging done during [facility] indicated continued significant right L4-S1 stenosis with clinical symptoms of S1 radiculopathy. I recommend an updated MRI and will likely need referral back to neurosurgery depending on results and continued symptoms. She has already gone through PT. Continue with heat, ice and rest but should stay stretched. She is in agreement with this plan.      Chuck Streeter PA-C  [facility]

## 2021-02-11 ENCOUNTER — OFFICE VISIT (OUTPATIENT)
Dept: FAMILY MEDICINE | Facility: OTHER | Age: 81
End: 2021-02-11
Payer: COMMERCIAL

## 2021-02-11 VITALS
OXYGEN SATURATION: 96 % | BODY MASS INDEX: 25.42 KG/M2 | RESPIRATION RATE: 12 BRPM | HEART RATE: 75 BPM | TEMPERATURE: 98.4 F | DIASTOLIC BLOOD PRESSURE: 72 MMHG | SYSTOLIC BLOOD PRESSURE: 122 MMHG | WEIGHT: 139 LBS

## 2021-02-11 DIAGNOSIS — M54.17 LUMBOSACRAL RADICULOPATHY AT S1: Primary | ICD-10-CM

## 2021-02-11 DIAGNOSIS — G89.29 CHRONIC RIGHT-SIDED LOW BACK PAIN WITH RIGHT-SIDED SCIATICA: ICD-10-CM

## 2021-02-11 DIAGNOSIS — M54.41 CHRONIC RIGHT-SIDED LOW BACK PAIN WITH RIGHT-SIDED SCIATICA: ICD-10-CM

## 2021-02-11 PROCEDURE — 99214 OFFICE O/P EST MOD 30 MIN: CPT | Performed by: PHYSICIAN ASSISTANT

## 2021-02-11 RX ORDER — HYDROCODONE BITARTRATE AND ACETAMINOPHEN 5; 325 MG/1; MG/1
1 TABLET ORAL EVERY 6 HOURS PRN
Qty: 15 TABLET | Refills: 0 | Status: SHIPPED | OUTPATIENT
Start: 2021-02-11 | End: 2021-05-27

## 2021-02-11 NOTE — PATIENT INSTRUCTIONS
Will order an updated MRI of your back given your worsening pain.  Will prescribe Norco as needed to take as needed for severe pain.  Do not take within 4 hours of Xanax.  Continue ibuprofen, home stretching, ice and heat as needed.  Depending on results, may have you see neurosurgery for further evaluation.

## 2021-02-15 ENCOUNTER — HOSPITAL ENCOUNTER (OUTPATIENT)
Dept: MRI IMAGING | Facility: CLINIC | Age: 81
Discharge: HOME | End: 2021-02-15
Attending: PHYSICIAN ASSISTANT | Admitting: PHYSICIAN ASSISTANT
Payer: COMMERCIAL

## 2021-02-15 DIAGNOSIS — M54.17 LUMBOSACRAL RADICULOPATHY AT S1: ICD-10-CM

## 2021-02-15 DIAGNOSIS — M54.41 CHRONIC RIGHT-SIDED LOW BACK PAIN WITH RIGHT-SIDED SCIATICA: ICD-10-CM

## 2021-02-15 DIAGNOSIS — G89.29 CHRONIC RIGHT-SIDED LOW BACK PAIN WITH RIGHT-SIDED SCIATICA: ICD-10-CM

## 2021-02-15 LAB
CREAT BLD-MCNC: 0.8 MG/DL (ref 0.52–1.04)
GFR SERPL CREATININE-BSD FRML MDRD: 69 ML/MIN/{1.73_M2}

## 2021-02-15 PROCEDURE — 255N000002 HC RX 255 OP 636: Performed by: PHYSICIAN ASSISTANT

## 2021-02-15 PROCEDURE — 82565 ASSAY OF CREATININE: CPT

## 2021-02-15 PROCEDURE — A9585 GADOBUTROL INJECTION: HCPCS | Performed by: PHYSICIAN ASSISTANT

## 2021-02-15 PROCEDURE — 72148 MRI LUMBAR SPINE W/O DYE: CPT

## 2021-02-15 RX ORDER — GADOBUTROL 604.72 MG/ML
7.5 INJECTION INTRAVENOUS ONCE
Status: COMPLETED | OUTPATIENT
Start: 2021-02-15 | End: 2021-02-15

## 2021-02-15 RX ADMIN — GADOBUTROL 6.5 ML: 604.72 INJECTION INTRAVENOUS at 13:01

## 2021-02-15 NOTE — PROGRESS NOTES
Appropriate assistive devices provided during their visit. yes (Yes, No, N/A) cane (list device)    Exam table and/or cart  placed in the lowest position. yes (Yes, No, N/A)    Brakes on tables/carts/wheelchairs used at all times. yes (Yes, No, N/A)    Non slip footwear applied. NA (Yes, No, NA)    Patient was accompanied by staff throughout visit. yes (Yes, No, N/A)    Equipment safety straps used. N/A (Yes, No, N/A)    Assist with toileting. N/A (Yes, No, N/A)

## 2021-02-24 ENCOUNTER — IMMUNIZATION (OUTPATIENT)
Dept: PEDIATRICS | Facility: CLINIC | Age: 81
End: 2021-02-24
Attending: INTERNAL MEDICINE
Payer: COMMERCIAL

## 2021-02-24 PROCEDURE — 91300 PR COVID VAC PFIZER DIL RECON 30 MCG/0.3 ML IM: CPT

## 2021-02-24 PROCEDURE — 0002A PR COVID VAC PFIZER DIL RECON 30 MCG/0.3 ML IM: CPT

## 2021-03-08 DIAGNOSIS — I10 HTN, GOAL BELOW 140/90: ICD-10-CM

## 2021-03-08 DIAGNOSIS — E78.5 HYPERLIPIDEMIA LDL GOAL <130: ICD-10-CM

## 2021-03-09 RX ORDER — METOPROLOL TARTRATE 25 MG/1
TABLET, FILM COATED ORAL
Qty: 45 TABLET | Refills: 1 | Status: SHIPPED | OUTPATIENT
Start: 2021-03-09 | End: 2021-11-24

## 2021-03-09 RX ORDER — SIMVASTATIN 20 MG
TABLET ORAL
Qty: 45 TABLET | Refills: 1 | Status: SHIPPED | OUTPATIENT
Start: 2021-03-09 | End: 2022-02-03

## 2021-03-14 DIAGNOSIS — E03.9 ACQUIRED HYPOTHYROIDISM: ICD-10-CM

## 2021-03-15 RX ORDER — LEVOTHYROXINE SODIUM 50 UG/1
TABLET ORAL
Qty: 90 TABLET | Refills: 3 | Status: SHIPPED | OUTPATIENT
Start: 2021-03-15 | End: 2022-03-28

## 2021-03-23 PROBLEM — G89.29 CHRONIC RIGHT-SIDED LOW BACK PAIN WITH RIGHT-SIDED SCIATICA: Status: RESOLVED | Noted: 2020-07-01 | Resolved: 2021-03-23

## 2021-03-23 PROBLEM — M54.41 CHRONIC RIGHT-SIDED LOW BACK PAIN WITH RIGHT-SIDED SCIATICA: Status: RESOLVED | Noted: 2020-07-01 | Resolved: 2021-03-23

## 2021-05-07 DIAGNOSIS — H81.10 BENIGN PAROXYSMAL POSITIONAL VERTIGO, UNSPECIFIED LATERALITY: ICD-10-CM

## 2021-05-07 RX ORDER — MECLIZINE HYDROCHLORIDE 25 MG/1
TABLET ORAL
Qty: 30 TABLET | Refills: 1 | Status: SHIPPED | OUTPATIENT
Start: 2021-05-07 | End: 2021-11-24

## 2021-05-07 NOTE — TELEPHONE ENCOUNTER
Prescription approved per [facility] Refill Protocol.    Sylvia Joseph RN on 5/7/2021 at 2:46 PM

## 2021-05-26 NOTE — PROGRESS NOTES
Assessment & Plan       ICD-10-CM    1. Chronic right-sided low back pain with right-sided sciatica  M54.41 NEUROSURGERY REFERRAL    G89.29    2. Lumbosacral radiculopathy at S1  M54.17 NEUROSURGERY REFERRAL   3. Recurrent falls  R29.6 PHYSICAL THERAPY REFERRAL   4. Benign paroxysmal positional vertigo, unspecified laterality  H81.10 PHYSICAL THERAPY REFERRAL   5. HTN, goal below 140/90  I10    6. Callus of foot  L84        1-2. Continued right sided low back pain with right leg pain, numbness and tingling. MRI in February revealed recurrent disc herniation at L5-S1 with moderate foraminal narrowing and severe degenerative disc disease with similar but slightly less severe findings on the right at L4-L5. She has tried physical therapy, injections and medications without long-lasting relief so I recommend she see neurosurgery, Dr. Cast, to discuss possible surgical intervention. In the meantime, continue with ibuprofen and Tylenol as needed along with home stretching exercises which she continued to complete diligently.     3-4. Recent falls with increased vertigo symptoms, partially responsive to the Epley maneuver in the past. I recommend she try vestibular therapy so order has been placed. Encouraged to stay well hydrated.     5. BP is stable.    6. Callus pared with a 15 blade and she tolerated this well. Recommend she keep callus treated with nightly, warm water soaks followed by pumice stone or callus shaver treatment. Can also try corn/callus pads again.        CHIARA Recinos [facility]    Demond Abbott is a 81 year old who presents for the following health issues  accompanied by her spouse:    HPI     She has experienced vertigo for the past couple years that comes and goes and typically lasts 2 weeks at a time. She has undergone the Epley maneuver in clinic and performs the modified Epley maneuver at home with some benefit. However, she has noticed increased  dizziness/imbalance lately and has had 3 falls over the past few months due to the vertigo. She denies headaches, facial droop, speech changes or extremity weakness but does have frequent right leg numbness. She has continued chronic low back pain with right sided radicular symptoms having undergone a previous L4-S1 discectomy years ago. She has had two epidural steroid injections over the past year which provided relief for a few months but the pain always returns. Pain is worse with prolonged sitting and standing. She takes 600 mg of ibuprofen a few times daily with minimal relief. She wondering about the next steps in treatment. She denies loss of bowel/bladder control or saddle anaesthesia.     She has a right foot callus that she would like debrided as it caused pain at times.       Review of Systems   Constitutional, HEENT, cardiovascular, pulmonary, neuro, and musculoskeletal gi and gu systems are negative, except as otherwise noted.      Objective    /84   Pulse 70   Temp 97.9  F (36.6  C) (Temporal)   Resp 14   Wt 62.7 kg (138 lb 3.2 oz)   LMP  (LMP Unknown)   SpO2 97%   BMI 25.28 kg/m    Body mass index is 25.28 kg/m .  Physical Exam   GENERAL: healthy, alert and no distress  EYES: Eyes grossly normal to inspection, PERRL and conjunctivae and sclerae normal  RESP: lungs clear to auscultation - no rales, rhonchi or wheezes  CV: regular rate and rhythm, normal S1 S2, no S3 or S4, no murmur, click or rub, no peripheral edema and peripheral pulses strong  MS: no gross musculoskeletal defects noted, no edema. Tenderness over the lumbar spinous processes.   NEURO: Normal strength and tone, mentation intact and speech normal. Cranial nerves II-XII are grossly intact. DTRs are 2+/4 throughout and symmetric. Positive straight leg raise on the right. Gait is stable.   SKIN: Callus of right plantar foot over first MCP joint.   PSYCH: mentation appears normal, affect normal/bright

## 2021-05-27 ENCOUNTER — OFFICE VISIT (OUTPATIENT)
Dept: FAMILY MEDICINE | Facility: OTHER | Age: 81
End: 2021-05-27
Payer: COMMERCIAL

## 2021-05-27 VITALS
TEMPERATURE: 97.9 F | SYSTOLIC BLOOD PRESSURE: 122 MMHG | WEIGHT: 138.2 LBS | RESPIRATION RATE: 14 BRPM | BODY MASS INDEX: 25.28 KG/M2 | DIASTOLIC BLOOD PRESSURE: 84 MMHG | OXYGEN SATURATION: 97 % | HEART RATE: 70 BPM

## 2021-05-27 DIAGNOSIS — H81.10 BENIGN PAROXYSMAL POSITIONAL VERTIGO, UNSPECIFIED LATERALITY: ICD-10-CM

## 2021-05-27 DIAGNOSIS — I10 HTN, GOAL BELOW 140/90: ICD-10-CM

## 2021-05-27 DIAGNOSIS — M54.41 CHRONIC RIGHT-SIDED LOW BACK PAIN WITH RIGHT-SIDED SCIATICA: Primary | ICD-10-CM

## 2021-05-27 DIAGNOSIS — G89.29 CHRONIC RIGHT-SIDED LOW BACK PAIN WITH RIGHT-SIDED SCIATICA: Primary | ICD-10-CM

## 2021-05-27 DIAGNOSIS — R29.6 RECURRENT FALLS: ICD-10-CM

## 2021-05-27 DIAGNOSIS — M54.17 LUMBOSACRAL RADICULOPATHY AT S1: ICD-10-CM

## 2021-05-27 DIAGNOSIS — L84 CALLUS OF FOOT: ICD-10-CM

## 2021-05-27 PROCEDURE — 99214 OFFICE O/P EST MOD 30 MIN: CPT | Performed by: PHYSICIAN ASSISTANT

## 2021-05-27 RX ORDER — CHLORAL HYDRATE 500 MG
2 CAPSULE ORAL DAILY
COMMUNITY
End: 2021-08-27

## 2021-05-27 NOTE — PATIENT INSTRUCTIONS
Will have you see neurosurgery to further evaluate your low back.  Continue with ibuprofen and Tylenol as needed.    Will have you start physical therapy for your vertigo.  They will call to schedule.    Buy a pumice stone of callus shaver to keep the callus down. Soak your foot in warm water then use the pumice stone or shaver to remove the dead skin.  Try the callus pads.

## 2021-06-10 ENCOUNTER — OFFICE VISIT (OUTPATIENT)
Dept: NEUROSURGERY | Facility: CLINIC | Age: 81
End: 2021-06-10
Attending: PHYSICIAN ASSISTANT
Payer: COMMERCIAL

## 2021-06-10 VITALS
TEMPERATURE: 97.6 F | WEIGHT: 137.2 LBS | BODY MASS INDEX: 25.25 KG/M2 | HEIGHT: 62 IN | DIASTOLIC BLOOD PRESSURE: 76 MMHG | SYSTOLIC BLOOD PRESSURE: 134 MMHG

## 2021-06-10 DIAGNOSIS — M54.16 LUMBAR RADICULOPATHY: Primary | ICD-10-CM

## 2021-06-10 PROCEDURE — 99203 OFFICE O/P NEW LOW 30 MIN: CPT | Performed by: NURSE PRACTITIONER

## 2021-06-10 ASSESSMENT — MIFFLIN-ST. JEOR: SCORE: 1045.59

## 2021-06-10 NOTE — PROGRESS NOTES
[facility]  Neurosurgery Clinic Visit      CC: back and leg pain    Primary care Provider: Chuck Streeter    Reason For Visit:   I was asked by Chuck Streeter PA-C to consult on the patient for lumbosacral radiculopathy at S1, chronic right-sided low back pain with right-sided sciatica.      HPI: Milena Hamilton is a 81 year old female with a history of chronic back and right leg pain s/p lumbar laminectomy 3 years ago with Dr. Broderick. She presents with worsening symptoms. Today she reports 2 years of low back and right posterior thigh pain. She denies paresthesias and overt weakness. She underwent a LESI with Dr. Dasilva 1/29/2021, but does not recall this. Her previous injection in 2020 gave her some relieft. She has been seen by Dr. Cast 3/30/2017. At that time a L4-S1 AP fusion was discussed.       Past Medical History:   Diagnosis Date     Lichen planus     vulvar     Osteopenia 4/2010    recheck DEXA in 2 years     Panic disorder without agoraphobia      Pure hypercholesterolemia     low LDLs     Raynaud's disease 6/2014     Unspecified essential hypertension        Past Medical History reviewed with patient during visit.    Past Surgical History:   Procedure Laterality Date     C APPENDECTOMY       C VAG HYST,RMV TUBE/OVARY  1984    cervix removed     COLONOSCOPY  10/17/2007    normal, recheck in 10 years     COLONOSCOPY N/A 8/27/2014    Procedure: COLONOSCOPY;  Surgeon: Raffi Montiel MD;  Location:  GI     COMBINED CYSTOSCOPY, INSERT CATHETER URETER Bilateral 10/13/2014    Procedure: COMBINED CYSTOSCOPY, INSERT CATHETER URETER;  Surgeon: Abdoulaye Odell MD;  Location:  OR     EYE SURGERY  8/01    right eye muscle repair     HC REMOVAL GALLBLADDER  2/9/2010    lap     HC REMOVAL OF OVARY/TUBE(S)      Salpingo-Oophorectomy, bilateral     HYSTERECTOMY, PAP NO LONGER INDICATED       INJECT EPIDURAL LUMBAR Right 4/10/2017    Procedure: INJECT EPIDURAL LUMBAR;  Surgeon:  Raffi Wright MD;  [facility]     INJECT EPIDURAL LUMBAR Right 2/27/2020    Procedure: Right Lumbar 4- Sacral 1 Epidural Steroid Injection;  Surgeon: Antonio Dasilva MD;  [facility]     INJECT EPIDURAL LUMBAR Right 1/29/2021    Procedure: INJECTION, SPINE,  sacral 1 EPIDURAL;  Surgeon: Antonio Dasilva MD;  [facility]     INJECT EPIDURAL TRANSFORAMINAL Right 6/9/2016    Procedure: INJECT EPIDURAL TRANSFORAMINAL;  Surgeon: Antonio Dasilva MD;  [facility]     INJECT EPIDURAL TRANSFORAMINAL Right 10/13/2016    Procedure: INJECT EPIDURAL TRANSFORAMINAL;  Surgeon: Antonio Dasilva MD;  [facility]     LAPAROSCOPIC ASSISTED COLECTOMY LEFT (DESCENDING) N/A 10/13/2014    Procedure: LAPAROSCOPIC ASSISTED COLECTOMY LEFT (DESCENDING);  Surgeon: Raffi Montiel MD;  Location: [facility]     ZZC NONSPECIFIC PROCEDURE      Right inferior oblique recession, 14 mm     Past Surgical History reviewed with patient during visit.    Current Outpatient Medications   Medication     ALPRAZolam (XANAX) 0.25 MG tablet     Ascorbic Acid (VITAMIN C PO)     ASPIRIN 81 MG OR TABS     calcium carbonate-vitamin D (OSCAL W/D) 500-200 MG-UNIT tablet     cyclobenzaprine (FLEXERIL) 5 MG tablet     Dentifrices (BIOTENE DRY MOUTH) PSTE     fish oil-omega-3 fatty acids 1000 MG capsule     ibuprofen (ADVIL/MOTRIN) 600 MG tablet     levothyroxine (SYNTHROID/LEVOTHROID) 50 MCG tablet     meclizine (ANTIVERT) 25 MG tablet     metoprolol tartrate (LOPRESSOR) 25 MG tablet     omeprazole (PRILOSEC) 20 MG DR capsule     simvastatin (ZOCOR) 20 MG tablet     THERAPEUTIC MULTIVITAMIN OR     No current facility-administered medications for this visit.        Allergies   Allergen Reactions     Azithromycin Diarrhea     Ciprofloxacin Other (See Comments)     Cipro, dizziness and abdominal pain     Gabapentin Dizziness and Visual Disturbance     Penicillins      stomach upset, diarrhea     Sulfa Drugs      hives       Social History  "    Socioeconomic History     Marital status:      Spouse name: Not on file     Number of children: 4     Years of education: Not on file     Highest education level: Not on file   Occupational History     Occupation: dry cleaning   Social Needs     Financial resource strain: Not on file     Food insecurity     Worry: Not on file     Inability: Not on file     Transportation needs     Medical: Not on file     Non-medical: Not on file   Tobacco Use     Smoking status: Never Smoker     Smokeless tobacco: Never Used   Substance and Sexual Activity     Alcohol use: No     Drug use: No     Sexual activity: Never     Partners: Male     Birth control/protection: Surgical     Comment: not currently/hysterectomy   Lifestyle     Physical activity     Days per week: Not on file     Minutes per session: Not on file     Stress: Not on file   Relationships     Social connections     Talks on phone: Not on file     Gets together: Not on file     Attends [employer] service: Not on file     Active member of club or organization: Not on file     Attends meetings of clubs or organizations: Not on file     Relationship status: Not on file     Intimate partner violence     Fear of current or ex partner: Not on file     Emotionally abused: Not on file     Physically abused: Not on file     Forced sexual activity: Not on file   Other Topics Concern     Parent/sibling w/ CABG, MI or angioplasty before 65F 55M? No   Social History Narrative     Not on file       Family History   Problem Relation Age of Onset     Diabetes Brother      Heart Disease Brother 50        AMI     Diabetes Brother      Heart Disease Brother         AMI, \"older\"     Cerebrovascular Disease Brother      Psychotic Disorder Brother         , alcohol     Cancer Sister         breast cancer x2     Hypertension No family hx of      Breast Cancer No family hx of      Ovarian Cancer No family hx of      Prostate Cancer No family hx of      Mental Illness No family " "hx of      Anesthesia Reaction No family hx of      Osteoporosis No family hx of      Obesity No family hx of      Other Cancer No family hx of      Depression No family hx of      Anxiety Disorder No family hx of      Mental Illness No family hx of      Substance Abuse No family hx of          ROS: 10 point ROS neg other than the symptoms noted above in the HPI.    Vital Signs:   /76   Temp 97.6  F (36.4  C) (Temporal)   Ht 5' 2\" (1.575 m)   Wt 137 lb 3.2 oz (62.2 kg)   LMP  (LMP Unknown)   BMI 25.09 kg/m        Examination:  Constitutional:  Alert, well nourished, NAD.  Memory: recent and remote memory   HEENT: Normocephalic, atraumatic.   Pulm:  Without shortness of breath   CV:  No pitting edema of BLE.      Neurological:  Awake  Alert  Oriented x 3  Speech clear  Tongue midline    Motor exam:  Hip Flexor:                Right: 5/5  Left:  5/5  Hip Adductor:             Right:  5/5  Left:  5/5  Hip Abductor:             Right:  5/5  Left:  5/5  Gastroc Soleus:        Right:  5/5  Left:  5/5  Tib/Ant:                      Right:  5/5  Left:  5/5  EHL:                          Right:  5/5  Left:  5/5     Sensation normal to bilateral upper and lower extremities  Muscle tone to bilateral upper and lower extremities   Gait: Able to stand from a seated position. Normal non-antalgic, non-myelopathic gait.  Able to heel/toe walk without loss of balance    Lumbar examination reveals no tenderness of the spine or paraspinous muscles.  Hip height is symmetrical. Negative SI joint, sciatic notch or greater trochanteric tenderness to palpation bilaterally.  Straight leg raise is negative bilaterally.      Imaging:   Lumbar MRI 2/15/2021  IMPRESSION:    1. Five lumbar-type vertebral bodies for the purposes of this dictation. Spinal nomenclature is different compared to previous spine MR 4/25/2019. Recommend close attention to spinal numbering if site-specific therapy is considered.  2. Residual enhancing " granulation tissue around the right L5 nerve root in the L5-S1 neural foramen near the operative bed. There appears to be residual moderate right neural foraminal narrowing secondary to disc bulge and uncinate spurring. Disc bulge appears to be possibly increased since previous MR 4/25/2019.  3. Persistent right lateral recess narrowing at L4-L5 which may affect the right L5 nerve root. This is similar to prior MR 4/25/2019.  4. Additional degenerative changes throughout the lumbar spine as detailed above, grossly similar to prior. No other significant spinal canal or neural foraminal narrowings.    Assessment/Plan:   Lumbar radiculopathy. Discussed options. Will plan for repeat LESI and PT at this time and she should follow up with Dr. Cast if symptoms persist or worsen to discuss other options. She verbalized understanding and agreement.    Patient Instructions   -Lumbar injection ordered. They will contact you to schedule.  -Physical therapy ordered. They will contact you to schedule.  -Please contact our clinic with questions or concerns at [phone].      Renetta Brown, [facility]  [address] [place] [id number]  Tel [phone]  Fax [phone]

## 2021-06-10 NOTE — PATIENT INSTRUCTIONS
-Lumbar injection ordered. They will contact you to schedule.  -Physical therapy ordered. They will contact you to schedule.  -Please contact our clinic with questions or concerns at [phone].

## 2021-06-11 ENCOUNTER — TELEPHONE (OUTPATIENT)
Dept: SURGERY | Facility: CLINIC | Age: 81
End: 2021-06-11

## 2021-06-11 DIAGNOSIS — Z11.59 ENCOUNTER FOR SCREENING FOR OTHER VIRAL DISEASES: ICD-10-CM

## 2021-06-11 NOTE — PROGRESS NOTES
{PROVIDER CHARTING PREFERENCE:278109}    Demond Abbott is a 81 year old who presents for the following health issues {ACCOMPANIED BY STATEMENT (Optional):850120}    HPI     {SUPERLIST (Optional):714251}  {additonal problems for provider to add (Optional):455094}    Review of Systems   {ROS COMP (Optional):189463}      Objective    LMP  (LMP Unknown)   There is no height or weight on file to calculate BMI.  Physical Exam   {Exam List (Optional):157730}    {Diagnostic Test Results (Optional):260726}    {AMBULATORY ATTESTATION (Optional):101084}

## 2021-06-11 NOTE — TELEPHONE ENCOUNTER
Pt scheduled for LESI  Date:6/29/21  Time:0930am  Dr. Dasilva    Instructed pt to have H&P and  for procedure.  Patient informed of COVID testing process.

## 2021-06-14 ENCOUNTER — OFFICE VISIT (OUTPATIENT)
Dept: FAMILY MEDICINE | Facility: OTHER | Age: 81
End: 2021-06-14
Payer: COMMERCIAL

## 2021-06-14 VITALS
HEART RATE: 72 BPM | TEMPERATURE: 97.4 F | DIASTOLIC BLOOD PRESSURE: 68 MMHG | OXYGEN SATURATION: 98 % | HEIGHT: 62 IN | RESPIRATION RATE: 16 BRPM | BODY MASS INDEX: 25.12 KG/M2 | SYSTOLIC BLOOD PRESSURE: 118 MMHG | WEIGHT: 136.5 LBS

## 2021-06-14 DIAGNOSIS — Z01.818 PREOP GENERAL PHYSICAL EXAM: Primary | ICD-10-CM

## 2021-06-14 DIAGNOSIS — Z11.59 ENCOUNTER FOR SCREENING FOR OTHER VIRAL DISEASES: ICD-10-CM

## 2021-06-14 DIAGNOSIS — M53.3 SI (SACROILIAC) JOINT DYSFUNCTION: ICD-10-CM

## 2021-06-14 DIAGNOSIS — M51.369 DDD (DEGENERATIVE DISC DISEASE), LUMBAR: ICD-10-CM

## 2021-06-14 DIAGNOSIS — M54.16 LUMBAR RADICULOPATHY: ICD-10-CM

## 2021-06-14 PROCEDURE — U0003 INFECTIOUS AGENT DETECTION BY NUCLEIC ACID (DNA OR RNA); SEVERE ACUTE RESPIRATORY SYNDROME CORONAVIRUS 2 (SARS-COV-2) (CORONAVIRUS DISEASE [COVID-19]), AMPLIFIED PROBE TECHNIQUE, MAKING USE OF HIGH THROUGHPUT TECHNOLOGIES AS DESCRIBED BY CMS-2020-01-R: HCPCS | Performed by: ANESTHESIOLOGY

## 2021-06-14 PROCEDURE — 99214 OFFICE O/P EST MOD 30 MIN: CPT | Performed by: STUDENT IN AN ORGANIZED HEALTH CARE EDUCATION/TRAINING PROGRAM

## 2021-06-14 PROCEDURE — U0005 INFEC AGEN DETEC AMPLI PROBE: HCPCS | Performed by: ANESTHESIOLOGY

## 2021-06-14 RX ORDER — CYCLOBENZAPRINE HCL 5 MG
5 TABLET ORAL
Qty: 30 TABLET | Refills: 1 | Status: SHIPPED | OUTPATIENT
Start: 2021-06-14 | End: 2021-11-24

## 2021-06-14 ASSESSMENT — MIFFLIN-ST. JEOR: SCORE: 1042.41

## 2021-06-14 NOTE — PATIENT INSTRUCTIONS
Hold ibuprofen 24 hours prior to the injection.    Okay to take morning medications the day of the injection with a few sips of water. If you think the medications may upset your stomach, then hold them until you return home from the injection.    Margaret Harkins, CNP

## 2021-06-14 NOTE — H&P (VIEW-ONLY)
[address]  [facility] [address]  Phone: [phone]  Primary Provider: Chuck Streeter  Pre-op Performing Provider: BJ MATTA      PREOPERATIVE EVALUATION:  Today's date: 6/14/2021    Milena Hamilton is a 81 year old female who presents for a preoperative evaluation.    Surgical Information:  Surgery/Procedure: Epidural injection in back  Surgery Location: [facility]  Surgeon: Dr. Dasilva  Surgery Date: 06/18/2021  Time of Surgery: 12:00 Pm  Where patient plans to recover: At home with family  Fax number for surgical facility: Note does not need to be faxed, will be available electronically in Epic.    Type of Anesthesia Anticipated: Epidural    Assessment & Plan     The proposed surgical procedure is considered LOW risk.    Preop general physical exam  SI (sacroiliac) joint dysfunction  - cyclobenzaprine (FLEXERIL) 5 MG tablet; Take 1 tablet (5 mg) by mouth nightly as needed for muscle spasms    Lumbar radiculopathy  - cyclobenzaprine (FLEXERIL) 5 MG tablet; Take 1 tablet (5 mg) by mouth nightly as needed for muscle spasms    DDD (degenerative disc disease), lumbar  - cyclobenzaprine (FLEXERIL) 5 MG tablet; Take 1 tablet (5 mg) by mouth nightly as needed for muscle spasms    Encounter for screening for other viral diseases  - Asymptomatic COVID-19 Virus (Coronavirus) by PCR        Risks and Recommendations:  The patient has the following additional risks and recommendations for perioperative complications:   - No identified additional risk factors other than previously addressed    Medication Instructions:   - ibuprofen (Advil, Motrin): HOLD 1 day before surgery.     RECOMMENDATION:  APPROVAL GIVEN to proceed with proposed procedure, without further diagnostic evaluation.        Subjective     HPI related to upcoming procedure: lumbar radiculopathy for the past 5 years that radiates into the left leg. She has to use a cane to  walk as the pain causes instability of her gait. Plan for LESI as the last one done 6 months ago was not effective. If this injection does not work, they have discussed possible surgical treatment.       Preop Questions 3/2/2018   1. Have you ever had a heart attack or stroke? No   3. Do you have chest pain with activity? No   4. Do you have a history of  heart failure? No   5. Do you currently have a cold, bronchitis or symptoms of other infection? No   6. Do you have a cough, shortness of breath, or wheezing? No   7. Do you or anyone in your family have previous history of blood clots? No   8. Do you or does anyone in your family have a serious bleeding problem such as prolonged bleeding following surgeries or cuts? No   9. Have you ever had problems with anemia or been told to take iron pills? No   10. Have you had any abnormal blood loss such as black, tarry or bloody stools, or abnormal vaginal bleeding? No   11. Have you ever had a blood transfusion? No   13. Have you or any of your relatives ever had problems with anesthesia? No   14. Do you have sleep apnea, excessive snoring or daytime drowsiness? No   15. Do you have any artifical heart valves or other implanted medical devices like a pacemaker, defibrillator, or continuous glucose monitor? No   16. Do you have artificial joints? No   18. Is there any chance that you may be pregnant? No     Health Care Directive:  Patient does not have a Health Care Directive or Living Will: Discussed advance care planning with patient; however, patient declined at this time.    Preoperative Review of :   reviewed - controlled substances reflected in medication list.      Status of Chronic Conditions:  See problem list for active medical problems.  Problems all longstanding and stable, except as noted/documented.  See ROS for pertinent symptoms related to these conditions.      Review of Systems  Constitutional, neuro, ENT, endocrine, pulmonary, cardiac,  gastrointestinal, genitourinary, musculoskeletal, integument and psychiatric systems are negative, except as otherwise noted.    Patient Active Problem List    Diagnosis Date Noted     Scoliosis of lumbar spine, unspecified scoliosis type 12/12/2018     Priority: Medium     Intractable vomiting 02/27/2018     Priority: Medium     Gastroenteritis 02/27/2018     Priority: Medium     After cataract, left eye 09/18/2017     Priority: Medium     Acquired hypothyroidism 04/28/2017     Priority: Medium     Gastroesophageal reflux disease, esophagitis presence not specified 04/28/2017     Priority: Medium     IMO Regulatory Load OCT 2020       Cortical age-related cataract 01/16/2017     Priority: Medium     Cortical senile cataract 01/16/2017     Priority: Medium     Hypothyroidism, unspecified type 11/30/2016     Priority: Medium     Premature atrial beats 11/30/2016     Priority: Medium     Community acquired pneumonia 05/08/2015     Priority: Medium     Benign skin lesion 03/06/2015     Priority: Medium     HTN (hypertension) 07/10/2014     Priority: Medium     Raynaud's disease 06/01/2014     Priority: Medium     Health Care Home 12/30/2013     Priority: Medium     Status:  Closed  Care Coordinator:  Tasha Vasquez RN    See Letters for [facility] Care Plan             History of diverticulitis - s/p left hemicolectomy 11/30/2013     Priority: Medium     Diarrhea 11/30/2013     Priority: Medium     Chronic constipation 06/18/2013     Priority: Medium     Sacroiliac dysfunction 10/09/2012     Priority: Medium     Advanced directives, counseling/discussion 10/03/2011     Priority: Medium     Advance Directive Problem List Overview:   Name Relationship Phone    Primary Health Care Agent            Alternative Health Care Agent          Discussed advance care planning with patient; information given to patient to review. 10/3/2011          DDD (degenerative disc disease), cervical 08/10/2011     Priority: Medium     Anxiety  10/24/2010     Priority: Medium     Hyperlipidemia 09/29/2010     Priority: Medium     Osteopenia 04/01/2010     Priority: Medium     recheck DEXA in 2 years       Gastritis 03/15/2010     Priority: Medium     Panic disorder without agoraphobia 10/08/2004     Priority: Medium     Tinnitus 09/13/2004     Priority: Medium     Problem list name updated by automated process. Provider to review       Family history of diabetes mellitus 09/09/2002     Priority: Medium     Lichen planus      Priority: Medium      Past Medical History:   Diagnosis Date     Lichen planus     vulvar     Osteopenia 4/2010    recheck DEXA in 2 years     Panic disorder without agoraphobia      Pure hypercholesterolemia     low LDLs     Raynaud's disease 6/2014     Unspecified essential hypertension      Past Surgical History:   Procedure Laterality Date     C APPENDECTOMY       C VAG HYST,RMV TUBE/OVARY  1984    cervix removed     COLONOSCOPY  10/17/2007    normal, recheck in 10 years     COLONOSCOPY N/A 8/27/2014    Procedure: COLONOSCOPY;  Surgeon: Raffi Montiel MD;  Location:  GI     COMBINED CYSTOSCOPY, INSERT CATHETER URETER Bilateral 10/13/2014    Procedure: COMBINED CYSTOSCOPY, INSERT CATHETER URETER;  Surgeon: Abdoulaye Odell MD;  Location:  OR     EYE SURGERY  8/01    right eye muscle repair     HC REMOVAL GALLBLADDER  2/9/2010    lap     HC REMOVAL OF OVARY/TUBE(S)      Salpingo-Oophorectomy, bilateral     HYSTERECTOMY, PAP NO LONGER INDICATED       INJECT EPIDURAL LUMBAR Right 4/10/2017    Procedure: INJECT EPIDURAL LUMBAR;  Surgeon: Raffi Wright MD;  [facility]     INJECT EPIDURAL LUMBAR Right 2/27/2020    Procedure: Right Lumbar 4- Sacral 1 Epidural Steroid Injection;  Surgeon: Antonio Dasilva MD;  [facility]     INJECT EPIDURAL LUMBAR Right 1/29/2021    Procedure: INJECTION, SPINE,  sacral 1 EPIDURAL;  Surgeon: Antonio Dasilva MD;  [facility]     INJECT EPIDURAL TRANSFORAMINAL Right 6/9/2016     Procedure: INJECT EPIDURAL TRANSFORAMINAL;  Surgeon: Antonio Dasilva MD;  [facility]     INJECT EPIDURAL TRANSFORAMINAL Right 10/13/2016    Procedure: INJECT EPIDURAL TRANSFORAMINAL;  Surgeon: Antonio Dasilva MD;  [facility]     LAPAROSCOPIC ASSISTED COLECTOMY LEFT (DESCENDING) N/A 10/13/2014    Procedure: LAPAROSCOPIC ASSISTED COLECTOMY LEFT (DESCENDING);  Surgeon: Raffi Montiel MD;  [facility]: [facility]     ZZC NONSPECIFIC PROCEDURE      Right inferior oblique recession, 14 mm     Current Outpatient Medications   Medication Sig Dispense Refill     ALPRAZolam (XANAX) 0.25 MG tablet TAKE ONE TABLET BY MOUTH AT BEDTIME AS NEEDED FOR ANXIETY 30 tablet 2     Ascorbic Acid (VITAMIN C PO)        ASPIRIN 81 MG OR TABS 1 TABLET DAILY       calcium carbonate-vitamin D (OSCAL W/D) 500-200 MG-UNIT tablet Take 1 tablet by mouth 2 times daily       cyclobenzaprine (FLEXERIL) 5 MG tablet Take 1 tablet (5 mg) by mouth nightly as needed for muscle spasms 30 tablet 1     Dentifrices (BIOTENE DRY MOUTH) PSTE Toothpaste and mouthwash       fish oil-omega-3 fatty acids 1000 MG capsule Take 2 g by mouth daily       ibuprofen (ADVIL/MOTRIN) 600 MG tablet Take 1 tablet (600 mg) by mouth every 6 hours as needed for moderate pain 100 tablet 3     levothyroxine (SYNTHROID/LEVOTHROID) 50 MCG tablet TAKE ONE TABLET BY MOUTH once daily 90 tablet 3     meclizine (ANTIVERT) 25 MG tablet TAKE ONE TABLET BY MOUTH THREE TIMES DAILY AS NEEDED for dizziness 30 tablet 1     metoprolol tartrate (LOPRESSOR) 25 MG tablet TAKE 1/2 TABLET (12.5 MG) BY MOUTH once daily 45 tablet 1     omeprazole (PRILOSEC) 20 MG DR capsule Take 1 capsule (20 mg) by mouth daily 90 capsule 3     simvastatin (ZOCOR) 20 MG tablet TAKE 1/2 TABLET (10mg) BY MOUTH every night AT BEDTIME 45 tablet 1     THERAPEUTIC MULTIVITAMIN OR 1 TABLET DAILY         Allergies   Allergen Reactions     Azithromycin Diarrhea     Ciprofloxacin Other (See Comments)     Cipro, dizziness  "and abdominal pain     Gabapentin Dizziness and Visual Disturbance     Penicillins      stomach upset, diarrhea     Sulfa Drugs      hives        Social History     Tobacco Use     Smoking status: Never Smoker     Smokeless tobacco: Never Used   Substance Use Topics     Alcohol use: No       History   Drug Use No         Objective     /68   Pulse 72   Temp 97.4  F (36.3  C) (Temporal)   Resp 16   Ht 1.575 m (5' 2\")   Wt 61.9 kg (136 lb 8 oz)   LMP  (LMP Unknown)   SpO2 98%   BMI 24.97 kg/m      Physical Exam    GENERAL APPEARANCE: healthy, alert and no distress     EYES: EOMI, PERRL     HENT: ear canals and TM's normal and nose and mouth without ulcers or lesions     NECK: no adenopathy, no asymmetry, masses, or scars and thyroid normal to palpation     RESP: lungs clear to auscultation - no rales, rhonchi or wheezes     CV: regular rates and rhythm, normal S1 S2, no S3 or S4 and no murmur, click or rub     MS: extremities normal- no gross deformities noted, no evidence of inflammation in joints, FROM in all extremities.     SKIN: no suspicious lesions or rashes     NEURO: Normal strength and tone, sensory exam grossly normal, mentation intact and speech normal     PSYCH: mentation appears normal. and affect normal/bright     LYMPHATICS: No cervical adenopathy    Recent Labs   Lab Test 01/14/21  1421 12/28/20  1203 09/11/19  1427   HGB 12.9  --  13.4     --  209    141 141   POTASSIUM 3.9 4.0 4.0   CR 0.62 0.67 0.83        Diagnostics:  Labs pending at this time.  Results will be reviewed when available.   No EKG required for low risk surgery (cataract, skin procedure, breast biopsy, etc).    Revised Cardiac Risk Index (RCRI):  The patient has the following serious cardiovascular risks for perioperative complications:   - No serious cardiac risks = 0 points     RCRI Interpretation: 0 points: Class I (very low risk - 0.4% complication rate)           Signed Electronically by: Lenka GARNER" FORREST Harkins CNP  Copy of this evaluation report is provided to requesting physician.

## 2021-06-18 ENCOUNTER — HOSPITAL ENCOUNTER (OUTPATIENT)
Dept: GENERAL RADIOLOGY | Facility: CLINIC | Age: 81
End: 2021-06-18
Attending: ANESTHESIOLOGY | Admitting: ANESTHESIOLOGY
Payer: COMMERCIAL

## 2021-06-18 ENCOUNTER — ANESTHESIA (OUTPATIENT)
Dept: SURGERY | Facility: CLINIC | Age: 81
End: 2021-06-18
Payer: COMMERCIAL

## 2021-06-18 ENCOUNTER — HOSPITAL ENCOUNTER (OUTPATIENT)
Facility: CLINIC | Age: 81
Discharge: HOME | End: 2021-06-18
Attending: ANESTHESIOLOGY | Admitting: ANESTHESIOLOGY
Payer: COMMERCIAL

## 2021-06-18 ENCOUNTER — ANESTHESIA EVENT (OUTPATIENT)
Dept: SURGERY | Facility: CLINIC | Age: 81
End: 2021-06-18
Payer: COMMERCIAL

## 2021-06-18 VITALS
RESPIRATION RATE: 16 BRPM | OXYGEN SATURATION: 98 % | SYSTOLIC BLOOD PRESSURE: 168 MMHG | TEMPERATURE: 97.7 F | HEART RATE: 71 BPM | BODY MASS INDEX: 25.03 KG/M2 | HEIGHT: 62 IN | DIASTOLIC BLOOD PRESSURE: 74 MMHG | WEIGHT: 136 LBS

## 2021-06-18 DIAGNOSIS — M54.16 LUMBAR RADICULOPATHY: ICD-10-CM

## 2021-06-18 PROCEDURE — 250N000011 HC RX IP 250 OP 636: Performed by: NURSE ANESTHETIST, CERTIFIED REGISTERED

## 2021-06-18 PROCEDURE — 64483 NJX AA&/STRD TFRM EPI L/S 1: CPT | Performed by: ANESTHESIOLOGY

## 2021-06-18 PROCEDURE — 370N000017 HC ANESTHESIA TECHNICAL FEE, PER MIN: Performed by: ANESTHESIOLOGY

## 2021-06-18 PROCEDURE — 250N000011 HC RX IP 250 OP 636: Performed by: ANESTHESIOLOGY

## 2021-06-18 PROCEDURE — 64483 NJX AA&/STRD TFRM EPI L/S 1: CPT | Mod: RT | Performed by: ANESTHESIOLOGY

## 2021-06-18 PROCEDURE — 999N000179 XR SURGERY CARM FLUORO LESS THAN 5 MIN W STILLS: Mod: TC

## 2021-06-18 PROCEDURE — 250N000009 HC RX 250: Performed by: NURSE ANESTHETIST, CERTIFIED REGISTERED

## 2021-06-18 PROCEDURE — 64484 NJX AA&/STRD TFRM EPI L/S EA: CPT | Mod: RT | Performed by: ANESTHESIOLOGY

## 2021-06-18 PROCEDURE — 64484 NJX AA&/STRD TFRM EPI L/S EA: CPT | Performed by: ANESTHESIOLOGY

## 2021-06-18 RX ORDER — TRIAMCINOLONE ACETONIDE 40 MG/ML
INJECTION, SUSPENSION INTRA-ARTICULAR; INTRAMUSCULAR PRN
Status: DISCONTINUED | OUTPATIENT
Start: 2021-06-18 | End: 2021-06-18 | Stop reason: HOSPADM

## 2021-06-18 RX ORDER — PROPOFOL 10 MG/ML
INJECTION, EMULSION INTRAVENOUS PRN
Status: DISCONTINUED | OUTPATIENT
Start: 2021-06-18 | End: 2021-06-18

## 2021-06-18 RX ORDER — LIDOCAINE HYDROCHLORIDE 20 MG/ML
INJECTION, SOLUTION INFILTRATION; PERINEURAL PRN
Status: DISCONTINUED | OUTPATIENT
Start: 2021-06-18 | End: 2021-06-18

## 2021-06-18 RX ORDER — LIDOCAINE 40 MG/G
CREAM TOPICAL
Status: DISCONTINUED | OUTPATIENT
Start: 2021-06-18 | End: 2021-06-18 | Stop reason: HOSPADM

## 2021-06-18 RX ORDER — IOPAMIDOL 612 MG/ML
INJECTION, SOLUTION INTRATHECAL PRN
Status: DISCONTINUED | OUTPATIENT
Start: 2021-06-18 | End: 2021-06-18 | Stop reason: HOSPADM

## 2021-06-18 RX ADMIN — PROPOFOL 70 MG: 10 INJECTION, EMULSION INTRAVENOUS at 13:36

## 2021-06-18 RX ADMIN — LIDOCAINE HYDROCHLORIDE 60 MG: 20 INJECTION, SOLUTION INFILTRATION; PERINEURAL at 13:32

## 2021-06-18 RX ADMIN — PROPOFOL 30 MG: 10 INJECTION, EMULSION INTRAVENOUS at 13:41

## 2021-06-18 RX ADMIN — LIDOCAINE HYDROCHLORIDE 0.1 ML: 10 INJECTION, SOLUTION EPIDURAL; INFILTRATION; INTRACAUDAL; PERINEURAL at 12:21

## 2021-06-18 RX ADMIN — PROPOFOL 20 MG: 10 INJECTION, EMULSION INTRAVENOUS at 13:43

## 2021-06-18 ASSESSMENT — MIFFLIN-ST. JEOR: SCORE: 1040.14

## 2021-06-18 NOTE — DISCHARGE INSTRUCTIONS
Home Care Instructions                Procedure: Epidural injection or joint injection    Activity:    Rest today    Do not work today    Resume normal activity tomorrow    Pain:    You may experience soreness at the injection site for 1 to 3 days.    You may use an ice pack for 20 minutes every 2 hours for the first 24 hours    You may use a heating pad after the first 24 hours    You may use Tylenol  (acetaminophen) every 4 hours or other pain medicines as directed by your physician    Safety  Sedation medicine, if given may remain active for many hours.    It is important for the next 24 hours that you do not:    Drive a car    Operate machines or power tools    Consume alcohol, including beer    Sign any important papers or legal documents    You may experience numbness radiating into your legs or arms, (depending on the procedure location)  This numbness may last several hours.  Until the numb sensation returns to normal please use caution in walking, climbing stairs, stepping out of your vehicle, etc.    Common side effects of steroids:  Not everyone will experience corticosteroid side effects. If side effects are experienced they will gradually subside in the 7-10 day period following an injection.    Most common side effects include:    Flushed face and/or chest    Feeling of warmth, particularly in face but could be overall feeling of warmth    Increased blood sugar in diabetic patients    Menstrual irregularities may occur.  If taking hormone based birth control an alternate method of birth control is recommended    Sleep disturbances and/or mood swings are possible    Leg cramps    Please contact us if you have:  Severe pain   Fever more than 101.5 degrees Fahrenheit  Signs of infection (redness, swelling or drainage)      If you have questions during normal business hours (8am-5pm Monday-Friday) contact the [facility] at [phone]. If you need help after hours, we recommend that you go to a  hospital emergency room or dial 911.

## 2021-06-18 NOTE — ANESTHESIA PREPROCEDURE EVALUATION
Anesthesia Pre-Procedure Evaluation    Patient: Milena Hamilton   MRN: [id number] : 1940        Preoperative Diagnosis: Lumbar radiculopathy [M54.16]   Procedure : Procedure(s):  INJECTION, SPINE, LUMBAR, EPIDURAL     Past Medical History:   Diagnosis Date     Lichen planus     vulvar     Osteopenia 2010    recheck DEXA in 2 years     Panic disorder without agoraphobia      Pure hypercholesterolemia     low LDLs     Raynaud's disease 2014     Unspecified essential hypertension       Past Surgical History:   Procedure Laterality Date     C APPENDECTOMY       C VAG HYST,RMV TUBE/OVARY  1984    cervix removed     COLONOSCOPY  10/17/2007    normal, recheck in 10 years     COLONOSCOPY N/A 2014    Procedure: COLONOSCOPY;  Surgeon: Raffi Montiel MD;  Location: [facility]     COMBINED CYSTOSCOPY, INSERT CATHETER URETER Bilateral 10/13/2014    Procedure: COMBINED CYSTOSCOPY, INSERT CATHETER URETER;  Surgeon: Abdoulaye Odell MD;  Location: [facility]     EYE SURGERY      right eye muscle repair     HC REMOVAL GALLBLADDER  2010    lap     HC REMOVAL OF OVARY/TUBE(S)      Salpingo-Oophorectomy, bilateral     HYSTERECTOMY, PAP NO LONGER INDICATED       INJECT EPIDURAL LUMBAR Right 4/10/2017    Procedure: INJECT EPIDURAL LUMBAR;  Surgeon: Raffi Wright MD;  Location: [facility]     INJECT EPIDURAL LUMBAR Right 2020    Procedure: Right Lumbar 4- Sacral 1 Epidural Steroid Injection;  Surgeon: Antonio Dasilva MD;  Location: [facility]     INJECT EPIDURAL LUMBAR Right 2021    Procedure: INJECTION, SPINE,  sacral 1 EPIDURAL;  Surgeon: Antonio Dasilva MD;  Location: [facility]     INJECT EPIDURAL TRANSFORAMINAL Right 2016    Procedure: INJECT EPIDURAL TRANSFORAMINAL;  Surgeon: Antonio Dasilva MD;  Location[facility]     INJECT EPIDURAL TRANSFORAMINAL Right 10/13/2016    Procedure: INJECT EPIDURAL TRANSFORAMINAL;  Surgeon: Antonio Dasilva MD;  Location: [facility]     LAPAROSCOPIC ASSISTED COLECTOMY LEFT  (DESCENDING) N/A 10/13/2014    Procedure: LAPAROSCOPIC ASSISTED COLECTOMY LEFT (DESCENDING);  Surgeon: Raffi Montiel MD;  Location: [facility]     ZZC NONSPECIFIC PROCEDURE      Right inferior oblique recession, 14 mm      Allergies   Allergen Reactions     Azithromycin Diarrhea     Ciprofloxacin Other (See Comments)     Cipro, dizziness and abdominal pain     Gabapentin Dizziness and Visual Disturbance     Penicillins      stomach upset, diarrhea     Sulfa Drugs      hives      Social History     Tobacco Use     Smoking status: Never Smoker     Smokeless tobacco: Never Used   Substance Use Topics     Alcohol use: No      Wt Readings from Last 1 Encounters:   06/18/21 61.7 kg (136 lb)        Anesthesia Evaluation            ROS/MED HX  ENT/Pulmonary:  - neg pulmonary ROS     Neurologic:  - neg neurologic ROS     Cardiovascular:     (+) Dyslipidemia hypertension-----    METS/Exercise Tolerance:     Hematologic:  - neg hematologic  ROS     Musculoskeletal: Comment: Low back pain      GI/Hepatic:     (+) GERD, Asymptomatic on medication,     Renal/Genitourinary:  - neg Renal ROS     Endo:     (+) thyroid problem, hypothyroidism,     Psychiatric/Substance Use:  - neg psychiatric ROS     Infectious Disease: Comment: Cellulitis of toe - neg infectious disease ROS     Malignancy:  - neg malignancy ROS     Other:  - neg other ROS    (+) , H/O Chronic Pain,        Physical Exam    Airway        Mallampati: II   TM distance: > 3 FB   Neck ROM: full   Mouth opening: > 3 cm    Respiratory Devices and Support         Dental  no notable dental history         Cardiovascular   cardiovascular exam normal          Pulmonary   pulmonary exam normal                OUTSIDE LABS:  CBC:   Lab Results   Component Value Date    WBC 5.8 01/14/2021    WBC 5.7 09/11/2019    HGB 12.9 01/14/2021    HGB 13.4 09/11/2019    HCT 40.9 01/14/2021    HCT 42.0 09/11/2019     01/14/2021     09/11/2019     BMP:   Lab Results   Component  Value Date     01/14/2021     12/28/2020    POTASSIUM 3.9 01/14/2021    POTASSIUM 4.0 12/28/2020    CHLORIDE 106 01/14/2021    CHLORIDE 106 12/28/2020    CO2 32 01/14/2021    CO2 32 12/28/2020    BUN 15 01/14/2021    BUN 11 12/28/2020    CR 0.62 01/14/2021    CR 0.67 12/28/2020     (H) 01/14/2021    GLC 93 12/28/2020     COAGS: No results found for: PTT, INR, FIBR  POC:   Lab Results   Component Value Date     (H) 10/15/2014     HEPATIC:   Lab Results   Component Value Date    ALBUMIN 3.9 01/14/2021    PROTTOTAL 7.3 01/14/2021    ALT 19 01/14/2021    AST 12 01/14/2021    ALKPHOS 67 01/14/2021    BILITOTAL 0.5 01/14/2021     OTHER:   Lab Results   Component Value Date    LACT 1.2 03/23/2016    A1C 5.4 09/26/2008    ANU 9.0 01/14/2021    PHOS 3.7 05/29/2015    MAG 2.1 05/29/2015    LIPASE 136 02/27/2018    AMYLASE 59 05/29/2013    TSH 0.89 12/28/2020    T4 1.14 01/12/2016    CRP <2.9 09/11/2019    SED 9 09/11/2019       Anesthesia Plan    ASA Status:  2   NPO Status:  NPO Appropriate    Anesthesia Type: MAC.     - Reason for MAC: Deep or markedly invasive procedure (G8)   Induction: Intravenous, Propofol.   Maintenance: TIVA.        Consents    Anesthesia Plan(s) and associated risks, benefits, and realistic alternatives discussed. Questions answered and patient/representative(s) expressed understanding.     - Discussed with:  Patient      - Extended Intubation/Ventilatory Support Discussed: No.      - Patient is DNR/DNI Status: No    Use of blood products discussed: No .     Postoperative Care    Pain management: IV analgesics.   PONV prophylaxis: Ondansetron (or other 5HT-3), Dexamethasone or Solumedrol     Comments:    The risks and benefits of anesthesia, and the alternatives where applicable, have been discussed with the patient, and they wish to proceed.            FORREST Paz CRNA

## 2021-06-18 NOTE — ANESTHESIA CARE TRANSFER NOTE
Patient: Milena Hamilton    Procedure(s):  Right lumbar 4-5 and Lumbar 5 sacral 1 epidural injection    Diagnosis: Lumbar radiculopathy [M54.16]  Diagnosis Additional Information: No value filed.    Anesthesia Type:   MAC     Note:    Oropharynx: spontaneously breathing  Level of Consciousness: awake  Oxygen Supplementation: nasal cannula  Level of Supplemental Oxygen (L/min / FiO2): 24%  Independent Airway: airway patency satisfactory and stable  Dentition: dentition unchanged  Vital Signs Stable: post-procedure vital signs reviewed and stable  Report to RN Given: handoff report given  Patient transferred to: Phase II    Handoff Report: Identifed the Patient, Identified the Reponsible Provider, Reviewed the pertinent medical history, Discussed the surgical course, Reviewed Intra-OP anesthesia mangement and issues during anesthesia, Set expectations for post-procedure period and Allowed opportunity for questions and acknowledgement of understanding      Vitals: (Last set prior to Anesthesia Care Transfer)  CRNA VITALS  6/18/2021 1322 - 6/18/2021 1352      6/18/2021             Resp Rate (observed):  (!) 6        Electronically Signed By: FORREST Paz CRNA  June 18, 2021  1:52 PM

## 2021-06-18 NOTE — ANESTHESIA POSTPROCEDURE EVALUATION
Patient: Milena Hamilton    Procedure(s):  Right lumbar 4-5 and Lumbar 5 sacral 1 epidural injection    Diagnosis:Lumbar radiculopathy [M54.16]  Diagnosis Additional Information: No value filed.    Anesthesia Type:  MAC    Note:  Disposition: Outpatient   Postop Pain Control: Uneventful            Sign Out: Well controlled pain   PONV: No   Neuro/Psych: Uneventful            Sign Out: Acceptable/Baseline neuro status   Airway/Respiratory: Uneventful            Sign Out: Acceptable/Baseline resp. status   CV/Hemodynamics: Uneventful            Sign Out: Acceptable CV status   Other NRE: NONE   DID A NON-ROUTINE EVENT OCCUR? No    Event details/Postop Comments:  Pt was happy with anesthesia care.  No complications.  I will follow up with the pt if needed.           Last vitals:  Vitals:    06/18/21 1228   BP: (!) 153/79   Resp: 16   Temp: 97.7  F (36.5  C)       Last vitals prior to Anesthesia Care Transfer:  CRNA VITALS  6/18/2021 1317 - 6/18/2021 1356      6/18/2021             Resp Rate (observed):  (!) 6          Electronically Signed By: FORREST Paz CRNA  June 18, 2021  1:56 PM

## 2021-06-18 NOTE — OP NOTE
PRIMARY PROBLEM: Low back pain and right leg pain     PROCEDURE: Right L4-5 and right L5-S1  Transforaminal Epidural Steroid Injections with fluoroscopic guidance and contrast.      PROCEDURE DETAILS: After written informed consent was obtained from the patient, the patient was escorted to the procedure room.  The patient was placed in the prone position.  A  time out  was conducted to verify patient identity, procedure to be performed, side, site, allergies and any special requirements.  The skin over the thoracolumbar region was prepped and draped in normal sterile fashion. Fluoroscopy was used to identify the neural foramen in AP view and the skin was anesthetized with 2 mL of 1% lidocaine with bicarbonate buffer. A 22-gauge Quincke spinal needle was advanced through this location and advanced under fluoroscopic guidance towards the neural foramen.  The target zone was the 6 o clock position of the pedicle.   Prior to entering the foramen, the depth of the needle was gauged with a lateral view on fluoroscopy. While still in a lateral view, the needle was slowly advanced to avoid injury to the spinal nerve.  Then, in the oblique view (approximately 28 degrees), after negative aspiration, 1.5 mL of Omnipaque contrast dye was injected revealing epidural spread without evidence of intravascular or intrathecal spread.  Then a 3cc solution of 20 mg of Triamcinolone in 2.5 mL of  Preservative-Free saline was slowly injected into the epidural space at each segment.  There is very good flow of medication around the right L4-5 laminotomy into the central epidural space.  There was some obvious epidural scarring at the right L4-5 foraminal opening.  At the L5-S1 level there was a significant amount of degeneration at this level but I did have flow of medication over the L5 nerve root and into her central epidural space.  After injection of the medication, as the needle tip was withdrawn, it was flushed with local  anesthetic.   The patient was monitored with blood pressure and pulse oximetry machines with the assistance of an RN throughout the procedure.  The patient was alert and responsive to questions throughout the procedure.   The patient tolerated the procedure well and was observed in the post-procedural area.  The patient was dismissed without apparent complications.      DIAGNOSIS:  1.  Right lumbosacral radiculopathy secondary to severe lateral recess stenosis at L4-5 and L5-S1     PLAN:  1. Performed right L4-5 and L5-S1  transforaminal epidural steroid injections.  2. The patient was instructed to follow-up per Dr. Dasilva's instructions.    Hopefully this injection helps her since the last one did not.  However, the injection I did for her in early 2020 helped her for about a year so I went back to that particular injection.     Antonio Dasilva MD  Diplomate of the American Board of Anesthesiology, Pain Medicine

## 2021-06-29 DIAGNOSIS — E03.9 ACQUIRED HYPOTHYROIDISM: ICD-10-CM

## 2021-06-29 RX ORDER — LEVOTHYROXINE SODIUM 50 UG/1
TABLET ORAL
Qty: 90 TABLET | Refills: 0 | OUTPATIENT
Start: 2021-06-29

## 2021-06-29 NOTE — TELEPHONE ENCOUNTER
Patient will contact Alicia to have remaining refills sent to the [facility].     Aurelio Sahu RN, BSN  [place]  June 29, 2021

## 2021-07-05 ENCOUNTER — APPOINTMENT (OUTPATIENT)
Dept: GENERAL RADIOLOGY | Facility: CLINIC | Age: 81
End: 2021-07-05
Attending: PHYSICIAN ASSISTANT
Payer: COMMERCIAL

## 2021-07-05 ENCOUNTER — HOSPITAL ENCOUNTER (EMERGENCY)
Facility: CLINIC | Age: 81
Discharge: HOME | End: 2021-07-05
Attending: PHYSICIAN ASSISTANT | Admitting: PHYSICIAN ASSISTANT
Payer: COMMERCIAL

## 2021-07-05 VITALS
OXYGEN SATURATION: 100 % | RESPIRATION RATE: 18 BRPM | DIASTOLIC BLOOD PRESSURE: 98 MMHG | TEMPERATURE: 98.3 F | HEART RATE: 117 BPM | BODY MASS INDEX: 25.24 KG/M2 | SYSTOLIC BLOOD PRESSURE: 191 MMHG | WEIGHT: 138 LBS

## 2021-07-05 DIAGNOSIS — K59.00 CONSTIPATION: ICD-10-CM

## 2021-07-05 PROCEDURE — 74019 RADEX ABDOMEN 2 VIEWS: CPT

## 2021-07-05 PROCEDURE — 99282 EMERGENCY DEPT VISIT SF MDM: CPT | Performed by: PHYSICIAN ASSISTANT

## 2021-07-05 PROCEDURE — 99283 EMERGENCY DEPT VISIT LOW MDM: CPT | Performed by: PHYSICIAN ASSISTANT

## 2021-07-06 NOTE — ED NOTES
Patient able to have large bowel movement. Feels better and would like to go home. Provider made aware.

## 2021-07-06 NOTE — DISCHARGE INSTRUCTIONS
It was a pleasure working with you again today!  I am also thankful you are feeling much better after the enema!

## 2021-07-06 NOTE — ED TRIAGE NOTES
"Has not had a \"good\" bowel movement in two weeks. Feels like she has to go and can't. Has been taking OTC meds to try to have BM. Did not do an enema.   "

## 2021-07-06 NOTE — ED NOTES
Patient given soap suds enema. Will push call light when she feels like she's ready to get up to have BM.

## 2021-07-06 NOTE — ED PROVIDER NOTES
History     Chief Complaint   Patient presents with     Constipation     HPI  Milena Hamilton is a 81 year old female who presents for evaluation of constipation.  Has not had a significant bowel movement in the past 2.5 weeks per her report.  When she does go, she states that she will only pass a few small little balls of stool.  They are generally firm in nature.  Denies any rectal bleeding or black stools.  Occasional nausea but no vomiting.  Eating normally.  Has had a decreased appetite recently.  Has struggled with chronic constipation.  She currently is trying to take a Dulcolax, MiraLAX, and Benefiber.  No results with this treatment.  She has generalized abdominal discomfort which she states is cramping in nature.  She also feels bloated.  No recent vomiting.  Denies any fevers or chills.  No dysuria, frequency, urgency, flank pain, or gross hematuria.  No previous small bowel obstruction.          Allergies:  Allergies   Allergen Reactions     Azithromycin Diarrhea     Ciprofloxacin Other (See Comments)     Cipro, dizziness and abdominal pain     Gabapentin Dizziness and Visual Disturbance     Penicillins      stomach upset, diarrhea     Sulfa Drugs      hives       Problem List:    Patient Active Problem List    Diagnosis Date Noted     Scoliosis of lumbar spine, unspecified scoliosis type 12/12/2018     Priority: Medium     Intractable vomiting 02/27/2018     Priority: Medium     Gastroenteritis 02/27/2018     Priority: Medium     After cataract, left eye 09/18/2017     Priority: Medium     Acquired hypothyroidism 04/28/2017     Priority: Medium     Gastroesophageal reflux disease, esophagitis presence not specified 04/28/2017     Priority: Medium     IMO Regulatory Load OCT 2020       Cortical age-related cataract 01/16/2017     Priority: Medium     Cortical senile cataract 01/16/2017     Priority: Medium     Hypothyroidism, unspecified type 11/30/2016     Priority: Medium     Premature atrial beats  11/30/2016     Priority: Medium     Community acquired pneumonia 05/08/2015     Priority: Medium     Benign skin lesion 03/06/2015     Priority: Medium     HTN (hypertension) 07/10/2014     Priority: Medium     Raynaud's disease 06/01/2014     Priority: Medium     Health Care Home 12/30/2013     Priority: Medium     Status:  Closed  Care Coordinator:  Tasha Vasquez RN    See Letters for [facility] Care Plan             History of diverticulitis - s/p left hemicolectomy 11/30/2013     Priority: Medium     Diarrhea 11/30/2013     Priority: Medium     Chronic constipation 06/18/2013     Priority: Medium     Sacroiliac dysfunction 10/09/2012     Priority: Medium     Advanced directives, counseling/discussion 10/03/2011     Priority: Medium     Advance Directive Problem List Overview:   Name Relationship Phone    Primary Health Care Agent            Alternative Health Care Agent          Discussed advance care planning with patient; information given to patient to review. 10/3/2011          DDD (degenerative disc disease), cervical 08/10/2011     Priority: Medium     Anxiety 10/24/2010     Priority: Medium     Hyperlipidemia 09/29/2010     Priority: Medium     Osteopenia 04/01/2010     Priority: Medium     recheck DEXA in 2 years       Gastritis 03/15/2010     Priority: Medium     Panic disorder without agoraphobia 10/08/2004     Priority: Medium     Tinnitus 09/13/2004     Priority: Medium     Problem list name updated by automated process. Provider to review       Family history of diabetes mellitus 09/09/2002     Priority: Medium     Lichen planus      Priority: Medium        Past Medical History:    Past Medical History:   Diagnosis Date     Lichen planus      Osteopenia 4/2010     Panic disorder without agoraphobia      Pure hypercholesterolemia      Raynaud's disease 6/2014     Unspecified essential hypertension        Past Surgical History:    Past Surgical History:   Procedure Laterality Date     C APPENDECTOMY        C VAG HYST,RMV TUBE/OVARY  1984    cervix removed     COLONOSCOPY  10/17/2007    normal, recheck in 10 years     COLONOSCOPY N/A 8/27/2014    Procedure: COLONOSCOPY;  Surgeon: Raffi Montiel MD;  [facility]     COMBINED CYSTOSCOPY, INSERT CATHETER URETER Bilateral 10/13/2014    Procedure: COMBINED CYSTOSCOPY, INSERT CATHETER URETER;  Surgeon: Abdoulaye Odell MD;  [facility]     EYE SURGERY  8/01    right eye muscle repair     HC REMOVAL GALLBLADDER  2/9/2010    lap     HC REMOVAL OF OVARY/TUBE(S)      Salpingo-Oophorectomy, bilateral     HYSTERECTOMY, PAP NO LONGER INDICATED       INJECT EPIDURAL LUMBAR Right 4/10/2017    Procedure: INJECT EPIDURAL LUMBAR;  Surgeon: Raffi Wright MD;  [facility]     INJECT EPIDURAL LUMBAR Right 2/27/2020    Procedure: Right Lumbar 4- Sacral 1 Epidural Steroid Injection;  Surgeon: Antonio Dasilva MD;  [facility]     INJECT EPIDURAL LUMBAR Right 1/29/2021    Procedure: INJECTION, SPINE,  sacral 1 EPIDURAL;  Surgeon: Antonio Dasilva MD;  [facility]     INJECT EPIDURAL TRANSFORAMINAL Right 6/9/2016    Procedure: INJECT EPIDURAL TRANSFORAMINAL;  Surgeon: Antonio Dasilva MD;  [facility]     INJECT EPIDURAL TRANSFORAMINAL Right 10/13/2016    Procedure: INJECT EPIDURAL TRANSFORAMINAL;  Surgeon: Antonio Dasilva MD;  [facility]     INJECT EPIDURAL TRANSFORAMINAL Right 6/18/2021    Procedure: Right L4-5 and right L5-S1 Transforaminal Epidural Steroid Injections with fluoroscopic guidance and contrast.;  Surgeon: Antonio Dasilva MD;  [facility]     LAPAROSCOPIC ASSISTED COLECTOMY LEFT (DESCENDING) N/A 10/13/2014    Procedure: LAPAROSCOPIC ASSISTED COLECTOMY LEFT (DESCENDING);  Surgeon: Raffi Montiel MD;  [facility]     ZZC NONSPECIFIC PROCEDURE      Right inferior oblique recession, 14 mm       Family History:    Family History   Problem Relation Age of Onset     Diabetes Brother      Heart Disease Brother 50        AMI      "Diabetes Brother      Heart Disease Brother         AMI, \"older\"     Cerebrovascular Disease Brother      Psychotic Disorder Brother         , alcohol     Cancer Sister         breast cancer x2     Hypertension No family hx of      Breast Cancer No family hx of      Ovarian Cancer No family hx of      Prostate Cancer No family hx of      Mental Illness No family hx of      Anesthesia Reaction No family hx of      Osteoporosis No family hx of      Obesity No family hx of      Other Cancer No family hx of      Depression No family hx of      Anxiety Disorder No family hx of      Mental Illness No family hx of      Substance Abuse No family hx of        Social History:  Marital Status:   [2]  Social History     Tobacco Use     Smoking status: Never Smoker     Smokeless tobacco: Never Used   Substance Use Topics     Alcohol use: No     Drug use: No        Medications:    ALPRAZolam (XANAX) 0.25 MG tablet  Ascorbic Acid (VITAMIN C PO)  ASPIRIN 81 MG OR TABS  calcium carbonate-vitamin D (OSCAL W/D) 500-200 MG-UNIT tablet  cyclobenzaprine (FLEXERIL) 5 MG tablet  Dentifrices (BIOTENE DRY MOUTH) PSTE  fish oil-omega-3 fatty acids 1000 MG capsule  ibuprofen (ADVIL/MOTRIN) 600 MG tablet  levothyroxine (SYNTHROID/LEVOTHROID) 50 MCG tablet  meclizine (ANTIVERT) 25 MG tablet  metoprolol tartrate (LOPRESSOR) 25 MG tablet  omeprazole (PRILOSEC) 20 MG DR capsule  simvastatin (ZOCOR) 20 MG tablet  THERAPEUTIC MULTIVITAMIN OR          Review of Systems   All other systems reviewed and are negative.      Physical Exam   BP: (!) 195/91  Pulse: 78  Temp: 98.3  F (36.8  C)  Resp: 18  Weight: 62.6 kg (138 lb)  SpO2: 100 %      Physical Exam  Vitals signs and nursing note reviewed.   Constitutional:       General: She is not in acute distress.     Appearance: She is not diaphoretic.   HENT:      Head: Normocephalic and atraumatic.      Right Ear: External ear normal.      Left Ear: External ear normal.      Nose: Nose normal.     "  Mouth/Throat:      Pharynx: No oropharyngeal exudate.   Eyes:      General: No scleral icterus.        Right eye: No discharge.         Left eye: No discharge.      Conjunctiva/sclera: Conjunctivae normal.      Pupils: Pupils are equal, round, and reactive to light.   Neck:      Musculoskeletal: Normal range of motion and neck supple.      Thyroid: No thyromegaly.   Cardiovascular:      Rate and Rhythm: Normal rate and regular rhythm.      Heart sounds: Normal heart sounds. No murmur.   Pulmonary:      Effort: Pulmonary effort is normal. No respiratory distress.      Breath sounds: Normal breath sounds. No wheezing or rales.   Chest:      Chest wall: No tenderness.   Abdominal:      General: Bowel sounds are normal. There is no distension.      Palpations: Abdomen is soft. There is no mass.      Tenderness: There is no abdominal tenderness. There is no right CVA tenderness, left CVA tenderness, guarding or rebound.      Hernia: No hernia is present.   Musculoskeletal: Normal range of motion.         General: No tenderness or deformity.   Lymphadenopathy:      Cervical: No cervical adenopathy.   Skin:     General: Skin is warm and dry.      Capillary Refill: Capillary refill takes less than 2 seconds.      Findings: No erythema or rash.   Neurological:      Mental Status: She is alert and oriented to person, place, and time.      Cranial Nerves: No cranial nerve deficit.   Psychiatric:         Behavior: Behavior normal.         Thought Content: Thought content normal.         ED Course        Procedures               Critical Care time:  none               Results for orders placed or performed during the hospital encounter of 07/05/21 (from the past 24 hour(s))   XR Abdomen 2 Views    Narrative    EXAM: XR ABDOMEN 2VIEWS  LOCATION: [facility]  DATE/TIME: 7/5/2021 9:43 PM    INDICATION: Abdominal pain and bloating. No bowel movement for 2 weeks.  COMPARISON: 02/27/2018.      Impression    IMPRESSION:  Right upper quadrant surgical clips consistent with cholecystectomy. A few gas-filled loops of nondistended small and large bowel are present. No evidence of mechanical small bowel obstruction. Small to moderate amount of formed stool in the   distal colon. Moderate lumbar dextroscoliosis.        Medications - No data to display    Assessments & Plan (with Medical Decision Making)  Constipation     81 year old female presents for evaluation of symptomatic constipation for the past 2.5 weeks.  Passing minimal stool with any bowel movement and when she does pass stool it is very firm and small.  Feeling bloated and having some generalized abdominal cramping.  Occasional nausea but no vomiting.  On exam she is afebrile with a temperature of 98.3.  Good bowel sounds.  No localized tenderness in the abdomen she does not have an acute abdominal exam.  X-ray confirms moderate amount of stool in the distal colon but no air-fluid levels.  Treated with a soapsuds enema with significant stool production.  She felt much better after this was performed and felt ready for discharge.  Discussed okay to restart Benefiber and also take the stool softener to ensure that this does not happen again.  Encouraged her to follow-up with her PCP in 1 week to recheck her condition and see how this process is going.  Indication for ED return reviewed with the patient and her  in detail.  She was in agreement with this plan was suitable for discharge.     I have reviewed the nursing notes.    I have reviewed the findings, diagnosis, plan and need for follow up with the patient.       Discharge Medication List as of 7/5/2021 11:06 PM          Final diagnoses:   Constipation       Disclaimer: This note consists of symbols derived from keyboarding, dictation and/or voice recognition software. As a result, there may be errors in the script that have gone undetected. Please consider this when interpreting information found in this  chart.      7/5/2021   [facility] EMERGENCY DEPT     Neo Ghosh PA-C  07/06/21 0028

## 2021-07-07 DIAGNOSIS — F41.9 ANXIETY: ICD-10-CM

## 2021-07-07 RX ORDER — ALPRAZOLAM 0.25 MG
TABLET ORAL
Qty: 30 TABLET | Refills: 2 | Status: SHIPPED | OUTPATIENT
Start: 2021-07-07 | End: 2022-01-05

## 2021-07-07 NOTE — TELEPHONE ENCOUNTER
Pending Prescriptions:                       Disp   Refills    ALPRAZolam (XANAX) 0.25 MG tablet [Pharmac*30 tab*2        Sig: TAKE ONE TABLET BY MOUTH AT BEDTIME AS NEEDED FOR           ANXIETY    Routing refill request to provider for review/approval because:  Drug not on the [facility] refill protocol

## 2021-07-08 ENCOUNTER — TELEPHONE (OUTPATIENT)
Dept: FAMILY MEDICINE | Facility: OTHER | Age: 81
End: 2021-07-08

## 2021-07-08 DIAGNOSIS — M54.16 LUMBAR RADICULOPATHY: Primary | ICD-10-CM

## 2021-07-08 DIAGNOSIS — M51.369 DDD (DEGENERATIVE DISC DISEASE), LUMBAR: ICD-10-CM

## 2021-07-08 NOTE — TELEPHONE ENCOUNTER
Patient calling to see if you would be willing to place a referral for her to see Neurosurgery Dr. Ulices Ceron he is out of [facility] Neurosurgeon  [facility] phone is [phone]. She saw Dr. Cast in the past but found this Provider who does take her insurance...  Please advise

## 2021-07-28 ENCOUNTER — TRANSFERRED RECORDS (OUTPATIENT)
Dept: HEALTH INFORMATION MANAGEMENT | Facility: CLINIC | Age: 81
End: 2021-07-28

## 2021-08-10 ENCOUNTER — TRANSFERRED RECORDS (OUTPATIENT)
Dept: HEALTH INFORMATION MANAGEMENT | Facility: CLINIC | Age: 81
End: 2021-08-10

## 2021-08-11 ENCOUNTER — HOSPITAL ENCOUNTER (OUTPATIENT)
Dept: CT IMAGING | Facility: CLINIC | Age: 81
Discharge: HOME | End: 2021-08-11
Attending: NEUROLOGICAL SURGERY | Admitting: NEUROLOGICAL SURGERY
Payer: COMMERCIAL

## 2021-08-11 DIAGNOSIS — M54.16 LUMBAR RADICULOPATHY: ICD-10-CM

## 2021-08-11 PROCEDURE — 72131 CT LUMBAR SPINE W/O DYE: CPT

## 2021-08-11 NOTE — PROGRESS NOTES
Appropriate assistive devices provided during their visit. yes (Yes, No, N/A) cane (list device)    Exam table and/or cart  placed in the lowest position. yes (Yes, No, N/A)    Brakes on tables/carts/wheelchairs used at all times. na (Yes, No, N/A)    Non slip footwear applied. yes (Yes, No, NA)    Patient was accompanied by staff throughout visit. yes (Yes, No, N/A)    Equipment safety straps used. na (Yes, No, N/A)    Assist with toileting. na (Yes, No, N/A)

## 2021-08-21 NOTE — PROGRESS NOTES
[address]  [facility] [address]  Phone: [phone]  Primary Provider: Chuck Streeter  Pre-op Performing Provider: FREDY SAINI    PREOPERATIVE EVALUATION:  Today's date: 8/27/2021    Milena Hamilton is a 81 year old female who presents for a preoperative evaluation.    Surgical Information:  Surgery/Procedure: LUMBAR 2 TO PELVIS FUSION, LUMBAR 4-SACRAL 1 DECOMPRESSION  Surgery Location: [facility]  Surgeon: Osmany  Surgery Date: 09/03  Time of Surgery: 7:30AM  Where patient plans to recover: Other: Keeping for 4-5 days in hospital, then 2 weeks in TCU  Fax number for surgical facility: [phone]    Type of Anesthesia Anticipated: Choice    Assessment & Plan     The proposed surgical procedure is considered LOW risk.    Preop general physical exam  Lumbar radiculopathy  Chronic right-sided low back pain with right-sided sciatica  History of diverticulitis - s/p left hemicolectomy  Gastroesophageal reflux disease with esophagitis, unspecified whether hemorrhage  Acquired hypothyroidism  Chronic constipation  Cleared for surgical intervention           Risks and Recommendations:  The patient has the following additional risks and recommendations for perioperative complications:   - No identified additional risk factors other than previously addressed    Medication Instructions:  Patient is to take all scheduled medications on the day of surgery    RECOMMENDATION:  APPROVAL GIVEN to proceed with proposed procedure, without further diagnostic evaluation.    Review of external notes as documented above         Subjective     HPI related to upcoming procedure: low back pain in need of intervention      Preop Questions 8/27/2021   1. Have you ever had a heart attack or stroke? No   2. Have you ever had surgery on your heart or blood vessels, such as a stent placement, a coronary artery bypass, or surgery on an artery in your head, neck, heart, or  legs? No   3. Do you have chest pain with activity? No   4. Do you have a history of  heart failure? No   5. Do you currently have a cold, bronchitis or symptoms of other infection? No   6. Do you have a cough, shortness of breath, or wheezing? No   7. Do you or anyone in your family have previous history of blood clots? No   8. Do you or does anyone in your family have a serious bleeding problem such as prolonged bleeding following surgeries or cuts? No   9. Have you ever had problems with anemia or been told to take iron pills? No   10. Have you had any abnormal blood loss such as black, tarry or bloody stools, or abnormal vaginal bleeding? No   11. Have you ever had a blood transfusion? No   12. Are you willing to have a blood transfusion if it is medically needed before, during, or after your surgery? Yes   13. Have you or any of your relatives ever had problems with anesthesia? No   14. Do you have sleep apnea, excessive snoring or daytime drowsiness? No   15. Do you have any artifical heart valves or other implanted medical devices like a pacemaker, defibrillator, or continuous glucose monitor? No   16. Do you have artificial joints? No   17. Are you allergic to latex? No   18. Is there any chance that you may be pregnant? -     Health Care Directive:  Patient does not have a Health Care Directive or Living Will: Patient states has Advance Directive and will bring in a copy to clinic.    Preoperative Review of :   reviewed - no record of controlled substances prescribed.    Review of Systems  CONSTITUTIONAL: NEGATIVE for fever, chills, change in weight  INTEGUMENTARY/SKIN: NEGATIVE for worrisome rashes, moles or lesions  EYES: NEGATIVE for vision changes or irritation  ENT/MOUTH: NEGATIVE for ear, mouth and throat problems  RESP: NEGATIVE for significant cough or SOB  CV: NEGATIVE for chest pain, palpitations or peripheral edema  GI: NEGATIVE for nausea, abdominal pain, heartburn, or change in bowel  habits  : NEGATIVE for frequency, dysuria, or hematuria  MUSCULOSKELETAL: NEGATIVE for significant arthralgias or myalgia  NEURO: NEGATIVE for weakness, dizziness or paresthesias  ENDOCRINE: NEGATIVE for temperature intolerance, skin/hair changes  HEME: NEGATIVE for bleeding problems  PSYCHIATRIC: NEGATIVE for changes in mood or affect       Past Medical History:   Diagnosis Date     Lichen planus     vulvar     Osteopenia 4/2010    recheck DEXA in 2 years     Panic disorder without agoraphobia      Pure hypercholesterolemia     low LDLs     Raynaud's disease 6/2014     Unspecified essential hypertension      Past Surgical History:   Procedure Laterality Date     C APPENDECTOMY       C VAG HYST,RMV TUBE/OVARY  1984    cervix removed     COLONOSCOPY  10/17/2007    normal, recheck in 10 years     COLONOSCOPY N/A 8/27/2014    Procedure: COLONOSCOPY;  Surgeon: Raffi Montiel MD;  [facility]     COMBINED CYSTOSCOPY, INSERT CATHETER URETER Bilateral 10/13/2014    Procedure: COMBINED CYSTOSCOPY, INSERT CATHETER URETER;  Surgeon: Abdoulaye Odell MD;  [facility]     EYE SURGERY  8/01    right eye muscle repair     HC REMOVAL GALLBLADDER  2/9/2010    lap     HC REMOVAL OF OVARY/TUBE(S)      Salpingo-Oophorectomy, bilateral     HYSTERECTOMY, PAP NO LONGER INDICATED       INJECT EPIDURAL LUMBAR Right 4/10/2017    Procedure: INJECT EPIDURAL LUMBAR;  Surgeon: Raffi Wright MD;  [facility]     INJECT EPIDURAL LUMBAR Right 2/27/2020    Procedure: Right Lumbar 4- Sacral 1 Epidural Steroid Injection;  Surgeon: Antonio Dasilva MD;  [facility]     INJECT EPIDURAL LUMBAR Right 1/29/2021    Procedure: INJECTION, SPINE,  sacral 1 EPIDURAL;  Surgeon: Antonio Dasilva MD;  [facility]     INJECT EPIDURAL TRANSFORAMINAL Right 6/9/2016    Procedure: INJECT EPIDURAL TRANSFORAMINAL;  Surgeon: Antonio Dasilva MD;  [facility]     INJECT EPIDURAL TRANSFORAMINAL Right 10/13/2016    Procedure: INJECT  EPIDURAL TRANSFORAMINAL;  Surgeon: Antonio Dasilva MD;  [facility]     INJECT EPIDURAL TRANSFORAMINAL Right 6/18/2021    Procedure: Right L4-5 and right L5-S1 Transforaminal Epidural Steroid Injections with fluoroscopic guidance and contrast.;  Surgeon: Antonio Dasilva MD;  [facility]     LAPAROSCOPIC ASSISTED COLECTOMY LEFT (DESCENDING) N/A 10/13/2014    Procedure: LAPAROSCOPIC ASSISTED COLECTOMY LEFT (DESCENDING);  Surgeon: Raffi Montiel MD;  [facility]     ZZC NONSPECIFIC PROCEDURE      Right inferior oblique recession, 14 mm     Current Outpatient Medications   Medication Sig Dispense Refill     ALPRAZolam (XANAX) 0.25 MG tablet TAKE ONE TABLET BY MOUTH AT BEDTIME AS NEEDED FOR ANXIETY 30 tablet 2     Ascorbic Acid (VITAMIN C PO)        ASPIRIN 81 MG OR TABS 1 TABLET DAILY       calcium carbonate-vitamin D (OSCAL W/D) 500-200 MG-UNIT tablet Take 1 tablet by mouth 2 times daily       cyclobenzaprine (FLEXERIL) 5 MG tablet Take 1 tablet (5 mg) by mouth nightly as needed for muscle spasms 30 tablet 1     Dentifrices (BIOTENE DRY MOUTH) PSTE Toothpaste and mouthwash       fish oil-omega-3 fatty acids 1000 MG capsule Take 2 g by mouth daily       ibuprofen (ADVIL/MOTRIN) 600 MG tablet Take 1 tablet (600 mg) by mouth every 6 hours as needed for moderate pain 100 tablet 3     levothyroxine (SYNTHROID/LEVOTHROID) 50 MCG tablet TAKE ONE TABLET BY MOUTH once daily 90 tablet 3     meclizine (ANTIVERT) 25 MG tablet TAKE ONE TABLET BY MOUTH THREE TIMES DAILY AS NEEDED for dizziness 30 tablet 1     metoprolol tartrate (LOPRESSOR) 25 MG tablet TAKE 1/2 TABLET (12.5 MG) BY MOUTH once daily 45 tablet 1     omeprazole (PRILOSEC) 20 MG DR capsule Take 1 capsule (20 mg) by mouth daily 90 capsule 3     simvastatin (ZOCOR) 20 MG tablet TAKE 1/2 TABLET (10mg) BY MOUTH every night AT BEDTIME 45 tablet 1     THERAPEUTIC MULTIVITAMIN OR 1 TABLET DAILY         Allergies   Allergen Reactions     Azithromycin Diarrhea      "Ciprofloxacin Other (See Comments)     Cipro, dizziness and abdominal pain     Gabapentin Dizziness and Visual Disturbance     Penicillins      stomach upset, diarrhea     Sulfa Drugs      hives        Social History     Tobacco Use     Smoking status: Never Smoker     Smokeless tobacco: Never Used   Substance Use Topics     Alcohol use: No     Family History   Problem Relation Age of Onset     Diabetes Brother      Heart Disease Brother 50        AMI     Diabetes Brother      Heart Disease Brother         AMI, \"older\"     Cerebrovascular Disease Brother      Psychotic Disorder Brother         , alcohol     Cancer Sister         breast cancer x2     Hypertension No family hx of      Breast Cancer No family hx of      Ovarian Cancer No family hx of      Prostate Cancer No family hx of      Mental Illness No family hx of      Anesthesia Reaction No family hx of      Osteoporosis No family hx of      Obesity No family hx of      Other Cancer No family hx of      Depression No family hx of      Anxiety Disorder No family hx of      Mental Illness No family hx of      Substance Abuse No family hx of      History   Drug Use No         Objective     LMP  (LMP Unknown)     Physical Exam    GENERAL APPEARANCE: healthy, alert and no distress     EYES: EOMI, PERRL     HENT: ear canals and TM's normal and nose and mouth without ulcers or lesions     NECK: no adenopathy, no asymmetry, masses, or scars and thyroid normal to palpation     RESP: lungs clear to auscultation - no rales, rhonchi or wheezes     CV: regular rates and rhythm, normal S1 S2, no S3 or S4 and no murmur, click or rub     ABDOMEN:  soft, nontender, no HSM or masses and bowel sounds normal     MS: extremities normal- no gross deformities noted, no evidence of inflammation in joints, FROM in all extremities.     SKIN: no suspicious lesions or rashes     NEURO: Normal strength and tone, sensory exam grossly normal, mentation intact and speech normal     " PSYCH: mentation appears normal. and affect normal/bright     LYMPHATICS: No cervical adenopathy    Recent Labs   Lab Test 01/14/21  1421 12/28/20  1203 09/11/19  1427   HGB 12.9  --  13.4     --  209    141 141   POTASSIUM 3.9 4.0 4.0   CR 0.62 0.67 0.83        Diagnostics:  Labs pending at this time.  Results will be reviewed when available.   No EKG required, no history of coronary heart disease, significant arrhythmia, peripheral arterial disease or other structural heart disease.    Revised Cardiac Risk Index (RCRI):  The patient has the following serious cardiovascular risks for perioperative complications:   - No serious cardiac risks = 0 points     RCRI Interpretation: 1 point: Class II (low risk - 0.9% complication rate)           Signed Electronically by: Twin Perry PA-C  Copy of this evaluation report is provided to requesting physician.

## 2021-08-27 ENCOUNTER — OFFICE VISIT (OUTPATIENT)
Dept: FAMILY MEDICINE | Facility: OTHER | Age: 81
End: 2021-08-27
Payer: COMMERCIAL

## 2021-08-27 VITALS
TEMPERATURE: 97.9 F | DIASTOLIC BLOOD PRESSURE: 62 MMHG | WEIGHT: 134.5 LBS | BODY MASS INDEX: 24.6 KG/M2 | OXYGEN SATURATION: 98 % | HEART RATE: 74 BPM | SYSTOLIC BLOOD PRESSURE: 118 MMHG | RESPIRATION RATE: 18 BRPM

## 2021-08-27 DIAGNOSIS — K59.09 CHRONIC CONSTIPATION: ICD-10-CM

## 2021-08-27 DIAGNOSIS — Z01.818 PREOP GENERAL PHYSICAL EXAM: Primary | ICD-10-CM

## 2021-08-27 DIAGNOSIS — M54.41 CHRONIC RIGHT-SIDED LOW BACK PAIN WITH RIGHT-SIDED SCIATICA: ICD-10-CM

## 2021-08-27 DIAGNOSIS — G89.29 CHRONIC RIGHT-SIDED LOW BACK PAIN WITH RIGHT-SIDED SCIATICA: ICD-10-CM

## 2021-08-27 DIAGNOSIS — K57.32 DIVERTICULITIS OF COLON: ICD-10-CM

## 2021-08-27 DIAGNOSIS — K21.00 GASTROESOPHAGEAL REFLUX DISEASE WITH ESOPHAGITIS, UNSPECIFIED WHETHER HEMORRHAGE: ICD-10-CM

## 2021-08-27 DIAGNOSIS — M54.16 LUMBAR RADICULOPATHY: ICD-10-CM

## 2021-08-27 DIAGNOSIS — E03.9 ACQUIRED HYPOTHYROIDISM: ICD-10-CM

## 2021-08-27 PROCEDURE — 99214 OFFICE O/P EST MOD 30 MIN: CPT | Performed by: PHYSICIAN ASSISTANT

## 2021-08-30 ENCOUNTER — LAB (OUTPATIENT)
Dept: LAB | Facility: OTHER | Age: 81
End: 2021-08-30

## 2021-08-30 DIAGNOSIS — M54.16 LUMBAR RADICULOPATHY: ICD-10-CM

## 2021-08-30 DIAGNOSIS — Z01.818 PREOP GENERAL PHYSICAL EXAM: ICD-10-CM

## 2021-08-30 DIAGNOSIS — K57.32 DIVERTICULITIS OF COLON: ICD-10-CM

## 2021-08-30 DIAGNOSIS — E03.9 ACQUIRED HYPOTHYROIDISM: ICD-10-CM

## 2021-08-30 DIAGNOSIS — K21.00 GASTROESOPHAGEAL REFLUX DISEASE WITH ESOPHAGITIS, UNSPECIFIED WHETHER HEMORRHAGE: ICD-10-CM

## 2021-08-30 DIAGNOSIS — M54.41 CHRONIC RIGHT-SIDED LOW BACK PAIN WITH RIGHT-SIDED SCIATICA: ICD-10-CM

## 2021-08-30 DIAGNOSIS — G89.29 CHRONIC RIGHT-SIDED LOW BACK PAIN WITH RIGHT-SIDED SCIATICA: ICD-10-CM

## 2021-08-30 DIAGNOSIS — K59.09 CHRONIC CONSTIPATION: ICD-10-CM

## 2021-08-30 PROCEDURE — U0005 INFEC AGEN DETEC AMPLI PROBE: HCPCS

## 2021-08-30 PROCEDURE — U0003 INFECTIOUS AGENT DETECTION BY NUCLEIC ACID (DNA OR RNA); SEVERE ACUTE RESPIRATORY SYNDROME CORONAVIRUS 2 (SARS-COV-2) (CORONAVIRUS DISEASE [COVID-19]), AMPLIFIED PROBE TECHNIQUE, MAKING USE OF HIGH THROUGHPUT TECHNOLOGIES AS DESCRIBED BY CMS-2020-01-R: HCPCS

## 2021-08-31 LAB — SARS-COV-2 RNA RESP QL NAA+PROBE: NEGATIVE

## 2021-09-08 ENCOUNTER — TRANSFERRED RECORDS (OUTPATIENT)
Dept: HEALTH INFORMATION MANAGEMENT | Facility: CLINIC | Age: 81
End: 2021-09-08

## 2021-09-09 ENCOUNTER — LAB REQUISITION (OUTPATIENT)
Dept: LAB | Facility: CLINIC | Age: 81
End: 2021-09-09
Payer: COMMERCIAL

## 2021-09-09 DIAGNOSIS — R76.12 NONSPECIFIC REACTION TO CELL MEDIATED IMMUNITY MEASUREMENT OF GAMMA INTERFERON ANTIGEN RESPONSE WITHOUT ACTIVE TUBERCULOSIS: ICD-10-CM

## 2021-09-10 PROCEDURE — 86481 TB AG RESPONSE T-CELL SUSP: CPT | Performed by: FAMILY MEDICINE

## 2021-09-10 PROCEDURE — 36415 COLL VENOUS BLD VENIPUNCTURE: CPT | Performed by: FAMILY MEDICINE

## 2021-09-12 ENCOUNTER — LAB REQUISITION (OUTPATIENT)
Dept: LAB | Facility: CLINIC | Age: 81
End: 2021-09-12
Payer: COMMERCIAL

## 2021-09-12 DIAGNOSIS — E87.6 HYPOKALEMIA: ICD-10-CM

## 2021-09-12 DIAGNOSIS — D64.9 ANEMIA, UNSPECIFIED: ICD-10-CM

## 2021-09-12 LAB
GAMMA INTERFERON BACKGROUND BLD IA-ACNC: 0.07 IU/ML
M TB IFN-G BLD-IMP: NEGATIVE
M TB IFN-G CD4+ BCKGRND COR BLD-ACNC: 0.58 IU/ML
MITOGEN IGNF BCKGRD COR BLD-ACNC: -0.03 IU/ML
MITOGEN IGNF BCKGRD COR BLD-ACNC: -0.04 IU/ML
QUANTIFERON MITOGEN: 0.65 IU/ML
QUANTIFERON NIL TUBE: 0.07 IU/ML
QUANTIFERON TB1 TUBE: 0.03 IU/ML
QUANTIFERON TB2 TUBE: 0.04

## 2021-09-13 ENCOUNTER — LAB REQUISITION (OUTPATIENT)
Dept: LAB | Facility: CLINIC | Age: 81
End: 2021-09-13
Payer: COMMERCIAL

## 2021-09-13 DIAGNOSIS — R10.9 UNSPECIFIED ABDOMINAL PAIN: ICD-10-CM

## 2021-09-13 LAB
ANION GAP SERPL CALCULATED.3IONS-SCNC: 12 MMOL/L (ref 5–18)
BUN SERPL-MCNC: 12 MG/DL (ref 8–28)
CALCIUM SERPL-MCNC: 9.6 MG/DL (ref 8.5–10.5)
CHLORIDE BLD-SCNC: 99 MMOL/L (ref 98–107)
CO2 SERPL-SCNC: 27 MMOL/L (ref 22–31)
CREAT SERPL-MCNC: 0.66 MG/DL (ref 0.6–1.1)
GFR SERPL CREATININE-BSD FRML MDRD: 84 ML/MIN/1.73M2
GLUCOSE BLD-MCNC: 108 MG/DL (ref 70–125)
HGB BLD-MCNC: 11.3 G/DL (ref 11.7–15.7)
POTASSIUM BLD-SCNC: 3.8 MMOL/L (ref 3.5–5)
SODIUM SERPL-SCNC: 138 MMOL/L (ref 136–145)

## 2021-09-13 PROCEDURE — 87086 URINE CULTURE/COLONY COUNT: CPT | Performed by: FAMILY MEDICINE

## 2021-09-13 PROCEDURE — 80048 BASIC METABOLIC PNL TOTAL CA: CPT | Performed by: FAMILY MEDICINE

## 2021-09-13 PROCEDURE — 36415 COLL VENOUS BLD VENIPUNCTURE: CPT | Performed by: FAMILY MEDICINE

## 2021-09-13 PROCEDURE — P9603 ONE-WAY ALLOW PRORATED MILES: HCPCS | Performed by: FAMILY MEDICINE

## 2021-09-13 PROCEDURE — 85018 HEMOGLOBIN: CPT | Performed by: FAMILY MEDICINE

## 2021-09-14 ENCOUNTER — LAB REQUISITION (OUTPATIENT)
Dept: LAB | Facility: CLINIC | Age: 81
End: 2021-09-14
Payer: COMMERCIAL

## 2021-09-14 DIAGNOSIS — E87.6 HYPOKALEMIA: ICD-10-CM

## 2021-09-14 DIAGNOSIS — I15.2 HYPERTENSION SECONDARY TO ENDOCRINE DISORDERS: ICD-10-CM

## 2021-09-15 LAB
ANION GAP SERPL CALCULATED.3IONS-SCNC: 11 MMOL/L (ref 5–18)
BACTERIA UR CULT: NO GROWTH
BUN SERPL-MCNC: 11 MG/DL (ref 8–28)
CALCIUM SERPL-MCNC: 9.8 MG/DL (ref 8.5–10.5)
CHLORIDE BLD-SCNC: 101 MMOL/L (ref 98–107)
CO2 SERPL-SCNC: 27 MMOL/L (ref 22–31)
CREAT SERPL-MCNC: 0.68 MG/DL (ref 0.6–1.1)
GFR SERPL CREATININE-BSD FRML MDRD: 83 ML/MIN/1.73M2
GLUCOSE BLD-MCNC: 124 MG/DL (ref 70–125)
POTASSIUM BLD-SCNC: 4.1 MMOL/L (ref 3.5–5)
POTASSIUM BLD-SCNC: 4.2 MMOL/L (ref 3.5–5)
SODIUM SERPL-SCNC: 139 MMOL/L (ref 136–145)

## 2021-09-15 PROCEDURE — P9603 ONE-WAY ALLOW PRORATED MILES: HCPCS | Performed by: FAMILY MEDICINE

## 2021-09-15 PROCEDURE — 36415 COLL VENOUS BLD VENIPUNCTURE: CPT | Performed by: FAMILY MEDICINE

## 2021-09-15 PROCEDURE — 84132 ASSAY OF SERUM POTASSIUM: CPT | Performed by: FAMILY MEDICINE

## 2021-09-15 PROCEDURE — 80048 BASIC METABOLIC PNL TOTAL CA: CPT | Performed by: FAMILY MEDICINE

## 2021-09-19 ENCOUNTER — LAB REQUISITION (OUTPATIENT)
Dept: LAB | Facility: CLINIC | Age: 81
End: 2021-09-19
Payer: COMMERCIAL

## 2021-09-19 DIAGNOSIS — E87.6 HYPOKALEMIA: ICD-10-CM

## 2021-09-19 DIAGNOSIS — D64.9 ANEMIA, UNSPECIFIED: ICD-10-CM

## 2021-09-20 LAB
ANION GAP SERPL CALCULATED.3IONS-SCNC: 9 MMOL/L (ref 5–18)
BUN SERPL-MCNC: 10 MG/DL (ref 8–28)
CALCIUM SERPL-MCNC: 9.2 MG/DL (ref 8.5–10.5)
CHLORIDE BLD-SCNC: 103 MMOL/L (ref 98–107)
CO2 SERPL-SCNC: 29 MMOL/L (ref 22–31)
CREAT SERPL-MCNC: 0.66 MG/DL (ref 0.6–1.1)
GFR SERPL CREATININE-BSD FRML MDRD: 84 ML/MIN/1.73M2
GLUCOSE BLD-MCNC: 87 MG/DL (ref 70–125)
HGB BLD-MCNC: 10.3 G/DL (ref 11.7–15.7)
POTASSIUM BLD-SCNC: 3.5 MMOL/L (ref 3.5–5)
SODIUM SERPL-SCNC: 141 MMOL/L (ref 136–145)

## 2021-09-20 PROCEDURE — 85018 HEMOGLOBIN: CPT | Performed by: FAMILY MEDICINE

## 2021-09-20 PROCEDURE — P9603 ONE-WAY ALLOW PRORATED MILES: HCPCS | Performed by: FAMILY MEDICINE

## 2021-09-20 PROCEDURE — 36415 COLL VENOUS BLD VENIPUNCTURE: CPT | Performed by: FAMILY MEDICINE

## 2021-09-20 PROCEDURE — 80048 BASIC METABOLIC PNL TOTAL CA: CPT | Performed by: FAMILY MEDICINE

## 2021-09-22 ENCOUNTER — LAB REQUISITION (OUTPATIENT)
Dept: LAB | Facility: CLINIC | Age: 81
End: 2021-09-22
Payer: COMMERCIAL

## 2021-09-22 DIAGNOSIS — R35.0 FREQUENCY OF MICTURITION: ICD-10-CM

## 2021-09-22 PROCEDURE — 87086 URINE CULTURE/COLONY COUNT: CPT | Performed by: FAMILY MEDICINE

## 2021-09-22 PROCEDURE — 81001 URINALYSIS AUTO W/SCOPE: CPT | Performed by: FAMILY MEDICINE

## 2021-09-23 ENCOUNTER — NURSE TRIAGE (OUTPATIENT)
Dept: NURSING | Facility: CLINIC | Age: 81
End: 2021-09-23

## 2021-09-23 LAB
ALBUMIN UR-MCNC: 10 MG/DL
AMORPH CRY #/AREA URNS HPF: ABNORMAL /HPF
APPEARANCE UR: ABNORMAL
BACTERIA #/AREA URNS HPF: ABNORMAL /HPF
BILIRUB UR QL STRIP: NEGATIVE
COLOR UR AUTO: ABNORMAL
GLUCOSE UR STRIP-MCNC: NEGATIVE MG/DL
HGB UR QL STRIP: ABNORMAL
KETONES UR STRIP-MCNC: NEGATIVE MG/DL
LEUKOCYTE ESTERASE UR QL STRIP: ABNORMAL
MUCOUS THREADS #/AREA URNS LPF: PRESENT /LPF
NITRATE UR QL: NEGATIVE
PH UR STRIP: 6 [PH] (ref 5–7)
RBC URINE: 16 /HPF
SP GR UR STRIP: 1.01 (ref 1–1.03)
SQUAMOUS EPITHELIAL: 3 /HPF
TRANSITIONAL EPI: 2 /HPF
UROBILINOGEN UR STRIP-MCNC: <2 MG/DL
WBC CLUMPS #/AREA URNS HPF: PRESENT /HPF
WBC URINE: 175 /HPF

## 2021-09-23 NOTE — TELEPHONE ENCOUNTER
Pt called stating she has trouble trouble in her uterus. Pt stated she didn't sleep the last 4 night she had the pain. Pt ratedher pain level 10/10, and state dit is constant most of the time.  Pt reported she took taylon which  is not relieving the pain. Pt reported she is a litte confused.     Per protocal pt advised to call 911 and she stated okay.       Prakash Freitas RN  [facility]         COVID 19 Nurse Triage Plan/Patient Instructions    Please be aware that novel coronavirus (COVID-19) may be circulating in the community. If you develop symptoms such as fever, cough, or SOB or if you have concerns about the presence of another infection including coronavirus (COVID-19), please contact your health care provider or visit [email or web address].     Disposition/Instructions    Call to EMS/911 recommended. Follow protocol based instructions.     Bring Your Own Device:  Please also bring your smart device(s) (smart phones, tablets, laptops) and their charging cables for your personal use and to communicate with your care team during your visit.    Thank you for taking steps to prevent the spread of this virus.  o Limit your contact with others.  o Wear a simple mask to cover your cough.  o Wash your hands well and often.    Resources    [facility]: About COVID-19: [email or web address]    CDC: What to Do If You're Sick: www.cdc.gov/coronavirus/2019-ncov/about/steps-when-sick.html    CDC: Ending Home Isolation: www.cdc.gov/coronavirus/2019-ncov/hcp/disposition-in-home-patients.html     CDC: Caring for Someone: www.cdc.gov/coronavirus/2019-ncov/if-you-are-sick/care-for-someone.html     [facility]: Interim Guidance for Hospital Discharge to Home: www.health.[facility].[place].us/diseases/coronavirus/hcp/hospdischarge.pdf    [facility] clinical trials (COVID-19 research studies): clinicalaffairs.[facility]/umn-clinical-trials     Below are the COVID-19 hotlines at the [place]  Department of Health ([facility]). Interpreters are available.   o For health questions: Call 556-965-5230 or 1-539.320.8142 (7 a.m. to 7 p.m.)  o For questions about schools and childcare: Call 263-678-6995 or 1-587.403.2575 (7 a.m. to 7 p.m.)                     Reason for Disposition    Sounds like a life-threatening emergency to the triager    Additional Information    Negative: Passed out (i.e., lost consciousness, collapsed and was not responding)    Negative: Shock suspected (e.g., cold/pale/clammy skin, too weak to stand, low BP, rapid pulse)    Negative: Difficult to awaken or acting confused (e.g., disoriented, slurred speech)     Pt said I dont know    Negative: Followed a major injury to the back (e.g., MVA, fall > 10 feet or 3 meters, penetrating injury, etc.)    Negative: Back pain or flank pain during pregnancy    Negative: Upper, mid or lower back pain that occurs mainly in the midline    Protocols used: FLANK PAIN-A-AH

## 2021-09-26 LAB
BACTERIA UR CULT: ABNORMAL
BACTERIA UR CULT: ABNORMAL

## 2021-10-05 ENCOUNTER — TRANSFERRED RECORDS (OUTPATIENT)
Dept: HEALTH INFORMATION MANAGEMENT | Facility: CLINIC | Age: 81
End: 2021-10-05

## 2021-10-11 ENCOUNTER — LAB REQUISITION (OUTPATIENT)
Dept: LAB | Facility: CLINIC | Age: 81
End: 2021-10-11
Payer: COMMERCIAL

## 2021-10-11 DIAGNOSIS — R30.0 DYSURIA: ICD-10-CM

## 2021-10-11 DIAGNOSIS — R19.7 DIARRHEA, UNSPECIFIED: ICD-10-CM

## 2021-10-11 DIAGNOSIS — R50.81 FEVER PRESENTING WITH CONDITIONS CLASSIFIED ELSEWHERE: ICD-10-CM

## 2021-10-12 LAB
ALBUMIN UR-MCNC: NEGATIVE MG/DL
APPEARANCE UR: CLEAR
BACTERIA #/AREA URNS HPF: ABNORMAL /HPF
BILIRUB UR QL STRIP: NEGATIVE
COLOR UR AUTO: ABNORMAL
GLUCOSE UR STRIP-MCNC: NEGATIVE MG/DL
HGB UR QL STRIP: NEGATIVE
KETONES UR STRIP-MCNC: NEGATIVE MG/DL
LEUKOCYTE ESTERASE UR QL STRIP: ABNORMAL
MUCOUS THREADS #/AREA URNS LPF: PRESENT /LPF
NITRATE UR QL: NEGATIVE
PH UR STRIP: 5.5 [PH] (ref 5–7)
RBC URINE: 2 /HPF
SP GR UR STRIP: 1.01 (ref 1–1.03)
SQUAMOUS EPITHELIAL: <1 /HPF
UROBILINOGEN UR STRIP-MCNC: <2 MG/DL
WBC URINE: 4 /HPF

## 2021-10-12 PROCEDURE — 81001 URINALYSIS AUTO W/SCOPE: CPT | Performed by: FAMILY MEDICINE

## 2021-10-19 ENCOUNTER — LAB REQUISITION (OUTPATIENT)
Dept: LAB | Facility: CLINIC | Age: 81
End: 2021-10-19
Payer: COMMERCIAL

## 2021-10-19 DIAGNOSIS — I15.2 HYPERTENSION SECONDARY TO ENDOCRINE DISORDERS: ICD-10-CM

## 2021-10-19 DIAGNOSIS — D64.9 ANEMIA, UNSPECIFIED: ICD-10-CM

## 2021-10-20 LAB
ANION GAP SERPL CALCULATED.3IONS-SCNC: 7 MMOL/L (ref 5–18)
BUN SERPL-MCNC: 10 MG/DL (ref 8–28)
CALCIUM SERPL-MCNC: 9.5 MG/DL (ref 8.5–10.5)
CHLORIDE BLD-SCNC: 107 MMOL/L (ref 98–107)
CO2 SERPL-SCNC: 29 MMOL/L (ref 22–31)
CREAT SERPL-MCNC: 0.66 MG/DL (ref 0.6–1.1)
ERYTHROCYTE [DISTWIDTH] IN BLOOD BY AUTOMATED COUNT: 13.3 % (ref 10–15)
GFR SERPL CREATININE-BSD FRML MDRD: 84 ML/MIN/1.73M2
GLUCOSE BLD-MCNC: 84 MG/DL (ref 70–125)
HCT VFR BLD AUTO: 35.8 % (ref 35–47)
HGB BLD-MCNC: 11.1 G/DL (ref 11.7–15.7)
MCH RBC QN AUTO: 30.8 PG (ref 26.5–33)
MCHC RBC AUTO-ENTMCNC: 31 G/DL (ref 31.5–36.5)
MCV RBC AUTO: 99 FL (ref 78–100)
PLATELET # BLD AUTO: 271 10E3/UL (ref 150–450)
POTASSIUM BLD-SCNC: 3.9 MMOL/L (ref 3.5–5)
RBC # BLD AUTO: 3.6 10E6/UL (ref 3.8–5.2)
SODIUM SERPL-SCNC: 143 MMOL/L (ref 136–145)
WBC # BLD AUTO: 7 10E3/UL (ref 4–11)

## 2021-10-20 PROCEDURE — 80048 BASIC METABOLIC PNL TOTAL CA: CPT | Mod: ORL | Performed by: NURSE PRACTITIONER

## 2021-10-20 PROCEDURE — P9603 ONE-WAY ALLOW PRORATED MILES: HCPCS | Mod: ORL | Performed by: NURSE PRACTITIONER

## 2021-10-20 PROCEDURE — 85027 COMPLETE CBC AUTOMATED: CPT | Mod: ORL | Performed by: NURSE PRACTITIONER

## 2021-10-20 PROCEDURE — 36415 COLL VENOUS BLD VENIPUNCTURE: CPT | Mod: ORL | Performed by: NURSE PRACTITIONER

## 2021-10-22 ENCOUNTER — MEDICAL CORRESPONDENCE (OUTPATIENT)
Dept: HEALTH INFORMATION MANAGEMENT | Facility: CLINIC | Age: 81
End: 2021-10-22
Payer: MEDICARE

## 2021-10-27 NOTE — LETTER
6/10/2021         RE: Milena Hamilton  [address] [facility] [address]        Dear Colleague,    Thank you for referring your patient, Milena Hamilton, to the [facility]OS[facility] [place]. Please see a copy of my visit note below.    [facility] Neurosurgery  Neurosurgery Clinic Visit      CC: back and leg pain    Primary care Provider: Chuck Streeter    Reason For Visit:   I was asked by Chuck Streeter PA-C to consult on the patient for lumbosacral radiculopathy at S1, chronic right-sided low back pain with right-sided sciatica.      HPI: Milena Hamilton is a 81 year old female with a history of chronic back and right leg pain s/p lumbar laminectomy 3 years ago with Dr. Broderick. She presents with worsening symptoms. Today she reports 2 years of low back and right posterior thigh pain. She denies paresthesias and overt weakness. She underwent a LESI with Dr. Dasilva 1/29/2021, but does not recall this. Her previous injection in 2020 gave her some relieft. She has been seen by Dr. Cast 3/30/2017. At that time a L4-S1 AP fusion was discussed.       Past Medical History:   Diagnosis Date     Lichen planus     vulvar     Osteopenia 4/2010    recheck DEXA in 2 years     Panic disorder without agoraphobia      Pure hypercholesterolemia     low LDLs     Raynaud's disease 6/2014     Unspecified essential hypertension        Past Medical History reviewed with patient during visit.    Past Surgical History:   Procedure Laterality Date     C APPENDECTOMY       C VAG HYST,RMV TUBE/OVARY  1984    cervix removed     COLONOSCOPY  10/17/2007    normal, recheck in 10 years     COLONOSCOPY N/A 8/27/2014    Procedure: COLONOSCOPY;  Surgeon: Raffi Montiel MD;  Location:  GI     COMBINED CYSTOSCOPY, INSERT CATHETER URETER Bilateral 10/13/2014    Procedure: COMBINED CYSTOSCOPY, INSERT CATHETER URETER;  Surgeon: Abdoulaye Odell MD;  Location:  OR     EYE SURGERY  8/01    right      Caller: Mary Albarran    Relationship: Self      Medication requested (name and dosage):     Requested Prescriptions:   HYDROCODONE  EVERY 4 HOURS OR AS NEEDED FOR PAIN PER PATIENT (MEDICATION NOT IN LIST)     Pharmacy where request should be sent: [id number] - [place], [place] - [address] AT [facility] [phone]  - [phone]   [phone]    Additional details provided by patient: PATIENT HAS A TWO DAYS SUPPLY. PATIENT WOULD LIKE A CALL BACK ABOUT LABS THAT WERE ORDERED BY DR. WILLIAM. SHE WANTED TO KNOW IF SHE COULD HAVE THEM DRAWN AT THIS OFFICE IF SHE HAS THE ORDERS.  Best call back number: [phone]    Does the patient have less than a 3 day supply:  [x] Yes  [] No    Steven Max Rep   10/27/21 09:49 EDT          eye muscle repair     HC REMOVAL GALLBLADDER  2/9/2010    lap     HC REMOVAL OF OVARY/TUBE(S)      Salpingo-Oophorectomy, bilateral     HYSTERECTOMY, PAP NO LONGER INDICATED       INJECT EPIDURAL LUMBAR Right 4/10/2017    Procedure: INJECT EPIDURAL LUMBAR;  Surgeon: Raffi Wright MD;  Location: [facility] OR     INJECT EPIDURAL LUMBAR Right 2/27/2020    Procedure: Right Lumbar 4- Sacral 1 Epidural Steroid Injection;  Surgeon: Antonio Dasilva MD;  Location: [facility] OR     INJECT EPIDURAL LUMBAR Right 1/29/2021    Procedure: INJECTION, SPINE,  sacral 1 EPIDURAL;  Surgeon: Antonio Dasilva MD;  Location: [facility] OR     INJECT EPIDURAL TRANSFORAMINAL Right 6/9/2016    Procedure: INJECT EPIDURAL TRANSFORAMINAL;  Surgeon: Antonio Dasilva MD;  Location: [facility] OR     INJECT EPIDURAL TRANSFORAMINAL Right 10/13/2016    Procedure: INJECT EPIDURAL TRANSFORAMINAL;  Surgeon: Antonio Dasilva MD;  Location: [facility] OR     LAPAROSCOPIC ASSISTED COLECTOMY LEFT (DESCENDING) N/A 10/13/2014    Procedure: LAPAROSCOPIC ASSISTED COLECTOMY LEFT (DESCENDING);  Surgeon: Raffi Montiel MD;  Location: [facility] OR     ZZC NONSPECIFIC PROCEDURE      Right inferior oblique recession, 14 mm     Past Surgical History reviewed with patient during visit.    Current Outpatient Medications   Medication     ALPRAZolam (XANAX) 0.25 MG tablet     Ascorbic Acid (VITAMIN C PO)     ASPIRIN 81 MG OR TABS     calcium carbonate-vitamin D (OSCAL W/D) 500-200 MG-UNIT tablet     cyclobenzaprine (FLEXERIL) 5 MG tablet     Dentifrices (BIOTENE DRY MOUTH) PSTE     fish oil-omega-3 fatty acids 1000 MG capsule     ibuprofen (ADVIL/MOTRIN) 600 MG tablet     levothyroxine (SYNTHROID/LEVOTHROID) 50 MCG tablet     meclizine (ANTIVERT) 25 MG tablet     metoprolol tartrate (LOPRESSOR) 25 MG tablet     omeprazole (PRILOSEC) 20 MG DR capsule     simvastatin (ZOCOR) 20 MG tablet     THERAPEUTIC MULTIVITAMIN OR     No current facility-administered medications for this visit.        Allergies  "  Allergen Reactions     Azithromycin Diarrhea     Ciprofloxacin Other (See Comments)     Cipro, dizziness and abdominal pain     Gabapentin Dizziness and Visual Disturbance     Penicillins      stomach upset, diarrhea     Sulfa Drugs      hives       Social History     Socioeconomic History     Marital status:      Spouse name: Not on file     Number of children: 4     Years of education: Not on file     Highest education level: Not on file   Occupational History     Occupation: dry cleaning   Social Needs     Financial resource strain: Not on file     Food insecurity     Worry: Not on file     Inability: Not on file     Transportation needs     Medical: Not on file     Non-medical: Not on file   Tobacco Use     Smoking status: Never Smoker     Smokeless tobacco: Never Used   Substance and Sexual Activity     Alcohol use: No     Drug use: No     Sexual activity: Never     Partners: Male     Birth control/protection: Surgical     Comment: not currently/hysterectomy   Lifestyle     Physical activity     Days per week: Not on file     Minutes per session: Not on file     Stress: Not on file   Relationships     Social connections     Talks on phone: Not on file     Gets together: Not on file     Attends Confucianism service: Not on file     Active member of club or organization: Not on file     Attends meetings of clubs or organizations: Not on file     Relationship status: Not on file     Intimate partner violence     Fear of current or ex partner: Not on file     Emotionally abused: Not on file     Physically abused: Not on file     Forced sexual activity: Not on file   Other Topics Concern     Parent/sibling w/ CABG, MI or angioplasty before 65F 55M? No   Social History Narrative     Not on file       Family History   Problem Relation Age of Onset     Diabetes Brother      Heart Disease Brother 50        AMI     Diabetes Brother      Heart Disease Brother         AMI, \"older\"     Cerebrovascular Disease Brother  " "    Psychotic Disorder Brother         , alcohol     Cancer Sister         breast cancer x2     Hypertension No family hx of      Breast Cancer No family hx of      Ovarian Cancer No family hx of      Prostate Cancer No family hx of      Mental Illness No family hx of      Anesthesia Reaction No family hx of      Osteoporosis No family hx of      Obesity No family hx of      Other Cancer No family hx of      Depression No family hx of      Anxiety Disorder No family hx of      Mental Illness No family hx of      Substance Abuse No family hx of          ROS: 10 point ROS neg other than the symptoms noted above in the HPI.    Vital Signs:   /76   Temp 97.6  F (36.4  C) (Temporal)   Ht 5' 2\" (1.575 m)   Wt 137 lb 3.2 oz (62.2 kg)   LMP  (LMP Unknown)   BMI 25.09 kg/m        Examination:  Constitutional:  Alert, well nourished, NAD.  Memory: recent and remote memory   HEENT: Normocephalic, atraumatic.   Pulm:  Without shortness of breath   CV:  No pitting edema of BLE.      Neurological:  Awake  Alert  Oriented x 3  Speech clear  Tongue midline    Motor exam:  Hip Flexor:                Right: 5/5  Left:  5/5  Hip Adductor:             Right:  5/5  Left:  5/5  Hip Abductor:             Right:  5/5  Left:  5/5  Gastroc Soleus:        Right:  5/5  Left:  5/5  Tib/Ant:                      Right:  5/5  Left:  5/5  EHL:                          Right:  5/5  Left:  5/5     Sensation normal to bilateral upper and lower extremities  Muscle tone to bilateral upper and lower extremities   Gait: Able to stand from a seated position. Normal non-antalgic, non-myelopathic gait.  Able to heel/toe walk without loss of balance    Lumbar examination reveals no tenderness of the spine or paraspinous muscles.  Hip height is symmetrical. Negative SI joint, sciatic notch or greater trochanteric tenderness to palpation bilaterally.  Straight leg raise is negative bilaterally.      Imaging:   Lumbar MRI 2/15/2021  IMPRESSION:  "   1. Five lumbar-type vertebral bodies for the purposes of this dictation. Spinal nomenclature is different compared to previous spine MR 4/25/2019. Recommend close attention to spinal numbering if site-specific therapy is considered.  2. Residual enhancing granulation tissue around the right L5 nerve root in the L5-S1 neural foramen near the operative bed. There appears to be residual moderate right neural foraminal narrowing secondary to disc bulge and uncinate spurring. Disc bulge appears to be possibly increased since previous MR 4/25/2019.  3. Persistent right lateral recess narrowing at L4-L5 which may affect the right L5 nerve root. This is similar to prior MR 4/25/2019.  4. Additional degenerative changes throughout the lumbar spine as detailed above, grossly similar to prior. No other significant spinal canal or neural foraminal narrowings.    Assessment/Plan:   Lumbar radiculopathy. Discussed options. Will plan for repeat LESI and PT at this time and she should follow up with Dr. Cast if symptoms persist or worsen to discuss other options. She verbalized understanding and agreement.    Patient Instructions   -Lumbar injection ordered. They will contact you to schedule.  -Physical therapy ordered. They will contact you to schedule.  -Please contact our clinic with questions or concerns at [phone].      Renetta Brown CNP  [facility] Neurosurgery  [address] [place] [address]  Tel [phone]  Fax [phone]        Again, thank you for allowing me to participate in the care of your patient.        Sincerely,        Renetta Brown NP

## 2021-10-28 DIAGNOSIS — M54.41 CHRONIC RIGHT-SIDED LOW BACK PAIN WITH RIGHT-SIDED SCIATICA: Primary | ICD-10-CM

## 2021-10-28 DIAGNOSIS — G89.29 CHRONIC RIGHT-SIDED LOW BACK PAIN WITH RIGHT-SIDED SCIATICA: Primary | ICD-10-CM

## 2021-10-28 RX ORDER — ACETAMINOPHEN 500 MG
1000 TABLET ORAL
Qty: 180 TABLET | Refills: 1 | Status: SHIPPED | OUTPATIENT
Start: 2021-10-28 | End: 2021-11-24

## 2021-10-28 RX ORDER — ACETAMINOPHEN 500 MG
1000 TABLET ORAL DAILY PRN
Qty: 60 TABLET | Refills: 5 | Status: SHIPPED | OUTPATIENT
Start: 2021-10-28 | End: 2021-11-24

## 2021-10-28 NOTE — TELEPHONE ENCOUNTER
[facility] calling.  They would like to have her tyelnol switched from tylenol 1000 mg TID to  tylenol 1000 mg twice a daily and 1000 mg PRN x 1.  Rx pending.  not on current med list.    FYI: She has been having yellow runny clumpy stools 1-2 per day for a while.  No pain when passing.  Still urinating.     [facility].  Phone: [phone]     Fax to [phone]     Please sign and fax script.    Abhijit Dumont, SHERRILLN, RN, PHN  [facility] ~ Registered Nurse  Clinic Triage ~ [place] [facility] [place]  October 28, 2021

## 2021-10-28 NOTE — TELEPHONE ENCOUNTER
Called and spoke to [facility] and gave information below.   Called and spoke to patient and schedule visit with [facility] for next Wednesday which was next available.     Nurse with [facility] inquired about Xanax - asked if it was PRN. I read sig in chart that we have so she can update her medication list.    Bethany Cheek MA

## 2021-10-28 NOTE — TELEPHONE ENCOUNTER
Rx placed in bin. Recommend she be seen by one of my colleagues for her diarrhea.    Chuck Streeter PA-C

## 2021-11-01 ENCOUNTER — TELEPHONE (OUTPATIENT)
Dept: FAMILY MEDICINE | Facility: OTHER | Age: 81
End: 2021-11-01

## 2021-11-01 DIAGNOSIS — Z53.9 DIAGNOSIS NOT YET DEFINED: Primary | ICD-10-CM

## 2021-11-01 PROCEDURE — G0180 MD CERTIFICATION HHA PATIENT: HCPCS | Performed by: PHYSICIAN ASSISTANT

## 2021-11-01 NOTE — TELEPHONE ENCOUNTER
Form placed in out of office box in Team C  for review/signature of covering provider.    Bceky HIDALGO CMA

## 2021-11-01 NOTE — TELEPHONE ENCOUNTER
Reason for Call:  Form, our goal is to have forms completed with 72 hours, however, some forms may require a visit or additional information.    Type of letter, form or note:  medical    Who is the form from?: [facility] needs signature (if other please explain)    Where did the form come from: form was faxed in    What clinic location was the form placed at?: [facility] [phone]    Where the form was placed: Team B Box/Folder    What number is listed as a contact on the form?: [phone]       Additional comments: Please sign and fax to [phone]    Call taken on 11/1/2021 at 8:25 AM by Elena Mcnair

## 2021-11-02 ENCOUNTER — TELEPHONE (OUTPATIENT)
Dept: FAMILY MEDICINE | Facility: OTHER | Age: 81
End: 2021-11-02

## 2021-11-02 ENCOUNTER — MEDICAL CORRESPONDENCE (OUTPATIENT)
Dept: HEALTH INFORMATION MANAGEMENT | Facility: CLINIC | Age: 81
End: 2021-11-02
Payer: MEDICARE

## 2021-11-02 NOTE — TELEPHONE ENCOUNTER
Reason for Call:  Form, our goal is to have forms completed with 72 hours, however, some forms may require a visit or additional information.    Type of letter, form or note:  orders    Who is the form from?: [facility] (if other please explain)    Where did the form come from: form was faxed in    What clinic location was the form placed at?: [facility] [phone]    Where the form was placed: Team B Box/Folder    What number is listed as a contact on the form?:  [phone]       Additional comments:  Fax [phone]    Call taken on 11/2/2021 at 7:11 AM by Luz Marina Kerns

## 2021-11-02 NOTE — TELEPHONE ENCOUNTER
Erroneous  SHERRILL HardyN, RN, PHN  [place]  November 2, 2021

## 2021-11-03 DIAGNOSIS — K21.9 GASTROESOPHAGEAL REFLUX DISEASE: ICD-10-CM

## 2021-11-04 NOTE — TELEPHONE ENCOUNTER
Refill given, follow up with C[facility] for further refills.       FORREST Joyner CNP  Questions or concerns please feel free to send me a [employer] message or call me  Phone : [phone]

## 2021-11-22 ENCOUNTER — TELEPHONE (OUTPATIENT)
Dept: FAMILY MEDICINE | Facility: OTHER | Age: 81
End: 2021-11-22
Payer: MEDICARE

## 2021-11-22 NOTE — TELEPHONE ENCOUNTER
Reason for Call:  Form, our goal is to have forms completed with 72 hours, however, some forms may require a visit or additional information.    Type of letter, form or note:  orders    Who is the form from?: [facility] (if other please explain)    Where did the form come from: form was faxed in    What clinic location was the form placed at?: [facility] [phone]    Where the form was placed: Team B Box/Folder    What number is listed as a contact on the form?: [phone]       Additional comments:  Fax [phone]    Call taken on 11/22/2021 at 7:05 AM by Luz Marina Kerns

## 2021-11-23 ENCOUNTER — MEDICAL CORRESPONDENCE (OUTPATIENT)
Dept: HEALTH INFORMATION MANAGEMENT | Facility: CLINIC | Age: 81
End: 2021-11-23
Payer: MEDICARE

## 2021-11-23 NOTE — PROGRESS NOTES
Assessment & Plan   Problem List Items Addressed This Visit     Hyperlipidemia     Continue simvastatin         Anxiety     Uses xanax and atarax PRN for anxiety         Relevant Medications    hydrOXYzine (ATARAX) 10 MG tablet    Diarrhea     Using immodium as needed for diarrhea. stable         HTN (hypertension)     Has history of hypertension and was previously on metoprolol which was spotted following discharge after her most recent surgery. Blood pressures are well controlled. Continue to monitor q6 months.          Acquired hypothyroidism     Continue on current dose of levothyroxine         Lumbar radiculopathy - Primary     Pt with history of  Right L5-S1 radiculopathy, degenerative scoliosis with coronal deformity  Underwent Bilateral L5-S1 decompression and diskectomy with transforaminal interbody fusion with morselized autograft and BMP,Insertion of intervertebral mechanical device at L5-S1 with Medtronic ARTiC-L., Left L2-3 transforaminal diskectomy and interbody fusion with morcellized autograft and BMP, Insertion of intervertebral mechanical device at L2-3 with Medtronic ARTiC-L,Posterior segmental instrumentation from L2 to S1 with Medtronic Solera system, Bilateral sacral pelvic fixation with Medtronic Ballast system, L2 to S1 posterior fusion with morselized allograft and autograft, Intraoperative use of stereotaxis is here for follow up.  Currently walking with a walker at all times. Currently has home health PT who has been working with her to eventually transition her to a cane.   Currently in Dilaudid as prescribed by her surgeon which she has been taking one pill daily as needed  Continue home PT.               Relevant Medications    HYDROmorphone (DILAUDID) 2 MG tablet    hydrOXYzine (ATARAX) 10 MG tablet    methocarbamol (ROBAXIN) 500 MG tablet    Dysuria    Relevant Orders    UA with Microscopic reflex to Culture - lab collect (Completed)    Urine Microscopic (Completed)      Other Visit  Diagnoses     Need for prophylactic vaccination and inoculation against influenza        Benign essential hypertension        Relevant Orders    Basic metabolic panel  (Ca, Cl, CO2, Creat, Gluc, K, Na, BUN) (Completed)    Anemia, unspecified type        Relevant Orders    CBC with platelets (Completed)             Return in about 3 months (around 2/24/2022).    Olga Mccray MD  [facility] OLIVIA Abbott is a 81 year old who presents for the following health issues     HPI   Has had surgery done September - done at [facility]. Here to follow up on her medications and new symptoms of dysuria      Review of Systems   Constitutional, HEENT, cardiovascular, pulmonary, gi and gu systems are negative, except as otherwise noted.      Objective    LMP  (LMP Unknown)   There is no height or weight on file to calculate BMI.  Physical Exam   GENERAL: healthy, alert and no distress  NECK: no adenopathy, no asymmetry, masses, or scars and thyroid normal to palpation  RESP: lungs clear to auscultation - no rales, rhonchi or wheezes  CV: regular rate and rhythm, normal S1 S2, no S3 or S4, no murmur, click or rub, no peripheral edema and peripheral pulses strong  ABDOMEN: soft, nontender, no hepatosplenomegaly, no masses and bowel sounds normal  MS: no gross musculoskeletal defects noted, no edema    Results for orders placed or performed in visit on 11/24/21   UA with Microscopic reflex to Culture - lab collect     Status: Abnormal    Specimen: Urine, Midstream   Result Value Ref Range    Color Urine Yellow Colorless, Straw, Light Yellow, Yellow    Appearance Urine Clear Clear    Glucose Urine Negative Negative mg/dL    Bilirubin Urine Negative Negative    Ketones Urine Negative Negative mg/dL    Specific Gravity Urine 1.015 1.003 - 1.035    Blood Urine Negative Negative    pH Urine 5.5 5.0 - 7.0    Protein Albumin Urine Negative Negative mg/dL    Urobilinogen Urine 0.2 0.2, 1.0 E.U./dL    Nitrite  Urine Negative Negative    Leukocyte Esterase Urine Trace (A) Negative   Basic metabolic panel  (Ca, Cl, CO2, Creat, Gluc, K, Na, BUN)     Status: Abnormal   Result Value Ref Range    Sodium 137 133 - 144 mmol/L    Potassium 3.6 3.4 - 5.3 mmol/L    Chloride 103 94 - 109 mmol/L    Carbon Dioxide (CO2) 32 20 - 32 mmol/L    Anion Gap 2 (L) 3 - 14 mmol/L    Urea Nitrogen 9 7 - 30 mg/dL    Creatinine 0.60 0.52 - 1.04 mg/dL    Calcium 9.0 8.5 - 10.1 mg/dL    Glucose 85 70 - 99 mg/dL    GFR Estimate 86 >60 mL/min/1.73m2   CBC with platelets     Status: Abnormal   Result Value Ref Range    WBC Count 4.8 4.0 - 11.0 10e3/uL    RBC Count 3.76 (L) 3.80 - 5.20 10e6/uL    Hemoglobin 11.8 11.7 - 15.7 g/dL    Hematocrit 36.9 35.0 - 47.0 %    MCV 98 78 - 100 fL    MCH 31.4 26.5 - 33.0 pg    MCHC 32.0 31.5 - 36.5 g/dL    RDW 13.2 10.0 - 15.0 %    Platelet Count 252 150 - 450 10e3/uL   Urine Microscopic     Status: Abnormal   Result Value Ref Range    RBC Urine 0-2 0-2 /HPF /HPF    WBC Urine 0-5 0-5 /HPF /HPF    Squamous Epithelials Urine Few (A) None Seen /LPF    Mucus Urine Present (A) None Seen /LPF    Narrative    Urine Culture not indicated

## 2021-11-24 ENCOUNTER — OFFICE VISIT (OUTPATIENT)
Dept: FAMILY MEDICINE | Facility: OTHER | Age: 81
End: 2021-11-24
Payer: COMMERCIAL

## 2021-11-24 VITALS
BODY MASS INDEX: 23.41 KG/M2 | TEMPERATURE: 97.2 F | WEIGHT: 128 LBS | OXYGEN SATURATION: 97 % | DIASTOLIC BLOOD PRESSURE: 76 MMHG | SYSTOLIC BLOOD PRESSURE: 132 MMHG | HEART RATE: 89 BPM

## 2021-11-24 DIAGNOSIS — R30.0 DYSURIA: ICD-10-CM

## 2021-11-24 DIAGNOSIS — D64.9 ANEMIA, UNSPECIFIED TYPE: ICD-10-CM

## 2021-11-24 DIAGNOSIS — E03.9 ACQUIRED HYPOTHYROIDISM: ICD-10-CM

## 2021-11-24 DIAGNOSIS — R19.7 DIARRHEA, UNSPECIFIED TYPE: ICD-10-CM

## 2021-11-24 DIAGNOSIS — Z23 NEED FOR PROPHYLACTIC VACCINATION AND INOCULATION AGAINST INFLUENZA: ICD-10-CM

## 2021-11-24 DIAGNOSIS — F41.9 ANXIETY: ICD-10-CM

## 2021-11-24 DIAGNOSIS — E78.5 HYPERLIPIDEMIA, UNSPECIFIED HYPERLIPIDEMIA TYPE: ICD-10-CM

## 2021-11-24 DIAGNOSIS — I10 PRIMARY HYPERTENSION: ICD-10-CM

## 2021-11-24 DIAGNOSIS — I10 BENIGN ESSENTIAL HYPERTENSION: ICD-10-CM

## 2021-11-24 DIAGNOSIS — M54.16 LUMBAR RADICULOPATHY: Primary | ICD-10-CM

## 2021-11-24 PROBLEM — H26.40 AFTER CATARACT, LEFT EYE: Status: RESOLVED | Noted: 2017-09-18 | Resolved: 2021-11-24

## 2021-11-24 PROBLEM — N28.9 RENAL INSUFFICIENCY SYNDROME: Status: ACTIVE | Noted: 2021-09-04

## 2021-11-24 PROBLEM — R11.10 INTRACTABLE VOMITING: Status: RESOLVED | Noted: 2018-02-27 | Resolved: 2021-11-24

## 2021-11-24 PROBLEM — H25.019 CORTICAL AGE-RELATED CATARACT: Status: RESOLVED | Noted: 2017-01-16 | Resolved: 2021-11-24

## 2021-11-24 PROBLEM — C44.311 BASAL CELL CARCINOMA OF NOSE: Status: ACTIVE | Noted: 2021-09-08

## 2021-11-24 PROBLEM — K52.9 GASTROENTERITIS: Status: RESOLVED | Noted: 2018-02-27 | Resolved: 2021-11-24

## 2021-11-24 PROBLEM — H25.019 CORTICAL SENILE CATARACT: Status: RESOLVED | Noted: 2017-01-16 | Resolved: 2021-11-24

## 2021-11-24 LAB
ALBUMIN UR-MCNC: NEGATIVE MG/DL
ANION GAP SERPL CALCULATED.3IONS-SCNC: 2 MMOL/L (ref 3–14)
APPEARANCE UR: CLEAR
BILIRUB UR QL STRIP: NEGATIVE
BUN SERPL-MCNC: 9 MG/DL (ref 7–30)
CALCIUM SERPL-MCNC: 9 MG/DL (ref 8.5–10.1)
CHLORIDE BLD-SCNC: 103 MMOL/L (ref 94–109)
CO2 SERPL-SCNC: 32 MMOL/L (ref 20–32)
COLOR UR AUTO: YELLOW
CREAT SERPL-MCNC: 0.6 MG/DL (ref 0.52–1.04)
ERYTHROCYTE [DISTWIDTH] IN BLOOD BY AUTOMATED COUNT: 13.2 % (ref 10–15)
GFR SERPL CREATININE-BSD FRML MDRD: 86 ML/MIN/1.73M2
GLUCOSE BLD-MCNC: 85 MG/DL (ref 70–99)
GLUCOSE UR STRIP-MCNC: NEGATIVE MG/DL
HCT VFR BLD AUTO: 36.9 % (ref 35–47)
HGB BLD-MCNC: 11.8 G/DL (ref 11.7–15.7)
HGB UR QL STRIP: NEGATIVE
KETONES UR STRIP-MCNC: NEGATIVE MG/DL
LEUKOCYTE ESTERASE UR QL STRIP: ABNORMAL
MCH RBC QN AUTO: 31.4 PG (ref 26.5–33)
MCHC RBC AUTO-ENTMCNC: 32 G/DL (ref 31.5–36.5)
MCV RBC AUTO: 98 FL (ref 78–100)
MUCOUS THREADS #/AREA URNS LPF: PRESENT /LPF
NITRATE UR QL: NEGATIVE
PH UR STRIP: 5.5 [PH] (ref 5–7)
PLATELET # BLD AUTO: 252 10E3/UL (ref 150–450)
POTASSIUM BLD-SCNC: 3.6 MMOL/L (ref 3.4–5.3)
RBC # BLD AUTO: 3.76 10E6/UL (ref 3.8–5.2)
RBC #/AREA URNS AUTO: ABNORMAL /HPF
SODIUM SERPL-SCNC: 137 MMOL/L (ref 133–144)
SP GR UR STRIP: 1.01 (ref 1–1.03)
SQUAMOUS #/AREA URNS AUTO: ABNORMAL /LPF
UROBILINOGEN UR STRIP-ACNC: 0.2 E.U./DL
WBC # BLD AUTO: 4.8 10E3/UL (ref 4–11)
WBC #/AREA URNS AUTO: ABNORMAL /HPF

## 2021-11-24 PROCEDURE — 81001 URINALYSIS AUTO W/SCOPE: CPT | Performed by: FAMILY MEDICINE

## 2021-11-24 PROCEDURE — 80048 BASIC METABOLIC PNL TOTAL CA: CPT | Performed by: FAMILY MEDICINE

## 2021-11-24 PROCEDURE — 36415 COLL VENOUS BLD VENIPUNCTURE: CPT | Performed by: FAMILY MEDICINE

## 2021-11-24 PROCEDURE — 99214 OFFICE O/P EST MOD 30 MIN: CPT | Performed by: FAMILY MEDICINE

## 2021-11-24 PROCEDURE — 85027 COMPLETE CBC AUTOMATED: CPT | Performed by: FAMILY MEDICINE

## 2021-11-24 RX ORDER — LOPERAMIDE HYDROCHLORIDE 2 MG/1
2 TABLET ORAL DAILY PRN
COMMUNITY
Start: 2021-10-08 | End: 2022-04-06

## 2021-11-24 RX ORDER — POLYETHYLENE GLYCOL 3350 17 G/17G
1 POWDER, FOR SOLUTION ORAL DAILY PRN
COMMUNITY
Start: 2021-10-05 | End: 2022-05-25

## 2021-11-24 RX ORDER — HYDROMORPHONE HYDROCHLORIDE 2 MG/1
2 TABLET ORAL DAILY
COMMUNITY
Start: 2021-10-20 | End: 2022-03-24

## 2021-11-24 RX ORDER — VITAMIN E 268 MG
400 CAPSULE ORAL DAILY
COMMUNITY
End: 2023-02-13

## 2021-11-24 RX ORDER — METHOCARBAMOL 500 MG/1
500 TABLET, FILM COATED ORAL DAILY
COMMUNITY
Start: 2021-10-20 | End: 2022-03-24

## 2021-11-24 RX ORDER — IBUPROFEN 200 MG
500 CAPSULE ORAL 2 TIMES DAILY
COMMUNITY
End: 2022-04-06

## 2021-11-24 RX ORDER — SENNOSIDES A AND B 8.6 MG/1
8.6 TABLET, FILM COATED ORAL DAILY PRN
COMMUNITY
Start: 2021-09-08 | End: 2022-04-06

## 2021-11-24 RX ORDER — HYDROXYZINE HYDROCHLORIDE 10 MG/1
5-10 TABLET, FILM COATED ORAL EVERY 6 HOURS PRN
COMMUNITY
Start: 2021-10-20 | End: 2022-11-16

## 2021-11-24 NOTE — ASSESSMENT & PLAN NOTE
Pt with history of  Right L5-S1 radiculopathy, degenerative scoliosis with coronal deformity  Underwent Bilateral L5-S1 decompression and diskectomy with transforaminal interbody fusion with morselized autograft and BMP,Insertion of intervertebral mechanical device at L5-S1 with Medtronic ARTiC-L., Left L2-3 transforaminal diskectomy and interbody fusion with morcellized autograft and BMP, Insertion of intervertebral mechanical device at L2-3 with Medtronic ARTiC-L,Posterior segmental instrumentation from L2 to S1 with Medtronic Solera system, Bilateral sacral pelvic fixation with Medtronic Ballast system, L2 to S1 posterior fusion with morselized allograft and autograft, Intraoperative use of stereotaxis is here for follow up.  Currently walking with a walker at all times. Currently has home health PT who has been working with her to eventually transition her to a cane.   Currently in Dilaudid as prescribed by her surgeon which she has been taking one pill daily as needed  Continue home PT.

## 2021-11-24 NOTE — ASSESSMENT & PLAN NOTE
Has history of hypertension and was previously on metoprolol which was spotted following discharge after her most recent surgery. Blood pressures are well controlled. Continue to monitor q6 months.

## 2021-11-26 ENCOUNTER — TELEPHONE (OUTPATIENT)
Dept: FAMILY MEDICINE | Facility: OTHER | Age: 81
End: 2021-11-26
Payer: MEDICARE

## 2021-11-26 NOTE — RESULT ENCOUNTER NOTE
Please call and inform pt that urine analysis did not show any significant sign of infections. It looked much better compared to a previous test. I would recommend monitoring your symptoms for now and repeating the test in the next 1-2 weeks if your symptoms gett worse. The rest of the labs are stable

## 2021-11-26 NOTE — TELEPHONE ENCOUNTER
----- Message from Olga Mccray MD sent at 11/26/2021  7:36 AM CST -----  Please call and inform pt that urine analysis did not show any significant sign of infections. It looked much better compared to a previous test. I would recommend monitoring your symptoms for now and repeating the test in the next 1-2 weeks if your symptoms gett worse. The rest of the labs are stable

## 2021-12-09 ENCOUNTER — TRANSFERRED RECORDS (OUTPATIENT)
Dept: HEALTH INFORMATION MANAGEMENT | Facility: CLINIC | Age: 81
End: 2021-12-09
Payer: MEDICARE

## 2022-01-03 DIAGNOSIS — F41.9 ANXIETY: ICD-10-CM

## 2022-01-04 ENCOUNTER — TRANSFERRED RECORDS (OUTPATIENT)
Dept: HEALTH INFORMATION MANAGEMENT | Facility: CLINIC | Age: 82
End: 2022-01-04
Payer: COMMERCIAL

## 2022-01-05 RX ORDER — ALPRAZOLAM 0.25 MG
TABLET ORAL
Qty: 30 TABLET | Refills: 2 | Status: SHIPPED | OUTPATIENT
Start: 2022-01-05 | End: 2022-05-25

## 2022-02-03 DIAGNOSIS — E78.5 HYPERLIPIDEMIA LDL GOAL <130: ICD-10-CM

## 2022-02-03 RX ORDER — SIMVASTATIN 20 MG
TABLET ORAL
Qty: 45 TABLET | Refills: 0 | Status: SHIPPED | OUTPATIENT
Start: 2022-02-03 | End: 2022-03-24

## 2022-02-03 NOTE — TELEPHONE ENCOUNTER
Patient returned call and was given message from below. She is scheduled on March 24th with [facility] for a medicare wellness/physical.

## 2022-02-03 NOTE — TELEPHONE ENCOUNTER
Pending Prescriptions:                       Disp   Refills    simvastatin (ZOCOR) 20 MG tablet [Pharmacy*45 tab*3        Sig: TAKE 1/2 TABLET BY MOUTH EVERY NIGHT    Routing refill request to provider for review/approval because:  Labs not current:  LDL  A break in medication

## 2022-02-11 ENCOUNTER — TRANSFERRED RECORDS (OUTPATIENT)
Dept: HEALTH INFORMATION MANAGEMENT | Facility: CLINIC | Age: 82
End: 2022-02-11
Payer: COMMERCIAL

## 2022-02-17 DIAGNOSIS — K21.9 GASTROESOPHAGEAL REFLUX DISEASE: ICD-10-CM

## 2022-02-18 ENCOUNTER — TRANSFERRED RECORDS (OUTPATIENT)
Dept: HEALTH INFORMATION MANAGEMENT | Facility: CLINIC | Age: 82
End: 2022-02-18
Payer: COMMERCIAL

## 2022-03-08 DIAGNOSIS — M50.30 DDD (DEGENERATIVE DISC DISEASE), CERVICAL: Primary | ICD-10-CM

## 2022-03-08 NOTE — TELEPHONE ENCOUNTER
"Pending Prescriptions:                       Disp   Refills    ibuprofen (ADVIL/MOTRIN) 600 MG tablet [Ph*100 ta*3        Sig: TAKE 1 TABLET (600 MG) BY MOUTH EVERY 6 HOURS AS           NEEDED FOR MODERATE PAIN    Routing refill request to provider for review/approval because:  Labs not current:      Requested Prescriptions   Pending Prescriptions Disp Refills    ibuprofen (ADVIL/MOTRIN) 600 MG tablet [Pharmacy Med Name: IBUPROFEN 600MG TABS] 100 tablet 3     Sig: TAKE 1 TABLET (600 MG) BY MOUTH EVERY 6 HOURS AS NEEDED FOR MODERATE PAIN        NSAID Medications Failed - 3/8/2022 11:56 AM        Failed - Normal ALT on file in past 12 months     Recent Labs   Lab Test 01/14/21  1421   ALT 19               Failed - Normal AST on file in past 12 months       Recent Labs   Lab Test 01/14/21  1421   AST 12             Failed - Patient is age 6-64 years        Failed - Normal CBC on file in past 12 months     Recent Labs   Lab Test 11/24/21  1148   WBC 4.8   RBC 3.76*   HGB 11.8   HCT 36.9                      Failed - Medication is active on med list        Passed - Blood pressure under 140/90 in past 12 months       BP Readings from Last 3 Encounters:   11/24/21 132/76   08/27/21 118/62   07/05/21 (!) 191/98                 Passed - Recent (12 mo) or future (30 days) visit within the authorizing provider's specialty     Patient has had an office visit with the authorizing provider or a provider within the authorizing providers department within the previous 12 mos or has a future within next 30 days. See \"Patient Info\" tab in inbasket, or \"Choose Columns\" in Meds & Orders section of the refill encounter.              Passed - No active pregnancy on record        Passed - Normal serum creatinine on file in past 12 months     Recent Labs   Lab Test 11/24/21  1148 09/13/21  0522 02/15/21  1242   CR 0.60   < >  --    CREAT  --   --  0.8    < > = values in this interval not displayed.       Ok to refill medication if " creatinine is low          Passed - No positive pregnancy test in past 12 months

## 2022-03-10 RX ORDER — IBUPROFEN 600 MG/1
TABLET, FILM COATED ORAL
Qty: 100 TABLET | Refills: 3 | Status: SHIPPED | OUTPATIENT
Start: 2022-03-10 | End: 2022-11-16

## 2022-03-14 ENCOUNTER — HOSPITAL ENCOUNTER (OUTPATIENT)
Dept: MAMMOGRAPHY | Facility: CLINIC | Age: 82
Discharge: HOME | End: 2022-03-14
Attending: PHYSICIAN ASSISTANT | Admitting: PHYSICIAN ASSISTANT
Payer: COMMERCIAL

## 2022-03-14 DIAGNOSIS — Z12.31 VISIT FOR SCREENING MAMMOGRAM: ICD-10-CM

## 2022-03-14 PROCEDURE — 77067 SCR MAMMO BI INCL CAD: CPT

## 2022-03-18 ENCOUNTER — TRANSFERRED RECORDS (OUTPATIENT)
Dept: HEALTH INFORMATION MANAGEMENT | Facility: CLINIC | Age: 82
End: 2022-03-18
Payer: COMMERCIAL

## 2022-03-24 ENCOUNTER — OFFICE VISIT (OUTPATIENT)
Dept: FAMILY MEDICINE | Facility: OTHER | Age: 82
End: 2022-03-24
Payer: COMMERCIAL

## 2022-03-24 VITALS
WEIGHT: 130 LBS | OXYGEN SATURATION: 97 % | DIASTOLIC BLOOD PRESSURE: 74 MMHG | TEMPERATURE: 97.7 F | HEART RATE: 80 BPM | RESPIRATION RATE: 18 BRPM | BODY MASS INDEX: 23.78 KG/M2 | SYSTOLIC BLOOD PRESSURE: 132 MMHG

## 2022-03-24 DIAGNOSIS — E78.5 HYPERLIPIDEMIA LDL GOAL <130: ICD-10-CM

## 2022-03-24 DIAGNOSIS — M54.16 LUMBAR RADICULOPATHY: ICD-10-CM

## 2022-03-24 DIAGNOSIS — E03.9 ACQUIRED HYPOTHYROIDISM: ICD-10-CM

## 2022-03-24 DIAGNOSIS — Z00.00 ENCOUNTER FOR ROUTINE ADULT HEALTH EXAMINATION WITHOUT ABNORMAL FINDINGS: Primary | ICD-10-CM

## 2022-03-24 DIAGNOSIS — M53.3 SI (SACROILIAC) JOINT DYSFUNCTION: ICD-10-CM

## 2022-03-24 DIAGNOSIS — I10 BENIGN ESSENTIAL HYPERTENSION: ICD-10-CM

## 2022-03-24 DIAGNOSIS — Z98.1 S/P LUMBAR FUSION: ICD-10-CM

## 2022-03-24 DIAGNOSIS — K21.9 GASTROESOPHAGEAL REFLUX DISEASE, UNSPECIFIED WHETHER ESOPHAGITIS PRESENT: ICD-10-CM

## 2022-03-24 PROBLEM — L57.9 SKIN CHANGES DUE TO CHRONIC EXPOSURE TO NONIONIZING RADIATION: Status: ACTIVE | Noted: 2021-09-08

## 2022-03-24 PROBLEM — Z85.828 HISTORY OF MALIGNANT NEOPLASM OF SKIN: Status: ACTIVE | Noted: 2021-09-08

## 2022-03-24 LAB
CREAT UR-MCNC: 59 MG/DL
TSH SERPL DL<=0.005 MIU/L-ACNC: 1.18 MU/L (ref 0.4–4)

## 2022-03-24 PROCEDURE — 84443 ASSAY THYROID STIM HORMONE: CPT | Performed by: PHYSICIAN ASSISTANT

## 2022-03-24 PROCEDURE — 99214 OFFICE O/P EST MOD 30 MIN: CPT | Mod: 25 | Performed by: PHYSICIAN ASSISTANT

## 2022-03-24 PROCEDURE — 99397 PER PM REEVAL EST PAT 65+ YR: CPT | Performed by: PHYSICIAN ASSISTANT

## 2022-03-24 PROCEDURE — 36415 COLL VENOUS BLD VENIPUNCTURE: CPT | Performed by: PHYSICIAN ASSISTANT

## 2022-03-24 PROCEDURE — 80061 LIPID PANEL: CPT | Performed by: PHYSICIAN ASSISTANT

## 2022-03-24 PROCEDURE — 80307 DRUG TEST PRSMV CHEM ANLYZR: CPT | Performed by: PHYSICIAN ASSISTANT

## 2022-03-24 RX ORDER — CYCLOBENZAPRINE HCL 5 MG
5 TABLET ORAL 3 TIMES DAILY PRN
Qty: 30 TABLET | Refills: 2 | Status: SHIPPED | OUTPATIENT
Start: 2022-03-24 | End: 2022-05-25

## 2022-03-24 RX ORDER — LIDOCAINE 4 G/G
1 PATCH TOPICAL EVERY 24 HOURS
Qty: 30 PATCH | Refills: 5 | Status: SHIPPED | OUTPATIENT
Start: 2022-03-24 | End: 2022-05-25

## 2022-03-24 RX ORDER — SIMVASTATIN 20 MG
TABLET ORAL
Qty: 135 TABLET | Refills: 3 | Status: SHIPPED | OUTPATIENT
Start: 2022-03-24 | End: 2023-05-02

## 2022-03-24 ASSESSMENT — ENCOUNTER SYMPTOMS
FEVER: 0
SHORTNESS OF BREATH: 0
BREAST MASS: 0
NAUSEA: 0
CONSTIPATION: 0
DIARRHEA: 0
PALPITATIONS: 0
EYE PAIN: 0
NERVOUS/ANXIOUS: 0
ARTHRALGIAS: 0
HEARTBURN: 0
PARESTHESIAS: 0
JOINT SWELLING: 0
MYALGIAS: 0
WEAKNESS: 0
DYSURIA: 0
COUGH: 0
HEADACHES: 0
DIZZINESS: 1
FREQUENCY: 1

## 2022-03-24 ASSESSMENT — PAIN SCALES - GENERAL: PAINLEVEL: NO PAIN (0)

## 2022-03-24 ASSESSMENT — ACTIVITIES OF DAILY LIVING (ADL): CURRENT_FUNCTION: BATHING REQUIRES ASSISTANCE

## 2022-03-24 NOTE — PATIENT INSTRUCTIONS
Preventive Health Recommendations    See your health care provider every year to    Review health changes.     Discuss preventive care.      Review your medicines if your doctor has prescribed any.      You no longer need a yearly Pap test unless you've had an abnormal Pap test in the past 10 years. If you have vaginal symptoms, such as bleeding or discharge, be sure to talk with your provider about a Pap test.      Every 1 to 2 years, have a mammogram.  If you are over 69, talk with your health care provider about whether or not you want to continue having screening mammograms.      Every 10 years, have a colonoscopy. Or, have a yearly FIT test (stool test). These exams will check for colon cancer.       Have a cholesterol test every 5 years, or more often if your doctor advises it.       Have a diabetes test (fasting glucose) every three years. If you are at risk for diabetes, you should have this test more often.       At age 65, have a bone density scan (DEXA) to check for osteoporosis (brittle bone disease).    Shots:    Get a flu shot each year.    Get a tetanus shot every 10 years.    Talk to your doctor about your pneumonia vaccines. There are now two you should receive - Pneumovax (PPSV 23) and Prevnar (PCV 13).    Talk to your pharmacist about the shingles vaccine.    Talk to your doctor about the hepatitis B vaccine.    Nutrition:     Eat at least 5 servings of fruits and vegetables each day.      Eat whole-grain bread, whole-wheat pasta and brown rice instead of white grains and rice.      Get adequate about Calcium and Vitamin D.     Lifestyle    Exercise at least 150 minutes a week (30 minutes a day, 5 days a week). This will help you control your weight and prevent disease.      Limit alcohol to one drink per day.      No smoking.       Wear sunscreen to prevent skin cancer.       See your dentist twice a year for an exam and cleaning.      See your eye doctor every 1 to 2 years to screen for  conditions such as glaucoma, macular degeneration, cataracts, etc.      Will try lidocaine patches for 12 hours per day for the pain.  Will try Flexeril (cyclobenzaprine) again to take as needed for muscle tightness. Try at night first.  Will order an SI joint injection with Dr. Dasilva. They will call to schedule.    Follow up in 8 weeks for a recheck.

## 2022-03-24 NOTE — PROGRESS NOTES
"SUBJECTIVE:   Milena Hamilton is a 81 year old female who presents for Preventive Visit.      Patient has been advised of split billing requirements and indicates understanding: Yes  Are you in the first 12 months of your Medicare coverage?  No    Healthy Habits:     In general, how would you rate your overall health?  Fair    Frequency of exercise:  6-7 days/week    Duration of exercise:  15-30 minutes    Do you usually eat at least 4 servings of fruit and vegetables a day, include whole grains    & fiber and avoid regularly eating high fat or \"junk\" foods?  Yes    Taking medications regularly:  Yes    Medication side effects:  None    Ability to successfully perform activities of daily living:  Bathing requires assistance    Home Safety:  No safety concerns identified    Hearing Impairment:  Difficult to understand a speaker at a public meeting or [employer]ian[employer] service and no hearing concerns    In the past 6 months, have you been bothered by leaking of urine? Yes    In general, how would you rate your overall mental or emotional health?  Fair      PHQ-2 Total Score: 0    Additional concerns today:  No    She is regretting her lumbar fusion surgery in November as she underwent an L2-S1 posterior fusion and does not feel like her symptoms have improved at all. She still has pain into her right leg along with moderate/severe low back pain, worse in the morning. She has been undergoing physical therapy without much relief. She is not noting much relief with Tylenol or ibuprofen. She did notice improvement on the Dilaudid she was prescribed after surgery. She denies any loss of bowel/bladder control or any saddle anaesthesia. She is hoping she can get something to help ease the pain. She states her neurosurgery team talked about ordering an SI joint injection at her visit last month but nothing was ever scheduled.     Her anxiety has overall been stable with as needed Xanax. She typically takes this at night a few days " per week.       Do you feel safe in your environment? Yes    Have you ever done Advance Care Planning? (For example, a Health Directive, POLST, or a discussion with a medical provider or your loved ones about your wishes): Yes, advance care planning is on file.    Fall risk  Fallen 2 or more times in the past year?: (P) No  Any fall with injury in the past year?: (P) No    Cognitive Screening   1) Repeat 3 items (Leader, Season, Table)    2) Clock draw: NORMAL  3) 3 item recall: Recalls 2 objects   Results: NORMAL clock, 1-2 items recalled: COGNITIVE IMPAIRMENT LESS LIKELY    Mini-CogTM Copyright S Monica. Licensed by the author for use in [facility]; reprinted with permission (sojanie@[email or web address]). All rights reserved.      Do you have sleep apnea, excessive snoring or daytime drowsiness?: no    Reviewed and updated as needed this visit by clinical staff   Tobacco  Allergies  Meds  Problems  Med Hx  Surg Hx  Fam Hx  Soc   Hx        Reviewed and updated as needed this visit by Provider  Tobacco  Allergies  Meds  Problems  Med Hx  Surg Hx  Fam Hx      Social History     Tobacco Use     Smoking status: Never Smoker     Smokeless tobacco: Never Used   Substance Use Topics     Alcohol use: No     If you drink alcohol do you typically have >3 drinks per day or >7 drinks per week? No    Alcohol Use 3/24/2022   Prescreen: >3 drinks/day or >7 drinks/week? No   Prescreen: >3 drinks/day or >7 drinks/week? -       Current providers sharing in care for this patient include:Patient Care Team:  Chuck Streeter PA-C as PCP - General (Physician Assistant)  Chuck Streeter PA-C as Assigned PCP  Renetta Brown NP as Assigned Neuroscience Provider    The following health maintenance items are reviewed in Epic and correct as of today:  Health Maintenance Due   Topic Date Due     INFLUENZA VACCINE (1) 09/01/2021     LIPID  12/28/2021     URINE DRUG SCREEN  12/28/2021     FALL RISK ASSESSMENT   "12/28/2021       Review of Systems   Constitutional: Negative for fever.   HENT: Negative for congestion, ear pain and hearing loss.    Eyes: Negative for pain and visual disturbance.   Respiratory: Negative for cough and shortness of breath.    Cardiovascular: Negative for palpitations and peripheral edema.   Gastrointestinal: Negative for constipation, diarrhea, heartburn and nausea.   Breasts:  Negative for tenderness, breast mass and discharge.   Genitourinary: Positive for frequency and urgency. Negative for dysuria, genital sores, pelvic pain, vaginal bleeding and vaginal discharge.   Musculoskeletal: Negative for arthralgias, joint swelling and myalgias.   Skin: Negative for rash.   Neurological: Positive for dizziness. Negative for weakness, headaches and paresthesias.   Psychiatric/Behavioral: The patient is not nervous/anxious.        OBJECTIVE:   /74   Pulse 80   Temp 97.7  F (36.5  C) (Temporal)   Resp 18   Wt 59 kg (130 lb)   LMP  (LMP Unknown)   SpO2 97%   BMI 23.78 kg/m   Estimated body mass index is 23.78 kg/m  as calculated from the following:    Height as of 6/18/21: 1.575 m (5' 2\").    Weight as of this encounter: 59 kg (130 lb).  Physical Exam  GENERAL APPEARANCE: healthy, alert and no distress  EYES: Eyes grossly normal to inspection, PERRL and conjunctivae and sclerae normal  HENT: ear canals and TM's normal, nose and mouth without ulcers or lesions, oropharynx clear and oral mucous membranes moist  NECK: no adenopathy, no asymmetry, masses, or scars and thyroid normal to palpation  RESP: lungs clear to auscultation - no rales, rhonchi or wheezes  CV: regular rate and rhythm, normal S1 S2, no S3 or S4, no murmur, click or rub, no peripheral edema and peripheral pulses strong  ABDOMEN: soft, nontender, no hepatosplenomegaly, no masses and bowel sounds normal  MS: no musculoskeletal defects are noted and gait is slightly antalgic and slowed with use of a cane. No lumbar tenderness. " Right SI joint tenderness with positive Jennifer testing.   SKIN: no suspicious lesions or rashes  NEURO: Normal strength and tone, sensory exam grossly normal, mentation intact and speech normal. Positive straight leg raise on the right. Gait is antalgic.  PSYCH: mentation appears normal and affect normal/bright    ASSESSMENT / PLAN:       ICD-10-CM    1. Encounter for routine adult health examination without abnormal findings  Z00.00    2. Hyperlipidemia LDL goal <130  E78.5 simvastatin (ZOCOR) 20 MG tablet     Lipid panel reflex to direct LDL Fasting   3. Benign essential hypertension  I10    4. Gastroesophageal reflux disease, unspecified whether esophagitis present  K21.9 omeprazole (PRILOSEC) 20 MG DR capsule   5. Acquired hypothyroidism  E03.9 TSH with free T4 reflex     TSH with free T4 reflex   6. Lumbar radiculopathy  M54.16 [id number] - Urine Drug Confirmation Panel (Comprehensive)     cyclobenzaprine (FLEXERIL) 5 MG tablet     Lidocaine (LIDOCARE) 4 % Patch     [id number] - Urine Drug Confirmation Panel (Comprehensive)   7. S/P lumbar fusion  Z98.1 cyclobenzaprine (FLEXERIL) 5 MG tablet     Lidocaine (LIDOCARE) 4 % Patch   8. SI (sacroiliac) joint dysfunction  M53.3 [id number] - Urine Drug Confirmation Panel (Comprehensive)     cyclobenzaprine (FLEXERIL) 5 MG tablet     Lidocaine (LIDOCARE) 4 % Patch     XR Sacroiliac Therapeutic Injection Right     [id number] - Urine Drug Confirmation Panel (Comprehensive)       2. Continue simvastatin. Updated fasting lipid panel ordered.    3. Stable without medications. Continue to monitor.    4. Stable on omeprazole. She understands the potential long-term side effects of PPIs.    5. Updated TSH ordered. Will refill levothyroxine based on results.    6-8. Continued low back and right leg pain since her fusion surgery in November. She regrets undergoing the surgery given her continued symptoms. She has been told that it can take a year for the fusion to be completed and I  "reiterated this fact that she certainly may notice improvement in her pain as the months progress. Continue physical therapy and will restart as needed Flexeril along with daily lidocaine patches. I also recommend a right SI joint injection with Dr. Dasilva. She is medically cleared for this procedure. Will plan on recheck in 8 weeks.    Patient has been advised of split billing requirements and indicates understanding: Yes    COUNSELING:  Reviewed preventive health counseling, as reflected in patient instructions       Regular exercise       Healthy diet/nutrition    Estimated body mass index is 23.78 kg/m  as calculated from the following:    Height as of 6/18/21: 1.575 m (5' 2\").    Weight as of this encounter: 59 kg (130 lb).        She reports that she has never smoked. She has never used smokeless tobacco.      Appropriate preventive services were discussed with this patient, including applicable screening as appropriate for cardiovascular disease, diabetes, osteopenia/osteoporosis, and glaucoma.  As appropriate for age/gender, discussed screening for colorectal cancer, prostate cancer, breast cancer, and cervical cancer. Checklist reviewing preventive services available has been given to the patient.    Reviewed patients plan of care and provided an AVS. The Basic Care Plan (routine screening as documented in Health Maintenance) for Milena meets the Care Plan requirement. This Care Plan has been established and reviewed with the Patient.    Counseling Resources:  ATP IV Guidelines  Pooled Cohorts Equation Calculator  Breast Cancer Risk Calculator  Breast Cancer: Medication to Reduce Risk  FRAX Risk Assessment  ICSI Preventive Guidelines  Dietary Guidelines for Americans, 2010  USDA's MyPlate  ASA Prophylaxis  Lung CA Screening    CHIARA Recinos [facility]    Identified Health Risks:  "

## 2022-03-24 NOTE — LETTER
March 28, 2022      Milena Hamilton  [id number] [facility]  [place] [address]        Dear ,    We are writing to inform you of your test results.    Your test results fall within the expected range(s) or remain unchanged from previous results.  Please continue with current treatment plan.    Resulted Orders   Lipid panel reflex to direct LDL Fasting   Result Value Ref Range    Cholesterol 202 (H) <200 mg/dL    Triglycerides 89 <150 mg/dL    Direct Measure HDL 88 >=50 mg/dL    LDL Cholesterol Calculated 96 <=100 mg/dL    Non HDL Cholesterol 114 <130 mg/dL    Patient Fasting > 8hrs? No     Narrative    Cholesterol  Desirable:  <200 mg/dL    Triglycerides  Normal:  Less than 150 mg/dL  Borderline High:  150-199 mg/dL  High:  200-499 mg/dL  Very High:  Greater than or equal to 500 mg/dL    Direct Measure HDL  Female:  Greater than or equal to 50 mg/dL   Male:  Greater than or equal to 40 mg/dL    LDL Cholesterol  Desirable:  <100mg/dL  Above Desirable:  100-129 mg/dL   Borderline High:  130-159 mg/dL   High:  160-189 mg/dL   Very High:  >= 190 mg/dL    Non HDL Cholesterol  Desirable:  130 mg/dL  Above Desirable:  130-159 mg/dL  Borderline High:  160-189 mg/dL  High:  190-219 mg/dL  Very High:  Greater than or equal to 220 mg/dL   TSH with free T4 reflex   Result Value Ref Range    TSH 1.18 0.40 - 4.00 mU/L   Urine Creatinine for Drug Screen Panel   Result Value Ref Range    Creatinine Urine for Drug Screen 59 mg/dL      Comment:      The reference range has not been established for creatinine in random urines. The results should be integrated into the clinical context for interpretation.       If you have any questions or concerns, please call the clinic at the number listed above.       Sincerely,      Chuck Streeter PA-C

## 2022-03-24 NOTE — LETTER
[facility] [place]  03/24/22  Patient: Milena Hamilton  YOB: 1940  Medical Record Number: [id number]                                                                                  Non-Opioid Controlled Substance Agreement    This is an agreement between you and your provider regarding safe and appropriate use of controlled substances prescribed by your care team. Controlled substances are?medicines that can cause physical and mental dependence (abuse).     There are strict laws about having and using these medicines. We here at [facility] are  committed to working with you in your efforts to get better. To support you in this work, we'll help you schedule regular office appointments for medicine refills. If we must cancel or change your appointment for any reason, we'll make sure you have enough medicine to last until your next appointment.     As a Provider, I will:     Listen carefully to your concerns while treating you with respect.     Recommend a treatment plan that I believe is in your best interest and may involve therapies other than medicine.      Talk with you often about the possible benefits and the risk of harm of any medicine that we prescribe for you.    Assess the safety of this medicine and check how well it works.      Provide a plan on how to taper (discontinue or go off) using this medicine if the decision is made to stop its use.      ::  As a Patient, I understand controlled substances:       Are prescribed by my care provider to help me function or work and manage my condition(s).?    Are strong medicines and can cause serious side effects.       Need to be taken exactly as prescribed.?Combining controlled substances with certain medicines or chemicals (such as illegal drugs, alcohol, sedatives, sleeping pills, and benzodiazepines) can be dangerous or even fatal.? If I stop taking my medicines suddenly, I may have severe withdrawal symptoms.     The  risks, benefits, and side effects of these medicine(s) were explained to me. I agree that:    1. I will take part in other treatments as advised by my care team. This may be psychiatry or counseling, physical therapy, behavioral therapy, group treatment or a referral to specialist.    2. I will keep all my appointments and understand this is part of the monitoring of controlled substances.?My care team may require an office visit for EVERY controlled substance refill. If I miss appointments or don t follow instructions, my care team may stop my medicine    3. I will take my medicines as prescribed. I will not change the dose or schedule unless my care team tells me to. There will be no refills if I run out early.      4. I may be asked to come to the clinic and complete a urine drug test or complete a pill count. If I don t give a urine sample or participate in a pill count, the care team may stop my medicine.    5. I will only receive controlled substance prescriptions from this clinic. If I am treated by another provider, I will tell them that I am taking controlled substances and that I have a treatment agreement with this provider. I will inform my [facility] care team within one business day if I am given a prescription for any controlled substance by another healthcare provider. My [facility] care team can contact other providers and pharmacists about my use of any medicines.    6. It is up to me to make sure that I don't run out of my medicines on weekends or holidays.?If my care team is willing to refill my prescription without a visit, I must request refills only during office hours. Refills may take up to 3 business days to process. I will use one pharmacy to fill all my controlled substance prescriptions. I will notify the clinic about any changes to my insurance or medicine availability.    7. I am responsible for my prescriptions. If the medicine/prescription is lost, stolen or destroyed,  it will not be replaced.?I also agree not to share controlled substance medicines with anyone.     8. I am aware I should not use any illegal or recreational drugs. I agree not to drink alcohol unless my care team says I can.     9. If I enroll in the [place] Medical Cannabis program, I will tell my care team before my next refill.    10. I will tell my care team right away if I become pregnant, have a new medical problem treated outside of my regular clinic, or have a change in my medicines.     11. I understand that this medicine can affect my thinking, judgment and reaction time.? Alcohol and drugs affect the brain and body, which can affect the safety of my driving. Being under the influence of alcohol or drugs can affect my decision-making, behaviors, personal safety and the safety of others. Driving while impaired (DWI) can occur if a person is driving, operating or in physical control of a car, motorcycle, boat, snowmobile, ATV, motorbike, off-road vehicle or any other motor vehicle ([place] Statute 169A.20). I understand the risk if I choose to drive or operate any vehicle or machinery.    I understand that if I do not follow any of the conditions above, my prescriptions or treatment may be stopped or changed.   I agree that my provider, clinic care team and pharmacy may work with any city, state or federal law enforcement agency that investigates the misuse, sale or other diversion of my controlled medicine. I will allow my provider to discuss my care with, or share a copy of, this agreement with any other treating provider, pharmacy or emergency room where I receive care.     I have read this agreement and have asked questions about anything I did not understand.    ________________________________________________________  Patient Signature - Milena Hamilton     ___________________                   Date     ________________________________________________________  Provider Signature - Chuck Streeter PA-C        ___________________                   Date     ________________________________________________________  Witness Signature (required if provider not present while patient signing)          ___________________                   Date

## 2022-03-24 NOTE — LETTER
[facility] OLIVIA Allen  03/24/22  Patient: Milena Hamilton  YOB: 1940  Medical Record Number: [id number]                                                                                  Non-Opioid Controlled Substance Agreement    This is an agreement between you and your provider regarding safe and appropriate use of controlled substances prescribed by your care team. Controlled substances are?medicines that can cause physical and mental dependence (abuse).     There are strict laws about having and using these medicines. We here at [facility] are  committed to working with you in your efforts to get better. To support you in this work, we'll help you schedule regular office appointments for medicine refills. If we must cancel or change your appointment for any reason, we'll make sure you have enough medicine to last until your next appointment.     As a Provider, I will:     Listen carefully to your concerns while treating you with respect.     Recommend a treatment plan that I believe is in your best interest and may involve therapies other than medicine.      Talk with you often about the possible benefits and the risk of harm of any medicine that we prescribe for you.    Assess the safety of this medicine and check how well it works.      Provide a plan on how to taper (discontinue or go off) using this medicine if the decision is made to stop its use.      ::  As a Patient, I understand controlled substances:       Are prescribed by my care provider to help me function or work and manage my condition(s).?    Are strong medicines and can cause serious side effects.       Need to be taken exactly as prescribed.?Combining controlled substances with certain medicines or chemicals (such as illegal drugs, alcohol, sedatives, sleeping pills, and benzodiazepines) can be dangerous or even fatal.? If I stop taking my medicines suddenly, I may have severe withdrawal symptoms.     The  risks, benefits, and side effects of these medicine(s) were explained to me. I agree that:    1. I will take part in other treatments as advised by my care team. This may be psychiatry or counseling, physical therapy, behavioral therapy, group treatment or a referral to specialist.    2. I will keep all my appointments and understand this is part of the monitoring of controlled substances.?My care team may require an office visit for EVERY controlled substance refill. If I miss appointments or don t follow instructions, my care team may stop my medicine    3. I will take my medicines as prescribed. I will not change the dose or schedule unless my care team tells me to. There will be no refills if I run out early.      4. I may be asked to come to the clinic and complete a urine drug test or complete a pill count. If I don t give a urine sample or participate in a pill count, the care team may stop my medicine.    5. I will only receive controlled substance prescriptions from this clinic. If I am treated by another provider, I will tell them that I am taking controlled substances and that I have a treatment agreement with this provider. I will inform my [facility] care team within one business day if I am given a prescription for any controlled substance by another healthcare provider. My [facility] care team can contact other providers and pharmacists about my use of any medicines.    6. It is up to me to make sure that I don't run out of my medicines on weekends or holidays.?If my care team is willing to refill my prescription without a visit, I must request refills only during office hours. Refills may take up to 3 business days to process. I will use one pharmacy to fill all my controlled substance prescriptions. I will notify the clinic about any changes to my insurance or medicine availability.    7. I am responsible for my prescriptions. If the medicine/prescription is lost, stolen or destroyed,  it will not be replaced.?I also agree not to share controlled substance medicines with anyone.     8. I am aware I should not use any illegal or recreational drugs. I agree not to drink alcohol unless my care team says I can.     9. If I enroll in the [place] [facility] Cannabis program, I will tell my care team before my next refill.    10. I will tell my care team right away if I become pregnant, have a new medical problem treated outside of my regular clinic, or have a change in my medicines.     11. I understand that this medicine can affect my thinking, judgment and reaction time.? Alcohol and drugs affect the brain and body, which can affect the safety of my driving. Being under the influence of alcohol or drugs can affect my decision-making, behaviors, personal safety and the safety of others. Driving while impaired (DWI) can occur if a person is driving, operating or in physical control of a car, motorcycle, boat, snowmobile, ATV, motorbike, off-road vehicle or any other motor vehicle ([place] Statute 169A.20). I understand the risk if I choose to drive or operate any vehicle or machinery.    I understand that if I do not follow any of the conditions above, my prescriptions or treatment may be stopped or changed.   I agree that my provider, clinic care team and pharmacy may work with any city, state or federal law enforcement agency that investigates the misuse, sale or other diversion of my controlled medicine. I will allow my provider to discuss my care with, or share a copy of, this agreement with any other treating provider, pharmacy or emergency room where I receive care.     I have read this agreement and have asked questions about anything I did not understand.    ________________________________________________________  Patient Signature - Milena Hamilton     ___________________                   Date     ________________________________________________________  Provider Signature - Chuck Streeter PA-C        ___________________                   Date     ________________________________________________________  Witness Signature (required if provider not present while patient signing)          ___________________                   Date

## 2022-03-25 LAB
CHOLEST SERPL-MCNC: 202 MG/DL
FASTING STATUS PATIENT QL REPORTED: NO
HDLC SERPL-MCNC: 88 MG/DL
LDLC SERPL CALC-MCNC: 96 MG/DL
NONHDLC SERPL-MCNC: 114 MG/DL
TRIGL SERPL-MCNC: 89 MG/DL

## 2022-03-28 ENCOUNTER — TELEPHONE (OUTPATIENT)
Dept: PALLIATIVE MEDICINE | Facility: CLINIC | Age: 82
End: 2022-03-28
Payer: COMMERCIAL

## 2022-03-28 DIAGNOSIS — E03.9 ACQUIRED HYPOTHYROIDISM: ICD-10-CM

## 2022-03-28 RX ORDER — LEVOTHYROXINE SODIUM 50 UG/1
TABLET ORAL
Qty: 90 TABLET | Refills: 3 | Status: SHIPPED | OUTPATIENT
Start: 2022-03-28 | End: 2023-05-18

## 2022-03-29 DIAGNOSIS — Z11.59 ENCOUNTER FOR SCREENING FOR OTHER VIRAL DISEASES: Primary | ICD-10-CM

## 2022-03-29 NOTE — TELEPHONE ENCOUNTER
Pt scheduled for [facility]  Date: 4/14/22  Time: 1330  Dr. Dasilva    Instructed pt to have H&P and  for procedure.  Patient informed of COVID testing process.

## 2022-04-06 ENCOUNTER — APPOINTMENT (OUTPATIENT)
Dept: CT IMAGING | Facility: CLINIC | Age: 82
End: 2022-04-06
Attending: PHYSICIAN ASSISTANT
Payer: COMMERCIAL

## 2022-04-06 ENCOUNTER — HOSPITAL ENCOUNTER (EMERGENCY)
Facility: CLINIC | Age: 82
Discharge: HOME | End: 2022-04-06
Attending: PHYSICIAN ASSISTANT | Admitting: PHYSICIAN ASSISTANT
Payer: COMMERCIAL

## 2022-04-06 VITALS
WEIGHT: 133.2 LBS | BODY MASS INDEX: 24.36 KG/M2 | TEMPERATURE: 98.5 F | HEART RATE: 91 BPM | RESPIRATION RATE: 16 BRPM | DIASTOLIC BLOOD PRESSURE: 77 MMHG | OXYGEN SATURATION: 96 % | SYSTOLIC BLOOD PRESSURE: 149 MMHG

## 2022-04-06 DIAGNOSIS — M54.50 BILATERAL LOW BACK PAIN WITHOUT SCIATICA: ICD-10-CM

## 2022-04-06 DIAGNOSIS — S06.0X0A CONCUSSION WITHOUT LOSS OF CONSCIOUSNESS: ICD-10-CM

## 2022-04-06 DIAGNOSIS — I44.7 LBBB (LEFT BUNDLE BRANCH BLOCK): ICD-10-CM
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
## 2022-04-06 LAB
ALBUMIN SERPL-MCNC: 4.4 G/DL (ref 3.4–5)
ALP SERPL-CCNC: 65 U/L (ref 40–150)
ALT SERPL W P-5'-P-CCNC: 17 U/L (ref 0–50)
ANION GAP SERPL CALCULATED.3IONS-SCNC: 5 MMOL/L (ref 3–14)
APTT PPP: 30 SECONDS (ref 22–38)
AST SERPL W P-5'-P-CCNC: 16 U/L (ref 0–45)
BASOPHILS # BLD AUTO: 0.1 10E3/UL (ref 0–0.2)
BASOPHILS NFR BLD AUTO: 1 %
BILIRUB SERPL-MCNC: 0.3 MG/DL (ref 0.2–1.3)
BUN SERPL-MCNC: 11 MG/DL (ref 7–30)
CALCIUM SERPL-MCNC: 8.8 MG/DL (ref 8.5–10.1)
CHLORIDE BLD-SCNC: 107 MMOL/L (ref 94–109)
CO2 SERPL-SCNC: 28 MMOL/L (ref 20–32)
CREAT SERPL-MCNC: 0.66 MG/DL (ref 0.52–1.04)
EOSINOPHIL # BLD AUTO: 0.1 10E3/UL (ref 0–0.7)
EOSINOPHIL NFR BLD AUTO: 2 %
ERYTHROCYTE [DISTWIDTH] IN BLOOD BY AUTOMATED COUNT: 12.7 % (ref 10–15)
GFR SERPL CREATININE-BSD FRML MDRD: 88 ML/MIN/1.73M2
GLUCOSE BLD-MCNC: 110 MG/DL (ref 70–99)
HCT VFR BLD AUTO: 41.3 % (ref 35–47)
HGB BLD-MCNC: 13.3 G/DL (ref 11.7–15.7)
IMM GRANULOCYTES # BLD: 0 10E3/UL
IMM GRANULOCYTES NFR BLD: 0 %
INR PPP: 0.88 (ref 0.85–1.15)
LYMPHOCYTES # BLD AUTO: 1.6 10E3/UL (ref 0.8–5.3)
LYMPHOCYTES NFR BLD AUTO: 31 %
MCH RBC QN AUTO: 30.9 PG (ref 26.5–33)
MCHC RBC AUTO-ENTMCNC: 32.2 G/DL (ref 31.5–36.5)
MCV RBC AUTO: 96 FL (ref 78–100)
MONOCYTES # BLD AUTO: 0.4 10E3/UL (ref 0–1.3)
MONOCYTES NFR BLD AUTO: 8 %
NEUTROPHILS # BLD AUTO: 3 10E3/UL (ref 1.6–8.3)
NEUTROPHILS NFR BLD AUTO: 58 %
NRBC # BLD AUTO: 0 10E3/UL
NRBC BLD AUTO-RTO: 0 /100
PLATELET # BLD AUTO: 236 10E3/UL (ref 150–450)
POTASSIUM BLD-SCNC: 3.8 MMOL/L (ref 3.4–5.3)
PROT SERPL-MCNC: 7.8 G/DL (ref 6.8–8.8)
RBC # BLD AUTO: 4.31 10E6/UL (ref 3.8–5.2)
SODIUM SERPL-SCNC: 140 MMOL/L (ref 133–144)
TROPONIN I SERPL HS-MCNC: <3 NG/L
WBC # BLD AUTO: 5.2 10E3/UL (ref 4–11)

## 2022-04-06 PROCEDURE — 80053 COMPREHEN METABOLIC PANEL: CPT | Performed by: PHYSICIAN ASSISTANT

## 2022-04-06 PROCEDURE — 85730 THROMBOPLASTIN TIME PARTIAL: CPT | Performed by: PHYSICIAN ASSISTANT

## 2022-04-06 PROCEDURE — 84484 ASSAY OF TROPONIN QUANT: CPT | Performed by: PHYSICIAN ASSISTANT

## 2022-04-06 PROCEDURE — 99285 EMERGENCY DEPT VISIT HI MDM: CPT | Mod: 25

## 2022-04-06 PROCEDURE — 85610 PROTHROMBIN TIME: CPT | Performed by: PHYSICIAN ASSISTANT

## 2022-04-06 PROCEDURE — 93010 ELECTROCARDIOGRAM REPORT: CPT | Performed by: PHYSICIAN ASSISTANT

## 2022-04-06 PROCEDURE — 72192 CT PELVIS W/O DYE: CPT

## 2022-04-06 PROCEDURE — 72131 CT LUMBAR SPINE W/O DYE: CPT

## 2022-04-06 PROCEDURE — 70450 CT HEAD/BRAIN W/O DYE: CPT

## 2022-04-06 PROCEDURE — 93005 ELECTROCARDIOGRAM TRACING: CPT

## 2022-04-06 PROCEDURE — 99285 EMERGENCY DEPT VISIT HI MDM: CPT | Mod: 25 | Performed by: PHYSICIAN ASSISTANT

## 2022-04-06 PROCEDURE — 36415 COLL VENOUS BLD VENIPUNCTURE: CPT | Performed by: PHYSICIAN ASSISTANT

## 2022-04-06 PROCEDURE — 85025 COMPLETE CBC W/AUTO DIFF WBC: CPT | Performed by: PHYSICIAN ASSISTANT

## 2022-04-06 NOTE — ED TRIAGE NOTES
PT comes in after falling back and hitting her head. PT states she remembers waking up on the floor and wasn't sure why she was there.  witnessed fall and stated she did not pass out.

## 2022-04-07 ENCOUNTER — PATIENT OUTREACH (OUTPATIENT)
Dept: CARE COORDINATION | Facility: CLINIC | Age: 82
End: 2022-04-07
Payer: COMMERCIAL

## 2022-04-07 DIAGNOSIS — Z71.89 OTHER SPECIFIED COUNSELING: ICD-10-CM

## 2022-04-07 NOTE — ED PROVIDER NOTES
History     Chief Complaint   Patient presents with     Fall     HPI   Trauma evaluation was called.  I saw the patient 6:11 PM right after the trauma evaluation was called.  The ED was busy and an MD was not able to see the patient.    Milena Hamilton is a 81 year old female who presents for evaluation of a fall that occurred 1 hour ago at home.  Her  was present in the kitchen and saw this happen.  He states that she accidentally stepped on her opposite foot slipper which caused her to lose her balance and she fell backward in the kitchen.  When she fell she struck the posterior aspect of her head.  Her  denies her losing consciousness.  He states that when she was on the ground she suddenly did not remember what happened.  She had a hard time remembering what they ate earlier.  He was very concerned regarding her confusion.  She also reported low back discomfort.  She did have lumbar surgery, and is concerned about the status of her hardware.  She denies any lower extremity weakness, numbness, or tingling.  No loss of bowel/bladder function.  She denies bleeding from anywhere.  She denies any upper or lower extremity discomfort.  She actually walked into the ED under her own power.  Her  drove her up to the ED.  She denies any chest pain, palpitations, dyspnea, or lower extremity edema.  She has felt dizzy and nauseated ever since the incident along with the memory issues.  No vomiting.  Current pain is rated 2-3 on a scale of 10.  She has not taken anything for her discomfort.        Allergies:  Allergies   Allergen Reactions     Azithromycin Diarrhea     Ciprofloxacin Other (See Comments)     Cipro, dizziness and abdominal pain     Gabapentin Dizziness and Visual Disturbance     Penicillins      stomach upset, diarrhea     Raspberry Hives     Sulfa Drugs Hives     hives     Augmentin Rash       Problem List:    Patient Active Problem List    Diagnosis Date Noted     Dysuria 11/24/2021      Priority: Medium     Basal cell carcinoma of nose 09/08/2021     Priority: Medium     History of malignant neoplasm of skin 09/08/2021     Priority: Medium     Skin changes due to chronic exposure to nonionizing radiation 09/08/2021     Priority: Medium     Renal insufficiency syndrome 09/04/2021     Priority: Medium     Formatting of this note might be different from the original.  Creatinine clearance 49       Lumbar radiculopathy 12/28/2018     Priority: Medium     Scoliosis of lumbar spine, unspecified scoliosis type 12/12/2018     Priority: Medium     Chronic right-sided low back pain with right-sided sciatica 05/30/2018     Priority: Medium     Acquired hypothyroidism 04/28/2017     Priority: Medium     Gastroesophageal reflux disease, esophagitis presence not specified 04/28/2017     Priority: Medium     IMO Regulatory Load OCT 2020       Premature atrial beats 11/30/2016     Priority: Medium     HTN (hypertension) 07/10/2014     Priority: Medium     Raynaud's disease 06/01/2014     Priority: Medium     Health Care Home 12/30/2013     Priority: Medium     Status:  Closed  Care Coordinator:  Tasha Vasquez RN    See Letters for [facility] Care Plan             History of diverticulitis - s/p left hemicolectomy 11/30/2013     Priority: Medium     Diarrhea 11/30/2013     Priority: Medium     Chronic constipation 06/18/2013     Priority: Medium     Sacroiliac dysfunction 10/09/2012     Priority: Medium     Advanced directives, counseling/discussion 10/03/2011     Priority: Medium     Advance Directive Problem List Overview:   Name Relationship Phone    Primary Health Care Agent            Alternative Health Care Agent          Discussed advance care planning with patient; information given to patient to review. 10/3/2011          DDD (degenerative disc disease), cervical 08/10/2011     Priority: Medium     Anxiety 10/24/2010     Priority: Medium     Hyperlipidemia 09/29/2010     Priority: Medium     Osteopenia  04/01/2010     Priority: Medium     recheck DEXA in 2 years       Gastritis 03/15/2010     Priority: Medium     Panic disorder without agoraphobia 10/08/2004     Priority: Medium     Tinnitus 09/13/2004     Priority: Medium     Problem list name updated by automated process. Provider to review       Family history of diabetes mellitus 09/09/2002     Priority: Medium     Lichen planus      Priority: Medium        Past Medical History:    Past Medical History:   Diagnosis Date     Lichen planus      Osteopenia 4/2010     Panic disorder without agoraphobia      Pure hypercholesterolemia      Raynaud's disease 6/2014     Unspecified essential hypertension        Past Surgical History:    Past Surgical History:   Procedure Laterality Date     COLONOSCOPY  10/17/2007    normal, recheck in 10 years     COLONOSCOPY N/A 8/27/2014    Procedure: COLONOSCOPY;  Surgeon: Raffi Montiel MD;  Location:  GI     COMBINED CYSTOSCOPY, INSERT CATHETER URETER Bilateral 10/13/2014    Procedure: COMBINED CYSTOSCOPY, INSERT CATHETER URETER;  Surgeon: Abdoulaye Odell MD;  Location: [facility]     EYE SURGERY  8/01    right eye muscle repair     HC REMOVAL GALLBLADDER  2/9/2010    lap     HC REMOVAL OF OVARY/TUBE(S)      Salpingo-Oophorectomy, bilateral     HYSTERECTOMY, PAP NO LONGER INDICATED       INJECT EPIDURAL LUMBAR Right 4/10/2017    Procedure: INJECT EPIDURAL LUMBAR;  Surgeon: Raffi Wright MD;  Location: [facility]     INJECT EPIDURAL LUMBAR Right 2/27/2020    Procedure: Right Lumbar 4- Sacral 1 Epidural Steroid Injection;  Surgeon: Antonio Dasilva MD;  Location: [facility]     INJECT EPIDURAL LUMBAR Right 1/29/2021    Procedure: INJECTION, SPINE,  sacral 1 EPIDURAL;  Surgeon: Antonio Dasilva MD;  Location: [facility]     INJECT EPIDURAL TRANSFORAMINAL Right 6/9/2016    Procedure: INJECT EPIDURAL TRANSFORAMINAL;  Surgeon: Antonio Dasilva MD;  Location: [facility]     INJECT EPIDURAL TRANSFORAMINAL Right 10/13/2016    Procedure: INJECT  "EPIDURAL TRANSFORAMINAL;  Surgeon: Antonio Dasilva MD;  Location: [facility]     INJECT EPIDURAL TRANSFORAMINAL Right 2021    Procedure: Right L4-5 and right L5-S1 Transforaminal Epidural Steroid Injections with fluoroscopic guidance and contrast.;  Surgeon: Antonio Dasilva MD;  Location: [facility]     LAPAROSCOPIC ASSISTED COLECTOMY LEFT (DESCENDING) N/A 10/13/2014    Procedure: LAPAROSCOPIC ASSISTED COLECTOMY LEFT (DESCENDING);  Surgeon: Raffi Montiel MD;  Location: [facility]     ZZC APPENDECTOMY       ZZC NONSPECIFIC PROCEDURE      Right inferior oblique recession, 14 mm     ZZC VAG HYST,RMV TUBE/OVARY  1984    cervix removed       Family History:    Family History   Problem Relation Age of Onset     Diabetes Brother      Heart Disease Brother 50        AMI     Diabetes Brother      Heart Disease Brother         AMI, \"older\"     Cerebrovascular Disease Brother      Psychotic Disorder Brother         , alcohol     Cancer Sister         breast cancer x2     Hypertension No family hx of      Breast Cancer No family hx of      Ovarian Cancer No family hx of      Prostate Cancer No family hx of      Mental Illness No family hx of      Anesthesia Reaction No family hx of      Osteoporosis No family hx of      Obesity No family hx of      Other Cancer No family hx of      Depression No family hx of      Anxiety Disorder No family hx of      Mental Illness No family hx of      Substance Abuse No family hx of        Social History:  Marital Status:   [2]  Social History     Tobacco Use     Smoking status: Never Smoker     Smokeless tobacco: Never Used   Vaping Use     Vaping Use: Never used   Substance Use Topics     Alcohol use: No     Drug use: No        Medications:    ALPRAZolam (XANAX) 0.25 MG tablet  calcium carbonate-vitamin D (OSCAL W/D) 500-200 MG-UNIT tablet  cholecalciferol 25 MCG (1000 UT) TABS  cyclobenzaprine (FLEXERIL) 5 MG tablet  hydrOXYzine (ATARAX) 10 MG tablet  ibuprofen (ADVIL/MOTRIN) 600 MG " tablet  levothyroxine (SYNTHROID/LEVOTHROID) 50 MCG tablet  Lidocaine (LIDOCARE) 4 % Patch  omeprazole (PRILOSEC) 20 MG DR capsule  polyethylene glycol (MIRALAX) 17 GM/Dose powder  simvastatin (ZOCOR) 20 MG tablet  vitamin E (TOCOPHEROL) 400 units (180 mg) capsule          Review of Systems   All other systems reviewed and are negative.      Physical Exam   BP: (!) 188/109  Pulse: 105  Temp: 98  F (36.7  C)  Resp: 17  Weight: 60.4 kg (133 lb 3.2 oz)  SpO2: 99 %      Primary Survey:    Airway: Intact, Patent and Talking    Breathing: Spontaneous, Bilateral breath sounds and Non labored    Circulation: Awake and alert with normal blood pressure and normal central and peripheral perfusion     Disability:     Pupils: EOMI PERRL      GCS:   Motor 6=Obeys commands   Verbal 5=Oriented   Eye Opening 4=Spontaneous   Total: 15       Environment & Exposure: Patient was completely exposed and a head-to-toe visual inspection was done.    Physical Exam  Vitals and nursing note reviewed.   Constitutional:       General: She is not in acute distress.     Appearance: She is not diaphoretic.   HENT:      Head: Normocephalic and atraumatic.      Comments: Negative sanchez sign.  No bruising or swelling of the scalp.  No laceration.     Right Ear: Tympanic membrane, ear canal and external ear normal.      Left Ear: Tympanic membrane, ear canal and external ear normal.      Ears:      Comments: No hemotympanum.     Nose: Nose normal. No congestion or rhinorrhea.      Comments: No nasal bone trauma.     Mouth/Throat:      Mouth: Mucous membranes are moist.      Pharynx: No oropharyngeal exudate.      Comments: No dental trauma.  Eyes:      General: No visual field deficit or scleral icterus.        Right eye: No discharge.         Left eye: No discharge.      Extraocular Movements: Extraocular movements intact.      Conjunctiva/sclera: Conjunctivae normal.      Pupils: Pupils are equal, round, and reactive to light.   Neck:      Thyroid:  No thyromegaly.      Comments: No midline tenderness.  No pain with axial loading.  Normal range of motion of the C-spine without pain.  Therefore, c-collar was not applied.  Cardiovascular:      Rate and Rhythm: Normal rate and regular rhythm.      Heart sounds: Normal heart sounds. No murmur heard.  Pulmonary:      Effort: Pulmonary effort is normal. No respiratory distress.      Breath sounds: Normal breath sounds. No wheezing or rales.   Chest:      Chest wall: No tenderness.   Abdominal:      General: Bowel sounds are normal. There is no distension.      Palpations: Abdomen is soft. There is no mass.      Tenderness: There is no abdominal tenderness. There is no right CVA tenderness, left CVA tenderness, guarding or rebound.   Musculoskeletal:      Cervical back: Normal range of motion and neck supple. No rigidity or tenderness.      Comments: Tender to palpation of the spinous processes of L2-S1.  Some mild paravertebral muscular tenderness.  No bruising or swelling.  Lower extremities with equal and appropriate range of motion and strength.  DTRs and sensation intact and normal.  Upper and lower extremities without any evidence for trauma.  Normal range of motion.  No tenderness throughout.  Distal pulses and sensation intact.  Thoracic spine nontender to palpation.   Lymphadenopathy:      Cervical: No cervical adenopathy.   Skin:     General: Skin is warm and dry.      Capillary Refill: Capillary refill takes less than 2 seconds.      Findings: No erythema or rash.   Neurological:      Mental Status: She is alert and oriented to person, place, and time.      GCS: GCS eye subscore is 4. GCS verbal subscore is 5. GCS motor subscore is 6.      Cranial Nerves: Cranial nerves are intact. No cranial nerve deficit, dysarthria or facial asymmetry.      Sensory: Sensation is intact.      Motor: Motor function is intact. No weakness, tremor, abnormal muscle tone or pronator drift.      Coordination: Coordination is  intact. Romberg sign negative. Coordination normal. Finger-Nose-Finger Test and Heel to Shin Test normal. Rapid alternating movements normal.      Gait: Gait is intact.      Deep Tendon Reflexes:      Reflex Scores:       Bicep reflexes are 2+ on the right side and 2+ on the left side.       Patellar reflexes are 2+ on the right side and 2+ on the left side.  Psychiatric:         Behavior: Behavior normal.         Thought Content: Thought content normal.         ED Course                 Procedures                 EKG Interpretation:      EKG Number: 1  Interpreted by Neo Ghosh PA-C  Symptoms at time of EKG: back pain.   Rhythm: Normal sinus  and Sinus tachycardia  Rate: 103  Axis: Normal  Ectopy: None  Conduction: Normal and Left bundle branch block (incomplete)  ST Segments/ T Waves: No ST-T wave changes and No acute ischemic changes  Q Waves: None  Comparison to prior: new LBBB from 2018    Clinical Impression: new LBBB from 2018.          Critical Care time:  none               Results for orders placed or performed during the hospital encounter of 04/06/22 (from the past 24 hour(s))   CBC with platelets differential    Narrative    The following orders were created for panel order CBC with platelets differential.  Procedure                               Abnormality         Status                     ---------                               -----------         ------                     CBC with platelets and d...[[id number]]                      Final result                 Please view results for these tests on the individual orders.   Comprehensive metabolic panel   Result Value Ref Range    Sodium 140 133 - 144 mmol/L    Potassium 3.8 3.4 - 5.3 mmol/L    Chloride 107 94 - 109 mmol/L    Carbon Dioxide (CO2) 28 20 - 32 mmol/L    Anion Gap 5 3 - 14 mmol/L    Urea Nitrogen 11 7 - 30 mg/dL    Creatinine 0.66 0.52 - 1.04 mg/dL    Calcium 8.8 8.5 - 10.1 mg/dL    Glucose 110 (H) 70 - 99 mg/dL    Alkaline  Phosphatase 65 40 - 150 U/L    AST 16 0 - 45 U/L    ALT 17 0 - 50 U/L    Protein Total 7.8 6.8 - 8.8 g/dL    Albumin 4.4 3.4 - 5.0 g/dL    Bilirubin Total 0.3 0.2 - 1.3 mg/dL    GFR Estimate 88 >60 mL/min/1.73m2   INR   Result Value Ref Range    INR 0.88 0.85 - 1.15   Partial thromboplastin time   Result Value Ref Range    aPTT 30 22 - 38 Seconds   CBC with platelets and differential   Result Value Ref Range    WBC Count 5.2 4.0 - 11.0 10e3/uL    RBC Count 4.31 3.80 - 5.20 10e6/uL    Hemoglobin 13.3 11.7 - 15.7 g/dL    Hematocrit 41.3 35.0 - 47.0 %    MCV 96 78 - 100 fL    MCH 30.9 26.5 - 33.0 pg    MCHC 32.2 31.5 - 36.5 g/dL    RDW 12.7 10.0 - 15.0 %    Platelet Count 236 150 - 450 10e3/uL    % Neutrophils 58 %    % Lymphocytes 31 %    % Monocytes 8 %    % Eosinophils 2 %    % Basophils 1 %    % Immature Granulocytes 0 %    NRBCs per 100 WBC 0 <1 /100    Absolute Neutrophils 3.0 1.6 - 8.3 10e3/uL    Absolute Lymphocytes 1.6 0.8 - 5.3 10e3/uL    Absolute Monocytes 0.4 0.0 - 1.3 10e3/uL    Absolute Eosinophils 0.1 0.0 - 0.7 10e3/uL    Absolute Basophils 0.1 0.0 - 0.2 10e3/uL    Absolute Immature Granulocytes 0.0 <=0.4 10e3/uL    Absolute NRBCs 0.0 10e3/uL   Troponin I   Result Value Ref Range    Troponin I High Sensitivity <3 <54 ng/L   CT Head w/o Contrast    Narrative    EXAM: CT HEAD W/O CONTRAST  LOCATION: [facility]  DATE/TIME: 4/6/2022 6:18 PM    INDICATION: fall, posterior head trauma  COMPARISON: None.  TECHNIQUE: Routine CT Head without IV contrast. Multiplanar reformats. Dose reduction techniques were used.    FINDINGS:  INTRACRANIAL CONTENTS: No intracranial hemorrhage, extraaxial collection, or mass effect.  No CT evidence of acute infarct. There is scattered diffuse low attenuation within the periventricular and subcortical white matter consistent with mild diffuse   small vessel ischemic disease. The ventricular system, basal cisterns and the cortical sulci are consistent  with diffuse volume loss. The ventricular system is out of proportion to the sulci suggestive of a possible component of normal pressure   hydrocephalus.     VISUALIZED ORBITS/SINUSES/MASTOIDS: No intraorbital abnormality. No paranasal sinus mucosal disease. No middle ear or mastoid effusion.    BONES/SOFT TISSUES: No acute abnormality.      Impression    IMPRESSION:  1.  No CT finding of a mass, hemorrhage or focal area suggestive of acute infarct.  2.  Mild diffuse age related changes along with a possible component of normal pressure hydrocephalus.   Lumbar spine CT w/o contrast    Narrative    EXAM: CT LUMBAR SPINE W/O CONTRAST  LOCATION: [facility]  DATE/TIME: 4/6/2022 6:19 PM    INDICATION: Fall, low back pain.  COMPARISON: Lumbar spine radiographs dated 03/18/2022. CT lumbar spine 08/11/2021. MRI lumbar spine 04/25/2019.  TECHNIQUE: Routine CT Lumbar Spine without IV contrast. Multiplanar reformats. Dose reduction techniques were used.    FINDINGS: In keeping with the numbering system used on CT lumbar spine report of 08/11/2021 and lumbar spine radiograph report of 03/18/2022, I will presume that there are 5 lumbar vertebral bodies with rudimentary ribs bilaterally at L1.    No acute fracture identified. There is redemonstration of an L2 superior endplate compression deformity with mild to moderate height loss, which appears subacute to chronic. No new vertebral body height loss identified. Unchanged mild to moderate   dextroconvex scoliosis centered at L2-L3.    Bilateral posterior fusion instrumentation extending from L2 to the bony pelvis, as before. No hardware fracture. There is lucency surrounding the right L2 pedicle screw (series 7 image 31) and seen to a lesser degree involving the left L2 pedicle screw,   concerning for loosening. The tips of the pedicle screw breach the superior L2 vertebral endplate cortex and partially extend along the undersurface of the L1-L2  intervertebral disc. No other findings concerning for loosening. There appears to be   probable fusion across the L4-L5 facet joints and across the left L5-S1 posterior elements. There also is fusion across the right sacroiliac joint.    Postsurgical changes of interbody fusion at L2-L3 and L5-S1 with solid fusion across the left aspect of the L2-L3 disc space and the right aspect of the L5-S1 disc space.    Severe disc height loss at L4-L5 with lesser degrees of disc height loss elsewhere. Multilevel degenerative facet hypertrophy is present. Scattered endplate spurs. The spinal canal is partially obscured by streak artifact from the patient's hardware.   Posterior decompression changes noted at the L5-S1 level. Mild to moderate/moderate spinal canal narrowing at L3-L4 and L4-L5 in the setting of disc bulges and facet hypertrophy and ligamentum flavum thickening, not well assessed on CT. There is no   definite high-grade/severe spinal canal stenosis. No definite high-grade neural foraminal stenosis.    Surgical clips in the right upper quadrant of the abdomen from prior cholecystectomy. Probable small cysts in the liver. Scattered atherosclerotic calcifications of the aortoiliac arteries. The visualized paraspinous soft tissues otherwise appear normal.      Impression    IMPRESSION:  1. No acute abnormality of the lumbar spine identified. Specifically, no acute fracture.  2. Unchanged subacute to chronic L2 superior endplate compression fracture.  3. Redemonstrated findings of L2-bony pelvis fusion, as described. Findings concerning for loosening of the right greater than left L2 pedicle screws.  4. Solid instrumented interbody fusion at L2-L3 and L5-S1, as before.  5. Multilevel degenerative changes elsewhere, as described.  6. Mild to moderate dextroconvex scoliosis, as before.   CT Pelvis Bone wo Contrast    Narrative    EXAM: CT PELVIS BONE WO CONTRAST  LOCATION: [facility]  DATE/TIME: 4/6/2022 6:19 PM    INDICATION: Fall. Sacral and right hip pain.  COMPARISON: None available.  TECHNIQUE: CT scan of the pelvis was performed without IV contrast. Multiplanar reformats were obtained. Dose reduction techniques were used.  CONTRAST: None.    FINDINGS:    I have no recent pelvic or hip radiographic exam for review.    The initial  image reveals changes related to complex long segment instrumented lumbosacral spinal fusion extending across both SI joints anteriorly into the iliac bones. Anterior interbody fusion at the lumbosacral junction, see separate lumbar   spine CT report for complete details.    No evidence for occult sacral, pelvic, or proximal femur fracture. No superior or inferior pubic ramus fracture. No acetabular fracture. No CT finding for femoral head AVN. Calcific distal right gluteal tendinitis. Mild bilateral hip osteoarthritis with   osteoarthritis at the symphysis pubis. Symmetric degenerative SI joints. No aggressive bone lesion identified on CT. Partial ankylosis across the right SI joint.    Colonic diverticulosis. Anastomotic bowel staple line in the left pelvis. No intestinal obstruction. Distended urinary bladder. Absent uterus. No dominant adnexal mass. Shotty inguinal lymph nodes. No significant hip joint effusion. Symmetric pelvic   muscle bulk.      Impression    IMPRESSION:  1.  No CT evidence for acute displaced sacral, pelvic, or proximal femur fracture.  2.  Extensive postop change lumbosacral spine with bilateral iliac screws traversing the anterior margins of both SI joints. See separate lumbar spine CT report.  3.  Mild bilateral hip osteoarthritis. No CT finding for femoral head AVN.       Medications - No data to display    Assessments & Plan (with Medical Decision Making)     Fall  Concussion without loss of consciousness  Bilateral low back pain without sciatica  LBBB (left bundle branch block)     81 year old female presents for  evaluation of a fall in the kitchen.  She accidentally stepped on the back of her opposite slipper which caused her to lose her balance.  She fell backwards and struck her head on the linoleum floor.  No LOC but she has complaints of low back discomfort, dizziness, nausea, and memory issues.  She is brought to the ED by her .  She walked into the ED on her own.  Denies any chest pain, dyspnea, or palpitations.  See HPI for details.  On exam blood pressure 153/79, temperature 98.5, pulse 95, respirations 16, oxygen saturation 97% on room air.  Patient is in no acute distress.  She does appear mildly confused but also appears anxious.  No sign of significant head trauma.  No scalp bleeding.  No sanchez sign or hemotympanum.  No C-spine tenderness.  Normal range of motion of the C-spine.  C-spines cleared based on clinical criteria.  She does have some minor tenderness of the lumbar spine over the spinous processes and the paravertebral musculature.  Lower extremities with equal and appropriate sensation, range of motion, strength, and DTRs.  No sign of extremity trauma.  IV was established.  Laboratory levels display a normal CBC, comprehensive metabolic panel, INR, PTT, and undetectable troponin.  CT of the head without intracranial bleeding.  There is diffuse age-related changes with a possible component of NPH.  Lumbar spine with hardware present but no sign of hardware failure.  Unchanged subacute to chronic L2 endplate compression fracture.  No new fractures identified.  Multilevel degenerative changes.  CT of the pelvis and sacrum without acute fracture.  Osteoarthritis noted.  Patient remained neurologically intact throughout her entire visit.  Reassured her that she does not have any evidence for fracture.  No intracranial bleeding.  I am concerned that she does have a mild concussion based on her symptoms after posterior head trauma.  Therefore, I recommended rest.  Push clear fluids.  Increased sleep.   Avoid reading, watching TV, exercise, etc. until she feels 100% improved.  Concussion  referral placed for follow-up in the next 4-5 days.  Indications for ED return reviewed with the patient in detail.  EKG does have a change from her most recent EKG of 2018.  There is new onset left bundle branch block.  She does not have any chest pain, palpitations, or dyspnea.  Troponin level is undetectable.  Therefore, more emergent evaluation for this is not indicated.  She does not have any symptoms of cardiac ischemia.  I referred her back to her PCP for further management of this concern.  For her low back strain, she can use ibuprofen 600 mg every 6 hours as needed.  This works well for her.  Alternate ice and heat.  Patient and her  were in agreement with this plan and she was suitable for discharge.     I have reviewed the nursing notes.    I have reviewed the findings, diagnosis, plan and need for follow up with the patient.       New Prescriptions    No medications on file       Final diagnoses:   Fall   Concussion without loss of consciousness   Bilateral low back pain without sciatica   LBBB (left bundle branch block)       Disclaimer: This note consists of symbols derived from keyboarding, dictation and/or voice recognition software. As a result, there may be errors in the script that have gone undetected. Please consider this when interpreting information found in this chart.      4/6/2022   [facility] EMERGENCY DEPT     Neo Ghosh PA-C  04/06/22 2039

## 2022-04-07 NOTE — PROGRESS NOTES
Clinic Care Coordination Contact  [facility]: Post-Discharge Note  SITUATION                                                      Admission:    Admission Date: 04/06/22   Reason for Admission: Fall  Discharge:   Discharge Date: 04/06/22  Discharge Diagnosis: Fall, concussion without loss of consciousness, bileteral low back pain without sciatica, LBBB (Left bundle branch block)    BACKGROUND                                                      Per hospital discharge summary and inpatient provider notes:    Trauma evaluation was called.  I saw the patient 6:11 PM right after the trauma evaluation was called.  The ED was busy and an MD was not able to see the patient.     Milena Hamilton is a 81 year old female who presents for evaluation of a fall that occurred 1 hour ago at home.  Her  was present in the kitchen and saw this happen.  He states that she accidentally stepped on her opposite foot slipper which caused her to lose her balance and she fell backward in the kitchen.  When she fell she struck the posterior aspect of her head.  Her  denies her losing consciousness.  He states that when she was on the ground she suddenly did not remember what happened.  She had a hard time remembering what they ate earlier.  He was very concerned regarding her confusion.  She also reported low back discomfort.  She did have lumbar surgery, and is concerned about the status of her hardware.  She denies any lower extremity weakness, numbness, or tingling.  No loss of bowel/bladder function.  She denies bleeding from anywhere.  She denies any upper or lower extremity discomfort.  She actually walked into the ED under her own power.  Her  drove her up to the ED.  She denies any chest pain, palpitations, dyspnea, or lower extremity edema.  She has felt dizzy and nauseated ever since the incident along with the memory issues.  No vomiting.  Current pain is rated 2-3 on a scale of 10.  She has not taken  anything for her discomfort.    ASSESSMENT      Enrollment  Primary Care Care Coordination Status: Declined    Discharge Assessment  How are you doing now that you are home?: I have a little headache  How are your symptoms? (Red Flag symptoms escalate to triage hotline per guidelines): Improved  Do you feel your condition is stable enough to be safe at home until your provider visit?: Yes  Does the patient have their discharge instructions? : Yes  Does the patient have questions regarding their discharge instructions? : Yes (see comment) (She was wondering why she needs to see a concussion doctor. CHW reviewed her AVS with her and the reccomendations to see a provider for follow up regarding a concussion)  Were you started on any new medications or were there changes to any of your previous medications? : Yes  Does the patient have all of their medications?: Yes  Do you have questions regarding any of your medications? : No  Do you have all of your needed medical supplies or equipment (DME)?  (i.e. oxygen tank, CPAP, cane, etc.): Yes  Discharge follow-up appointment scheduled within 14 calendar days? : Yes (Concussion clinic called to schedule an appointment with her but patient said they do not have availability until July. CHW assisted in calling concussion clinic for a sooner appointment.)  Discharge Follow Up Appointment Date: 04/13/22  Discharge Follow Up Appointment Scheduled with?: Primary Care Provider  Is patient agreeable to assistance with scheduling? : Yes (CHW assisted calling concussion clinic for a sooner appointment and also reccomended patient call her primary care provider to update him about her ED visit and see if he would like her to come in for a follow up)    Concussion clinic could not see her until June and recommended speaking to primary care provider as well for a sooner appointment. Patient was in agreement. CHW transferred to her clinic to schedule an ED follow up with her PCP, Chuck DEL REAL  CHIARA Streeter, on 4/13/2022 at 3:10 PM.     Post-op (CHW CTA Only)  If the patient had a surgery or procedure, do they have any questions for a nurse?: No      PLAN                                                      Outpatient Plan:      Follow up with [facility] (EMERGENCY MEDICINE); As needed, If symptoms worsen.    Concussion  Referral Monday for a recheck.    Future Appointments   Date Time Provider Department Center   4/13/2022  3:30 PM Chuck Streeter PA-C [facility] [place]   [facility] [place]   5/25/2022  1:30 PM Chuck Streeter PA-C [facility] [place]         For any urgent concerns, please contact our 24 hour nurse triage line: [phone] ([phone])       Dorothy Mcintosh  Community Health Worker  [facility], [facility]  Ph: [phone]

## 2022-04-07 NOTE — DISCHARGE INSTRUCTIONS
It was a pleasure working with you today!  I hope your condition improves rapidly!     Thankfully, all of your testing came out okay today.  You did not have any disruption to the hardware from your back surgery.  You also did not have any fractures identified.  I am concerned that you did suffer a mild concussion given your confusion, nausea, and dizziness after falling and hitting her head.  For that reason, I would like for you to avoid aerobic activity, watching TV, reading books, doing crossword puzzles, etc.  Your brain needs rest at this time.  I placed a referral with the concussion clinic and I am hopeful that they can see you on Monday for recheck.  If you are feeling 100% normal by Saturday of this week, then I think it would be okay to return to your normal activities.  If things have not returned to normal, then continue your restrictions until you see the concussion clinic.  For any pain in your back that you experience you can continue your ibuprofen 600 mg every 6 hours as needed.  It is okay to use ice or heat for 20 minutes every couple hours.    Your EKG did show subtle changes from your most recent EKG in 2018.  You have a change in the EKG called left bundle branch block.  There is nothing emergent that needs to be done regarding this, but I would like for you to see Amrik Streeter PA-C in the next 1-2 weeks to reevaluate your progress after your fall and also to see if any further testing needs to be done.

## 2022-04-13 ENCOUNTER — OFFICE VISIT (OUTPATIENT)
Dept: FAMILY MEDICINE | Facility: OTHER | Age: 82
End: 2022-04-13
Payer: COMMERCIAL

## 2022-04-13 VITALS
WEIGHT: 128 LBS | RESPIRATION RATE: 20 BRPM | HEART RATE: 88 BPM | BODY MASS INDEX: 23.55 KG/M2 | DIASTOLIC BLOOD PRESSURE: 70 MMHG | OXYGEN SATURATION: 99 % | TEMPERATURE: 97.6 F | SYSTOLIC BLOOD PRESSURE: 118 MMHG | HEIGHT: 62 IN

## 2022-04-13 DIAGNOSIS — I44.7 LEFT BUNDLE BRANCH BLOCK: ICD-10-CM

## 2022-04-13 DIAGNOSIS — M54.16 LUMBAR RADICULOPATHY: ICD-10-CM

## 2022-04-13 DIAGNOSIS — R32 URINARY INCONTINENCE, UNSPECIFIED TYPE: ICD-10-CM

## 2022-04-13 DIAGNOSIS — S06.0X0D CONCUSSION WITHOUT LOSS OF CONSCIOUSNESS, SUBSEQUENT ENCOUNTER: Primary | ICD-10-CM

## 2022-04-13 DIAGNOSIS — G31.9 VENTRICULAR ENLARGEMENT DUE TO BRAIN ATROPHY (H): ICD-10-CM

## 2022-04-13 DIAGNOSIS — M53.3 SI (SACROILIAC) JOINT DYSFUNCTION: ICD-10-CM

## 2022-04-13 PROCEDURE — 99214 OFFICE O/P EST MOD 30 MIN: CPT | Performed by: PHYSICIAN ASSISTANT

## 2022-04-13 ASSESSMENT — PAIN SCALES - GENERAL: PAINLEVEL: EXTREME PAIN (9)

## 2022-04-13 NOTE — Clinical Note
Please help schedule a new appointment with neurosurgery in [place] for evaluation of normal pressure hydrocephalus (NPH) within the next few weeks. Can ask a TC to help with this if needed.

## 2022-04-13 NOTE — PROGRESS NOTES
Assessment & Plan       ICD-10-CM    1. Concussion without loss of consciousness, subsequent encounter  S06.0X0D    2. Left bundle branch block  I44.7 Echocardiogram Complete   3. Urinary incontinence, unspecified type  R32 Neurosurgery Referral   4. 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 Lumbar radiculopathy  M54.16    6. SI (sacroiliac) joint dysfunction  M53.3        1. Recent fall with mild concussion. Symptoms are improving nicely without a significant headache or other residual symptoms related to her fall. I recommend she continue with mental activity/screen time as tolerated and if symptoms worsen, she will contact the clinic.    2. New left bundle branch block noted on EKG in the hospital. She denies any new shortness of breath, chest pain, or palpitations and no murmur is appreciated on exam. However, given this new finding, echocardiogram is warranted for further evaluation.    3-4. During her head injury work up in the ED, she underwent a head CT which revealed dilated ventricles out of proportion to her cerebral atrophy, raising concern for normal pressure hydrocephalus. I have a very high suspicion for this condition given her poor balance with shuffling gait and her fairly new nighttime urinary incontinence. I recommend she see neurosurgery for further evaluation and discussed that often a lumbar puncture is completed first to diagnose the condition and determine if symptoms improve when a small amount of CSF is removed. They can then determine if she needs a ventriculoperitoneal shunt. Will let neurosurgery discuss this in more detail but if this is the correct diagnosis, it is treatable and her balance and urinary symptoms should improve.    5-6. She has a right SI joint injection scheduled for tomorrow for which she has already been medically cleared. The new LBBB does not change this clearance since it is such a low risk procedure.  I did notify  patient that her lumbar CT in the ED revealed possibly loosening of her L2 pedicle screws so this should be further discussed when she follows up with her spine surgeon.       Chuck Streeter PA-C  [facility] OLIVIA Abbott is a 81 year old who presents for the following health issues     HPI     Recent fall after tripping on her foot, landing on her back and striking her head. Her head CT in the ED did not reveal any acute abnormalities although there was mention of dilated ventricles raising concerning for normal pressure hydrocephalus. She does admit to worsening balance over the past few months along with night time urinary incontinence. She denies new confusion or memory loss. Her headache is much improved since her ED visit and she denies any blurred vision, nausea, or confusion. She is still dealing with fairly significant low back pain and has an SI joint injection scheduled for tomorrow.      ED Visit Summary 4/6/22 [facility]    Assessments & Plan (with Medical Decision Making)     Fall  Concussion without loss of consciousness  Bilateral low back pain without sciatica  LBBB (left bundle branch block)      81 year old female presents for evaluation of a fall in the kitchen.  She accidentally stepped on the back of her opposite slipper which caused her to lose her balance.  She fell backwards and struck her head on the linoleum floor.  No LOC but she has complaints of low back discomfort, dizziness, nausea, and memory issues.  She is brought to the ED by her .  She walked into the ED on her own.  Denies any chest pain, dyspnea, or palpitations.  See HPI for details.  On exam blood pressure 153/79, temperature 98.5, pulse 95, respirations 16, oxygen saturation 97% on room air.  Patient is in no acute distress.  She does appear mildly confused but also appears anxious.  No sign of significant head trauma.  No scalp bleeding.  No sanchez sign or  hemotympanum.  No C-spine tenderness.  Normal range of motion of the C-spine.  C-spines cleared based on clinical criteria.  She does have some minor tenderness of the lumbar spine over the spinous processes and the paravertebral musculature.  Lower extremities with equal and appropriate sensation, range of motion, strength, and DTRs.  No sign of extremity trauma.  IV was established.  Laboratory levels display a normal CBC, comprehensive metabolic panel, INR, PTT, and undetectable troponin.  CT of the head without intracranial bleeding.  There is diffuse age-related changes with a possible component of NPH.  Lumbar spine with hardware present but no sign of hardware failure.  Unchanged subacute to chronic L2 endplate compression fracture.  No new fractures identified.  Multilevel degenerative changes.  CT of the pelvis and sacrum without acute fracture.  Osteoarthritis noted.  Patient remained neurologically intact throughout her entire visit.  Reassured her that she does not have any evidence for fracture.  No intracranial bleeding.  I am concerned that she does have a mild concussion based on her symptoms after posterior head trauma.  Therefore, I recommended rest.  Push clear fluids.  Increased sleep.  Avoid reading, watching TV, exercise, etc. until she feels 100% improved.  Concussion  referral placed for follow-up in the next 4-5 days.  Indications for ED return reviewed with the patient in detail.  EKG does have a change from her most recent EKG of 2018.  There is new onset left bundle branch block.  She does not have any chest pain, palpitations, or dyspnea.  Troponin level is undetectable.  Therefore, more emergent evaluation for this is not indicated.  She does not have any symptoms of cardiac ischemia.  I referred her back to her PCP for further management of this concern.  For her low back strain, she can use ibuprofen 600 mg every 6 hours as needed.  This works well for her.  Alternate ice and  "heat.  Patient and her  were in agreement with this plan and she was suitable for discharge.    Review of Systems   Constitutional, HEENT, cardiovascular, pulmonary, neuro, musculoskeletal, gi and gu systems are negative, except as otherwise noted.      Objective     /70   Pulse 88   Temp 97.6  F (36.4  C) (Temporal)   Resp 20   Ht 1.575 m (5' 2.01\")   Wt 58.1 kg (128 lb)   LMP  (LMP Unknown)   SpO2 99%   BMI 23.41 kg/m    Body mass index is 23.41 kg/m .  Physical Exam   GENERAL: healthy, alert and no distress  EYES: Eyes grossly normal to inspection, PERRL and conjunctivae and sclerae normal  RESP: lungs clear to auscultation - no rales, rhonchi or wheezes  CV: regular rate and rhythm, normal S1 S2, no S3 or S4, no murmur, click or rub, no peripheral edema and peripheral pulses strong  NEURO: Normal strength and tone, mentation intact and speech normal. Cranial nerves II-XII are grossly intact. DTRs are 2+/4 throughout and symmetric. Gait is slowed and slightly shuffled and there is some unsteadiness when turning around. She typically uses a walker.   PSYCH: mentation appears normal, affect normal/bright            "

## 2022-04-13 NOTE — PATIENT INSTRUCTIONS
Will have you see a neurosurgeon to discuss the enlarged ventricles in your brain as this could be causing your balance issues and urinary issues.  They will call to schedule.    I recommend you mention that your L2 pedicle screws may be slightly loose when you see your neurosurgeon.    Will order an echocardiogram for your new EKG findings.    Stay well hydrated and walk slowly.  If you have worsening headaches, let me know.

## 2022-04-14 ENCOUNTER — HOSPITAL ENCOUNTER (OUTPATIENT)
Facility: CLINIC | Age: 82
Discharge: HOME | End: 2022-04-14
Attending: ANESTHESIOLOGY | Admitting: ANESTHESIOLOGY
Payer: COMMERCIAL

## 2022-04-14 ENCOUNTER — HOSPITAL ENCOUNTER (OUTPATIENT)
Dept: GENERAL RADIOLOGY | Facility: CLINIC | Age: 82
Discharge: HOME | End: 2022-04-14
Attending: ANESTHESIOLOGY
Payer: COMMERCIAL

## 2022-04-14 VITALS
SYSTOLIC BLOOD PRESSURE: 168 MMHG | DIASTOLIC BLOOD PRESSURE: 87 MMHG | RESPIRATION RATE: 18 BRPM | OXYGEN SATURATION: 98 % | TEMPERATURE: 97.6 F

## 2022-04-14 DIAGNOSIS — M53.3 SI (SACROILIAC) JOINT DYSFUNCTION: ICD-10-CM

## 2022-04-14 PROCEDURE — 250N000009 HC RX 250: Performed by: ANESTHESIOLOGY

## 2022-04-14 PROCEDURE — 20550 NJX 1 TENDON SHEATH/LIGAMENT: CPT | Mod: 51 | Performed by: ANESTHESIOLOGY

## 2022-04-14 PROCEDURE — 27096 INJECT SACROILIAC JOINT: CPT | Mod: 50 | Performed by: ANESTHESIOLOGY

## 2022-04-14 PROCEDURE — 250N000011 HC RX IP 250 OP 636: Performed by: ANESTHESIOLOGY

## 2022-04-14 PROCEDURE — 999N000179 XR SURGERY CARM FLUORO LESS THAN 5 MIN W STILLS

## 2022-04-14 PROCEDURE — 27096 INJECT SACROILIAC JOINT: CPT | Performed by: ANESTHESIOLOGY

## 2022-04-14 RX ORDER — IOPAMIDOL 612 MG/ML
INJECTION, SOLUTION INTRATHECAL PRN
Status: DISCONTINUED | OUTPATIENT
Start: 2022-04-14 | End: 2022-04-14 | Stop reason: HOSPADM

## 2022-04-14 RX ORDER — BUPIVACAINE HYDROCHLORIDE 5 MG/ML
INJECTION, SOLUTION PERINEURAL PRN
Status: DISCONTINUED | OUTPATIENT
Start: 2022-04-14 | End: 2022-04-14 | Stop reason: HOSPADM

## 2022-04-14 RX ORDER — METHYLPREDNISOLONE ACETATE 80 MG/ML
INJECTION, SUSPENSION INTRA-ARTICULAR; INTRALESIONAL; INTRAMUSCULAR; SOFT TISSUE PRN
Status: DISCONTINUED | OUTPATIENT
Start: 2022-04-14 | End: 2022-04-14 | Stop reason: HOSPADM

## 2022-04-14 NOTE — DISCHARGE INSTRUCTIONS

## 2022-04-14 NOTE — OP NOTE
CHIEF COMPLAINT:    1.SI joint dysfunction (Sacroilitis) and pain (724.6)   2 Sacral enthesopathy (720.1)    PROCEDURE:   Fluoroscopically-guided injection of the Bilateral  sacroiliac joints with Bilateral pastor Sacroiliac joint ligaments infiltration over the sacrum.    PROCEDURE DETAILS: After written informed consent was obtained from the patient, the patient was escorted to the procedure room.  The patient was placed in the prone position.   A time out was conducted to verify patient identity, procedure to be performed, side, site, allergies and any special requirements.  The skin over the lumbosacral region was prepped and draped in normal sterile fashion with ChloraPrep. Fluoroscopy was used to identify the posteroinferior region of the sacroiliac joint.  The skin was anesthetized with 2 mL of 1% lidocaine with bicarbonate buffer.  Using fluoroscopic guidance, a 22 gauge, 3.5  Quincke spinal needle was advanced into the joint from an inferior approach.  After negative aspiration, 0.5 ml of Omnipaque contrast was injected showing intra-articular spread of contrast without evidence of intravascular spread.  2.0 mL of solution consisting of 20 mg of triamcinolone and 1.5 cc of 0.5% marcaine was injected slowly into the joint.   A second injection at the sacroiliac joint ligaments was then performed.  The middle third of the SI Joint was identified with fluoroscopy. After advancing a 22 gauge, 3.5  Quincke spinal needle to these ligaments with intermittent fluoroscopic guidance,  3 mL of solution consisting of  10 mg of triamcinolone and 2.75 mL of 0.5 Marcaine was injected periligamentous and intramuscular over the sacroiliac joint ligaments.    This was performed bilaterally.  The patient was monitored with blood pressure and pulse oximetry machines with the assistance of an RN throughout the procedure.  The patient was alert and responsive to questions throughout the procedure.   The patient tolerated the  procedure well and was observed in the post-procedural area.  The patient was dismissed without apparent complications.   Blood loss: Less than 5 cc.    DIAGNOSIS:  1.  Bilateral sacroilitis  2.  Bilateral sacral enthesopathy  3.  L2 to pelvis fusion  PLAN:  1. Performed Bilateral   Sacroiliac joint injections.  2. Bilateral SI ligament injections over the sacrum   3.  Given that she has pain right over her right gluteus medius and minimus I would estimate that the pelvic screws probably have loosened up slightly causing some SI joint dysfunction.  Sometimes going back and doing a fusion over the SI joints is the only thing that I have found to be effective for the situation.  We will have to see if she gets any long-term benefits from the corticosteroid and then go from there.    Antoino Dasilva MD  Diplomate of the American Board of Anesthesiology, Pain Medicine

## 2022-04-14 NOTE — TELEPHONE ENCOUNTER
SPINE PATIENTS - NEW PROTOCOL PREVISIT    RECORDS RECEIVED FROM: Internal   REASON FOR VISIT: CT revealed dilated ventricles out of proportion to her cerebral atrophy, raising concern for normal pressure hydrocephalus   Date of Appt: 04/21/2022   NOTES (FOR ALL VISITS) STATUS DETAILS   OFFICE NOTE from referring provider Internal 04/13/2022 Dr Streeter [facility]    OFFICE NOTE from other specialist N/A    DISCHARGE SUMMARY from hospital N/A    DISCHARGE REPORT from ER Internal 04/06/2022 [facility] ED Dr Ghosh   EMG REPORT N/A    MEDICATION LIST N/A    IMAGING  (FOR ALL VISITS)     MRI (HEAD, NECK, SPINE) N/A    XRAY (SPINE) *NEUROSURGERY* N/A    CT (HEAD, NECK, SPINE) Internal 04/06/2022 head, lumbar spine, pelvis      04/14/2022 INJECT JOINT SACROILIAC Bilateral - INTERNAL

## 2022-04-21 ENCOUNTER — PRE VISIT (OUTPATIENT)
Dept: NEUROSURGERY | Facility: CLINIC | Age: 82
End: 2022-04-21
Payer: COMMERCIAL

## 2022-04-21 ENCOUNTER — OFFICE VISIT (OUTPATIENT)
Dept: NEUROSURGERY | Facility: CLINIC | Age: 82
End: 2022-04-21
Attending: PHYSICIAN ASSISTANT
Payer: COMMERCIAL

## 2022-04-21 VITALS
HEART RATE: 101 BPM | WEIGHT: 128 LBS | SYSTOLIC BLOOD PRESSURE: 148 MMHG | BODY MASS INDEX: 23.41 KG/M2 | DIASTOLIC BLOOD PRESSURE: 77 MMHG

## 2022-04-21 DIAGNOSIS — G31.9 VENTRICULAR ENLARGEMENT DUE TO BRAIN ATROPHY (H): ICD-10-CM

## 2022-04-21 DIAGNOSIS — R32 URINARY INCONTINENCE, UNSPECIFIED TYPE: ICD-10-CM

## 2022-04-21 PROCEDURE — 99214 OFFICE O/P EST MOD 30 MIN: CPT | Performed by: PHYSICIAN ASSISTANT

## 2022-04-21 NOTE — LETTER
4/21/2022         RE: Milena Hamilton  [address] [facility] [address]        Dear Colleague,    Thank you for referring your patient, Milena Hamilton, to the [facility] [place]. Please see a copy of my visit note below.    Neurosurgery Consult    HPI    Ms. Hamilton is an 81-year-old female who presents to clinic for evaluation of a head CT scan which demonstrated ventricular dilation possibly related to normal pressure hydrocephalus.  Her primary care provider also noted that she has had some worsening balance issues over the past few months, and nighttime urinary incontinence.  Of note she also underwent in September of last year at a multilevel lumbar fusion from L2 to the pelvis.  On her most recent CT scan after her fall in the emergency department at the beginning of April there is noted to be some possible loosening in the L2 screws of this construction.  She will be following up with her surgical team at [facility] for evaluation of this.  She also recently underwent sacroiliac joint injections without any improvement 1 week since her injections.  She denies memory loss, denies confusion.    She reports incontinence at night, but no issues with incontinence during the day.  She has some difficulty with balance and states that she walks with very short steps because she was told to do so by her primary care provider.    She also reports significant right-sided low back pain since her surgery, and does not feel that she had much improvement from the lumbar fusion.    Medical history  Status post lumbar fusion  History of diverticulitis  Hyperlipidemia  Hypertension      Social history          B/P: 148/77, T: Data Unavailable, P: 101, R: Data Unavailable       Exam    Alert and oriented no acute distress  Bilateral upper extremities appropriate strength  Finger-nose slow and accurate  Extract movements intact  Negative pronator drift  Cranial nerves grossly  symmetric  Gait is slow with short steps, and shuffling    Imaging    Head CT scan from April 6, 2022 demonstrated mild diffuse age-related changes with a possible component of normal pressure hydrocephalus.        Assessment    Ventriculomegaly possibly related to normal pressure hydrocephalus    Plan:      I recommend the patient meet with neurology for further evaluation and determination if other explanations felt better with her symptomatology or if normal pressure hydrocephalus should be evaluated further with lumbar puncture.  If neurology feels strongly that normal pressure hydrocephalus is contributing to her symptoms and she responds well to lumbar puncture or other diagnostic testing, we would certainly be happy to see the patient again and discuss possible surgical intervention.    Total time of 30 minutes spent with the patient today in counseling and coordination of care.      Again, thank you for allowing me to participate in the care of your patient.        Sincerely,        Jose G Payne PA-C

## 2022-04-21 NOTE — PROGRESS NOTES
Neurosurgery Consult    HPI    Ms. Hamilton is an 81-year-old female who presents to clinic for evaluation of a head CT scan which demonstrated ventricular dilation possibly related to normal pressure hydrocephalus.  Her primary care provider also noted that she has had some worsening balance issues over the past few months, and nighttime urinary incontinence.  Of note she also underwent in September of last year at a multilevel lumbar fusion from L2 to the pelvis.  On her most recent CT scan after her fall in the emergency department at the beginning of April there is noted to be some possible loosening in the L2 screws of this construction.  She will be following up with her surgical team at [facility] for evaluation of this.  She also recently underwent sacroiliac joint injections without any improvement 1 week since her injections.  She denies memory loss, denies confusion.    She reports incontinence at night, but no issues with incontinence during the day.  She has some difficulty with balance and states that she walks with very short steps because she was told to do so by her primary care provider.    She also reports significant right-sided low back pain since her surgery, and does not feel that she had much improvement from the lumbar fusion.    Medical history  Status post lumbar fusion  History of diverticulitis  Hyperlipidemia  Hypertension      Social history          B/P: 148/77, T: Data Unavailable, P: 101, R: Data Unavailable       Exam    Alert and oriented no acute distress  Bilateral upper extremities appropriate strength  Finger-nose slow and accurate  Extract movements intact  Negative pronator drift  Cranial nerves grossly symmetric  Gait is slow with short steps, and shuffling    Imaging    Head CT scan from April 6, 2022 demonstrated mild diffuse age-related changes with a possible component of normal pressure hydrocephalus.        Assessment    Ventriculomegaly possibly related to  normal pressure hydrocephalus    Plan:      I recommend the patient meet with neurology for further evaluation and determination if other explanations felt better with her symptomatology or if normal pressure hydrocephalus should be evaluated further with lumbar puncture.  If neurology feels strongly that normal pressure hydrocephalus is contributing to her symptoms and she responds well to lumbar puncture or other diagnostic testing, we would certainly be happy to see the patient again and discuss possible surgical intervention.    Total time of 30 minutes spent with the patient today in counseling and coordination of care.

## 2022-04-29 DIAGNOSIS — E78.5 HYPERLIPIDEMIA LDL GOAL <130: ICD-10-CM

## 2022-05-03 RX ORDER — SIMVASTATIN 20 MG
TABLET ORAL
Qty: 45 TABLET | Refills: 0 | OUTPATIENT
Start: 2022-05-03

## 2022-05-09 ENCOUNTER — TELEPHONE (OUTPATIENT)
Dept: FAMILY MEDICINE | Facility: OTHER | Age: 82
End: 2022-05-09

## 2022-05-09 ENCOUNTER — HOSPITAL ENCOUNTER (OUTPATIENT)
Dept: CARDIOLOGY | Facility: CLINIC | Age: 82
Discharge: HOME | End: 2022-05-09
Attending: PHYSICIAN ASSISTANT | Admitting: PHYSICIAN ASSISTANT
Payer: COMMERCIAL

## 2022-05-09 DIAGNOSIS — R93.1 DECREASED CARDIAC EJECTION FRACTION: ICD-10-CM

## 2022-05-09 DIAGNOSIS — I10 BENIGN ESSENTIAL HYPERTENSION: ICD-10-CM

## 2022-05-09 DIAGNOSIS — I44.7 LBBB (LEFT BUNDLE BRANCH BLOCK): Primary | ICD-10-CM

## 2022-05-09 DIAGNOSIS — I10 HTN, GOAL BELOW 140/90: ICD-10-CM

## 2022-05-09 DIAGNOSIS — I44.7 LEFT BUNDLE BRANCH BLOCK: ICD-10-CM

## 2022-05-09 LAB — BI-PLANE LVEF ECHO: NORMAL

## 2022-05-09 PROCEDURE — 93306 TTE W/DOPPLER COMPLETE: CPT

## 2022-05-09 PROCEDURE — 93306 TTE W/DOPPLER COMPLETE: CPT | Mod: 26 | Performed by: INTERNAL MEDICINE

## 2022-05-09 RX ORDER — METOPROLOL SUCCINATE 25 MG/1
25 TABLET, EXTENDED RELEASE ORAL DAILY
Qty: 30 TABLET | Refills: 2 | Status: SHIPPED | OUTPATIENT
Start: 2022-05-09 | End: 2022-06-08

## 2022-05-09 NOTE — TELEPHONE ENCOUNTER
Please call and notify patient that her echocardiogram reveals that her heart is not pumping quite as much blood as it should, this is called the ejection fraction and it is slightly lower than normal. I would recommend she see cardiology for further evaluation so referral has been placed. I also recommend she restart her metoprolol but will change to the longer acting dose so a new order has been placed as this will help protect her heart and bring down her blood pressure.    Chuck Streeter PA-C

## 2022-05-20 ENCOUNTER — TRANSFERRED RECORDS (OUTPATIENT)
Dept: HEALTH INFORMATION MANAGEMENT | Facility: CLINIC | Age: 82
End: 2022-05-20
Payer: COMMERCIAL

## 2022-05-25 ENCOUNTER — OFFICE VISIT (OUTPATIENT)
Dept: FAMILY MEDICINE | Facility: OTHER | Age: 82
End: 2022-05-25
Payer: COMMERCIAL

## 2022-05-25 VITALS
OXYGEN SATURATION: 98 % | HEIGHT: 61 IN | BODY MASS INDEX: 24.28 KG/M2 | RESPIRATION RATE: 18 BRPM | TEMPERATURE: 97.7 F | HEART RATE: 88 BPM | WEIGHT: 128.6 LBS | DIASTOLIC BLOOD PRESSURE: 70 MMHG | SYSTOLIC BLOOD PRESSURE: 132 MMHG

## 2022-05-25 DIAGNOSIS — M53.3 SI (SACROILIAC) JOINT DYSFUNCTION: ICD-10-CM

## 2022-05-25 DIAGNOSIS — Z23 HIGH PRIORITY FOR 2019-NCOV VACCINE: ICD-10-CM

## 2022-05-25 DIAGNOSIS — F41.9 ANXIETY: ICD-10-CM

## 2022-05-25 DIAGNOSIS — G31.9 VENTRICULAR ENLARGEMENT DUE TO BRAIN ATROPHY (H): ICD-10-CM

## 2022-05-25 DIAGNOSIS — M54.16 LUMBAR RADICULOPATHY: Primary | ICD-10-CM

## 2022-05-25 DIAGNOSIS — Z98.1 S/P LUMBAR FUSION: ICD-10-CM

## 2022-05-25 PROCEDURE — 91305 COVID-19,PF,PFIZER (12+ YRS): CPT | Performed by: PHYSICIAN ASSISTANT

## 2022-05-25 PROCEDURE — 0054A COVID-19,PF,PFIZER (12+ YRS): CPT | Performed by: PHYSICIAN ASSISTANT

## 2022-05-25 PROCEDURE — 99214 OFFICE O/P EST MOD 30 MIN: CPT | Mod: 25 | Performed by: PHYSICIAN ASSISTANT

## 2022-05-25 RX ORDER — ALPRAZOLAM 0.25 MG
0.25 TABLET ORAL
Qty: 30 TABLET | Refills: 2 | Status: SHIPPED | OUTPATIENT
Start: 2022-05-25 | End: 2022-11-04

## 2022-05-25 RX ORDER — METHYLPREDNISOLONE 4 MG
TABLET, DOSE PACK ORAL
Status: ON HOLD | COMMUNITY
Start: 2022-05-20 | End: 2022-07-07

## 2022-05-25 RX ORDER — CYCLOBENZAPRINE HCL 5 MG
5 TABLET ORAL 3 TIMES DAILY PRN
Qty: 60 TABLET | Refills: 2 | Status: SHIPPED | OUTPATIENT
Start: 2022-05-25 | End: 2022-11-16

## 2022-05-25 ASSESSMENT — PAIN SCALES - GENERAL: PAINLEVEL: MODERATE PAIN (5)

## 2022-05-25 NOTE — PROGRESS NOTES
Assessment & Plan       ICD-10-CM    1. Lumbar radiculopathy  M54.16 diclofenac (VOLTAREN) 1 % topical gel     cyclobenzaprine (FLEXERIL) 5 MG tablet   2. S/P lumbar fusion  Z98.1 diclofenac (VOLTAREN) 1 % topical gel     cyclobenzaprine (FLEXERIL) 5 MG tablet   3. SI (sacroiliac) joint dysfunction  M53.3 diclofenac (VOLTAREN) 1 % topical gel     cyclobenzaprine (FLEXERIL) 5 MG tablet   4. Ventricular enlargement due to brain atrophy (H)  G31.9    5. High priority for 2019-nCoV vaccine  Z23 COVID-19,PF,PFIZER (12+ Yrs GRAY LABEL)   6. Anxiety  F41.9 ALPRAZolam (XANAX) 0.25 MG tablet       1-3. Continued low back pain, mostly in the SI joints, right much more than left, with no improvement after SI joint injection last month. She is 8 months out from an L2-pelvis fusion and is still dealing with a fairly significant amount of pain. She is not requesting any narcotics and I really would like to avoid these given her age and concomitant use of benzos. Will try Voltaren gel up to 4 times daily as needed and she will continue with twice daily Tylenol. She will also continue physical therapy. She will follow up with an updated CT with her neurosurgeon at [facility] in September. If her pain is not improving over the next 4-6 weeks, I discussed referral to pain management. She is in agreement with this plan.     4. She has continued slowed, shuffling, ataxic gait with urinary incontinence. She does not have a lot of memory loss but does describe cloudy thinking at times. I feel there is a high likelihood of NPH but unfortunately, her appointment with neurology is not scheduled until September. I feel it is unreasonable to wait this long, especially given her worsening symptoms so I will message the neurologist to see if we can expedite sooner evaluation or if potentially I could place an order for a diagnostic lumbar puncture. I feel the risks of further decline due to undiagnosed NPH outweigh the  potential risk of a lumbar puncture. Will notify patient once I hear back.     5. 2nd booster dose administered by MA.    6. Continue Xanax nightly as needed for anxiety/sleep. PDMP reviewed today.    Follow up in 3 months.       Chuck Streeter PA-C  [facility] OLIVIA Abbott is a 82 year old who presents for the following health issues accompanied by her spouse.    HPI     Pain History:  When did you first notice your pain? - Chronic Pain   Have you seen this provider for your pain in the past?   Yes   Where in your body do you have pain? Lower back  Are you seeing anyone else for your pain? Yes - surgeon    Chronic Pain Follow Up:    Location of pain: low back  Analgesia/pain control:    - Recent changes:  No change    - Overall control: inadequate control   - Current treatments: medication   Adherence:     - Do you ever take more pain medicine than prescribed? No    - When did you take your last dose of pain medicine?  1230pm   Adverse effects: No   PDMP Review       Value Time User    State PDMP site checked  Yes 3/24/2022  2:02 PM Chuck Streeter PA-C        Last CSA Agreement:   CSA -- Patient Level:    Controlled Substance Agreement - Non - Opioid - Scan on 4/11/2022  2:21 PM: NON-OPIOID CONTROLLED SUBSTANCE AGREEMENT  Controlled Substance Agreement - Non - Opioid - Scan on 1/6/2021 10:03 AM: NON-OPIOID CONTROLLED SUBSTANCE AGREEMENT       Last UDS: 3/28/2022    She continues to experience significant pain in her low back and upper buttock, especially on the right sided since her lumbar fusion surgery last September. She saw her neurosurgeon recently and they prescribed a Medrol dose pack and recommended she follow up in September with an updated lumbar CT. The Medrol dose pack has not been beneficial. She also has been taking Tylenol twice daily, also with no benefit. She has previously tried and failed ibuprofen, meloxicam and gabapentin. She had a recent bilateral  "SI joint injection and states the left sided SI joint pain is improved but the right sided pain, which has always been worse, did not improve at all.     She continues to experience poor balance and a slow, shuffling gait so does not walk very often. She uses a cane at all times when she does ambulate. She also describes continued urinary incontinence, mostly at night. Some nights are okay but other night she will be up every hour. She denies significant memory loss but does notice some clouded thinking at times. She saw an [facility] neurosurgeon recently to discuss evaluation for normal pressure hydrocephalus but he recommended she see neurology for further evaluation and to make a definitive diagnosis. Unfortunately, her neurology appointment is not until September.     Review of Systems   Constitutional, cardiovascular, pulmonary, musculoskeletal, neuro, psych, gi and gu systems are negative, except as otherwise noted.      Objective    /70   Pulse 88   Temp 97.7  F (36.5  C) (Temporal)   Resp 18   Ht 1.56 m (5' 1.42\")   Wt 58.3 kg (128 lb 9.6 oz)   LMP  (LMP Unknown)   SpO2 98%   Breastfeeding No   BMI 23.97 kg/m    Body mass index is 23.97 kg/m .  Physical Exam   GENERAL: healthy, alert and no distress  RESP: lungs clear to auscultation - no rales, rhonchi or wheezes  CV: regular rate and rhythm, normal S1 S2, no S3 or S4, no murmur, click or rub, no peripheral edema  MS: no gross musculoskeletal defects noted, no edema. Tenderness over the right SI joint.   NEURO: Normal strength and tone, mentation intact and speech normal. Cranial nerves II-XII are grossly intact. DTRs are 2+/4 throughout and symmetric. Gait is wide based and shuffled with some antalgia, especially upon turning.    PSYCH: mentation appears normal, affect normal/bright          "

## 2022-05-25 NOTE — Clinical Note
Dr. Nichole,  I am the PCP for Milena and you are seeing her in September for evaluation of possible NPH. She has classic symptoms including and a wide based, shuffling, slowed gait, urinary incontinence and some cloudy thinking compared to baseline. She is 8 months out from an L2-pelvis fusion. She was evaluated by neurosurgery who recommended neurology evaluation for further NPH work up. 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 I am hoping to avoid making her wait 4 more months for a visit and am wondering if either she can be worked in sooner or if you would feel comfortable with me order a diagnostic lumbar puncture. I am not trying to step on any toes just want patient to have an answer in the near future so the appropriate treatment can be recommended. Thank you.  Chuck Streeter PA-C

## 2022-05-25 NOTE — PATIENT INSTRUCTIONS
Will start Voltaren gel to use over the painful areas up to 4 times daily.    Continue with twice daily Tylenol.    Stay well hydrated, drinking at least 60 oz of water daily.  Eat a daily banana and try a daily magnesium supplement.    If pain is not improving, will refer to pain management.    I will contact the neurologist.    Follow up in 3 months.

## 2022-05-27 ENCOUNTER — TELEPHONE (OUTPATIENT)
Dept: FAMILY MEDICINE | Facility: OTHER | Age: 82
End: 2022-05-27
Payer: COMMERCIAL

## 2022-05-27 DIAGNOSIS — R32 URINARY INCONTINENCE, UNSPECIFIED TYPE: ICD-10-CM

## 2022-05-27 DIAGNOSIS — R26.89 IMBALANCE: ICD-10-CM

## 2022-05-27 DIAGNOSIS — G31.9 VENTRICULAR ENLARGEMENT DUE TO BRAIN ATROPHY (H): Primary | ICD-10-CM

## 2022-05-27 NOTE — TELEPHONE ENCOUNTER
Please call and notify patient that received a message back from Dr. Nichole, the neurologist, and he was able to move her appointment up to July 14 which is great. In the meantime, he recommended a brain MRI so order has been placed and he stated that he would be comfortable if I ordered a diagnostic lumbar puncture to further evaluation of possible normal pressure hydrocephalus (NPH) causing some of her symptoms as we have discussed in clinic. I have ordered the lumbar puncture through interventional radiology and have also placed a PT order as Dr. Nichole explained that PT has to help with the evaluation of patient before and after the lumbar puncture. Please help coordinate/schedule these tests for the patient as I have never ordered this before. Not sure if they do it in [place] or if it has to be done in [place] or somewhere else. May need to get a TC involved to help with this. Let me know if you have questions. Thanks.    Chuck Streeter PA-C

## 2022-06-03 NOTE — TELEPHONE ENCOUNTER
Patient was called and informed. She was connected with a warm tranfer to central imaging.  was asked to connect patient with the proper scheduling for the lumbar test

## 2022-06-08 ENCOUNTER — OFFICE VISIT (OUTPATIENT)
Dept: CARDIOLOGY | Facility: CLINIC | Age: 82
End: 2022-06-08
Attending: PHYSICIAN ASSISTANT
Payer: COMMERCIAL

## 2022-06-08 VITALS
SYSTOLIC BLOOD PRESSURE: 148 MMHG | HEIGHT: 61 IN | BODY MASS INDEX: 24.75 KG/M2 | OXYGEN SATURATION: 97 % | HEART RATE: 84 BPM | DIASTOLIC BLOOD PRESSURE: 76 MMHG | WEIGHT: 131.1 LBS

## 2022-06-08 DIAGNOSIS — I44.7 LBBB (LEFT BUNDLE BRANCH BLOCK): ICD-10-CM

## 2022-06-08 DIAGNOSIS — R93.1 DECREASED CARDIAC EJECTION FRACTION: ICD-10-CM

## 2022-06-08 DIAGNOSIS — I50.22 CHRONIC SYSTOLIC HEART FAILURE (H): Primary | ICD-10-CM

## 2022-06-08 DIAGNOSIS — I10 BENIGN ESSENTIAL HYPERTENSION: ICD-10-CM

## 2022-06-08 PROCEDURE — 99204 OFFICE O/P NEW MOD 45 MIN: CPT | Performed by: INTERNAL MEDICINE

## 2022-06-08 RX ORDER — METOPROLOL SUCCINATE 100 MG/1
100 TABLET, EXTENDED RELEASE ORAL DAILY
Qty: 180 TABLET | Refills: 11 | Status: SHIPPED | OUTPATIENT
Start: 2022-06-08 | End: 2022-08-08

## 2022-06-08 RX ORDER — LISINOPRIL 5 MG/1
5 TABLET ORAL DAILY
Qty: 90 TABLET | Refills: 11 | Status: SHIPPED | OUTPATIENT
Start: 2022-06-08 | End: 2022-07-13

## 2022-06-08 NOTE — PROGRESS NOTES
Service Date: 2022    CHIARA Recinos [address] [address]    RE:      Milena Hamilton  MRN:  [id number]  :   1940    Dear Mr. Streeter:     Milena Hamilton, an 82-year-old woman with hypertension, hypothyroidism, history of lumbar fusion, ataxia, urinary incontinence and memory loss, was evaluated in consultation at your request for newly diagnosed left bundle branch block and systolic heart failure.    Ms. Hamilton is undergoing an evaluation for possible normal pressure hydrocephalus (NPH) because of  gait difficulties, urinary incontinence and memory difficulties.  An appointment is planned with a neurologist in 2022.  She has a history of lumbar fusion and recently suffered a mechanical fall that resulted in an emergency room visit on 2022.  As part of her emergency room evaluation, she underwent an ECG showing a new left bundle branch block however troponin levels were normal She was discharged from the ED and underwent an outpatient.transthoracic echocardiogram that showed LVEF 45%  with no significant valvular stenosis or insufficiency.  She was seen recently in your office, and Toprol XL 25 mg daily was added to her regimen.  Cardiology consultation was requested.    The patient denies chest, arm, neck, jaw or back discomfort with exertion, previous MI, diabetes mellitus, dyslipidemia, cigarette smoking or a family history of premature coronary artery disease.  She has never been told of having any cardiac problems.    ALLERGIES:  Azithromycin, ciprofloxacin, gabapentin, penicillins, raspberries, sulfa drugs and Augmentin.    HABITS:  The patient does not abuse tobacco or alcohol.    SOCIAL HISTORY:  The patient is .  She has 4 children, 9 grandchildren and 2 great grandchildren.  She worked in a [employer].  She enjoys baking.  Her family confirms there have been some memory difficulties.    REVIEW OF SYSTEMS:   A 12 point review of systems was performed.  Outside of the issues mentioned in HPI, there have been no other complaints except for back pain.  She has received injection of the sacroiliac joints for treatment of low back pain.  A followup visit with a neurosurgeon is planned because during her recent fall, they felt she have disrupted one of the screws that had been placed at the time of her back surgery.    FAMILY HISTORY:  There is a brother who has had heart disease, diabetes and cerebrovascular disease.  There is a sister who has had cancer.    MEDICATIONS:    1.  Alprazolam p.r.n. anxiety.  2.  Diclofenac gel.  3.  Levothyroxine 50 mcg daily.  4.  Methylprednisolone.   5.  Toprol XL 25 mg daily.  6.  Simvastatin 20 mg daily.    PHYSICAL EXAMINATION:    GENERAL:  Exam demonstrates a very pleasant, 82-year-old woman who has a limited memory.  VITAL SIGNS:  Her blood pressure is 148/76. Her heart rate is 84.  Her height is 1.6 m.  Her weight is 59.5 kg.  Her BMI is 24.  LUNGS:  Clear to percussion and auscultation.  CARDIOVASCULAR:  Normal S1 with a normal S2.  There is a soft S4.  There is a paradoxically split second heart sound.  There is no murmur.  Her pulses are symmetrical in the carotid, radial, brachial, femoral, popliteal, dorsalis pedis and posterior tibials.  ABDOMEN:  Bowel sounds are present in all 4 quadrants.  Liver percusses to 7 cm.  Spleen is not palpable.  Aorta is not tender.  LOWER EXTREMITIES:  There is no swelling, cyanosis or clubbing.    NEUROLOGIC:  The patient moves all 4 extremities.  She has a limited memory.  She has no other focal neurologic deficit     LABORATORY STUDIES: Most recent LDL cholesterol is 96.  Potassium is e.8/  Her ECG shows a sinus rhythm with a left bundle branch block, which has been new since her previous EKGs.  Echo shows an ejection fraction of 45%-50% with normal LV chamber size and abnormal septal motion, compatible with an intraventricular conduction delay.   There is no significant valvular stenosis or insufficiency.    ASSESSMENT:  Ms. Stark has a newly diagnosed left bundle branch block and mild systolic heart failure.  Although she has the coronary  risk factors of age, hypertension and dyslipidemia, she has had no chest pain, and her history does not suggest coronary artery disease as the likely cause of her left ventricular dysfunction. She has no significant valve disease.  I am more suspicious of long-standing hypertension as a potential etiology.     I have discussed the options of further diagnostic testing, including both  diagnostic angiography and nuclear stress testing.  All of us ( the patient, her son and )  agreed that the patient is not a good candidate for mechanical intervention, and it would be best to treat her heart failure with medical therapy alone at this time. .  I would both increase her Toprol and add lisinopril at this time.  We will plan followup visits to titrate up her neurohormonal inhibitors. It is apparent that her neurologic status is the most important issue at this time.     RECOMMENDATIONS:    1.  I would avoid any further diagnostic testing at this time.  2.  Increase Toprol XL from 25 to 100 mg daily.  3.  Add lisinopril 5 mg daily.  4.  Followup visit with an PATRICIA in about 1 month to make certain she is tolerating medications.  We could consider increasing drugs if tolerated at that time.    5.  Follow up with me in about 3-6 months with an echo at that time.  6.  Await evaluation by Neurology and Neurosurgery.    We have appreciated the opportunity to participate in the care of your patient Karen Stark.    Sincerely,    Ulices Jett MD        D: 2022   T: 2022   MT: [email or web address]    Name:     KAREN STARK  MRN:      [id number]        Account:      [id number]   :      1940           Service Date: 2022       Document: [id number]

## 2022-06-08 NOTE — LETTER
6/8/2022    Chuck Streeter PA-C  [address]  [facility] [address]    RE: Milena Hamilton       Dear Colleague,     I had the pleasure of seeing Milena Hamilton in the [facility].  NO diagnosis of heart disease. 61 years. 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 Has difficulty with back.     No smoking Diabetes Has had blood pressure. No MI No chest pain.   Hysterectomy sp  Abby back surgery.  HISTORY OF PRESENT ILLNESS:  New LBBB LVEF 45%    Orders this Visit:  No orders of the defined types were placed in this encounter.    No orders of the defined types were placed in this encounter.    There are no discontinued medications.    Encounter Diagnoses   Name Primary?     LBBB (left bundle branch block)      Decreased cardiac ejection fraction        CURRENT MEDICATIONS:  Current Outpatient Medications   Medication Sig Dispense Refill     ALPRAZolam (XANAX) 0.25 MG tablet Take 1 tablet (0.25 mg) by mouth nightly as needed for anxiety 30 tablet 2     calcium carbonate-vitamin D (OSCAL W/D) 500-200 MG-UNIT tablet Take 1 tablet by mouth 2 times daily       cholecalciferol 25 MCG (1000 UT) TABS Take 1,000 Units by mouth daily (with dinner)        cyclobenzaprine (FLEXERIL) 5 MG tablet Take 1 tablet (5 mg) by mouth 3 times daily as needed for muscle spasms 60 tablet 2     diclofenac (VOLTAREN) 1 % topical gel Apply 2 g topically 4 times daily 150 g 5     hydrOXYzine (ATARAX) 10 MG tablet Take 5-10 mg by mouth every 6 hours as needed       ibuprofen (ADVIL/MOTRIN) 600 MG tablet TAKE 1 TABLET (600 MG) BY MOUTH EVERY 6 HOURS AS NEEDED FOR MODERATE PAIN (Patient taking differently: Take 600 mg by mouth every 6 hours as needed for inflammatory pain) 100 tablet 3     levothyroxine (SYNTHROID/LEVOTHROID) 50 MCG tablet TAKE ONE TABLET BY MOUTH once daily 90 tablet 3     methylPREDNISolone (MEDROL DOSEPAK) 4 MG tablet therapy pack        metoprolol succinate  "ER (TOPROL XL) 25 MG 24 hr tablet Take 1 tablet (25 mg) by mouth daily 30 tablet 2     omeprazole (PRILOSEC) 20 MG DR capsule TAKE ONE CAPSULE BY MOUTH ONCE DAILY (Patient taking differently: Take 20 mg by mouth daily) 90 capsule 3     simvastatin (ZOCOR) 20 MG tablet TAKE 1/2 TABLET BY MOUTH EVERY NIGHT (Patient taking differently: Take 10 mg by mouth At Bedtime) 135 tablet 3     vitamin E (TOCOPHEROL) 400 units (180 mg) capsule Take 400 Units by mouth daily (with dinner)          ALLERGIES     Allergies   Allergen Reactions     Azithromycin Diarrhea     Ciprofloxacin Other (See Comments)     Cipro, dizziness and abdominal pain     Gabapentin Dizziness and Visual Disturbance     Penicillins      stomach upset, diarrhea     Raspberry Hives     Sulfa Drugs Hives     hives     Augmentin Rash       PAST MEDICAL, SURGICAL, FAMILY, SOCIAL HISTORY:  History was reviewed and updated as needed, see medical record.    Review of Systems:  A 12-point review of systems was completed, see medical record for detailed review of systems information.    Physical Exam:  Vitals: BP (!) 148/76 (BP Location: Right arm, Patient Position: Sitting, Cuff Size: Adult Regular)   Pulse 84   Ht 1.56 m (5' 1.42\")   Wt 59.5 kg (131 lb 1.6 oz)   LMP  (LMP Unknown)   SpO2 97%   BMI 24.43 kg/m      Constitutional:           Skin:           Head:           Eyes:           ENT:           Neck:           Chest:           Cardiac:                    Abdomen:           Vascular:                                        Extremities and Back:           Neurological:           ASSESSMENT: asymptomatic systolic heart failure       RECOMMENDATIONS:   Increase metoprolol to 100 mg daily  Add  Lisinopril 5mg      Recent Lab Results:  LIPID RESULTS:  Lab Results   Component Value Date    CHOL 202 (H) 03/24/2022    CHOL 191 12/28/2020    HDL 88 03/24/2022    HDL 73 12/28/2020    LDL 96 03/24/2022    LDL 93 12/28/2020    TRIG 89 03/24/2022    TRIG 127 " 2020    CHOLHDLRATIO 2.2 2014       LIVER ENZYME RESULTS:  Lab Results   Component Value Date    AST 16 2022    AST 12 2021    ALT 17 2022    ALT 19 2021       CBC RESULTS:  Lab Results   Component Value Date    WBC 5.2 2022    WBC 5.8 2021    RBC 4.31 2022    RBC 4.14 2021    HGB 13.3 2022    HGB 12.9 2021    HCT 41.3 2022    HCT 40.9 2021    MCV 96 2022    MCV 99 2021    MCH 30.9 2022    MCH 31.2 2021    MCHC 32.2 2022    MCHC 31.5 2021    RDW 12.7 2022    RDW 12.3 2021     2022     2021       BMP RESULTS:  Lab Results   Component Value Date     2022     2021    POTASSIUM 3.8 2022    POTASSIUM 3.9 2021    CHLORIDE 107 2022    CHLORIDE 106 2021    CO2 28 2022    CO2 32 2021    ANIONGAP 5 2022    ANIONGAP 2 (L) 2021     (H) 2022     (H) 2021    BUN 11 2022    BUN 15 2021    CR 0.66 2022    CR 0.62 2021    GFRESTIMATED 88 2022    GFRESTIMATED 69 02/15/2021    GFRESTBLACK 84 02/15/2021    ANU 8.8 2022    ANU 9.0 2021        A1C RESULTS:  Lab Results   Component Value Date    A1C 5.4 2008       INR RESULTS:  Lab Results   Component Value Date    INR 0.88 2022       We greatly appreciate the opportunity to be involved in the care of your patient, Milena Hamilton.    Sincerely,  Uilces Jett MD        Chuck Streeter PA-C  [address] [address]                                                                       Service Date: 2022    Chuck Streeter PA-C  [facility]  [address] [facility] [place] [address]    RE:      Milena Hamilton  MRN:  [id number]  :   1940    Dear Mr. Streeter:     Milena Hamilton, an 82-year-old woman with  hypertension, hypothyroidism, history of lumbar fusion, ataxia, urinary incontinence and memory loss, was evaluated in consultation at your request for newly diagnosed left bundle branch block and systolic heart failure.    Ms. Hamilton is undergoing an evaluation for possible normal pressure hydrocephalus (NPH) because of  gait difficulties, urinary incontinence and memory difficulties.  An appointment is planned with a neurologist in 07/2022.  She has a history of lumbar fusion and recently suffered a mechanical fall that resulted in an emergency room visit on 04/06/2022.  As part of her emergency room evaluation, she underwent an ECG showing a new left bundle branch block however troponin levels were normal She was discharged from the ED and underwent an outpatient.transthoracic echocardiogram that showed LVEF 45%  with no significant valvular stenosis or insufficiency.  She was seen recently in your office, and Toprol XL 25 mg daily was added to her regimen.  Cardiology consultation was requested.    The patient denies chest, arm, neck, jaw or back discomfort with exertion, previous MI, diabetes mellitus, dyslipidemia, cigarette smoking or a family history of premature coronary artery disease.  She has never been told of having any cardiac problems.    ALLERGIES:  Azithromycin, ciprofloxacin, gabapentin, penicillins, raspberries, sulfa drugs and Augmentin.    HABITS:  The patient does not abuse tobacco or alcohol.    SOCIAL HISTORY:  The patient is .  She has 4 children, 9 grandchildren and 2 great grandchildren.  She worked in a [employer].  She enjoys baking.  Her family confirms there have been some memory difficulties.    REVIEW OF SYSTEMS:  A 12 point review of systems was performed.  Outside of the issues mentioned in HPI, there have been no other complaints except for back pain.  She has received injection of the sacroiliac joints for treatment of low back pain.  A followup visit with a neurosurgeon  is planned because during her recent fall, they felt she have disrupted one of the screws that had been placed at the time of her back surgery.    FAMILY HISTORY:  There is a brother who has had heart disease, diabetes and cerebrovascular disease.  There is a sister who has had cancer.    MEDICATIONS:    1.  Alprazolam p.r.n. anxiety.  2.  Diclofenac gel.  3.  Levothyroxine 50 mcg daily.  4.  Methylprednisolone.   5.  Toprol XL 25 mg daily.  6.  Simvastatin 20 mg daily.    PHYSICAL EXAMINATION:    GENERAL:  Exam demonstrates a very pleasant, 82-year-old woman who has a limited memory.  VITAL SIGNS:  Her blood pressure is 148/76. Her heart rate is 84.  Her height is 1.6 m.  Her weight is 59.5 kg.  Her BMI is 24.  LUNGS:  Clear to percussion and auscultation.  CARDIOVASCULAR:  Normal S1 with a normal S2.  There is a soft S4.  There is a paradoxically split second heart sound.  There is no murmur.  Her pulses are symmetrical in the carotid, radial, brachial, femoral, popliteal, dorsalis pedis and posterior tibials.  ABDOMEN:  Bowel sounds are present in all 4 quadrants.  Liver percusses to 7 cm.  Spleen is not palpable.  Aorta is not tender.  LOWER EXTREMITIES:  There is no swelling, cyanosis or clubbing.    NEUROLOGIC:  The patient moves all 4 extremities.  She has a limited memory.  She has no other focal neurologic deficit     LABORATORY STUDIES: Most recent LDL cholesterol is 96.  Potassium is e.8/  Her ECG shows a sinus rhythm with a left bundle branch block, which has been new since her previous EKGs.  Echo shows an ejection fraction of 45%-50% with normal LV chamber size and abnormal septal motion, compatible with an intraventricular conduction delay.  There is no significant valvular stenosis or insufficiency.    ASSESSMENT:  Ms. Hamilton has a newly diagnosed left bundle branch block and mild systolic heart failure.  Although she has the coronary  risk factors of age, hypertension and dyslipidemia, she has had  no chest pain, and her history does not suggest coronary artery disease as the likely cause of her left ventricular dysfunction. She has no significant valve disease.  I am more suspicious of long-standing hypertension as a potential etiology.     I have discussed the options of further diagnostic testing, including both  diagnostic angiography and nuclear stress testing.  All of us ( the patient, her son and )  agreed that the patient is not a good candidate for mechanical intervention, and it would be best to treat her heart failure with medical therapy alone at this time. .  I would both increase her Toprol and add lisinopril at this time.  We will plan followup visits to titrate up her neurohormonal inhibitors. It is apparent that her neurologic status is the most important issue at this time.     RECOMMENDATIONS:    1.  I would avoid any further diagnostic testing at this time.  2.  Increase Toprol XL from 25 to 100 mg daily.  3.  Add lisinopril 5 mg daily.  4.  Followup visit with an PATRICIA in about 1 month to make certain she is tolerating medications.  We could consider increasing drugs if tolerated at that time.    5.  Follow up with me in about 3-6 months with an echo at that time.  6.  Await evaluation by Neurology and Neurosurgery.    We have appreciated the opportunity to participate in the care of your patient Karen Stark.    Sincerely,    Ulices Jett MD        D: 2022   T: 2022   MT: [email or web address]    Name:     KAREN STARK  MRN:      [id number]        Account:      [id number]   :      1940           Service Date: 2022       Document: [id number]      Thank you for allowing me to participate in the care of your patient.      Sincerely,     Ulices Jett MD     [facility]  cc:   Chuck Streeter PA-C  [address] [place] [address]

## 2022-06-08 NOTE — PROGRESS NOTES
NO diagnosis of heart disease. 61 years. 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 Has difficulty with back.     No smoking Diabetes Has had blood pressure. No MI No chest pain.   Hysterectomy sp  Abby back surgery.  HISTORY OF PRESENT ILLNESS:  New LBBB LVEF 45%    Orders this Visit:  No orders of the defined types were placed in this encounter.    No orders of the defined types were placed in this encounter.    There are no discontinued medications.    Encounter Diagnoses   Name Primary?     LBBB (left bundle branch block)      Decreased cardiac ejection fraction        CURRENT MEDICATIONS:  Current Outpatient Medications   Medication Sig Dispense Refill     ALPRAZolam (XANAX) 0.25 MG tablet Take 1 tablet (0.25 mg) by mouth nightly as needed for anxiety 30 tablet 2     calcium carbonate-vitamin D (OSCAL W/D) 500-200 MG-UNIT tablet Take 1 tablet by mouth 2 times daily       cholecalciferol 25 MCG (1000 UT) TABS Take 1,000 Units by mouth daily (with dinner)        cyclobenzaprine (FLEXERIL) 5 MG tablet Take 1 tablet (5 mg) by mouth 3 times daily as needed for muscle spasms 60 tablet 2     diclofenac (VOLTAREN) 1 % topical gel Apply 2 g topically 4 times daily 150 g 5     hydrOXYzine (ATARAX) 10 MG tablet Take 5-10 mg by mouth every 6 hours as needed       ibuprofen (ADVIL/MOTRIN) 600 MG tablet TAKE 1 TABLET (600 MG) BY MOUTH EVERY 6 HOURS AS NEEDED FOR MODERATE PAIN (Patient taking differently: Take 600 mg by mouth every 6 hours as needed for inflammatory pain) 100 tablet 3     levothyroxine (SYNTHROID/LEVOTHROID) 50 MCG tablet TAKE ONE TABLET BY MOUTH once daily 90 tablet 3     methylPREDNISolone (MEDROL DOSEPAK) 4 MG tablet therapy pack        metoprolol succinate ER (TOPROL XL) 25 MG 24 hr tablet Take 1 tablet (25 mg) by mouth daily 30 tablet 2     omeprazole (PRILOSEC) 20 MG DR capsule TAKE ONE CAPSULE BY MOUTH ONCE DAILY (Patient taking differently: Take 20 mg by mouth  "daily) 90 capsule 3     simvastatin (ZOCOR) 20 MG tablet TAKE 1/2 TABLET BY MOUTH EVERY NIGHT (Patient taking differently: Take 10 mg by mouth At Bedtime) 135 tablet 3     vitamin E (TOCOPHEROL) 400 units (180 mg) capsule Take 400 Units by mouth daily (with dinner)          ALLERGIES     Allergies   Allergen Reactions     Azithromycin Diarrhea     Ciprofloxacin Other (See Comments)     Cipro, dizziness and abdominal pain     Gabapentin Dizziness and Visual Disturbance     Penicillins      stomach upset, diarrhea     Raspberry Hives     Sulfa Drugs Hives     hives     Augmentin Rash       PAST MEDICAL, SURGICAL, FAMILY, SOCIAL HISTORY:  History was reviewed and updated as needed, see medical record.    Review of Systems:  A 12-point review of systems was completed, see medical record for detailed review of systems information.    Physical Exam:  Vitals: BP (!) 148/76 (BP Location: Right arm, Patient Position: Sitting, Cuff Size: Adult Regular)   Pulse 84   Ht 1.56 m (5' 1.42\")   Wt 59.5 kg (131 lb 1.6 oz)   LMP  (LMP Unknown)   SpO2 97%   BMI 24.43 kg/m      Constitutional:           Skin:           Head:           Eyes:           ENT:           Neck:           Chest:           Cardiac:                    Abdomen:           Vascular:                                        Extremities and Back:           Neurological:           ASSESSMENT: asymptomatic systolic heart failure       RECOMMENDATIONS:   Increase metoprolol to 100 mg daily  Add  Lisinopril 5mg      Recent Lab Results:  LIPID RESULTS:  Lab Results   Component Value Date    CHOL 202 (H) 03/24/2022    CHOL 191 12/28/2020    HDL 88 03/24/2022    HDL 73 12/28/2020    LDL 96 03/24/2022    LDL 93 12/28/2020    TRIG 89 03/24/2022    TRIG 127 12/28/2020    CHOLHDLRATIO 2.2 11/12/2014       LIVER ENZYME RESULTS:  Lab Results   Component Value Date    AST 16 04/06/2022    AST 12 01/14/2021    ALT 17 04/06/2022    ALT 19 01/14/2021       CBC RESULTS:  Lab " Results   Component Value Date    WBC 5.2 04/06/2022    WBC 5.8 01/14/2021    RBC 4.31 04/06/2022    RBC 4.14 01/14/2021    HGB 13.3 04/06/2022    HGB 12.9 01/14/2021    HCT 41.3 04/06/2022    HCT 40.9 01/14/2021    MCV 96 04/06/2022    MCV 99 01/14/2021    MCH 30.9 04/06/2022    MCH 31.2 01/14/2021    MCHC 32.2 04/06/2022    MCHC 31.5 01/14/2021    RDW 12.7 04/06/2022    RDW 12.3 01/14/2021     04/06/2022     01/14/2021       BMP RESULTS:  Lab Results   Component Value Date     04/06/2022     01/14/2021    POTASSIUM 3.8 04/06/2022    POTASSIUM 3.9 01/14/2021    CHLORIDE 107 04/06/2022    CHLORIDE 106 01/14/2021    CO2 28 04/06/2022    CO2 32 01/14/2021    ANIONGAP 5 04/06/2022    ANIONGAP 2 (L) 01/14/2021     (H) 04/06/2022     (H) 01/14/2021    BUN 11 04/06/2022    BUN 15 01/14/2021    CR 0.66 04/06/2022    CR 0.62 01/14/2021    GFRESTIMATED 88 04/06/2022    GFRESTIMATED 69 02/15/2021    GFRESTBLACK 84 02/15/2021    ANU 8.8 04/06/2022    ANU 9.0 01/14/2021        A1C RESULTS:  Lab Results   Component Value Date    A1C 5.4 09/26/2008       INR RESULTS:  Lab Results   Component Value Date    INR 0.88 04/06/2022       We greatly appreciate the opportunity to be involved in the care of your patient, Milena Hamilton.    Sincerely,  Ulices Jett MD      CC  Chuck Streeter, PA-C  [address]  [place] [address]

## 2022-06-09 ENCOUNTER — HOSPITAL ENCOUNTER (OUTPATIENT)
Dept: MRI IMAGING | Facility: CLINIC | Age: 82
Discharge: HOME | End: 2022-06-09
Attending: PHYSICIAN ASSISTANT | Admitting: PHYSICIAN ASSISTANT
Payer: COMMERCIAL

## 2022-06-09 DIAGNOSIS — G31.9 VENTRICULAR ENLARGEMENT DUE TO BRAIN ATROPHY (H): ICD-10-CM

## 2022-06-09 DIAGNOSIS — R26.89 IMBALANCE: ICD-10-CM

## 2022-06-09 DIAGNOSIS — R32 URINARY INCONTINENCE, UNSPECIFIED TYPE: ICD-10-CM

## 2022-06-09 PROCEDURE — 70551 MRI BRAIN STEM W/O DYE: CPT

## 2022-06-09 NOTE — PROGRESS NOTES
Appropriate assistive devices provided during their visit. Yes (Yes, No, N/A) Wheelchair (list device)    Exam table and/or cart  placed in the lowest position. Yes (Yes, No, N/A)    Brakes on tables/carts/wheelchairs used at all times. Yes (Yes, No, N/A)    Non slip footwear applied. NA (Yes, No, NA)    Patient was accompanied by staff throughout visit. Yes (Yes, No, N/A)    Equipment safety straps used. NA (Yes, No, N/A)    Assist with toileting. NA (Yes, No, N/A)

## 2022-06-15 ENCOUNTER — TELEPHONE (OUTPATIENT)
Dept: FAMILY MEDICINE | Facility: OTHER | Age: 82
End: 2022-06-15

## 2022-06-15 DIAGNOSIS — R32 URINARY INCONTINENCE, UNSPECIFIED TYPE: ICD-10-CM

## 2022-06-15 DIAGNOSIS — G31.9 VENTRICULAR ENLARGEMENT DUE TO BRAIN ATROPHY (H): Primary | ICD-10-CM

## 2022-06-15 DIAGNOSIS — R26.89 IMBALANCE: ICD-10-CM

## 2022-06-15 NOTE — TELEPHONE ENCOUNTER
I have never scheduled this procedure so will defer to TC's to schedule. TC's, please see previous notes on what I want scheduled for a diagnostic lumbar puncture.    Chuck Streeter PA-C

## 2022-06-15 NOTE — TELEPHONE ENCOUNTER
Patient states that she has not been called to schedule the lumbar puncture yet.  There is an order for this in system 5/27/22 Amrik Streeter PA-C; IR Lumbar Puncture.  Please schedule this procedure for patient; prefers [place].  If needed can go to [place].  Please call patient back at [phone] or [phone]. Thank you.  Selma ORTEGA RN    She is aware of her 6/9/22 MRI Brain results and provider note.  Selma Nesbitt RN

## 2022-06-16 NOTE — TELEPHONE ENCOUNTER
Pt called back in, trying to get assistance to schedule lumbar puncture. After calling around, IR said the order needs to be changed to OR lumbar , not IR lumbar puncture as they do not do them. I think I have the correct one pending for your approval.  Once done, will call imaging at [facility] as [facility] will not do lumbar puncture.

## 2022-06-17 NOTE — TELEPHONE ENCOUNTER
No one in scheduling is able to order. Its still not coming up for them to be able to schedule and they cannot access the full chart to see your order in this encounter. I have no idea what is wrong with it as I see it is released but now states completed. im not sure if this is an Epic issue or what is going on with it.

## 2022-06-17 NOTE — TELEPHONE ENCOUNTER
"Spoke to scheduling again and this order isnt showing up for them to schedule. They suggested canceling the IR order that is in system and making sure this new order is \"released\". Once done. We can call [facility] imaging scheduling @ [phone]. Any day except tuesdays work for PT.   "

## 2022-06-17 NOTE — TELEPHONE ENCOUNTER
I have tried placing a new order under XR Lumbar Puncture diagnostic. I am not sure what else they need me to do. They need to send me the specific imaging code to put in if they need a specific order.    Chuck Streeter PA-C

## 2022-06-17 NOTE — TELEPHONE ENCOUNTER
I have discontinued the IR order and release the other order so please make sure they can schedule things now.    Chuck Streeter PA-C

## 2022-06-20 NOTE — TELEPHONE ENCOUNTER
Scheduled with same day surgery in [place]. Monday 10am check in for lumbar puncture. Same day surgery scheduling [phone]

## 2022-06-21 ENCOUNTER — TELEPHONE (OUTPATIENT)
Dept: CARDIOLOGY | Facility: CLINIC | Age: 82
End: 2022-06-21

## 2022-06-21 ENCOUNTER — TELEPHONE (OUTPATIENT)
Dept: NEUROLOGY | Facility: CLINIC | Age: 82
End: 2022-06-21
Payer: COMMERCIAL

## 2022-06-21 NOTE — TELEPHONE ENCOUNTER
Reason for Call:  Other prescription    Detailed comments: patient has some questions about side effects of the doctor upping her blood pressure medication dosage. Please call pt back to discuss. Thank you    Phone Number Patient can be reached at: Cell: [phone]    Best Time: any    Can we leave a detailed message on this number? YES    Call taken on 6/21/2022 at 12:22 PM by Bre Grace

## 2022-06-21 NOTE — TELEPHONE ENCOUNTER
"Received message from call center. Pt requesting a call to discuss medications.     Pt ask if her medication changes could be causing her sxs. Pt states she is having \"dark\" stools and some diarrhea. Also reports \"some pain\" in stomach as well. Pt could not answer if diarrhea was new to her since medication changes on 6/8, but states she has diarrhea daily.     Sxs of dark stools started about 2-3 days after medication changes on 6/8.     Last OV on 6/8/22 with , \" Increase Toprol XL from 25 to 100 mg daily.  Add lisinopril 5 mg daily.\"     Some documentation noted that Lisinopril may cause GI issues. Asked pt is she has called her PMD to discuss as well. Pt states she did already but they haven't advised anything. Advised that pt should discuss with PMD regarding diarrhea, stomach pain and dark stools.     Pt denies lightheadedness, dizziness, syncope.     Will route to  regarding new initiation of ACEi and increase in BB. David RN    "

## 2022-06-21 NOTE — TELEPHONE ENCOUNTER
Called and left voicemail for patient to call back. We need to switch patient's appointment with Dr. Nichole on 7/14 to 11am.

## 2022-06-21 NOTE — TELEPHONE ENCOUNTER
Patient returned call and was agreeable with changing her appt to 11am on 07/14/2022.  This was completed.    Alaina Soria LPN

## 2022-06-22 NOTE — TELEPHONE ENCOUNTER
"See message per  below. Pt did not answer. Left a VM for callback. Sending FYI to PMD as well.       Ulices Jett MD  You        \"Hi Rosita,  The symptoms she is describing are NOT typical for side effects from either medication and I would encourage her to see her PMD. I am more concerned about PUD /GERD than a medication side effect.   Dr. SHIN\"          "

## 2022-06-26 ENCOUNTER — HOSPITAL ENCOUNTER (EMERGENCY)
Facility: CLINIC | Age: 82
Discharge: HOME | End: 2022-06-27
Attending: PHYSICIAN ASSISTANT | Admitting: PHYSICIAN ASSISTANT
Payer: COMMERCIAL

## 2022-06-26 ENCOUNTER — APPOINTMENT (OUTPATIENT)
Dept: CT IMAGING | Facility: CLINIC | Age: 82
End: 2022-06-26
Attending: PHYSICIAN ASSISTANT
Payer: COMMERCIAL

## 2022-06-26 DIAGNOSIS — S00.03XA SCALP CONTUSION: ICD-10-CM

## 2022-06-26 DIAGNOSIS — S00.01XA SCALP ABRASION: ICD-10-CM
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
## 2022-06-26 LAB
ALBUMIN SERPL-MCNC: 3.8 G/DL (ref 3.4–5)
ALP SERPL-CCNC: 52 U/L (ref 40–150)
ALT SERPL W P-5'-P-CCNC: 20 U/L (ref 0–50)
ANION GAP SERPL CALCULATED.3IONS-SCNC: 5 MMOL/L (ref 3–14)
AST SERPL W P-5'-P-CCNC: 14 U/L (ref 0–45)
BASOPHILS # BLD AUTO: 0 10E3/UL (ref 0–0.2)
BASOPHILS NFR BLD AUTO: 0 %
BILIRUB SERPL-MCNC: 0.3 MG/DL (ref 0.2–1.3)
BUN SERPL-MCNC: 14 MG/DL (ref 7–30)
CALCIUM SERPL-MCNC: 9.2 MG/DL (ref 8.5–10.1)
CHLORIDE BLD-SCNC: 103 MMOL/L (ref 94–109)
CO2 SERPL-SCNC: 31 MMOL/L (ref 20–32)
CREAT SERPL-MCNC: 0.62 MG/DL (ref 0.52–1.04)
EOSINOPHIL # BLD AUTO: 0.1 10E3/UL (ref 0–0.7)
EOSINOPHIL NFR BLD AUTO: 1 %
ERYTHROCYTE [DISTWIDTH] IN BLOOD BY AUTOMATED COUNT: 12.4 % (ref 10–15)
GFR SERPL CREATININE-BSD FRML MDRD: 89 ML/MIN/1.73M2
GLUCOSE BLD-MCNC: 115 MG/DL (ref 70–99)
HCT VFR BLD AUTO: 38.5 % (ref 35–47)
HGB BLD-MCNC: 12.7 G/DL (ref 11.7–15.7)
IMM GRANULOCYTES # BLD: 0 10E3/UL
IMM GRANULOCYTES NFR BLD: 1 %
LYMPHOCYTES # BLD AUTO: 1.7 10E3/UL (ref 0.8–5.3)
LYMPHOCYTES NFR BLD AUTO: 21 %
MCH RBC QN AUTO: 31.9 PG (ref 26.5–33)
MCHC RBC AUTO-ENTMCNC: 33 G/DL (ref 31.5–36.5)
MCV RBC AUTO: 97 FL (ref 78–100)
MONOCYTES # BLD AUTO: 0.5 10E3/UL (ref 0–1.3)
MONOCYTES NFR BLD AUTO: 6 %
NEUTROPHILS # BLD AUTO: 5.6 10E3/UL (ref 1.6–8.3)
NEUTROPHILS NFR BLD AUTO: 71 %
NRBC # BLD AUTO: 0 10E3/UL
NRBC BLD AUTO-RTO: 0 /100
PLATELET # BLD AUTO: 215 10E3/UL (ref 150–450)
POTASSIUM BLD-SCNC: 3.9 MMOL/L (ref 3.4–5.3)
PROT SERPL-MCNC: 7.2 G/DL (ref 6.8–8.8)
RBC # BLD AUTO: 3.98 10E6/UL (ref 3.8–5.2)
SODIUM SERPL-SCNC: 139 MMOL/L (ref 133–144)
TROPONIN I SERPL HS-MCNC: 6 NG/L
WBC # BLD AUTO: 7.9 10E3/UL (ref 4–11)

## 2022-06-26 PROCEDURE — 99285 EMERGENCY DEPT VISIT HI MDM: CPT | Mod: 25 | Performed by: PHYSICIAN ASSISTANT

## 2022-06-26 PROCEDURE — 84484 ASSAY OF TROPONIN QUANT: CPT | Performed by: PHYSICIAN ASSISTANT

## 2022-06-26 PROCEDURE — 250N000013 HC RX MED GY IP 250 OP 250 PS 637: Performed by: PHYSICIAN ASSISTANT

## 2022-06-26 PROCEDURE — 93010 ELECTROCARDIOGRAM REPORT: CPT | Performed by: PHYSICIAN ASSISTANT

## 2022-06-26 PROCEDURE — 36415 COLL VENOUS BLD VENIPUNCTURE: CPT | Performed by: PHYSICIAN ASSISTANT

## 2022-06-26 PROCEDURE — 70450 CT HEAD/BRAIN W/O DYE: CPT

## 2022-06-26 PROCEDURE — 72125 CT NECK SPINE W/O DYE: CPT

## 2022-06-26 PROCEDURE — 80053 COMPREHEN METABOLIC PANEL: CPT | Performed by: PHYSICIAN ASSISTANT

## 2022-06-26 PROCEDURE — 93005 ELECTROCARDIOGRAM TRACING: CPT | Performed by: PHYSICIAN ASSISTANT

## 2022-06-26 PROCEDURE — 85025 COMPLETE CBC W/AUTO DIFF WBC: CPT | Performed by: PHYSICIAN ASSISTANT

## 2022-06-26 RX ORDER — BUPIVACAINE HYDROCHLORIDE AND EPINEPHRINE 2.5; 5 MG/ML; UG/ML
10 INJECTION, SOLUTION EPIDURAL; INFILTRATION; INTRACAUDAL; PERINEURAL ONCE
Status: DISCONTINUED | OUTPATIENT
Start: 2022-06-26 | End: 2022-06-27 | Stop reason: HOSPADM

## 2022-06-26 RX ORDER — ACETAMINOPHEN 325 MG/1
975 TABLET ORAL ONCE
Status: COMPLETED | OUTPATIENT
Start: 2022-06-26 | End: 2022-06-26

## 2022-06-26 RX ADMIN — ACETAMINOPHEN 975 MG: 325 TABLET, FILM COATED ORAL at 22:28

## 2022-06-27 ENCOUNTER — ANESTHESIA (OUTPATIENT)
Dept: SURGERY | Facility: CLINIC | Age: 82
End: 2022-06-27
Payer: COMMERCIAL

## 2022-06-27 ENCOUNTER — TELEPHONE (OUTPATIENT)
Dept: FAMILY MEDICINE | Facility: OTHER | Age: 82
End: 2022-06-27

## 2022-06-27 ENCOUNTER — ANESTHESIA EVENT (OUTPATIENT)
Dept: SURGERY | Facility: CLINIC | Age: 82
End: 2022-06-27
Payer: COMMERCIAL

## 2022-06-27 VITALS
TEMPERATURE: 99 F | WEIGHT: 135 LBS | OXYGEN SATURATION: 96 % | SYSTOLIC BLOOD PRESSURE: 151 MMHG | HEART RATE: 74 BPM | DIASTOLIC BLOOD PRESSURE: 73 MMHG | BODY MASS INDEX: 25.16 KG/M2 | RESPIRATION RATE: 20 BRPM

## 2022-06-27 NOTE — ANESTHESIA PREPROCEDURE EVALUATION
Anesthesia Pre-Procedure Evaluation    Patient: Milena Hamilton   MRN: [id number] : 1940        Procedure : Procedure(s):  LUMBAR PUNCTURE, DIAGNOSTIC          Past Medical History:   Diagnosis Date     Lichen planus     vulvar     Osteopenia 2010    recheck DEXA in 2 years     Panic disorder without agoraphobia      Pure hypercholesterolemia     low LDLs     Raynaud's disease 2014     Unspecified essential hypertension       Past Surgical History:   Procedure Laterality Date     COLONOSCOPY  10/17/2007    normal, recheck in 10 years     COLONOSCOPY N/A 2014    Procedure: COLONOSCOPY;  Surgeon: Raffi Montiel MD;  Location: [facility]     COMBINED CYSTOSCOPY, INSERT CATHETER URETER Bilateral 10/13/2014    Procedure: COMBINED CYSTOSCOPY, INSERT CATHETER URETER;  Surgeon: Abdoulaye Odell MD;  Location: [facility]     EYE SURGERY      right eye muscle repair     HC REMOVAL GALLBLADDER  2010    lap     HC REMOVAL OF OVARY/TUBE(S)      Salpingo-Oophorectomy, bilateral     HYSTERECTOMY, PAP NO LONGER INDICATED       INJECT EPIDURAL LUMBAR Right 4/10/2017    Procedure: INJECT EPIDURAL LUMBAR;  Surgeon: Raffi Wright MD;  Location: [facility]     INJECT EPIDURAL LUMBAR Right 2020    Procedure: Right Lumbar 4- Sacral 1 Epidural Steroid Injection;  Surgeon: Antonio Dasilva MD;  Location: [facility]     INJECT EPIDURAL LUMBAR Right 2021    Procedure: INJECTION, SPINE,  sacral 1 EPIDURAL;  Surgeon: Antonio Dasilva MD;  Location: [facility]     INJECT EPIDURAL TRANSFORAMINAL Right 2016    Procedure: INJECT EPIDURAL TRANSFORAMINAL;  Surgeon: Antonio Dasilva MD;  Location[facility]     INJECT EPIDURAL TRANSFORAMINAL Right 10/13/2016    Procedure: INJECT EPIDURAL TRANSFORAMINAL;  Surgeon: Antonio Dasilva MD;  Location[facility]     INJECT EPIDURAL TRANSFORAMINAL Right 2021    Procedure: Right L4-5 and right L5-S1 Transforaminal Epidural Steroid Injections with fluoroscopic guidance and contrast.;   Surgeon: Antonio Dasilva MD;  Location: [facility]     INJECT JOINT SACROILIAC Right 4/14/2022    Procedure: Fluoroscopically-guided injection of the Bilateral sacroiliac joints with Bilateral pastor Sacroiliac joint ligaments infiltration over the sacrum;  Surgeon: Antonio Dasilva MD;  Location[facility]     LAPAROSCOPIC ASSISTED COLECTOMY LEFT (DESCENDING) N/A 10/13/2014    Procedure: LAPAROSCOPIC ASSISTED COLECTOMY LEFT (DESCENDING);  Surgeon: Raffi Montiel MD;  Location: [facility]     ZZC APPENDECTOMY       ZZC NONSPECIFIC PROCEDURE      Right inferior oblique recession, 14 mm     ZZC VAG HYST,RMV TUBE/OVARY  1984    cervix removed      Allergies   Allergen Reactions     Azithromycin Diarrhea     Ciprofloxacin Other (See Comments)     Cipro, dizziness and abdominal pain     Gabapentin Dizziness and Visual Disturbance     Penicillins      stomach upset, diarrhea     Raspberry Hives     Sulfa Drugs Hives     hives     Augmentin Rash      Social History     Tobacco Use     Smoking status: Never Smoker     Smokeless tobacco: Never Used   Substance Use Topics     Alcohol use: No      Wt Readings from Last 1 Encounters:   06/26/22 61.2 kg (135 lb)        Anesthesia Evaluation   Pt has had prior anesthetic. Type: Regional and General.    No history of anesthetic complications       ROS/MED HX  ENT/Pulmonary:  - neg pulmonary ROS     Neurologic: Comment: ? Normal pressure hydrocephalus  - neg neurologic ROS     Cardiovascular:     (+) Dyslipidemia hypertension-----Previous cardiac testing   Echo: Date: 5/9/22 Results:  Interpretation Summary     BP: 170/92 mmHg. Sinus tachycardia.  The left ventricle is normal in size.  Normal left ventricular wall thickness.  Mildly decreased left ventricular systolic function  Biplane LVEF is 50%.  Septal motion is consistent with conduction abnormality.  Left ventricular diastolic function is normal.  The right ventricle is normal in size and function.  No significant valve disease.     Stress  Test: Date: Results:    ECG Reviewed: Date: 06/26/2022 Results:  - NSR with L BBB  Cath: Date: Results:      METS/Exercise Tolerance:     Hematologic:  - neg hematologic  ROS     Musculoskeletal: Comment: - Lumbar radiculopathy   Pt has had a spinal fusion with rods last year (L2-S1) bilaterally       GI/Hepatic: Comment: - Gastritis     (+) GERD, Asymptomatic on medication,     Renal/Genitourinary:     (+) renal disease, type: CRI,     Endo:     (+) thyroid problem, hypothyroidism,     Psychiatric/Substance Use:  - neg psychiatric ROS   (+) psychiatric history anxiety     Infectious Disease:  - neg infectious disease ROS     Malignancy:  - neg malignancy ROS     Other:  - neg other ROS    (+) , H/O Chronic Pain,        Physical Exam    Airway        Mallampati: II   TM distance: > 3 FB   Neck ROM: full   Mouth opening: > 3 cm    Respiratory Devices and Support         Dental  no notable dental history         Cardiovascular   cardiovascular exam normal       Rhythm and rate: regular and normal     Pulmonary   pulmonary exam normal        breath sounds clear to auscultation           OUTSIDE LABS:  CBC:   Lab Results   Component Value Date    WBC 7.9 06/26/2022    WBC 5.2 04/06/2022    HGB 12.7 06/26/2022    HGB 13.3 04/06/2022    HCT 38.5 06/26/2022    HCT 41.3 04/06/2022     06/26/2022     04/06/2022     BMP:   Lab Results   Component Value Date     06/26/2022     04/06/2022    POTASSIUM 3.9 06/26/2022    POTASSIUM 3.8 04/06/2022    CHLORIDE 103 06/26/2022    CHLORIDE 107 04/06/2022    CO2 31 06/26/2022    CO2 28 04/06/2022    BUN 14 06/26/2022    BUN 11 04/06/2022    CR 0.62 06/26/2022    CR 0.66 04/06/2022     (H) 06/26/2022     (H) 04/06/2022     COAGS:   Lab Results   Component Value Date    PTT 30 04/06/2022    INR 0.88 04/06/2022     POC:   Lab Results   Component Value Date     (H) 10/15/2014     HEPATIC:   Lab Results   Component Value Date    ALBUMIN 3.8  06/26/2022    PROTTOTAL 7.2 06/26/2022    ALT 20 06/26/2022    AST 14 06/26/2022    ALKPHOS 52 06/26/2022    BILITOTAL 0.3 06/26/2022     OTHER:   Lab Results   Component Value Date    LACT 1.2 03/23/2016    A1C 5.4 09/26/2008    ANU 9.2 06/26/2022    PHOS 3.7 05/29/2015    MAG 2.1 05/29/2015    LIPASE 136 02/27/2018    AMYLASE 59 05/29/2013    TSH 1.18 03/24/2022    T4 1.14 01/12/2016    CRP <2.9 09/11/2019    SED 9 09/11/2019       Anesthesia Plan    ASA Status:  3   - Procedure: Procedure only, no anesthetic delivered   NPO Status:  NPO Appropriate    Anesthesia Type: Spinal (Lumbar Puncture ).              Consents    Anesthesia Plan(s) and associated risks, benefits, and realistic alternatives discussed. Questions answered and patient/representative(s) expressed understanding.     - Discussed: Risks, Benefits and Alternatives for BOTH SEDATION and the PROCEDURE were discussed     - Discussed with:  Patient      - Extended Intubation/Ventilatory Support Discussed: No.      - Patient is DNR/DNI Status: No    Use of blood products discussed: No .     Postoperative Care            Comments:    Other Comments: The risks and benefits of anesthesia, and the alternatives where applicable, have been discussed with the patient, and they wish to proceed.  Ordering provider, Chuck ECHAVARRIA, is only requesting opening pressures and specially doesn't want any CSF labs.             FORREST Youngblood CRNA

## 2022-06-27 NOTE — TELEPHONE ENCOUNTER
Reason for Call:  Other call back    Detailed comments: needing to reschedule procedure scheduled for today as they were in the ED all night     Phone Number Patient can be reached at: Home number on file [phone] (home)    Best Time: any    Can we leave a detailed message on this number? YES    Call taken on 6/27/2022 at 9:15 AM by Alana Goodwin

## 2022-06-27 NOTE — DISCHARGE INSTRUCTIONS
It was a pleasure working with you today!  I hope your condition improves rapidly!     Thankfully, all of your testing came out okay today.  It is okay for you to continue with your already scheduled appointment tomorrow.  Also, please continue to work on picking up your feet with walking like we discussed.  Shuffling your feet can be risky for repeat falls.    Please use bacitracin ointment on your wound about every 3-4 hours throughout the day.  You will likely only need to do this for about 3-4 days until the local irritation heals.  I would use ice over the bruised area of your scalp for 20 minutes every 3 hours as well to help with swelling and discomfort.  For any headache that you experience, you can use Tylenol up to 1000 mg every 6 hours, as this worked quite well for you here.

## 2022-06-27 NOTE — ED TRIAGE NOTES
Fell in the kitchen and hit her head, has a laceration to the back of her head.  The fall was not witnessed and she doesn't remember the fall Family states she was confused after the fall and couldn't; answer basic questions.  Patient currently answering questions appropriately but slowly.      Triage Assessment     Row Name 06/26/22 2539       Triage Assessment (Adult)    Airway WDL WDL       Respiratory WDL    Respiratory WDL WDL

## 2022-06-27 NOTE — ED PROVIDER NOTES
History     Chief Complaint   Patient presents with     Fall     HPI   Trauma evaluation was called by nursing.  I was present in the room within 1 minute of the trauma evaluation being called.  MD was not available to evaluate the patient given the significant busy nature of the ED    Milena Hamilton is a 82 year old female who presents for evaluation of a fall.  This was an unwitnessed fall in her kitchen.  She does not recall if she experienced syncope or if she tripped.  Patient fell backwards striking the backside of her head.  Her family came to her aid right away.  They are unsure if there was any significant LOC.  She answers questions right away, but was apparently slow to answer.  She was able to bear weight and walk afterwards.  Reports 5 on a scale of 10 aching head discomfort posteriorly.  No nausea or vomiting.  She has bleeding from her scalp.  Her daughter applied pressure to the area.  Patient has been talking normally per daughter report.  Patient does not complain of any extremity or hip discomfort.  She denies any significant neck pain.  No upper extremity numbness, tingling, or weakness.  She denies any recent infectious symptoms such as fever, chills, rhinorrhea, congestion, cough, dysuria, frequency, flank pain, or gross hematuria.  She denies any current chest pain or abdominal pain.          Allergies:  Allergies   Allergen Reactions     Azithromycin Diarrhea     Ciprofloxacin Other (See Comments)     Cipro, dizziness and abdominal pain     Gabapentin Dizziness and Visual Disturbance     Penicillins      stomach upset, diarrhea     Raspberry Hives     Sulfa Drugs Hives     hives     Augmentin Rash       Problem List:    Patient Active Problem List    Diagnosis Date Noted     Dysuria 11/24/2021     Priority: Medium     Basal cell carcinoma of nose 09/08/2021     Priority: Medium     History of malignant neoplasm of skin 09/08/2021     Priority: Medium     Skin changes due to chronic  exposure to nonionizing radiation 09/08/2021     Priority: Medium     Renal insufficiency syndrome 09/04/2021     Priority: Medium     Formatting of this note might be different from the original.  Creatinine clearance 49       Lumbar radiculopathy 12/28/2018     Priority: Medium     Scoliosis of lumbar spine, unspecified scoliosis type 12/12/2018     Priority: Medium     Chronic right-sided low back pain with right-sided sciatica 05/30/2018     Priority: Medium     Acquired hypothyroidism 04/28/2017     Priority: Medium     Gastroesophageal reflux disease, esophagitis presence not specified 04/28/2017     Priority: Medium     IMO Regulatory Load OCT 2020       Premature atrial beats 11/30/2016     Priority: Medium     HTN (hypertension) 07/10/2014     Priority: Medium     Raynaud's disease 06/01/2014     Priority: Medium     Health Care Home 12/30/2013     Priority: Medium     Status:  Closed  Care Coordinator:  Tasha Vasquez RN    See Letters for [facility] Care Plan             History of diverticulitis - s/p left hemicolectomy 11/30/2013     Priority: Medium     Diarrhea 11/30/2013     Priority: Medium     Chronic constipation 06/18/2013     Priority: Medium     Sacroiliac dysfunction 10/09/2012     Priority: Medium     Advanced directives, counseling/discussion 10/03/2011     Priority: Medium     Advance Directive Problem List Overview:   Name Relationship Phone    Primary Health Care Agent            Alternative Health Care Agent          Discussed advance care planning with patient; information given to patient to review. 10/3/2011          DDD (degenerative disc disease), cervical 08/10/2011     Priority: Medium     Anxiety 10/24/2010     Priority: Medium     Hyperlipidemia 09/29/2010     Priority: Medium     Osteopenia 04/01/2010     Priority: Medium     recheck DEXA in 2 years       Gastritis 03/15/2010     Priority: Medium     Panic disorder without agoraphobia 10/08/2004     Priority: Medium     Tinnitus  09/13/2004     Priority: Medium     Problem list name updated by automated process. Provider to review       Family history of diabetes mellitus 09/09/2002     Priority: Medium     Lichen planus      Priority: Medium        Past Medical History:    Past Medical History:   Diagnosis Date     Lichen planus      Osteopenia 4/2010     Panic disorder without agoraphobia      Pure hypercholesterolemia      Raynaud's disease 6/2014     Unspecified essential hypertension        Past Surgical History:    Past Surgical History:   Procedure Laterality Date     COLONOSCOPY  10/17/2007    normal, recheck in 10 years     COLONOSCOPY N/A 8/27/2014    Procedure: COLONOSCOPY;  Surgeon: Raffi Montiel MD;  Location: [facility]     COMBINED CYSTOSCOPY, INSERT CATHETER URETER Bilateral 10/13/2014    Procedure: COMBINED CYSTOSCOPY, INSERT CATHETER URETER;  Surgeon: Abdoulaye Odell MD;  Location: [facility]     EYE SURGERY  8/01    right eye muscle repair     HC REMOVAL GALLBLADDER  2/9/2010    lap     HC REMOVAL OF OVARY/TUBE(S)      Salpingo-Oophorectomy, bilateral     HYSTERECTOMY, PAP NO LONGER INDICATED       INJECT EPIDURAL LUMBAR Right 4/10/2017    Procedure: INJECT EPIDURAL LUMBAR;  Surgeon: Raffi Wright MD;  Location: [facility]     INJECT EPIDURAL LUMBAR Right 2/27/2020    Procedure: Right Lumbar 4- Sacral 1 Epidural Steroid Injection;  Surgeon: Antonio Dasilva MD;  Location: [facility]     INJECT EPIDURAL LUMBAR Right 1/29/2021    Procedure: INJECTION, SPINE,  sacral 1 EPIDURAL;  Surgeon: Antonio Dasilva MD;  [facility]     INJECT EPIDURAL TRANSFORAMINAL Right 6/9/2016    Procedure: INJECT EPIDURAL TRANSFORAMINAL;  Surgeon: Antonio Dasilva MD;  [facility]     INJECT EPIDURAL TRANSFORAMINAL Right 10/13/2016    Procedure: INJECT EPIDURAL TRANSFORAMINAL;  Surgeon: Antonio Dasilva MD;  [facility]     INJECT EPIDURAL TRANSFORAMINAL Right 6/18/2021    Procedure: Right L4-5 and right L5-S1 Transforaminal Epidural  "Steroid Injections with fluoroscopic guidance and contrast.;  Surgeon: Antonio Dasilva MD;  [facility]     INJECT JOINT SACROILIAC Right 2022    Procedure: Fluoroscopically-guided injection of the Bilateral sacroiliac joints with Bilateral pastor Sacroiliac joint ligaments infiltration over the sacrum;  Surgeon: Antonio Dasilva MD;  [facility]     LAPAROSCOPIC ASSISTED COLECTOMY LEFT (DESCENDING) N/A 10/13/2014    Procedure: LAPAROSCOPIC ASSISTED COLECTOMY LEFT (DESCENDING);  Surgeon: Raffi Montiel MD;  Location: [facility]     ZZC APPENDECTOMY       ZZC NONSPECIFIC PROCEDURE      Right inferior oblique recession, 14 mm     ZZC VAG HYST,RMV TUBE/OVARY  1984    cervix removed       Family History:    Family History   Problem Relation Age of Onset     Diabetes Brother      Heart Disease Brother 50        AMI     Diabetes Brother      Heart Disease Brother         AMI, \"older\"     Cerebrovascular Disease Brother      Psychotic Disorder Brother         , alcohol     Cancer Sister         breast cancer x2     Hypertension No family hx of      Breast Cancer No family hx of      Ovarian Cancer No family hx of      Prostate Cancer No family hx of      Mental Illness No family hx of      Anesthesia Reaction No family hx of      Osteoporosis No family hx of      Obesity No family hx of      Other Cancer No family hx of      Depression No family hx of      Anxiety Disorder No family hx of      Mental Illness No family hx of      Substance Abuse No family hx of        Social History:  Marital Status:   [2]  Social History     Tobacco Use     Smoking status: Never Smoker     Smokeless tobacco: Never Used   Vaping Use     Vaping Use: Never used   Substance Use Topics     Alcohol use: No     Drug use: No        Medications:    ALPRAZolam (XANAX) 0.25 MG tablet  calcium carbonate-vitamin D (OSCAL W/D) 500-200 MG-UNIT tablet  cholecalciferol 25 MCG (1000 UT) TABS  cyclobenzaprine (FLEXERIL) 5 MG " tablet  diclofenac (VOLTAREN) 1 % topical gel  hydrOXYzine (ATARAX) 10 MG tablet  ibuprofen (ADVIL/MOTRIN) 600 MG tablet  levothyroxine (SYNTHROID/LEVOTHROID) 50 MCG tablet  lisinopril (ZESTRIL) 5 MG tablet  methylPREDNISolone (MEDROL DOSEPAK) 4 MG tablet therapy pack  metoprolol succinate ER (TOPROL XL) 100 MG 24 hr tablet  omeprazole (PRILOSEC) 20 MG DR capsule  simvastatin (ZOCOR) 20 MG tablet  vitamin E (TOCOPHEROL) 400 units (180 mg) capsule          Review of Systems   All other systems reviewed and are negative.      Physical Exam   BP: (!) 182/92  Pulse: 79  Temp: 99  F (37.2  C)  Resp: 20  Weight: 61.2 kg (135 lb)  SpO2: 98 %      Physical Exam  Vitals and nursing note reviewed.   Constitutional:       General: She is not in acute distress.     Appearance: She is not diaphoretic.   HENT:      Head:      Comments: Mid posterior scalp laceration and contusion noted.  No active bleeding.     Right Ear: Tympanic membrane, ear canal and external ear normal.      Left Ear: Tympanic membrane, ear canal and external ear normal.      Ears:      Comments: No hemotympanum.     Nose: Nose normal. No congestion or rhinorrhea.      Comments: No nasal bone trauma.     Mouth/Throat:      Mouth: Mucous membranes are moist.      Pharynx: No oropharyngeal exudate or posterior oropharyngeal erythema.      Comments: No trismus or malocclusion.  No jaw tenderness.  No sign of dental trauma.  Eyes:      General: No scleral icterus.        Right eye: No discharge.         Left eye: No discharge.      Extraocular Movements: Extraocular movements intact.      Conjunctiva/sclera: Conjunctivae normal.      Pupils: Pupils are equal, round, and reactive to light.   Neck:      Thyroid: No thyromegaly.   Cardiovascular:      Rate and Rhythm: Normal rate and regular rhythm.      Heart sounds: Normal heart sounds. No murmur heard.  Pulmonary:      Effort: Pulmonary effort is normal. No respiratory distress.      Breath sounds: Normal breath  sounds. No wheezing or rales.   Chest:      Chest wall: No tenderness.   Abdominal:      General: Bowel sounds are normal. There is no distension.      Palpations: Abdomen is soft. There is no mass.      Tenderness: There is no abdominal tenderness. There is no right CVA tenderness, left CVA tenderness, guarding or rebound.   Musculoskeletal:         General: No tenderness or deformity. Normal range of motion.      Cervical back: Normal range of motion and neck supple. No rigidity or tenderness.      Comments: No sign of extremity trauma.  Normal range of motion of her hips.  No pain with AP and lateral compression of the pelvis.  Distal pulses 2+.  Sensation intact to light touch   Lymphadenopathy:      Cervical: No cervical adenopathy.   Skin:     General: Skin is warm and dry.      Capillary Refill: Capillary refill takes less than 2 seconds.      Findings: No erythema or rash.   Neurological:      General: No focal deficit present.      Mental Status: She is alert and oriented to person, place, and time.      Cranial Nerves: No cranial nerve deficit.      Sensory: No sensory deficit.      Motor: No weakness.      Coordination: Coordination normal.      Gait: Gait normal.      Deep Tendon Reflexes: Reflexes normal.   Psychiatric:         Mood and Affect: Mood normal.         Behavior: Behavior normal.         Thought Content: Thought content normal.         ED Course                 Procedures              EKG Interpretation:      Interpreted by Neo Ghosh PA-C  Symptoms at time of EKG: head discomfort.   Rhythm: normal sinus   Rate: normal  Axis: LBBB  Ectopy: none  Conduction: normal  ST Segments/ T Waves: No ST-T wave changes  Q Waves: none  Comparison to prior: Unchanged from 4/2022    Clinical Impression: normal EKG, unchanged LBBB          Critical Care time:  none       Trauma Summary Disposition     Patient is trauma admission:  Trauma  Evaluation    Intent to transfer: none    Spine  Backboard  removal time: Backboard not applied   C-collar and immobilization: not indicated, cleared.  CSpine Clearance: by [employer] Criteria  Full Primary and Secondary survey with appropriate immobilization of spine completed in exam section.     Neuro  GCS at arrival:  Motor 6=Obeys commands   Verbal 5=Oriented   Eye Opening 4=Spontaneous   Total: 15     GCS at disposition: unchanged    ED Procedures completed  Laceration repair                    Results for orders placed or performed during the hospital encounter of 06/26/22 (from the past 24 hour(s))   CBC with platelets differential    Narrative    The following orders were created for panel order CBC with platelets differential.  Procedure                               Abnormality         Status                     ---------                               -----------         ------                     CBC with platelets and d...[[id number]]                      Final result                 Please view results for these tests on the individual orders.   Comprehensive metabolic panel   Result Value Ref Range    Sodium 139 133 - 144 mmol/L    Potassium 3.9 3.4 - 5.3 mmol/L    Chloride 103 94 - 109 mmol/L    Carbon Dioxide (CO2) 31 20 - 32 mmol/L    Anion Gap 5 3 - 14 mmol/L    Urea Nitrogen 14 7 - 30 mg/dL    Creatinine 0.62 0.52 - 1.04 mg/dL    Calcium 9.2 8.5 - 10.1 mg/dL    Glucose 115 (H) 70 - 99 mg/dL    Alkaline Phosphatase 52 40 - 150 U/L    AST 14 0 - 45 U/L    ALT 20 0 - 50 U/L    Protein Total 7.2 6.8 - 8.8 g/dL    Albumin 3.8 3.4 - 5.0 g/dL    Bilirubin Total 0.3 0.2 - 1.3 mg/dL    GFR Estimate 89 >60 mL/min/1.73m2   Troponin I   Result Value Ref Range    Troponin I High Sensitivity 6 <54 ng/L   CBC with platelets and differential   Result Value Ref Range    WBC Count 7.9 4.0 - 11.0 10e3/uL    RBC Count 3.98 3.80 - 5.20 10e6/uL    Hemoglobin 12.7 11.7 - 15.7 g/dL    Hematocrit 38.5 35.0 - 47.0 %    MCV 97 78 - 100 fL    MCH 31.9 26.5 - 33.0 pg    MCHC 33.0 31.5 -  36.5 g/dL    RDW 12.4 10.0 - 15.0 %    Platelet Count 215 150 - 450 10e3/uL    % Neutrophils 71 %    % Lymphocytes 21 %    % Monocytes 6 %    % Eosinophils 1 %    % Basophils 0 %    % Immature Granulocytes 1 %    NRBCs per 100 WBC 0 <1 /100    Absolute Neutrophils 5.6 1.6 - 8.3 10e3/uL    Absolute Lymphocytes 1.7 0.8 - 5.3 10e3/uL    Absolute Monocytes 0.5 0.0 - 1.3 10e3/uL    Absolute Eosinophils 0.1 0.0 - 0.7 10e3/uL    Absolute Basophils 0.0 0.0 - 0.2 10e3/uL    Absolute Immature Granulocytes 0.0 <=0.4 10e3/uL    Absolute NRBCs 0.0 10e3/uL   CT Head w/o Contrast    Narrative    EXAM: CT HEAD W/O CONTRAST, CT CERVICAL SPINE W/O CONTRAST  LOCATION: [facility]  DATE/TIME: 6/26/2022 11:32 PM    INDICATION: Fall, posterior head trauma, head and neck injury.  COMPARISON: MRI brain 06/09/2022.  TECHNIQUE:   1) Routine CT Head without IV contrast. Multiplanar reformats. Dose reduction techniques were used.  2) Routine CT Cervical Spine without IV contrast. Multiplanar reformats. Dose reduction techniques were used.    FINDINGS:   HEAD CT:   INTRACRANIAL CONTENTS: No intracranial hemorrhage, extraaxial collection, or mass effect. No CT evidence of acute infarct. Mild to moderate presumed chronic small vessel ischemic changes. Ventriculomegaly disproportionate to the degree of volume loss.   Correlate for normal pressure hydrocephalus.     VISUALIZED ORBITS/SINUSES/MASTOIDS: No intraorbital abnormality. No paranasal sinus mucosal disease. No middle ear or mastoid effusion.    BONES/SOFT TISSUES: Small subgaleal contusion in the right parietal region. No calvarial fracture.    CERVICAL SPINE CT:   VERTEBRA: Cervical vertebra are normal in height. Mild alterations in lateral alignment likely relate to degenerative changes. No fracture or posttraumatic subluxation.     CANAL/FORAMINA: Scattered degenerative changes. No canal or neural foraminal stenosis.    PARASPINAL: No extraspinal  abnormality. Scarring left lung apex.      Impression    IMPRESSION:  HEAD CT:  1.  No acute intracranial hemorrhage or calvarial fracture.  2.  Ventriculomegaly disproportionate to the degree of volume loss elsewhere which in the correct clinical setting would raise the possibility of a normal pressure/communicating hydrocephalus.    CERVICAL SPINE CT:  1.  No CT evidence for acute fracture or post traumatic subluxation.   CT Cervical Spine w/o Contrast    Narrative    EXAM: CT HEAD W/O CONTRAST, CT CERVICAL SPINE W/O CONTRAST  LOCATION: [facility]  DATE/TIME: 6/26/2022 11:32 PM    INDICATION: Fall, posterior head trauma, head and neck injury.  COMPARISON: MRI brain 06/09/2022.  TECHNIQUE:   1) Routine CT Head without IV contrast. Multiplanar reformats. Dose reduction techniques were used.  2) Routine CT Cervical Spine without IV contrast. Multiplanar reformats. Dose reduction techniques were used.    FINDINGS:   HEAD CT:   INTRACRANIAL CONTENTS: No intracranial hemorrhage, extraaxial collection, or mass effect. No CT evidence of acute infarct. Mild to moderate presumed chronic small vessel ischemic changes. Ventriculomegaly disproportionate to the degree of volume loss.   Correlate for normal pressure hydrocephalus.     VISUALIZED ORBITS/SINUSES/MASTOIDS: No intraorbital abnormality. No paranasal sinus mucosal disease. No middle ear or mastoid effusion.    BONES/SOFT TISSUES: Small subgaleal contusion in the right parietal region. No calvarial fracture.    CERVICAL SPINE CT:   VERTEBRA: Cervical vertebra are normal in height. Mild alterations in lateral alignment likely relate to degenerative changes. No fracture or posttraumatic subluxation.     CANAL/FORAMINA: Scattered degenerative changes. No canal or neural foraminal stenosis.    PARASPINAL: No extraspinal abnormality. Scarring left lung apex.      Impression    IMPRESSION:  HEAD CT:  1.  No acute intracranial hemorrhage or  calvarial fracture.  2.  Ventriculomegaly disproportionate to the degree of volume loss elsewhere which in the correct clinical setting would raise the possibility of a normal pressure/communicating hydrocephalus.    CERVICAL SPINE CT:  1.  No CT evidence for acute fracture or post traumatic subluxation.       Medications   bupivacaine 0.25 % - EPINEPHrine 1:200,000 (PF) injection 25 mg (has no administration in time range)   acetaminophen (TYLENOL) tablet 975 mg (975 mg Oral Given 6/26/22 2228)       Assessments & Plan (with Medical Decision Making)     Fall  Scalp abrasion  Scalp contusion     82 year old female presents for evaluation of a fall in her kitchen.  She struck the posterior aspect of her head.  Uncertain LOC.  She was alert when her family came to her aid.  Bleeding controlled with pressure.  She complains of posterior head discomfort which she rates 5 on a scale of 10.  Her daughter is present.  See HPI for details.  On exam blood pressure 161/75, temperature 99.0, pulse 73, respiration 20, oxygen saturation 98% on room air.  Patient is able to transfer from the wheelchair to the bed without complication.  She follows commands appropriately.  GCS is 15 and normal.  She does have a posterior scalp wound without any active bleeding but there is a contusion.  No sign of step-off.  I did immediately anesthetize the wound with 0.5% bupivacaine.  Neurologically intact.  Normal coordination.  No other sign of extremity or torso trauma.  No abdominal discomfort.  Cardiopulmonary exam normal.  See above for details.  EKG with left bundle branch block but no change.  No acute ST or T wave change.  IV was established.  Laboratory levels reassuring with comprehensive metabolic panel, troponin, and CBC all normal.  Hemoglobin stable at 12.7.  CT head with possible findings of normal pressure hydrocephalus with ventriculomegaly.  This was previously known with recent MRI on 6/9/2022 done by her PCP who is actively  managing this concern.  No acute change.  No intracranial hemorrhage or skull fracture.  Cervical spine CT confirms no acute C-spine injury.  On final inspection of her scalp wound, she actually does not have any significant laceration.  The bleeding was coming from superficial abrasion.  She did not have any further bleeding here in the ED.  We washed her hair and washed upper scalp wound with normal saline.  Bacitracin ointment was applied.  Patient was neurologically intact at the end of her visit.  We were conversing well.  She states that her headache is down to 1 on a scale of 10.  She has a nonfocal neurological exam, and I think she is stable for discharge.  The potential normal pressure hydrocephalus with ventriculomegaly noted on CT is an ongoing finding.  Noted on previous CT and even noted on MRI within the past 3 weeks.  She has seen neurology already, and it appears she has a lumbar puncture with opening pressures scheduled for tomorrow which is an appropriate work-up for this concern.  Given that she does not have any acute neurological compromise, there is no indication for inpatient evaluation.  Discussed safety measures within the home.  Her daughter confirms that they have all the rugs out of the home, physical therapy has been in the home evaluating safety measures, and the bathroom is set up with supportive devices as well.  Based on her final interview.  I do not think that she has suffered a concussion.  Signs and symptoms of concussion to monitor for were reviewed with daughter and the patient.  Indications for ED return reviewed.  Patient was in agreement with this plan and she was suitable for discharge.     I have reviewed the nursing notes.    I have reviewed the findings, diagnosis, plan and need for follow up with the patient.       New Prescriptions    No medications on file       Final diagnoses:   Fall   Scalp abrasion   Scalp contusion     Disclaimer: This note consists of symbols  derived from keyboarding, dictation and/or voice recognition software. As a result, there may be errors in the script that have gone undetected. Please consider this when interpreting information found in this chart.      6/26/2022   [facility] EMERGENCY DEPT     Neo Ghosh PA-C  06/27/22 0115

## 2022-06-28 ENCOUNTER — PATIENT OUTREACH (OUTPATIENT)
Dept: CARE COORDINATION | Facility: CLINIC | Age: 82
End: 2022-06-28

## 2022-06-28 DIAGNOSIS — Z71.89 OTHER SPECIFIED COUNSELING: ICD-10-CM

## 2022-06-28 NOTE — PROGRESS NOTES
"Clinic Care Coordination Contact  [facility]: Post-Discharge Note  SITUATION                                                      Admission:    Admission Date: 06/26/22   Reason for Admission: Fall  Discharge:   Discharge Date: 06/27/22  Discharge Diagnosis: Fall    BACKGROUND                                                      Per hospital discharge summary and inpatient provider notes:    Trauma evaluation was called by nursing.  I was present in the room within 1 minute of the trauma evaluation being called.  MD was not available to evaluate the patient given the significant busy nature of the ED     Milena Hamilton is a 82 year old female who presents for evaluation of a fall.  This was an unwitnessed fall in her kitchen.  She does not recall if she experienced syncope or if she tripped.  Patient fell backwards striking the backside of her head.  Her family came to her aid right away.  They are unsure if there was any significant LOC.  She answers questions right away, but was apparently slow to answer.  She was able to bear weight and walk afterwards.  Reports 5 on a scale of 10 aching head discomfort posteriorly.  No nausea or vomiting.  She has bleeding from her scalp.  Her daughter applied pressure to the area.  Patient has been talking normally per daughter report.  Patient does not complain of any extremity or hip discomfort.  She denies any significant neck pain.  No upper extremity numbness, tingling, or weakness.  She denies any recent infectious symptoms such as fever, chills, rhinorrhea, congestion, cough, dysuria, frequency, flank pain, or gross hematuria.  She denies any current chest pain or abdominal pain.    ASSESSMENT      Enrollment  Primary Care Care Coordination Status: Declined    Discharge Assessment  How are you doing now that you are home?: \"Still hurts\"  How are your symptoms? (Red Flag symptoms escalate to triage hotline per guidelines): Unchanged  Do you feel your condition is " stable enough to be safe at home until your provider visit?: Yes  Does the patient have their discharge instructions? : Yes  Does the patient have questions regarding their discharge instructions? : No  Were you started on any new medications or were there changes to any of your previous medications? : No  Does the patient have all of their medications?: Yes  Do you have questions regarding any of your medications? : No  Do you have all of your needed medical supplies or equipment (DME)?  (i.e. oxygen tank, CPAP, cane, etc.): Yes  Discharge follow-up appointment scheduled within 14 calendar days? : No  Is patient agreeable to assistance with scheduling? : No    Post-op ([facility] Only)  If the patient had a surgery or procedure, do they have any questions for a nurse?: No      PLAN                                                      Outpatient Plan:    Please use bacitracin ointment on your wound about every 3-4 hours  throughout the day. You will likely only need to do this for about 3-4  days until the local irritation heals. I would use ice over the bruised area  of your scalp for 20 minutes every 3 hours as well to help with swelling  and discomfort. For any headache that you experience, you can use  Tylenol up to 1000 mg every 6 hours, as this worked quite well for you  here.    Future Appointments   Date Time Provider Department Center   7/13/2022 11:15 AM May Claudio APRN CNP [facility]   7/14/2022 11:00 AM Jovany Nichole MD [facility]   9/1/2022 11:00 AM [id number]   9/8/2022 11:00 AM Ulices Jett MD [facility]         For any urgent concerns, please contact our 24 hour nurse triage line: [phone] ([phone])         YANCY Siegel  [phone]  [facility]

## 2022-07-01 ENCOUNTER — VIRTUAL VISIT (OUTPATIENT)
Dept: FAMILY MEDICINE | Facility: OTHER | Age: 82
End: 2022-07-01
Payer: COMMERCIAL

## 2022-07-01 DIAGNOSIS — I10 HTN, GOAL BELOW 140/90: ICD-10-CM

## 2022-07-01 DIAGNOSIS — R26.89 IMBALANCE: ICD-10-CM

## 2022-07-01 DIAGNOSIS — R29.6 RECURRENT FALLS: Primary | ICD-10-CM

## 2022-07-01 DIAGNOSIS — K52.1 DRUG-INDUCED DIARRHEA: ICD-10-CM

## 2022-07-01 DIAGNOSIS — G31.9 VENTRICULAR ENLARGEMENT DUE TO BRAIN ATROPHY (H): ICD-10-CM

## 2022-07-01 PROCEDURE — 99214 OFFICE O/P EST MOD 30 MIN: CPT | Mod: 95 | Performed by: PHYSICIAN ASSISTANT

## 2022-07-01 NOTE — PROGRESS NOTES
Milena is a 82 year old who is being evaluated via a billable telephone visit.      What phone number would you like to be contacted at? [phone]  How would you like to obtain your AVS? Mail a copy    Assessment & Plan       ICD-10-CM    1. Recurrent falls  R29.6    2. Imbalance  R26.89    3. 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 HTN, goal below 140/90  I10    5. Drug-induced diarrhea  K52.1          1-3. Recent fall again in the kitchen causing a scalp contusion. She was seen in the ED with a normal work up and no evidence of a concussion and is physically doing much better. She remains very nervous about falling again. 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 I am glad that we were finally able to get the lumbar puncture scheduled for next week with neurology follow up the week after. We discussed that the lumbar puncture is diagnostic but can also be therapeutic for a short period of time so she will monitor her balance, incontinence and mentation/memory very closely after the procedure. She will continue with her walker at all times.     4-5. Blood pressure elevated in the ED, likely due to anxiety and pain from the fall. Her BP at home has been running around 130/80. She has had diarrhea since increasing metoprolol XR to 100 mg and adding lisinopril 5 mg last month. She previously tolerated lower doses of metoprolol so this is unlikely causing the diarrhea so it is more likely the lisinopril. I recommend cutting lisinopril in half over the next 3-4 days and if diarrhea not improving, can stop it completely. Continue to monitor home BP closely and if rising, may need to add a different medication. She does have follow-up with cardiology in 2 weeks.       Chuck Streeter PA-C  [facility]    Demond Abbott is a 82 year old, presenting for the following health issues:  Fall and RECHECK (From emergency  "room visit on 6/26)      \[facility] [place] [facility]\""     ED/UC Followup:    Facility:  [facility]  Date of visit: 06/26  Reason for visit: fall  Current Status: \"Not the best\"    She is overall feeling well physically but is very nervous/scared about falling again. She is holding on to her walker very tightly when walking. She does not recall how she fell during these most recent episode or if she fainted or loss consciousness but she has not fallen since her ED visit. She denies any headaches, vision changes, or blurred vision. She is excited to finally get her lumbar puncture done next week and then see neurology the week after as she really wants the normal pressure hydrocephalus \"taken care of\" and treated if this is what she truly has.    She has been noting diarrhea since increasing her metoprolol and starting lisinopril last month with cardiology. She started a lower dose metoprolol 1 month prior and did not have symptoms so wants to know if it is the higher dose of metoprolol or the lisinopril. It is only 1 diarrhea stool per day and she states it is a yellow color. Mild abdominal cramping but no fevers, chills, nausea or vomiting.       ED Summary 6/26    Assessments & Plan (with Medical Decision Making)     Fall  Scalp abrasion  Scalp contusion      82 year old female presents for evaluation of a fall in her kitchen.  She struck the posterior aspect of her head.  Uncertain LOC.  She was alert when her family came to her aid.  Bleeding controlled with pressure.  She complains of posterior head discomfort which she rates 5 on a scale of 10.  Her daughter is present.  See \[facility] [place] [facility]\"" for details.  On exam blood pressure 161/75, temperature 99.0, pulse 73, respiration 20, oxygen saturation 98% on room air.  Patient is able to transfer from the wheelchair to the bed without complication.  She follows commands appropriately.  GCS is 15 and normal.  She does have a posterior scalp wound without any active bleeding but there is a " contusion.  No sign of step-off.  I did immediately anesthetize the wound with 0.5% bupivacaine.  Neurologically intact.  Normal coordination.  No other sign of extremity or torso trauma.  No abdominal discomfort.  Cardiopulmonary exam normal.  See above for details.  EKG with left bundle branch block but no change.  No acute ST or T wave change.  IV was established.  Laboratory levels reassuring with comprehensive metabolic panel, troponin, and CBC all normal.  Hemoglobin stable at 12.7.  CT head with possible findings of normal pressure hydrocephalus with ventriculomegaly.  This was previously known with recent MRI on 6/9/2022 done by her PCP who is actively managing this concern.  No acute change.  No intracranial hemorrhage or skull fracture.  Cervical spine CT confirms no acute C-spine injury.  On final inspection of her scalp wound, she actually does not have any significant laceration.  The bleeding was coming from superficial abrasion.  She did not have any further bleeding here in the ED.  We washed her hair and washed upper scalp wound with normal saline.  Bacitracin ointment was applied.  Patient was neurologically intact at the end of her visit.  We were conversing well.  She states that her headache is down to 1 on a scale of 10.  She has a nonfocal neurological exam, and I think she is stable for discharge.  The potential normal pressure hydrocephalus with ventriculomegaly noted on CT is an ongoing finding.  Noted on previous CT and even noted on MRI within the past 3 weeks.  She has seen neurology already, and it appears she has a lumbar puncture with opening pressures scheduled for tomorrow which is an appropriate work-up for this concern.  Given that she does not have any acute neurological compromise, there is no indication for inpatient evaluation.  Discussed safety measures within the home.  Her daughter confirms that they have all the rugs out of the home, physical therapy has been in the home  evaluating safety measures, and the bathroom is set up with supportive devices as well.  Based on her final interview.  I do not think that she has suffered a concussion.  Signs and symptoms of concussion to monitor for were reviewed with daughter and the patient.  Indications for ED return reviewed.  Patient was in agreement with this plan and she was suitable for discharge.    Review of Systems   Constitutional, HEENT, cardiovascular, pulmonary, neuro, psych, gi and gu systems are negative, except as otherwise noted.      Objective         Vitals:  No vitals were obtained today due to virtual visit.    Physical Exam   healthy, alert and no distress  PSYCH: Alert and oriented times 3; coherent speech, normal   rate and volume, able to articulate logical thoughts, able   to abstract reason, no tangential thoughts, no hallucinations   or delusions  Her affect is normal  RESP: No cough, no audible wheezing, able to talk in full sentences  Remainder of exam unable to be completed due to telephone visits        Phone call duration: 12:30 minutes    .  ..

## 2022-07-05 NOTE — TELEPHONE ENCOUNTER
RECORDS RECEIVED FROM: Internal   REASON FOR VISIT: balance issues   Date of Appt: 07/14/2022   NOTES (FOR ALL VISITS) STATUS DETAILS   OFFICE NOTE from referring provider Received 05/20/2022 [facility] Neurosurgery    OFFICE NOTE from other specialist N/A    DISCHARGE SUMMARY from hospital N/A    DISCHARGE REPORT from the ER Internal 04/06/2022 [facility] ED Dr Ghosh    OPERATIVE REPORT N/A    MEDICATION LIST N/A    IMAGING  (FOR ALL VISITS)     EMG N/A    EEG N/A    LUMBAR PUNCTURE N/A    ALISHA SCAN N/A    ULTRASOUND (CAROTID BILAT) *VASCULAR* N/A    MRI (HEAD, NECK, SPINE) Internal 06/09/2022 brain   CT (HEAD, NECK, SPINE) Internal 06/26/2022 cervical spine, head

## 2022-07-07 ENCOUNTER — TRANSFERRED RECORDS (OUTPATIENT)
Dept: FAMILY MEDICINE | Facility: OTHER | Age: 82
End: 2022-07-07

## 2022-07-07 ENCOUNTER — HOSPITAL ENCOUNTER (OUTPATIENT)
Facility: CLINIC | Age: 82
Discharge: HOME | End: 2022-07-07
Attending: ANESTHESIOLOGY | Admitting: NURSE ANESTHETIST, CERTIFIED REGISTERED
Payer: COMMERCIAL

## 2022-07-07 VITALS
DIASTOLIC BLOOD PRESSURE: 87 MMHG | BODY MASS INDEX: 25.49 KG/M2 | RESPIRATION RATE: 18 BRPM | HEIGHT: 61 IN | TEMPERATURE: 97 F | WEIGHT: 135 LBS | SYSTOLIC BLOOD PRESSURE: 184 MMHG

## 2022-07-07 PROCEDURE — 62270 DX LMBR SPI PNXR: CPT

## 2022-07-07 PROCEDURE — 370N000003 HC ANESTHESIA WARD SERVICE

## 2022-07-07 RX ORDER — ACETAMINOPHEN 500 MG
500-1000 TABLET ORAL EVERY 6 HOURS PRN
COMMUNITY

## 2022-07-07 NOTE — ANESTHESIA POSTPROCEDURE EVALUATION
Patient: Milena Hamilton    Procedure: Procedure(s):  LUMBAR PUNCTURE, DIAGNOSTIC       Anesthesia Type:  Spinal    Note:  Disposition: Outpatient   Postop Pain Control: Uneventful            Sign Out: Well controlled pain   PONV: No   Neuro/Psych: Uneventful            Sign Out: Acceptable/Baseline neuro status   Airway/Respiratory: Uneventful            Sign Out: Acceptable/Baseline resp. status   CV/Hemodynamics: Uneventful            Sign Out: Acceptable CV status   Other NRE: NONE   DID A NON-ROUTINE EVENT OCCUR? No    Event details/Postop Comments:  Pt was happy with anesthesia care.  No complications.  I will follow up with the pt if needed.           Last vitals:  Vitals Value Taken Time   BP     Temp     Pulse     Resp     SpO2         Electronically Signed By: FORREST Vincent CRNA  July 7, 2022  2:38 PM

## 2022-07-07 NOTE — INTERVAL H&P NOTE
"I have reviewed the surgical (or preoperative) H&P that is linked to this encounter, and examined the patient. There are no significant changes    Clinical Conditions Present on Arrival:  Clinically Significant Risk Factors Present on Admission                   # Overweight: Estimated body mass index is 25.16 kg/m  as calculated from the following:    Height as of 6/8/22: 1.56 m (5' 1.42\").    Weight as of 6/26/22: 61.2 kg (135 lb).       "

## 2022-07-07 NOTE — ANESTHESIA PROCEDURE NOTES
Lumbar puncture Procedure Note    Pre-Procedure   Staff -        CRNA: Ashwin Tobias APRN CRNA       Performed By: CRNA       Location: holding area       Pre-Anesthestic Checklist: patient identified, IV checked, risks and benefits discussed, informed consent, monitors and equipment checked, pre-op evaluation and at physician/surgeon's request  Timeout:       Correct Patient: Yes        Correct Procedure: Yes        Correct Site: Yes        Correct Position: Yes   Procedure Documentation  Procedure: lumbar puncture       Diagnosis: syncopy/dizziness, questionable normal hydrocephalus       Patient Position: sitting       Patient Prep/Sterile Barriers: sterile gloves, mask       Skin prep: Betadine       Insertion Site: L4-5. (midline approach).       Needle Gauge: 25.        Needle Length (Inches): 3.5        Spinal Needle Type: Yani tip       Introducer used       Introducer: 20 G       # of attempts: 1 and  # of redirects:  0    Assessment/Narrative         Paresthesias: No.       LP fluid removal amount (ml): 4       CSF fluid: clear.       Opening pressure was 14.5 cmH2O while  Lateral.       Comments:  L.P X 1 at L4-5, CSF clear, no paresthesia's, no complications noted/stated.  Opening pressure was 14.5 cm h20.  Needle removed, site clear. Band-Aid placed over site.  Pt sitting up, normal sensation/motor function lower extremities

## 2022-07-07 NOTE — ANESTHESIA CARE TRANSFER NOTE
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    Anesthesia Type:   Spinal     Note:    Oropharynx: oropharynx clear of all foreign objects and spontaneously breathing  Level of Consciousness: awake  Oxygen Supplementation: room air    Independent Airway: airway patency satisfactory and stable  Dentition: dentition unchanged  Vital Signs Stable: post-procedure vital signs reviewed and stable  Report to RN Given: handoff report given  Patient transferred to: PACU    Handoff Report: Identifed the Patient, Identified the Reponsible Provider, Reviewed the pertinent medical history, Discussed the surgical course, Reviewed Intra-OP anesthesia mangement and issues during anesthesia, Set expectations for post-procedure period and Allowed opportunity for questions and acknowledgement of understanding      Vitals:  Vitals Value Taken Time   BP     Temp     Pulse     Resp     SpO2         Electronically Signed By: FORREST Vincent CRNA  July 7, 2022  2:36 PM

## 2022-07-13 ENCOUNTER — OFFICE VISIT (OUTPATIENT)
Dept: CARDIOLOGY | Facility: CLINIC | Age: 82
End: 2022-07-13
Payer: COMMERCIAL

## 2022-07-13 VITALS
HEART RATE: 78 BPM | BODY MASS INDEX: 24.84 KG/M2 | HEIGHT: 61 IN | WEIGHT: 131.6 LBS | OXYGEN SATURATION: 98 % | DIASTOLIC BLOOD PRESSURE: 78 MMHG | SYSTOLIC BLOOD PRESSURE: 162 MMHG

## 2022-07-13 DIAGNOSIS — I50.22 CHRONIC SYSTOLIC HEART FAILURE (H): ICD-10-CM

## 2022-07-13 DIAGNOSIS — I44.7 LBBB (LEFT BUNDLE BRANCH BLOCK): ICD-10-CM

## 2022-07-13 DIAGNOSIS — R29.6 FALLS FREQUENTLY: Primary | ICD-10-CM

## 2022-07-13 PROCEDURE — 99214 OFFICE O/P EST MOD 30 MIN: CPT | Performed by: NURSE PRACTITIONER

## 2022-07-13 RX ORDER — LISINOPRIL 5 MG/1
2.5 TABLET ORAL DAILY
Qty: 90 TABLET | Refills: 11 | COMMUNITY
Start: 2022-07-13 | End: 2022-07-29 | Stop reason: ALTCHOICE

## 2022-07-13 RX ORDER — LOSARTAN POTASSIUM 25 MG/1
25 TABLET ORAL DAILY
Qty: 90 TABLET | Refills: 3 | Status: SHIPPED | OUTPATIENT
Start: 2022-07-13 | End: 2022-07-29

## 2022-07-13 NOTE — PATIENT INSTRUCTIONS
TODAY'S RECOMMENDATIONS:    STOP lisinopril  START losartan 25 mg once a day  Zio Patch for 7 days to assess for electrical heart concerns as part of reasons for falls.  Continue all other medications without changes.  Please follow up with May in 4-6 weeks.    If you have questions or concerns please call clinic at [phone].    Please call [phone] for scheduling.      It was a pleasure seeing you today!

## 2022-07-13 NOTE — LETTER
7/13/2022    Chuck Streeter PA-C  [address] [facility] [address]  [facility] [address]    RE: Milena Hamilton       Dear Colleague,     I had the pleasure of seeing Milena Hamilton in the [facility].    General Cardiology Clinic Progress Note  Milena Hamilton MRN# [id number]   YOB: 1940 Age: 82 year old     Primary cardiologist: Dr. Jett    Reason for visit: Follow-up after medication changes    History of presenting illness:    Milena Hamilton, a pleasant 82 year old patient who has a past medical history significant for:     1. Left bundle branch block  2. Systolic heart failure  3. Hypertension  4. Hyperlipidemia  5. Hypothyroidism  6. History lumbar fusion  7. Ataxia  8. Urinary incontinence  9. Memory loss    She was recently evaluated by Dr. Jett for a newly found left bundle branch block and systolic heart failure.  She suffered a mechanical fall in April 2020 and an EKG at that time showed a new left bundle branch block with normal troponins.  She was discharged and underwent an outpatient echocardiogram that showed her LVEF was 50% without significant valvular abnormalities.  Given her lack of chest pain and poor candidate for mechanical intervention it was not recommended that she undergo any further diagnostic studies including angiography or stress testing.  Optimization for heart failure was advised and metoprolol XL was increased from 25 mg 200 mg daily and lisinopril 5 mg daily was added.     Unfortunately, Milena had a unwitnessed fall on 7/1/2022.  Her  was in the other room watching TV and she fell while in the kitchen.  She does not recall any events around the fall including syncope, presyncope or if it was mechanical in nature.  She did not lose control of her bowel or bladder and was slightly confused afterwards.  She did sustain a scalp injury and was treated in the emergency department due to a superficial abrasion and bleeding.  She had a nonfocal  neuro exam at the time and was not felt she had a concussion.    Due to some diarrhea and concerned this was from lisinopril her dose was decreased to 2.5 mg daily with resolution of her symptoms.  She reports her home blood pressure on the 2.5 mg dose has been between 103-140.    Today she presents with her , Ivan.  She has had no further falls but is naturally very concerned about falling again as it was no isolated cause as of yet for the most recent event.  She denies anginal symptoms or shortness of breath on exertion or lower extremity edema, palpitations, PND orthopnea.    Diagnostic studies:  Echocardiogram (5/9/2022): LVEF by biplane was 50% with normal LV size and thickness and normal diastolic function and normal RV size and function.  EKG: Left bundle branch block         Assessment and Plan:     ASSESSMENT:    1. LV dysfunction    Echocardiogram showed LVEF of 50%    Currently on metoprolol XL and lisinopril 2.5 mg daily (did not tolerate 5 mg daily of lisinopril due to diarrhea)    Euvolemic on exam    2. Left bundle branch block    Recently noted on EKG    3. Hypertension    Suboptimally controlled today.  Patient states her home blood pressure systolically runs between 130-140 while on metoprolol  mg daily and 2.5 mg of lisinopril daily.    4. Hyperlipidemia most recent LDL was 96    Currently on simvastatin 20 mg daily    PLAN:     1. Stop lisinopril due to diarrhea and with goal in mind to optimize cardiomyopathy medications  2. Start losartan 25 mg daily  3. Zio patch for 7 days to assess arrhythmia genic etiology for recent fall  4. Return to clinic in follow-up in 4 to 6 weeks with me to review monitoring and tolerance to new medication  5. Follow-up with Dr. Jett in 6 with repeat echocardiogram.       Orders this Visit:  Orders Placed This Encounter   Procedures     Follow-Up with Cardiology PATRICIA     Leadless EKG Monitor 3 to 7 Days     Orders Placed This Encounter  "  Medications     lisinopril (ZESTRIL) 5 MG tablet     Sig: Take 0.5 tablets (2.5 mg) by mouth daily     Dispense:  90 tablet     Refill:  11     losartan (COZAAR) 25 MG tablet     Sig: Take 1 tablet (25 mg) by mouth daily     Dispense:  90 tablet     Refill:  3     Medications Discontinued During This Encounter   Medication Reason     lisinopril (ZESTRIL) 5 MG tablet        Today's clinic visit entailed:  Review of the result(s) of each unique test - Echo, EKG  Assessment requiring an independent historian(s) - family -   Ordering of each unique test  Prescription drug management  20 minutes spent on the date of the encounter doing chart review, history and exam, documentation and further activities per the note  Provider  Link to [facility] Help Grid     The level of medical decision making during this visit was of moderate complexity.           Review of Systems:     Review of Systems:  Skin:        Eyes:  Positive for glasses  ENT:       Respiratory:  Negative shortness of breath;cough;wheezing  Cardiovascular:  Negative;palpitations;chest pain Positive for;edema;lightheadedness  Gastroenterology:      Genitourinary:       Musculoskeletal:       Neurologic:  Positive for numbness or tingling of feet;numbness or tingling of hands  Psychiatric:       Heme/Lymph/Imm:  Positive for allergies  Endocrine:                 Physical Exam:     Vitals: BP (!) 162/78 (BP Location: Right arm, Patient Position: Sitting, Cuff Size: Adult Regular)   Pulse 78   Ht 1.56 m (5' 1.42\")   Wt 59.7 kg (131 lb 9.6 oz)   LMP  (LMP Unknown)   SpO2 98%   BMI 24.53 kg/m    Constitutional: Well nourished and in no apparent distress.  Eyes: Pupils equal, round. Sclerae anicteric.   HEENT: Normocephalic, atraumatic.   Neck: Supple. JVD   Respiratory: Breathing non-labored. Lungs clear to auscultation bilaterally. No crackles, wheezes, rhonchi, or rales.  Cardiovascular:  Regular rate and rhythm, normal S1 and S2. No murmur, rub, or " gallop.  Skin: Warm, dry. No rashes, cyanosis, or xanthelasma.  Extremities: No edema.  Neurologic: No gross motor deficits. Alert, awake, and oriented to person, place and time.  Psychiatric: Affect appropriate.             Medications:     Current Outpatient Medications   Medication Sig Dispense Refill     acetaminophen (TYLENOL) 500 MG tablet Take 500-1,000 mg by mouth every 6 hours as needed for mild pain       ALPRAZolam (XANAX) 0.25 MG tablet Take 1 tablet (0.25 mg) by mouth nightly as needed for anxiety 30 tablet 2     calcium carbonate-vitamin D (OSCAL W/D) 500-200 MG-UNIT tablet Take 1 tablet by mouth 2 times daily       cholecalciferol 25 MCG (1000 UT) TABS Take 1,000 Units by mouth daily (with dinner)        cyclobenzaprine (FLEXERIL) 5 MG tablet Take 1 tablet (5 mg) by mouth 3 times daily as needed for muscle spasms 60 tablet 2     diclofenac (VOLTAREN) 1 % topical gel Apply 2 g topically 4 times daily 150 g 5     hydrOXYzine (ATARAX) 10 MG tablet Take 5-10 mg by mouth every 6 hours as needed       ibuprofen (ADVIL/MOTRIN) 600 MG tablet TAKE 1 TABLET (600 MG) BY MOUTH EVERY 6 HOURS AS NEEDED FOR MODERATE PAIN (Patient taking differently: Take 600 mg by mouth every 6 hours as needed for inflammatory pain) 100 tablet 3     levothyroxine (SYNTHROID/LEVOTHROID) 50 MCG tablet TAKE ONE TABLET BY MOUTH once daily 90 tablet 3     lisinopril (ZESTRIL) 5 MG tablet Take 0.5 tablets (2.5 mg) by mouth daily 90 tablet 11     losartan (COZAAR) 25 MG tablet Take 1 tablet (25 mg) by mouth daily 90 tablet 3     metoprolol succinate ER (TOPROL XL) 100 MG 24 hr tablet Take 1 tablet (100 mg) by mouth daily 180 tablet 11     omeprazole (PRILOSEC) 20 MG DR capsule TAKE ONE CAPSULE BY MOUTH ONCE DAILY (Patient taking differently: Take 20 mg by mouth daily) 90 capsule 3     simvastatin (ZOCOR) 20 MG tablet TAKE 1/2 TABLET BY MOUTH EVERY NIGHT (Patient taking differently: Take 10 mg by mouth At Bedtime) 135 tablet 3     vitamin E  "(TOCOPHEROL) 400 units (180 mg) capsule Take 400 Units by mouth daily (with dinner)          Family History   Problem Relation Age of Onset     Diabetes Brother      Heart Disease Brother 50        AMI     Diabetes Brother      Heart Disease Brother         AMI, \"older\"     Cerebrovascular Disease Brother      Psychotic Disorder Brother         , alcohol     Cancer Sister         breast cancer x2     Hypertension No family hx of      Breast Cancer No family hx of      Ovarian Cancer No family hx of      Prostate Cancer No family hx of      Mental Illness No family hx of      Anesthesia Reaction No family hx of      Osteoporosis No family hx of      Obesity No family hx of      Other Cancer No family hx of      Depression No family hx of      Anxiety Disorder No family hx of      Mental Illness No family hx of      Substance Abuse No family hx of        Social History     Socioeconomic History     Marital status:      Spouse name: Not on file     Number of children: 4     Years of education: Not on file     Highest education level: Not on file   Occupational History     Occupation: dry cleaning   Tobacco Use     Smoking status: Never Smoker     Smokeless tobacco: Never Used   Vaping Use     Vaping Use: Never used   Substance and Sexual Activity     Alcohol use: No     Drug use: No     Sexual activity: Not Currently     Partners: Male     Birth control/protection: Surgical     Comment: not currently/hysterectomy   Other Topics Concern     Parent/sibling w/ CABG, MI or angioplasty before 65F 55M? No   Social History Narrative     Not on file     Social Determinants of Health     Financial Resource Strain: Not on file   Food Insecurity: Not on file   Transportation Needs: Not on file   Physical Activity: Not on file   Stress: Not on file   Social Connections: Not on file   Intimate Partner Violence: Not on file   Housing Stability: Not on file            Past Medical History:     Past Medical History: "   Diagnosis Date     Lichen planus     vulvar     Osteopenia 4/2010    recheck DEXA in 2 years     Panic disorder without agoraphobia      Pure hypercholesterolemia     low LDLs     Raynaud's disease 6/2014     Unspecified essential hypertension               Past Surgical History:     Past Surgical History:   Procedure Laterality Date     COLONOSCOPY  10/17/2007    normal, recheck in 10 years     COLONOSCOPY N/A 08/27/2014    Procedure: COLONOSCOPY;  Surgeon: Raffi Montiel MD;  Location: [facility]     COMBINED CYSTOSCOPY, INSERT CATHETER URETER Bilateral 10/13/2014    Procedure: COMBINED CYSTOSCOPY, INSERT CATHETER URETER;  Surgeon: Abdoulaye Odell MD;  Location: [facility]     EYE SURGERY  08/01/2001    right eye muscle repair     HC REMOVAL GALLBLADDER  02/09/2010    lap     HC REMOVAL OF OVARY/TUBE(S)      Salpingo-Oophorectomy, bilateral     HYSTERECTOMY, PAP NO LONGER INDICATED       INJECT EPIDURAL LUMBAR Right 04/10/2017    Procedure: INJECT EPIDURAL LUMBAR;  Surgeon: Raffi Wright MD;  Location: [facility]     INJECT EPIDURAL LUMBAR Right 02/27/2020    Procedure: Right Lumbar 4- Sacral 1 Epidural Steroid Injection;  Surgeon: Antonio aDsilva MD;  Location: [facility]     INJECT EPIDURAL LUMBAR Right 01/29/2021    Procedure: INJECTION, SPINE,  sacral 1 EPIDURAL;  Surgeon: Antonio Dasilva MD;  [facility]: [facility]     INJECT EPIDURAL TRANSFORAMINAL Right 06/09/2016    Procedure: INJECT EPIDURAL TRANSFORAMINAL;  Surgeon: Antonio Dasilva MD;  [facility]     INJECT EPIDURAL TRANSFORAMINAL Right 10/13/2016    Procedure: INJECT EPIDURAL TRANSFORAMINAL;  Surgeon: Antonio Dasilva MD;  [facility]     INJECT EPIDURAL TRANSFORAMINAL Right 06/18/2021    Procedure: Right L4-5 and right L5-S1 Transforaminal Epidural Steroid Injections with fluoroscopic guidance and contrast.;  Surgeon: Antonio Dasilva MD;  Location: [facility]     INJECT JOINT SACROILIAC Right 04/14/2022    Procedure: Fluoroscopically-guided injection of  the Bilateral sacroiliac joints with Bilateral pastor Sacroiliac joint ligaments infiltration over the sacrum;  Surgeon: Antonio Dasilva MD;  [facility]     LAPAROSCOPIC ASSISTED COLECTOMY LEFT (DESCENDING) N/A 10/13/2014    Procedure: LAPAROSCOPIC ASSISTED COLECTOMY LEFT (DESCENDING);  Surgeon: Raffi Montiel MD;  Location: [facility]     LUMBAR SPINE SURGERY  09/2021    L2-Pelvis fusion with rods and screws     ZZC APPENDECTOMY       ZZC NONSPECIFIC PROCEDURE      Right inferior oblique recession, 14 mm     ZZC VAG HYST,RMV TUBE/OVARY  01/01/1984    cervix removed              Allergies:   Azithromycin, Ciprofloxacin, Gabapentin, Penicillins, Raspberry, Sulfa drugs, and Augmentin       Data:   All laboratory data reviewed:    Recent Labs   Lab Test 03/24/22  1451 12/28/20  1203 12/13/19  1323 09/11/19  1427   LDL 96 93 68  --    HDL 88 73 77  --    NHDL 114 118 90  --    CHOL 202* 191 167  --    TRIG 89 127 108  --    TSH 1.18 0.89  --  0.82       Lab Results   Component Value Date    WBC 7.9 06/26/2022    WBC 5.8 01/14/2021    RBC 3.98 06/26/2022    RBC 4.14 01/14/2021    HGB 12.7 06/26/2022    HGB 12.9 01/14/2021    HCT 38.5 06/26/2022    HCT 40.9 01/14/2021    MCV 97 06/26/2022    MCV 99 01/14/2021    MCH 31.9 06/26/2022    MCH 31.2 01/14/2021    MCHC 33.0 06/26/2022    MCHC 31.5 01/14/2021    RDW 12.4 06/26/2022    RDW 12.3 01/14/2021     06/26/2022     01/14/2021       Lab Results   Component Value Date     06/26/2022     01/14/2021    POTASSIUM 3.9 06/26/2022    POTASSIUM 3.9 01/14/2021    CHLORIDE 103 06/26/2022    CHLORIDE 106 01/14/2021    CO2 31 06/26/2022    CO2 32 01/14/2021    ANIONGAP 5 06/26/2022    ANIONGAP 2 (L) 01/14/2021     (H) 06/26/2022     (H) 01/14/2021    BUN 14 06/26/2022    BUN 15 01/14/2021    CR 0.62 06/26/2022    CR 0.62 01/14/2021    GFRESTIMATED 89 06/26/2022    GFRESTIMATED 69 02/15/2021    GFRESTBLACK 84 02/15/2021    ANU 9.2 06/26/2022     ANU 9.0 01/14/2021      Lab Results   Component Value Date    AST 14 06/26/2022    AST 12 01/14/2021    ALT 20 06/26/2022    ALT 19 01/14/2021       Lab Results   Component Value Date    A1C 5.4 09/26/2008       Lab Results   Component Value Date    INR 0.88 04/06/2022         FORREST RASHID CNP  [facility]  Pager: [phone]  RN phone: [phone]      Thank you for allowing me to participate in the care of your patient.      Sincerely,     FORREST RASHID CNP     [facility]  cc:   Ulices Jett MD  [address]  [place] [address]

## 2022-07-13 NOTE — PROGRESS NOTES
General Cardiology Clinic Progress Note  Milena Hamilton MRN# [id number]   YOB: 1940 Age: 82 year old     Primary cardiologist: Dr. Jett    Reason for visit: Follow-up after medication changes    History of presenting illness:    Milena Hamilton, a pleasant 82 year old patient who has a past medical history significant for:     1. Left bundle branch block  2. Systolic heart failure  3. Hypertension  4. Hyperlipidemia  5. Hypothyroidism  6. History lumbar fusion  7. Ataxia  8. Urinary incontinence  9. Memory loss    She was recently evaluated by Dr. Jett for a newly found left bundle branch block and systolic heart failure.  She suffered a mechanical fall in April 2020 and an EKG at that time showed a new left bundle branch block with normal troponins.  She was discharged and underwent an outpatient echocardiogram that showed her LVEF was 50% without significant valvular abnormalities.  Given her lack of chest pain and poor candidate for mechanical intervention it was not recommended that she undergo any further diagnostic studies including angiography or stress testing.  Optimization for heart failure was advised and metoprolol XL was increased from 25 mg 200 mg daily and lisinopril 5 mg daily was added.     Unfortunately, Milena had a unwitnessed fall on 7/1/2022.  Her  was in the other room watching TV and she fell while in the kitchen.  She does not recall any events around the fall including syncope, presyncope or if it was mechanical in nature.  She did not lose control of her bowel or bladder and was slightly confused afterwards.  She did sustain a scalp injury and was treated in the emergency department due to a superficial abrasion and bleeding.  She had a nonfocal neuro exam at the time and was not felt she had a concussion.    Due to some diarrhea and concerned this was from lisinopril her dose was decreased to 2.5 mg daily with resolution of her symptoms.  She reports her  home blood pressure on the 2.5 mg dose has been between 103-140.    Today she presents with her , Ivan.  She has had no further falls but is naturally very concerned about falling again as it was no isolated cause as of yet for the most recent event.  She denies anginal symptoms or shortness of breath on exertion or lower extremity edema, palpitations, PND orthopnea.    Diagnostic studies:  Echocardiogram (5/9/2022): LVEF by biplane was 50% with normal LV size and thickness and normal diastolic function and normal RV size and function.  EKG: Left bundle branch block         Assessment and Plan:     ASSESSMENT:    1. LV dysfunction    Echocardiogram showed LVEF of 50%    Currently on metoprolol XL and lisinopril 2.5 mg daily (did not tolerate 5 mg daily of lisinopril due to diarrhea)    Euvolemic on exam    2. Left bundle branch block    Recently noted on EKG    3. Hypertension    Suboptimally controlled today.  Patient states her home blood pressure systolically runs between 130-140 while on metoprolol  mg daily and 2.5 mg of lisinopril daily.    4. Hyperlipidemia most recent LDL was 96    Currently on simvastatin 20 mg daily    PLAN:     1. Stop lisinopril due to diarrhea and with goal in mind to optimize cardiomyopathy medications  2. Start losartan 25 mg daily  3. Zio patch for 7 days to assess arrhythmia genic etiology for recent fall  4. Return to clinic in follow-up in 4 to 6 weeks with me to review monitoring and tolerance to new medication  5. Follow-up with Dr. Jett in 6 with repeat echocardiogram.       Orders this Visit:  Orders Placed This Encounter   Procedures     Follow-Up with Cardiology PATRICIA     Leadless EKG Monitor 3 to 7 Days     Orders Placed This Encounter   Medications     lisinopril (ZESTRIL) 5 MG tablet     Sig: Take 0.5 tablets (2.5 mg) by mouth daily     Dispense:  90 tablet     Refill:  11     losartan (COZAAR) 25 MG tablet     Sig: Take 1 tablet (25 mg) by mouth daily      "Dispense:  90 tablet     Refill:  3     Medications Discontinued During This Encounter   Medication Reason     lisinopril (ZESTRIL) 5 MG tablet        Today's clinic visit entailed:  Review of the result(s) of each unique test - Echo, EKG  Assessment requiring an independent historian(s) - family -   Ordering of each unique test  Prescription drug management  20 minutes spent on the date of the encounter doing chart review, history and exam, documentation and further activities per the note  Provider  Link to [facility] Help Grid     The level of medical decision making during this visit was of moderate complexity.           Review of Systems:     Review of Systems:  Skin:        Eyes:  Positive for glasses  ENT:       Respiratory:  Negative shortness of breath;cough;wheezing  Cardiovascular:  Negative;palpitations;chest pain Positive for;edema;lightheadedness  Gastroenterology:      Genitourinary:       Musculoskeletal:       Neurologic:  Positive for numbness or tingling of feet;numbness or tingling of hands  Psychiatric:       Heme/Lymph/Imm:  Positive for allergies  Endocrine:                 Physical Exam:     Vitals: BP (!) 162/78 (BP Location: Right arm, Patient Position: Sitting, Cuff Size: Adult Regular)   Pulse 78   Ht 1.56 m (5' 1.42\")   Wt 59.7 kg (131 lb 9.6 oz)   LMP  (LMP Unknown)   SpO2 98%   BMI 24.53 kg/m    Constitutional: Well nourished and in no apparent distress.  Eyes: Pupils equal, round. Sclerae anicteric.   HEENT: Normocephalic, atraumatic.   Neck: Supple. JVD   Respiratory: Breathing non-labored. Lungs clear to auscultation bilaterally. No crackles, wheezes, rhonchi, or rales.  Cardiovascular:  Regular rate and rhythm, normal S1 and S2. No murmur, rub, or gallop.  Skin: Warm, dry. No rashes, cyanosis, or xanthelasma.  Extremities: No edema.  Neurologic: No gross motor deficits. Alert, awake, and oriented to person, place and time.  Psychiatric: Affect appropriate.             " Medications:     Current Outpatient Medications   Medication Sig Dispense Refill     acetaminophen (TYLENOL) 500 MG tablet Take 500-1,000 mg by mouth every 6 hours as needed for mild pain       ALPRAZolam (XANAX) 0.25 MG tablet Take 1 tablet (0.25 mg) by mouth nightly as needed for anxiety 30 tablet 2     calcium carbonate-vitamin D (OSCAL W/D) 500-200 MG-UNIT tablet Take 1 tablet by mouth 2 times daily       cholecalciferol 25 MCG (1000 UT) TABS Take 1,000 Units by mouth daily (with dinner)        cyclobenzaprine (FLEXERIL) 5 MG tablet Take 1 tablet (5 mg) by mouth 3 times daily as needed for muscle spasms 60 tablet 2     diclofenac (VOLTAREN) 1 % topical gel Apply 2 g topically 4 times daily 150 g 5     hydrOXYzine (ATARAX) 10 MG tablet Take 5-10 mg by mouth every 6 hours as needed       ibuprofen (ADVIL/MOTRIN) 600 MG tablet TAKE 1 TABLET (600 MG) BY MOUTH EVERY 6 HOURS AS NEEDED FOR MODERATE PAIN (Patient taking differently: Take 600 mg by mouth every 6 hours as needed for inflammatory pain) 100 tablet 3     levothyroxine (SYNTHROID/LEVOTHROID) 50 MCG tablet TAKE ONE TABLET BY MOUTH once daily 90 tablet 3     lisinopril (ZESTRIL) 5 MG tablet Take 0.5 tablets (2.5 mg) by mouth daily 90 tablet 11     losartan (COZAAR) 25 MG tablet Take 1 tablet (25 mg) by mouth daily 90 tablet 3     metoprolol succinate ER (TOPROL XL) 100 MG 24 hr tablet Take 1 tablet (100 mg) by mouth daily 180 tablet 11     omeprazole (PRILOSEC) 20 MG DR capsule TAKE ONE CAPSULE BY MOUTH ONCE DAILY (Patient taking differently: Take 20 mg by mouth daily) 90 capsule 3     simvastatin (ZOCOR) 20 MG tablet TAKE 1/2 TABLET BY MOUTH EVERY NIGHT (Patient taking differently: Take 10 mg by mouth At Bedtime) 135 tablet 3     vitamin E (TOCOPHEROL) 400 units (180 mg) capsule Take 400 Units by mouth daily (with dinner)          Family History   Problem Relation Age of Onset     Diabetes Brother      Heart Disease Brother 50        AMI     Diabetes Brother   "    Heart Disease Brother         AMI, \"older\"     Cerebrovascular Disease Brother      Psychotic Disorder Brother         , alcohol     Cancer Sister         breast cancer x2     Hypertension No family hx of      Breast Cancer No family hx of      Ovarian Cancer No family hx of      Prostate Cancer No family hx of      Mental Illness No family hx of      Anesthesia Reaction No family hx of      Osteoporosis No family hx of      Obesity No family hx of      Other Cancer No family hx of      Depression No family hx of      Anxiety Disorder No family hx of      Mental Illness No family hx of      Substance Abuse No family hx of        Social History     Socioeconomic History     Marital status:      Spouse name: Not on file     Number of children: 4     Years of education: Not on file     Highest education level: Not on file   Occupational History     Occupation: dry cleaning   Tobacco Use     Smoking status: Never Smoker     Smokeless tobacco: Never Used   Vaping Use     Vaping Use: Never used   Substance and Sexual Activity     Alcohol use: No     Drug use: No     Sexual activity: Not Currently     Partners: Male     Birth control/protection: Surgical     Comment: not currently/hysterectomy   Other Topics Concern     Parent/sibling w/ CABG, MI or angioplasty before 65F 55M? No   Social History Narrative     Not on file     Social Determinants of Health     Financial Resource Strain: Not on file   Food Insecurity: Not on file   Transportation Needs: Not on file   Physical Activity: Not on file   Stress: Not on file   Social Connections: Not on file   Intimate Partner Violence: Not on file   Housing Stability: Not on file            Past Medical History:     Past Medical History:   Diagnosis Date     Lichen planus     vulvar     Osteopenia 2010    recheck DEXA in 2 years     Panic disorder without agoraphobia      Pure hypercholesterolemia     low LDLs     Raynaud's disease 2014     Unspecified " essential hypertension               Past Surgical History:     Past Surgical History:   Procedure Laterality Date     COLONOSCOPY  10/17/2007    normal, recheck in 10 years     COLONOSCOPY N/A 08/27/2014    Procedure: COLONOSCOPY;  Surgeon: Raffi Montiel MD;  Location: [facility]     COMBINED CYSTOSCOPY, INSERT CATHETER URETER Bilateral 10/13/2014    Procedure: COMBINED CYSTOSCOPY, INSERT CATHETER URETER;  Surgeon: Abdoulaye Odell MD;  Location: [facility]     EYE SURGERY  08/01/2001    right eye muscle repair     HC REMOVAL GALLBLADDER  02/09/2010    lap     HC REMOVAL OF OVARY/TUBE(S)      Salpingo-Oophorectomy, bilateral     HYSTERECTOMY, PAP NO LONGER INDICATED       INJECT EPIDURAL LUMBAR Right 04/10/2017    Procedure: INJECT EPIDURAL LUMBAR;  Surgeon: Raffi Wright MD;  Location: [facility]     INJECT EPIDURAL LUMBAR Right 02/27/2020    Procedure: Right Lumbar 4- Sacral 1 Epidural Steroid Injection;  Surgeon: Antonio Dasilva MD;  Location: [facility]     INJECT EPIDURAL LUMBAR Right 01/29/2021    Procedure: INJECTION, SPINE,  sacral 1 EPIDURAL;  Surgeon: Antonio Dasilva MD;  Location: [facility]     INJECT EPIDURAL TRANSFORAMINAL Right 06/09/2016    Procedure: INJECT EPIDURAL TRANSFORAMINAL;  Surgeon: Antonio Dasilva MD;  [facility]     INJECT EPIDURAL TRANSFORAMINAL Right 10/13/2016    Procedure: INJECT EPIDURAL TRANSFORAMINAL;  Surgeon: Antonio Dasilva MD;  [facility]     INJECT EPIDURAL TRANSFORAMINAL Right 06/18/2021    Procedure: Right L4-5 and right L5-S1 Transforaminal Epidural Steroid Injections with fluoroscopic guidance and contrast.;  Surgeon: Antonio Dasilva MD;  Location: [facility]     INJECT JOINT SACROILIAC Right 04/14/2022    Procedure: Fluoroscopically-guided injection of the Bilateral sacroiliac joints with Bilateral pastor Sacroiliac joint ligaments infiltration over the sacrum;  Surgeon: Antonio Dasilva MD;  Location[facility]     LAPAROSCOPIC ASSISTED COLECTOMY LEFT (DESCENDING) N/A 10/13/2014     Procedure: LAPAROSCOPIC ASSISTED COLECTOMY LEFT (DESCENDING);  Surgeon: Raffi Montiel MD;  Location: [facility]     LUMBAR SPINE SURGERY  09/2021    L2-Pelvis fusion with rods and screws     ZZC APPENDECTOMY       ZZC NONSPECIFIC PROCEDURE      Right inferior oblique recession, 14 mm     ZZC VAG HYST,RMV TUBE/OVARY  01/01/1984    cervix removed              Allergies:   Azithromycin, Ciprofloxacin, Gabapentin, Penicillins, Raspberry, Sulfa drugs, and Augmentin       Data:   All laboratory data reviewed:    Recent Labs   Lab Test 03/24/22  1451 12/28/20  1203 12/13/19  1323 09/11/19  1427   LDL 96 93 68  --    HDL 88 73 77  --    NHDL 114 118 90  --    CHOL 202* 191 167  --    TRIG 89 127 108  --    TSH 1.18 0.89  --  0.82       Lab Results   Component Value Date    WBC 7.9 06/26/2022    WBC 5.8 01/14/2021    RBC 3.98 06/26/2022    RBC 4.14 01/14/2021    HGB 12.7 06/26/2022    HGB 12.9 01/14/2021    HCT 38.5 06/26/2022    HCT 40.9 01/14/2021    MCV 97 06/26/2022    MCV 99 01/14/2021    MCH 31.9 06/26/2022    MCH 31.2 01/14/2021    MCHC 33.0 06/26/2022    MCHC 31.5 01/14/2021    RDW 12.4 06/26/2022    RDW 12.3 01/14/2021     06/26/2022     01/14/2021       Lab Results   Component Value Date     06/26/2022     01/14/2021    POTASSIUM 3.9 06/26/2022    POTASSIUM 3.9 01/14/2021    CHLORIDE 103 06/26/2022    CHLORIDE 106 01/14/2021    CO2 31 06/26/2022    CO2 32 01/14/2021    ANIONGAP 5 06/26/2022    ANIONGAP 2 (L) 01/14/2021     (H) 06/26/2022     (H) 01/14/2021    BUN 14 06/26/2022    BUN 15 01/14/2021    CR 0.62 06/26/2022    CR 0.62 01/14/2021    GFRESTIMATED 89 06/26/2022    GFRESTIMATED 69 02/15/2021    GFRESTBLACK 84 02/15/2021    ANU 9.2 06/26/2022    ANU 9.0 01/14/2021      Lab Results   Component Value Date    AST 14 06/26/2022    AST 12 01/14/2021    ALT 20 06/26/2022    ALT 19 01/14/2021       Lab Results   Component Value Date    A1C 5.4 09/26/2008       Lab Results    Component Value Date    INR 0.88 04/06/2022         FORREST RASHID [facility]  Pager: [phone]  RN phone: [phone]

## 2022-07-14 ENCOUNTER — PRE VISIT (OUTPATIENT)
Dept: NEUROLOGY | Facility: CLINIC | Age: 82
End: 2022-07-14

## 2022-07-14 ENCOUNTER — OFFICE VISIT (OUTPATIENT)
Dept: NEUROLOGY | Facility: CLINIC | Age: 82
End: 2022-07-14
Attending: PHYSICIAN ASSISTANT
Payer: COMMERCIAL

## 2022-07-14 VITALS
HEIGHT: 61 IN | SYSTOLIC BLOOD PRESSURE: 155 MMHG | BODY MASS INDEX: 24.55 KG/M2 | WEIGHT: 130 LBS | HEART RATE: 73 BPM | DIASTOLIC BLOOD PRESSURE: 80 MMHG

## 2022-07-14 DIAGNOSIS — R32 URINARY INCONTINENCE, UNSPECIFIED TYPE: ICD-10-CM

## 2022-07-14 DIAGNOSIS — R41.3 MEMORY CHANGE: ICD-10-CM

## 2022-07-14 DIAGNOSIS — R26.89 IMBALANCE: ICD-10-CM

## 2022-07-14 DIAGNOSIS — G91.2 IDIOPATHIC NORMAL PRESSURE HYDROCEPHALUS (H): Primary | ICD-10-CM

## 2022-07-14 PROCEDURE — 99205 OFFICE O/P NEW HI 60 MIN: CPT | Performed by: INTERNAL MEDICINE

## 2022-07-14 PROCEDURE — 99417 PROLNG OP E/M EACH 15 MIN: CPT | Performed by: INTERNAL MEDICINE

## 2022-07-14 RX ORDER — LEVOTHYROXINE SODIUM 50 UG/1
50 TABLET ORAL
COMMUNITY
End: 2022-08-10

## 2022-07-14 ASSESSMENT — PAIN SCALES - GENERAL: PAINLEVEL: NO PAIN (0)

## 2022-07-14 NOTE — LETTER
"    7/14/2022         RE: Milena Hamilton  [id number] [facility] [address]        Dear Colleague,    Thank you for referring your patient, Milena Hamilton, to the [facility] [place]. Please see a copy of my visit note below.    [facility] Neurology Consultation    Milena Hamilton MRN# [id number]   Age: 82 year old YOB: 1940     Requesting physician: Chuck Green     Reason for Consultation: possible normal pressure hydrocephalus      History of Presenting Symptoms:   Milena Hamilton is a 82 year old female who presents today for evaluation of possible normal pressure hydrocephalus.    Patient has had 20+ years of low back issues. She has had some balance problems for the last 10+ years. Balance has gotten worse in the last 5 years. She has been falling intermittently for the last 5 years. She has been using a cane \"for a while\". She has been using a walker since low back surgery in September 2021. Prior to the lumbar surgery largely due to low back pain radiating down to the right leg. The surgery helped with decrease the amount of pain.    She has had 2 major falls. One fall was in April 2022. She went to the ER and had a CT head, which showed ventriculomegaly. She also had a fall in June 2022.    Since fall 2021 family has noticed some changes in short term memory. Patient denies this.     She has had chronic issues with urinary incontinence, particularly at night. She is unsure how long this has been going on for.       Past Medical History:     Patient Active Problem List   Diagnosis     Lichen planus     Family history of diabetes mellitus     Tinnitus     Panic disorder without agoraphobia     Gastritis     Osteopenia     Hyperlipidemia     Anxiety     DDD (degenerative disc disease), cervical     Advanced directives, counseling/discussion     Sacroiliac dysfunction     Chronic constipation     History of diverticulitis - s/p left " hemicolectomy     Diarrhea     Health Care Home     Raynaud's disease     HTN (hypertension)     Premature atrial beats     Acquired hypothyroidism     Gastroesophageal reflux disease, esophagitis presence not specified     Chronic right-sided low back pain with right-sided sciatica     Scoliosis of lumbar spine, unspecified scoliosis type     Lumbar radiculopathy     Dysuria     Basal cell carcinoma of nose     Renal insufficiency syndrome     History of malignant neoplasm of skin     Skin changes due to chronic exposure to nonionizing radiation     Past Medical History:   Diagnosis Date     Lichen planus     vulvar     Osteopenia 4/2010    recheck DEXA in 2 years     Panic disorder without agoraphobia      Pure hypercholesterolemia     low LDLs     Raynaud's disease 6/2014     Unspecified essential hypertension         Past Surgical History:     Past Surgical History:   Procedure Laterality Date     COLONOSCOPY  10/17/2007    normal, recheck in 10 years     COLONOSCOPY N/A 08/27/2014    Procedure: COLONOSCOPY;  Surgeon: Raffi Montiel MD;  Location:  GI     COMBINED CYSTOSCOPY, INSERT CATHETER URETER Bilateral 10/13/2014    Procedure: COMBINED CYSTOSCOPY, INSERT CATHETER URETER;  Surgeon: Abdoulaye Odell MD;  Location: [facility] OR     EYE SURGERY  08/01/2001    right eye muscle repair     HC REMOVAL GALLBLADDER  02/09/2010    lap     HC REMOVAL OF OVARY/TUBE(S)      Salpingo-Oophorectomy, bilateral     HYSTERECTOMY, PAP NO LONGER INDICATED       INJECT EPIDURAL LUMBAR Right 04/10/2017    Procedure: INJECT EPIDURAL LUMBAR;  Surgeon: Raffi Wright MD;  Location: [facility] OR     INJECT EPIDURAL LUMBAR Right 02/27/2020    Procedure: Right Lumbar 4- Sacral 1 Epidural Steroid Injection;  Surgeon: Antonio Dasilva MD;  Location: [facility] OR     INJECT EPIDURAL LUMBAR Right 01/29/2021    Procedure: INJECTION, SPINE,  sacral 1 EPIDURAL;  Surgeon: Antonio Dasilva MD;  Location: [facility]     INJECT EPIDURAL TRANSFORAMINAL  "Right 2016    Procedure: INJECT EPIDURAL TRANSFORAMINAL;  Surgeon: Antonio Dasilva MD;  Location: [facility]     INJECT EPIDURAL TRANSFORAMINAL Right 10/13/2016    Procedure: INJECT EPIDURAL TRANSFORAMINAL;  Surgeon: Antonio Dasilva MD;  Location: [facility]     INJECT EPIDURAL TRANSFORAMINAL Right 2021    Procedure: Right L4-5 and right L5-S1 Transforaminal Epidural Steroid Injections with fluoroscopic guidance and contrast.;  Surgeon: Antonio Dasilva MD;  Location: [facility]     INJECT JOINT SACROILIAC Right 2022    Procedure: Fluoroscopically-guided injection of the Bilateral sacroiliac joints with Bilateral pastor Sacroiliac joint ligaments infiltration over the sacrum;  Surgeon: Antonio Dasilva MD;  Location: [facility] OR     LAPAROSCOPIC ASSISTED COLECTOMY LEFT (DESCENDING) N/A 10/13/2014    Procedure: LAPAROSCOPIC ASSISTED COLECTOMY LEFT (DESCENDING);  Surgeon: Raffi Montiel MD;  Location: [facility]     LUMBAR SPINE SURGERY  2021    L2-Pelvis fusion with rods and screws     ZZC APPENDECTOMY       ZZC NONSPECIFIC PROCEDURE      Right inferior oblique recession, 14 mm     ZZC VAG HYST,RMV TUBE/OVARY  1984    cervix removed        Social History:     Social History     Tobacco Use     Smoking status: Never Smoker     Smokeless tobacco: Never Used   Vaping Use     Vaping Use: Never used   Substance Use Topics     Alcohol use: No     Drug use: No        Family History:     Family History   Problem Relation Age of Onset     Diabetes Brother      Heart Disease Brother 50        AMI     Diabetes Brother      Heart Disease Brother         AMI, \"older\"     Cerebrovascular Disease Brother      Psychotic Disorder Brother         , alcohol     Cancer Sister         breast cancer x2     Hypertension No family hx of      Breast Cancer No family hx of      Ovarian Cancer No family hx of      Prostate Cancer No family hx of      Mental Illness No family hx of      Anesthesia Reaction No family hx of      Osteoporosis " No family hx of      Obesity No family hx of      Other Cancer No family hx of      Depression No family hx of      Anxiety Disorder No family hx of      Mental Illness No family hx of      Substance Abuse No family hx of         Medications:     Current Outpatient Medications   Medication Sig     acetaminophen (TYLENOL) 500 MG tablet Take 500-1,000 mg by mouth every 6 hours as needed for mild pain     ALPRAZolam (XANAX) 0.25 MG tablet Take 1 tablet (0.25 mg) by mouth nightly as needed for anxiety     calcium carbonate-vitamin D (OSCAL W/D) 500-200 MG-UNIT tablet Take 1 tablet by mouth 2 times daily     cholecalciferol 25 MCG (1000 UT) TABS Take 1,000 Units by mouth daily (with dinner)      cyclobenzaprine (FLEXERIL) 5 MG tablet Take 1 tablet (5 mg) by mouth 3 times daily as needed for muscle spasms     diclofenac (VOLTAREN) 1 % topical gel Apply 2 g topically 4 times daily     hydrOXYzine (ATARAX) 10 MG tablet Take 5-10 mg by mouth every 6 hours as needed     ibuprofen (ADVIL/MOTRIN) 600 MG tablet TAKE 1 TABLET (600 MG) BY MOUTH EVERY 6 HOURS AS NEEDED FOR MODERATE PAIN (Patient taking differently: Take 600 mg by mouth every 6 hours as needed for inflammatory pain)     levothyroxine (SYNTHROID/LEVOTHROID) 50 MCG tablet TAKE ONE TABLET BY MOUTH once daily     lisinopril (ZESTRIL) 5 MG tablet Take 0.5 tablets (2.5 mg) by mouth daily     losartan (COZAAR) 25 MG tablet Take 1 tablet (25 mg) by mouth daily     metoprolol succinate ER (TOPROL XL) 100 MG 24 hr tablet Take 1 tablet (100 mg) by mouth daily     omeprazole (PRILOSEC) 20 MG DR capsule TAKE ONE CAPSULE BY MOUTH ONCE DAILY (Patient taking differently: Take 20 mg by mouth daily)     simvastatin (ZOCOR) 20 MG tablet TAKE 1/2 TABLET BY MOUTH EVERY NIGHT (Patient taking differently: Take 10 mg by mouth At Bedtime)     vitamin E (TOCOPHEROL) 400 units (180 mg) capsule Take 400 Units by mouth daily (with dinner)      No current facility-administered medications for  "this visit.        Allergies:     Allergies   Allergen Reactions     Azithromycin Diarrhea     Ciprofloxacin Other (See Comments)     Cipro, dizziness and abdominal pain     Gabapentin Dizziness and Visual Disturbance     Penicillins      stomach upset, diarrhea     Raspberry Hives     Sulfa Drugs Hives     hives     Augmentin Rash        Review of Systems:   A comprehensive 10 point review of systems (constitutional, ENT, cardiac, peripheral vascular, lymphatic, respiratory, GI, , Musculoskeletal, skin, Neurological) was performed and found to be negative except as described in this note.      Physical Exam:   Vitals: BP (!) 155/80 (BP Location: Right arm, Patient Position: Sitting, Cuff Size: Adult Regular)   Pulse 73   Ht 1.549 m (5' 1\")   Wt 59 kg (130 lb)   LMP  (LMP Unknown)   BMI 24.56 kg/m     General: Seated comfortably in no acute distress.  Lungs: breathing comfortably  Extremities: no edema  Skin: No rashes  Neurologic:     Mental Status: MoCA 23/30 (-1 trails, -1 copy rectanlge, -1 serial 7s, -2 delayed recall, -1 fluency, -1 abstraction)     Cranial Nerves: Visual fields intact. PERRL. EOMI with normal smooth pursuit. Facial sensation intact/symmetric. Facial movements symmetric. Hearing not formally tested but intact to conversation. Palate elevation symmetric, uvula midline. No dysarthria. Shoulder shrug strong bilaterally. Tongue protrusion midline.     Motor: No tremors or other abnormal movements observed. Muscle tone normal in the arms, paratonia in the bilateral legs. Normal/symmetric rapid finger tapping. Strength 5/5 as below.      Right Left   Shoulder abduction        5 5   Elbow extension 5 5   Elbow flexion 5 5   Wrist extension         5 5   Finger extension 5 5   ADM 5 5   FDI 5 5   APB 5 5   Hip flexion 5 5   Knee flexion 5 5   Knee extension 5 5   Dorsiflexion 5 5   Plantar flexion 5 5        Deep Tendon Reflexes: 2+/symmetric throughout upper and lower extremities with " exception of 1+ ankle jerks. Toes equivocal.     Sensory: Decreased sensation to light touch, temperature, and vibration in the right foot, otherwise intact. Vibration is absent in the right great toe and ~5 seconds at the right ankle, ~8 seconds in the left great toe and ankle, normal in the hands. Proprioception is decreased at the right toes and normal elsewhere. Negative Romberg.      Coordination: Finger-nose-finger and heel-shin intact without dysmetria.      Gait: Slow shuffling gait with magnetic like quality. Decreased arm swing. Not able to do heel, toe, or tandem gait.          Data: Pertinent prior to visit   Imaging:  MRI brain 6/9/2022  IMPRESSION:     1. Moderate enlargement of the ventricular system which appears out of  proportion to the cerebral sulci and volume loss. This remains  concerning for normal pressure hydrocephalus. Ventricular size is  similar to prior head CT 4/6/2022. Mild confluent periventricular  white matter T2 hyperintensities could represent transependymal flow  of CSF.  2. Background patchy nonspecific white matter changes most likely due  to chronic microvascular ischemic disease.      CT head/cervical 6/2022  IMPRESSION:  HEAD CT:  1.  No acute intracranial hemorrhage or calvarial fracture.  2.  Ventriculomegaly disproportionate to the degree of volume loss elsewhere which in the correct clinical setting would raise the possibility of a normal pressure/communicating hydrocephalus.  CERVICAL SPINE CT:  1.  No CT evidence for acute fracture or post traumatic subluxation.    CT lumbar 4/2022  IMPRESSION:  1. No acute abnormality of the lumbar spine identified. Specifically, no acute fracture.  2. Unchanged subacute to chronic L2 superior endplate compression fracture.  3. Redemonstrated findings of L2-bony pelvis fusion, as described. Findings concerning for loosening of the right greater than left L2 pedicle screws.  4. Solid instrumented interbody fusion at L2-L3 and L5-S1, as  before.  5. Multilevel degenerative changes elsewhere, as described.  6. Mild to moderate dextroconvex scoliosis, as before.    MRI lumbar 2/2021  IMPRESSION:    1. Five lumbar-type vertebral bodies for the purposes of this  dictation. Spinal nomenclature is different compared to previous spine  MR 4/25/2019. Recommend close attention to spinal numbering if  site-specific therapy is considered.  2. Residual enhancing granulation tissue around the right L5 nerve  root in the L5-S1 neural foramen near the operative bed. There appears  to be residual moderate right neural foraminal narrowing secondary to  disc bulge and uncinate spurring. Disc bulge appears to be possibly  increased since previous MR 4/25/2019.  3. Persistent right lateral recess narrowing at L4-L5 which may affect  the right L5 nerve root. This is similar to prior MR 4/25/2019.  4. Additional degenerative changes throughout the lumbar spine as  detailed above, grossly similar to prior. No other significant spinal  canal or neural foraminal narrowings.     Procedures:  None    Laboratory:  None         Assessment and Plan:   Assessment:  Milena Hamilton is a 82 year old female who presents today for evaluation of possible normal pressure hydrocephalus (NPH). Patient has history of chronic lumbosacral radiculopathy and most recently had L2-pelvis fusion in September 2021. She has had many years of imbalance, with particular worsening over the last 5 years. She also has chronic urinary incontinence and in the last year has had some cognitive changes. MRI brain shows moderate enlargement of ventricular space, out of proportion to volume loss.     Patient's specific pattern of gait and other developing symptoms could be seen in the context of NPH. MoCA today is 23/30. She also has significant sensory changes in the right foot likely related to lumbosacral radiculopathy, which is contributing to gait in part. She recently had lumbar puncture (4 ml removed)  "and thinks she felt a little better transiently afterwards. Opening pressure was normal. We discussed pursuing high volume lumbar puncture (30 ml) with pre and post physical therapy assessment. Given cognitive changes we will also send for CSF ADmark panel.       Plan:  - High volume (30 ml) lumbar puncture with pre and post PT assessment  - CSF glucose, protein, cell count, ADmark panel     Follow up in Neurology clinic pending above    Jovany Nichole MD   of Neurology  [facility]      The total time of this encounter today amounted to 108 minutes. This time included time spent with the patient, prep work, ordering tests, and performing post visit documentation.      Milena Hamilton's goals for this visit include:   Chief Complaint   Patient presents with     New Patient     ok'd by Dr. Nichole  referral by Jose G Payne, balance issues       She requests these members of her care team be copied on today's visit information: yes    PCP: Chuck Streeter    Referring Provider:  Jose G Payne PA-C  [address] [facility] [address]  [place] [address]    BP (!) 155/80 (BP Location: Right arm, Patient Position: Sitting, Cuff Size: Adult Regular)   Pulse 73   Ht 1.549 m (5' 1\")   Wt 59 kg (130 lb)   LMP  (LMP Unknown)   BMI 24.56 kg/m      Do you need any medication refills at today's visit? No  MARY KAY Levy, BRIAN ([facility])          Again, thank you for allowing me to participate in the care of your patient.        Sincerely,        Jovany Nichole MD    "

## 2022-07-14 NOTE — PROGRESS NOTES
"Milena Hamilton's goals for this visit include:   Chief Complaint   Patient presents with     New Patient     ok'd by Dr. Nichole  referral by Jose G Payne, balance issues       She requests these members of her care team be copied on today's visit information: yes    PCP: Chuck Streeter    Referring Provider:  Jose G Payne PA-C  [address]  [place] [address]    BP (!) 155/80 (BP Location: Right arm, Patient Position: Sitting, Cuff Size: Adult Regular)   Pulse 73   Ht 1.549 m (5' 1\")   Wt 59 kg (130 lb)   LMP  (LMP Unknown)   BMI 24.56 kg/m      Do you need any medication refills at today's visit? No  KAY Levy., BRIAN ([facility])      "

## 2022-07-14 NOTE — PROGRESS NOTES
"[facility] Neurology Consultation    Milena Hamilton MRN# [id number]   Age: 82 year old YOB: 1940     Requesting physician: Chuck Green     Reason for Consultation: possible normal pressure hydrocephalus      History of Presenting Symptoms:   Milena Hamilton is a 82 year old female who presents today for evaluation of possible normal pressure hydrocephalus.    Patient has had 20+ years of low back issues. She has had some balance problems for the last 10+ years. Balance has gotten worse in the last 5 years. She has been falling intermittently for the last 5 years. She has been using a cane \"for a while\". She has been using a walker since low back surgery in September 2021. Prior to the lumbar surgery largely due to low back pain radiating down to the right leg. The surgery helped with decrease the amount of pain.    She has had 2 major falls. One fall was in April 2022. She went to the ER and had a CT head, which showed ventriculomegaly. She also had a fall in June 2022.    Since fall 2021 family has noticed some changes in short term memory. Patient denies this.     She has had chronic issues with urinary incontinence, particularly at night. She is unsure how long this has been going on for.       Past Medical History:     Patient Active Problem List   Diagnosis     Lichen planus     Family history of diabetes mellitus     Tinnitus     Panic disorder without agoraphobia     Gastritis     Osteopenia     Hyperlipidemia     Anxiety     DDD (degenerative disc disease), cervical     Advanced directives, counseling/discussion     Sacroiliac dysfunction     Chronic constipation     History of diverticulitis - s/p left hemicolectomy     Diarrhea     Health Care Home     Raynaud's disease     HTN (hypertension)     Premature atrial beats     Acquired hypothyroidism     Gastroesophageal reflux disease, esophagitis presence not specified     Chronic right-sided low back pain with " right-sided sciatica     Scoliosis of lumbar spine, unspecified scoliosis type     Lumbar radiculopathy     Dysuria     Basal cell carcinoma of nose     Renal insufficiency syndrome     History of malignant neoplasm of skin     Skin changes due to chronic exposure to nonionizing radiation     Past Medical History:   Diagnosis Date     Lichen planus     vulvar     Osteopenia 4/2010    recheck DEXA in 2 years     Panic disorder without agoraphobia      Pure hypercholesterolemia     low LDLs     Raynaud's disease 6/2014     Unspecified essential hypertension         Past Surgical History:     Past Surgical History:   Procedure Laterality Date     COLONOSCOPY  10/17/2007    normal, recheck in 10 years     COLONOSCOPY N/A 08/27/2014    Procedure: COLONOSCOPY;  Surgeon: Raffi Montiel MD;  Location: [facility]     COMBINED CYSTOSCOPY, INSERT CATHETER URETER Bilateral 10/13/2014    Procedure: COMBINED CYSTOSCOPY, INSERT CATHETER URETER;  Surgeon: Abdoulaye Odell MD;  Location: [facility]     EYE SURGERY  08/01/2001    right eye muscle repair     HC REMOVAL GALLBLADDER  02/09/2010    lap     HC REMOVAL OF OVARY/TUBE(S)      Salpingo-Oophorectomy, bilateral     HYSTERECTOMY, PAP NO LONGER INDICATED       INJECT EPIDURAL LUMBAR Right 04/10/2017    Procedure: INJECT EPIDURAL LUMBAR;  Surgeon: Raffi Wright MD;  Location: [facility]     INJECT EPIDURAL LUMBAR Right 02/27/2020    Procedure: Right Lumbar 4- Sacral 1 Epidural Steroid Injection;  Surgeon: Antonio Dasilva MD;  Location: [facility]     INJECT EPIDURAL LUMBAR Right 01/29/2021    Procedure: INJECTION, SPINE,  sacral 1 EPIDURAL;  Surgeon: Antonio Dasilva MD;  Location: [facility]     INJECT EPIDURAL TRANSFORAMINAL Right 06/09/2016    Procedure: INJECT EPIDURAL TRANSFORAMINAL;  Surgeon: Antonio Dasilva MD;  [facility]     INJECT EPIDURAL TRANSFORAMINAL Right 10/13/2016    Procedure: INJECT EPIDURAL TRANSFORAMINAL;  Surgeon: Antonio Dasilva MD;  [facility]     INJECT  "EPIDURAL TRANSFORAMINAL Right 2021    Procedure: Right L4-5 and right L5-S1 Transforaminal Epidural Steroid Injections with fluoroscopic guidance and contrast.;  Surgeon: Antonio Dasilva MD;  Location: [facility]     INJECT JOINT SACROILIAC Right 2022    Procedure: Fluoroscopically-guided injection of the Bilateral sacroiliac joints with Bilateral pastor Sacroiliac joint ligaments infiltration over the sacrum;  Surgeon: Antonio Dasilva MD;  Location: [facility]     LAPAROSCOPIC ASSISTED COLECTOMY LEFT (DESCENDING) N/A 10/13/2014    Procedure: LAPAROSCOPIC ASSISTED COLECTOMY LEFT (DESCENDING);  Surgeon: Raffi Montiel MD;  Location: [facility]     LUMBAR SPINE SURGERY  2021    L2-Pelvis fusion with rods and screws     ZZC APPENDECTOMY       ZZC NONSPECIFIC PROCEDURE      Right inferior oblique recession, 14 mm     ZZC VAG HYST,RMV TUBE/OVARY  1984    cervix removed        Social History:     Social History     Tobacco Use     Smoking status: Never Smoker     Smokeless tobacco: Never Used   Vaping Use     Vaping Use: Never used   Substance Use Topics     Alcohol use: No     Drug use: No        Family History:     Family History   Problem Relation Age of Onset     Diabetes Brother      Heart Disease Brother 50        AMI     Diabetes Brother      Heart Disease Brother         AMI, \"older\"     Cerebrovascular Disease Brother      Psychotic Disorder Brother         , alcohol     Cancer Sister         breast cancer x2     Hypertension No family hx of      Breast Cancer No family hx of      Ovarian Cancer No family hx of      Prostate Cancer No family hx of      Mental Illness No family hx of      Anesthesia Reaction No family hx of      Osteoporosis No family hx of      Obesity No family hx of      Other Cancer No family hx of      Depression No family hx of      Anxiety Disorder No family hx of      Mental Illness No family hx of      Substance Abuse No family hx of         Medications:     Current Outpatient " Medications   Medication Sig     acetaminophen (TYLENOL) 500 MG tablet Take 500-1,000 mg by mouth every 6 hours as needed for mild pain     ALPRAZolam (XANAX) 0.25 MG tablet Take 1 tablet (0.25 mg) by mouth nightly as needed for anxiety     calcium carbonate-vitamin D (OSCAL W/D) 500-200 MG-UNIT tablet Take 1 tablet by mouth 2 times daily     cholecalciferol 25 MCG (1000 UT) TABS Take 1,000 Units by mouth daily (with dinner)      cyclobenzaprine (FLEXERIL) 5 MG tablet Take 1 tablet (5 mg) by mouth 3 times daily as needed for muscle spasms     diclofenac (VOLTAREN) 1 % topical gel Apply 2 g topically 4 times daily     hydrOXYzine (ATARAX) 10 MG tablet Take 5-10 mg by mouth every 6 hours as needed     ibuprofen (ADVIL/MOTRIN) 600 MG tablet TAKE 1 TABLET (600 MG) BY MOUTH EVERY 6 HOURS AS NEEDED FOR MODERATE PAIN (Patient taking differently: Take 600 mg by mouth every 6 hours as needed for inflammatory pain)     levothyroxine (SYNTHROID/LEVOTHROID) 50 MCG tablet TAKE ONE TABLET BY MOUTH once daily     lisinopril (ZESTRIL) 5 MG tablet Take 0.5 tablets (2.5 mg) by mouth daily     losartan (COZAAR) 25 MG tablet Take 1 tablet (25 mg) by mouth daily     metoprolol succinate ER (TOPROL XL) 100 MG 24 hr tablet Take 1 tablet (100 mg) by mouth daily     omeprazole (PRILOSEC) 20 MG DR capsule TAKE ONE CAPSULE BY MOUTH ONCE DAILY (Patient taking differently: Take 20 mg by mouth daily)     simvastatin (ZOCOR) 20 MG tablet TAKE 1/2 TABLET BY MOUTH EVERY NIGHT (Patient taking differently: Take 10 mg by mouth At Bedtime)     vitamin E (TOCOPHEROL) 400 units (180 mg) capsule Take 400 Units by mouth daily (with dinner)      No current facility-administered medications for this visit.        Allergies:     Allergies   Allergen Reactions     Azithromycin Diarrhea     Ciprofloxacin Other (See Comments)     Cipro, dizziness and abdominal pain     Gabapentin Dizziness and Visual Disturbance     Penicillins      stomach upset, diarrhea      "Raspberry Hives     Sulfa Drugs Hives     hives     Augmentin Rash        Review of Systems:   A comprehensive 10 point review of systems (constitutional, ENT, cardiac, peripheral vascular, lymphatic, respiratory, GI, , Musculoskeletal, skin, Neurological) was performed and found to be negative except as described in this note.      Physical Exam:   Vitals: BP (!) 155/80 (BP Location: Right arm, Patient Position: Sitting, Cuff Size: Adult Regular)   Pulse 73   Ht 1.549 m (5' 1\")   Wt 59 kg (130 lb)   LMP  (LMP Unknown)   BMI 24.56 kg/m     General: Seated comfortably in no acute distress.  Lungs: breathing comfortably  Extremities: no edema  Skin: No rashes  Neurologic:     Mental Status: MoCA 23/30 (-1 trails, -1 copy rectanlge, -1 serial 7s, -2 delayed recall, -1 fluency, -1 abstraction)     Cranial Nerves: Visual fields intact. PERRL. EOMI with normal smooth pursuit. Facial sensation intact/symmetric. Facial movements symmetric. Hearing not formally tested but intact to conversation. Palate elevation symmetric, uvula midline. No dysarthria. Shoulder shrug strong bilaterally. Tongue protrusion midline.     Motor: No tremors or other abnormal movements observed. Muscle tone normal in the arms, paratonia in the bilateral legs. Normal/symmetric rapid finger tapping. Strength 5/5 as below.      Right Left   Shoulder abduction        5 5   Elbow extension 5 5   Elbow flexion 5 5   Wrist extension         5 5   Finger extension 5 5   ADM 5 5   FDI 5 5   APB 5 5   Hip flexion 5 5   Knee flexion 5 5   Knee extension 5 5   Dorsiflexion 5 5   Plantar flexion 5 5        Deep Tendon Reflexes: 2+/symmetric throughout upper and lower extremities with exception of 1+ ankle jerks. Toes equivocal.     Sensory: Decreased sensation to light touch, temperature, and vibration in the right foot, otherwise intact. Vibration is absent in the right great toe and ~5 seconds at the right ankle, ~8 seconds in the left great toe and " ankle, normal in the hands. Proprioception is decreased at the right toes and normal elsewhere. Negative Romberg.      Coordination: Finger-nose-finger and heel-shin intact without dysmetria.      Gait: Slow shuffling gait with magnetic like quality. Decreased arm swing. Not able to do heel, toe, or tandem gait.          Data: Pertinent prior to visit   Imaging:  MRI brain 6/9/2022  IMPRESSION:     1. Moderate enlargement of the ventricular system which appears out of  proportion to the cerebral sulci and volume loss. This remains  concerning for normal pressure hydrocephalus. Ventricular size is  similar to prior head CT 4/6/2022. Mild confluent periventricular  white matter T2 hyperintensities could represent transependymal flow  of CSF.  2. Background patchy nonspecific white matter changes most likely due  to chronic microvascular ischemic disease.      CT head/cervical 6/2022  IMPRESSION:  HEAD CT:  1.  No acute intracranial hemorrhage or calvarial fracture.  2.  Ventriculomegaly disproportionate to the degree of volume loss elsewhere which in the correct clinical setting would raise the possibility of a normal pressure/communicating hydrocephalus.  CERVICAL SPINE CT:  1.  No CT evidence for acute fracture or post traumatic subluxation.    CT lumbar 4/2022  IMPRESSION:  1. No acute abnormality of the lumbar spine identified. Specifically, no acute fracture.  2. Unchanged subacute to chronic L2 superior endplate compression fracture.  3. Redemonstrated findings of L2-bony pelvis fusion, as described. Findings concerning for loosening of the right greater than left L2 pedicle screws.  4. Solid instrumented interbody fusion at L2-L3 and L5-S1, as before.  5. Multilevel degenerative changes elsewhere, as described.  6. Mild to moderate dextroconvex scoliosis, as before.    MRI lumbar 2/2021  IMPRESSION:    1. Five lumbar-type vertebral bodies for the purposes of this  dictation. Spinal nomenclature is different  compared to previous spine  MR 4/25/2019. Recommend close attention to spinal numbering if  site-specific therapy is considered.  2. Residual enhancing granulation tissue around the right L5 nerve  root in the L5-S1 neural foramen near the operative bed. There appears  to be residual moderate right neural foraminal narrowing secondary to  disc bulge and uncinate spurring. Disc bulge appears to be possibly  increased since previous MR 4/25/2019.  3. Persistent right lateral recess narrowing at L4-L5 which may affect  the right L5 nerve root. This is similar to prior MR 4/25/2019.  4. Additional degenerative changes throughout the lumbar spine as  detailed above, grossly similar to prior. No other significant spinal  canal or neural foraminal narrowings.     Procedures:  None    Laboratory:  None         Assessment and Plan:   Assessment:  Milena Hamilton is a 82 year old female who presents today for evaluation of possible normal pressure hydrocephalus (NPH). Patient has history of chronic lumbosacral radiculopathy and most recently had L2-pelvis fusion in September 2021. She has had many years of imbalance, with particular worsening over the last 5 years. She also has chronic urinary incontinence and in the last year has had some cognitive changes. MRI brain shows moderate enlargement of ventricular space, out of proportion to volume loss.     Patient's specific pattern of gait and other developing symptoms could be seen in the context of NPH. MoCA today is 23/30. She also has significant sensory changes in the right foot likely related to lumbosacral radiculopathy, which is contributing to gait in part. She recently had lumbar puncture (4 ml removed) and thinks she felt a little better transiently afterwards. Opening pressure was normal. We discussed pursuing high volume lumbar puncture (30 ml) with pre and post physical therapy assessment. Given cognitive changes we will also send for CSF ADmark panel.        Plan:  - High volume (30 ml) lumbar puncture with pre and post PT assessment  - CSF glucose, protein, cell count, ADmark panel     Follow up in Neurology clinic pending above    Jovany Nichole MD   of Neurology  [facility]      The total time of this encounter today amounted to 108 minutes. This time included time spent with the patient, prep work, ordering tests, and performing post visit documentation.

## 2022-07-19 ENCOUNTER — HOSPITAL ENCOUNTER (OUTPATIENT)
Facility: CLINIC | Age: 82
End: 2022-07-19
Payer: COMMERCIAL

## 2022-07-25 ENCOUNTER — HOSPITAL ENCOUNTER (OUTPATIENT)
Dept: CARDIOLOGY | Facility: CLINIC | Age: 82
Discharge: HOME | End: 2022-07-25
Attending: NURSE PRACTITIONER | Admitting: NURSE PRACTITIONER
Payer: COMMERCIAL

## 2022-07-25 DIAGNOSIS — I44.7 LBBB (LEFT BUNDLE BRANCH BLOCK): ICD-10-CM

## 2022-07-25 DIAGNOSIS — R29.6 FALLS FREQUENTLY: ICD-10-CM

## 2022-07-25 PROCEDURE — 93242 EXT ECG>48HR<7D RECORDING: CPT

## 2022-07-29 ENCOUNTER — TELEPHONE (OUTPATIENT)
Dept: CARDIOLOGY | Facility: CLINIC | Age: 82
End: 2022-07-29

## 2022-07-29 DIAGNOSIS — I44.7 LBBB (LEFT BUNDLE BRANCH BLOCK): ICD-10-CM

## 2022-07-29 DIAGNOSIS — I50.22 CHRONIC SYSTOLIC HEART FAILURE (H): ICD-10-CM

## 2022-07-29 RX ORDER — LOSARTAN POTASSIUM 25 MG/1
25 TABLET ORAL DAILY
Qty: 90 TABLET | Refills: 3 | Status: SHIPPED | OUTPATIENT
Start: 2022-07-29 | End: 2023-05-18

## 2022-07-29 NOTE — TELEPHONE ENCOUNTER
Reason for Call:  Other prescription    Detailed comments: Wants to talk about her blood pressure medication, believes it is giving her stomach problems. Just has a few questions and wants a call back at [phone]    Phone Number Patient can be reached at: Home number on file [phone] (home)    Best Time: Any    Can we leave a detailed message on this number? YES     Call taken on 7/29/2022 at 9:58 AM by Tonie Galvez      Call taken on 7/29/2022 at 9:58 AM by Tonie Galvez

## 2022-07-29 NOTE — TELEPHONE ENCOUNTER
Situation: Patient has medication questions and states that her diarrhea symptoms are not improving.     Background: Patient last saw May Claudio on 7/13/22. At that OV, per note, lisinopril was discontinued and patient was started on losartan 25 mg daily due to diarrhea symptoms. Patient's medication list, however said to reduce lisinopril to 2.5 mg and start losartan 25 mg, so patient has currently been taking both losartan and lisinopril.     Assessment: Patient states that she picked up the reduced dose of lisinopril and the losartan from the pharmacy and has been taking both medications, along with the metoprolol. Patient states that her diarrhea symptoms are not any better. Patient states that she needs to take imodium daily for it to stop. Patient is very confused about her medications, RN went through exactly which medications she needs to be taking with rationale explained. Also updated medication list to reflect that lisinopril was discontinued (was still on medication list as 2.5 mg daily along with losartan 25 mg). Instructed patient to discard of old doses of medication bottles that she has to avoid confusion. Patient states that her HR has been in the 70s, and her BP has been a little elevated (150s systolic).    Recommendations: Routing to May Claudio for further review.

## 2022-08-01 NOTE — TELEPHONE ENCOUNTER
RN called patient and updated her with PATRICIA Olvera's recommendations. Patient verbalized understanding and will call us in a few days to give an update if her symptoms of diarrhea of not improved off of the lisinopril.

## 2022-08-01 NOTE — TELEPHONE ENCOUNTER
Thanks for going over her medications and symptoms in such detail. Please have her see if they symptoms improve off Lisinopril and if they do not stop metoprolol and start carvedilol 12.5 mg BID.     FORREST Tran, CNP

## 2022-08-08 RX ORDER — CARVEDILOL 12.5 MG/1
12.5 TABLET ORAL 2 TIMES DAILY WITH MEALS
Qty: 180 TABLET | Refills: 2 | Status: SHIPPED | OUTPATIENT
Start: 2022-08-08 | End: 2022-08-10

## 2022-08-08 NOTE — TELEPHONE ENCOUNTER
Patient called back today to update us that her diarrhea is still present. Patient is in agreement to stop the metoprolol and start carvedilol 12.5mg BID. Patient verbalized understanding and is in agreement with plan. Patient will call us back if the diarrhea persists with this medication.

## 2022-08-09 ENCOUNTER — NURSE TRIAGE (OUTPATIENT)
Dept: CARDIOLOGY | Facility: CLINIC | Age: 82
End: 2022-08-09

## 2022-08-09 ENCOUNTER — TELEPHONE (OUTPATIENT)
Dept: CARDIOLOGY | Facility: CLINIC | Age: 82
End: 2022-08-09

## 2022-08-09 NOTE — TELEPHONE ENCOUNTER
Milena called very upset. She picked up her Carvedilol and was told it could cause dizziness/lightheadedness. She said she is not going to take a medicine that is going to make her more dizzy/lightheaded. She said her stomach hurts so bad now. She said she is dizzy all the time unless she is lying down. I asked her if she is still having diarrhea and she said from what? She is completely confused as to what medication she is supposed to be taking. She has not taken the Carvedilol. She stopped the Metoprolol on Sunday. She said she hasn't taken the 25 mg Losartan yet. She is worried because she hasn't taken any BP medication today at all. She didn't know what her BP was. She is just completely confused as to what is making her so sick. She is hesitant to take medications and hesitant not to. I told her to just rest tonight, eat something that will soothe her tummy and not to start the new medication tonight. I told her I will message her doctor and have them call her back in the morning. She said she has therapy at 11:00 AM and I told her I would tell them so they can call her prior to that.     Reason for Disposition    Caller has NON-URGENT medicine question about med that PCP or specialist prescribed and triager unable to answer question    Caller wants to use a complementary or alternative medicine    Additional Information    Negative: Intentional drug overdose and suicidal thoughts or ideas    Negative: Drug overdose and triager unable to answer question    Negative: Caller requesting a renewal or refill of a medicine patient is currently taking    Negative: Caller requesting information unrelated to medicine    Negative: Caller requesting information about COVID-19 Vaccine    Negative: Caller requesting information about Emergency Contraception    Negative: Caller requesting information about Combined Birth Control Pills    Negative: Caller requesting information about Progestin Birth Control Pills     Negative: Caller requesting information about Post-Op pain or medicines    Negative: Caller requesting a prescription antibiotic (such as penicillin) for Strep throat and has a positive culture result    Negative: Caller requesting a prescription anti-viral med (such as Tamiflu) and has influenza (flu) symptoms    Negative: Immunization reaction suspected    Negative: Rash while taking a medicine or within 3 days of stopping it    Negative: Asthma and having symptoms of asthma (cough, wheezing, etc.)    Negative: Symptom of illness (e.g., headache, abdominal pain, earache, vomiting) that are more than mild    Negative: Breastfeeding questions about mother's medicines and diet    Negative: MORE THAN A DOUBLE DOSE of a prescription or over-the-counter (OTC) drug    Negative: DOUBLE DOSE (an extra dose or lesser amount) of prescription drug and any symptoms (e.g., dizziness, nausea, pain, sleepiness)    Negative: DOUBLE DOSE (an extra dose or lesser amount) of over-the-counter (OTC) drug and any symptoms (e.g., dizziness, nausea, pain, sleepiness)    Negative: Took another person's prescription drug    Negative: DOUBLE DOSE (an extra dose or lesser amount) of prescription drug and NO symptoms  (Exception: A double dose of antibiotics.)    Negative: Diabetes drug error or overdose (e.g., took wrong type of insulin or took extra dose)    Negative: Caller has medication question about med NOT prescribed by PCP and triager unable to answer question (e.g., compatibility with other med, storage)    Negative: Prescription not at pharmacy and was prescribed by PCP recently  (Exception: triager has access to EMR and prescription is recorded there. Go to Home Care and confirm for pharmacy.)    Negative: Pharmacy calling with prescription question and triager unable to answer question    Negative: Caller has URGENT medicine question about med that PCP or specialist prescribed and triager unable to answer question    Negative:  "Medicine patch causing local rash or itching    Answer Assessment - Initial Assessment Questions  1. NAME of MEDICATION: \"What medicine are you calling about?\"      Carvedilol, Losartan  2. QUESTION: \"What is your question?\" (e.g., double dose of medicine, side effect)     Patient picked up the Carvedilol and was told it can cause dizziness/lightheadedness. She is not going to take a pill that is going to make her more dizzy and lightheaded. She doesn't know what to do.    Protocols used: MEDICATION QUESTION CALL-A-OH      "

## 2022-08-10 ENCOUNTER — OFFICE VISIT (OUTPATIENT)
Dept: CARDIOLOGY | Facility: CLINIC | Age: 82
End: 2022-08-10
Payer: COMMERCIAL

## 2022-08-10 VITALS
SYSTOLIC BLOOD PRESSURE: 148 MMHG | OXYGEN SATURATION: 96 % | HEIGHT: 61 IN | WEIGHT: 129.1 LBS | BODY MASS INDEX: 24.37 KG/M2 | DIASTOLIC BLOOD PRESSURE: 78 MMHG | HEART RATE: 90 BPM

## 2022-08-10 DIAGNOSIS — I50.22 CHRONIC SYSTOLIC HEART FAILURE (H): Primary | ICD-10-CM

## 2022-08-10 PROCEDURE — 99214 OFFICE O/P EST MOD 30 MIN: CPT | Performed by: NURSE PRACTITIONER

## 2022-08-10 RX ORDER — METOPROLOL SUCCINATE 25 MG/1
25 TABLET, EXTENDED RELEASE ORAL DAILY
Qty: 90 TABLET | Refills: 3 | Status: SHIPPED | OUTPATIENT
Start: 2022-08-10 | End: 2023-05-18

## 2022-08-10 NOTE — LETTER
8/10/2022    Chuck Streeter PA-C  [address] [facility] [address]  [facility] [address]    RE: Milena Hamilton       Dear Colleague,     I had the pleasure of seeing Milena Hamilton in the [facility].    General Cardiology Clinic Progress Note  Milena Hamilton MRN# [id number]   YOB: 1940 Age: 82 year old     Primary cardiologist: Dr. Jett    Reason for visit: Follow-up after medication changes    History of presenting illness:    Milena Hamilton, a pleasant 82 year old patient who has a past medical history significant for:     1. Left bundle branch block  2. Systolic heart failure  3. Hypertension  4. Hyperlipidemia  5. Hypothyroidism  6. History lumbar fusion  7. Ataxia  8. Urinary incontinence  9. Memory loss    She was recently evaluated by Dr. Jett on 6/8/2022 for a newly found left bundle branch block and systolic heart failure.  She suffered a mechanical fall in April 2022 and an EKG at that time showed a new left bundle branch block with normal troponins.  She was discharged and underwent an outpatient echocardiogram that showed her LVEF was 50% without significant valvular abnormalities.  Given her lack of chest pain and poor candidate for mechanical intervention it was not recommended that she undergo any further diagnostic studies including angiography or stress testing.  Optimization for heart failure was advised and metoprolol XL was increased from 25 mg to 100 mg daily and lisinopril 5 mg daily was added.     Unfortunately, Milena had a unwitnessed fall on 7/1/2022.  Her  was in the other room watching TV and she fell while in the kitchen. She does not recall any events around the fall including syncope, presyncope or if it was mechanical in nature.  She did not lose control of her bowel or bladder and was slightly confused afterwards.  She did sustain a scalp injury and was treated in the emergency department due to a superficial abrasion and bleeding.  She  had a nonfocal neuro exam at the time and was not felt she had a concussion.    Due to some diarrhea and concerned this was from lisinopril her dose was decreased to 2.5 mg daily with resolution of her symptoms.  She reports her home blood pressure on the 2.5 mg dose by her PCP.     At our last visit on 7/13/2022 lisinopril was discontinued and she was started on losartan 25 mg daily. There was confusion and she was taking both lisinopril and losartan. Lisinopril was discontinued and she had ongoing diarrhea. Therefore, metoprolol was changed to carvedilol.  Due to side effects listed for dizziness and fatigue she elected to not start carvedilol.  Also a Zio Patch was obtained noting 1 run of PSVT for 18 beats with average  bpm and max rate 146 bpm.    Today she presents with her , Ivan.  Thankfully, she has not had any recurrent falls since her last visit.  She still has ongoing diarrhea despite not taking losartan, metoprolol, carvedilol or lisinopril (no cardiomyopathy medications) since Monday.  She questions what she has to take  Tylenol twice daily and we discussed that this was initially prescribed as needed.  She is thankful and hopes to decrease some of her medications.  She also questions if calcium carbonate could be's causing diarrhea as she states she recalls it had in the past.  Also, on metoprolol 25 mg daily she felt she did not have any diarrhea.    Diagnostic studies:  Echocardiogram (5/9/2022): LVEF by biplane was 50% with normal LV size and thickness and normal diastolic function and normal RV size and function.  EKG: Left bundle branch block  Zio Patch (7/2022):1 run of PSVT for 18 beats with average  bpm and max rate 146 bpm.         Assessment and Plan:     ASSESSMENT:    1. Diarrhea    Initially thought secondary to lisinopril, but despite lowering the dose and discontinuing medication she continued to have symptoms.    She has not been on any cardiac medications since  Monday and has ongoing diarrhea.    2. Dizziness and frequent falls    Etiology unclear but does have chronic diarrhea and could be secondary to dehydration.    No arrhythmias noted on Zio Patch and no structural abnormalities on echocardiogram    3. LV dysfunction    Echocardiogram showed LVEF of 50%    Euvolemic on exam    4. Left bundle branch block    Recently noted on EKG    5. Hypertension    Suboptimally controlled today     6. Hyperlipidemia most recent LDL was 96    Currently on simvastatin 20 mg daily    PLAN:     1. Restart metoprolol at decreased dose of 25 mg daily  2. Restart restart losartan at 25 mg daily  3. Change Tylenol to as needed  4. Trial of holding Calcium carbonate  5. Follow-up with Dr. Jett with repeat echo       Orders this Visit:  No orders of the defined types were placed in this encounter.    Orders Placed This Encounter   Medications     metoprolol succinate ER (TOPROL XL) 25 MG 24 hr tablet     Sig: Take 1 tablet (25 mg) by mouth daily     Dispense:  90 tablet     Refill:  3     Medications Discontinued During This Encounter   Medication Reason     levothyroxine (SYNTHROID/LEVOTHROID) 50 MCG tablet Medication Reconciliation Clean Up     carvedilol (COREG) 12.5 MG tablet      calcium carbonate-vitamin D (OSCAL W/D) 500-200 MG-UNIT tablet        Today's clinic visit entailed:  Review of the result(s) of each unique test - Echo, EKG  Assessment requiring an independent historian(s) - family -   Ordering of each unique test  Prescription drug management  20 minutes spent on the date of the encounter doing chart review, history and exam, documentation and further activities per the note  Provider  Link to [facility] Help Grid     The level of medical decision making during this visit was of moderate complexity.           Review of Systems:     Review of Systems:  Skin:  Negative     Eyes:  Positive for glasses  ENT:  Negative    Respiratory:  Negative    Cardiovascular:  Negative  "for;palpitations;chest pain edema;Positive for;lightheadedness;dizziness  Gastroenterology: Positive for diarrhea  Genitourinary:  Negative    Musculoskeletal:  Negative    Neurologic:  Negative    Psychiatric:  Negative    Heme/Lymph/Imm:  Positive for allergies  Endocrine:  Negative              Physical Exam:     Vitals: BP (!) 148/78 (BP Location: Right arm, Patient Position: Sitting, Cuff Size: Adult Regular)   Pulse 90   Ht 1.549 m (5' 1\")   Wt 58.6 kg (129 lb 1.6 oz)   LMP  (LMP Unknown)   SpO2 96%   BMI 24.39 kg/m    Constitutional: Well nourished and in no apparent distress.  Eyes: Pupils equal, round. Sclerae anicteric.   HEENT: Normocephalic, atraumatic.   Neck: Supple. JVD   Respiratory: Breathing non-labored. Lungs clear to auscultation bilaterally. No crackles, wheezes, rhonchi, or rales.  Cardiovascular:  Regular rate and rhythm, normal S1 and S2. No murmur, rub, or gallop.  Skin: Warm, dry. No rashes, cyanosis, or xanthelasma.  Extremities: No edema.  Neurologic: No gross motor deficits. Alert, awake, and oriented to person, place and time.  Psychiatric: Affect appropriate.             Medications:     Current Outpatient Medications   Medication Sig Dispense Refill     acetaminophen (TYLENOL) 500 MG tablet Take 500-1,000 mg by mouth every 6 hours as needed for mild pain       ALPRAZolam (XANAX) 0.25 MG tablet Take 1 tablet (0.25 mg) by mouth nightly as needed for anxiety 30 tablet 2     cholecalciferol 25 MCG (1000 UT) TABS Take 1,000 Units by mouth daily (with dinner)        levothyroxine (SYNTHROID/LEVOTHROID) 50 MCG tablet TAKE ONE TABLET BY MOUTH once daily 90 tablet 3     metoprolol succinate ER (TOPROL XL) 25 MG 24 hr tablet Take 1 tablet (25 mg) by mouth daily 90 tablet 3     omeprazole (PRILOSEC) 20 MG DR capsule TAKE ONE CAPSULE BY MOUTH ONCE DAILY (Patient taking differently: Take 20 mg by mouth daily) 90 capsule 3     simvastatin (ZOCOR) 20 MG tablet TAKE 1/2 TABLET BY MOUTH EVERY " "NIGHT (Patient taking differently: Take 10 mg by mouth At Bedtime) 135 tablet 3     vitamin E (TOCOPHEROL) 400 units (180 mg) capsule Take 400 Units by mouth daily (with dinner)        cyclobenzaprine (FLEXERIL) 5 MG tablet Take 1 tablet (5 mg) by mouth 3 times daily as needed for muscle spasms (Patient not taking: Reported on 8/10/2022) 60 tablet 2     diclofenac (VOLTAREN) 1 % topical gel Apply 2 g topically 4 times daily (Patient not taking: Reported on 8/10/2022) 150 g 5     hydrOXYzine (ATARAX) 10 MG tablet Take 5-10 mg by mouth every 6 hours as needed (Patient not taking: Reported on 8/10/2022)       ibuprofen (ADVIL/MOTRIN) 600 MG tablet TAKE 1 TABLET (600 MG) BY MOUTH EVERY 6 HOURS AS NEEDED FOR MODERATE PAIN (Patient not taking: Reported on 8/10/2022) 100 tablet 3     losartan (COZAAR) 25 MG tablet Take 1 tablet (25 mg) by mouth daily (Patient not taking: Reported on 8/10/2022) 90 tablet 3       Family History   Problem Relation Age of Onset     Diabetes Brother      Heart Disease Brother 50        AMI     Diabetes Brother      Heart Disease Brother         AMI, \"older\"     Cerebrovascular Disease Brother      Psychotic Disorder Brother         , alcohol     Cancer Sister         breast cancer x2     Hypertension No family hx of      Breast Cancer No family hx of      Ovarian Cancer No family hx of      Prostate Cancer No family hx of      Mental Illness No family hx of      Anesthesia Reaction No family hx of      Osteoporosis No family hx of      Obesity No family hx of      Other Cancer No family hx of      Depression No family hx of      Anxiety Disorder No family hx of      Mental Illness No family hx of      Substance Abuse No family hx of        Social History     Socioeconomic History     Marital status:      Spouse name: Not on file     Number of children: 4     Years of education: Not on file     Highest education level: Not on file   Occupational History     Occupation: dry cleaning "   Tobacco Use     Smoking status: Never Smoker     Smokeless tobacco: Never Used   Vaping Use     Vaping Use: Never used   Substance and Sexual Activity     Alcohol use: No     Drug use: No     Sexual activity: Not Currently     Partners: Male     Birth control/protection: Surgical     Comment: not currently/hysterectomy   Other Topics Concern     Parent/sibling w/ CABG, MI or angioplasty before 65F 55M? No   Social History Narrative     Not on file     Social Determinants of Health     Financial Resource Strain: Not on file   Food Insecurity: Not on file   Transportation Needs: Not on file   Physical Activity: Not on file   Stress: Not on file   Social Connections: Not on file   Intimate Partner Violence: Not on file   Housing Stability: Not on file            Past Medical History:     Past Medical History:   Diagnosis Date     Lichen planus     vulvar     Osteopenia 4/2010    recheck DEXA in 2 years     Panic disorder without agoraphobia      Pure hypercholesterolemia     low LDLs     Raynaud's disease 6/2014     Unspecified essential hypertension               Past Surgical History:     Past Surgical History:   Procedure Laterality Date     COLONOSCOPY  10/17/2007    normal, recheck in 10 years     COLONOSCOPY N/A 08/27/2014    Procedure: COLONOSCOPY;  Surgeon: Raffi Montiel MD;  Location:  GI     COMBINED CYSTOSCOPY, INSERT CATHETER URETER Bilateral 10/13/2014    Procedure: COMBINED CYSTOSCOPY, INSERT CATHETER URETER;  Surgeon: Abdoulaye Odell MD;  Location:  OR     EYE SURGERY  08/01/2001    right eye muscle repair     HC REMOVAL GALLBLADDER  02/09/2010    lap     HC REMOVAL OF OVARY/TUBE(S)      Salpingo-Oophorectomy, bilateral     HYSTERECTOMY, PAP NO LONGER INDICATED       INJECT EPIDURAL LUMBAR Right 04/10/2017    Procedure: INJECT EPIDURAL LUMBAR;  Surgeon: Raffi Wright MD;  Location:  OR     INJECT EPIDURAL LUMBAR Right 02/27/2020    Procedure: Right Lumbar 4- Sacral 1 Epidural  Steroid Injection;  Surgeon: Antonio Dasilva MD;  Location: [facility]     INJECT EPIDURAL LUMBAR Right 01/29/2021    Procedure: INJECTION, SPINE,  sacral 1 EPIDURAL;  Surgeon: Antonio Dasilva MD;  Location: [facility]     INJECT EPIDURAL TRANSFORAMINAL Right 06/09/2016    Procedure: INJECT EPIDURAL TRANSFORAMINAL;  Surgeon: Antonio Dasilva MD;  Location: [facility]     INJECT EPIDURAL TRANSFORAMINAL Right 10/13/2016    Procedure: INJECT EPIDURAL TRANSFORAMINAL;  Surgeon: Antonio Dasilva MD;  Location: [facility]     INJECT EPIDURAL TRANSFORAMINAL Right 06/18/2021    Procedure: Right L4-5 and right L5-S1 Transforaminal Epidural Steroid Injections with fluoroscopic guidance and contrast.;  Surgeon: Antonio Dasilva MD;  Location: [facility]     INJECT JOINT SACROILIAC Right 04/14/2022    Procedure: Fluoroscopically-guided injection of the Bilateral sacroiliac joints with Bilateral pastor Sacroiliac joint ligaments infiltration over the sacrum;  Surgeon: Antonio Dasilva MD;  Location: [facility]     LAPAROSCOPIC ASSISTED COLECTOMY LEFT (DESCENDING) N/A 10/13/2014    Procedure: LAPAROSCOPIC ASSISTED COLECTOMY LEFT (DESCENDING);  Surgeon: Raffi Montiel MD;  Location: [facility]     LUMBAR SPINE SURGERY  09/2021    L2-Pelvis fusion with rods and screws     ZZC APPENDECTOMY       ZZC NONSPECIFIC PROCEDURE      Right inferior oblique recession, 14 mm     ZZC VAG HYST,RMV TUBE/OVARY  01/01/1984    cervix removed              Allergies:   Azithromycin, Ciprofloxacin, Gabapentin, Penicillins, Raspberry, Sulfa drugs, and Augmentin       Data:   All laboratory data reviewed:    Recent Labs   Lab Test 03/24/22  1451 12/28/20  1203 12/13/19  1323 09/11/19  1427   LDL 96 93 68  --    HDL 88 73 77  --    NHDL 114 118 90  --    CHOL 202* 191 167  --    TRIG 89 127 108  --    TSH 1.18 0.89  --  0.82       Lab Results   Component Value Date    WBC 7.9 06/26/2022    WBC 5.8 01/14/2021    RBC 3.98 06/26/2022    RBC 4.14 01/14/2021    HGB 12.7 06/26/2022    HGB 12.9  01/14/2021    HCT 38.5 06/26/2022    HCT 40.9 01/14/2021    MCV 97 06/26/2022    MCV 99 01/14/2021    MCH 31.9 06/26/2022    MCH 31.2 01/14/2021    MCHC 33.0 06/26/2022    MCHC 31.5 01/14/2021    RDW 12.4 06/26/2022    RDW 12.3 01/14/2021     06/26/2022     01/14/2021       Lab Results   Component Value Date     06/26/2022     01/14/2021    POTASSIUM 3.9 06/26/2022    POTASSIUM 3.9 01/14/2021    CHLORIDE 103 06/26/2022    CHLORIDE 106 01/14/2021    CO2 31 06/26/2022    CO2 32 01/14/2021    ANIONGAP 5 06/26/2022    ANIONGAP 2 (L) 01/14/2021     (H) 06/26/2022     (H) 01/14/2021    BUN 14 06/26/2022    BUN 15 01/14/2021    CR 0.62 06/26/2022    CR 0.62 01/14/2021    GFRESTIMATED 89 06/26/2022    GFRESTIMATED 69 02/15/2021    GFRESTBLACK 84 02/15/2021    ANU 9.2 06/26/2022    ANU 9.0 01/14/2021      Lab Results   Component Value Date    AST 14 06/26/2022    AST 12 01/14/2021    ALT 20 06/26/2022    ALT 19 01/14/2021       Lab Results   Component Value Date    A1C 5.4 09/26/2008       Lab Results   Component Value Date    INR 0.88 04/06/2022         FORREST RASHID CNP  [facility]  Pager: [phone]  RN phone: [phone]      Thank you for allowing me to participate in the care of your patient.      Sincerely,     FORREST RASHID CNP     [facility]  cc:   No referring provider defined for this encounter.

## 2022-08-10 NOTE — PATIENT INSTRUCTIONS

## 2022-08-10 NOTE — PATIENT INSTRUCTIONS
TODAY'S RECOMMENDATIONS:    HEART MEDICATIONS ARE: Metoprolol 25 mg once a day and losartan 25 mg once a day  Only take Tylenol as needed NOT twice a day  STOP taking Caltrate to see if diarrhea improves  Please follow up with Dr. Jett in September with echo.    If you have questions or concerns please call clinic at [phone].    Please call [phone] for scheduling.      It was a pleasure seeing you today!

## 2022-08-10 NOTE — PROGRESS NOTES
General Cardiology Clinic Progress Note  Milena Hamilton MRN# [id number]   YOB: 1940 Age: 82 year old     Primary cardiologist: Dr. Jett    Reason for visit: Follow-up after medication changes    History of presenting illness:    Milena Hamilton, a pleasant 82 year old patient who has a past medical history significant for:     1. Left bundle branch block  2. Systolic heart failure  3. Hypertension  4. Hyperlipidemia  5. Hypothyroidism  6. History lumbar fusion  7. Ataxia  8. Urinary incontinence  9. Memory loss    She was recently evaluated by Dr. Jett on 6/8/2022 for a newly found left bundle branch block and systolic heart failure.  She suffered a mechanical fall in April 2022 and an EKG at that time showed a new left bundle branch block with normal troponins.  She was discharged and underwent an outpatient echocardiogram that showed her LVEF was 50% without significant valvular abnormalities.  Given her lack of chest pain and poor candidate for mechanical intervention it was not recommended that she undergo any further diagnostic studies including angiography or stress testing.  Optimization for heart failure was advised and metoprolol XL was increased from 25 mg to 100 mg daily and lisinopril 5 mg daily was added.     Unfortunately, Milena had a unwitnessed fall on 7/1/2022.  Her  was in the other room watching TV and she fell while in the kitchen. She does not recall any events around the fall including syncope, presyncope or if it was mechanical in nature.  She did not lose control of her bowel or bladder and was slightly confused afterwards.  She did sustain a scalp injury and was treated in the emergency department due to a superficial abrasion and bleeding.  She had a nonfocal neuro exam at the time and was not felt she had a concussion.    Due to some diarrhea and concerned this was from lisinopril her dose was decreased to 2.5 mg daily with resolution of her symptoms.   She reports her home blood pressure on the 2.5 mg dose by her PCP.     At our last visit on 7/13/2022 lisinopril was discontinued and she was started on losartan 25 mg daily. There was confusion and she was taking both lisinopril and losartan. Lisinopril was discontinued and she had ongoing diarrhea. Therefore, metoprolol was changed to carvedilol.  Due to side effects listed for dizziness and fatigue she elected to not start carvedilol.  Also a Zio Patch was obtained noting 1 run of PSVT for 18 beats with average  bpm and max rate 146 bpm.    Today she presents with her , Ivan.  Thankfully, she has not had any recurrent falls since her last visit.  She still has ongoing diarrhea despite not taking losartan, metoprolol, carvedilol or lisinopril (no cardiomyopathy medications) since Monday.  She questions what she has to take  Tylenol twice daily and we discussed that this was initially prescribed as needed.  She is thankful and hopes to decrease some of her medications.  She also questions if calcium carbonate could be's causing diarrhea as she states she recalls it had in the past.  Also, on metoprolol 25 mg daily she felt she did not have any diarrhea.    Diagnostic studies:  Echocardiogram (5/9/2022): LVEF by biplane was 50% with normal LV size and thickness and normal diastolic function and normal RV size and function.  EKG: Left bundle branch block  Zio Patch (7/2022):1 run of PSVT for 18 beats with average  bpm and max rate 146 bpm.         Assessment and Plan:     ASSESSMENT:    1. Diarrhea    Initially thought secondary to lisinopril, but despite lowering the dose and discontinuing medication she continued to have symptoms.    She has not been on any cardiac medications since Monday and has ongoing diarrhea.    2. Dizziness and frequent falls    Etiology unclear but does have chronic diarrhea and could be secondary to dehydration.    No arrhythmias noted on Zio Patch and no structural  abnormalities on echocardiogram    3. LV dysfunction    Echocardiogram showed LVEF of 50%    Euvolemic on exam    4. Left bundle branch block    Recently noted on EKG    5. Hypertension    Suboptimally controlled today     6. Hyperlipidemia most recent LDL was 96    Currently on simvastatin 20 mg daily    PLAN:     1. Restart metoprolol at decreased dose of 25 mg daily  2. Restart restart losartan at 25 mg daily  3. Change Tylenol to as needed  4. Trial of holding Calcium carbonate  5. Follow-up with Dr. Jett with repeat echo       Orders this Visit:  No orders of the defined types were placed in this encounter.    Orders Placed This Encounter   Medications     metoprolol succinate ER (TOPROL XL) 25 MG 24 hr tablet     Sig: Take 1 tablet (25 mg) by mouth daily     Dispense:  90 tablet     Refill:  3     Medications Discontinued During This Encounter   Medication Reason     levothyroxine (SYNTHROID/LEVOTHROID) 50 MCG tablet Medication Reconciliation Clean Up     carvedilol (COREG) 12.5 MG tablet      calcium carbonate-vitamin D (OSCAL W/D) 500-200 MG-UNIT tablet        Today's clinic visit entailed:  Review of the result(s) of each unique test - Echo, EKG  Assessment requiring an independent historian(s) - family -   Ordering of each unique test  Prescription drug management  20 minutes spent on the date of the encounter doing chart review, history and exam, documentation and further activities per the note  Provider  Link to [facility] Help Grid     The level of medical decision making during this visit was of moderate complexity.           Review of Systems:     Review of Systems:  Skin:  Negative     Eyes:  Positive for glasses  ENT:  Negative    Respiratory:  Negative    Cardiovascular:  Negative for;palpitations;chest pain edema;Positive for;lightheadedness;dizziness  Gastroenterology: Positive for diarrhea  Genitourinary:  Negative    Musculoskeletal:  Negative    Neurologic:  Negative    Psychiatric:   "Negative    Heme/Lymph/Imm:  Positive for allergies  Endocrine:  Negative              Physical Exam:     Vitals: BP (!) 148/78 (BP Location: Right arm, Patient Position: Sitting, Cuff Size: Adult Regular)   Pulse 90   Ht 1.549 m (5' 1\")   Wt 58.6 kg (129 lb 1.6 oz)   LMP  (LMP Unknown)   SpO2 96%   BMI 24.39 kg/m    Constitutional: Well nourished and in no apparent distress.  Eyes: Pupils equal, round. Sclerae anicteric.   HEENT: Normocephalic, atraumatic.   Neck: Supple. JVD   Respiratory: Breathing non-labored. Lungs clear to auscultation bilaterally. No crackles, wheezes, rhonchi, or rales.  Cardiovascular:  Regular rate and rhythm, normal S1 and S2. No murmur, rub, or gallop.  Skin: Warm, dry. No rashes, cyanosis, or xanthelasma.  Extremities: No edema.  Neurologic: No gross motor deficits. Alert, awake, and oriented to person, place and time.  Psychiatric: Affect appropriate.             Medications:     Current Outpatient Medications   Medication Sig Dispense Refill     acetaminophen (TYLENOL) 500 MG tablet Take 500-1,000 mg by mouth every 6 hours as needed for mild pain       ALPRAZolam (XANAX) 0.25 MG tablet Take 1 tablet (0.25 mg) by mouth nightly as needed for anxiety 30 tablet 2     cholecalciferol 25 MCG (1000 UT) TABS Take 1,000 Units by mouth daily (with dinner)        levothyroxine (SYNTHROID/LEVOTHROID) 50 MCG tablet TAKE ONE TABLET BY MOUTH once daily 90 tablet 3     metoprolol succinate ER (TOPROL XL) 25 MG 24 hr tablet Take 1 tablet (25 mg) by mouth daily 90 tablet 3     omeprazole (PRILOSEC) 20 MG DR capsule TAKE ONE CAPSULE BY MOUTH ONCE DAILY (Patient taking differently: Take 20 mg by mouth daily) 90 capsule 3     simvastatin (ZOCOR) 20 MG tablet TAKE 1/2 TABLET BY MOUTH EVERY NIGHT (Patient taking differently: Take 10 mg by mouth At Bedtime) 135 tablet 3     vitamin E (TOCOPHEROL) 400 units (180 mg) capsule Take 400 Units by mouth daily (with dinner)        cyclobenzaprine (FLEXERIL) 5 " "MG tablet Take 1 tablet (5 mg) by mouth 3 times daily as needed for muscle spasms (Patient not taking: Reported on 8/10/2022) 60 tablet 2     diclofenac (VOLTAREN) 1 % topical gel Apply 2 g topically 4 times daily (Patient not taking: Reported on 8/10/2022) 150 g 5     hydrOXYzine (ATARAX) 10 MG tablet Take 5-10 mg by mouth every 6 hours as needed (Patient not taking: Reported on 8/10/2022)       ibuprofen (ADVIL/MOTRIN) 600 MG tablet TAKE 1 TABLET (600 MG) BY MOUTH EVERY 6 HOURS AS NEEDED FOR MODERATE PAIN (Patient not taking: Reported on 8/10/2022) 100 tablet 3     losartan (COZAAR) 25 MG tablet Take 1 tablet (25 mg) by mouth daily (Patient not taking: Reported on 8/10/2022) 90 tablet 3       Family History   Problem Relation Age of Onset     Diabetes Brother      Heart Disease Brother 50        AMI     Diabetes Brother      Heart Disease Brother         AMI, \"older\"     Cerebrovascular Disease Brother      Psychotic Disorder Brother         , alcohol     Cancer Sister         breast cancer x2     Hypertension No family hx of      Breast Cancer No family hx of      Ovarian Cancer No family hx of      Prostate Cancer No family hx of      Mental Illness No family hx of      Anesthesia Reaction No family hx of      Osteoporosis No family hx of      Obesity No family hx of      Other Cancer No family hx of      Depression No family hx of      Anxiety Disorder No family hx of      Mental Illness No family hx of      Substance Abuse No family hx of        Social History     Socioeconomic History     Marital status:      Spouse name: Not on file     Number of children: 4     Years of education: Not on file     Highest education level: Not on file   Occupational History     Occupation: dry cleaning   Tobacco Use     Smoking status: Never Smoker     Smokeless tobacco: Never Used   Vaping Use     Vaping Use: Never used   Substance and Sexual Activity     Alcohol use: No     Drug use: No     Sexual activity: Not " Currently     Partners: Male     Birth control/protection: Surgical     Comment: not currently/hysterectomy   Other Topics Concern     Parent/sibling w/ CABG, MI or angioplasty before 65F 55M? No   Social History Narrative     Not on file     Social Determinants of Health     Financial Resource Strain: Not on file   Food Insecurity: Not on file   Transportation Needs: Not on file   Physical Activity: Not on file   Stress: Not on file   Social Connections: Not on file   Intimate Partner Violence: Not on file   Housing Stability: Not on file            Past Medical History:     Past Medical History:   Diagnosis Date     Lichen planus     vulvar     Osteopenia 4/2010    recheck DEXA in 2 years     Panic disorder without agoraphobia      Pure hypercholesterolemia     low LDLs     Raynaud's disease 6/2014     Unspecified essential hypertension               Past Surgical History:     Past Surgical History:   Procedure Laterality Date     COLONOSCOPY  10/17/2007    normal, recheck in 10 years     COLONOSCOPY N/A 08/27/2014    Procedure: COLONOSCOPY;  Surgeon: Raffi Montiel MD;  Location:  GI     COMBINED CYSTOSCOPY, INSERT CATHETER URETER Bilateral 10/13/2014    Procedure: COMBINED CYSTOSCOPY, INSERT CATHETER URETER;  Surgeon: Abdoulaye Odell MD;  Location:  OR     EYE SURGERY  08/01/2001    right eye muscle repair     HC REMOVAL GALLBLADDER  02/09/2010    lap     HC REMOVAL OF OVARY/TUBE(S)      Salpingo-Oophorectomy, bilateral     HYSTERECTOMY, PAP NO LONGER INDICATED       INJECT EPIDURAL LUMBAR Right 04/10/2017    Procedure: INJECT EPIDURAL LUMBAR;  Surgeon: Raffi Wright MD;  Location: [facility]     INJECT EPIDURAL LUMBAR Right 02/27/2020    Procedure: Right Lumbar 4- Sacral 1 Epidural Steroid Injection;  Surgeon: Antonio Dasilva MD;  Location: [facility]     INJECT EPIDURAL LUMBAR Right 01/29/2021    Procedure: INJECTION, SPINE,  sacral 1 EPIDURAL;  Surgeon: Antonio Dasilva MD;  Location:  OR      INJECT EPIDURAL TRANSFORAMINAL Right 06/09/2016    Procedure: INJECT EPIDURAL TRANSFORAMINAL;  Surgeon: Antonio Dasilva MD;  Location: [facility]     INJECT EPIDURAL TRANSFORAMINAL Right 10/13/2016    Procedure: INJECT EPIDURAL TRANSFORAMINAL;  Surgeon: Antonio Dasilva MD;  Location: [facility]     INJECT EPIDURAL TRANSFORAMINAL Right 06/18/2021    Procedure: Right L4-5 and right L5-S1 Transforaminal Epidural Steroid Injections with fluoroscopic guidance and contrast.;  Surgeon: Antonio Dasilva MD;  Location: [facility]     INJECT JOINT SACROILIAC Right 04/14/2022    Procedure: Fluoroscopically-guided injection of the Bilateral sacroiliac joints with Bilateral pastor Sacroiliac joint ligaments infiltration over the sacrum;  Surgeon: Antonio Dasilva MD;  Location: [facility]     LAPAROSCOPIC ASSISTED COLECTOMY LEFT (DESCENDING) N/A 10/13/2014    Procedure: LAPAROSCOPIC ASSISTED COLECTOMY LEFT (DESCENDING);  Surgeon: Raffi Montiel MD;  Location: [facility]     LUMBAR SPINE SURGERY  09/2021    L2-Pelvis fusion with rods and screws     ZZC APPENDECTOMY       ZZC NONSPECIFIC PROCEDURE      Right inferior oblique recession, 14 mm     ZZC VAG HYST,RMV TUBE/OVARY  01/01/1984    cervix removed              Allergies:   Azithromycin, Ciprofloxacin, Gabapentin, Penicillins, Raspberry, Sulfa drugs, and Augmentin       Data:   All laboratory data reviewed:    Recent Labs   Lab Test 03/24/22  1451 12/28/20  1203 12/13/19  1323 09/11/19  1427   LDL 96 93 68  --    HDL 88 73 77  --    NHDL 114 118 90  --    CHOL 202* 191 167  --    TRIG 89 127 108  --    TSH 1.18 0.89  --  0.82       Lab Results   Component Value Date    WBC 7.9 06/26/2022    WBC 5.8 01/14/2021    RBC 3.98 06/26/2022    RBC 4.14 01/14/2021    HGB 12.7 06/26/2022    HGB 12.9 01/14/2021    HCT 38.5 06/26/2022    HCT 40.9 01/14/2021    MCV 97 06/26/2022    MCV 99 01/14/2021    MCH 31.9 06/26/2022    MCH 31.2 01/14/2021    MCHC 33.0 06/26/2022    MCHC 31.5 01/14/2021    RDW 12.4 06/26/2022     RDW 12.3 01/14/2021     06/26/2022     01/14/2021       Lab Results   Component Value Date     06/26/2022     01/14/2021    POTASSIUM 3.9 06/26/2022    POTASSIUM 3.9 01/14/2021    CHLORIDE 103 06/26/2022    CHLORIDE 106 01/14/2021    CO2 31 06/26/2022    CO2 32 01/14/2021    ANIONGAP 5 06/26/2022    ANIONGAP 2 (L) 01/14/2021     (H) 06/26/2022     (H) 01/14/2021    BUN 14 06/26/2022    BUN 15 01/14/2021    CR 0.62 06/26/2022    CR 0.62 01/14/2021    GFRESTIMATED 89 06/26/2022    GFRESTIMATED 69 02/15/2021    GFRESTBLACK 84 02/15/2021    ANU 9.2 06/26/2022    ANU 9.0 01/14/2021      Lab Results   Component Value Date    AST 14 06/26/2022    AST 12 01/14/2021    ALT 20 06/26/2022    ALT 19 01/14/2021       Lab Results   Component Value Date    A1C 5.4 09/26/2008       Lab Results   Component Value Date    INR 0.88 04/06/2022         FORREST RASHID [facility]  Pager: [phone]  RN phone: [phone]

## 2022-08-12 ENCOUNTER — TELEPHONE (OUTPATIENT)
Dept: GENERAL RADIOLOGY | Facility: CLINIC | Age: 82
End: 2022-08-12

## 2022-08-12 NOTE — TELEPHONE ENCOUNTER
Left message for patient to contact [facility] for instructions regarding lumbar puncture scheduled next week. Number left was [phone]

## 2022-08-15 ENCOUNTER — TELEPHONE (OUTPATIENT)
Dept: GENERAL RADIOLOGY | Facility: CLINIC | Age: 82
End: 2022-08-15

## 2022-08-15 DIAGNOSIS — G91.2 NPH (NORMAL PRESSURE HYDROCEPHALUS) (H): Primary | ICD-10-CM

## 2022-08-15 DIAGNOSIS — R26.9 GAIT DIFFICULTY: ICD-10-CM

## 2022-08-15 DIAGNOSIS — Z01.89 ENCOUNTER FOR LUMBAR PUNCTURE: ICD-10-CM

## 2022-08-15 NOTE — TELEPHONE ENCOUNTER
Interventional Radiology Pre Call    Spoke with Milena and reviewed instructions for lumbar puncture.    Location: [address] [place] [id number]  Date: 8/17  Arrival Time: 1045a    PREPARATION INSTRUCTIONS    You may eat and drink without restriction.     We you WILL need to have someone available to drive you home. Patient reports daughter will be driving.    We also recommend that you have someone stay with you 1-2 hours after you get home.     Please take all your medications as prescribed, unless you are contacted by a nurse from our department to hold them.    Patient reports she is not taking any blood thinners.    COVID TESTING  1 to 2 days before your procedure, take an at-home, rapid antigen test. Before you come in you must get tested for COVID-19, even if you've been vaccinated. If the test is negative, take a picture of the negative result, and a nurse will review the results when you arrive for your appointment. If the test is POSITIVE, call the Imaging Nurse at [phone]    Milena verbalized understanding of instructions. Also instructed to call Imaging Nurse at [phone] if questions arise.

## 2022-08-15 NOTE — PROGRESS NOTES
Physical Therapy Initial Evaluation  8/15/2022     Physical Therapy Assessment for Pre and Post Lumbar Puncture Procedure     Pre Lumbar Puncture  Subjective:  Has a headache, has had multiple falls recently    Gait assessment: shuffling gait, short step length, decreased heel strike/push off, using FWW    Total treatment time: 25 minutes       Post Lumbar Puncture  Subjective:  Headache has gone, feels much better  Gait assessment: Improved step length, and wider base of support, using FWW     Total treatment time: 25 minutes    Test Pre-Test Post Test   TUG 35 sec   with FWW and CGA 28 sec   With FWW and SBA   Hart Balance Test 27/50  intermittent use of FWW and CGA-Min A. Unable to complete alternating step taps or SL stance 34/50  Improved ability to stand and reach, turn 360 degrees, look over shoulder and stand on one leg   Dynamic Gait Index (DGI) 5/24   with FWW and SBA. Difficulty with pivot turn and head turns with gait.    15/24   With FWW and SBA. Significant improvement in gait speed and gait with head turns.

## 2022-08-17 ENCOUNTER — THERAPY VISIT (OUTPATIENT)
Dept: PHYSICAL THERAPY | Facility: CLINIC | Age: 82
End: 2022-08-17
Attending: INTERNAL MEDICINE
Payer: COMMERCIAL

## 2022-08-17 ENCOUNTER — ANCILLARY PROCEDURE (OUTPATIENT)
Dept: GENERAL RADIOLOGY | Facility: CLINIC | Age: 82
End: 2022-08-17
Attending: INTERNAL MEDICINE
Payer: COMMERCIAL

## 2022-08-17 VITALS
HEART RATE: 84 BPM | DIASTOLIC BLOOD PRESSURE: 80 MMHG | SYSTOLIC BLOOD PRESSURE: 148 MMHG | OXYGEN SATURATION: 98 % | TEMPERATURE: 97.8 F

## 2022-08-17 DIAGNOSIS — G91.2 IDIOPATHIC NORMAL PRESSURE HYDROCEPHALUS (H): Primary | ICD-10-CM

## 2022-08-17 DIAGNOSIS — Z01.89 ENCOUNTER FOR LUMBAR PUNCTURE: Primary | ICD-10-CM

## 2022-08-17 DIAGNOSIS — G91.2 IDIOPATHIC NORMAL PRESSURE HYDROCEPHALUS (H): ICD-10-CM

## 2022-08-17 LAB
APPEARANCE CSF: CLEAR
COLOR CSF: COLORLESS
GLUCOSE CSF-MCNC: 52 MG/DL (ref 40–70)
HOLD SPECIMEN: NORMAL
HOLD SPECIMEN: NORMAL
PROT CSF-MCNC: 37.9 MG/DL (ref 15–45)
RBC CELLS COUNTED: 5
TUBE # CSF: 3
WBC # CSF MANUAL: 1 /UL (ref 0–5)

## 2022-08-17 PROCEDURE — 99000 SPECIMEN HANDLING OFFICE-LAB: CPT | Performed by: INTERNAL MEDICINE

## 2022-08-17 PROCEDURE — 62328 DX LMBR SPI PNXR W/FLUOR/CT: CPT | Performed by: PHYSICIAN ASSISTANT

## 2022-08-17 PROCEDURE — 84157 ASSAY OF PROTEIN OTHER: CPT | Performed by: INTERNAL MEDICINE

## 2022-08-17 PROCEDURE — 82945 GLUCOSE OTHER FLUID: CPT | Performed by: INTERNAL MEDICINE

## 2022-08-17 PROCEDURE — 83520 IMMUNOASSAY QUANT NOS NONAB: CPT | Mod: 90 | Performed by: INTERNAL MEDICINE

## 2022-08-17 PROCEDURE — 97116 GAIT TRAINING THERAPY: CPT | Mod: GP

## 2022-08-17 PROCEDURE — 97161 PT EVAL LOW COMPLEX 20 MIN: CPT | Mod: GP

## 2022-08-17 PROCEDURE — 89050 BODY FLUID CELL COUNT: CPT | Performed by: INTERNAL MEDICINE

## 2022-08-17 RX ORDER — LIDOCAINE HYDROCHLORIDE 10 MG/ML
5 INJECTION, SOLUTION EPIDURAL; INFILTRATION; INTRACAUDAL; PERINEURAL ONCE
Status: COMPLETED | OUTPATIENT
Start: 2022-08-17 | End: 2022-08-17

## 2022-08-17 RX ADMIN — LIDOCAINE HYDROCHLORIDE 5 ML: 10 INJECTION, SOLUTION EPIDURAL; INFILTRATION; INTRACAUDAL; PERINEURAL at 12:00

## 2022-08-17 NOTE — PROGRESS NOTES
Milena Hamilton  [id number]    Completed lumbar puncture under fluoroscopic guidance.  A 22 gauge 3.5 inch spinal needle was advanced between L4-L5 with return of fluid.  A total of 30 mL was collected (as requested) in 4+1 tubes for requested labs. Needle removed after return of stylet.  No immediate complication.  Plan: lie flat for 1 hour.  Dx: Gait disturbance.  Niurka.

## 2022-08-17 NOTE — PROGRESS NOTES
Patient tolerated procedure well. No obvious complications. VSS. AVS given and pt escorted to the waiting on 5th floor for PT appt.

## 2022-08-17 NOTE — DISCHARGE INSTRUCTIONS
Discharge instructions for Lumbar Puncture/Blood Patch/Post Myelogram           AFTER YOU GO HOME    Relax and take it easy for 24 hours  You may resume normal activity tomorrow  You may remove the bandage on your back in the evening or next morning  You may resume bathing the next day  Drink at least 4 glasses of extra fluid today if not on a fluid restriction  DO NOT drive or operate machinery at home or at work for at least 24 hours  If you develop a headache you can take over the counter medicines and lay down.  You can try drinking caffeine-coffee, soda.                   CALL YOU PRIMARY PHYSICIAN IF:  If you start to leak a large amount of fluid from the puncture site, lie down flat on your back.   You develop a severe headache  You develop nausea or vomiting   You develop a temperature of 101 degrees or greater     Date: 8/17/22  Provider: Aidan Bah PA-C    [facility]  [address] [place] [id number]

## 2022-08-22 NOTE — PROGRESS NOTES
[facility]    OUTPATIENT Physical Therapy ORTHOPEDIC EVALUATION  PLAN OF TREATMENT FOR OUTPATIENT REHABILITATION  (COMPLETE FOR INITIAL CLAIMS ONLY)  Patient's Last Name, First Name, M.I.  YOB: 1940  Milena Hamilton    Provider s Name:  [facility]   Medical Record No.  [id number]   Start of Care Date:   8/17/22   Onset Date:    8/17/22   Type:     _X__PT   ___OT Medical Diagnosis:    Encounter Diagnosis   Name Primary?     Idiopathic normal pressure hydrocephalus (H)         Treatment Diagnosis:           Goals:     08/22/22 0500   Body Part   Goals listed below are for Balance   Goal #1   Goal #1 balance   Performance level Participate in physical therapy assessment pre and post lumbar puncture including standardized balance tests.   Due date 08/17/22   Date Goal Met 08/17/22       Therapy Frequency:     Predicted Duration of Therapy Intervention:       Ynes Luna, PT                 I CERTIFY THE NEED FOR THESE SERVICES FURNISHED UNDER        THIS PLAN OF TREATMENT AND WHILE UNDER MY CARE     (Physician attestation of this document indicates review and certification of the therapy plan).                     Certification Date From:    8/17/22  Certification Date To:   8/17/22    Referring Provider:  Jovany Nichole    Initial Assessment        See Epic Evaluation

## 2022-08-23 ENCOUNTER — TELEPHONE (OUTPATIENT)
Dept: FAMILY MEDICINE | Facility: OTHER | Age: 82
End: 2022-08-23

## 2022-08-23 ENCOUNTER — TELEPHONE (OUTPATIENT)
Dept: CARDIOLOGY | Facility: CLINIC | Age: 82
End: 2022-08-23

## 2022-08-23 NOTE — TELEPHONE ENCOUNTER
Patient called today to report persistent diarrhea for two months. RN reviewed with patient that we have made some medication changes in hopes that it would alleviate the diarrhea, but it has not appeared to. RN suggested patient call her PMD to discuss the ongoing diarrhea and inquire if the would like to run any GI testing to inquire if their is something else going on that might be the underlying cause for her symptoms. Patient is in agreement with this plan and will update us with what her PMD recommends.

## 2022-08-23 NOTE — TELEPHONE ENCOUNTER
Pt spoke with triage nurse about diarrhea for the last 2 months. Nurse said to try and see pcp within the next day. Pt is wondering if she can be worked into the schedule d/t getting weak tomorrow. Please contact pt at [phone]

## 2022-08-24 ENCOUNTER — TELEPHONE (OUTPATIENT)
Dept: FAMILY MEDICINE | Facility: OTHER | Age: 82
End: 2022-08-24

## 2022-08-24 ENCOUNTER — HOSPITAL ENCOUNTER (EMERGENCY)
Facility: CLINIC | Age: 82
Discharge: HOME | End: 2022-08-24
Attending: EMERGENCY MEDICINE | Admitting: EMERGENCY MEDICINE
Payer: COMMERCIAL

## 2022-08-24 VITALS
HEART RATE: 84 BPM | WEIGHT: 127 LBS | SYSTOLIC BLOOD PRESSURE: 134 MMHG | DIASTOLIC BLOOD PRESSURE: 71 MMHG | OXYGEN SATURATION: 98 % | BODY MASS INDEX: 24 KG/M2 | TEMPERATURE: 99.1 F | RESPIRATION RATE: 18 BRPM

## 2022-08-24 DIAGNOSIS — A09 DIARRHEA OF INFECTIOUS ORIGIN: ICD-10-CM

## 2022-08-24 DIAGNOSIS — K57.92 ACUTE DIVERTICULITIS: ICD-10-CM

## 2022-08-24 LAB
ANION GAP SERPL CALCULATED.3IONS-SCNC: 8 MMOL/L (ref 3–14)
BASOPHILS # BLD AUTO: 0 10E3/UL (ref 0–0.2)
BASOPHILS NFR BLD AUTO: 1 %
BUN SERPL-MCNC: 9 MG/DL (ref 7–30)
CALCIUM SERPL-MCNC: 9.1 MG/DL (ref 8.5–10.1)
CHLORIDE BLD-SCNC: 107 MMOL/L (ref 94–109)
CO2 SERPL-SCNC: 27 MMOL/L (ref 20–32)
CREAT SERPL-MCNC: 0.68 MG/DL (ref 0.52–1.04)
CRP SERPL-MCNC: <2.9 MG/L (ref 0–8)
EOSINOPHIL # BLD AUTO: 0 10E3/UL (ref 0–0.7)
EOSINOPHIL NFR BLD AUTO: 1 %
ERYTHROCYTE [DISTWIDTH] IN BLOOD BY AUTOMATED COUNT: 12 % (ref 10–15)
GFR SERPL CREATININE-BSD FRML MDRD: 87 ML/MIN/1.73M2
GLUCOSE BLD-MCNC: 97 MG/DL (ref 70–99)
HCT VFR BLD AUTO: 38.2 % (ref 35–47)
HGB BLD-MCNC: 12.4 G/DL (ref 11.7–15.7)
IMM GRANULOCYTES # BLD: 0 10E3/UL
IMM GRANULOCYTES NFR BLD: 1 %
LYMPHOCYTES # BLD AUTO: 1 10E3/UL (ref 0.8–5.3)
LYMPHOCYTES NFR BLD AUTO: 25 %
MCH RBC QN AUTO: 32.2 PG (ref 26.5–33)
MCHC RBC AUTO-ENTMCNC: 32.5 G/DL (ref 31.5–36.5)
MCV RBC AUTO: 99 FL (ref 78–100)
MONOCYTES # BLD AUTO: 0.3 10E3/UL (ref 0–1.3)
MONOCYTES NFR BLD AUTO: 7 %
NEUTROPHILS # BLD AUTO: 2.6 10E3/UL (ref 1.6–8.3)
NEUTROPHILS NFR BLD AUTO: 65 %
NRBC # BLD AUTO: 0 10E3/UL
NRBC BLD AUTO-RTO: 0 /100
PLATELET # BLD AUTO: 191 10E3/UL (ref 150–450)
POTASSIUM BLD-SCNC: 3.7 MMOL/L (ref 3.4–5.3)
RBC # BLD AUTO: 3.85 10E6/UL (ref 3.8–5.2)
SODIUM SERPL-SCNC: 142 MMOL/L (ref 133–144)
WBC # BLD AUTO: 4 10E3/UL (ref 4–11)

## 2022-08-24 PROCEDURE — 99284 EMERGENCY DEPT VISIT MOD MDM: CPT | Performed by: EMERGENCY MEDICINE

## 2022-08-24 PROCEDURE — 36415 COLL VENOUS BLD VENIPUNCTURE: CPT | Performed by: EMERGENCY MEDICINE

## 2022-08-24 PROCEDURE — 86140 C-REACTIVE PROTEIN: CPT | Performed by: EMERGENCY MEDICINE

## 2022-08-24 PROCEDURE — 80048 BASIC METABOLIC PNL TOTAL CA: CPT | Performed by: EMERGENCY MEDICINE

## 2022-08-24 PROCEDURE — 85025 COMPLETE CBC W/AUTO DIFF WBC: CPT | Performed by: EMERGENCY MEDICINE

## 2022-08-24 RX ORDER — CEFDINIR 300 MG/1
300 CAPSULE ORAL 2 TIMES DAILY
Qty: 14 CAPSULE | Refills: 0 | Status: SHIPPED | OUTPATIENT
Start: 2022-08-24 | End: 2022-08-31

## 2022-08-24 RX ORDER — METRONIDAZOLE 500 MG/1
500 TABLET ORAL 2 TIMES DAILY
Qty: 14 TABLET | Refills: 1 | Status: SHIPPED | OUTPATIENT
Start: 2022-08-24 | End: 2022-08-31

## 2022-08-24 ASSESSMENT — ACTIVITIES OF DAILY LIVING (ADL): ADLS_ACUITY_SCORE: 37

## 2022-08-24 NOTE — TELEPHONE ENCOUNTER
alejandra Conklin scheduling transferred patient to nurse line. Patient reporting black stools since beginning of June. Has an appointment tomorrow with PCP for this reason.     Patient reports feeling extremely weak today. Has had diarrhea for the last few days. Reports feeling dizzy and extremely weak upon standing.     Advised patient to go to nearest Emergency Room. Asked patient if anyone is with her currently. Patient reports  is on the way home from work because she's not feeling well. Advised patient if too weak to get into the car, or if she starts feeling dizzy/lightheaded while sitting down, or worsening symptoms, call 911. Patient agrees to plan of care and will go to nearest ER right now.     Renetta Harper RN

## 2022-08-24 NOTE — ED PROVIDER NOTES
History     Chief Complaint   Patient presents with     Melena     HPI  Milena Hamilton is a 82 year old female who presents with diarrhea.  Diarrhea is been intermittent for 2 months.  On average she is having 3-4 loose stools per day.  She is noted no blood or mucus in the stool.  Did take Pepto-Bismol over the last few days and now is having dark stools.  Past medical history for documented diverticulosis involving the transverse, sigmoid and colon.  She denies fever, chills, night sweats.  Patient denies any severe abdominal pain but has been having some left lower quadrant discomfort.  No dysuria urgency or frequency.  No vaginal discharge or bleeding.        Allergies:  Allergies   Allergen Reactions     Azithromycin Diarrhea     Ciprofloxacin Other (See Comments)     Cipro, dizziness and abdominal pain     Gabapentin Dizziness and Visual Disturbance     Penicillins      stomach upset, diarrhea     Raspberry Hives     Sulfa Drugs Hives     hives     Augmentin Rash       Problem List:    Patient Active Problem List    Diagnosis Date Noted     Dysuria 11/24/2021     Priority: Medium     Basal cell carcinoma of nose 09/08/2021     Priority: Medium     History of malignant neoplasm of skin 09/08/2021     Priority: Medium     Skin changes due to chronic exposure to nonionizing radiation 09/08/2021     Priority: Medium     Renal insufficiency syndrome 09/04/2021     Priority: Medium     Formatting of this note might be different from the original.  Creatinine clearance 49       Lumbar radiculopathy 12/28/2018     Priority: Medium     Scoliosis of lumbar spine, unspecified scoliosis type 12/12/2018     Priority: Medium     Chronic right-sided low back pain with right-sided sciatica 05/30/2018     Priority: Medium     Acquired hypothyroidism 04/28/2017     Priority: Medium     Gastroesophageal reflux disease, esophagitis presence not specified 04/28/2017     Priority: Medium     IMO Regulatory Load OCT 2020        Premature atrial beats 11/30/2016     Priority: Medium     HTN (hypertension) 07/10/2014     Priority: Medium     Raynaud's disease 06/01/2014     Priority: Medium     Health Care Home 12/30/2013     Priority: Medium     Status:  Closed  Care Coordinator:  Tasha Vasquez RN    See Letters for [facility] Care Plan             History of diverticulitis - s/p left hemicolectomy 11/30/2013     Priority: Medium     Diarrhea 11/30/2013     Priority: Medium     Chronic constipation 06/18/2013     Priority: Medium     Sacroiliac dysfunction 10/09/2012     Priority: Medium     Advanced directives, counseling/discussion 10/03/2011     Priority: Medium     Advance Directive Problem List Overview:   Name Relationship Phone    Primary Health Care Agent            Alternative Health Care Agent          Discussed advance care planning with patient; information given to patient to review. 10/3/2011        Gutierrez Willis DO  08/24/22 1250         DDD (degenerative disc disease), cervical 08/10/2011     Priority: Medium     Anxiety 10/24/2010     Priority: Medium     Hyperlipidemia 09/29/2010     Priority: Medium     Osteopenia 04/01/2010     Priority: Medium     recheck DEXA in 2 years       Gastritis 03/15/2010     Priority: Medium     Panic disorder without agoraphobia 10/08/2004     Priority: Medium     Tinnitus 09/13/2004     Priority: Medium     Problem list name updated by automated process. Provider to review       Family history of diabetes mellitus 09/09/2002     Priority: Medium     Lichen planus      Priority: Medium        Past Medical History:    Past Medical History:   Diagnosis Date     Lichen planus      Osteopenia 4/2010     Panic disorder without agoraphobia      Pure hypercholesterolemia      Raynaud's disease 6/2014     Unspecified essential hypertension        Past Surgical History:    Past Surgical History:   Procedure Laterality Date     COLONOSCOPY  10/17/2007    normal, recheck in 10 years      COLONOSCOPY N/A 08/27/2014    Procedure: COLONOSCOPY;  Surgeon: Raffi Montiel MD;  Location: [facility]     COMBINED CYSTOSCOPY, INSERT CATHETER URETER Bilateral 10/13/2014    Procedure: COMBINED CYSTOSCOPY, INSERT CATHETER URETER;  Surgeon: Abdoulaye Odell MD;  Location: [facility]     EYE SURGERY  08/01/2001    right eye muscle repair     HC REMOVAL GALLBLADDER  02/09/2010    lap     HC REMOVAL OF OVARY/TUBE(S)      Salpingo-Oophorectomy, bilateral     HYSTERECTOMY, PAP NO LONGER INDICATED       INJECT EPIDURAL LUMBAR Right 04/10/2017    Procedure: INJECT EPIDURAL LUMBAR;  Surgeon: Raffi Wright MD;  Location: [facility]     INJECT EPIDURAL LUMBAR Right 02/27/2020    Procedure: Right Lumbar 4- Sacral 1 Epidural Steroid Injection;  Surgeon: Antonio Dasilva MD;  Location: [facility]     INJECT EPIDURAL LUMBAR Right 01/29/2021    Procedure: INJECTION, SPINE,  sacral 1 EPIDURAL;  Surgeon: Antonio Dasilva MD;  Location[facility]     INJECT EPIDURAL TRANSFORAMINAL Right 06/09/2016    Procedure: INJECT EPIDURAL TRANSFORAMINAL;  Surgeon: Antonio Dasilva MD;  [facility]     INJECT EPIDURAL TRANSFORAMINAL Right 10/13/2016    Procedure: INJECT EPIDURAL TRANSFORAMINAL;  Surgeon: Antonio Dasilva MD;  [facility]     INJECT EPIDURAL TRANSFORAMINAL Right 06/18/2021    Procedure: Right L4-5 and right L5-S1 Transforaminal Epidural Steroid Injections with fluoroscopic guidance and contrast.;  Surgeon: Antonio Dasilva MD;  Location[facility]     INJECT JOINT SACROILIAC Right 04/14/2022    Procedure: Fluoroscopically-guided injection of the Bilateral sacroiliac joints with Bilateral pastor Sacroiliac joint ligaments infiltration over the sacrum;  Surgeon: Antonio Dasilva MD;  [facility]     LAPAROSCOPIC ASSISTED COLECTOMY LEFT (DESCENDING) N/A 10/13/2014    Procedure: LAPAROSCOPIC ASSISTED COLECTOMY LEFT (DESCENDING);  Surgeon: Raffi Montiel MD;  Location: [facility]     LUMBAR SPINE SURGERY  09/2021    L2-Pelvis fusion with rods  "and screws     ZZC APPENDECTOMY       Z NONSPECIFIC PROCEDURE      Right inferior oblique recession, 14 mm     Z VAG HYST,RMV TUBE/OVARY  1984    cervix removed       Family History:    Family History   Problem Relation Age of Onset     Diabetes Brother      Heart Disease Brother 50        AMI     Diabetes Brother      Heart Disease Brother         AMI, \"older\"     Cerebrovascular Disease Brother      Psychotic Disorder Brother         , alcohol     Cancer Sister         breast cancer x2     Hypertension No family hx of      Breast Cancer No family hx of      Ovarian Cancer No family hx of      Prostate Cancer No family hx of      Mental Illness No family hx of      Anesthesia Reaction No family hx of      Osteoporosis No family hx of      Obesity No family hx of      Other Cancer No family hx of      Depression No family hx of      Anxiety Disorder No family hx of      Mental Illness No family hx of      Substance Abuse No family hx of        Social History:  Marital Status:   [2]  Social History     Tobacco Use     Smoking status: Never Smoker     Smokeless tobacco: Never Used   Vaping Use     Vaping Use: Never used   Substance Use Topics     Alcohol use: No     Drug use: No        Medications:    acetaminophen (TYLENOL) 500 MG tablet  ALPRAZolam (XANAX) 0.25 MG tablet  cholecalciferol 25 MCG (1000 UT) TABS  cyclobenzaprine (FLEXERIL) 5 MG tablet  diclofenac (VOLTAREN) 1 % topical gel  hydrOXYzine (ATARAX) 10 MG tablet  ibuprofen (ADVIL/MOTRIN) 600 MG tablet  levothyroxine (SYNTHROID/LEVOTHROID) 50 MCG tablet  losartan (COZAAR) 25 MG tablet  metoprolol succinate ER (TOPROL XL) 25 MG 24 hr tablet  omeprazole (PRILOSEC) 20 MG DR capsule  simvastatin (ZOCOR) 20 MG tablet  vitamin E (TOCOPHEROL) 400 units (180 mg) capsule          Review of Systems   All other systems reviewed and are negative.      Physical Exam   BP: 139/73  Pulse: 89  Temp: 99.1  F (37.3  C)  Resp: 18  Weight: 57.6 kg (127 " lb)  SpO2: 98 %      Physical Exam  Vitals and nursing note reviewed.   Constitutional:       Appearance: She is not ill-appearing.   HENT:      Head: Normocephalic.      Nose: Nose normal.   Eyes:      Conjunctiva/sclera: Conjunctivae normal.   Cardiovascular:      Rate and Rhythm: Normal rate.   Pulmonary:      Effort: Pulmonary effort is normal.   Abdominal:      General: Abdomen is flat. Bowel sounds are normal. There is no distension.      Tenderness: There is abdominal tenderness (Left lower quadrant). There is guarding. There is no rebound.   Musculoskeletal:         General: Normal range of motion.      Right lower leg: No edema.      Left lower leg: No edema.   Skin:     General: Skin is warm.      Capillary Refill: Capillary refill takes less than 2 seconds.      Findings: No rash.   Neurological:      General: No focal deficit present.      Mental Status: She is alert and oriented to person, place, and time.   Psychiatric:         Mood and Affect: Mood normal.         Behavior: Behavior normal.         ED Course                 Procedures                  Results for orders placed or performed during the hospital encounter of 08/24/22 (from the past 24 hour(s))   CBC with platelets differential    Narrative    The following orders were created for panel order CBC with platelets differential.  Procedure                               Abnormality         Status                     ---------                               -----------         ------                     CBC with platelets and d...[[id number]]                      Final result                 Please view results for these tests on the individual orders.   Basic metabolic panel   Result Value Ref Range    Sodium 142 133 - 144 mmol/L    Potassium 3.7 3.4 - 5.3 mmol/L    Chloride 107 94 - 109 mmol/L    Carbon Dioxide (CO2)      Anion Gap      Urea Nitrogen      Creatinine      Calcium      Glucose      GFR Estimate     CBC with platelets and  differential   Result Value Ref Range    WBC Count 4.0 4.0 - 11.0 10e3/uL    RBC Count 3.85 3.80 - 5.20 10e6/uL    Hemoglobin 12.4 11.7 - 15.7 g/dL    Hematocrit 38.2 35.0 - 47.0 %    MCV 99 78 - 100 fL    MCH 32.2 26.5 - 33.0 pg    MCHC 32.5 31.5 - 36.5 g/dL    RDW 12.0 10.0 - 15.0 %    Platelet Count 191 150 - 450 10e3/uL    % Neutrophils 65 %    % Lymphocytes 25 %    % Monocytes 7 %    % Eosinophils 1 %    % Basophils 1 %    % Immature Granulocytes 1 %    NRBCs per 100 WBC 0 <1 /100    Absolute Neutrophils 2.6 1.6 - 8.3 10e3/uL    Absolute Lymphocytes 1.0 0.8 - 5.3 10e3/uL    Absolute Monocytes 0.3 0.0 - 1.3 10e3/uL    Absolute Eosinophils 0.0 0.0 - 0.7 10e3/uL    Absolute Basophils 0.0 0.0 - 0.2 10e3/uL    Absolute Immature Granulocytes 0.0 <=0.4 10e3/uL    Absolute NRBCs 0.0 10e3/uL       Medications - No data to display    Assessments & Plan (with Medical Decision Making)  82-year-old female.  Diarrhea for last 2 months.  Noted examination of localized pain left lower quadrant.  Previous colonoscopies documented the presence of diverticulum throughout the sigmoid colon.  Exam notes nonsurgical abdomen patient is currently afebrile with a temp of 98.1.  WBC = 4.0.  Hemoglobin = 12.4  Plan: Patient has intolerance to ciprofloxacin and Augmentin.  Discussed with pharmacy.  We use a third-generation cephalosporin (cefdinir) and metronidazole.     I have reviewed the nursing notes.    I have reviewed the findings, diagnosis, plan and need for follow up with the patient.      New Prescriptions    No medications on file       Final diagnoses:   Diarrhea of infectious origin   Acute diverticulitis       8/24/2022   [facility] EMERGENCY DEPT

## 2022-08-24 NOTE — ED TRIAGE NOTES
Pt presents with abdominal pain and diarrhea. Pt reports they have been adjusting her blood pressure meds since June and she has had diarrhea since then. Pt reports she had black stool this morning and took Pepto bismal yesterday. Ele Meadows RN       Triage Assessment     Row Name 08/24/22 1124       Triage Assessment (Adult)    Airway WDL WDL       Respiratory WDL    Respiratory WDL WDL       Cardiac WDL    Cardiac WDL WDL

## 2022-08-24 NOTE — DISCHARGE INSTRUCTIONS
Diet as tolerated    Arrange for follow-up with your primary clinic provider in 1 week    Cefdinir 300 mg twice daily for 7 days    Metronidazole 500 mg twice daily for 7 days    The diarrhea that you have been experiencing is related to inflammation: From acute diverticulitis.

## 2022-08-29 LAB — SCANNED LAB RESULT: ABNORMAL

## 2022-08-31 ENCOUNTER — HOSPITAL ENCOUNTER (EMERGENCY)
Facility: CLINIC | Age: 82
Discharge: HOME | End: 2022-08-31
Attending: NURSE PRACTITIONER | Admitting: NURSE PRACTITIONER
Payer: COMMERCIAL

## 2022-08-31 ENCOUNTER — APPOINTMENT (OUTPATIENT)
Dept: CT IMAGING | Facility: CLINIC | Age: 82
End: 2022-08-31
Attending: NURSE PRACTITIONER
Payer: COMMERCIAL

## 2022-08-31 VITALS
TEMPERATURE: 98 F | WEIGHT: 125 LBS | RESPIRATION RATE: 18 BRPM | DIASTOLIC BLOOD PRESSURE: 73 MMHG | BODY MASS INDEX: 23.62 KG/M2 | HEART RATE: 85 BPM | OXYGEN SATURATION: 98 % | SYSTOLIC BLOOD PRESSURE: 148 MMHG

## 2022-08-31 DIAGNOSIS — B37.0 THRUSH: ICD-10-CM

## 2022-08-31 DIAGNOSIS — R19.7 DIARRHEA, UNSPECIFIED TYPE: ICD-10-CM

## 2022-08-31 DIAGNOSIS — R74.8 ELEVATED LIPASE: ICD-10-CM

## 2022-08-31 LAB
ALBUMIN SERPL-MCNC: 3.8 G/DL (ref 3.4–5)
ALBUMIN UR-MCNC: NEGATIVE MG/DL
ALP SERPL-CCNC: 56 U/L (ref 40–150)
ALT SERPL W P-5'-P-CCNC: 16 U/L (ref 0–50)
ANION GAP SERPL CALCULATED.3IONS-SCNC: 3 MMOL/L (ref 3–14)
APPEARANCE UR: CLEAR
AST SERPL W P-5'-P-CCNC: 12 U/L (ref 0–45)
BASOPHILS # BLD AUTO: 0.1 10E3/UL (ref 0–0.2)
BASOPHILS NFR BLD AUTO: 1 %
BILIRUB SERPL-MCNC: 0.3 MG/DL (ref 0.2–1.3)
BILIRUB UR QL STRIP: NEGATIVE
BUN SERPL-MCNC: 12 MG/DL (ref 7–30)
CALCIUM SERPL-MCNC: 9 MG/DL (ref 8.5–10.1)
CHLORIDE BLD-SCNC: 110 MMOL/L (ref 94–109)
CO2 SERPL-SCNC: 29 MMOL/L (ref 20–32)
COLOR UR AUTO: YELLOW
CREAT SERPL-MCNC: 0.76 MG/DL (ref 0.52–1.04)
EOSINOPHIL # BLD AUTO: 0.1 10E3/UL (ref 0–0.7)
EOSINOPHIL NFR BLD AUTO: 2 %
ERYTHROCYTE [DISTWIDTH] IN BLOOD BY AUTOMATED COUNT: 11.9 % (ref 10–15)
GFR SERPL CREATININE-BSD FRML MDRD: 78 ML/MIN/1.73M2
GLUCOSE BLD-MCNC: 101 MG/DL (ref 70–99)
GLUCOSE UR STRIP-MCNC: NEGATIVE MG/DL
HCT VFR BLD AUTO: 39.2 % (ref 35–47)
HGB BLD-MCNC: 12.7 G/DL (ref 11.7–15.7)
HGB UR QL STRIP: ABNORMAL
IMM GRANULOCYTES # BLD: 0 10E3/UL
IMM GRANULOCYTES NFR BLD: 0 %
KETONES UR STRIP-MCNC: NEGATIVE MG/DL
LEUKOCYTE ESTERASE UR QL STRIP: ABNORMAL
LIPASE SERPL-CCNC: 1105 U/L (ref 73–393)
LYMPHOCYTES # BLD AUTO: 2.1 10E3/UL (ref 0.8–5.3)
LYMPHOCYTES NFR BLD AUTO: 40 %
MCH RBC QN AUTO: 32.2 PG (ref 26.5–33)
MCHC RBC AUTO-ENTMCNC: 32.4 G/DL (ref 31.5–36.5)
MCV RBC AUTO: 99 FL (ref 78–100)
MONOCYTES # BLD AUTO: 0.5 10E3/UL (ref 0–1.3)
MONOCYTES NFR BLD AUTO: 10 %
MUCOUS THREADS #/AREA URNS LPF: PRESENT /LPF
NEUTROPHILS # BLD AUTO: 2.5 10E3/UL (ref 1.6–8.3)
NEUTROPHILS NFR BLD AUTO: 47 %
NITRATE UR QL: NEGATIVE
NRBC # BLD AUTO: 0 10E3/UL
NRBC BLD AUTO-RTO: 0 /100
PH UR STRIP: 6 [PH] (ref 5–7)
PLATELET # BLD AUTO: 249 10E3/UL (ref 150–450)
POTASSIUM BLD-SCNC: 3.8 MMOL/L (ref 3.4–5.3)
PROT SERPL-MCNC: 7 G/DL (ref 6.8–8.8)
RBC # BLD AUTO: 3.95 10E6/UL (ref 3.8–5.2)
RBC URINE: 2 /HPF
SODIUM SERPL-SCNC: 142 MMOL/L (ref 133–144)
SP GR UR STRIP: 1.01 (ref 1–1.03)
SQUAMOUS EPITHELIAL: <1 /HPF
UROBILINOGEN UR STRIP-MCNC: NORMAL MG/DL
WBC # BLD AUTO: 5.3 10E3/UL (ref 4–11)
WBC URINE: 8 /HPF

## 2022-08-31 PROCEDURE — 250N000009 HC RX 250: Performed by: NURSE PRACTITIONER

## 2022-08-31 PROCEDURE — 81001 URINALYSIS AUTO W/SCOPE: CPT | Performed by: NURSE PRACTITIONER

## 2022-08-31 PROCEDURE — 74177 CT ABD & PELVIS W/CONTRAST: CPT

## 2022-08-31 PROCEDURE — 258N000003 HC RX IP 258 OP 636: Performed by: NURSE PRACTITIONER

## 2022-08-31 PROCEDURE — 250N000011 HC RX IP 250 OP 636: Performed by: NURSE PRACTITIONER

## 2022-08-31 PROCEDURE — 36415 COLL VENOUS BLD VENIPUNCTURE: CPT | Performed by: NURSE PRACTITIONER

## 2022-08-31 PROCEDURE — 96360 HYDRATION IV INFUSION INIT: CPT | Performed by: NURSE PRACTITIONER

## 2022-08-31 PROCEDURE — 85014 HEMATOCRIT: CPT | Performed by: NURSE PRACTITIONER

## 2022-08-31 PROCEDURE — 80053 COMPREHEN METABOLIC PANEL: CPT | Performed by: NURSE PRACTITIONER

## 2022-08-31 PROCEDURE — 83690 ASSAY OF LIPASE: CPT | Performed by: NURSE PRACTITIONER

## 2022-08-31 PROCEDURE — 87086 URINE CULTURE/COLONY COUNT: CPT | Performed by: NURSE PRACTITIONER

## 2022-08-31 PROCEDURE — 99284 EMERGENCY DEPT VISIT MOD MDM: CPT | Performed by: NURSE PRACTITIONER

## 2022-08-31 PROCEDURE — 99285 EMERGENCY DEPT VISIT HI MDM: CPT | Mod: 25 | Performed by: NURSE PRACTITIONER

## 2022-08-31 RX ORDER — NYSTATIN 100000/ML
500000 SUSPENSION, ORAL (FINAL DOSE FORM) ORAL 4 TIMES DAILY
Qty: 280 ML | Refills: 0 | Status: SHIPPED | OUTPATIENT
Start: 2022-08-31 | End: 2022-09-14

## 2022-08-31 RX ORDER — IOPAMIDOL 755 MG/ML
500 INJECTION, SOLUTION INTRAVASCULAR ONCE
Status: COMPLETED | OUTPATIENT
Start: 2022-08-31 | End: 2022-08-31

## 2022-08-31 RX ADMIN — IOPAMIDOL 62 ML: 755 INJECTION, SOLUTION INTRAVENOUS at 22:02

## 2022-08-31 RX ADMIN — SODIUM CHLORIDE 60 ML: 9 INJECTION, SOLUTION INTRAVENOUS at 22:02

## 2022-08-31 RX ADMIN — SODIUM CHLORIDE 500 ML: 9 INJECTION, SOLUTION INTRAVENOUS at 22:13

## 2022-08-31 ASSESSMENT — ENCOUNTER SYMPTOMS
NAUSEA: 0
SHORTNESS OF BREATH: 0
COUGH: 0
CHILLS: 0
BACK PAIN: 0
CONSTIPATION: 0
VOMITING: 0
FEVER: 0
NEUROLOGICAL NEGATIVE: 1
DIARRHEA: 1
DYSURIA: 0
APPETITE CHANGE: 0
ABDOMINAL PAIN: 1

## 2022-08-31 ASSESSMENT — ACTIVITIES OF DAILY LIVING (ADL)
ADLS_ACUITY_SCORE: 37
ADLS_ACUITY_SCORE: 37

## 2022-09-01 ENCOUNTER — HOSPITAL ENCOUNTER (OUTPATIENT)
Dept: CARDIOLOGY | Facility: CLINIC | Age: 82
Discharge: HOME | End: 2022-09-01
Attending: INTERNAL MEDICINE | Admitting: INTERNAL MEDICINE
Payer: COMMERCIAL

## 2022-09-01 DIAGNOSIS — R93.1 DECREASED CARDIAC EJECTION FRACTION: ICD-10-CM

## 2022-09-01 DIAGNOSIS — I44.7 LBBB (LEFT BUNDLE BRANCH BLOCK): ICD-10-CM

## 2022-09-01 DIAGNOSIS — I10 BENIGN ESSENTIAL HYPERTENSION: ICD-10-CM

## 2022-09-01 DIAGNOSIS — I50.22 CHRONIC SYSTOLIC HEART FAILURE (H): ICD-10-CM

## 2022-09-01 LAB — LVEF ECHO: NORMAL

## 2022-09-01 PROCEDURE — 93321 DOPPLER ECHO F-UP/LMTD STD: CPT

## 2022-09-01 PROCEDURE — 93325 DOPPLER ECHO COLOR FLOW MAPG: CPT

## 2022-09-01 PROCEDURE — 93325 DOPPLER ECHO COLOR FLOW MAPG: CPT | Mod: 26 | Performed by: INTERNAL MEDICINE

## 2022-09-01 PROCEDURE — 93321 DOPPLER ECHO F-UP/LMTD STD: CPT | Mod: 26 | Performed by: INTERNAL MEDICINE

## 2022-09-01 PROCEDURE — 93308 TTE F-UP OR LMTD: CPT | Mod: 26 | Performed by: INTERNAL MEDICINE

## 2022-09-01 NOTE — ED TRIAGE NOTES
Pt seen in ED about a week ago - continues with loose stools, very tired and fatigued, generalized weakness, nausea. Abd pain 8/10.      Triage Assessment     Row Name 08/31/22 2056       Triage Assessment (Adult)    Airway WDL WDL       Respiratory WDL    Respiratory WDL WDL       Cardiac WDL    Cardiac WDL WDL

## 2022-09-01 NOTE — DISCHARGE INSTRUCTIONS
Clear liquid diet for the next 24 hours, then bland foods.   --avoid fatty foods (dairy, butter, cheese, fried foods)  Collect stool specimens.  Lipase (lab) needs to be rechecked on Friday.  Call [facility] tomorrow to set up time for recheck of your Lipase. (You should be able to drop off stool specimen then too)  I will message your primary care provider (ITALIA Bang) to let him know about your visit here and needing labs rechecked.  For oral Thrush:   --Nystatin 5 ml swish and spit four times a day for 14 days.    Return to the emergency department for fevers, vomiting, increased pain or any new/worsening symptoms.

## 2022-09-01 NOTE — ED PROVIDER NOTES
History     Chief Complaint   Patient presents with     Abdominal Pain     Nausea     HPI  Milena Hamilton is a  82 year old female who presents for evaluation of diarrhea and abdominal pain. Loose stools have been intermittent for 2 months, but worse over the last week. Stool is dark brown, but no black or bloody stools. No fevers or chills. Epigastric and left sided abdominal pain. Reports 5 loose stools for the last 24 hours. Patient has known diverticulosis. Seen here on 8/24 for this with no leukocytosis, normal hgb. She was treated for Suspect Diverticulitis with Cefdinir and Metronidazole (she has intolerance to augmentin and cipro). Patient reports nothing changed with the antibiotics.   Prior abdominal surgeries: appendectomy, cholecystectomy, hysterectomy, left ascending hemicolectomy due to diverticulitis.      Allergies:  Allergies   Allergen Reactions     Azithromycin Diarrhea     Ciprofloxacin Other (See Comments)     Cipro, dizziness and abdominal pain     Gabapentin Dizziness and Visual Disturbance     Penicillins      stomach upset, diarrhea     Raspberry Hives     Sulfa Drugs Hives     hives     Augmentin Rash       Problem List:    Patient Active Problem List    Diagnosis Date Noted     Dysuria 11/24/2021     Priority: Medium     Basal cell carcinoma of nose 09/08/2021     Priority: Medium     History of malignant neoplasm of skin 09/08/2021     Priority: Medium     Skin changes due to chronic exposure to nonionizing radiation 09/08/2021     Priority: Medium     Renal insufficiency syndrome 09/04/2021     Priority: Medium     Formatting of this note might be different from the original.  Creatinine clearance 49       Lumbar radiculopathy 12/28/2018     Priority: Medium     Scoliosis of lumbar spine, unspecified scoliosis type 12/12/2018     Priority: Medium     Chronic right-sided low back pain with right-sided sciatica 05/30/2018     Priority: Medium     Acquired hypothyroidism 04/28/2017      Priority: Medium     Gastroesophageal reflux disease, esophagitis presence not specified 04/28/2017     Priority: Medium     IMO Regulatory Load OCT 2020       Premature atrial beats 11/30/2016     Priority: Medium     HTN (hypertension) 07/10/2014     Priority: Medium     Raynaud's disease 06/01/2014     Priority: Medium     Health Care Home 12/30/2013     Priority: Medium     Status:  Closed  Care Coordinator:  Tasha Vasquez, RN    See Letters for [facility] Care Plan             History of diverticulitis - s/p left hemicolectomy 11/30/2013     Priority: Medium     Diarrhea 11/30/2013     Priority: Medium     Chronic constipation 06/18/2013     Priority: Medium     Sacroiliac dysfunction 10/09/2012     Priority: Medium     Advanced directives, counseling/discussion 10/03/2011     Priority: Medium     Advance Directive Problem List Overview:   Name Relationship Phone    Primary Health Care Agent            Alternative Health Care Agent          Discussed advance care planning with patient; information given to patient to review. 10/3/2011        Ronel White APRN CNP  08/31/22 2351         DDD (degenerative disc disease), cervical 08/10/2011     Priority: Medium     Anxiety 10/24/2010     Priority: Medium     Hyperlipidemia 09/29/2010     Priority: Medium     Osteopenia 04/01/2010     Priority: Medium     recheck DEXA in 2 years       Gastritis 03/15/2010     Priority: Medium     Panic disorder without agoraphobia 10/08/2004     Priority: Medium     Tinnitus 09/13/2004     Priority: Medium     Problem list name updated by automated process. Provider to review       Family history of diabetes mellitus 09/09/2002     Priority: Medium     Lichen planus      Priority: Medium        Past Medical History:    Past Medical History:   Diagnosis Date     Lichen planus      Osteopenia 4/2010     Panic disorder without agoraphobia      Pure hypercholesterolemia      Raynaud's disease 6/2014     Unspecified essential  hypertension        Past Surgical History:    Past Surgical History:   Procedure Laterality Date     COLONOSCOPY  10/17/2007    normal, recheck in 10 years     COLONOSCOPY N/A 08/27/2014    Procedure: COLONOSCOPY;  Surgeon: Raffi Montiel MD;  Location: [facility]     COMBINED CYSTOSCOPY, INSERT CATHETER URETER Bilateral 10/13/2014    Procedure: COMBINED CYSTOSCOPY, INSERT CATHETER URETER;  Surgeon: Abdoulaye Odell MD;  Location: [facility]     EYE SURGERY  08/01/2001    right eye muscle repair     HC REMOVAL GALLBLADDER  02/09/2010    lap     HC REMOVAL OF OVARY/TUBE(S)      Salpingo-Oophorectomy, bilateral     HYSTERECTOMY, PAP NO LONGER INDICATED       INJECT EPIDURAL LUMBAR Right 04/10/2017    Procedure: INJECT EPIDURAL LUMBAR;  Surgeon: Raffi Wright MD;  [facility]     INJECT EPIDURAL LUMBAR Right 02/27/2020    Procedure: Right Lumbar 4- Sacral 1 Epidural Steroid Injection;  Surgeon: Antonio Dasilva MD;  [facility]     INJECT EPIDURAL LUMBAR Right 01/29/2021    Procedure: INJECTION, SPINE,  sacral 1 EPIDURAL;  Surgeon: Antonio Dasilva MD;  [facility]     INJECT EPIDURAL TRANSFORAMINAL Right 06/09/2016    Procedure: INJECT EPIDURAL TRANSFORAMINAL;  Surgeon: Antonio Dasilva MD;  [facility]     INJECT EPIDURAL TRANSFORAMINAL Right 10/13/2016    Procedure: INJECT EPIDURAL TRANSFORAMINAL;  Surgeon: Antonio Dasilva MD;  [facility]     INJECT EPIDURAL TRANSFORAMINAL Right 06/18/2021    Procedure: Right L4-5 and right L5-S1 Transforaminal Epidural Steroid Injections with fluoroscopic guidance and contrast.;  Surgeon: Antonio Dasilva MD;  [facility]     INJECT JOINT SACROILIAC Right 04/14/2022    Procedure: Fluoroscopically-guided injection of the Bilateral sacroiliac joints with Bilateral pastor Sacroiliac joint ligaments infiltration over the sacrum;  Surgeon: Antonio Dasilva MD;  [facility]     LAPAROSCOPIC ASSISTED COLECTOMY LEFT (DESCENDING) N/A 10/13/2014    Procedure:  "LAPAROSCOPIC ASSISTED COLECTOMY LEFT (DESCENDING);  Surgeon: Raffi Montiel MD;  [facility]     LUMBAR SPINE SURGERY  2021    L2-Pelvis fusion with rods and screws     ZZC APPENDECTOMY       ZZC NONSPECIFIC PROCEDURE      Right inferior oblique recession, 14 mm     ZZC VAG HYST,RMV TUBE/OVARY  1984    cervix removed       Family History:    Family History   Problem Relation Age of Onset     Diabetes Brother      Heart Disease Brother 50        AMI     Diabetes Brother      Heart Disease Brother         AMI, \"older\"     Cerebrovascular Disease Brother      Psychotic Disorder Brother         , alcohol     Cancer Sister         breast cancer x2     Hypertension No family hx of      Breast Cancer No family hx of      Ovarian Cancer No family hx of      Prostate Cancer No family hx of      Mental Illness No family hx of      Anesthesia Reaction No family hx of      Osteoporosis No family hx of      Obesity No family hx of      Other Cancer No family hx of      Depression No family hx of      Anxiety Disorder No family hx of      Mental Illness No family hx of      Substance Abuse No family hx of        Social History:  Marital Status:   [2]  Social History     Tobacco Use     Smoking status: Never Smoker     Smokeless tobacco: Never Used   Vaping Use     Vaping Use: Never used   Substance Use Topics     Alcohol use: No     Drug use: No        Medications:    nystatin (MYCOSTATIN) 566949 UNIT/ML suspension  acetaminophen (TYLENOL) 500 MG tablet  ALPRAZolam (XANAX) 0.25 MG tablet  cefdinir (OMNICEF) 300 MG capsule  cholecalciferol 25 MCG (1000 UT) TABS  cyclobenzaprine (FLEXERIL) 5 MG tablet  diclofenac (VOLTAREN) 1 % topical gel  hydrOXYzine (ATARAX) 10 MG tablet  ibuprofen (ADVIL/MOTRIN) 600 MG tablet  levothyroxine (SYNTHROID/LEVOTHROID) 50 MCG tablet  losartan (COZAAR) 25 MG tablet  metoprolol succinate ER (TOPROL XL) 25 MG 24 hr tablet  metroNIDAZOLE (FLAGYL) 500 MG tablet  omeprazole " (PRILOSEC) 20 MG DR capsule  simvastatin (ZOCOR) 20 MG tablet  vitamin E (TOCOPHEROL) 400 units (180 mg) capsule          Review of Systems   Constitutional: Negative for appetite change, chills and fever.   HENT: Negative for congestion.    Respiratory: Negative for cough and shortness of breath.    Cardiovascular: Negative for chest pain.   Gastrointestinal: Positive for abdominal pain and diarrhea. Negative for constipation, nausea and vomiting.   Genitourinary: Negative for dysuria.   Musculoskeletal: Negative for back pain.   Skin: Negative.    Neurological: Negative.    All other systems reviewed and are negative.      Physical Exam   BP: (!) 174/93  Pulse: 99  Temp: 98  F (36.7  C)  Resp: 18  Weight: 56.7 kg (125 lb)  SpO2: 99 %      Physical Exam  Constitutional:       General: She is not in acute distress.     Appearance: Normal appearance. She is well-developed. She is not ill-appearing.   HENT:      Head: Normocephalic and atraumatic.      Right Ear: External ear normal.      Left Ear: External ear normal.      Nose: Nose normal.      Mouth/Throat:      Mouth: Mucous membranes are moist.   Eyes:      Conjunctiva/sclera: Conjunctivae normal.   Cardiovascular:      Rate and Rhythm: Normal rate and regular rhythm.      Heart sounds: Normal heart sounds. No murmur heard.  Pulmonary:      Effort: Pulmonary effort is normal. No respiratory distress.      Breath sounds: Normal breath sounds.   Abdominal:      General: Bowel sounds are normal. There is no distension.      Palpations: Abdomen is soft.      Tenderness: There is abdominal tenderness in the epigastric area, left upper quadrant and left lower quadrant.   Musculoskeletal:         General: Normal range of motion.   Skin:     General: Skin is warm and dry.      Findings: No rash.   Neurological:      General: No focal deficit present.      Mental Status: She is alert and oriented to person, place, and time.         ED Course                  Procedures              Results for orders placed or performed during the hospital encounter of 08/31/22 (from the past 24 hour(s))   UA with Microscopic reflex to Culture    Specimen: Urine, Midstream   Result Value Ref Range    Color Urine Yellow Colorless, Straw, Light Yellow, Yellow    Appearance Urine Clear Clear    Glucose Urine Negative Negative mg/dL    Bilirubin Urine Negative Negative    Ketones Urine Negative Negative mg/dL    Specific Gravity Urine 1.015 1.003 - 1.035    Blood Urine Trace (A) Negative    pH Urine 6.0 5.0 - 7.0    Protein Albumin Urine Negative Negative mg/dL    Urobilinogen Urine Normal Normal, 2.0 mg/dL    Nitrite Urine Negative Negative    Leukocyte Esterase Urine Trace (A) Negative    Mucus Urine Present (A) None Seen /LPF    RBC Urine 2 <=2 /HPF    WBC Urine 8 (H) <=5 /HPF    Squamous Epithelials Urine <1 <=1 /HPF    Narrative    Urine Culture not indicated   CBC with platelets differential    Narrative    The following orders were created for panel order CBC with platelets differential.  Procedure                               Abnormality         Status                     ---------                               -----------         ------                     CBC with platelets and d...[[id number]]                      Final result                 Please view results for these tests on the individual orders.   Comprehensive metabolic panel   Result Value Ref Range    Sodium 142 133 - 144 mmol/L    Potassium 3.8 3.4 - 5.3 mmol/L    Chloride 110 (H) 94 - 109 mmol/L    Carbon Dioxide (CO2) 29 20 - 32 mmol/L    Anion Gap 3 3 - 14 mmol/L    Urea Nitrogen 12 7 - 30 mg/dL    Creatinine 0.76 0.52 - 1.04 mg/dL    Calcium 9.0 8.5 - 10.1 mg/dL    Glucose 101 (H) 70 - 99 mg/dL    Alkaline Phosphatase 56 40 - 150 U/L    AST 12 0 - 45 U/L    ALT 16 0 - 50 U/L    Protein Total 7.0 6.8 - 8.8 g/dL    Albumin 3.8 3.4 - 5.0 g/dL    Bilirubin Total 0.3 0.2 - 1.3 mg/dL    GFR Estimate 78 >60  mL/min/1.73m2   Lipase   Result Value Ref Range    Lipase 1,105 (H) 73 - 393 U/L   CBC with platelets and differential   Result Value Ref Range    WBC Count 5.3 4.0 - 11.0 10e3/uL    RBC Count 3.95 3.80 - 5.20 10e6/uL    Hemoglobin 12.7 11.7 - 15.7 g/dL    Hematocrit 39.2 35.0 - 47.0 %    MCV 99 78 - 100 fL    MCH 32.2 26.5 - 33.0 pg    MCHC 32.4 31.5 - 36.5 g/dL    RDW 11.9 10.0 - 15.0 %    Platelet Count 249 150 - 450 10e3/uL    % Neutrophils 47 %    % Lymphocytes 40 %    % Monocytes 10 %    % Eosinophils 2 %    % Basophils 1 %    % Immature Granulocytes 0 %    NRBCs per 100 WBC 0 <1 /100    Absolute Neutrophils 2.5 1.6 - 8.3 10e3/uL    Absolute Lymphocytes 2.1 0.8 - 5.3 10e3/uL    Absolute Monocytes 0.5 0.0 - 1.3 10e3/uL    Absolute Eosinophils 0.1 0.0 - 0.7 10e3/uL    Absolute Basophils 0.1 0.0 - 0.2 10e3/uL    Absolute Immature Granulocytes 0.0 <=0.4 10e3/uL    Absolute NRBCs 0.0 10e3/uL   CT Abdomen Pelvis w Contrast    Narrative    EXAM: CT ABDOMEN PELVIS W CONTRAST  LOCATION: [facility]  DATE/TIME: 8/31/2022 10:10 PM    INDICATION: diarrhea. epigastric, LUQ, and LLQ abdominal pain.  COMPARISON: None.  TECHNIQUE: CT scan of the abdomen and pelvis was performed following injection of IV contrast. Multiplanar reformats were obtained. Dose reduction techniques were used.  CONTRAST: 62ml isovue 370    FINDINGS:   LOWER CHEST: Normal.    HEPATOBILIARY: Few small cysts in the liver. The gallbladder is absent.    PANCREAS: Normal.    SPLEEN: Normal.    ADRENAL GLANDS: Normal.    KIDNEYS/BLADDER: The right kidney is ptotic. No hydronephrosis.    BOWEL: Postoperative changes in the sigmoid colon. Colonic diverticula without acute diverticulitis. No bowel obstruction or inflammation. No free intraperitoneal gas or fluid.    LYMPH NODES: Normal.    VASCULATURE: Atherosclerotic calcification of the aorta and its branches. No aneurysm.    PELVIC ORGANS: The uterus is absent. There is no  adnexal mass.    MUSCULOSKELETAL: Lumbosacral fusion hardware. Scoliosis. Degenerative disease in the spine and hips.      Impression    IMPRESSION:   1.  No acute abnormality. No bowel obstruction or inflammation.  2.  Colonic diverticula without acute diverticulitis.       Medications   0.9% sodium chloride BOLUS (0 mLs Intravenous Stopped 8/31/22 2308)   iopamidol (ISOVUE-370) solution 500 mL (62 mLs Intravenous Given 8/31/22 2202)   sodium chloride 0.9 % bag 100mL for CT scan flush use (60 mLs Intravenous Given 8/31/22 2202)       Assessments & Plan (with Medical Decision Making)    82 year old female who presents for evaluation of diarrhea and abdominal pain. Loose stools have been intermittent for 2 months, but worse over the last week. Stool is dark brown, but no black or bloody stools. No fevers or chills. Epigastric and left sided abdominal pain. Reports 5 loose stools for the last 24 hours. Patient has known diverticulosis. Seen here on 8/24 for this with no leukocytosis, normal hgb. She was treated for Suspect Diverticulitis with Cefdinir and Metronidazole (she has intolerance to augmentin and cipro)  Prior abdominal surgeries: appendectomy, cholecystectomy, hysterectomy, left ascending hemicolectomy due to diverticulitis.    On exam patient is alert and oriented. NAD. Afebrile. Normotensive. No tachycardia. Thick white patch on tongue, no lesions or white patches on buccal mucosa or palate.  TTP to the epigastric, LUQ and LLQ. Abdomen is otherwise soft and nondistended.     No leukocytosis.  Normal hgb.  Normal kidney function. Normal electrolytes.  Lipase elevated at 1,105  Normal transaminase and normal bilirubin.  Glucose 101.  UA with 8 WBC and trace leuk est. Negative nitrite.    Abd/pelvis CT is negative for acute pathology. Specifically no bowel obstruction or mass, no diverticulitis, no pancreatitis, no bile duct dilation or bile duct stone/obstruction.    Unclear cause for her diarrhea. We will  plan to send stool containers home with patient to collect for stool culture and c-diff.  Unclear cause for her elevated Lipase. She does not drink alcohol. She has findings on CT to suggest bile duct stone and her gallbladder is removed.   Patient does have evidence of oral thrush likely from being on 2 different antibiotics this past week. Patient will be treated with Nystatin.    Patient needs close follow-up and I recommend recheck of Lipase in 2 days.  I have placed orders for this and I will message her PCP.    Plan:  Clear liquid diet for the next 24 hours, then bland foods.   --avoid fatty foods (dairy, butter, cheese, fried foods)  Collect stool specimens.  Lipase (lab) needs to be rechecked on Friday.  Call [facility] tomorrow to set up time for recheck of your Lipase. (You should be able to drop off stool specimen then too)  I will message your primary care provider (ITALIA Bang) to let him know about your visit here and needing labs rechecked.  For oral Thrush:   --Nystatin 5 ml swish and spit four times a day for 14 days.    Return to the emergency department for fevers, vomiting, increased pain or any new/worsening symptoms.  Discharge Medication List as of 8/31/2022 11:08 PM      START taking these medications    Details   nystatin (MYCOSTATIN) 807054 UNIT/ML suspension Swish and spit 5 mLs (500,000 Units) in mouth 4 times daily for 14 daysDisp-280 mL, [id number]             Final diagnoses:   Elevated lipase   Diarrhea, unspecified type   Thrush       8/31/2022   [facility] EMERGENCY DEPT

## 2022-09-02 LAB — BACTERIA UR CULT: NORMAL

## 2022-09-02 NOTE — RESULT ENCOUNTER NOTE
Final urine culture report is negative.  Adult Negative Urine culture parameters per protocol: Any # Urogenital single or mixed organism, <10,000 col/ml single organism (cath/midstream), and > 3 organisms (No susceptibilities performed).  [facility] Emergency Dept discharge antibiotic prescribed (If applicable): None  Treatment recommendations per [facility] ED Lab Result Urine Culture protocol.

## 2022-09-06 ENCOUNTER — LAB (OUTPATIENT)
Dept: LAB | Facility: OTHER | Age: 82
End: 2022-09-06
Payer: COMMERCIAL

## 2022-09-06 ENCOUNTER — TELEPHONE (OUTPATIENT)
Dept: NURSING | Facility: CLINIC | Age: 82
End: 2022-09-06

## 2022-09-06 DIAGNOSIS — R19.7 DIARRHEA, UNSPECIFIED TYPE: ICD-10-CM

## 2022-09-06 DIAGNOSIS — R74.8 ELEVATED LIPASE: ICD-10-CM

## 2022-09-06 LAB — LIPASE SERPL-CCNC: 225 U/L (ref 73–393)

## 2022-09-06 PROCEDURE — 36415 COLL VENOUS BLD VENIPUNCTURE: CPT

## 2022-09-06 PROCEDURE — 83690 ASSAY OF LIPASE: CPT

## 2022-09-06 NOTE — TELEPHONE ENCOUNTER
Telephone call    Patient calling to report she received a call stating her stool samples did not have a label on them and she will have to obtain samples again.  She is going to the lab today and she will have to obtain new samples and to make sure they are properly labeled for the lab.    Aminata Matos RN   [facility] Nurse Advisor  11:28 AM 9/6/2022

## 2022-09-07 LAB — C DIFF TOX B STL QL: NEGATIVE

## 2022-09-07 PROCEDURE — 87493 C DIFF AMPLIFIED PROBE: CPT | Mod: 59

## 2022-09-07 PROCEDURE — 87506 IADNA-DNA/RNA PROBE TQ 6-11: CPT

## 2022-09-08 ENCOUNTER — VIRTUAL VISIT (OUTPATIENT)
Dept: NEUROLOGY | Facility: CLINIC | Age: 82
End: 2022-09-08
Payer: COMMERCIAL

## 2022-09-08 ENCOUNTER — OFFICE VISIT (OUTPATIENT)
Dept: CARDIOLOGY | Facility: CLINIC | Age: 82
End: 2022-09-08
Payer: COMMERCIAL

## 2022-09-08 VITALS
OXYGEN SATURATION: 99 % | HEIGHT: 61 IN | WEIGHT: 124 LBS | HEART RATE: 80 BPM | BODY MASS INDEX: 23.41 KG/M2 | SYSTOLIC BLOOD PRESSURE: 118 MMHG | DIASTOLIC BLOOD PRESSURE: 66 MMHG

## 2022-09-08 DIAGNOSIS — I44.7 LBBB (LEFT BUNDLE BRANCH BLOCK): ICD-10-CM

## 2022-09-08 DIAGNOSIS — I10 BENIGN ESSENTIAL HYPERTENSION: ICD-10-CM

## 2022-09-08 DIAGNOSIS — R29.6 FALLS FREQUENTLY: ICD-10-CM

## 2022-09-08 DIAGNOSIS — G91.2 IDIOPATHIC NORMAL PRESSURE HYDROCEPHALUS (H): Primary | ICD-10-CM

## 2022-09-08 DIAGNOSIS — I50.22 CHRONIC SYSTOLIC HEART FAILURE (H): ICD-10-CM

## 2022-09-08 DIAGNOSIS — R93.1 DECREASED CARDIAC EJECTION FRACTION: ICD-10-CM

## 2022-09-08 PROCEDURE — 99214 OFFICE O/P EST MOD 30 MIN: CPT | Mod: 95 | Performed by: INTERNAL MEDICINE

## 2022-09-08 PROCEDURE — 99214 OFFICE O/P EST MOD 30 MIN: CPT | Performed by: INTERNAL MEDICINE

## 2022-09-08 NOTE — PROGRESS NOTES
Milena is a 82 year old who is being evaluated via a billable telephone visit.      What phone number would you like to be contacted at? [phone]  How would you like to obtain your AVS? Mail a copy  Phone call duration: 11 Minutes  MARY KAY Levy, BRIAN ([facility])

## 2022-09-08 NOTE — PROGRESS NOTES
Service Date: 09/08/2022    CARDIOLOGY CLINIC VISIT NOTE    REFERRING PROVIDERS:  Chuck Streeter PA-C, and oJvany Nichole MD    HISTORY OF PRESENT ILLNESS:  Milena Hamilton, an 82-year-old woman with hypertension, hypothyroidism, history of lumbar fusion, left bundle branch block and suspected normal pressure hydrocephalus, was seen today at your request for followup of left bundle branch block and mild LV systolic dysfunction     I initially saw Ms. Hamilton on 06/08/2022 on referral from Mr. Streeter for newly diagnosed left bundle branch block and left ventricular systolic performance in the 45%-50% range on echocardiogram.  After discussion with the patient's family, the patient was not deemed to be a good candidate for further cardiac testing, and we were suspicious that both her ECG changes and borderline left ventricular systolic performance were a consequence of hypertension.  At that time, we increased her Toprol from  mg daily and added lisinopril.  Subsequently, the patient was seen in our clinic and felt not to be tolerating lisinopril.  There were reports of diarrhea and an unwitnessed fall and concerns lisinopril might be a cause for those symptoms. Since she was switched to low dose metoprolol xl, alone there have been no new symptoms.  She saw her neurologist yesterday, and plans were made for referral to Neurosurgery for treatment of suspected  normal pressure hydrocephalus.    PAST MEDICAL HISTORY:    1.  Hypertension.  2.  Recently diagnosed left bundle branch block.  3.  Suspected normal pressure hydrocephalus (ataxia/urinary incontinence/memory difficulties).  4.  Old history of lumbar fusion.    PHYSICAL EXAMINATION:    GENERAL:  Exam today demonstrates a pleasant, cooperative and forgetful, 82-year-old woman.  VITAL SIGNS:  Her blood pressure is 118/66.  Her heart rate is 80.  Her height is 1.5 m.  Her weight is 56.2 kg.  Her BMI is 23.4.  LUNGS:  Clear to percussion and  auscultation.  CARDIOVASCULAR:  Normal S1 with a normal S2.  There is no S3.  There is no murmur, rub or click.    ASSESSMENT:  Currently, the patient's left ventricular systolic performance is at lower limits of normal, and her blood pressure is well controlled.  I agree that low-dose beta-blocker therapy alone is the most reasonable course at this time as long as she tolerates the drug.  I believe she should be able to undergo any neurologic procedures with low cardiovascular risk.  I would not recommend any change in her therapy or further cardiac testing at this time.    RECOMMENDATIONS:    1.  Continue present dose of low-dose metoprolol.  2.  No further cardiac testing at this time.  3.  Follow up p.r.n.    We greatly appreciate the opportunity to participate in the care of your patient Karen Stark.    cc:  Chuck Streeter PA-C  [facility]  [address] [place] [address]    MD ALEIDA Maldonado [facility]   [address] [place] [address]    Ulices Jett MD        D: 2022   T: 2022   MT: [email or web address]    Name:     KAREN STARK  MRN:      [id number]        Account:      [id number]   :      1940           Service Date: 2022       Document: [id number]

## 2022-09-08 NOTE — PROGRESS NOTES
HISTORY OF PRESENT ILLNESS:  Much better since drugs stopped.     Orders this Visit:  No orders of the defined types were placed in this encounter.    No orders of the defined types were placed in this encounter.    There are no discontinued medications.    Encounter Diagnoses   Name Primary?     LBBB (left bundle branch block)      Decreased cardiac ejection fraction      Chronic systolic heart failure (H)      Benign essential hypertension      Falls frequently        CURRENT MEDICATIONS:  Current Outpatient Medications   Medication Sig Dispense Refill     acetaminophen (TYLENOL) 500 MG tablet Take 500-1,000 mg by mouth every 6 hours as needed for mild pain       ALPRAZolam (XANAX) 0.25 MG tablet Take 1 tablet (0.25 mg) by mouth nightly as needed for anxiety 30 tablet 2     cholecalciferol 25 MCG (1000 UT) TABS Take 1,000 Units by mouth daily (with dinner)        levothyroxine (SYNTHROID/LEVOTHROID) 50 MCG tablet TAKE ONE TABLET BY MOUTH once daily 90 tablet 3     losartan (COZAAR) 25 MG tablet Take 1 tablet (25 mg) by mouth daily 90 tablet 3     metoprolol succinate ER (TOPROL XL) 25 MG 24 hr tablet Take 1 tablet (25 mg) by mouth daily 90 tablet 3     nystatin (MYCOSTATIN) 274050 UNIT/ML suspension Swish and spit 5 mLs (500,000 Units) in mouth 4 times daily for 14 days 280 mL 0     omeprazole (PRILOSEC) 20 MG DR capsule TAKE ONE CAPSULE BY MOUTH ONCE DAILY 90 capsule 3     simvastatin (ZOCOR) 20 MG tablet TAKE 1/2 TABLET BY MOUTH EVERY NIGHT (Patient taking differently: Take 10 mg by mouth At Bedtime) 135 tablet 3     vitamin E (TOCOPHEROL) 400 units (180 mg) capsule Take 400 Units by mouth daily (with dinner)        cyclobenzaprine (FLEXERIL) 5 MG tablet Take 1 tablet (5 mg) by mouth 3 times daily as needed for muscle spasms (Patient not taking: No sig reported) 60 tablet 2     diclofenac (VOLTAREN) 1 % topical gel Apply 2 g topically 4 times daily (Patient not taking: No sig reported) 150 g 5     hydrOXYzine  "(ATARAX) 10 MG tablet Take 5-10 mg by mouth every 6 hours as needed (Patient not taking: No sig reported)       ibuprofen (ADVIL/MOTRIN) 600 MG tablet TAKE 1 TABLET (600 MG) BY MOUTH EVERY 6 HOURS AS NEEDED FOR MODERATE PAIN (Patient not taking: No sig reported) 100 tablet 3       ALLERGIES     Allergies   Allergen Reactions     Azithromycin Diarrhea     Ciprofloxacin Other (See Comments)     Cipro, dizziness and abdominal pain     Gabapentin Dizziness and Visual Disturbance     Penicillins      stomach upset, diarrhea     Raspberry Hives     Sulfa Drugs Hives     hives     Augmentin Rash       PAST MEDICAL, SURGICAL, FAMILY, SOCIAL HISTORY:  History was reviewed and updated as needed, see medical record.    Review of Systems:  A 12-point review of systems was completed, see medical record for detailed review of systems information.    Physical Exam:  Vitals: /66 (BP Location: Right arm, Patient Position: Sitting, Cuff Size: Adult Regular)   Pulse 80   Ht 1.549 m (5' 1\")   Wt 56.2 kg (124 lb)   LMP  (LMP Unknown)   SpO2 99%   BMI 23.43 kg/m      Constitutional:           Skin:           Head:           Eyes:           ENT:           Neck:           Chest:           Cardiac:                    Abdomen:           Vascular:                                        Extremities and Back:           Neurological:           ASSESSMENT: Likely does not require more medications for blood  Pressure control LVEF is lower limits of normal       RECOMMENDATIONS:   Will stay on low dose metoprolol  Stop lisinopril  Follow-up  Prn  Can undergo neurologic  Surgery at  Low CV risk  HISTORY OF PRESENT ILLNESS:  NA    Orders this Visit:  No orders of the defined types were placed in this encounter.    No orders of the defined types were placed in this encounter.    There are no discontinued medications.    Encounter Diagnoses   Name Primary?     LBBB (left bundle branch block)      Decreased cardiac ejection fraction      " Chronic systolic heart failure (H)      Benign essential hypertension      Falls frequently        CURRENT MEDICATIONS:  Current Outpatient Medications   Medication Sig Dispense Refill     acetaminophen (TYLENOL) 500 MG tablet Take 500-1,000 mg by mouth every 6 hours as needed for mild pain       ALPRAZolam (XANAX) 0.25 MG tablet Take 1 tablet (0.25 mg) by mouth nightly as needed for anxiety 30 tablet 2     cholecalciferol 25 MCG (1000 UT) TABS Take 1,000 Units by mouth daily (with dinner)        levothyroxine (SYNTHROID/LEVOTHROID) 50 MCG tablet TAKE ONE TABLET BY MOUTH once daily 90 tablet 3     losartan (COZAAR) 25 MG tablet Take 1 tablet (25 mg) by mouth daily 90 tablet 3     metoprolol succinate ER (TOPROL XL) 25 MG 24 hr tablet Take 1 tablet (25 mg) by mouth daily 90 tablet 3     nystatin (MYCOSTATIN) 624042 UNIT/ML suspension Swish and spit 5 mLs (500,000 Units) in mouth 4 times daily for 14 days 280 mL 0     omeprazole (PRILOSEC) 20 MG DR capsule TAKE ONE CAPSULE BY MOUTH ONCE DAILY 90 capsule 3     simvastatin (ZOCOR) 20 MG tablet TAKE 1/2 TABLET BY MOUTH EVERY NIGHT (Patient taking differently: Take 10 mg by mouth At Bedtime) 135 tablet 3     vitamin E (TOCOPHEROL) 400 units (180 mg) capsule Take 400 Units by mouth daily (with dinner)        cyclobenzaprine (FLEXERIL) 5 MG tablet Take 1 tablet (5 mg) by mouth 3 times daily as needed for muscle spasms (Patient not taking: No sig reported) 60 tablet 2     diclofenac (VOLTAREN) 1 % topical gel Apply 2 g topically 4 times daily (Patient not taking: No sig reported) 150 g 5     hydrOXYzine (ATARAX) 10 MG tablet Take 5-10 mg by mouth every 6 hours as needed (Patient not taking: No sig reported)       ibuprofen (ADVIL/MOTRIN) 600 MG tablet TAKE 1 TABLET (600 MG) BY MOUTH EVERY 6 HOURS AS NEEDED FOR MODERATE PAIN (Patient not taking: No sig reported) 100 tablet 3       ALLERGIES     Allergies   Allergen Reactions     Azithromycin Diarrhea     Ciprofloxacin Other  "(See Comments)     Cipro, dizziness and abdominal pain     Gabapentin Dizziness and Visual Disturbance     Penicillins      stomach upset, diarrhea     Raspberry Hives     Sulfa Drugs Hives     hives     Augmentin Rash       PAST MEDICAL, SURGICAL, FAMILY, SOCIAL HISTORY:  History was reviewed and updated as needed, see medical record.    Review of Systems:  A 12-point review of systems was completed, see medical record for detailed review of systems information.    Physical Exam:  Vitals: /66 (BP Location: Right arm, Patient Position: Sitting, Cuff Size: Adult Regular)   Pulse 80   Ht 1.549 m (5' 1\")   Wt 56.2 kg (124 lb)   LMP  (LMP Unknown)   SpO2 99%   BMI 23.43 kg/m      Constitutional:           Skin:           Head:           Eyes:           ENT:           Neck:           Chest:           Cardiac:                    Abdomen:           Vascular:                                        Extremities and Back:           Neurological:           ASSESSMENT: NA       RECOMMENDATIONS: NA      Recent Lab Results:  LIPID RESULTS:  Lab Results   Component Value Date    CHOL 202 (H) 03/24/2022    CHOL 191 12/28/2020    HDL 88 03/24/2022    HDL 73 12/28/2020    LDL 96 03/24/2022    LDL 93 12/28/2020    TRIG 89 03/24/2022    TRIG 127 12/28/2020    CHOLHDLRATIO 2.2 11/12/2014       LIVER ENZYME RESULTS:  Lab Results   Component Value Date    AST 12 08/31/2022    AST 12 01/14/2021    ALT 16 08/31/2022    ALT 19 01/14/2021       CBC RESULTS:  Lab Results   Component Value Date    WBC 5.3 08/31/2022    WBC 5.8 01/14/2021    RBC 3.95 08/31/2022    RBC 4.14 01/14/2021    HGB 12.7 08/31/2022    HGB 12.9 01/14/2021    HCT 39.2 08/31/2022    HCT 40.9 01/14/2021    MCV 99 08/31/2022    MCV 99 01/14/2021    MCH 32.2 08/31/2022    MCH 31.2 01/14/2021    MCHC 32.4 08/31/2022    MCHC 31.5 01/14/2021    RDW 11.9 08/31/2022    RDW 12.3 01/14/2021     08/31/2022     01/14/2021       BMP RESULTS:  Lab Results "   Component Value Date     08/31/2022     01/14/2021    POTASSIUM 3.8 08/31/2022    POTASSIUM 3.9 01/14/2021    CHLORIDE 110 (H) 08/31/2022    CHLORIDE 106 01/14/2021    CO2 29 08/31/2022    CO2 32 01/14/2021    ANIONGAP 3 08/31/2022    ANIONGAP 2 (L) 01/14/2021     (H) 08/31/2022     (H) 01/14/2021    BUN 12 08/31/2022    BUN 15 01/14/2021    CR 0.76 08/31/2022    CR 0.62 01/14/2021    GFRESTIMATED 78 08/31/2022    GFRESTIMATED 69 02/15/2021    GFRESTBLACK 84 02/15/2021    ANU 9.0 08/31/2022    ANU 9.0 01/14/2021        A1C RESULTS:  Lab Results   Component Value Date    A1C 5.4 09/26/2008       INR RESULTS:  Lab Results   Component Value Date    INR 0.88 04/06/2022       We greatly appreciate the opportunity to be involved in the care of your patient, Milena Hamilton.    Sincerely,  Ulices Jett MD        Ulices Jett MD  [address] [facility] [address]  [place] [id number]                                                                         Recent Lab Results:  LIPID RESULTS:  Lab Results   Component Value Date    CHOL 202 (H) 03/24/2022    CHOL 191 12/28/2020    HDL 88 03/24/2022    HDL 73 12/28/2020    LDL 96 03/24/2022    LDL 93 12/28/2020    TRIG 89 03/24/2022    TRIG 127 12/28/2020    CHOLHDLRATIO 2.2 11/12/2014       LIVER ENZYME RESULTS:  Lab Results   Component Value Date    AST 12 08/31/2022    AST 12 01/14/2021    ALT 16 08/31/2022    ALT 19 01/14/2021       CBC RESULTS:  Lab Results   Component Value Date    WBC 5.3 08/31/2022    WBC 5.8 01/14/2021    RBC 3.95 08/31/2022    RBC 4.14 01/14/2021    HGB 12.7 08/31/2022    HGB 12.9 01/14/2021    HCT 39.2 08/31/2022    HCT 40.9 01/14/2021    MCV 99 08/31/2022    MCV 99 01/14/2021    MCH 32.2 08/31/2022    MCH 31.2 01/14/2021    MCHC 32.4 08/31/2022    MCHC 31.5 01/14/2021    RDW 11.9 08/31/2022    RDW 12.3 01/14/2021     08/31/2022     01/14/2021       BMP RESULTS:  Lab Results   Component Value Date    NA  142 08/31/2022     01/14/2021    POTASSIUM 3.8 08/31/2022    POTASSIUM 3.9 01/14/2021    CHLORIDE 110 (H) 08/31/2022    CHLORIDE 106 01/14/2021    CO2 29 08/31/2022    CO2 32 01/14/2021    ANIONGAP 3 08/31/2022    ANIONGAP 2 (L) 01/14/2021     (H) 08/31/2022     (H) 01/14/2021    BUN 12 08/31/2022    BUN 15 01/14/2021    CR 0.76 08/31/2022    CR 0.62 01/14/2021    GFRESTIMATED 78 08/31/2022    GFRESTIMATED 69 02/15/2021    GFRESTBLACK 84 02/15/2021    ANU 9.0 08/31/2022    ANU 9.0 01/14/2021        A1C RESULTS:  Lab Results   Component Value Date    A1C 5.4 09/26/2008       INR RESULTS:  Lab Results   Component Value Date    INR 0.88 04/06/2022       We greatly appreciate the opportunity to be involved in the care of your patient, Milena Hamilton.    Sincerely,  Ulices Jett MD        Ulices Jett MD  [address]  [place] [id number]

## 2022-09-08 NOTE — LETTER
9/8/2022    Chuck Streeter PA-C  [address]  [facility] [address]    RE: Milena Hamilton       Dear Colleague,     I had the pleasure of seeing Milena Hamilton in the [facility].  HISTORY OF PRESENT ILLNESS:  Much better since drugs stopped.     Orders this Visit:  No orders of the defined types were placed in this encounter.    No orders of the defined types were placed in this encounter.    There are no discontinued medications.    Encounter Diagnoses   Name Primary?     LBBB (left bundle branch block)      Decreased cardiac ejection fraction      Chronic systolic heart failure (H)      Benign essential hypertension      Falls frequently        CURRENT MEDICATIONS:  Current Outpatient Medications   Medication Sig Dispense Refill     acetaminophen (TYLENOL) 500 MG tablet Take 500-1,000 mg by mouth every 6 hours as needed for mild pain       ALPRAZolam (XANAX) 0.25 MG tablet Take 1 tablet (0.25 mg) by mouth nightly as needed for anxiety 30 tablet 2     cholecalciferol 25 MCG (1000 UT) TABS Take 1,000 Units by mouth daily (with dinner)        levothyroxine (SYNTHROID/LEVOTHROID) 50 MCG tablet TAKE ONE TABLET BY MOUTH once daily 90 tablet 3     losartan (COZAAR) 25 MG tablet Take 1 tablet (25 mg) by mouth daily 90 tablet 3     metoprolol succinate ER (TOPROL XL) 25 MG 24 hr tablet Take 1 tablet (25 mg) by mouth daily 90 tablet 3     nystatin (MYCOSTATIN) 956462 UNIT/ML suspension Swish and spit 5 mLs (500,000 Units) in mouth 4 times daily for 14 days 280 mL 0     omeprazole (PRILOSEC) 20 MG DR capsule TAKE ONE CAPSULE BY MOUTH ONCE DAILY 90 capsule 3     simvastatin (ZOCOR) 20 MG tablet TAKE 1/2 TABLET BY MOUTH EVERY NIGHT (Patient taking differently: Take 10 mg by mouth At Bedtime) 135 tablet 3     vitamin E (TOCOPHEROL) 400 units (180 mg) capsule Take 400 Units by mouth daily (with dinner)        cyclobenzaprine (FLEXERIL) 5 MG tablet Take 1 tablet (5 mg) by mouth 3 times daily as  "needed for muscle spasms (Patient not taking: No sig reported) 60 tablet 2     diclofenac (VOLTAREN) 1 % topical gel Apply 2 g topically 4 times daily (Patient not taking: No sig reported) 150 g 5     hydrOXYzine (ATARAX) 10 MG tablet Take 5-10 mg by mouth every 6 hours as needed (Patient not taking: No sig reported)       ibuprofen (ADVIL/MOTRIN) 600 MG tablet TAKE 1 TABLET (600 MG) BY MOUTH EVERY 6 HOURS AS NEEDED FOR MODERATE PAIN (Patient not taking: No sig reported) 100 tablet 3       ALLERGIES     Allergies   Allergen Reactions     Azithromycin Diarrhea     Ciprofloxacin Other (See Comments)     Cipro, dizziness and abdominal pain     Gabapentin Dizziness and Visual Disturbance     Penicillins      stomach upset, diarrhea     Raspberry Hives     Sulfa Drugs Hives     hives     Augmentin Rash       PAST MEDICAL, SURGICAL, FAMILY, SOCIAL HISTORY:  History was reviewed and updated as needed, see medical record.    Review of Systems:  A 12-point review of systems was completed, see medical record for detailed review of systems information.    Physical Exam:  Vitals: /66 (BP Location: Right arm, Patient Position: Sitting, Cuff Size: Adult Regular)   Pulse 80   Ht 1.549 m (5' 1\")   Wt 56.2 kg (124 lb)   LMP  (LMP Unknown)   SpO2 99%   BMI 23.43 kg/m      Constitutional:           Skin:           Head:           Eyes:           ENT:           Neck:           Chest:           Cardiac:                    Abdomen:           Vascular:                                        Extremities and Back:           Neurological:           ASSESSMENT: Likely does not require more medications for blood  Pressure control LVEF is lower limits of normal       RECOMMENDATIONS:   Will stay on low dose metoprolol  Stop lisinopril  Follow-up  Prn  Can undergo neurologic  Surgery at  Low CV risk  HISTORY OF PRESENT ILLNESS:  NA    Orders this Visit:  No orders of the defined types were placed in this encounter.    No orders of " the defined types were placed in this encounter.    There are no discontinued medications.    Encounter Diagnoses   Name Primary?     LBBB (left bundle branch block)      Decreased cardiac ejection fraction      Chronic systolic heart failure (H)      Benign essential hypertension      Falls frequently        CURRENT MEDICATIONS:  Current Outpatient Medications   Medication Sig Dispense Refill     acetaminophen (TYLENOL) 500 MG tablet Take 500-1,000 mg by mouth every 6 hours as needed for mild pain       ALPRAZolam (XANAX) 0.25 MG tablet Take 1 tablet (0.25 mg) by mouth nightly as needed for anxiety 30 tablet 2     cholecalciferol 25 MCG (1000 UT) TABS Take 1,000 Units by mouth daily (with dinner)        levothyroxine (SYNTHROID/LEVOTHROID) 50 MCG tablet TAKE ONE TABLET BY MOUTH once daily 90 tablet 3     losartan (COZAAR) 25 MG tablet Take 1 tablet (25 mg) by mouth daily 90 tablet 3     metoprolol succinate ER (TOPROL XL) 25 MG 24 hr tablet Take 1 tablet (25 mg) by mouth daily 90 tablet 3     nystatin (MYCOSTATIN) 349674 UNIT/ML suspension Swish and spit 5 mLs (500,000 Units) in mouth 4 times daily for 14 days 280 mL 0     omeprazole (PRILOSEC) 20 MG DR capsule TAKE ONE CAPSULE BY MOUTH ONCE DAILY 90 capsule 3     simvastatin (ZOCOR) 20 MG tablet TAKE 1/2 TABLET BY MOUTH EVERY NIGHT (Patient taking differently: Take 10 mg by mouth At Bedtime) 135 tablet 3     vitamin E (TOCOPHEROL) 400 units (180 mg) capsule Take 400 Units by mouth daily (with dinner)        cyclobenzaprine (FLEXERIL) 5 MG tablet Take 1 tablet (5 mg) by mouth 3 times daily as needed for muscle spasms (Patient not taking: No sig reported) 60 tablet 2     diclofenac (VOLTAREN) 1 % topical gel Apply 2 g topically 4 times daily (Patient not taking: No sig reported) 150 g 5     hydrOXYzine (ATARAX) 10 MG tablet Take 5-10 mg by mouth every 6 hours as needed (Patient not taking: No sig reported)       ibuprofen (ADVIL/MOTRIN) 600 MG tablet TAKE 1 TABLET  "(600 MG) BY MOUTH EVERY 6 HOURS AS NEEDED FOR MODERATE PAIN (Patient not taking: No sig reported) 100 tablet 3       ALLERGIES     Allergies   Allergen Reactions     Azithromycin Diarrhea     Ciprofloxacin Other (See Comments)     Cipro, dizziness and abdominal pain     Gabapentin Dizziness and Visual Disturbance     Penicillins      stomach upset, diarrhea     Raspberry Hives     Sulfa Drugs Hives     hives     Augmentin Rash       PAST MEDICAL, SURGICAL, FAMILY, SOCIAL HISTORY:  History was reviewed and updated as needed, see medical record.    Review of Systems:  A 12-point review of systems was completed, see medical record for detailed review of systems information.    Physical Exam:  Vitals: /66 (BP Location: Right arm, Patient Position: Sitting, Cuff Size: Adult Regular)   Pulse 80   Ht 1.549 m (5' 1\")   Wt 56.2 kg (124 lb)   LMP  (LMP Unknown)   SpO2 99%   BMI 23.43 kg/m      Constitutional:           Skin:           Head:           Eyes:           ENT:           Neck:           Chest:           Cardiac:                    Abdomen:           Vascular:                                        Extremities and Back:           Neurological:           ASSESSMENT: NA       RECOMMENDATIONS: NA      Recent Lab Results:  LIPID RESULTS:  Lab Results   Component Value Date    CHOL 202 (H) 03/24/2022    CHOL 191 12/28/2020    HDL 88 03/24/2022    HDL 73 12/28/2020    LDL 96 03/24/2022    LDL 93 12/28/2020    TRIG 89 03/24/2022    TRIG 127 12/28/2020    CHOLHDLRATIO 2.2 11/12/2014       LIVER ENZYME RESULTS:  Lab Results   Component Value Date    AST 12 08/31/2022    AST 12 01/14/2021    ALT 16 08/31/2022    ALT 19 01/14/2021       CBC RESULTS:  Lab Results   Component Value Date    WBC 5.3 08/31/2022    WBC 5.8 01/14/2021    RBC 3.95 08/31/2022    RBC 4.14 01/14/2021    HGB 12.7 08/31/2022    HGB 12.9 01/14/2021    HCT 39.2 08/31/2022    HCT 40.9 01/14/2021    MCV 99 08/31/2022    MCV 99 01/14/2021    MCH 32.2 " 08/31/2022    MCH 31.2 01/14/2021    MCHC 32.4 08/31/2022    MCHC 31.5 01/14/2021    RDW 11.9 08/31/2022    RDW 12.3 01/14/2021     08/31/2022     01/14/2021       BMP RESULTS:  Lab Results   Component Value Date     08/31/2022     01/14/2021    POTASSIUM 3.8 08/31/2022    POTASSIUM 3.9 01/14/2021    CHLORIDE 110 (H) 08/31/2022    CHLORIDE 106 01/14/2021    CO2 29 08/31/2022    CO2 32 01/14/2021    ANIONGAP 3 08/31/2022    ANIONGAP 2 (L) 01/14/2021     (H) 08/31/2022     (H) 01/14/2021    BUN 12 08/31/2022    BUN 15 01/14/2021    CR 0.76 08/31/2022    CR 0.62 01/14/2021    GFRESTIMATED 78 08/31/2022    GFRESTIMATED 69 02/15/2021    GFRESTBLACK 84 02/15/2021    ANU 9.0 08/31/2022    ANU 9.0 01/14/2021        A1C RESULTS:  Lab Results   Component Value Date    A1C 5.4 09/26/2008       INR RESULTS:  Lab Results   Component Value Date    INR 0.88 04/06/2022       We greatly appreciate the opportunity to be involved in the care of your patient, Milena Hamilton.    Sincerely,  Ulices Jett MD        Ulices Jett MD  [address]  [place] [address]                                                                         Recent Lab Results:  LIPID RESULTS:  Lab Results   Component Value Date    CHOL 202 (H) 03/24/2022    CHOL 191 12/28/2020    HDL 88 03/24/2022    HDL 73 12/28/2020    LDL 96 03/24/2022    LDL 93 12/28/2020    TRIG 89 03/24/2022    TRIG 127 12/28/2020    CHOLHDLRATIO 2.2 11/12/2014       LIVER ENZYME RESULTS:  Lab Results   Component Value Date    AST 12 08/31/2022    AST 12 01/14/2021    ALT 16 08/31/2022    ALT 19 01/14/2021       CBC RESULTS:  Lab Results   Component Value Date    WBC 5.3 08/31/2022    WBC 5.8 01/14/2021    RBC 3.95 08/31/2022    RBC 4.14 01/14/2021    HGB 12.7 08/31/2022    HGB 12.9 01/14/2021    HCT 39.2 08/31/2022    HCT 40.9 01/14/2021    MCV 99 08/31/2022    MCV 99 01/14/2021    MCH 32.2 08/31/2022    MCH 31.2  01/14/2021    MCHC 32.4 08/31/2022    MCHC 31.5 01/14/2021    RDW 11.9 08/31/2022    RDW 12.3 01/14/2021     08/31/2022     01/14/2021       BMP RESULTS:  Lab Results   Component Value Date     08/31/2022     01/14/2021    POTASSIUM 3.8 08/31/2022    POTASSIUM 3.9 01/14/2021    CHLORIDE 110 (H) 08/31/2022    CHLORIDE 106 01/14/2021    CO2 29 08/31/2022    CO2 32 01/14/2021    ANIONGAP 3 08/31/2022    ANIONGAP 2 (L) 01/14/2021     (H) 08/31/2022     (H) 01/14/2021    BUN 12 08/31/2022    BUN 15 01/14/2021    CR 0.76 08/31/2022    CR 0.62 01/14/2021    GFRESTIMATED 78 08/31/2022    GFRESTIMATED 69 02/15/2021    GFRESTBLACK 84 02/15/2021    ANU 9.0 08/31/2022    ANU 9.0 01/14/2021        A1C RESULTS:  Lab Results   Component Value Date    A1C 5.4 09/26/2008       INR RESULTS:  Lab Results   Component Value Date    INR 0.88 04/06/2022       We greatly appreciate the opportunity to be involved in the care of your patient, Milena Hamilton.    Sincerely,  Ulices Jett MD        Ulices Jett MD  [address] [address]                                                                       Service Date: 09/08/2022    CARDIOLOGY CLINIC VISIT NOTE    REFERRING PROVIDERS:  Chuck Streeter PA-C, and Jovany Nichole MD    HISTORY OF PRESENT ILLNESS:  Milena Hamilton, an 82-year-old woman with hypertension, hypothyroidism, history of lumbar fusion, left bundle branch block and suspected normal pressure hydrocephalus, was seen today at your request for followup of left bundle branch block and mild LV systolic dysfunction     I initially saw Ms. Hamilton on 06/08/2022 on referral from Mr. Streeter for newly diagnosed left bundle branch block and left ventricular systolic performance in the 45%-50% range on echocardiogram.  After discussion with the patient's family, the patient was not deemed to be a good candidate for further cardiac testing, and we were  suspicious that both her ECG changes and borderline left ventricular systolic performance were a consequence of hypertension.  At that time, we increased her Toprol from  mg daily and added lisinopril.  Subsequently, the patient was seen in our clinic and felt not to be tolerating lisinopril.  There were reports of diarrhea and an unwitnessed fall and concerns lisinopril might be a cause for those symptoms. Since she was switched to low dose metoprolol xl, alone there have been no new symptoms.  She saw her neurologist yesterday, and plans were made for referral to Neurosurgery for treatment of suspected  normal pressure hydrocephalus.    PAST MEDICAL HISTORY:    1.  Hypertension.  2.  Recently diagnosed left bundle branch block.  3.  Suspected normal pressure hydrocephalus (ataxia/urinary incontinence/memory difficulties).  4.  Old history of lumbar fusion.    PHYSICAL EXAMINATION:    GENERAL:  Exam today demonstrates a pleasant, cooperative and forgetful, 82-year-old woman.  VITAL SIGNS:  Her blood pressure is 118/66.  Her heart rate is 80.  Her height is 1.5 m.  Her weight is 56.2 kg.  Her BMI is 23.4.  LUNGS:  Clear to percussion and auscultation.  CARDIOVASCULAR:  Normal S1 with a normal S2.  There is no S3.  There is no murmur, rub or click.    ASSESSMENT:  Currently, the patient's left ventricular systolic performance is at lower limits of normal, and her blood pressure is well controlled.  I agree that low-dose beta-blocker therapy alone is the most reasonable course at this time as long as she tolerates the drug.  I believe she should be able to undergo any neurologic procedures with low cardiovascular risk.  I would not recommend any change in her therapy or further cardiac testing at this time.    RECOMMENDATIONS:    1.  Continue present dose of low-dose metoprolol.  2.  No further cardiac testing at this time.  3.  Follow up p.r.n.    We greatly appreciate the opportunity to participate in the care  of your patient Karen Stark.    cc:  Chuck Streeter PA-C  [facility]  [address] [place] [address]    Jovany Nichole MD   [facility]   [address] [place] [address]    Ulices Jett MD        D: 2022   T: 2022   MT: [email or web address]    Name:     KAREN STARK  MRN:      [id number]        Account:      [id number]   :      1940           Service Date: 2022       Document: [id number]      Thank you for allowing me to participate in the care of your patient.      Sincerely,     Ulices Jett MD     [facility]  cc:   Ulices Jett MD  [address] [address]

## 2022-09-08 NOTE — PROGRESS NOTES
[facility] Neurology Follow Up Visit    Milena Hamilton MRN# [id number]   Age: 82 year old YOB: 1940     Brief history of symptoms: The patient was initially seen in neurologic consultation on 7/14/2022 for evaluation of hydrocephalus. Please see the comprehensive neurologic consultation note from that date in the Epic records for details.     Interval history:   Patient is here to follow-up on results of recent lumbar puncture. She remembers feeling a little better with walking after the lumbar puncture. Her last fall was 2 weeks ago.       Past Medical History:     Patient Active Problem List   Diagnosis     Lichen planus     Family history of diabetes mellitus     Tinnitus     Panic disorder without agoraphobia     Gastritis     Osteopenia     Hyperlipidemia     Anxiety     DDD (degenerative disc disease), cervical     Advanced directives, counseling/discussion     Sacroiliac dysfunction     Chronic constipation     History of diverticulitis - s/p left hemicolectomy     Diarrhea     Health Care Home     Raynaud's disease     HTN (hypertension)     Premature atrial beats     Acquired hypothyroidism     Gastroesophageal reflux disease, esophagitis presence not specified     Chronic right-sided low back pain with right-sided sciatica     Scoliosis of lumbar spine, unspecified scoliosis type     Lumbar radiculopathy     Dysuria     Basal cell carcinoma of nose     Renal insufficiency syndrome     History of malignant neoplasm of skin     Skin changes due to chronic exposure to nonionizing radiation     Past Medical History:   Diagnosis Date     Lichen planus     vulvar     Osteopenia 4/2010    recheck DEXA in 2 years     Panic disorder without agoraphobia      Pure hypercholesterolemia     low LDLs     Raynaud's disease 6/2014     Unspecified essential hypertension         Past Surgical History:     Past Surgical History:   Procedure Laterality Date     COLONOSCOPY  10/17/2007    normal, recheck in 10 years      COLONOSCOPY N/A 08/27/2014    Procedure: COLONOSCOPY;  Surgeon: Raffi Montiel MD;  Location: [facility]     COMBINED CYSTOSCOPY, INSERT CATHETER URETER Bilateral 10/13/2014    Procedure: COMBINED CYSTOSCOPY, INSERT CATHETER URETER;  Surgeon: Abdoulaye Odell MD;  Location: [facility]     EYE SURGERY  08/01/2001    right eye muscle repair     HC REMOVAL GALLBLADDER  02/09/2010    lap     HC REMOVAL OF OVARY/TUBE(S)      Salpingo-Oophorectomy, bilateral     HYSTERECTOMY, PAP NO LONGER INDICATED       INJECT EPIDURAL LUMBAR Right 04/10/2017    Procedure: INJECT EPIDURAL LUMBAR;  Surgeon: Raffi Wright MD;  Location: [facility]     INJECT EPIDURAL LUMBAR Right 02/27/2020    Procedure: Right Lumbar 4- Sacral 1 Epidural Steroid Injection;  Surgeon: Antonio Dasilva MD;  Location: [facility]     INJECT EPIDURAL LUMBAR Right 01/29/2021    Procedure: INJECTION, SPINE,  sacral 1 EPIDURAL;  Surgeon: Antonio Dasilva MD;  [facility]     INJECT EPIDURAL TRANSFORAMINAL Right 06/09/2016    Procedure: INJECT EPIDURAL TRANSFORAMINAL;  Surgeon: Antonio Dasilva MD;  [facility]     INJECT EPIDURAL TRANSFORAMINAL Right 10/13/2016    Procedure: INJECT EPIDURAL TRANSFORAMINAL;  Surgeon: Antonio Dasilva MD;  [facility]     INJECT EPIDURAL TRANSFORAMINAL Right 06/18/2021    Procedure: Right L4-5 and right L5-S1 Transforaminal Epidural Steroid Injections with fluoroscopic guidance and contrast.;  Surgeon: Antonio Dasilva MD;  Location: [facility]     INJECT JOINT SACROILIAC Right 04/14/2022    Procedure: Fluoroscopically-guided injection of the Bilateral sacroiliac joints with Bilateral pastor Sacroiliac joint ligaments infiltration over the sacrum;  Surgeon: Antonio Dasilva MD;  [facility]     LAPAROSCOPIC ASSISTED COLECTOMY LEFT (DESCENDING) N/A 10/13/2014    Procedure: LAPAROSCOPIC ASSISTED COLECTOMY LEFT (DESCENDING);  Surgeon: Raffi Montiel MD;  Location: [facility]     LUMBAR SPINE SURGERY  09/2021    L2-Pelvis fusion with  "rods and screws     [facility] APPENDECTOMY       [facility] NONSPECIFIC PROCEDURE      Right inferior oblique recession, 14 mm     [facility] VAG HYST,RMV TUBE/OVARY  1984    cervix removed        Social History:     Social History     Tobacco Use     Smoking status: Never Smoker     Smokeless tobacco: Never Used   Vaping Use     Vaping Use: Never used   Substance Use Topics     Alcohol use: No     Drug use: No        Family History:     Family History   Problem Relation Age of Onset     Diabetes Brother      Heart Disease Brother 50        AMI     Diabetes Brother      Heart Disease Brother         AMI, \"older\"     Cerebrovascular Disease Brother      Psychotic Disorder Brother         , alcohol     Cancer Sister         breast cancer x2     Hypertension No family hx of      Breast Cancer No family hx of      Ovarian Cancer No family hx of      Prostate Cancer No family hx of      Mental Illness No family hx of      Anesthesia Reaction No family hx of      Osteoporosis No family hx of      Obesity No family hx of      Other Cancer No family hx of      Depression No family hx of      Anxiety Disorder No family hx of      Mental Illness No family hx of      Substance Abuse No family hx of         Medications:     Current Outpatient Medications   Medication Sig     acetaminophen (TYLENOL) 500 MG tablet Take 500-1,000 mg by mouth every 6 hours as needed for mild pain     ALPRAZolam (XANAX) 0.25 MG tablet Take 1 tablet (0.25 mg) by mouth nightly as needed for anxiety     cholecalciferol 25 MCG (1000 UT) TABS Take 1,000 Units by mouth daily (with dinner)      levothyroxine (SYNTHROID/LEVOTHROID) 50 MCG tablet TAKE ONE TABLET BY MOUTH once daily     metoprolol succinate ER (TOPROL XL) 25 MG 24 hr tablet Take 1 tablet (25 mg) by mouth daily     nystatin (MYCOSTATIN) 261580 UNIT/ML suspension Swish and spit 5 mLs (500,000 Units) in mouth 4 times daily for 14 days     simvastatin (ZOCOR) 20 MG tablet TAKE 1/2 TABLET BY MOUTH EVERY " NIGHT (Patient taking differently: Take 10 mg by mouth At Bedtime)     vitamin E (TOCOPHEROL) 400 units (180 mg) capsule Take 400 Units by mouth daily (with dinner)      cyclobenzaprine (FLEXERIL) 5 MG tablet Take 1 tablet (5 mg) by mouth 3 times daily as needed for muscle spasms (Patient not taking: No sig reported)     diclofenac (VOLTAREN) 1 % topical gel Apply 2 g topically 4 times daily (Patient not taking: No sig reported)     hydrOXYzine (ATARAX) 10 MG tablet Take 5-10 mg by mouth every 6 hours as needed (Patient not taking: No sig reported)     ibuprofen (ADVIL/MOTRIN) 600 MG tablet TAKE 1 TABLET (600 MG) BY MOUTH EVERY 6 HOURS AS NEEDED FOR MODERATE PAIN (Patient not taking: No sig reported)     losartan (COZAAR) 25 MG tablet Take 1 tablet (25 mg) by mouth daily (Patient not taking: No sig reported)     omeprazole (PRILOSEC) 20 MG DR capsule TAKE ONE CAPSULE BY MOUTH ONCE DAILY (Patient not taking: Reported on 9/8/2022)     No current facility-administered medications for this visit.        Allergies:     Allergies   Allergen Reactions     Azithromycin Diarrhea     Ciprofloxacin Other (See Comments)     Cipro, dizziness and abdominal pain     Gabapentin Dizziness and Visual Disturbance     Penicillins      stomach upset, diarrhea     Raspberry Hives     Sulfa Drugs Hives     hives     Augmentin Rash        Review of Systems:   As above     Physical Exam:   Vitals: LMP  (LMP Unknown)    No examination given nature of telephone visit     Data reviewed on previous visits    Imaging:  MRI brain 6/9/2022  IMPRESSION:     1. Moderate enlargement of the ventricular system which appears out of  proportion to the cerebral sulci and volume loss. This remains  concerning for normal pressure hydrocephalus. Ventricular size is  similar to prior head CT 4/6/2022. Mild confluent periventricular  white matter T2 hyperintensities could represent transependymal flow  of CSF.  2. Background patchy nonspecific white matter  changes most likely due  to chronic microvascular ischemic disease.       CT head/cervical 6/2022  IMPRESSION:  HEAD CT:  1.  No acute intracranial hemorrhage or calvarial fracture.  2.  Ventriculomegaly disproportionate to the degree of volume loss elsewhere which in the correct clinical setting would raise the possibility of a normal pressure/communicating hydrocephalus.  CERVICAL SPINE CT:  1.  No CT evidence for acute fracture or post traumatic subluxation.     CT lumbar 4/2022  IMPRESSION:  1. No acute abnormality of the lumbar spine identified. Specifically, no acute fracture.  2. Unchanged subacute to chronic L2 superior endplate compression fracture.  3. Redemonstrated findings of L2-bony pelvis fusion, as described. Findings concerning for loosening of the right greater than left L2 pedicle screws.  4. Solid instrumented interbody fusion at L2-L3 and L5-S1, as before.  5. Multilevel degenerative changes elsewhere, as described.  6. Mild to moderate dextroconvex scoliosis, as before.     MRI lumbar 2/2021  IMPRESSION:    1. Five lumbar-type vertebral bodies for the purposes of this  dictation. Spinal nomenclature is different compared to previous spine  MR 4/25/2019. Recommend close attention to spinal numbering if  site-specific therapy is considered.  2. Residual enhancing granulation tissue around the right L5 nerve  root in the L5-S1 neural foramen near the operative bed. There appears  to be residual moderate right neural foraminal narrowing secondary to  disc bulge and uncinate spurring. Disc bulge appears to be possibly  increased since previous MR 4/25/2019.  3. Persistent right lateral recess narrowing at L4-L5 which may affect  the right L5 nerve root. This is similar to prior MR 4/25/2019.  4. Additional degenerative changes throughout the lumbar spine as  detailed above, grossly similar to prior. No other significant spinal  canal or neural foraminal narrowings.     Pertinent Investigations since  last visit:   Pre and Post LP therapy assessment 8/2022  Test Pre-Test Post Test   TUG 35 sec   with FWW and CGA 28 sec   With FWW and SBA   Hart Balance Test 27/50  intermittent use of FWW and CGA-Min A. Unable to complete alternating step taps or SL stance 34/50  Improved ability to stand and reach, turn 360 degrees, look over shoulder and stand on one leg   Dynamic Gait Index (DGI) 5/24   with FWW and SBA. Difficulty with pivot turn and head turns with gait.    15/24   With FWW and SBA. Significant improvement in gait speed and gait with head turns.      8/2022 - Normal CSF glucose, protein, cell count             Assessment and Plan:   Assessment:  Milena Hamilton is a 82 year old female who presents today for follow-up of possible normal pressure hydrocephalus. Patient has had many years of imbalance, with particular worsening over the last 5 years. She also has chronic urinary incontinence and in the last year has had some cognitive changes. Prior MoCA in July 2022 was 23/30. Patient also has history of chronic lumbosacral radiculopathy and most recently had L2-pelvis fusion in September 2021. MRI brain shows moderate enlargement of ventricular space, out of proportion to volume loss. She had recent high volume lumbar puncture and pre and post test physical therapy assessment. The assessment showed a significant improvement after lumbar puncture as described above. Given improvement, it would be reasonable to consider shunt placement in discussion with neurosurgery. CSF ADmark panel was also sent, which returned in the indeterminant range for Alzheimer's pathology.     Plan:  - Evaluation with neurosurgery to discuss utility of shunt placement for normal pressure hydrocephalus    Follow up in Neurology clinic as needed should new concerns arise.    Jovany Nichole MD   of Neurology  [facility]    The total time of this encounter today amounted to 30 minutes. This time included  time spent with the patient, prep work, ordering tests, and performing post visit documentation.

## 2022-09-08 NOTE — RESULT ENCOUNTER NOTE
Final Enteric Bacteria and Virus Panel by JACLYN Stool is NEGATIVE for all tested organisms (bacteria/virus).  No treatment or change in treatment per [facility] Lab Result Enteric Bacteria and Virus Panel protocol.

## 2022-09-09 ENCOUNTER — TELEPHONE (OUTPATIENT)
Dept: NEUROSURGERY | Facility: CLINIC | Age: 82
End: 2022-09-09

## 2022-09-09 NOTE — TELEPHONE ENCOUNTER
----- Message from Jose G Payne PA-C sent at 9/9/2022  9:13 AM CDT -----  Regarding: consult with renny  Please arrange a 1 hour new patient consultation with Dr. Gutierrez.  Not urgent, next opening    Discussed placement of  shunt for NPH

## 2022-09-09 NOTE — TELEPHONE ENCOUNTER
Pt returned call to schedule appt with Dr Gutierrez for an hour consult. Pt would prefer to go to [place] to see a Neurosurgeon.   Is it possible for her to see Dr Pillai who goes out there?  Thank you.

## 2022-09-13 NOTE — TELEPHONE ENCOUNTER
Reviewed with Dr. Hira Pillai. He will see patient at his [place] location next week. Message routed to scheduling team to facilitate appointment.

## 2022-09-14 ENCOUNTER — OFFICE VISIT (OUTPATIENT)
Dept: FAMILY MEDICINE | Facility: OTHER | Age: 82
End: 2022-09-14
Payer: COMMERCIAL

## 2022-09-14 VITALS
OXYGEN SATURATION: 94 % | RESPIRATION RATE: 17 BRPM | BODY MASS INDEX: 24 KG/M2 | TEMPERATURE: 97.6 F | DIASTOLIC BLOOD PRESSURE: 64 MMHG | HEART RATE: 87 BPM | WEIGHT: 127 LBS | SYSTOLIC BLOOD PRESSURE: 116 MMHG

## 2022-09-14 DIAGNOSIS — Z01.818 PRE-OP EXAM: Primary | ICD-10-CM

## 2022-09-14 DIAGNOSIS — I50.22 CHRONIC SYSTOLIC HEART FAILURE (H): ICD-10-CM

## 2022-09-14 DIAGNOSIS — R29.6 RECURRENT FALLS: ICD-10-CM

## 2022-09-14 DIAGNOSIS — G91.2 NPH (NORMAL PRESSURE HYDROCEPHALUS) (H): ICD-10-CM

## 2022-09-14 DIAGNOSIS — I44.7 LBBB (LEFT BUNDLE BRANCH BLOCK): ICD-10-CM

## 2022-09-14 DIAGNOSIS — R19.7 DIARRHEA, UNSPECIFIED TYPE: ICD-10-CM

## 2022-09-14 DIAGNOSIS — K21.9 GASTROESOPHAGEAL REFLUX DISEASE, UNSPECIFIED WHETHER ESOPHAGITIS PRESENT: ICD-10-CM

## 2022-09-14 PROCEDURE — 99214 OFFICE O/P EST MOD 30 MIN: CPT | Mod: 25 | Performed by: PHYSICIAN ASSISTANT

## 2022-09-14 PROCEDURE — G0008 ADMIN INFLUENZA VIRUS VAC: HCPCS | Performed by: PHYSICIAN ASSISTANT

## 2022-09-14 PROCEDURE — 90662 IIV NO PRSV INCREASED AG IM: CPT | Performed by: PHYSICIAN ASSISTANT

## 2022-09-14 ASSESSMENT — PAIN SCALES - GENERAL: PAINLEVEL: MILD PAIN (3)

## 2022-09-14 NOTE — Clinical Note
Milena Chaudhary has a new NPH diagnosis and is seeing you on 9/22 for evaluation for  shunt. I have cleared her for surgery in the event she can have this completed within 30 days. Thanks.  Cuhck Streeter PA-C

## 2022-09-14 NOTE — PATIENT INSTRUCTIONS
Hold ibuprofen 1 week prior to surgery.  Preparing for Your Surgery  Getting started  A nurse will call you to review your health history and instructions. They will give you an arrival time based on your scheduled surgery time. Please be ready to share:  Your doctor's clinic name and phone number  Your medical, surgical and anesthesia history  A list of allergies and sensitivities  A list of medicines, including herbal treatments and over-the-counter drugs  Whether the patient has a legal guardian (ask how to send us the papers in advance)  Please tell us if you're pregnant--or if there's any chance you might be pregnant. Some surgeries may injure a fetus (unborn baby), so they require a pregnancy test. Surgeries that are safe for a fetus don't always need a test, and you can choose whether to have one.   If you have a child who's having surgery, please ask for a copy of Preparing for Your Child's Surgery.    Preparing for surgery  Within 30 days of surgery: Have a pre-op exam (sometimes called an H&P, or History and Physical). This can be done at a clinic or pre-operative center.  If you're having a , you may not need this exam. Talk to your care team.  At your pre-op exam, talk to your care team about all medicines you take. If you need to stop any medicines before surgery, ask when to start taking them again.  We do this for your safety. Many medicines can make you bleed too much during surgery. Some change how well surgery (anesthesia) drugs work.  Call your insurance company to let them know you're having surgery. (If you don't have insurance, call [phone].)  Call your clinic if there's any change in your health. This includes signs of a cold or flu (sore throat, runny nose, cough, rash, fever). It also includes a scrape or scratch near the surgery site.  If you have questions on the day of surgery, call your hospital or surgery center.  COVID testing  You may need to be tested for COVID-19  before having surgery. If so, we will give you instructions.  Eating and drinking guidelines  For your safety: Unless your surgeon tells you otherwise, follow the guidelines below.  Eat and drink as usual until 8 hours before surgery. After that, no food or milk.  Drink clear liquids until 2 hours before surgery. These are liquids you can see through, like water, Gatorade and Propel Water. You may also have black coffee and tea (no cream or milk).  Nothing by mouth within 2 hours of surgery. This includes gum, candy and breath mints.  If you drink alcohol: Stop drinking it the night before surgery.  If your care team tells you to take medicine on the morning of surgery, it's okay to take it with a sip of water.  Preventing infection  Shower or bathe the night before and morning of your surgery. Follow the instructions your clinic gave you. (If no instructions, use regular soap.)  Don't shave or clip hair near your surgery site. We'll remove the hair if needed.  Don't smoke or vape the morning of surgery. You may chew nicotine gum up to 2 hours before surgery. A nicotine patch is okay.  Note: Some surgeries require you to completely quit smoking and nicotine. Check with your surgeon.  Your care team will make every effort to keep you safe from infection. We will:  Clean our hands often with soap and water (or an alcohol-based hand rub).  Clean the skin at your surgery site with a special soap that kills germs.  Give you a special gown to keep you warm. (Cold raises the risk of infection.)  Wear special hair covers, masks, gowns and gloves during surgery.  Give antibiotic medicine, if prescribed. Not all surgeries need antibiotics.  What to bring on the day of surgery  Photo ID and insurance card  Copy of your health care directive, if you have one  Glasses and hearing aides (bring cases)  You can't wear contacts during surgery  Inhaler and eye drops, if you use them (tell us about these when you arrive)  CPAP machine  or breathing device, if you use them  A few personal items, if spending the night  If you have . . .  A pacemaker, ICD (cardiac defibrillator) or other implant: Bring the ID card.  An implanted stimulator: Bring the remote control.  A legal guardian: Bring a copy of the certified (court-stamped) guardianship papers.  Please remove any jewelry, including body piercings. Leave jewelry and other valuables at home.  If you're going home the day of surgery  You must have a responsible adult drive you home. They should stay with you overnight as well.  If you don't have someone to stay with you, and you aren't safe to go home alone, we may keep you overnight. Insurance often won't pay for this.  After surgery  If it's hard to control your pain or you need more pain medicine, please call your surgeon's office.  Questions?   If you have any questions for your care team, list them here: _________________________________________________________________________________________________________________________________________________________________________ ____________________________________ ____________________________________ ____________________________________  For informational purposes only. Not to replace the advice of your health care provider. Copyright   2003, 2019 [facility]. All rights reserved. Clinically reviewed by Evelia Martinez MD. [employer] [id number] - REV 07/21.

## 2022-09-14 NOTE — PROGRESS NOTES
Assessment & Plan       ICD-10-CM    1. Recurrent falls  R29.6    2. NPH (normal pressure hydrocephalus) (H)  G91.2        CHIARA Recinos [facility] OLIVIA Abbtot is a 82 year old accompanied by her spouse, presenting for the following health issues:  Hospital F/U      HPI     ED/UC Followup:     Facility:  [facility]  Date of visit: 8/31/22, 8/24/22  Reason for visit: Diarrhea, loss of balance/fall  Current Status: no change per pt    ED Summary   Assessments & Plan (with Medical Decision Making)    82 year old female who presents for evaluation of diarrhea and abdominal pain. Loose stools have been intermittent for 2 months, but worse over the last week. Stool is dark brown, but no black or bloody stools. No fevers or chills. Epigastric and left sided abdominal pain. Reports 5 loose stools for the last 24 hours. Patient has known diverticulosis. Seen here on 8/24 for this with no leukocytosis, normal hgb. She was treated for Suspect Diverticulitis with Cefdinir and Metronidazole (she has intolerance to augmentin and cipro)  Prior abdominal surgeries: appendectomy, cholecystectomy, hysterectomy, left ascending hemicolectomy due to diverticulitis.     On exam patient is alert and oriented. NAD. Afebrile. Normotensive. No tachycardia. Thick white patch on tongue, no lesions or white patches on buccal mucosa or palate.  TTP to the epigastric, LUQ and LLQ. Abdomen is otherwise soft and nondistended.      No leukocytosis.  Normal hgb.  Normal kidney function. Normal electrolytes.  Lipase elevated at 1,105  Normal transaminase and normal bilirubin.  Glucose 101.  UA with 8 WBC and trace leuk est. Negative nitrite.     Abd/pelvis CT is negative for acute pathology. Specifically no bowel obstruction or mass, no diverticulitis, no pancreatitis, no bile duct dilation or bile duct stone/obstruction.     Unclear cause for her diarrhea. We will plan to send stool containers home with  patient to collect for stool culture and c-diff.  Unclear cause for her elevated Lipase. She does not drink alcohol. She has findings on CT to suggest bile duct stone and her gallbladder is removed.   Patient does have evidence of oral thrush likely from being on 2 different antibiotics this past week. Patient will be treated with Nystatin.     Patient needs close follow-up and I recommend recheck of Lipase in 2 days.  I have placed orders for this and I will message her PCP.     Plan:  Clear liquid diet for the next 24 hours, then bland foods.   --avoid fatty foods (dairy, butter, cheese, fried foods)  Collect stool specimens.  Lipase (lab) needs to be rechecked on Friday.  Call [facility] tomorrow to set up time for recheck of your Lipase. (You should be able to drop off stool specimen then too)  I will message your primary care provider (ITALIA Bang) to let him know about your visit here and needing labs rechecked.  For oral Thrush:   --Nystatin 5 ml swish and spit four times a day for 14 days.     Return to the emergency department for fevers, vomiting, increased pain or any new/worsening symptoms.      ED Summary 8/24/22    Assessments & Plan (with Medical Decision Making)  82-year-old female.  Diarrhea for last 2 months.  Noted examination of localized pain left lower quadrant.  Previous colonoscopies documented the presence of diverticulum throughout the sigmoid colon.  Exam notes nonsurgical abdomen patient is currently afebrile with a temp of 98.1.  WBC = 4.0.  Hemoglobin = 12.4  Plan: Patient has intolerance to ciprofloxacin and Augmentin.  Discussed with pharmacy.  We use a third-generation cephalosporin (cefdinir) and metronidazole.    Review of Systems   {ROS COMP (Optional):940873}      Objective    /64   Pulse 87   Temp 97.6  F (36.4  C) (Temporal)   Resp 17   Wt 57.6 kg (127 lb)   LMP  (LMP Unknown)   SpO2 94%   BMI 24.00 kg/m    Body mass index is 24 kg/m .  Physical Exam    {Exam List (Optional):093845}    {Diagnostic Test Results (Optional):064622}    {AMBULATORY ATTESTATION (Optional):180441}

## 2022-09-22 ENCOUNTER — OFFICE VISIT (OUTPATIENT)
Dept: NEUROSURGERY | Facility: CLINIC | Age: 82
End: 2022-09-22
Payer: COMMERCIAL

## 2022-09-22 VITALS
HEART RATE: 85 BPM | SYSTOLIC BLOOD PRESSURE: 145 MMHG | HEIGHT: 61 IN | DIASTOLIC BLOOD PRESSURE: 77 MMHG | BODY MASS INDEX: 23.98 KG/M2 | WEIGHT: 127 LBS

## 2022-09-22 DIAGNOSIS — G91.2 IDIOPATHIC NORMAL PRESSURE HYDROCEPHALUS (INPH) (H): Primary | ICD-10-CM

## 2022-09-22 DIAGNOSIS — Z01.818 PRE-OP TESTING: ICD-10-CM

## 2022-09-22 PROCEDURE — 99215 OFFICE O/P EST HI 40 MIN: CPT | Performed by: STUDENT IN AN ORGANIZED HEALTH CARE EDUCATION/TRAINING PROGRAM

## 2022-09-22 ASSESSMENT — PAIN SCALES - GENERAL: PAINLEVEL: MODERATE PAIN (4)

## 2022-09-22 NOTE — NURSING NOTE
"Milena Hamilton's goals for this visit include:   Chief Complaint   Patient presents with     RECHECK     Discuss placement of  shunt for NPH. Ok to schedule with Dr. Pillai per staff  message from Jose G Payne.       She requests these members of her care team be copied on today's visit information: yes    PCP: Chuck Streeter    Referring Provider:  No referring provider defined for this encounter.    BP (!) 145/77 (BP Location: Right arm, Patient Position: Sitting, Cuff Size: Adult Regular)   Pulse 85   Ht 1.549 m (5' 1\")   Wt 57.6 kg (127 lb)   LMP  (LMP Unknown)   BMI 24.00 kg/m      Do you need any medication refills at today's visit? No  KAY Levy., BRIAN ([facility])      "

## 2022-09-22 NOTE — LETTER
"    9/22/2022         RE: Milena Hamilton  [id number] [address] [place] [id number]        Dear Colleague,    Thank you for referring your patient, Milena Hamilton, to the [facility] [place]. Please see a copy of my visit note below.    HPI:  82 year old female previously evaluated by neurology for NPH symptoms.  Complains of imbalance gradually worsening over the past few months.  She does not think she has any memory difficulty and reports no overt episodes of incontinence.  She had a large volume LP with documented improvement of scores with PT evaluation before and after PT.  No fine motor dexterity issues.  No neck or arm pain.  No hand numbness.  Current Outpatient Medications   Medication     acetaminophen (TYLENOL) 500 MG tablet     ALPRAZolam (XANAX) 0.25 MG tablet     cholecalciferol 25 MCG (1000 UT) TABS     cyclobenzaprine (FLEXERIL) 5 MG tablet     ibuprofen (ADVIL/MOTRIN) 600 MG tablet     levothyroxine (SYNTHROID/LEVOTHROID) 50 MCG tablet     losartan (COZAAR) 25 MG tablet     metoprolol succinate ER (TOPROL XL) 25 MG 24 hr tablet     omeprazole (PRILOSEC) 20 MG DR capsule     simvastatin (ZOCOR) 20 MG tablet     vitamin E (TOCOPHEROL) 400 units (180 mg) capsule     hydrOXYzine (ATARAX) 10 MG tablet     No current facility-administered medications for this visit.      Physical Exam:  Vital signs:                   Height: 154.9 cm (5' 1\") Weight: 57.6 kg (127 lb)  Estimated body mass index is 24 kg/m  as calculated from the following:    Height as of this encounter: 1.549 m (5' 1\").    Weight as of this encounter: 57.6 kg (127 lb).   PERRL, EOMI, face symmetric, no pronator drift.  Full strength in BUE and BLE.  Sensation intact.   Results Reviewed:  I personally reviewed an MRI of the brain showing enlarged ventricles consistent with NPH.  No mass lesions.   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  Discussed risks and " benefits of the procedure.  Patient would like to proceed with procedure.  Will get CT scan stealth brain for placement.  Plan for occipital placement with general surgery assistance for placement of abdominal catheter.    I spent 53 minutes reviewing the patient's chart, interviewing examining the patient as well as discussing diagnosis and treatment options.    Hiar Pillai MD        Again, thank you for allowing me to participate in the care of your patient.        Sincerely,        Hira Pillai MD

## 2022-09-22 NOTE — PATIENT INSTRUCTIONS
Patient Instructions    Surgery to be scheduled at [facility] for  shunt placement with Dr. Pillai    We will call you to schedule your CT scan which is to be done closer to your surgery date      Pre-Operative  Pre-operative physical with primary care physician within 30 days of surgical date.   Hospitalization stay   Shower procedure  Please shower with antimicrobial soap the night before surgery and morning of surgery. Please refer to showering instruction sheet in folder.  Eating/Drinking  Stop all solid foods 8 hours before surgery.  Keep drinking clear liquids until 4 hours before surgery  Clear liquids include water, clear juice, black coffee, or clear tea without milk, Gatorade, clear soda.   Medications  Hold Aspirin, NSAIDs (Advil/Ibuprofen, Indocin, Naproxen,Nuprin,Relafen/Nabumetone, Diclofenac,Meloxicam, Aleve, Celebrex) x 7 days prior to surgical date  Hold methotrexate, Xeljanz for 1-2 weeks prior to surgery and continue to hold 2 weeks post surgery.   You can take Tylenol (Acetaminophen) for pain, 1000 mg  Do not exceed 3,000 mg per day   Any other medications prescribed, please discuss with your primary care provider at your pre-operative physical   As part of preparation for your upcoming procedure you are required to have a test for the novel Coronavirus, COVID-19.  Please call the drive-up testing number to schedule your appointment. The test needs to be completed within 4 days (96) hours of surgery.   Scheduling Number: [phone]  You may NOT receive the COVID-19 vaccine 72 hours before or after surgery.    Pain Management  Dealing with pain  As your body heals, you might feel a stabbing, burning, or aching pain.   Keep in mind that medicine won't take away all of your pain. It helps to try other ways to relax and ease pain.   Things to help with pain  After surgery, we will give you medicine for your pain. These medications work well, but they can make you drowsy, itchy,  or sick to your stomach. If we give you narcotics for pain, try to take the pills with food.   For mild to moderate pain, you can take medication such as Tylenol. These can be used with narcotics, but make sure that your narcotic does not contain Tylenol.   Do NOT drive while taking narcotic pain medication  Do NOT drink alcohol while using any pain medication  You can utilize ice as needed (no longer than 20 minutes at one time)  You may resume taking NSAIDs (ex. Ibuprofen, aleve, naproxen) 2 weeks after surgery.    Incision Care  Remove dressing as instructed upon discharge  Watch for signs of infection  Redness, swelling, warmth, drainage, and fever of 101 degrees or higher  Notify clinic [phone].    Activity Restrictions  For the first 6-8 weeks, no lifting > 10 pounds, no bending, twisting, or overhead reaching.  Take stairs in moderation   Ok to walk as tolerated, take short frequent walks. You may gradually increase the distance as tolerated.   Avoid bed rest and prolonged sitting for longer than 30 minutes (change positions frequently while awake)  No contact sports until after follow up visit  No high impact activities such as; running/jogging, snowmobile or 4 gallagher riding or any other recreational vehicles  Please call the clinic if you develop any of the following symptoms:  Swelling and/or warmth in one or both legs  Pain or tenderness in your leg, ankle, foot, or arm   Red or discolored skin     Post-Op Follow Up Appointments  2 week incisions check with PATRICIA/RN  6 week post op with xray prior with Dr. Pillai  Remaining appt TBD  Please call to schedule follow up and xray appointments at [phone]    Resources  If you are currently employed, you will need to be off work for 4-6 weeks for post op recovery and healing.  Please fax any FMLA/short term disability paperwork to [phone]  You may call our clinic when you'd like to return to work and we can provide a work letter  To allow staff  adequate time to complete paperwork, please send as soon as possible     [facility]  Phone: [phone]  Fax: [phone]

## 2022-09-22 NOTE — NURSING NOTE
Reviewed pre- and post-operative instructions as outlined in the After Visit Summary/Patient Instructions with patient.   Surgery folder was given to patient    Patient Education Topic: pre-op education      Learner(s): Patient and Family     Knowledge Level: Advanced     Readiness to Learn: Ready    Method:  Verbal explanation and Written material     Outcome: Able to verbalize instructions    Barriers to Learning: No barrier    Covid Testing: patient educated they will need to have a test for the novel Coronavirus, COVID-19.The PCR test needs to be completed within 4 days (96) hours of surgery. . Order Placed.    Scheduling Number: [phone]    NDI/JOEY: should not be needed for shunt placement     Patient had the opportunity for questions to be answered. Advised Patient and Family  to call clinic with any questions/concerns. Gave patient antibacterial soap for pre-surgery skin preparation.     Daria Miranda, RNCC  Neurology/Neurosurgery/PM&R

## 2022-10-03 ENCOUNTER — HOSPITAL ENCOUNTER (EMERGENCY)
Facility: CLINIC | Age: 82
Discharge: HOME | End: 2022-10-03
Attending: EMERGENCY MEDICINE | Admitting: EMERGENCY MEDICINE
Payer: COMMERCIAL

## 2022-10-03 ENCOUNTER — APPOINTMENT (OUTPATIENT)
Dept: CT IMAGING | Facility: CLINIC | Age: 82
End: 2022-10-03
Attending: EMERGENCY MEDICINE
Payer: COMMERCIAL

## 2022-10-03 VITALS
TEMPERATURE: 97.6 F | WEIGHT: 125 LBS | RESPIRATION RATE: 18 BRPM | SYSTOLIC BLOOD PRESSURE: 161 MMHG | BODY MASS INDEX: 23.62 KG/M2 | HEART RATE: 73 BPM | DIASTOLIC BLOOD PRESSURE: 75 MMHG | OXYGEN SATURATION: 93 %

## 2022-10-03 DIAGNOSIS — R19.7 DIARRHEA, UNSPECIFIED TYPE: ICD-10-CM

## 2022-10-03 LAB
ALBUMIN SERPL-MCNC: 3.7 G/DL (ref 3.4–5)
ALP SERPL-CCNC: 54 U/L (ref 40–150)
ALT SERPL W P-5'-P-CCNC: 17 U/L (ref 0–50)
ANION GAP SERPL CALCULATED.3IONS-SCNC: 4 MMOL/L (ref 3–14)
AST SERPL W P-5'-P-CCNC: 16 U/L (ref 0–45)
BASOPHILS # BLD AUTO: 0 10E3/UL (ref 0–0.2)
BASOPHILS NFR BLD AUTO: 1 %
BILIRUB SERPL-MCNC: 0.5 MG/DL (ref 0.2–1.3)
BUN SERPL-MCNC: 10 MG/DL (ref 7–30)
CALCIUM SERPL-MCNC: 8.8 MG/DL (ref 8.5–10.1)
CHLORIDE BLD-SCNC: 109 MMOL/L (ref 94–109)
CO2 SERPL-SCNC: 29 MMOL/L (ref 20–32)
CREAT SERPL-MCNC: 0.66 MG/DL (ref 0.52–1.04)
EOSINOPHIL # BLD AUTO: 0.1 10E3/UL (ref 0–0.7)
EOSINOPHIL NFR BLD AUTO: 1 %
ERYTHROCYTE [DISTWIDTH] IN BLOOD BY AUTOMATED COUNT: 11.9 % (ref 10–15)
GFR SERPL CREATININE-BSD FRML MDRD: 88 ML/MIN/1.73M2
GLUCOSE BLD-MCNC: 98 MG/DL (ref 70–99)
HCT VFR BLD AUTO: 38.9 % (ref 35–47)
HGB BLD-MCNC: 12.6 G/DL (ref 11.7–15.7)
IMM GRANULOCYTES # BLD: 0 10E3/UL
IMM GRANULOCYTES NFR BLD: 0 %
LYMPHOCYTES # BLD AUTO: 1.4 10E3/UL (ref 0.8–5.3)
LYMPHOCYTES NFR BLD AUTO: 33 %
MCH RBC QN AUTO: 32 PG (ref 26.5–33)
MCHC RBC AUTO-ENTMCNC: 32.4 G/DL (ref 31.5–36.5)
MCV RBC AUTO: 99 FL (ref 78–100)
MONOCYTES # BLD AUTO: 0.3 10E3/UL (ref 0–1.3)
MONOCYTES NFR BLD AUTO: 7 %
NEUTROPHILS # BLD AUTO: 2.5 10E3/UL (ref 1.6–8.3)
NEUTROPHILS NFR BLD AUTO: 58 %
NRBC # BLD AUTO: 0 10E3/UL
NRBC BLD AUTO-RTO: 0 /100
PLATELET # BLD AUTO: 173 10E3/UL (ref 150–450)
POTASSIUM BLD-SCNC: 4.3 MMOL/L (ref 3.4–5.3)
PROT SERPL-MCNC: 6.9 G/DL (ref 6.8–8.8)
RBC # BLD AUTO: 3.94 10E6/UL (ref 3.8–5.2)
SODIUM SERPL-SCNC: 142 MMOL/L (ref 133–144)
WBC # BLD AUTO: 4.3 10E3/UL (ref 4–11)

## 2022-10-03 PROCEDURE — 99282 EMERGENCY DEPT VISIT SF MDM: CPT | Performed by: EMERGENCY MEDICINE

## 2022-10-03 PROCEDURE — 36415 COLL VENOUS BLD VENIPUNCTURE: CPT | Performed by: EMERGENCY MEDICINE

## 2022-10-03 PROCEDURE — 85025 COMPLETE CBC W/AUTO DIFF WBC: CPT | Performed by: EMERGENCY MEDICINE

## 2022-10-03 PROCEDURE — 84132 ASSAY OF SERUM POTASSIUM: CPT | Performed by: EMERGENCY MEDICINE

## 2022-10-03 PROCEDURE — 250N000011 HC RX IP 250 OP 636: Performed by: EMERGENCY MEDICINE

## 2022-10-03 PROCEDURE — 250N000009 HC RX 250: Performed by: EMERGENCY MEDICINE

## 2022-10-03 PROCEDURE — 74177 CT ABD & PELVIS W/CONTRAST: CPT

## 2022-10-03 PROCEDURE — 258N000003 HC RX IP 258 OP 636: Performed by: EMERGENCY MEDICINE

## 2022-10-03 PROCEDURE — 96360 HYDRATION IV INFUSION INIT: CPT | Mod: 59 | Performed by: EMERGENCY MEDICINE

## 2022-10-03 PROCEDURE — 99285 EMERGENCY DEPT VISIT HI MDM: CPT | Mod: 25 | Performed by: EMERGENCY MEDICINE

## 2022-10-03 RX ORDER — IOPAMIDOL 755 MG/ML
500 INJECTION, SOLUTION INTRAVASCULAR ONCE
Status: COMPLETED | OUTPATIENT
Start: 2022-10-03 | End: 2022-10-03

## 2022-10-03 RX ADMIN — SODIUM CHLORIDE 60 ML: 9 INJECTION, SOLUTION INTRAVENOUS at 14:31

## 2022-10-03 RX ADMIN — SODIUM CHLORIDE 1000 ML: 9 INJECTION, SOLUTION INTRAVENOUS at 13:32

## 2022-10-03 RX ADMIN — IOPAMIDOL 55 ML: 755 INJECTION, SOLUTION INTRAVENOUS at 14:31

## 2022-10-03 ASSESSMENT — ACTIVITIES OF DAILY LIVING (ADL)
ADLS_ACUITY_SCORE: 39
ADLS_ACUITY_SCORE: 39

## 2022-10-03 NOTE — ED TRIAGE NOTES
"Pt c/o diarrhea x 4 months.  States \"I just cant gain weight, it just runs out of me\".  C/o abd pain.  Denies nausea.       Triage Assessment     Row Name 10/03/22 1144       Triage Assessment (Adult)    Airway WDL WDL       Respiratory WDL    Respiratory WDL WDL       Skin Circulation/Temperature WDL    Skin Circulation/Temperature WDL WDL              "

## 2022-10-03 NOTE — ED PROVIDER NOTES
History     Chief Complaint   Patient presents with     Diarrhea     HPI  Milena Hamilton is a 82 year old female who presents for evaluation of diarrhea.  She states has been ongoing for the last 4 months.  She has had 2 stools today and 4 yesterday.  No blood or black tarry substance with this.  She has been seen in clinic twice for this.  She is also been seen here and told when she had diverticulitis.  She has some mild right upper quadrant pain.  No vomiting.  Today she ate cereal and toast.  No fever.    Allergies:  Allergies   Allergen Reactions     Azithromycin Diarrhea     Ciprofloxacin Other (See Comments)     Cipro, dizziness and abdominal pain     Gabapentin Dizziness and Visual Disturbance     Penicillins      stomach upset, diarrhea     Raspberry Hives     Sulfa Drugs Hives     hives     Augmentin Rash       Problem List:    Patient Active Problem List    Diagnosis Date Noted     Chronic systolic heart failure (H) 09/14/2022     Priority: Medium     LBBB (left bundle branch block) 09/14/2022     Priority: Medium     Dysuria 11/24/2021     Priority: Medium     Basal cell carcinoma of nose 09/08/2021     Priority: Medium     History of malignant neoplasm of skin 09/08/2021     Priority: Medium     Skin changes due to chronic exposure to nonionizing radiation 09/08/2021     Priority: Medium     Renal insufficiency syndrome 09/04/2021     Priority: Medium     Formatting of this note might be different from the original.  Creatinine clearance 49       Lumbar radiculopathy 12/28/2018     Priority: Medium     Scoliosis of lumbar spine, unspecified scoliosis type 12/12/2018     Priority: Medium     Chronic right-sided low back pain with right-sided sciatica 05/30/2018     Priority: Medium     Acquired hypothyroidism 04/28/2017     Priority: Medium     Gastroesophageal reflux disease, esophagitis presence not specified 04/28/2017     Priority: Medium     IMO Regulatory Load OCT 2020       Premature atrial  beats 11/30/2016     Priority: Medium     HTN (hypertension) 07/10/2014     Priority: Medium     Raynaud's disease 06/01/2014     Priority: Medium     Health Care Home 12/30/2013     Priority: Medium     Status:  Closed  Care Coordinator:  Tasha Vasquez RN    See Letters for [facility] Care Plan             History of diverticulitis - s/p left hemicolectomy 11/30/2013     Priority: Medium     Diarrhea 11/30/2013     Priority: Medium     Chronic constipation 06/18/2013     Priority: Medium     Sacroiliac dysfunction 10/09/2012     Priority: Medium     Advanced directives, counseling/discussion 10/03/2011     Priority: Medium     Advance Directive Problem List Overview:   Name Relationship Phone    Primary Health Care Agent            Alternative Health Care Agent          Discussed advance care planning with patient; information given to patient to review. 10/3/2011          DDD (degenerative disc disease), cervical 08/10/2011     Priority: Medium     Anxiety 10/24/2010     Priority: Medium     Hyperlipidemia 09/29/2010     Priority: Medium     Osteopenia 04/01/2010     Priority: Medium     recheck DEXA in 2 years       Gastritis 03/15/2010     Priority: Medium     Panic disorder without agoraphobia 10/08/2004     Priority: Medium     Tinnitus 09/13/2004     Priority: Medium     Problem list name updated by automated process. Provider to review       Family history of diabetes mellitus 09/09/2002     Priority: Medium     Lichen planus      Priority: Medium        Past Medical History:    Past Medical History:   Diagnosis Date     Lichen planus      Osteopenia 4/2010     Panic disorder without agoraphobia      Pure hypercholesterolemia      Raynaud's disease 6/2014     Unspecified essential hypertension        Past Surgical History:    Past Surgical History:   Procedure Laterality Date     COLONOSCOPY  10/17/2007    normal, recheck in 10 years     COLONOSCOPY N/A 08/27/2014    Procedure: COLONOSCOPY;  Surgeon: Dinesh  MD Raffi;  Location: [facility]     COMBINED CYSTOSCOPY, INSERT CATHETER URETER Bilateral 10/13/2014    Procedure: COMBINED CYSTOSCOPY, INSERT CATHETER URETER;  Surgeon: Abdoulaye Odell MD;  Location: [facility]     EYE SURGERY  08/01/2001    right eye muscle repair     HC REMOVAL GALLBLADDER  02/09/2010    lap     HC REMOVAL OF OVARY/TUBE(S)      Salpingo-Oophorectomy, bilateral     HYSTERECTOMY, PAP NO LONGER INDICATED       INJECT EPIDURAL LUMBAR Right 04/10/2017    Procedure: INJECT EPIDURAL LUMBAR;  Surgeon: Raffi Wright MD;  Location: [facility]     INJECT EPIDURAL LUMBAR Right 02/27/2020    Procedure: Right Lumbar 4- Sacral 1 Epidural Steroid Injection;  Surgeon: Antonio Dasilva MD;  Location: [facility]     INJECT EPIDURAL LUMBAR Right 01/29/2021    Procedure: INJECTION, SPINE,  sacral 1 EPIDURAL;  Surgeon: Antonio Dasilva MD;  Location: [facility]     INJECT EPIDURAL TRANSFORAMINAL Right 06/09/2016    Procedure: INJECT EPIDURAL TRANSFORAMINAL;  Surgeon: Antonio Dasilva MD;  [facility]: [facility]     INJECT EPIDURAL TRANSFORAMINAL Right 10/13/2016    Procedure: INJECT EPIDURAL TRANSFORAMINAL;  Surgeon: Antonio Dasilva MD;  [facility]: [facility]     INJECT EPIDURAL TRANSFORAMINAL Right 06/18/2021    Procedure: Right L4-5 and right L5-S1 Transforaminal Epidural Steroid Injections with fluoroscopic guidance and contrast.;  Surgeon: Antonio Dasilva MD;  Location: [facility]     INJECT JOINT SACROILIAC Right 04/14/2022    Procedure: Fluoroscopically-guided injection of the Bilateral sacroiliac joints with Bilateral pastor Sacroiliac joint ligaments infiltration over the sacrum;  Surgeon: Antonio Dasilva MD;  [facility]     LAPAROSCOPIC ASSISTED COLECTOMY LEFT (DESCENDING) N/A 10/13/2014    Procedure: LAPAROSCOPIC ASSISTED COLECTOMY LEFT (DESCENDING);  Surgeon: Raffi Montiel MD;  Location: [facility]     LUMBAR SPINE SURGERY  09/2021    L2-Pelvis fusion with rods and screws     ZZC APPENDECTOMY       ZZC NONSPECIFIC PROCEDURE       "Right inferior oblique recession, 14 mm     ZZC VAG HYST,RMV TUBE/OVARY  1984    cervix removed       Family History:    Family History   Problem Relation Age of Onset     Diabetes Brother      Heart Disease Brother 50        AMI     Diabetes Brother      Heart Disease Brother         AMI, \"older\"     Cerebrovascular Disease Brother      Psychotic Disorder Brother         , alcohol     Cancer Sister         breast cancer x2     Hypertension No family hx of      Breast Cancer No family hx of      Ovarian Cancer No family hx of      Prostate Cancer No family hx of      Mental Illness No family hx of      Anesthesia Reaction No family hx of      Osteoporosis No family hx of      Obesity No family hx of      Other Cancer No family hx of      Depression No family hx of      Anxiety Disorder No family hx of      Mental Illness No family hx of      Substance Abuse No family hx of        Social History:  Marital Status:   [2]  Social History     Tobacco Use     Smoking status: Never Smoker     Smokeless tobacco: Never Used   Vaping Use     Vaping Use: Never used   Substance Use Topics     Alcohol use: No     Drug use: No        Medications:    acetaminophen (TYLENOL) 500 MG tablet  ALPRAZolam (XANAX) 0.25 MG tablet  cholecalciferol 25 MCG (1000 UT) TABS  ibuprofen (ADVIL/MOTRIN) 600 MG tablet  levothyroxine (SYNTHROID/LEVOTHROID) 50 MCG tablet  losartan (COZAAR) 25 MG tablet  metoprolol succinate ER (TOPROL XL) 25 MG 24 hr tablet  omeprazole (PRILOSEC) 20 MG DR capsule  simvastatin (ZOCOR) 20 MG tablet  vitamin E (TOCOPHEROL) 400 units (180 mg) capsule  cyclobenzaprine (FLEXERIL) 5 MG tablet  hydrOXYzine (ATARAX) 10 MG tablet          Review of Systems  All other systems are reviewed and are negative    Physical Exam   BP: (!) 144/80  Pulse: 91  Temp: 97.6  F (36.4  C)  Resp: 18  Weight: 56.7 kg (125 lb)  SpO2: 98 %      Physical Exam  Vitals and nursing note reviewed.   Constitutional:       General: She " is not in acute distress.     Appearance: She is well-developed. She is not diaphoretic.   HENT:      Head: Normocephalic and atraumatic.   Eyes:      General: No scleral icterus.     Pupils: Pupils are equal, round, and reactive to light.   Cardiovascular:      Rate and Rhythm: Normal rate and regular rhythm.      Heart sounds: Normal heart sounds. No murmur heard.  Pulmonary:      Effort: No respiratory distress.      Breath sounds: No stridor. No wheezing or rales.   Abdominal:      Palpations: Abdomen is soft.      Tenderness: There is abdominal tenderness (mild) in the left upper quadrant and left lower quadrant. There is no guarding or rebound.   Musculoskeletal:         General: No tenderness.      Cervical back: Normal range of motion and neck supple.   Skin:     General: Skin is warm and dry.      Coloration: Skin is not pale.      Findings: No erythema or rash.   Neurological:      Mental Status: She is alert.         ED Course                 Procedures              Critical Care time:  none               Results for orders placed or performed during the hospital encounter of 10/03/22 (from the past 24 hour(s))   CBC with platelets differential    Narrative    The following orders were created for panel order CBC with platelets differential.  Procedure                               Abnormality         Status                     ---------                               -----------         ------                     CBC with platelets and d...[[id number]]                      Final result                 Please view results for these tests on the individual orders.   Comprehensive metabolic panel   Result Value Ref Range    Sodium 142 133 - 144 mmol/L    Potassium 4.3 3.4 - 5.3 mmol/L    Chloride 109 94 - 109 mmol/L    Carbon Dioxide (CO2) 29 20 - 32 mmol/L    Anion Gap 4 3 - 14 mmol/L    Urea Nitrogen 10 7 - 30 mg/dL    Creatinine 0.66 0.52 - 1.04 mg/dL    Calcium 8.8 8.5 - 10.1 mg/dL    Glucose 98 70 - 99  mg/dL    Alkaline Phosphatase 54 40 - 150 U/L    AST 16 0 - 45 U/L    ALT 17 0 - 50 U/L    Protein Total 6.9 6.8 - 8.8 g/dL    Albumin 3.7 3.4 - 5.0 g/dL    Bilirubin Total 0.5 0.2 - 1.3 mg/dL    GFR Estimate 88 >60 mL/min/1.73m2   CBC with platelets and differential   Result Value Ref Range    WBC Count 4.3 4.0 - 11.0 10e3/uL    RBC Count 3.94 3.80 - 5.20 10e6/uL    Hemoglobin 12.6 11.7 - 15.7 g/dL    Hematocrit 38.9 35.0 - 47.0 %    MCV 99 78 - 100 fL    MCH 32.0 26.5 - 33.0 pg    MCHC 32.4 31.5 - 36.5 g/dL    RDW 11.9 10.0 - 15.0 %    Platelet Count 173 150 - 450 10e3/uL    % Neutrophils 58 %    % Lymphocytes 33 %    % Monocytes 7 %    % Eosinophils 1 %    % Basophils 1 %    % Immature Granulocytes 0 %    NRBCs per 100 WBC 0 <1 /100    Absolute Neutrophils 2.5 1.6 - 8.3 10e3/uL    Absolute Lymphocytes 1.4 0.8 - 5.3 10e3/uL    Absolute Monocytes 0.3 0.0 - 1.3 10e3/uL    Absolute Eosinophils 0.1 0.0 - 0.7 10e3/uL    Absolute Basophils 0.0 0.0 - 0.2 10e3/uL    Absolute Immature Granulocytes 0.0 <=0.4 10e3/uL    Absolute NRBCs 0.0 10e3/uL   CT Abdomen Pelvis w Contrast    Narrative    CT ABDOMEN PELVIS W CONTRAST 10/3/2022 2:36 PM    CLINICAL HISTORY: diarrhea and luq pain    TECHNIQUE: CT scan of the abdomen and pelvis was performed following  injection of IV contrast. Multiplanar reformats were obtained. Dose  reduction techniques were used.  CONTRAST: 55 ml ISOVUE-370    COMPARISON: CT 8/31/2022.    FINDINGS:   LOWER CHEST: Unremarkable.    HEPATOBILIARY: Cholecystectomy. Unchanged hepatic cysts, no specific  follow-up recommended.    PANCREAS: Normal.    SPLEEN: Normal.    ADRENAL GLANDS: Normal.    KIDNEYS/BLADDER: No hydronephrosis. Urinary bladder is unremarkable.    BOWEL: Prior partial sigmoid colectomy. No evidence of bowel  obstruction or acute inflammation.    PELVIC ORGANS: Hysterectomy.    ADDITIONAL FINDINGS: Calcified atherosclerosis.    MUSCULOSKELETAL: Unchanged orthopedic hardware in the lumbar  spine  with L2 screws extending through the superior endplate. No acute bony  abnormality.      Impression    IMPRESSION:   1.  No visualized explanation for patient's abdominal pain.    CHRIS HERNANDEZ MD         SYSTEM ID:  [id number]       Medications   0.9% sodium chloride BOLUS (0 mLs Intravenous Stopped 10/3/22 1425)   iopamidol (ISOVUE-370) solution 500 mL (55 mLs Intravenous Given 10/3/22 1431)   sodium chloride 0.9 % bag 100mL for CT scan flush use (60 mLs Intravenous Given 10/3/22 1431)       Assessments & Plan (with Medical Decision Making)  82-year-old female who presents with diarrhea over the last 4 months by report.  Stable here.  Work-up benign including blood work and CT as above.  Stable for discharge home.  She is previously had stool studies that were benign as well.  Have recommended follow-up in clinic as scheduled next week.     I have reviewed the nursing notes.    I have reviewed the findings, diagnosis, plan and need for follow up with the patient.       Discharge Medication List as of 10/3/2022  3:22 PM          Final diagnoses:   Diarrhea, unspecified type       10/3/2022   [facility] EMERGENCY DEPT     Keshawn Doss MD  10/03/22 1949

## 2022-10-10 NOTE — PROGRESS NOTES
"HPI:  82 year old female previously evaluated by neurology for NPH symptoms.  Complains of imbalance gradually worsening over the past few months.  She does not think she has any memory difficulty and reports no overt episodes of incontinence.  She had a large volume LP with documented improvement of scores with PT evaluation before and after PT.  No fine motor dexterity issues.  No neck or arm pain.  No hand numbness.  Current Outpatient Medications   Medication     acetaminophen (TYLENOL) 500 MG tablet     ALPRAZolam (XANAX) 0.25 MG tablet     cholecalciferol 25 MCG (1000 UT) TABS     cyclobenzaprine (FLEXERIL) 5 MG tablet     ibuprofen (ADVIL/MOTRIN) 600 MG tablet     levothyroxine (SYNTHROID/LEVOTHROID) 50 MCG tablet     losartan (COZAAR) 25 MG tablet     metoprolol succinate ER (TOPROL XL) 25 MG 24 hr tablet     omeprazole (PRILOSEC) 20 MG DR capsule     simvastatin (ZOCOR) 20 MG tablet     vitamin E (TOCOPHEROL) 400 units (180 mg) capsule     hydrOXYzine (ATARAX) 10 MG tablet     No current facility-administered medications for this visit.      Physical Exam:  Vital signs:                   Height: 154.9 cm (5' 1\") Weight: 57.6 kg (127 lb)  Estimated body mass index is 24 kg/m  as calculated from the following:    Height as of this encounter: 1.549 m (5' 1\").    Weight as of this encounter: 57.6 kg (127 lb).   PERRL, EOMI, face symmetric, no pronator drift.  Full strength in BUE and BLE.  Sensation intact.   Results Reviewed:  I personally reviewed an MRI of the brain showing enlarged ventricles consistent with NPH.  No mass lesions.   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  Discussed risks and benefits of the procedure.  Patient would like to proceed with procedure.  Will get CT scan stealth brain for placement.  Plan for occipital placement with general surgery assistance for placement of abdominal catheter.    I spent 53 minutes reviewing the " patient's chart, interviewing examining the patient as well as discussing diagnosis and treatment options.    Hira Pillai MD

## 2022-10-24 ENCOUNTER — TELEPHONE (OUTPATIENT)
Dept: FAMILY MEDICINE | Facility: OTHER | Age: 82
End: 2022-10-24

## 2022-10-24 NOTE — TELEPHONE ENCOUNTER
Reason for Call:  Other appointment    Detailed comments: patient called and has a pre op physical; appointment on November 16 with Chuck ECHAVARRIA at [facility].    Patient would also like to be seen for right foot pain on this same appointment.    Provider needs to give approval.    Please contact patient.  Thank you.    Phone Number Patient can be reached at: Cell number on file:    Telephone Information:   Mobile [phone]       Best Time: any    Can we leave a detailed message on this number? YES    Call taken on 10/24/2022 at 1:35 PM by Grace Seaman

## 2022-10-28 ENCOUNTER — TRANSFERRED RECORDS (OUTPATIENT)
Dept: HEALTH INFORMATION MANAGEMENT | Facility: CLINIC | Age: 82
End: 2022-10-28

## 2022-11-04 DIAGNOSIS — F41.9 ANXIETY: ICD-10-CM

## 2022-11-04 RX ORDER — ALPRAZOLAM 0.25 MG
0.25 TABLET ORAL
Qty: 30 TABLET | Refills: 2 | Status: SHIPPED | OUTPATIENT
Start: 2022-11-04 | End: 2023-02-09

## 2022-11-07 ENCOUNTER — OFFICE VISIT (OUTPATIENT)
Dept: FAMILY MEDICINE | Facility: OTHER | Age: 82
End: 2022-11-07
Payer: COMMERCIAL

## 2022-11-07 VITALS
DIASTOLIC BLOOD PRESSURE: 64 MMHG | BODY MASS INDEX: 23.6 KG/M2 | HEART RATE: 73 BPM | HEIGHT: 61 IN | OXYGEN SATURATION: 97 % | WEIGHT: 125 LBS | TEMPERATURE: 98.1 F | SYSTOLIC BLOOD PRESSURE: 130 MMHG | RESPIRATION RATE: 16 BRPM

## 2022-11-07 DIAGNOSIS — M54.41 CHRONIC RIGHT-SIDED LOW BACK PAIN WITH RIGHT-SIDED SCIATICA: ICD-10-CM

## 2022-11-07 DIAGNOSIS — M79.671 RIGHT FOOT PAIN: Primary | ICD-10-CM

## 2022-11-07 DIAGNOSIS — G89.29 CHRONIC RIGHT-SIDED LOW BACK PAIN WITH RIGHT-SIDED SCIATICA: ICD-10-CM

## 2022-11-07 DIAGNOSIS — L03.031 CELLULITIS OF FIFTH TOE OF RIGHT FOOT: ICD-10-CM

## 2022-11-07 DIAGNOSIS — M72.2 PLANTAR FASCIITIS: ICD-10-CM

## 2022-11-07 PROCEDURE — 99214 OFFICE O/P EST MOD 30 MIN: CPT | Performed by: PHYSICIAN ASSISTANT

## 2022-11-07 ASSESSMENT — PAIN SCALES - GENERAL: PAINLEVEL: WORST PAIN (10)

## 2022-11-07 NOTE — PROGRESS NOTES
Assessment & Plan     Right foot pain  Chronic right-sided low back pain with right-sided sciatica  Plantar fasciitis  Cellulitis of fifth toe of right foot  Based on overall presentation I suspect that she is having more radicular signs and symptoms from her low back with sciatica than truly full foot pain.  She does have a slight ulceration between the fourth and fifth toes with related callus as well and some very mild cellulitis in this region that we will treat as noted below and have her follow-up with podiatry.  She may indeed need to have further evaluation of her back as well.  She has some mild plantars fasciitis in both feet and her foot gear is marginal today upon entrance into the clinic.  - Orthopedic  Referral; Future  - cephALEXin (KEFLEX) 250 MG capsule; Take 1 capsule (250 mg) by mouth 3 times daily for 10 days     No follow-ups on file.    CHIARA Marks [facility] is a 82 year old accompanied by her spouse, presenting for the following health issues:  Foot Pain      HPI     Pain History:  When did you first notice your pain? - Acute Pain   Have you seen anyone else for your pain? No  Where in your body do you have pain? Musculoskeletal problem/pain  Onset/Duration: 2 weeks ago  Description  Location: foot - right  Joint Swelling: YES  Redness: YES  Pain: YES  Warmth: unknown  Intensity:  10/10 when using, 2/10 when sitting  Progression of Symptoms:  worsening  Accompanying signs and symptoms:   Fevers: No  Numbness/tingling/weakness: YES  History  Trauma to the area: No  Recent illness:  No  Previous similar problem: No  Previous evaluation:  No  Precipitating or alleviating factors:  Aggravating factors include: standing, walking and climbing stairs  Therapies tried and outcome: immobilization and acetaminophen    Review of Systems   Constitutional, HEENT, cardiovascular, pulmonary, GI, , musculoskeletal, neuro, skin, endocrine  "and psych systems are negative, except as otherwise noted.      Objective    /64   Pulse 73   Temp 98.1  F (36.7  C) (Temporal)   Resp 16   Ht 1.549 m (5' 1\")   Wt 56.7 kg (125 lb)   LMP  (LMP Unknown)   SpO2 97%   BMI 23.62 kg/m    Body mass index is 23.62 kg/m .  Physical Exam   GENERAL: healthy, alert and no distress  NECK: no adenopathy, no asymmetry, masses, or scars and thyroid normal to palpation  RESP: lungs clear to auscultation - no rales, rhonchi or wheezes  CV: regular rate and rhythm, normal S1 S2, no S3 or S4, no murmur, click or rub, no peripheral edema and peripheral pulses strong  ABDOMEN: soft, nontender, no hepatosplenomegaly, no masses and bowel sounds normal  MS: no gross musculoskeletal defects noted, no edema is appreciated to the bilateral lower extremities.  Pain is noted over the plantar fascia upon exam.  Pain noted to the fourth and fifth toes and the lateral foot today upon exam.  SKIN: Very mild cellulitis to the right lateral distal foot is noted upon exam today.  Other than that no abnormality to visible skin.  NEURO: Normal strength and tone but she has an antalgic gait and poor balance is noted., mentation intact and speech normal though speech is pressured.  PSYCH: anxious and fatigued    No results found. However, due to the size of the patient record, not all encounters were searched. Please check Results Review for a complete set of results.                "

## 2022-11-10 ENCOUNTER — OFFICE VISIT (OUTPATIENT)
Dept: PODIATRY | Facility: CLINIC | Age: 82
End: 2022-11-10
Payer: COMMERCIAL

## 2022-11-10 VITALS
WEIGHT: 125 LBS | BODY MASS INDEX: 23.6 KG/M2 | TEMPERATURE: 98.1 F | DIASTOLIC BLOOD PRESSURE: 70 MMHG | HEIGHT: 61 IN | SYSTOLIC BLOOD PRESSURE: 154 MMHG

## 2022-11-10 DIAGNOSIS — L03.031 CELLULITIS OF FIFTH TOE OF RIGHT FOOT: ICD-10-CM

## 2022-11-10 DIAGNOSIS — M20.41 HAMMERTOE OF RIGHT FOOT: Primary | ICD-10-CM

## 2022-11-10 DIAGNOSIS — L97.511: ICD-10-CM

## 2022-11-10 PROCEDURE — 11042 DBRDMT SUBQ TIS 1ST 20SQCM/<: CPT | Performed by: PODIATRIST

## 2022-11-10 PROCEDURE — 99213 OFFICE O/P EST LOW 20 MIN: CPT | Mod: 25 | Performed by: PODIATRIST

## 2022-11-10 ASSESSMENT — PAIN SCALES - GENERAL: PAINLEVEL: EXTREME PAIN (8)

## 2022-11-10 NOTE — LETTER
11/10/2022         RE: Milena Hamilton  [id number] [facility] [address]        Dear Colleague,    Thank you for referring your patient, Milena Hamilton, to the [facility]. Please see a copy of my visit note below.    HPI:  Milena Hamilton is a 82 year old female who is seen in consultation at the request of Twin Canales PA-C    Pt presents for eval of:   (Onset, Location, L/R, Character, Treatments, Injury if yes)     Onset late Oct 2022, ulcer lateral Right 4th toe and medial Right 5th toe. Presents with her . Utilizing silcone toe spacer between Right great and 2nd toe.  Constant, sharp, throbbing, shooting pain 7-10/10.     Soaking with vinegar water.  11/7/22 started Keflex 250 mg, 1 capsule 3 times daily for 10 days.    Retired.      ROS:  10 point ROS neg other than the symptoms noted above in the HPI.    Patient Active Problem List   Diagnosis     Lichen planus     Family history of diabetes mellitus     Tinnitus     Panic disorder without agoraphobia     Gastritis     Osteopenia     Hyperlipidemia     Anxiety     DDD (degenerative disc disease), cervical     Advanced directives, counseling/discussion     Sacroiliac dysfunction     Chronic constipation     History of diverticulitis - s/p left hemicolectomy     Diarrhea     Health Care Home     Raynaud's disease     HTN (hypertension)     Premature atrial beats     Acquired hypothyroidism     Gastroesophageal reflux disease, esophagitis presence not specified     Chronic right-sided low back pain with right-sided sciatica     Scoliosis of lumbar spine, unspecified scoliosis type     Lumbar radiculopathy     Dysuria     Basal cell carcinoma of nose     Renal insufficiency syndrome     History of malignant neoplasm of skin     Skin changes due to chronic exposure to nonionizing radiation     Chronic systolic heart failure (H)     LBBB (left bundle branch block)     Cellulitis of fifth toe of right foot     Plantar  fasciitis     Right foot pain       PAST MEDICAL HISTORY:   Past Medical History:   Diagnosis Date     Lichen planus     vulvar     Osteopenia 4/2010    recheck DEXA in 2 years     Panic disorder without agoraphobia      Pure hypercholesterolemia     low LDLs     Raynaud's disease 6/2014     Unspecified essential hypertension         PAST SURGICAL HISTORY:   Past Surgical History:   Procedure Laterality Date     COLONOSCOPY  10/17/2007    normal, recheck in 10 years     COLONOSCOPY N/A 08/27/2014    Procedure: COLONOSCOPY;  Surgeon: Raffi Montiel MD;  Location: [facility]     COMBINED CYSTOSCOPY, INSERT CATHETER URETER Bilateral 10/13/2014    Procedure: COMBINED CYSTOSCOPY, INSERT CATHETER URETER;  Surgeon: Abdoulaye Odell MD;  Location: [facility]     EYE SURGERY  08/01/2001    right eye muscle repair     HC REMOVAL GALLBLADDER  02/09/2010    lap     HC REMOVAL OF OVARY/TUBE(S)      Salpingo-Oophorectomy, bilateral     HYSTERECTOMY, PAP NO LONGER INDICATED       INJECT EPIDURAL LUMBAR Right 04/10/2017    Procedure: INJECT EPIDURAL LUMBAR;  Surgeon: Raffi Wright MD;  Location: [facility]     INJECT EPIDURAL LUMBAR Right 02/27/2020    Procedure: Right Lumbar 4- Sacral 1 Epidural Steroid Injection;  Surgeon: Antonio Dasilva MD;  Location: [facility]     INJECT EPIDURAL LUMBAR Right 01/29/2021    Procedure: INJECTION, SPINE,  sacral 1 EPIDURAL;  Surgeon: Antonio Dasilva MD;  [facility]     INJECT EPIDURAL TRANSFORAMINAL Right 06/09/2016    Procedure: INJECT EPIDURAL TRANSFORAMINAL;  Surgeon: Antonio Dasilva MD;  [facility]     INJECT EPIDURAL TRANSFORAMINAL Right 10/13/2016    Procedure: INJECT EPIDURAL TRANSFORAMINAL;  Surgeon: Antonio Dasilva MD;  [facility]     INJECT EPIDURAL TRANSFORAMINAL Right 06/18/2021    Procedure: Right L4-5 and right L5-S1 Transforaminal Epidural Steroid Injections with fluoroscopic guidance and contrast.;  Surgeon: Antonio Dasilva MD;  Location[facility]     INJECT JOINT  SACROILIAC Right 04/14/2022    Procedure: Fluoroscopically-guided injection of the Bilateral sacroiliac joints with Bilateral pastor Sacroiliac joint ligaments infiltration over the sacrum;  Surgeon: Antonio Dasilva MD;  [facility]     LAPAROSCOPIC ASSISTED COLECTOMY LEFT (DESCENDING) N/A 10/13/2014    Procedure: LAPAROSCOPIC ASSISTED COLECTOMY LEFT (DESCENDING);  Surgeon: Raffi Montiel MD;  Location[facility]     LUMBAR SPINE SURGERY  09/2021    L2-Pelvis fusion with rods and screws     ZZC APPENDECTOMY       ZZ NONSPECIFIC PROCEDURE      Right inferior oblique recession, 14 mm     ZZC VAG HYST,RMV TUBE/OVARY  01/01/1984    cervix removed        MEDICATIONS:   Current Outpatient Medications:      acetaminophen (TYLENOL) 500 MG tablet, Take 500-1,000 mg by mouth every 6 hours as needed for mild pain, Disp: , Rfl:      ALPRAZolam (XANAX) 0.25 MG tablet, TAKE 1 TABLET (0.25 MG) BY MOUTH NIGHTLY AS NEEDED FOR ANXIETY, Disp: 30 tablet, Rfl: 2     cephALEXin (KEFLEX) 250 MG capsule, Take 1 capsule (250 mg) by mouth 3 times daily for 10 days, Disp: 30 capsule, Rfl: 0     cholecalciferol 25 MCG (1000 UT) TABS, Take 1,000 Units by mouth daily (with dinner) , Disp: , Rfl:      cyclobenzaprine (FLEXERIL) 5 MG tablet, Take 1 tablet (5 mg) by mouth 3 times daily as needed for muscle spasms, Disp: 60 tablet, Rfl: 2     hydrOXYzine (ATARAX) 10 MG tablet, Take 5-10 mg by mouth every 6 hours as needed, Disp: , Rfl:      ibuprofen (ADVIL/MOTRIN) 600 MG tablet, TAKE 1 TABLET (600 MG) BY MOUTH EVERY 6 HOURS AS NEEDED FOR MODERATE PAIN, Disp: 100 tablet, Rfl: 3     levothyroxine (SYNTHROID/LEVOTHROID) 50 MCG tablet, TAKE ONE TABLET BY MOUTH once daily, Disp: 90 tablet, Rfl: 3     losartan (COZAAR) 25 MG tablet, Take 1 tablet (25 mg) by mouth daily, Disp: 90 tablet, Rfl: 3     metoprolol succinate ER (TOPROL XL) 25 MG 24 hr tablet, Take 1 tablet (25 mg) by mouth daily, Disp: 90 tablet, Rfl: 3     omeprazole (PRILOSEC) 20 MG DR  "capsule, TAKE ONE CAPSULE BY MOUTH ONCE DAILY, Disp: 90 capsule, Rfl: 3     simvastatin (ZOCOR) 20 MG tablet, TAKE 1/2 TABLET BY MOUTH EVERY NIGHT (Patient taking differently: Take 10 mg by mouth At Bedtime), Disp: 135 tablet, Rfl: 3     vitamin E (TOCOPHEROL) 400 units (180 mg) capsule, Take 400 Units by mouth daily (with dinner) , Disp: , Rfl:      ALLERGIES:    Allergies   Allergen Reactions     Azithromycin Diarrhea     Ciprofloxacin Other (See Comments)     Cipro, dizziness and abdominal pain     Gabapentin Dizziness and Visual Disturbance     Penicillins      stomach upset, diarrhea     Raspberry Hives     Sulfa Drugs Hives     hives     Augmentin Rash        SOCIAL HISTORY:   Social History     Socioeconomic History     Marital status:      Spouse name: Not on file     Number of children: 4     Years of education: Not on file     Highest education level: Not on file   Occupational History     Occupation: dry cleaning   Tobacco Use     Smoking status: Never     Smokeless tobacco: Never   Vaping Use     Vaping Use: Never used   Substance and Sexual Activity     Alcohol use: No     Drug use: No     Sexual activity: Not Currently     Partners: Male     Birth control/protection: Surgical     Comment: not currently/hysterectomy   Other Topics Concern     Parent/sibling w/ CABG, MI or angioplasty before 65F 55M? No   Social History Narrative     Not on file     Social Determinants of Health     Financial Resource Strain: Not on file   Food Insecurity: Not on file   Transportation Needs: Not on file   Physical Activity: Not on file   Stress: Not on file   Social Connections: Not on file   Intimate Partner Violence: Not on file   Housing Stability: Not on file        FAMILY HISTORY:   Family History   Problem Relation Age of Onset     Diabetes Brother      Heart Disease Brother 50        AMI     Diabetes Brother      Heart Disease Brother         AMI, \"older\"     Cerebrovascular Disease Brother      Psychotic " "Disorder Brother         , alcohol     Cancer Sister         breast cancer x2     Hypertension No family hx of      Breast Cancer No family hx of      Ovarian Cancer No family hx of      Prostate Cancer No family hx of      Mental Illness No family hx of      Anesthesia Reaction No family hx of      Osteoporosis No family hx of      Obesity No family hx of      Other Cancer No family hx of      Depression No family hx of      Anxiety Disorder No family hx of      Mental Illness No family hx of      Substance Abuse No family hx of         EXAM:Vitals: BP (!) 154/70 (BP Location: Left arm, Patient Position: Sitting, Cuff Size: Adult Regular)   Temp 98.1  F (36.7  C) (Temporal)   Ht 1.549 m (5' 1\")   Wt 56.7 kg (125 lb)   LMP  (LMP Unknown)   BMI 23.62 kg/m    BMI= Body mass index is 23.62 kg/m .    General appearance: Patient is alert and fully cooperative with history & exam.  No sign of distress is noted during the visit.     Psychiatric: Affect is pleasant & appropriate.  Patient appears motivated to improve health.     Respiratory: Breathing is regular & unlabored while sitting.     HEENT: Hearing is intact to spoken word.  Speech is clear.  No gross evidence of visual impairment that would impact ambulation.     Vascular: DP 2/4 bilateral, PT 1/4 bilateral.  Mild generalized edema bilateral.  Temperature warm to warm proximal to distal but no hair growth noted on the foot or toes.     Neurologic: Lower extremity sensation is intact to light touch.  No evidence of weakness or contracture in the lower extremities.  No evidence of neuropathy.    Dermatologic: Mildly diminished texture turgor tone about the integument.  Macerated hyperkeratosis noted on the lateral fourth digit PIPJ and a con-commitment or kissing lesion is noted on the medial fifth digit all right foot.  Upon debridement of these they are not full-thickness ulcerations.    Musculoskeletal: Patient is ambulatory without assistive device or " brace.  Forefoot valgus is noted with hallux valgus and semirigid the contracted toes partially reducible.    ASSESSMENT:       ICD-10-CM    1. Hammertoe of right foot  M20.41 DEBRIDE SKIN/SUBQ TISSUE      2. Cellulitis of fifth toe of right foot  L03.031 Orthopedic  Referral     DEBRIDE SKIN/SUBQ TISSUE      3. Skin ulcer of fourth toe, right, limited to breakdown of skin (H)  L97.511 DEBRIDE SKIN/SUBQ TISSUE           PLAN:  Reviewed patient's chart in [facility].      11/10/2022   I debrided the lesion sharply 15 blade scalpel to and including dermis.  And this will certainly provide considerable relief as there was debridement of hyperkeratosis on the lateral fourth toe and leila mitten medial fifth toe all in the right foot.  Continue oral antibiotics x10 days.  Upon later review discovered the antibiotic is to 50 mg 3 times daily.  Her renal function creatinine is normal clearance and if this is slow to resolve over the next week this could be increased.  Recommend interdigital bathing once or twice daily and then application of Betadine once daily to dry this out.  Dispensed a postop shoe to keep separation and no compression of the toes.  Avoid any pressure in or around the lesions and any spacer is likely to provide pressure.  No soaking or long periods of submersion  Follow-up again in about 10-14 days to confirm this is resolving    Patient has a cerebral shunt scheduled in the next few weeks      Nathen Moon DPM            Again, thank you for allowing me to participate in the care of your patient.        Sincerely,        Nathen Moon DPM

## 2022-11-10 NOTE — NURSING NOTE
Dispensed 1 post op shoe, Size women's medium, with [facility] agreement signed by patient.  Discussed with patient this item may not be covered by Medicare, may rec a bill for this item. Patient verbalized understanding. Yolie Stephens [facility], November 10, 2022

## 2022-11-10 NOTE — PROGRESS NOTES
HPI:  Milena Hamilton is a 82 year old female who is seen in consultation at the request of Twin Canales PA-C    Pt presents for eval of:   (Onset, Location, L/R, Character, Treatments, Injury if yes)     Onset late Oct 2022, ulcer lateral Right 4th toe and medial Right 5th toe. Presents with her . Utilizing silcone toe spacer between Right great and 2nd toe.  Constant, sharp, throbbing, shooting pain 7-10/10.     Soaking with vinegar water.  11/7/22 started Keflex 250 mg, 1 capsule 3 times daily for 10 days.    Retired.      ROS:  10 point ROS neg other than the symptoms noted above in the HPI.    Patient Active Problem List   Diagnosis     Lichen planus     Family history of diabetes mellitus     Tinnitus     Panic disorder without agoraphobia     Gastritis     Osteopenia     Hyperlipidemia     Anxiety     DDD (degenerative disc disease), cervical     Advanced directives, counseling/discussion     Sacroiliac dysfunction     Chronic constipation     History of diverticulitis - s/p left hemicolectomy     Diarrhea     Health Care Home     Raynaud's disease     HTN (hypertension)     Premature atrial beats     Acquired hypothyroidism     Gastroesophageal reflux disease, esophagitis presence not specified     Chronic right-sided low back pain with right-sided sciatica     Scoliosis of lumbar spine, unspecified scoliosis type     Lumbar radiculopathy     Dysuria     Basal cell carcinoma of nose     Renal insufficiency syndrome     History of malignant neoplasm of skin     Skin changes due to chronic exposure to nonionizing radiation     Chronic systolic heart failure (H)     LBBB (left bundle branch block)     Cellulitis of fifth toe of right foot     Plantar fasciitis     Right foot pain       PAST MEDICAL HISTORY:   Past Medical History:   Diagnosis Date     Lichen planus     vulvar     Osteopenia 4/2010    recheck DEXA in 2 years     Panic disorder without agoraphobia      Pure hypercholesterolemia     low  LDLs     Raynaud's disease 6/2014     Unspecified essential hypertension         PAST SURGICAL HISTORY:   Past Surgical History:   Procedure Laterality Date     COLONOSCOPY  10/17/2007    normal, recheck in 10 years     COLONOSCOPY N/A 08/27/2014    Procedure: COLONOSCOPY;  Surgeon: Raffi Montiel MD;  Location: [facility]     COMBINED CYSTOSCOPY, INSERT CATHETER URETER Bilateral 10/13/2014    Procedure: COMBINED CYSTOSCOPY, INSERT CATHETER URETER;  Surgeon: Abdoulaye Odell MD;  Location: [facility]     EYE SURGERY  08/01/2001    right eye muscle repair     HC REMOVAL GALLBLADDER  02/09/2010    lap     HC REMOVAL OF OVARY/TUBE(S)      Salpingo-Oophorectomy, bilateral     HYSTERECTOMY, PAP NO LONGER INDICATED       INJECT EPIDURAL LUMBAR Right 04/10/2017    Procedure: INJECT EPIDURAL LUMBAR;  Surgeon: Raffi Wright MD;  [facility]     INJECT EPIDURAL LUMBAR Right 02/27/2020    Procedure: Right Lumbar 4- Sacral 1 Epidural Steroid Injection;  Surgeon: Antonio Dasilva MD;  [facility]     INJECT EPIDURAL LUMBAR Right 01/29/2021    Procedure: INJECTION, SPINE,  sacral 1 EPIDURAL;  Surgeon: Antonio Dasilva MD;  [facility]     INJECT EPIDURAL TRANSFORAMINAL Right 06/09/2016    Procedure: INJECT EPIDURAL TRANSFORAMINAL;  Surgeon: Antonio Dasilva MD;  [facility]     INJECT EPIDURAL TRANSFORAMINAL Right 10/13/2016    Procedure: INJECT EPIDURAL TRANSFORAMINAL;  Surgeon: Antonio Dasilva MD;  [facility]     INJECT EPIDURAL TRANSFORAMINAL Right 06/18/2021    Procedure: Right L4-5 and right L5-S1 Transforaminal Epidural Steroid Injections with fluoroscopic guidance and contrast.;  Surgeon: Antonio Dasilva MD;  [facility]     INJECT JOINT SACROILIAC Right 04/14/2022    Procedure: Fluoroscopically-guided injection of the Bilateral sacroiliac joints with Bilateral pastor Sacroiliac joint ligaments infiltration over the sacrum;  Surgeon: Antonio Dasilva MD;  [facility]     LAPAROSCOPIC ASSISTED  COLECTOMY LEFT (DESCENDING) N/A 10/13/2014    Procedure: LAPAROSCOPIC ASSISTED COLECTOMY LEFT (DESCENDING);  Surgeon: Raffi Montiel MD;  [facility]     LUMBAR SPINE SURGERY  09/2021    L2-Pelvis fusion with rods and screws     ZZC APPENDECTOMY       ZZC NONSPECIFIC PROCEDURE      Right inferior oblique recession, 14 mm     ZZC VAG HYST,RMV TUBE/OVARY  01/01/1984    cervix removed        MEDICATIONS:   Current Outpatient Medications:      acetaminophen (TYLENOL) 500 MG tablet, Take 500-1,000 mg by mouth every 6 hours as needed for mild pain, Disp: , Rfl:      ALPRAZolam (XANAX) 0.25 MG tablet, TAKE 1 TABLET (0.25 MG) BY MOUTH NIGHTLY AS NEEDED FOR ANXIETY, Disp: 30 tablet, Rfl: 2     cephALEXin (KEFLEX) 250 MG capsule, Take 1 capsule (250 mg) by mouth 3 times daily for 10 days, Disp: 30 capsule, Rfl: 0     cholecalciferol 25 MCG (1000 UT) TABS, Take 1,000 Units by mouth daily (with dinner) , Disp: , Rfl:      cyclobenzaprine (FLEXERIL) 5 MG tablet, Take 1 tablet (5 mg) by mouth 3 times daily as needed for muscle spasms, Disp: 60 tablet, Rfl: 2     hydrOXYzine (ATARAX) 10 MG tablet, Take 5-10 mg by mouth every 6 hours as needed, Disp: , Rfl:      ibuprofen (ADVIL/MOTRIN) 600 MG tablet, TAKE 1 TABLET (600 MG) BY MOUTH EVERY 6 HOURS AS NEEDED FOR MODERATE PAIN, Disp: 100 tablet, Rfl: 3     levothyroxine (SYNTHROID/LEVOTHROID) 50 MCG tablet, TAKE ONE TABLET BY MOUTH once daily, Disp: 90 tablet, Rfl: 3     losartan (COZAAR) 25 MG tablet, Take 1 tablet (25 mg) by mouth daily, Disp: 90 tablet, Rfl: 3     metoprolol succinate ER (TOPROL XL) 25 MG 24 hr tablet, Take 1 tablet (25 mg) by mouth daily, Disp: 90 tablet, Rfl: 3     omeprazole (PRILOSEC) 20 MG DR capsule, TAKE ONE CAPSULE BY MOUTH ONCE DAILY, Disp: 90 capsule, Rfl: 3     simvastatin (ZOCOR) 20 MG tablet, TAKE 1/2 TABLET BY MOUTH EVERY NIGHT (Patient taking differently: Take 10 mg by mouth At Bedtime), Disp: 135 tablet, Rfl: 3     vitamin E (TOCOPHEROL) 400  "units (180 mg) capsule, Take 400 Units by mouth daily (with dinner) , Disp: , Rfl:      ALLERGIES:    Allergies   Allergen Reactions     Azithromycin Diarrhea     Ciprofloxacin Other (See Comments)     Cipro, dizziness and abdominal pain     Gabapentin Dizziness and Visual Disturbance     Penicillins      stomach upset, diarrhea     Raspberry Hives     Sulfa Drugs Hives     hives     Augmentin Rash        SOCIAL HISTORY:   Social History     Socioeconomic History     Marital status:      Spouse name: Not on file     Number of children: 4     Years of education: Not on file     Highest education level: Not on file   Occupational History     Occupation: dry cleaning   Tobacco Use     Smoking status: Never     Smokeless tobacco: Never   Vaping Use     Vaping Use: Never used   Substance and Sexual Activity     Alcohol use: No     Drug use: No     Sexual activity: Not Currently     Partners: Male     Birth control/protection: Surgical     Comment: not currently/hysterectomy   Other Topics Concern     Parent/sibling w/ CABG, MI or angioplasty before 65F 55M? No   Social History Narrative     Not on file     Social Determinants of Health     Financial Resource Strain: Not on file   Food Insecurity: Not on file   Transportation Needs: Not on file   Physical Activity: Not on file   Stress: Not on file   Social Connections: Not on file   Intimate Partner Violence: Not on file   Housing Stability: Not on file        FAMILY HISTORY:   Family History   Problem Relation Age of Onset     Diabetes Brother      Heart Disease Brother 50        AMI     Diabetes Brother      Heart Disease Brother         AMI, \"older\"     Cerebrovascular Disease Brother      Psychotic Disorder Brother         , alcohol     Cancer Sister         breast cancer x2     Hypertension No family hx of      Breast Cancer No family hx of      Ovarian Cancer No family hx of      Prostate Cancer No family hx of      Mental Illness No family hx of      " "Anesthesia Reaction No family hx of      Osteoporosis No family hx of      Obesity No family hx of      Other Cancer No family hx of      Depression No family hx of      Anxiety Disorder No family hx of      Mental Illness No family hx of      Substance Abuse No family hx of         EXAM:Vitals: BP (!) 154/70 (BP Location: Left arm, Patient Position: Sitting, Cuff Size: Adult Regular)   Temp 98.1  F (36.7  C) (Temporal)   Ht 1.549 m (5' 1\")   Wt 56.7 kg (125 lb)   LMP  (LMP Unknown)   BMI 23.62 kg/m    BMI= Body mass index is 23.62 kg/m .    General appearance: Patient is alert and fully cooperative with history & exam.  No sign of distress is noted during the visit.     Psychiatric: Affect is pleasant & appropriate.  Patient appears motivated to improve health.     Respiratory: Breathing is regular & unlabored while sitting.     HEENT: Hearing is intact to spoken word.  Speech is clear.  No gross evidence of visual impairment that would impact ambulation.     Vascular: DP 2/4 bilateral, PT 1/4 bilateral.  Mild generalized edema bilateral.  Temperature warm to warm proximal to distal but no hair growth noted on the foot or toes.     Neurologic: Lower extremity sensation is intact to light touch.  No evidence of weakness or contracture in the lower extremities.  No evidence of neuropathy.    Dermatologic: Mildly diminished texture turgor tone about the integument.  Macerated hyperkeratosis noted on the lateral fourth digit PIPJ and a con-commitment or kissing lesion is noted on the medial fifth digit all right foot.  Upon debridement of these they are not full-thickness ulcerations.    Musculoskeletal: Patient is ambulatory without assistive device or brace.  Forefoot valgus is noted with hallux valgus and semirigid the contracted toes partially reducible.    ASSESSMENT:       ICD-10-CM    1. Hammertoe of right foot  M20.41 DEBRIDE SKIN/SUBQ TISSUE      2. Cellulitis of fifth toe of right foot  L03.031 " Orthopedic  Referral     DEBRIDE SKIN/SUBQ TISSUE      3. Skin ulcer of fourth toe, right, limited to breakdown of skin (H)  L97.580 DEBRIDE SKIN/SUBQ TISSUE           PLAN:  Reviewed patient's chart in [facility].      11/10/2022   I debrided the lesion sharply 15 blade scalpel to and including dermis.  And this will certainly provide considerable relief as there was debridement of hyperkeratosis on the lateral fourth toe and leila mitten medial fifth toe all in the right foot.  Continue oral antibiotics x10 days.  Upon later review discovered the antibiotic is to 50 mg 3 times daily.  Her renal function creatinine is normal clearance and if this is slow to resolve over the next week this could be increased.  Recommend interdigital bathing once or twice daily and then application of Betadine once daily to dry this out.  Dispensed a postop shoe to keep separation and no compression of the toes.  Avoid any pressure in or around the lesions and any spacer is likely to provide pressure.  No soaking or long periods of submersion  Follow-up again in about 10-14 days to confirm this is resolving    Patient has a cerebral shunt scheduled in the next few weeks      Nathen Moon DPM

## 2022-11-10 NOTE — PATIENT INSTRUCTIONS
"Nail Debridement    A high quality instrument makes trimming toenails MUCH easier.  Search [employer] for any 5\" nail nipper manufactured by reliable brands such as Miltex, Integra or Jarit as these quality instruments will help manage difficult nails more effectively and comfortably. We use Miltex -SS.  A physician is not necessary to trim nails even if you are taking blood thinners or are diabetic.  Your family or care givers may help manage your toenails.      Trim or sand the nails once weekly.  Do not wait until they are long and painful or trimming will become too difficult and painful and will increase your risk of complications or infection.  A course file or 120 grit sandpaper on a sanding block can be helpful.  For very thick nails many people prefer battery operated ferreira such as an Amope', Personal Pedi and Emjoi for regular use or heavy painful callouses or thick toenails.    Trim or skive any portion of nail that is thick, loose, crumbling, or not well attached. Do not tear the nail away, but rather cut them with a nail nipperor sand or sand them down.  You may follow up with your Podiatric Physician if you have pain, bleeding, infection, questions or other concerns.      You may also contact the following Registered Nurses for further help with nail debridement and minor hygiene concnerns.  They may come to your home or meet them at their clinic to trim your toenails and soak your feet, as well as monitor for any complications that would require evaluation by a Physician.      [facility]  Ingrid Mercer RN  Phone & text [phone]    [facility]  Marsha Reynaga RN  Office [phone]    [facility]  [phone]  [email or web address] - [facility]    For up to date list and to find foot care nurses in other communities visit [employer] Association website:  afcna.org.     Calluses, Corns, IPKs, Porokeratosis    When there is " excessive friction or pressure on the skin, the body responds by making the skin thicker.  While this may protect the deeper structures, the thickened skin can take up more space and thus increase pressure over a bony prominence or become an open sore or skin ulcer as this skin becomes less flexible.    Flat, diffuse thickening are simple calluses and they are usually caused by friction.  Often these are the result of rubbing on a shoe or going barefoot.    Calluses with a central core between the toes are called corns.  These often result from prominent joints on adjacent toes rubbing together.  Theses are often a symptom of bone malalignment and will usually recur unless the underlying bones are addressed.    Many of these lesions can be kept comfortable with routine maintenance. This consists of filing them with a Ped Egg, callus file, or 120 grit sandpaper on a block, every day during your bath or shower.  Most people prefer battery operated ferreira such as an Amope', Personal Pedi and Emjoi for regular use or heavy painful callouses.  Heavy creams or ointments can be applied 1-2 times every day to keep them soft. Toe spacers can be used for corns, gel pads can be used for other lesions on the bottom of the foot. If there is a deformity noted, such as a prominent bone, often this can be addressed to minimize recurrence. However, sometimes the pressure and lesion simply migrates to another spot after surgery, so it is not a guaranteed cure.     If you have severe callouses and cracking, you may apply heavy ointments that you scoop up such as Cetaphil cream, Eucerin, Aquaphor or Vaseline.  Be sure to obtain cream or ointment in these brands and not lotion (lotion is water based and not durable enough for feet). For more aggressive help apply heavy creams or ointment under occlusive dressings such as Saran Wrap or Jelly Feet while sleeping.   Jelly Feet can be obtained at [email or web address].     To be successful  with managing hyperkeratotic skin, you must manage hygiene daily.  Apply the cream once or twice EVERY day.  At your bath or shower time is the easiest time to work on this when skin is most soft.  There is no medical or surgical treatment that will absolutely eliminate many of these symptoms.      [employer] is a reliable source for all sorts of foot pads, cushions, or interdigital spacers and foot appliances. Go to www.[employer] or request a catalog at 1-861-Appsco.        Please call with any additional questions.         Reliable shoe stores: To maximize your experience and provide the best possible fit.  Be sure to show them your foot concerns and tell them Dr. Moon sent you.      Stores listed in bold have only athletic shoes, and stores that are not bold are mostly casual or variety of shoes    [employer]  [address] [place] [address]  [phone]     [facility] [facility]  [address] [place] [address]  [phone]     [employer]  [address] [place] [address]  [phone]    [employer]  [address] [facility]  [facility] [address]  [phone]    [employer]  [address] [place], [place] [address] [phone]    [employer]  [address] [place] [address]  [phone]                         [employer] Locations:     [address] [place] [address]   [phone]     [address] [place] [address]   [phone]     [address] [place] [address]   [phone]     [address] [place] [place] [address]   [phone]     [address] [place] [address]   [phone]     [address] [facility] [place] [address]   [phone]     [address] [facility] [address]   [place] [address]   [phone]     [address]   [place] [address]   [phone]             How to find reasonable shoes          The correct width    Correct Fitting     Correct Length      Foot Distortion    Posture Distortion                          Torsional Rigidity      Grasp behind the heel and underneath the foot and twist      Bad    Excessive torsion/twist in midfoot     Less torsion/twist in midfoot is better                   Heel Counter Rigidity      Grasp just above   midsole and squeeze      Bad    Soft heel counter      Good    Rigid Heel Counter      Flexion Rigidity      Grasp shoe and bend from forefoot to rearfoot

## 2022-11-16 ENCOUNTER — TELEPHONE (OUTPATIENT)
Dept: FAMILY MEDICINE | Facility: OTHER | Age: 82
End: 2022-11-16

## 2022-11-16 ENCOUNTER — OFFICE VISIT (OUTPATIENT)
Dept: FAMILY MEDICINE | Facility: OTHER | Age: 82
End: 2022-11-16
Payer: COMMERCIAL

## 2022-11-16 VITALS
WEIGHT: 130 LBS | HEART RATE: 82 BPM | BODY MASS INDEX: 23.92 KG/M2 | DIASTOLIC BLOOD PRESSURE: 79 MMHG | OXYGEN SATURATION: 97 % | HEIGHT: 62 IN | TEMPERATURE: 98.2 F | SYSTOLIC BLOOD PRESSURE: 138 MMHG

## 2022-11-16 DIAGNOSIS — M20.41 HAMMER TOE OF RIGHT FOOT: ICD-10-CM

## 2022-11-16 DIAGNOSIS — L03.031 CELLULITIS OF FIFTH TOE OF RIGHT FOOT: ICD-10-CM

## 2022-11-16 DIAGNOSIS — Z01.818 PREOP GENERAL PHYSICAL EXAM: Primary | ICD-10-CM

## 2022-11-16 DIAGNOSIS — I44.7 LBBB (LEFT BUNDLE BRANCH BLOCK): ICD-10-CM

## 2022-11-16 DIAGNOSIS — G91.2 NPH (NORMAL PRESSURE HYDROCEPHALUS) (H): ICD-10-CM

## 2022-11-16 PROCEDURE — 99214 OFFICE O/P EST MOD 30 MIN: CPT | Performed by: PHYSICIAN ASSISTANT

## 2022-11-16 ASSESSMENT — PAIN SCALES - GENERAL: PAINLEVEL: NO PAIN (0)

## 2022-11-16 NOTE — PROGRESS NOTES
[address]  [facility] [address]  Phone: [phone]  Primary Provider: Fanny Downs  Pre-op Performing Provider: FANNY DOWNS      PREOPERATIVE EVALUATION:  Today's date: 11/16/2022    Milena Hamilton is a 82 year old female who presents for a preoperative evaluation.    Surgical Information:  Surgery/Procedure: Right Ventriculoperitoneal shunt  Surgery Location: [facility]  Surgeon: Dr. Pillai and Dr. Stephens  Surgery Date: 12/2/22  Time of Surgery: 1:00Pm  Where patient plans to recover: At home with family  Fax number for surgical facility: Note does not need to be faxed, will be available electronically in Epic.    Type of Anesthesia Anticipated: General    Assessment & Plan     The proposed surgical procedure is considered INTERMEDIATE risk.      ICD-10-CM    1. Preop general physical exam  Z01.818       2. NPH (normal pressure hydrocephalus) (H)  G91.2       3. Cellulitis of fifth toe of right foot  L03.031       4. Hammer toe of right foot  M20.41       5. LBBB (left bundle branch block)  I44.7           Continue to follow closely with podiatry for right foot pain/hammertoes.  Cellulitis looks much better and she is finishing antibiotics.    Cleared for surgery from a cardiac standpoint. See cardiology note from 9/8/22.    Medication Instructions:  Hold losartan the day of surgery.    RECOMMENDATION:  APPROVAL GIVEN to proceed with proposed procedure, without further diagnostic evaluation.      Subjective     HPI related to upcoming procedure: She has NPH and will be undergoing a  shunt placement as noted above.     Preop Questions 8/27/2021   1. Have you ever had a heart attack or stroke? No   2. Have you ever had surgery on your heart or blood vessels, such as a stent placement, a coronary artery bypass, or surgery on an artery in your head, neck, heart, or legs? No   3. Do you have chest pain with activity? No   4. Do you  have a history of  heart failure? No   5. Do you currently have a cold, bronchitis or symptoms of other infection? No   6. Do you have a cough, shortness of breath, or wheezing? No   7. Do you or anyone in your family have previous history of blood clots? No   8. Do you or does anyone in your family have a serious bleeding problem such as prolonged bleeding following surgeries or cuts? No   9. Have you ever had problems with anemia or been told to take iron pills? No   10. Have you had any abnormal blood loss such as black, tarry or bloody stools, or abnormal vaginal bleeding? No   11. Have you ever had a blood transfusion? No   12. Are you willing to have a blood transfusion if it is medically needed before, during, or after your surgery? Yes   13. Have you or any of your relatives ever had problems with anesthesia? No   14. Do you have sleep apnea, excessive snoring or daytime drowsiness? No   15. Do you have any artifical heart valves or other implanted medical devices like a pacemaker, defibrillator, or continuous glucose monitor? No   16. Do you have artificial joints? No   17. Are you allergic to latex? No   18. Is there any chance that you may be pregnant? -     Health Care Directive:  Patient does not have a Health Care Directive or Living Will: Advance Directive received and scanned. Click on Code in the patient header to view.    Status of Chronic Conditions:  See problem list for active medical problems.  Problems all longstanding and stable, except as noted/documented.  See ROS for pertinent symptoms related to these conditions.      Review of Systems  CONSTITUTIONAL: NEGATIVE for fever, chills, change in weight  INTEGUMENTARY/SKIN: NEGATIVE for worrisome rashes, moles or lesions  EYES: NEGATIVE for vision changes or irritation  ENT/MOUTH: NEGATIVE for ear, mouth and throat problems  RESP: NEGATIVE for significant cough or SOB  CV: NEGATIVE for chest pain, palpitations or peripheral edema  GI: NEGATIVE  for nausea, abdominal pain, heartburn, or change in bowel habits  : NEGATIVE for frequency, dysuria, or hematuria  MUSCULOSKELETAL: +right foot pain.   NEURO: +Poor balance. NEGATIVE for weakness, dizziness or paresthesias  ENDOCRINE: NEGATIVE for temperature intolerance, skin/hair changes  HEME: NEGATIVE for bleeding problems  PSYCHIATRIC: NEGATIVE for changes in mood or affect    Patient Active Problem List    Diagnosis Date Noted     Cellulitis of fifth toe of right foot 11/07/2022     Priority: Medium     Plantar fasciitis 11/07/2022     Priority: Medium     Right foot pain 11/07/2022     Priority: Medium     Chronic systolic heart failure (H) 09/14/2022     Priority: Medium     LBBB (left bundle branch block) 09/14/2022     Priority: Medium     Dysuria 11/24/2021     Priority: Medium     Basal cell carcinoma of nose 09/08/2021     Priority: Medium     History of malignant neoplasm of skin 09/08/2021     Priority: Medium     Skin changes due to chronic exposure to nonionizing radiation 09/08/2021     Priority: Medium     Renal insufficiency syndrome 09/04/2021     Priority: Medium     Formatting of this note might be different from the original.  Creatinine clearance 49       Lumbar radiculopathy 12/28/2018     Priority: Medium     Scoliosis of lumbar spine, unspecified scoliosis type 12/12/2018     Priority: Medium     Chronic right-sided low back pain with right-sided sciatica 05/30/2018     Priority: Medium     Acquired hypothyroidism 04/28/2017     Priority: Medium     Gastroesophageal reflux disease, esophagitis presence not specified 04/28/2017     Priority: Medium     IMO Regulatory Load OCT 2020       Premature atrial beats 11/30/2016     Priority: Medium     HTN (hypertension) 07/10/2014     Priority: Medium     Raynaud's disease 06/01/2014     Priority: Medium     Health Care Home 12/30/2013     Priority: Medium     Status:  Closed  Care Coordinator:  Tasha Vasquez RN    See Letters for [facility] Care  Plan             History of diverticulitis - s/p left hemicolectomy 11/30/2013     Priority: Medium     Diarrhea 11/30/2013     Priority: Medium     Chronic constipation 06/18/2013     Priority: Medium     Sacroiliac dysfunction 10/09/2012     Priority: Medium     Advanced directives, counseling/discussion 10/03/2011     Priority: Medium     Advance Directive Problem List Overview:   Name Relationship Phone    Primary Health Care Agent            Alternative Health Care Agent          Discussed advance care planning with patient; information given to patient to review. 10/3/2011          DDD (degenerative disc disease), cervical 08/10/2011     Priority: Medium     Anxiety 10/24/2010     Priority: Medium     Hyperlipidemia 09/29/2010     Priority: Medium     Osteopenia 04/01/2010     Priority: Medium     recheck DEXA in 2 years       Gastritis 03/15/2010     Priority: Medium     Panic disorder without agoraphobia 10/08/2004     Priority: Medium     Tinnitus 09/13/2004     Priority: Medium     Problem list name updated by automated process. Provider to review       Family history of diabetes mellitus 09/09/2002     Priority: Medium     Lichen planus      Priority: Medium      Past Medical History:   Diagnosis Date     Lichen planus     vulvar     Osteopenia 4/2010    recheck DEXA in 2 years     Panic disorder without agoraphobia      Pure hypercholesterolemia     low LDLs     Raynaud's disease 6/2014     Unspecified essential hypertension      Past Surgical History:   Procedure Laterality Date     COLONOSCOPY  10/17/2007    normal, recheck in 10 years     COLONOSCOPY N/A 08/27/2014    Procedure: COLONOSCOPY;  Surgeon: Raffi Montiel MD;  Location:  GI     COMBINED CYSTOSCOPY, INSERT CATHETER URETER Bilateral 10/13/2014    Procedure: COMBINED CYSTOSCOPY, INSERT CATHETER URETER;  Surgeon: Abdoulaye Odell MD;  Location:  OR     EYE SURGERY  08/01/2001    right eye muscle repair     HC REMOVAL GALLBLADDER   02/09/2010    lap     HC REMOVAL OF OVARY/TUBE(S)      Salpingo-Oophorectomy, bilateral     HYSTERECTOMY, PAP NO LONGER INDICATED       INJECT EPIDURAL LUMBAR Right 04/10/2017    Procedure: INJECT EPIDURAL LUMBAR;  Surgeon: Raffi Wright MD;  Location: [facility]     INJECT EPIDURAL LUMBAR Right 02/27/2020    Procedure: Right Lumbar 4- Sacral 1 Epidural Steroid Injection;  Surgeon: Antonio Dasilva MD;  Location: [facility]     INJECT EPIDURAL LUMBAR Right 01/29/2021    Procedure: INJECTION, SPINE,  sacral 1 EPIDURAL;  Surgeon: Antonio Dasilva MD;  [facility]     INJECT EPIDURAL TRANSFORAMINAL Right 06/09/2016    Procedure: INJECT EPIDURAL TRANSFORAMINAL;  Surgeon: Antonio Dasilva MD;  [facility]     INJECT EPIDURAL TRANSFORAMINAL Right 10/13/2016    Procedure: INJECT EPIDURAL TRANSFORAMINAL;  Surgeon: Antonio Dasilva MD;  [facility]: [facility]     INJECT EPIDURAL TRANSFORAMINAL Right 06/18/2021    Procedure: Right L4-5 and right L5-S1 Transforaminal Epidural Steroid Injections with fluoroscopic guidance and contrast.;  Surgeon: Antonio Dasilva MD;  Location: [facility]     INJECT JOINT SACROILIAC Right 04/14/2022    Procedure: Fluoroscopically-guided injection of the Bilateral sacroiliac joints with Bilateral pastor Sacroiliac joint ligaments infiltration over the sacrum;  Surgeon: Antonio Dasilva MD;  Location: [facility]     LAPAROSCOPIC ASSISTED COLECTOMY LEFT (DESCENDING) N/A 10/13/2014    Procedure: LAPAROSCOPIC ASSISTED COLECTOMY LEFT (DESCENDING);  Surgeon: Raffi Montiel MD;  Location: [facility]     LUMBAR SPINE SURGERY  09/2021    L2-Pelvis fusion with rods and screws     ZZC APPENDECTOMY       ZZC NONSPECIFIC PROCEDURE      Right inferior oblique recession, 14 mm     ZZC VAG HYST,RMV TUBE/OVARY  01/01/1984    cervix removed     Current Outpatient Medications   Medication Sig Dispense Refill     acetaminophen (TYLENOL) 500 MG tablet Take 500-1,000 mg by mouth every 6 hours as needed for mild pain       ALPRAZolam  "(XANAX) 0.25 MG tablet TAKE 1 TABLET (0.25 MG) BY MOUTH NIGHTLY AS NEEDED FOR ANXIETY 30 tablet 2     cholecalciferol 25 MCG (1000 UT) TABS Take 1,000 Units by mouth daily (with dinner)        levothyroxine (SYNTHROID/LEVOTHROID) 50 MCG tablet TAKE ONE TABLET BY MOUTH once daily 90 tablet 3     losartan (COZAAR) 25 MG tablet Take 1 tablet (25 mg) by mouth daily 90 tablet 3     metoprolol succinate ER (TOPROL XL) 25 MG 24 hr tablet Take 1 tablet (25 mg) by mouth daily 90 tablet 3     omeprazole (PRILOSEC) 20 MG DR capsule TAKE ONE CAPSULE BY MOUTH ONCE DAILY 90 capsule 3     simvastatin (ZOCOR) 20 MG tablet TAKE 1/2 TABLET BY MOUTH EVERY NIGHT (Patient taking differently: Take 10 mg by mouth At Bedtime) 135 tablet 3     vitamin E (TOCOPHEROL) 400 units (180 mg) capsule Take 400 Units by mouth daily (with dinner)        cephALEXin (KEFLEX) 250 MG capsule Take 1 capsule (250 mg) by mouth 3 times daily for 10 days (Patient not taking: Reported on 11/16/2022) 30 capsule 0       Allergies   Allergen Reactions     Azithromycin Diarrhea     Ciprofloxacin Other (See Comments)     Cipro, dizziness and abdominal pain     Gabapentin Dizziness and Visual Disturbance     Penicillins      stomach upset, diarrhea     Raspberry Hives     Sulfa Drugs Hives     hives     Augmentin Rash        Social History     Tobacco Use     Smoking status: Never     Smokeless tobacco: Never   Substance Use Topics     Alcohol use: No       History   Drug Use No         Objective     /79 (BP Location: Right arm, Patient Position: Sitting, Cuff Size: Adult Regular)   Pulse 82   Temp 98.2  F (36.8  C) (Oral)   Ht 1.562 m (5' 1.5\")   Wt 59 kg (130 lb)   LMP  (LMP Unknown)   SpO2 97%   BMI 24.17 kg/m      Physical Exam    GENERAL APPEARANCE: healthy, alert and no distress     EYES: EOMI, PERRL     HENT: ear canals and TM's normal and nose and mouth without ulcers or lesions     NECK: no adenopathy, no asymmetry, masses, or scars and thyroid " normal to palpation     RESP: lungs clear to auscultation - no rales, rhonchi or wheezes     CV: regular rate and rhythm, normal S1 S2, no S3 or S4 and no murmur, click or rub     ABDOMEN:  soft, nontender, no HSM or masses and bowel sounds normal     MS: extremities normal- no gross deformities noted, no evidence of inflammation in joints, FROM in all extremities.     SKIN: Mild erythema of right 4th and 5th toes without warmth or tenderness although mild tenderness over right 4th toe callus.      NEURO: Normal strength and tone, sensory exam grossly normal, mentation intact and speech normal. Gait is slowed and shuffled.      PSYCH: mentation appears normal. and affect normal/bright     LYMPHATICS: No cervical adenopathy    Recent Labs   Lab Test 10/03/22  1315 08/31/22  2151 06/26/22  2236 04/06/22  1819   HGB 12.6 12.7   < > 13.3    249   < > 236   INR  --   --   --  0.88    142   < > 140   POTASSIUM 4.3 3.8   < > 3.8   CR 0.66 0.76   < > 0.66    < > = values in this interval not displayed.        Diagnostics:  No labs were ordered during this visit.   EKG 6/26/22: Normal sinus rhythm, Left bundle branch block, no ST/T wave changes concerning for ischemia    Echocardiogram 9/1/22  Interpretation Summary     The left ventricle is normal in size.  There is normal left ventricular wall thickness.  The visual ejection fraction is 50-55%.  Septal motion is consistent with conduction abnormality.  No significant valve issues.  Compared to prior study, there is no significant change.    Revised Cardiac Risk Index (RCRI):  The patient has the following serious cardiovascular risks for perioperative complications:   - No serious cardiac risks = 0 points     RCRI Interpretation: 0 points: Class I (very low risk - 0.4% complication rate)           Signed Electronically by: Chuck Streeter PA-C  Copy of this evaluation report is provided to requesting physician.

## 2022-11-16 NOTE — PATIENT INSTRUCTIONS
Hold losartan the day of surgery.  Preparing for Your Surgery  Getting started  A nurse will call you to review your health history and instructions. They will give you an arrival time based on your scheduled surgery time. Please be ready to share:  Your doctor s clinic name and phone number  Your medical, surgical, and anesthesia history  A list of allergies and sensitivities  A list of medicines, including herbal treatments and over-the-counter drugs  Whether the patient has a legal guardian (ask how to send us the papers in advance)  Please tell us if you re pregnant--or if there s any chance you might be pregnant. Some surgeries may injure a fetus (unborn baby), so they require a pregnancy test. Surgeries that are safe for a fetus don t always need a test, and you can choose whether to have one.   If you have a child who s having surgery, please ask for a copy of Preparing for Your Child s Surgery.    Preparing for surgery  Within 10 to 30 days of surgery: Have a pre-op exam (sometimes called an H&P, or History and Physical). This can be done at a clinic or pre-operative center.  If you re having a , you may not need this exam. Talk to your care team.  At your pre-op exam, talk to your care team about all medicines you take. If you need to stop any medicines before surgery, ask when to start taking them again.  We do this for your safety. Many medicines can make you bleed too much during surgery. Some change how well surgery (anesthesia) drugs work.  Call your insurance company to let them know you re having surgery. (If you don t have insurance, call [phone].)  Call your clinic if there s any change in your health. This includes signs of a cold or flu (sore throat, runny nose, cough, rash, fever). It also includes a scrape or scratch near the surgery site.  If you have questions on the day of surgery, call your hospital or surgery center.  COVID testing  You may need to be tested for COVID-19  before having surgery. If so, we will give you instructions (or click here).  Eating and drinking guidelines  For your safety: Unless your surgeon tells you otherwise, follow the guidelines below.  Eat and drink as usual until 8 hours before you arrive for surgery. After that, no food or milk.  Drink clear liquids until 2 hours before you arrive. These are liquids you can see through, like water, Gatorade, and Propel Water. They also include plain black coffee and tea (no cream or milk), candy, and breath mints. You can spit out gum when you arrive.  If you drink alcohol: Stop drinking it the night before surgery.  If your care team tells you to take medicine on the morning of surgery, it s okay to take it with a sip of water.  Preventing infection  Shower or bathe the night before and morning of your surgery. Follow the instructions your clinic gave you. (If no instructions, use regular soap.)  Don t shave or clip hair near your surgery site. We ll remove the hair if needed.  Don t smoke or vape the morning of surgery. You may chew nicotine gum up to 2 hours before surgery. A nicotine patch is okay.  Note: Some surgeries require you to completely quit smoking and nicotine. Check with your surgeon.  Your care team will make every effort to keep you safe from infection. We will:  Clean our hands often with soap and water (or an alcohol-based hand rub).  Clean the skin at your surgery site with a special soap that kills germs.  Give you a special gown to keep you warm. (Cold raises the risk of infection.)  Wear special hair covers, masks, gowns and gloves during surgery.  Give antibiotic medicine, if prescribed. Not all surgeries need antibiotics.  What to bring on the day of surgery  Photo ID and insurance card  Copy of your health care directive, if you have one  Glasses and hearing aids (bring cases)  You can t wear contacts during surgery  Inhaler and eye drops, if you use them (tell us about these when you  arrive)  CPAP machine or breathing device, if you use them  A few personal items, if spending the night  If you have . . .  A pacemaker, ICD (cardiac defibrillator) or other implant: Bring the ID card.  An implanted stimulator: Bring the remote control.  A legal guardian: Bring a copy of the certified (court-stamped) guardianship papers.  Please remove any jewelry, including body piercings. Leave jewelry and other valuables at home.  If you re going home the day of surgery  You must have a responsible adult drive you home. They should stay with you overnight as well.  If you don t have someone to stay with you, and you aren t safe to go home alone, we may keep you overnight. Insurance often won t pay for this.  After surgery  If it s hard to control your pain or you need more pain medicine, please call your surgeon s office.  Questions?   If you have any questions for your care team, list them here:   ____________________________________________________________________________________________________________________________________________________________________________________________________________________________________________________________________  For informational purposes only. Not to replace the advice of your health care provider. Copyright   2003, 2019 [employer]. All rights reserved. Clinically reviewed by Evelia Martinez MD. [employer] [id number] - REV 10/22.    Preparing for Your Surgery  Getting started  A nurse will call you to review your health history and instructions. They will give you an arrival time based on your scheduled surgery time. Please be ready to share:  Your doctor s clinic name and phone number  Your medical, surgical, and anesthesia history  A list of allergies and sensitivities  A list of medicines, including herbal treatments and over-the-counter drugs  Whether the patient has a legal guardian (ask how to send us the papers in advance)  Please tell us if you re  pregnant--or if there s any chance you might be pregnant. Some surgeries may injure a fetus (unborn baby), so they require a pregnancy test. Surgeries that are safe for a fetus don t always need a test, and you can choose whether to have one.   If you have a child who s having surgery, please ask for a copy of Preparing for Your Child s Surgery.    Preparing for surgery  Within 10 to 30 days of surgery: Have a pre-op exam (sometimes called an H&P, or History and Physical). This can be done at a clinic or pre-operative center.  If you re having a , you may not need this exam. Talk to your care team.  At your pre-op exam, talk to your care team about all medicines you take. If you need to stop any medicines before surgery, ask when to start taking them again.  We do this for your safety. Many medicines can make you bleed too much during surgery. Some change how well surgery (anesthesia) drugs work.  Call your insurance company to let them know you re having surgery. (If you don t have insurance, call [phone].)  Call your clinic if there s any change in your health. This includes signs of a cold or flu (sore throat, runny nose, cough, rash, fever). It also includes a scrape or scratch near the surgery site.  If you have questions on the day of surgery, call your hospital or surgery center.  COVID testing  You may need to be tested for COVID-19 before having surgery. If so, we will give you instructions (or click here).  Eating and drinking guidelines  For your safety: Unless your surgeon tells you otherwise, follow the guidelines below.  Eat and drink as usual until 8 hours before you arrive for surgery. After that, no food or milk.  Drink clear liquids until 2 hours before you arrive. These are liquids you can see through, like water, Gatorade, and Propel Water. They also include plain black coffee and tea (no cream or milk), candy, and breath mints. You can spit out gum when you arrive.  If you drink  alcohol: Stop drinking it the night before surgery.  If your care team tells you to take medicine on the morning of surgery, it s okay to take it with a sip of water.  Preventing infection  Shower or bathe the night before and morning of your surgery. Follow the instructions your clinic gave you. (If no instructions, use regular soap.)  Don t shave or clip hair near your surgery site. We ll remove the hair if needed.  Don t smoke or vape the morning of surgery. You may chew nicotine gum up to 2 hours before surgery. A nicotine patch is okay.  Note: Some surgeries require you to completely quit smoking and nicotine. Check with your surgeon.  Your care team will make every effort to keep you safe from infection. We will:  Clean our hands often with soap and water (or an alcohol-based hand rub).  Clean the skin at your surgery site with a special soap that kills germs.  Give you a special gown to keep you warm. (Cold raises the risk of infection.)  Wear special hair covers, masks, gowns and gloves during surgery.  Give antibiotic medicine, if prescribed. Not all surgeries need antibiotics.  What to bring on the day of surgery  Photo ID and insurance card  Copy of your health care directive, if you have one  Glasses and hearing aids (bring cases)  You can t wear contacts during surgery  Inhaler and eye drops, if you use them (tell us about these when you arrive)  CPAP machine or breathing device, if you use them  A few personal items, if spending the night  If you have . . .  A pacemaker, ICD (cardiac defibrillator) or other implant: Bring the ID card.  An implanted stimulator: Bring the remote control.  A legal guardian: Bring a copy of the certified (court-stamped) guardianship papers.  Please remove any jewelry, including body piercings. Leave jewelry and other valuables at home.  If you re going home the day of surgery  You must have a responsible adult drive you home. They should stay with you overnight as  well.  If you don t have someone to stay with you, and you aren t safe to go home alone, we may keep you overnight. Insurance often won t pay for this.  After surgery  If it s hard to control your pain or you need more pain medicine, please call your surgeon s office.  Questions?   If you have any questions for your care team, list them here:   ____________________________________________________________________________________________________________________________________________________________________________________________________________________________________________________________________  For informational purposes only. Not to replace the advice of your health care provider. Copyright   2003, 2019 [facility]. All rights reserved. Clinically reviewed by Evelia Martinez MD. [employer] [id number] - REV 10/22.

## 2022-11-16 NOTE — TELEPHONE ENCOUNTER
Lactobacillus/acidophillus is a good one or she can just ask the pharmacist.    Chuck Streeter PA-C

## 2022-11-22 ENCOUNTER — HOSPITAL ENCOUNTER (OUTPATIENT)
Dept: CT IMAGING | Facility: CLINIC | Age: 82
Discharge: HOME | End: 2022-11-22
Attending: STUDENT IN AN ORGANIZED HEALTH CARE EDUCATION/TRAINING PROGRAM | Admitting: STUDENT IN AN ORGANIZED HEALTH CARE EDUCATION/TRAINING PROGRAM
Payer: COMMERCIAL

## 2022-11-22 DIAGNOSIS — G91.2 IDIOPATHIC NORMAL PRESSURE HYDROCEPHALUS (INPH) (H): ICD-10-CM

## 2022-11-22 PROCEDURE — 70450 CT HEAD/BRAIN W/O DYE: CPT

## 2022-11-28 ENCOUNTER — LAB (OUTPATIENT)
Dept: LAB | Facility: OTHER | Age: 82
End: 2022-11-28
Attending: STUDENT IN AN ORGANIZED HEALTH CARE EDUCATION/TRAINING PROGRAM
Payer: COMMERCIAL

## 2022-11-28 DIAGNOSIS — Z01.818 PRE-OP TESTING: ICD-10-CM

## 2022-11-28 PROCEDURE — U0003 INFECTIOUS AGENT DETECTION BY NUCLEIC ACID (DNA OR RNA); SEVERE ACUTE RESPIRATORY SYNDROME CORONAVIRUS 2 (SARS-COV-2) (CORONAVIRUS DISEASE [COVID-19]), AMPLIFIED PROBE TECHNIQUE, MAKING USE OF HIGH THROUGHPUT TECHNOLOGIES AS DESCRIBED BY CMS-2020-01-R: HCPCS

## 2022-11-28 PROCEDURE — U0005 INFEC AGEN DETEC AMPLI PROBE: HCPCS

## 2022-11-29 LAB — SARS-COV-2 RNA RESP QL NAA+PROBE: NEGATIVE

## 2022-11-30 ENCOUNTER — OFFICE VISIT (OUTPATIENT)
Dept: PODIATRY | Facility: CLINIC | Age: 82
End: 2022-11-30
Payer: COMMERCIAL

## 2022-11-30 VITALS
TEMPERATURE: 97.7 F | HEIGHT: 62 IN | WEIGHT: 130 LBS | DIASTOLIC BLOOD PRESSURE: 70 MMHG | SYSTOLIC BLOOD PRESSURE: 136 MMHG | BODY MASS INDEX: 23.92 KG/M2

## 2022-11-30 DIAGNOSIS — L97.511: ICD-10-CM

## 2022-11-30 DIAGNOSIS — M20.41 HAMMERTOE OF RIGHT FOOT: Primary | ICD-10-CM

## 2022-11-30 PROCEDURE — 99213 OFFICE O/P EST LOW 20 MIN: CPT | Performed by: PODIATRIST

## 2022-11-30 ASSESSMENT — PAIN SCALES - GENERAL: PAINLEVEL: MODERATE PAIN (4)

## 2022-11-30 NOTE — PROGRESS NOTES
Chief Complaint   Patient presents with     WOUND CARE     Ulcer lateral 4th and medial 5th Right toes, onset late Oct 2022; LOV 11/10/2022     Other     Presents with her  11/30/2022     HPI:  Milena Hamilton is a 82 year old female who is seen in consultation at the request of Twin Canales PA-C    Pt presents for eval of:   (Onset, Location, L/R, Character, Treatments, Injury if yes)     Onset late Oct 2022, ulcer lateral Right 4th toe and medial Right 5th toe. Presents with her . Utilizing silcone toe spacer between Right great and 2nd toe.  Constant, sharp, throbbing, shooting pain 7-10/10.     Soaking with vinegar water.  11/7/22 started Keflex 250 mg, 1 capsule 3 times daily for 10 days.    Retired.      ROS:  10 point ROS neg other than the symptoms noted above in the HPI.    Patient Active Problem List   Diagnosis     Lichen planus     Family history of diabetes mellitus     Tinnitus     Panic disorder without agoraphobia     Gastritis     Osteopenia     Hyperlipidemia     Anxiety     DDD (degenerative disc disease), cervical     Advanced directives, counseling/discussion     Sacroiliac dysfunction     Chronic constipation     History of diverticulitis - s/p left hemicolectomy     Diarrhea     Health Care Home     Raynaud's disease     HTN (hypertension)     Premature atrial beats     Acquired hypothyroidism     Gastroesophageal reflux disease, esophagitis presence not specified     Chronic right-sided low back pain with right-sided sciatica     Scoliosis of lumbar spine, unspecified scoliosis type     Lumbar radiculopathy     Dysuria     Basal cell carcinoma of nose     Renal insufficiency syndrome     History of malignant neoplasm of skin     Skin changes due to chronic exposure to nonionizing radiation     Chronic systolic heart failure (H)     LBBB (left bundle branch block)     Cellulitis of fifth toe of right foot     Plantar fasciitis     Right foot pain       PAST MEDICAL HISTORY:    Past Medical History:   Diagnosis Date     Lichen planus     vulvar     Osteopenia 4/2010    recheck DEXA in 2 years     Panic disorder without agoraphobia      Pure hypercholesterolemia     low LDLs     Raynaud's disease 6/2014     Unspecified essential hypertension         PAST SURGICAL HISTORY:   Past Surgical History:   Procedure Laterality Date     COLONOSCOPY  10/17/2007    normal, recheck in 10 years     COLONOSCOPY N/A 08/27/2014    Procedure: COLONOSCOPY;  Surgeon: Raffi Montiel MD;  Location: [facility]     COMBINED CYSTOSCOPY, INSERT CATHETER URETER Bilateral 10/13/2014    Procedure: COMBINED CYSTOSCOPY, INSERT CATHETER URETER;  Surgeon: Abdoulaye Odell MD;  Location: [facility]     EYE SURGERY  08/01/2001    right eye muscle repair     HC REMOVAL GALLBLADDER  02/09/2010    lap     HC REMOVAL OF OVARY/TUBE(S)      Salpingo-Oophorectomy, bilateral     HYSTERECTOMY, PAP NO LONGER INDICATED       INJECT EPIDURAL LUMBAR Right 04/10/2017    Procedure: INJECT EPIDURAL LUMBAR;  Surgeon: Raffi Wright MD;  Location: [facility]     INJECT EPIDURAL LUMBAR Right 02/27/2020    Procedure: Right Lumbar 4- Sacral 1 Epidural Steroid Injection;  Surgeon: Antonio Dasilva MD;  Location: [facility]     INJECT EPIDURAL LUMBAR Right 01/29/2021    Procedure: INJECTION, SPINE,  sacral 1 EPIDURAL;  Surgeon: Antonio Dasilva MD;  Location: [facility]     INJECT EPIDURAL TRANSFORAMINAL Right 06/09/2016    Procedure: INJECT EPIDURAL TRANSFORAMINAL;  Surgeon: Antonio Dasilva MD;  Location: [facility]     INJECT EPIDURAL TRANSFORAMINAL Right 10/13/2016    Procedure: INJECT EPIDURAL TRANSFORAMINAL;  Surgeon: Antonio Dasilva MD;  [facility]     INJECT EPIDURAL TRANSFORAMINAL Right 06/18/2021    Procedure: Right L4-5 and right L5-S1 Transforaminal Epidural Steroid Injections with fluoroscopic guidance and contrast.;  Surgeon: Antonio Dasilva MD;  Location: [facility]     INJECT JOINT SACROILIAC Right 04/14/2022    Procedure:  Fluoroscopically-guided injection of the Bilateral sacroiliac joints with Bilateral pasotr Sacroiliac joint ligaments infiltration over the sacrum;  Surgeon: Antonio Dasilva MD;  Location: [facility]     LAPAROSCOPIC ASSISTED COLECTOMY LEFT (DESCENDING) N/A 10/13/2014    Procedure: LAPAROSCOPIC ASSISTED COLECTOMY LEFT (DESCENDING);  Surgeon: Raffi Montiel MD;  Location: [facility]     LUMBAR SPINE SURGERY  09/2021    L2-Pelvis fusion with rods and screws     ZZC APPENDECTOMY       ZZC NONSPECIFIC PROCEDURE      Right inferior oblique recession, 14 mm     ZZC VAG HYST,RMV TUBE/OVARY  01/01/1984    cervix removed        MEDICATIONS:   Current Outpatient Medications:      acetaminophen (TYLENOL) 500 MG tablet, Take 500-1,000 mg by mouth every 6 hours as needed for mild pain, Disp: , Rfl:      ALPRAZolam (XANAX) 0.25 MG tablet, TAKE 1 TABLET (0.25 MG) BY MOUTH NIGHTLY AS NEEDED FOR ANXIETY, Disp: 30 tablet, Rfl: 2     cholecalciferol 25 MCG (1000 UT) TABS, Take 1,000 Units by mouth daily (with dinner) , Disp: , Rfl:      levothyroxine (SYNTHROID/LEVOTHROID) 50 MCG tablet, TAKE ONE TABLET BY MOUTH once daily, Disp: 90 tablet, Rfl: 3     losartan (COZAAR) 25 MG tablet, Take 1 tablet (25 mg) by mouth daily, Disp: 90 tablet, Rfl: 3     metoprolol succinate ER (TOPROL XL) 25 MG 24 hr tablet, Take 1 tablet (25 mg) by mouth daily, Disp: 90 tablet, Rfl: 3     omeprazole (PRILOSEC) 20 MG DR capsule, TAKE ONE CAPSULE BY MOUTH ONCE DAILY, Disp: 90 capsule, Rfl: 3     simvastatin (ZOCOR) 20 MG tablet, TAKE 1/2 TABLET BY MOUTH EVERY NIGHT (Patient taking differently: Take 10 mg by mouth At Bedtime), Disp: 135 tablet, Rfl: 3     vitamin E (TOCOPHEROL) 400 units (180 mg) capsule, Take 400 Units by mouth daily (with dinner) , Disp: , Rfl:      ALLERGIES:    Allergies   Allergen Reactions     Azithromycin Diarrhea     Ciprofloxacin Other (See Comments)     Cipro, dizziness and abdominal pain     Gabapentin Dizziness and Visual Disturbance      "Penicillins      stomach upset, diarrhea     Raspberry Hives     Sulfa Drugs Hives     hives     Augmentin Rash        SOCIAL HISTORY:   Social History     Socioeconomic History     Marital status:      Spouse name: Not on file     Number of children: 4     Years of education: Not on file     Highest education level: Not on file   Occupational History     Occupation: dry cleaning   Tobacco Use     Smoking status: Never     Smokeless tobacco: Never   Vaping Use     Vaping Use: Never used   Substance and Sexual Activity     Alcohol use: No     Drug use: No     Sexual activity: Not Currently     Partners: Male     Birth control/protection: Surgical     Comment: not currently/hysterectomy   Other Topics Concern     Parent/sibling w/ CABG, MI or angioplasty before 65F 55M? No   Social History Narrative     Not on file     Social Determinants of Health     Financial Resource Strain: Not on file   Food Insecurity: Not on file   Transportation Needs: Not on file   Physical Activity: Not on file   Stress: Not on file   Social Connections: Not on file   Intimate Partner Violence: Not on file   Housing Stability: Not on file        FAMILY HISTORY:   Family History   Problem Relation Age of Onset     Diabetes Brother      Heart Disease Brother 50        AMI     Diabetes Brother      Heart Disease Brother         AMI, \"older\"     Cerebrovascular Disease Brother      Psychotic Disorder Brother         , alcohol     Cancer Sister         breast cancer x2     Hypertension No family hx of      Breast Cancer No family hx of      Ovarian Cancer No family hx of      Prostate Cancer No family hx of      Mental Illness No family hx of      Anesthesia Reaction No family hx of      Osteoporosis No family hx of      Obesity No family hx of      Other Cancer No family hx of      Depression No family hx of      Anxiety Disorder No family hx of      Mental Illness No family hx of      Substance Abuse No family hx of       " "  EXAM:Vitals: /70 (BP Location: Left arm, Patient Position: Sitting, Cuff Size: Adult Regular)   Temp 97.7  F (36.5  C) (Temporal)   Ht 1.562 m (5' 1.5\")   Wt 59 kg (130 lb)   LMP  (LMP Unknown)   BMI 24.17 kg/m    BMI= Body mass index is 24.17 kg/m .    General appearance: Patient is alert and fully cooperative with history & exam.  No sign of distress is noted during the visit.     Psychiatric: Affect is pleasant & appropriate.  Patient appears motivated to improve health.     Respiratory: Breathing is regular & unlabored while sitting.     HEENT: Hearing is intact to spoken word.  Speech is clear.  No gross evidence of visual impairment that would impact ambulation.     Vascular: DP 2/4 bilateral, PT 1/4 bilateral.  Mild generalized edema bilateral.  Temperature warm to warm proximal to distal but no hair growth noted on the foot or toes.     Neurologic: Lower extremity sensation is intact to light touch.  No evidence of weakness or contracture in the lower extremities.  No evidence of neuropathy.    Dermatologic: Mildly diminished texture turgor tone about the integument.  Macerated hyperkeratosis noted on the lateral fourth digit PIPJ and a con-commitment or kissing lesion is noted on the medial fifth digit all right foot.  Upon debridement of these they are not full-thickness ulcerations.    Musculoskeletal: Patient is ambulatory without assistive device or brace.  Forefoot valgus is noted with hallux valgus and semirigid the contracted toes partially reducible.    ASSESSMENT:       ICD-10-CM    1. Hammertoe of right foot  M20.41       2. Skin ulcer of fourth toe, right, limited to breakdown of skin (H)  L97.511            PLAN:  Reviewed patient[facility] in [facility].      11/10/2022   I debrided the lesion sharply 15 blade scalpel to and including dermis.  And this will certainly provide considerable relief as there was debridement of hyperkeratosis on the lateral fourth toe and leila mitten medial " fifth toe all in the right foot.  Continue oral antibiotics x10 days.  Upon later review discovered the antibiotic is 250 mg 3 times daily.  Her renal function creatinine is normal clearance and if this is slow to resolve over the next week this could be increased.  Recommend interdigital bathing once or twice daily and then application of Betadine once daily to dry this out.  Dispensed a postop shoe to keep separation and no compression of the toes.  Avoid any pressure in or around the lesions and any spacer is likely to provide pressure.  No soaking or long periods of submersion  Follow-up again in about 10-14 days to confirm this is resolving    Patient has a cerebral shunt scheduled in the next few weeks    11/30/2022  Recommend continuing padding and splinting the interdigital space.  Recommend appropriate shoe gear.  Discussed appropriate hygiene to file on this with abrasive debridement at every bath or shower time.  She will be having a shunt placed in the next few days.  If this remains symptomatic about her toes we discussed arthroplasty of the fourth and fifth toes.  May follow-up as needed if this remains symptomatic.  All questions answered      Nathen Moon DPM

## 2022-11-30 NOTE — LETTER
11/30/2022         RE: Milena Hamilton  [address] [address] [facility] [address]        Dear Colleague,    Thank you for referring your patient, Milena Hamilton, to the [facility]. Please see a copy of my visit note below.    Chief Complaint   Patient presents with     WOUND CARE     Ulcer lateral 4th and medial 5th Right toes, onset late Oct 2022; LOV 11/10/2022     Other     Presents with her  11/30/2022     HPI:  Milena Hamilton is a 82 year old female who is seen in consultation at the request of Twin Canales PA-C    Pt presents for eval of:   (Onset, Location, L/R, Character, Treatments, Injury if yes)     Onset late Oct 2022, ulcer lateral Right 4th toe and medial Right 5th toe. Presents with her . Utilizing silcone toe spacer between Right great and 2nd toe.  Constant, sharp, throbbing, shooting pain 7-10/10.     Soaking with vinegar water.  11/7/22 started Keflex 250 mg, 1 capsule 3 times daily for 10 days.    Retired.      ROS:  10 point ROS neg other than the symptoms noted above in the HPI.    Patient Active Problem List   Diagnosis     Lichen planus     Family history of diabetes mellitus     Tinnitus     Panic disorder without agoraphobia     Gastritis     Osteopenia     Hyperlipidemia     Anxiety     DDD (degenerative disc disease), cervical     Advanced directives, counseling/discussion     Sacroiliac dysfunction     Chronic constipation     History of diverticulitis - s/p left hemicolectomy     Diarrhea     Health Care Home     Raynaud's disease     HTN (hypertension)     Premature atrial beats     Acquired hypothyroidism     Gastroesophageal reflux disease, esophagitis presence not specified     Chronic right-sided low back pain with right-sided sciatica     Scoliosis of lumbar spine, unspecified scoliosis type     Lumbar radiculopathy     Dysuria     Basal cell carcinoma of nose     Renal insufficiency syndrome     History of malignant neoplasm of skin      Skin changes due to chronic exposure to nonionizing radiation     Chronic systolic heart failure (H)     LBBB (left bundle branch block)     Cellulitis of fifth toe of right foot     Plantar fasciitis     Right foot pain       PAST MEDICAL HISTORY:   Past Medical History:   Diagnosis Date     Lichen planus     vulvar     Osteopenia 4/2010    recheck DEXA in 2 years     Panic disorder without agoraphobia      Pure hypercholesterolemia     low LDLs     Raynaud's disease 6/2014     Unspecified essential hypertension         PAST SURGICAL HISTORY:   Past Surgical History:   Procedure Laterality Date     COLONOSCOPY  10/17/2007    normal, recheck in 10 years     COLONOSCOPY N/A 08/27/2014    Procedure: COLONOSCOPY;  Surgeon: Raffi Montiel MD;  Location: [facility]     COMBINED CYSTOSCOPY, INSERT CATHETER URETER Bilateral 10/13/2014    Procedure: COMBINED CYSTOSCOPY, INSERT CATHETER URETER;  Surgeon: Abdoulaye Odell MD;  Location: [facility]     EYE SURGERY  08/01/2001    right eye muscle repair     HC REMOVAL GALLBLADDER  02/09/2010    lap     HC REMOVAL OF OVARY/TUBE(S)      Salpingo-Oophorectomy, bilateral     HYSTERECTOMY, PAP NO LONGER INDICATED       INJECT EPIDURAL LUMBAR Right 04/10/2017    Procedure: INJECT EPIDURAL LUMBAR;  Surgeon: Raffi Wright MD;  Location: [facility]     INJECT EPIDURAL LUMBAR Right 02/27/2020    Procedure: Right Lumbar 4- Sacral 1 Epidural Steroid Injection;  Surgeon: Antonio Dasilva MD;  Location: [facility]     INJECT EPIDURAL LUMBAR Right 01/29/2021    Procedure: INJECTION, SPINE,  sacral 1 EPIDURAL;  Surgeon: Antonio Dasilva MD;  [facility]     INJECT EPIDURAL TRANSFORAMINAL Right 06/09/2016    Procedure: INJECT EPIDURAL TRANSFORAMINAL;  Surgeon: Antonio Dasilva MD;  [facility]     INJECT EPIDURAL TRANSFORAMINAL Right 10/13/2016    Procedure: INJECT EPIDURAL TRANSFORAMINAL;  Surgeon: Antonio Dasilva MD;  [facility]     INJECT EPIDURAL TRANSFORAMINAL Right 06/18/2021     Procedure: Right L4-5 and right L5-S1 Transforaminal Epidural Steroid Injections with fluoroscopic guidance and contrast.;  Surgeon: Antonio Dasilva MD;  [facility]     INJECT JOINT SACROILIAC Right 04/14/2022    Procedure: Fluoroscopically-guided injection of the Bilateral sacroiliac joints with Bilateral pastor Sacroiliac joint ligaments infiltration over the sacrum;  Surgeon: Antonio Dasilva MD;  [facility]     LAPAROSCOPIC ASSISTED COLECTOMY LEFT (DESCENDING) N/A 10/13/2014    Procedure: LAPAROSCOPIC ASSISTED COLECTOMY LEFT (DESCENDING);  Surgeon: Raffi Montiel MD;  [facility]     LUMBAR SPINE SURGERY  09/2021    L2-Pelvis fusion with rods and screws     ZZC APPENDECTOMY       ZZC NONSPECIFIC PROCEDURE      Right inferior oblique recession, 14 mm     ZZC VAG HYST,RMV TUBE/OVARY  01/01/1984    cervix removed        MEDICATIONS:   Current Outpatient Medications:      acetaminophen (TYLENOL) 500 MG tablet, Take 500-1,000 mg by mouth every 6 hours as needed for mild pain, Disp: , Rfl:      ALPRAZolam (XANAX) 0.25 MG tablet, TAKE 1 TABLET (0.25 MG) BY MOUTH NIGHTLY AS NEEDED FOR ANXIETY, Disp: 30 tablet, Rfl: 2     cholecalciferol 25 MCG (1000 UT) TABS, Take 1,000 Units by mouth daily (with dinner) , Disp: , Rfl:      levothyroxine (SYNTHROID/LEVOTHROID) 50 MCG tablet, TAKE ONE TABLET BY MOUTH once daily, Disp: 90 tablet, Rfl: 3     losartan (COZAAR) 25 MG tablet, Take 1 tablet (25 mg) by mouth daily, Disp: 90 tablet, Rfl: 3     metoprolol succinate ER (TOPROL XL) 25 MG 24 hr tablet, Take 1 tablet (25 mg) by mouth daily, Disp: 90 tablet, Rfl: 3     omeprazole (PRILOSEC) 20 MG DR capsule, TAKE ONE CAPSULE BY MOUTH ONCE DAILY, Disp: 90 capsule, Rfl: 3     simvastatin (ZOCOR) 20 MG tablet, TAKE 1/2 TABLET BY MOUTH EVERY NIGHT (Patient taking differently: Take 10 mg by mouth At Bedtime), Disp: 135 tablet, Rfl: 3     vitamin E (TOCOPHEROL) 400 units (180 mg) capsule, Take 400 Units by mouth daily (with  "dinner) , Disp: , Rfl:      ALLERGIES:    Allergies   Allergen Reactions     Azithromycin Diarrhea     Ciprofloxacin Other (See Comments)     Cipro, dizziness and abdominal pain     Gabapentin Dizziness and Visual Disturbance     Penicillins      stomach upset, diarrhea     Raspberry Hives     Sulfa Drugs Hives     hives     Augmentin Rash        SOCIAL HISTORY:   Social History     Socioeconomic History     Marital status:      Spouse name: Not on file     Number of children: 4     Years of education: Not on file     Highest education level: Not on file   Occupational History     Occupation: dry cleaning   Tobacco Use     Smoking status: Never     Smokeless tobacco: Never   Vaping Use     Vaping Use: Never used   Substance and Sexual Activity     Alcohol use: No     Drug use: No     Sexual activity: Not Currently     Partners: Male     Birth control/protection: Surgical     Comment: not currently/hysterectomy   Other Topics Concern     Parent/sibling w/ CABG, MI or angioplasty before 65F 55M? No   Social History Narrative     Not on file     Social Determinants of Health     Financial Resource Strain: Not on file   Food Insecurity: Not on file   Transportation Needs: Not on file   Physical Activity: Not on file   Stress: Not on file   Social Connections: Not on file   Intimate Partner Violence: Not on file   Housing Stability: Not on file        FAMILY HISTORY:   Family History   Problem Relation Age of Onset     Diabetes Brother      Heart Disease Brother 50        AMI     Diabetes Brother      Heart Disease Brother         AMI, \"older\"     Cerebrovascular Disease Brother      Psychotic Disorder Brother         , alcohol     Cancer Sister         breast cancer x2     Hypertension No family hx of      Breast Cancer No family hx of      Ovarian Cancer No family hx of      Prostate Cancer No family hx of      Mental Illness No family hx of      Anesthesia Reaction No family hx of      Osteoporosis No " "family hx of      Obesity No family hx of      Other Cancer No family hx of      Depression No family hx of      Anxiety Disorder No family hx of      Mental Illness No family hx of      Substance Abuse No family hx of         EXAM:Vitals: /70 (BP Location: Left arm, Patient Position: Sitting, Cuff Size: Adult Regular)   Temp 97.7  F (36.5  C) (Temporal)   Ht 1.562 m (5' 1.5\")   Wt 59 kg (130 lb)   LMP  (LMP Unknown)   BMI 24.17 kg/m    BMI= Body mass index is 24.17 kg/m .    General appearance: Patient is alert and fully cooperative with history & exam.  No sign of distress is noted during the visit.     Psychiatric: Affect is pleasant & appropriate.  Patient appears motivated to improve health.     Respiratory: Breathing is regular & unlabored while sitting.     HEENT: Hearing is intact to spoken word.  Speech is clear.  No gross evidence of visual impairment that would impact ambulation.     Vascular: DP 2/4 bilateral, PT 1/4 bilateral.  Mild generalized edema bilateral.  Temperature warm to warm proximal to distal but no hair growth noted on the foot or toes.     Neurologic: Lower extremity sensation is intact to light touch.  No evidence of weakness or contracture in the lower extremities.  No evidence of neuropathy.    Dermatologic: Mildly diminished texture turgor tone about the integument.  Macerated hyperkeratosis noted on the lateral fourth digit PIPJ and a con-commitment or kissing lesion is noted on the medial fifth digit all right foot.  Upon debridement of these they are not full-thickness ulcerations.    Musculoskeletal: Patient is ambulatory without assistive device or brace.  Forefoot valgus is noted with hallux valgus and semirigid the contracted toes partially reducible.    ASSESSMENT:       ICD-10-CM    1. Hammertoe of right foot  M20.41       2. Skin ulcer of fourth toe, right, limited to breakdown of skin (H)  L97.511            PLAN:  Reviewed patient's chart in [facility].  "     11/10/2022   I debrided the lesion sharply 15 blade scalpel to and including dermis.  And this will certainly provide considerable relief as there was debridement of hyperkeratosis on the lateral fourth toe and leila mitten medial fifth toe all in the right foot.  Continue oral antibiotics x10 days.  Upon later review discovered the antibiotic is 250 mg 3 times daily.  Her renal function creatinine is normal clearance and if this is slow to resolve over the next week this could be increased.  Recommend interdigital bathing once or twice daily and then application of Betadine once daily to dry this out.  Dispensed a postop shoe to keep separation and no compression of the toes.  Avoid any pressure in or around the lesions and any spacer is likely to provide pressure.  No soaking or long periods of submersion  Follow-up again in about 10-14 days to confirm this is resolving    Patient has a cerebral shunt scheduled in the next few weeks    11/30/2022  Recommend continuing padding and splinting the interdigital space.  Recommend appropriate shoe gear.  Discussed appropriate hygiene to file on this with abrasive debridement at every bath or shower time.  She will be having a shunt placed in the next few days.  If this remains symptomatic about her toes we discussed arthroplasty of the fourth and fifth toes.  May follow-up as needed if this remains symptomatic.  All questions answered      Nathen Moon DPM            Again, thank you for allowing me to participate in the care of your patient.        Sincerely,        Nathen Moon DPM

## 2022-11-30 NOTE — PATIENT INSTRUCTIONS
Reliable shoe stores: To maximize your experience and provide the best possible fit.  Be sure to show them your foot concerns and tell them Dr. Moon sent you.      Stores listed in bold have only athletic shoes, and stores that are not bold are mostly casual or variety of shoes    [employer]  [address] [place] [address]  [phone]    [employer] [place]  [address] [place] [address]  [phone]     [employer] Vickie Bell  [address] [place] [address]  [phone]    [employer]  [address] [facility]  [facility] [address]  [phone]    [employer]  [address] [place], [place] [address] [phone]    [employer]  [address] [place] [address]  [phone]                         [employer] Locations:     [address] [place] [address]   [phone]     [address] [place] [address] [address] [place] [address]   [phone]     [address] [place] [address]   [phone]     [address] [place] [address]   [phone]     [address] [place] [address]   [phone]     [address] [place] [address]   [phone]     [address] [facility] [address]   [place] [address]   [phone]     [address] [facility]   [place] [address]   [phone]             How to find reasonable shoes          The correct width    Correct Fitting    Correct Length      Foot Distortion    Posture Distortion                          Torsional Rigidity      Grasp behind the heel and underneath the foot and twist      Bad    Excessive torsion/twist in midfoot     Less torsion/twist in midfoot is better                   Heel Counter Rigidity      Grasp just above   midsole and squeeze      Bad    Soft heel counter      Good    Rigid Heel Counter      Flexion Rigidity      Grasp shoe and bend from forefoot to rearfoot

## 2022-12-01 ENCOUNTER — TELEPHONE (OUTPATIENT)
Dept: NEUROSURGERY | Facility: CLINIC | Age: 82
End: 2022-12-01

## 2022-12-01 ENCOUNTER — TELEPHONE (OUTPATIENT)
Dept: FAMILY MEDICINE | Facility: OTHER | Age: 82
End: 2022-12-01

## 2022-12-01 NOTE — OR NURSING
"Pt called evening of 12/1/22 stating she had received a phone call from the hospital regarding her meds. Pt stated, \"I only take one heart med and my doctor told me not to take it in the morning.\" RN confirmed to follow her primary care doctor's instructions.  "

## 2022-12-01 NOTE — TELEPHONE ENCOUNTER
Patient calling in regards to COVID test. Having surgery done on 12/2 and needed negative result.    Informed patient that result came back negative.     SHERRILL HaN, RN  [facility]lyse [facility]  December 1, 2022

## 2022-12-01 NOTE — TELEPHONE ENCOUNTER
Patient attempting to return call from a nurse regarding her medications, prior to surgery on 12-2-22. Write was not able to determine who had called patient.    Patient stated if they need to know what she is taking they can just ask her tomorrow when she checks in for the procedure.

## 2022-12-01 NOTE — TELEPHONE ENCOUNTER
Returned call. Reviewed with patient our NSAID hold policy from neurosurgery and to adhere to the recommendations her PCP gave her at Pre op regarding her other meds. Sounds like patient was trying to get back in touch with Pre admissions RN with hospital. Number provided to patient. She will call them. She had no further questions.

## 2022-12-02 ENCOUNTER — APPOINTMENT (OUTPATIENT)
Dept: SURGERY | Facility: PHYSICIAN GROUP | Age: 82
End: 2022-12-02
Payer: COMMERCIAL

## 2022-12-02 ENCOUNTER — ANESTHESIA (OUTPATIENT)
Dept: SURGERY | Facility: CLINIC | Age: 82
DRG: 032 | End: 2022-12-02
Payer: COMMERCIAL

## 2022-12-02 ENCOUNTER — ANESTHESIA EVENT (OUTPATIENT)
Dept: SURGERY | Facility: CLINIC | Age: 82
DRG: 032 | End: 2022-12-02
Payer: COMMERCIAL

## 2022-12-02 ENCOUNTER — HOSPITAL ENCOUNTER (INPATIENT)
Facility: CLINIC | Age: 82
LOS: 1 days | Discharge: HOME | DRG: 032 | End: 2022-12-03
Attending: STUDENT IN AN ORGANIZED HEALTH CARE EDUCATION/TRAINING PROGRAM | Admitting: STUDENT IN AN ORGANIZED HEALTH CARE EDUCATION/TRAINING PROGRAM
Payer: COMMERCIAL

## 2022-12-02 DIAGNOSIS — Z98.2 VP (VENTRICULOPERITONEAL) SHUNT STATUS: Primary | ICD-10-CM

## 2022-12-02 LAB
ABO/RH(D): NORMAL
ANTIBODY SCREEN: NEGATIVE
APTT PPP: 29 SECONDS (ref 22–38)
BASOPHILS # BLD AUTO: 0 10E3/UL (ref 0–0.2)
BASOPHILS NFR BLD AUTO: 1 %
CREAT SERPL-MCNC: 0.6 MG/DL (ref 0.52–1.04)
EOSINOPHIL # BLD AUTO: 0.1 10E3/UL (ref 0–0.7)
EOSINOPHIL NFR BLD AUTO: 1 %
ERYTHROCYTE [DISTWIDTH] IN BLOOD BY AUTOMATED COUNT: 12.6 % (ref 10–15)
GFR SERPL CREATININE-BSD FRML MDRD: 90 ML/MIN/1.73M2
HCT VFR BLD AUTO: 40.2 % (ref 35–47)
HGB BLD-MCNC: 12.7 G/DL (ref 11.7–15.7)
IMM GRANULOCYTES # BLD: 0 10E3/UL
IMM GRANULOCYTES NFR BLD: 0 %
INR PPP: 1.04 (ref 0.85–1.15)
LYMPHOCYTES # BLD AUTO: 1.5 10E3/UL (ref 0.8–5.3)
LYMPHOCYTES NFR BLD AUTO: 35 %
MCH RBC QN AUTO: 30.8 PG (ref 26.5–33)
MCHC RBC AUTO-ENTMCNC: 31.6 G/DL (ref 31.5–36.5)
MCV RBC AUTO: 97 FL (ref 78–100)
MONOCYTES # BLD AUTO: 0.3 10E3/UL (ref 0–1.3)
MONOCYTES NFR BLD AUTO: 8 %
NEUTROPHILS # BLD AUTO: 2.4 10E3/UL (ref 1.6–8.3)
NEUTROPHILS NFR BLD AUTO: 55 %
NRBC # BLD AUTO: 0 10E3/UL
NRBC BLD AUTO-RTO: 0 /100
PLATELET # BLD AUTO: 180 10E3/UL (ref 150–450)
POTASSIUM BLD-SCNC: 4 MMOL/L (ref 3.4–5.3)
RBC # BLD AUTO: 4.13 10E6/UL (ref 3.8–5.2)
SPECIMEN EXPIRATION DATE: NORMAL
WBC # BLD AUTO: 4.3 10E3/UL (ref 4–11)

## 2022-12-02 PROCEDURE — 258N000003 HC RX IP 258 OP 636: Performed by: ANESTHESIOLOGY

## 2022-12-02 PROCEDURE — 250N000011 HC RX IP 250 OP 636: Performed by: NURSE ANESTHETIST, CERTIFIED REGISTERED

## 2022-12-02 PROCEDURE — 62223 ESTABLISH BRAIN CAVITY SHUNT: CPT | Mod: 62 | Performed by: SURGERY

## 2022-12-02 PROCEDURE — 250N000025 HC SEVOFLURANE, PER MIN: Performed by: STUDENT IN AN ORGANIZED HEALTH CARE EDUCATION/TRAINING PROGRAM

## 2022-12-02 PROCEDURE — 272N000001 HC OR GENERAL SUPPLY STERILE: Performed by: STUDENT IN AN ORGANIZED HEALTH CARE EDUCATION/TRAINING PROGRAM

## 2022-12-02 PROCEDURE — 82565 ASSAY OF CREATININE: CPT | Performed by: ANESTHESIOLOGY

## 2022-12-02 PROCEDURE — 00163J6 BYPASS CEREBRAL VENTRICLE TO PERITONEAL CAVITY WITH SYNTHETIC SUBSTITUTE, PERCUTANEOUS APPROACH: ICD-10-PCS | Performed by: STUDENT IN AN ORGANIZED HEALTH CARE EDUCATION/TRAINING PROGRAM

## 2022-12-02 PROCEDURE — 85610 PROTHROMBIN TIME: CPT | Performed by: PHYSICIAN ASSISTANT

## 2022-12-02 PROCEDURE — 999N000141 HC STATISTIC PRE-PROCEDURE NURSING ASSESSMENT: Performed by: STUDENT IN AN ORGANIZED HEALTH CARE EDUCATION/TRAINING PROGRAM

## 2022-12-02 PROCEDURE — 250N000009 HC RX 250: Performed by: STUDENT IN AN ORGANIZED HEALTH CARE EDUCATION/TRAINING PROGRAM

## 2022-12-02 PROCEDURE — 250N000011 HC RX IP 250 OP 636: Performed by: PHYSICIAN ASSISTANT

## 2022-12-02 PROCEDURE — 278N000051 HC OR IMPLANT GENERAL: Performed by: STUDENT IN AN ORGANIZED HEALTH CARE EDUCATION/TRAINING PROGRAM

## 2022-12-02 PROCEDURE — 8E09XBZ COMPUTER ASSISTED PROCEDURE OF HEAD AND NECK REGION: ICD-10-PCS | Performed by: STUDENT IN AN ORGANIZED HEALTH CARE EDUCATION/TRAINING PROGRAM

## 2022-12-02 PROCEDURE — 250N000009 HC RX 250: Performed by: NURSE ANESTHETIST, CERTIFIED REGISTERED

## 2022-12-02 PROCEDURE — 250N000011 HC RX IP 250 OP 636: Performed by: ANESTHESIOLOGY

## 2022-12-02 PROCEDURE — 710N000009 HC RECOVERY PHASE 1, LEVEL 1, PER MIN: Performed by: STUDENT IN AN ORGANIZED HEALTH CARE EDUCATION/TRAINING PROGRAM

## 2022-12-02 PROCEDURE — 86901 BLOOD TYPING SEROLOGIC RH(D): CPT | Performed by: PHYSICIAN ASSISTANT

## 2022-12-02 PROCEDURE — 360N000079 HC SURGERY LEVEL 6, PER MIN: Performed by: STUDENT IN AN ORGANIZED HEALTH CARE EDUCATION/TRAINING PROGRAM

## 2022-12-02 PROCEDURE — 258N000003 HC RX IP 258 OP 636: Performed by: PHYSICIAN ASSISTANT

## 2022-12-02 PROCEDURE — 62223 ESTABLISH BRAIN CAVITY SHUNT: CPT | Mod: 62 | Performed by: STUDENT IN AN ORGANIZED HEALTH CARE EDUCATION/TRAINING PROGRAM

## 2022-12-02 PROCEDURE — 84132 ASSAY OF SERUM POTASSIUM: CPT | Performed by: ANESTHESIOLOGY

## 2022-12-02 PROCEDURE — 61781 SCAN PROC CRANIAL INTRA: CPT | Performed by: STUDENT IN AN ORGANIZED HEALTH CARE EDUCATION/TRAINING PROGRAM

## 2022-12-02 PROCEDURE — 250N000009 HC RX 250

## 2022-12-02 PROCEDURE — 250N000011 HC RX IP 250 OP 636

## 2022-12-02 PROCEDURE — C1769 GUIDE WIRE: HCPCS | Performed by: STUDENT IN AN ORGANIZED HEALTH CARE EDUCATION/TRAINING PROGRAM

## 2022-12-02 PROCEDURE — 258N000003 HC RX IP 258 OP 636

## 2022-12-02 PROCEDURE — 370N000017 HC ANESTHESIA TECHNICAL FEE, PER MIN: Performed by: STUDENT IN AN ORGANIZED HEALTH CARE EDUCATION/TRAINING PROGRAM

## 2022-12-02 PROCEDURE — 120N000013 HC R&B IMCU

## 2022-12-02 PROCEDURE — 250N000013 HC RX MED GY IP 250 OP 250 PS 637: Performed by: PHYSICIAN ASSISTANT

## 2022-12-02 PROCEDURE — 85730 THROMBOPLASTIN TIME PARTIAL: CPT | Performed by: PHYSICIAN ASSISTANT

## 2022-12-02 PROCEDURE — 36415 COLL VENOUS BLD VENIPUNCTURE: CPT | Performed by: ANESTHESIOLOGY

## 2022-12-02 PROCEDURE — 85004 AUTOMATED DIFF WBC COUNT: CPT | Performed by: PHYSICIAN ASSISTANT

## 2022-12-02 PROCEDURE — 120N000001 HC R&B MED SURG/OB

## 2022-12-02 DEVICE — CATHETER 91101 VENTRICULAR ANTIMICROBIAL
Type: IMPLANTABLE DEVICE | Site: CRANIAL | Status: FUNCTIONAL
Brand: ARES™

## 2022-12-02 DEVICE — SHUNT CATH PERITONEAL 120CM 23047: Type: IMPLANTABLE DEVICE | Site: CRANIAL | Status: FUNCTIONAL

## 2022-12-02 DEVICE — RES 44210 VENTRICULOSTOMY BH 6MM SHW IMP: Type: IMPLANTABLE DEVICE | Site: CRANIAL | Status: FUNCTIONAL

## 2022-12-02 RX ORDER — DEXAMETHASONE SODIUM PHOSPHATE 4 MG/ML
INJECTION, SOLUTION INTRA-ARTICULAR; INTRALESIONAL; INTRAMUSCULAR; INTRAVENOUS; SOFT TISSUE PRN
Status: DISCONTINUED | OUTPATIENT
Start: 2022-12-02 | End: 2022-12-02

## 2022-12-02 RX ORDER — NALOXONE HYDROCHLORIDE 0.4 MG/ML
0.4 INJECTION, SOLUTION INTRAMUSCULAR; INTRAVENOUS; SUBCUTANEOUS
Status: DISCONTINUED | OUTPATIENT
Start: 2022-12-02 | End: 2022-12-03 | Stop reason: HOSPADM

## 2022-12-02 RX ORDER — ONDANSETRON 2 MG/ML
INJECTION INTRAMUSCULAR; INTRAVENOUS PRN
Status: DISCONTINUED | OUTPATIENT
Start: 2022-12-02 | End: 2022-12-02

## 2022-12-02 RX ORDER — VITAMIN B COMPLEX
1000 TABLET ORAL DAILY
Status: DISCONTINUED | OUTPATIENT
Start: 2022-12-03 | End: 2022-12-03 | Stop reason: HOSPADM

## 2022-12-02 RX ORDER — FENTANYL CITRATE 0.05 MG/ML
50 INJECTION, SOLUTION INTRAMUSCULAR; INTRAVENOUS EVERY 5 MIN PRN
Status: DISCONTINUED | OUTPATIENT
Start: 2022-12-02 | End: 2022-12-02 | Stop reason: HOSPADM

## 2022-12-02 RX ORDER — HYDROMORPHONE HCL IN WATER/PF 6 MG/30 ML
0.2 PATIENT CONTROLLED ANALGESIA SYRINGE INTRAVENOUS
Status: DISCONTINUED | OUTPATIENT
Start: 2022-12-02 | End: 2022-12-03 | Stop reason: HOSPADM

## 2022-12-02 RX ORDER — ONDANSETRON 4 MG/1
4 TABLET, ORALLY DISINTEGRATING ORAL EVERY 6 HOURS PRN
Status: DISCONTINUED | OUTPATIENT
Start: 2022-12-02 | End: 2022-12-03 | Stop reason: HOSPADM

## 2022-12-02 RX ORDER — SODIUM CHLORIDE 9 MG/ML
INJECTION, SOLUTION INTRAVENOUS CONTINUOUS
Status: DISCONTINUED | OUTPATIENT
Start: 2022-12-02 | End: 2022-12-03 | Stop reason: HOSPADM

## 2022-12-02 RX ORDER — METHOCARBAMOL 500 MG/1
500 TABLET, FILM COATED ORAL EVERY 6 HOURS PRN
Status: DISCONTINUED | OUTPATIENT
Start: 2022-12-02 | End: 2022-12-03 | Stop reason: HOSPADM

## 2022-12-02 RX ORDER — CLINDAMYCIN PHOSPHATE 900 MG/50ML
900 INJECTION, SOLUTION INTRAVENOUS SEE ADMIN INSTRUCTIONS
Status: DISCONTINUED | OUTPATIENT
Start: 2022-12-02 | End: 2022-12-02 | Stop reason: HOSPADM

## 2022-12-02 RX ORDER — LOSARTAN POTASSIUM 25 MG/1
25 TABLET ORAL DAILY
Status: DISCONTINUED | OUTPATIENT
Start: 2022-12-02 | End: 2022-12-03 | Stop reason: HOSPADM

## 2022-12-02 RX ORDER — BISACODYL 10 MG
10 SUPPOSITORY, RECTAL RECTAL DAILY PRN
Status: DISCONTINUED | OUTPATIENT
Start: 2022-12-02 | End: 2022-12-03 | Stop reason: HOSPADM

## 2022-12-02 RX ORDER — HYDROMORPHONE HCL IN WATER/PF 6 MG/30 ML
0.4 PATIENT CONTROLLED ANALGESIA SYRINGE INTRAVENOUS
Status: DISCONTINUED | OUTPATIENT
Start: 2022-12-02 | End: 2022-12-03 | Stop reason: HOSPADM

## 2022-12-02 RX ORDER — LIDOCAINE 40 MG/G
CREAM TOPICAL
Status: DISCONTINUED | OUTPATIENT
Start: 2022-12-02 | End: 2022-12-03 | Stop reason: HOSPADM

## 2022-12-02 RX ORDER — HYDROMORPHONE HCL IN WATER/PF 6 MG/30 ML
0.2 PATIENT CONTROLLED ANALGESIA SYRINGE INTRAVENOUS EVERY 5 MIN PRN
Status: DISCONTINUED | OUTPATIENT
Start: 2022-12-02 | End: 2022-12-02 | Stop reason: HOSPADM

## 2022-12-02 RX ORDER — LIDOCAINE HYDROCHLORIDE 20 MG/ML
INJECTION, SOLUTION INFILTRATION; PERINEURAL PRN
Status: DISCONTINUED | OUTPATIENT
Start: 2022-12-02 | End: 2022-12-02

## 2022-12-02 RX ORDER — ONDANSETRON 4 MG/1
4 TABLET, ORALLY DISINTEGRATING ORAL EVERY 30 MIN PRN
Status: DISCONTINUED | OUTPATIENT
Start: 2022-12-02 | End: 2022-12-02 | Stop reason: HOSPADM

## 2022-12-02 RX ORDER — ACETAMINOPHEN 500 MG
500-1000 TABLET ORAL EVERY 6 HOURS PRN
Status: DISCONTINUED | OUTPATIENT
Start: 2022-12-02 | End: 2022-12-02 | Stop reason: ALTCHOICE

## 2022-12-02 RX ORDER — PROCHLORPERAZINE MALEATE 5 MG
5 TABLET ORAL EVERY 6 HOURS PRN
Status: DISCONTINUED | OUTPATIENT
Start: 2022-12-02 | End: 2022-12-03 | Stop reason: HOSPADM

## 2022-12-02 RX ORDER — SODIUM CHLORIDE, SODIUM LACTATE, POTASSIUM CHLORIDE, CALCIUM CHLORIDE 600; 310; 30; 20 MG/100ML; MG/100ML; MG/100ML; MG/100ML
INJECTION, SOLUTION INTRAVENOUS CONTINUOUS
Status: DISCONTINUED | OUTPATIENT
Start: 2022-12-02 | End: 2022-12-02 | Stop reason: HOSPADM

## 2022-12-02 RX ORDER — NALOXONE HYDROCHLORIDE 0.4 MG/ML
0.2 INJECTION, SOLUTION INTRAMUSCULAR; INTRAVENOUS; SUBCUTANEOUS
Status: DISCONTINUED | OUTPATIENT
Start: 2022-12-02 | End: 2022-12-03 | Stop reason: HOSPADM

## 2022-12-02 RX ORDER — ONDANSETRON 2 MG/ML
4 INJECTION INTRAMUSCULAR; INTRAVENOUS EVERY 6 HOURS PRN
Status: DISCONTINUED | OUTPATIENT
Start: 2022-12-02 | End: 2022-12-03 | Stop reason: HOSPADM

## 2022-12-02 RX ORDER — ALPRAZOLAM 0.25 MG
0.25 TABLET ORAL
Status: DISCONTINUED | OUTPATIENT
Start: 2022-12-02 | End: 2022-12-03 | Stop reason: HOSPADM

## 2022-12-02 RX ORDER — CLINDAMYCIN PHOSPHATE 900 MG/50ML
900 INJECTION, SOLUTION INTRAVENOUS
Status: COMPLETED | OUTPATIENT
Start: 2022-12-02 | End: 2022-12-02

## 2022-12-02 RX ORDER — HYDROMORPHONE HCL IN WATER/PF 6 MG/30 ML
0.4 PATIENT CONTROLLED ANALGESIA SYRINGE INTRAVENOUS EVERY 5 MIN PRN
Status: DISCONTINUED | OUTPATIENT
Start: 2022-12-02 | End: 2022-12-02 | Stop reason: HOSPADM

## 2022-12-02 RX ORDER — DEXMEDETOMIDINE HYDROCHLORIDE 4 UG/ML
INJECTION, SOLUTION INTRAVENOUS PRN
Status: DISCONTINUED | OUTPATIENT
Start: 2022-12-02 | End: 2022-12-02

## 2022-12-02 RX ORDER — POLYETHYLENE GLYCOL 3350 17 G/17G
17 POWDER, FOR SOLUTION ORAL DAILY
Status: DISCONTINUED | OUTPATIENT
Start: 2022-12-03 | End: 2022-12-03 | Stop reason: HOSPADM

## 2022-12-02 RX ORDER — PANTOPRAZOLE SODIUM 40 MG/1
40 TABLET, DELAYED RELEASE ORAL
Status: DISCONTINUED | OUTPATIENT
Start: 2022-12-03 | End: 2022-12-03 | Stop reason: HOSPADM

## 2022-12-02 RX ORDER — OXYCODONE HYDROCHLORIDE 5 MG/1
5 TABLET ORAL EVERY 4 HOURS PRN
Status: DISCONTINUED | OUTPATIENT
Start: 2022-12-02 | End: 2022-12-03 | Stop reason: HOSPADM

## 2022-12-02 RX ORDER — ACETAMINOPHEN 325 MG/1
975 TABLET ORAL EVERY 8 HOURS
Status: DISCONTINUED | OUTPATIENT
Start: 2022-12-02 | End: 2022-12-03 | Stop reason: HOSPADM

## 2022-12-02 RX ORDER — CALCIUM CARBONATE 500 MG/1
500 TABLET, CHEWABLE ORAL 4 TIMES DAILY PRN
Status: DISCONTINUED | OUTPATIENT
Start: 2022-12-02 | End: 2022-12-03 | Stop reason: HOSPADM

## 2022-12-02 RX ORDER — ONDANSETRON 2 MG/ML
4 INJECTION INTRAMUSCULAR; INTRAVENOUS EVERY 30 MIN PRN
Status: DISCONTINUED | OUTPATIENT
Start: 2022-12-02 | End: 2022-12-02 | Stop reason: HOSPADM

## 2022-12-02 RX ORDER — EPHEDRINE SULFATE 50 MG/ML
INJECTION, SOLUTION INTRAMUSCULAR; INTRAVENOUS; SUBCUTANEOUS PRN
Status: DISCONTINUED | OUTPATIENT
Start: 2022-12-02 | End: 2022-12-02

## 2022-12-02 RX ORDER — LEVOTHYROXINE SODIUM 50 UG/1
50 TABLET ORAL DAILY
Status: DISCONTINUED | OUTPATIENT
Start: 2022-12-02 | End: 2022-12-03 | Stop reason: HOSPADM

## 2022-12-02 RX ORDER — FENTANYL CITRATE 0.05 MG/ML
25 INJECTION, SOLUTION INTRAMUSCULAR; INTRAVENOUS EVERY 5 MIN PRN
Status: DISCONTINUED | OUTPATIENT
Start: 2022-12-02 | End: 2022-12-02 | Stop reason: HOSPADM

## 2022-12-02 RX ORDER — PROPOFOL 10 MG/ML
INJECTION, EMULSION INTRAVENOUS PRN
Status: DISCONTINUED | OUTPATIENT
Start: 2022-12-02 | End: 2022-12-02

## 2022-12-02 RX ORDER — CLINDAMYCIN PHOSPHATE 600 MG/50ML
600 INJECTION, SOLUTION INTRAVENOUS EVERY 8 HOURS
Status: COMPLETED | OUTPATIENT
Start: 2022-12-02 | End: 2022-12-03

## 2022-12-02 RX ORDER — AMOXICILLIN 250 MG
1 CAPSULE ORAL 2 TIMES DAILY
Status: DISCONTINUED | OUTPATIENT
Start: 2022-12-02 | End: 2022-12-03 | Stop reason: HOSPADM

## 2022-12-02 RX ORDER — METOPROLOL SUCCINATE 25 MG/1
25 TABLET, EXTENDED RELEASE ORAL DAILY
Status: DISCONTINUED | OUTPATIENT
Start: 2022-12-02 | End: 2022-12-03 | Stop reason: HOSPADM

## 2022-12-02 RX ORDER — SIMVASTATIN 10 MG
10 TABLET ORAL AT BEDTIME
Status: DISCONTINUED | OUTPATIENT
Start: 2022-12-02 | End: 2022-12-03 | Stop reason: HOSPADM

## 2022-12-02 RX ORDER — OXYCODONE HYDROCHLORIDE 5 MG/1
10 TABLET ORAL EVERY 4 HOURS PRN
Status: DISCONTINUED | OUTPATIENT
Start: 2022-12-02 | End: 2022-12-03 | Stop reason: HOSPADM

## 2022-12-02 RX ORDER — FENTANYL CITRATE 50 UG/ML
INJECTION, SOLUTION INTRAMUSCULAR; INTRAVENOUS PRN
Status: DISCONTINUED | OUTPATIENT
Start: 2022-12-02 | End: 2022-12-02

## 2022-12-02 RX ORDER — ACETAMINOPHEN 325 MG/1
650 TABLET ORAL EVERY 4 HOURS PRN
Status: DISCONTINUED | OUTPATIENT
Start: 2022-12-05 | End: 2022-12-03 | Stop reason: HOSPADM

## 2022-12-02 RX ORDER — BUPIVACAINE HYDROCHLORIDE AND EPINEPHRINE 5; 5 MG/ML; UG/ML
INJECTION, SOLUTION PERINEURAL PRN
Status: DISCONTINUED | OUTPATIENT
Start: 2022-12-02 | End: 2022-12-02 | Stop reason: HOSPADM

## 2022-12-02 RX ORDER — HALOPERIDOL 5 MG/ML
1 INJECTION INTRAMUSCULAR
Status: DISCONTINUED | OUTPATIENT
Start: 2022-12-02 | End: 2022-12-02 | Stop reason: HOSPADM

## 2022-12-02 RX ORDER — HYDROXYZINE HYDROCHLORIDE 10 MG/1
10 TABLET, FILM COATED ORAL EVERY 6 HOURS PRN
Status: DISCONTINUED | OUTPATIENT
Start: 2022-12-02 | End: 2022-12-03 | Stop reason: HOSPADM

## 2022-12-02 RX ADMIN — SIMVASTATIN 10 MG: 10 TABLET, FILM COATED ORAL at 21:25

## 2022-12-02 RX ADMIN — ACETAMINOPHEN 975 MG: 325 TABLET, FILM COATED ORAL at 19:00

## 2022-12-02 RX ADMIN — LIDOCAINE HYDROCHLORIDE 100 MG: 20 INJECTION, SOLUTION INFILTRATION; PERINEURAL at 13:35

## 2022-12-02 RX ADMIN — PHENYLEPHRINE HYDROCHLORIDE 100 MCG: 10 INJECTION INTRAVENOUS at 14:54

## 2022-12-02 RX ADMIN — PHENYLEPHRINE HYDROCHLORIDE 100 MCG: 10 INJECTION INTRAVENOUS at 15:10

## 2022-12-02 RX ADMIN — PROPOFOL 150 MG: 10 INJECTION, EMULSION INTRAVENOUS at 13:35

## 2022-12-02 RX ADMIN — PROPOFOL 50 MG: 10 INJECTION, EMULSION INTRAVENOUS at 14:37

## 2022-12-02 RX ADMIN — PHENYLEPHRINE HYDROCHLORIDE 100 MCG: 10 INJECTION INTRAVENOUS at 14:04

## 2022-12-02 RX ADMIN — DEXMEDETOMIDINE HYDROCHLORIDE 12 MCG: 200 INJECTION INTRAVENOUS at 13:56

## 2022-12-02 RX ADMIN — Medication 10 MG: at 14:28

## 2022-12-02 RX ADMIN — FENTANYL CITRATE 50 MCG: 50 INJECTION, SOLUTION INTRAMUSCULAR; INTRAVENOUS at 14:48

## 2022-12-02 RX ADMIN — ROCURONIUM BROMIDE 10 MG: 50 INJECTION, SOLUTION INTRAVENOUS at 14:18

## 2022-12-02 RX ADMIN — PROPOFOL 30 MG: 10 INJECTION, EMULSION INTRAVENOUS at 14:41

## 2022-12-02 RX ADMIN — SUGAMMADEX 200 MG: 100 INJECTION, SOLUTION INTRAVENOUS at 15:17

## 2022-12-02 RX ADMIN — ONDANSETRON 4 MG: 2 INJECTION INTRAMUSCULAR; INTRAVENOUS at 15:04

## 2022-12-02 RX ADMIN — METOPROLOL SUCCINATE 25 MG: 25 TABLET, EXTENDED RELEASE ORAL at 19:33

## 2022-12-02 RX ADMIN — CLINDAMYCIN PHOSPHATE 600 MG: 600 INJECTION, SOLUTION INTRAVENOUS at 19:33

## 2022-12-02 RX ADMIN — ALPRAZOLAM 0.25 MG: 0.25 TABLET ORAL at 21:30

## 2022-12-02 RX ADMIN — FENTANYL CITRATE 50 MCG: 50 INJECTION, SOLUTION INTRAMUSCULAR; INTRAVENOUS at 14:12

## 2022-12-02 RX ADMIN — CLINDAMYCIN PHOSPHATE 900 MG: 900 INJECTION, SOLUTION INTRAVENOUS at 10:58

## 2022-12-02 RX ADMIN — PHENYLEPHRINE HYDROCHLORIDE 50 MCG: 10 INJECTION INTRAVENOUS at 14:52

## 2022-12-02 RX ADMIN — LOSARTAN POTASSIUM 25 MG: 25 TABLET, FILM COATED ORAL at 19:33

## 2022-12-02 RX ADMIN — SENNOSIDES AND DOCUSATE SODIUM 1 TABLET: 50; 8.6 TABLET ORAL at 21:25

## 2022-12-02 RX ADMIN — DEXMEDETOMIDINE HYDROCHLORIDE 8 MCG: 200 INJECTION INTRAVENOUS at 14:07

## 2022-12-02 RX ADMIN — ROCURONIUM BROMIDE 50 MG: 50 INJECTION, SOLUTION INTRAVENOUS at 13:35

## 2022-12-02 RX ADMIN — PHENYLEPHRINE HYDROCHLORIDE 100 MCG: 10 INJECTION INTRAVENOUS at 14:30

## 2022-12-02 RX ADMIN — FENTANYL CITRATE 25 MCG: 50 INJECTION, SOLUTION INTRAMUSCULAR; INTRAVENOUS at 16:18

## 2022-12-02 RX ADMIN — PHENYLEPHRINE HYDROCHLORIDE 100 MCG: 10 INJECTION INTRAVENOUS at 14:18

## 2022-12-02 RX ADMIN — FENTANYL CITRATE 50 MCG: 50 INJECTION, SOLUTION INTRAMUSCULAR; INTRAVENOUS at 13:35

## 2022-12-02 RX ADMIN — PROPOFOL 40 MG: 10 INJECTION, EMULSION INTRAVENOUS at 14:47

## 2022-12-02 RX ADMIN — PROPOFOL 30 MG: 10 INJECTION, EMULSION INTRAVENOUS at 14:43

## 2022-12-02 RX ADMIN — SODIUM CHLORIDE: 9 INJECTION, SOLUTION INTRAVENOUS at 19:34

## 2022-12-02 RX ADMIN — LEVOTHYROXINE SODIUM 50 MCG: 50 TABLET ORAL at 19:33

## 2022-12-02 RX ADMIN — DEXAMETHASONE SODIUM PHOSPHATE 4 MG: 4 INJECTION, SOLUTION INTRA-ARTICULAR; INTRALESIONAL; INTRAMUSCULAR; INTRAVENOUS; SOFT TISSUE at 13:40

## 2022-12-02 RX ADMIN — PHENYLEPHRINE HYDROCHLORIDE 50 MCG: 10 INJECTION INTRAVENOUS at 14:51

## 2022-12-02 RX ADMIN — SODIUM CHLORIDE, POTASSIUM CHLORIDE, SODIUM LACTATE AND CALCIUM CHLORIDE: 600; 310; 30; 20 INJECTION, SOLUTION INTRAVENOUS at 10:48

## 2022-12-02 ASSESSMENT — LIFESTYLE VARIABLES: TOBACCO_USE: 0

## 2022-12-02 ASSESSMENT — ACTIVITIES OF DAILY LIVING (ADL)
CHANGE_IN_FUNCTIONAL_STATUS_SINCE_ONSET_OF_CURRENT_ILLNESS/INJURY: NO
ADLS_ACUITY_SCORE: 24
ADLS_ACUITY_SCORE: 39
FALL_HISTORY_WITHIN_LAST_SIX_MONTHS: YES
ADLS_ACUITY_SCORE: 39
DIFFICULTY_EATING/SWALLOWING: NO
DRESSING/BATHING_DIFFICULTY: NO
ADLS_ACUITY_SCORE: 24
TOILETING_ISSUES: NO
ADLS_ACUITY_SCORE: 37
DOING_ERRANDS_INDEPENDENTLY_DIFFICULTY: NO
CONCENTRATING,_REMEMBERING_OR_MAKING_DECISIONS_DIFFICULTY: NO
WALKING_OR_CLIMBING_STAIRS_DIFFICULTY: NO
WEAR_GLASSES_OR_BLIND: YES
VISION_MANAGEMENT: GLASSES
ADLS_ACUITY_SCORE: 39
NUMBER_OF_TIMES_PATIENT_HAS_FALLEN_WITHIN_LAST_SIX_MONTHS: 3
ADLS_ACUITY_SCORE: 39

## 2022-12-02 ASSESSMENT — ENCOUNTER SYMPTOMS
SEIZURES: 0
DYSRHYTHMIAS: 1

## 2022-12-02 NOTE — BRIEF OP NOTE
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  Complications: None.  Implants:   Implant Name Type Inv. Item Serial No.  Lot No. LRB No. Used Action   [employer] certas plus programmable valvue inline small valve only     [id number] Right 1 Implanted   csf ventriculostomy resevoir shallow depth     [id number] Right 1 Implanted   SHUNT CATH VENTRICULAR ANTIBIOTIC-IMPREG PERCY 23CM 27615 - [id number] Shunt SHUNT CATH VENTRICULAR ANTIBIOTIC-IMPREG PERCY 23CM [id number]  [employer] [id number] Right 1 Implanted   SHUNT CATH PERITONEAL 120CM 23308 - [id number] Shunt SHUNT CATH PERITONEAL 120CM [id number]  [employer]-NEURO [id number] Right 1 Implanted   SHUNT CATH PERITIONEAL STD 71204 - [id number] Shunt SHUNT CATH PERITIONEAL STD 52278  [employer] [id number] Right 1 [place]   SHUNT CERTAS PLUS W/SIPONGUARD [id number] - [id number] Shunt SHUNT CERTAS PLUS W/SIPONGUARD [id number]  [employer] [id number] Right 1 Wasted     Hira Pillai MD

## 2022-12-02 NOTE — ANESTHESIA CARE TRANSFER NOTE
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    Anesthesia Type:   General     Note:    Oropharynx: oropharynx clear of all foreign objects and spontaneously breathing  Level of Consciousness: awake  Oxygen Supplementation: face mask  Level of Supplemental Oxygen (L/min / FiO2): 6  Independent Airway: airway patency satisfactory and stable  Dentition: dentition unchanged  Vital Signs Stable: post-procedure vital signs reviewed and stable  Report to RN Given: handoff report given  Patient transferred to: PACU    Handoff Report: Identifed the Patient, Identified the Reponsible Provider, Reviewed the pertinent medical history, Discussed the surgical course, Reviewed Intra-OP anesthesia mangement and issues during anesthesia, Set expectations for post-procedure period and Allowed opportunity for questions and acknowledgement of understanding      Vitals:  Vitals Value Taken Time   BP     Temp     Pulse 98 12/02/22 1536   Resp 18 12/02/22 1536   SpO2 99 % 12/02/22 1536   Vitals shown include unvalidated device data.    Electronically Signed By: FORREST Beverly CRNA  December 2, 2022  3:37 PM

## 2022-12-02 NOTE — ANESTHESIA PREPROCEDURE EVALUATION
Anesthesia Pre-Procedure Evaluation    Patient: Milena Hamilton   MRN: [id number] : 1940        Procedure : Procedure(s):  Right Ventriculoperitoneal          Past Medical History:   Diagnosis Date     Lichen planus     vulvar     Osteopenia 2010    recheck DEXA in 2 years     Panic disorder without agoraphobia      Pure hypercholesterolemia     low LDLs     Raynaud's disease 2014     Unspecified essential hypertension       Past Surgical History:   Procedure Laterality Date     COLONOSCOPY  10/17/2007    normal, recheck in 10 years     COLONOSCOPY N/A 2014    Procedure: COLONOSCOPY;  Surgeon: Raffi Montiel MD;  Location: [facility]     COMBINED CYSTOSCOPY, INSERT CATHETER URETER Bilateral 10/13/2014    Procedure: COMBINED CYSTOSCOPY, INSERT CATHETER URETER;  Surgeon: Abdoulaye Odell MD;  Location: [facility]     EYE SURGERY  2001    right eye muscle repair     HC REMOVAL GALLBLADDER  2010    lap     HC REMOVAL OF OVARY/TUBE(S)      Salpingo-Oophorectomy, bilateral     HYSTERECTOMY, PAP NO LONGER INDICATED       INJECT EPIDURAL LUMBAR Right 04/10/2017    Procedure: INJECT EPIDURAL LUMBAR;  Surgeon: Raffi Wright MD;  [facility]     INJECT EPIDURAL LUMBAR Right 2020    Procedure: Right Lumbar 4- Sacral 1 Epidural Steroid Injection;  Surgeon: Antonio Dasilva MD;  [facility]     INJECT EPIDURAL LUMBAR Right 2021    Procedure: INJECTION, SPINE,  sacral 1 EPIDURAL;  Surgeon: Antonio Dasilva MD;  [facility]     INJECT EPIDURAL TRANSFORAMINAL Right 2016    Procedure: INJECT EPIDURAL TRANSFORAMINAL;  Surgeon: Antonio Dasilva MD;  [facility]     INJECT EPIDURAL TRANSFORAMINAL Right 10/13/2016    Procedure: INJECT EPIDURAL TRANSFORAMINAL;  Surgeon: Antonio Dasilva MD;  [facility]     INJECT EPIDURAL TRANSFORAMINAL Right 2021    Procedure: Right L4-5 and right L5-S1 Transforaminal Epidural Steroid Injections with fluoroscopic guidance and  contrast.;  Surgeon: Anotnio Dasilva MD;  [facility]     INJECT JOINT SACROILIAC Right 04/14/2022    Procedure: Fluoroscopically-guided injection of the Bilateral sacroiliac joints with Bilateral pastor Sacroiliac joint ligaments infiltration over the sacrum;  Surgeon: Antonio Dasilva MD;  [facility]     LAPAROSCOPIC ASSISTED COLECTOMY LEFT (DESCENDING) N/A 10/13/2014    Procedure: LAPAROSCOPIC ASSISTED COLECTOMY LEFT (DESCENDING);  Surgeon: Raffi Montiel MD;  [facility]     LUMBAR SPINE SURGERY  09/2021    L2-Pelvis fusion with rods and screws     ZZC APPENDECTOMY       ZZC NONSPECIFIC PROCEDURE      Right inferior oblique recession, 14 mm     ZZC VAG HYST,RMV TUBE/OVARY  01/01/1984    cervix removed      Allergies   Allergen Reactions     Azithromycin Diarrhea     Ciprofloxacin Other (See Comments)     Cipro, dizziness and abdominal pain     Gabapentin Dizziness and Visual Disturbance     Penicillins      stomach upset, diarrhea     Raspberry Hives     Sulfa Drugs Hives     hives     Augmentin Rash      Social History     Tobacco Use     Smoking status: Never     Smokeless tobacco: Never   Substance Use Topics     Alcohol use: No      Wt Readings from Last 1 Encounters:   12/02/22 58.1 kg (128 lb)        Anesthesia Evaluation   Pt has had prior anesthetic.     No history of anesthetic complications       ROS/MED HX  ENT/Pulmonary:    (-) tobacco use, asthma, sleep apnea and recent URI   Neurologic: Comment: P/w balance problems and found to have NPH    (-) no seizures, no CVA and no TIA   Cardiovascular:     (+) Dyslipidemia hypertension-----dysrhythmias (LBBB), Previous cardiac testing   Echo: Date: Results:  Echocardiogram 9/1/22  Interpretation Summary     The left ventricle is normal in size.  There is normal left ventricular wall thickness.  The visual ejection fraction is 50-55%.  Septal motion is consistent with conduction abnormality.  No significant valve issues.  Compared to prior study,  there is no significant change.  Stress Test: Date: Results:    ECG Reviewed: Date: Results:    Cath: Date: Results:   (-) taking anticoagulants/antiplatelets, pacemaker and pacemaker   METS/Exercise Tolerance: >4 METS    Hematologic:       Musculoskeletal:       GI/Hepatic: Comment: History of diverticulitis - s/p left hemicolectomy    (+) GERD, Asymptomatic on medication,  (-) liver disease   Renal/Genitourinary:    (-) renal disease   Endo:     (+) thyroid problem, hypothyroidism,     Psychiatric/Substance Use:     (+) psychiatric history anxiety     Infectious Disease:    (-) Recent Fever   Malignancy:       Other:      (+) , H/O Chronic Pain (LBP s/p multiple LESI and SI injections. S/p low back surgery in 10/2022. ),        Physical Exam    Airway        Mallampati: I   TM distance: > 3 FB   Neck ROM: full   Mouth opening: > 3 cm    Respiratory Devices and Support         Dental  no notable dental history         Cardiovascular          Rhythm and rate: regular and normal     Pulmonary           breath sounds clear to auscultation           OUTSIDE LABS:  CBC:   Lab Results   Component Value Date    WBC 4.3 10/03/2022    WBC 5.3 08/31/2022    HGB 12.6 10/03/2022    HGB 12.7 08/31/2022    HCT 38.9 10/03/2022    HCT 39.2 08/31/2022     10/03/2022     08/31/2022     BMP:   Lab Results   Component Value Date     10/03/2022     08/31/2022    POTASSIUM 4.3 10/03/2022    POTASSIUM 3.8 08/31/2022    CHLORIDE 109 10/03/2022    CHLORIDE 110 (H) 08/31/2022    CO2 29 10/03/2022    CO2 29 08/31/2022    BUN 10 10/03/2022    BUN 12 08/31/2022    CR 0.66 10/03/2022    CR 0.76 08/31/2022    GLC 98 10/03/2022     (H) 08/31/2022     COAGS:   Lab Results   Component Value Date    PTT 30 04/06/2022    INR 0.88 04/06/2022     POC:   Lab Results   Component Value Date     (H) 10/15/2014     HEPATIC:   Lab Results   Component Value Date    ALBUMIN 3.7 10/03/2022    PROTTOTAL 6.9 10/03/2022     ALT 17 10/03/2022    AST 16 10/03/2022    ALKPHOS 54 10/03/2022    BILITOTAL 0.5 10/03/2022     OTHER:   Lab Results   Component Value Date    LACT 1.2 03/23/2016    A1C 5.4 09/26/2008    ANU 8.8 10/03/2022    PHOS 3.7 05/29/2015    MAG 2.1 05/29/2015    LIPASE 225 09/06/2022    AMYLASE 59 05/29/2013    TSH 1.18 03/24/2022    T4 1.14 01/12/2016    CRP <2.9 08/24/2022    SED 9 09/11/2019       Anesthesia Plan    ASA Status:  3   NPO Status:  NPO Appropriate    Anesthesia Type: General.     - Airway: ETT   Induction: Intravenous.   Maintenance: Balanced.   Techniques and Equipment:     - Lines/Monitors: 2nd IV     Consents    Anesthesia Plan(s) and associated risks, benefits, and realistic alternatives discussed. Questions answered and patient/representative(s) expressed understanding.     - Discussed: Risks, Benefits and Alternatives for the PROCEDURE were discussed     - Discussed with:  Patient         Postoperative Care    Pain management: Multi-modal analgesia.   PONV prophylaxis: Background Propofol Infusion, Ondansetron (or other 5HT-3), Dexamethasone or Solumedrol     Comments:                Dorothy Lozada MD

## 2022-12-02 NOTE — OP NOTE
Location: [facility] or  Attending Surgeon: Hira Pillai MD  Assistant Surgeon: ITALIA Culver  Preprocedure diagnosis: Normal pressure hydrocephalus  Postprocedure diagnosis: Same  Procedure:  1.  Right occipital ventriculoperitoneal shunt placement with assistance from general surgery for the intraperitoneal catheter implantation.  2.  Use of intraoperative Stealth navigation  Indications: The patient is an 82-year-old female with enlarged bilateral ventricles.  She has symptoms consistent with normal pressure hydrocephalus.  A large-volume lumbar puncture with pre and post physical therapy testing revealed improved gait following the LP.  She is therefore indicated for shunt for normal pressure hydrocephalus.  Procedure details:  The patient about the main operating, intubated and placed under general esthesia.  She was placed in the operative table in the supine position with her head on a horseshoe and turned to the left.  A bur hole site was marked out approximately 6 cm superior and 3 cm to the right of the occipital protuberance.  The hair was clipped around this area and inferiorly down to the neck.  She was then cleaned and prepped in sterile fashion.  Local anesthetic was injected following timeout.  Using a 10 blade an incision was made in the candycane shape behind the ear on the right side.  We then used monopolar cautery to dissect down to the skull.  We then used a perforating bit to create a bur hole and then the dura was coagulated using bipolar cautery.  We then created a pocket inferiorly and did a shot past down to the abdomen penetrating through the abdominal wall with the assistance of general surgery.  We then passed the catheter through the shunt passer tubing to the abdomen after securing the shunt tubing to a Codman Certuss valve previously set to 4.  The valve was secured to the tubing using a silk tie.  This was then implanted into the abdomen as dictated by general surgery in a separate  note.  Then incised the dura using a cruciate incision with a 15 blade and coagulated the dural leaflets and then created a corticectomy using bipolar cautery.  Once we verified the blood pressure is below 120 mm systolic using Stealth navigation we passed a proximal catheter which was cut to 9.5 cm into the right lateral ventricle obtaining spontaneous flow of CSF approximately 3 to 4 cm into the brain.  This was then soft passed further into the ventricle and connected to a reservoir and secured using a silk tie.  The reservoir was then secured to the shunt valve using a silk tie as well.  We are able to pump and identify distal flow into the distal catheter.  We then irrigated the wound with normal saline.  The galea was then closed using 3-0 Vicryl sutures followed by 3-0 nylon sutures for the skin.  All counts were correct at the end the procedure.  The patient tolerated procedure well.  She was extubated and transferred the PACU in stable condition.  As present for the entire procedure except for final closure which I was immediately available for.    Hira Pillai MD

## 2022-12-02 NOTE — ANESTHESIA PROCEDURE NOTES
Airway       Patient location during procedure: OR       Procedure Start/Stop Times: 12/2/2022 1:37 PM  Staff -        Anesthesiologist:  Dorothy Lozada MD       CRNA: Jerry Ivory APRN CRNA       Performed By: CRNA  Consent for Airway        Urgency: elective  Indications and Patient Condition       Indications for airway management: pastor-procedural       Induction type:intravenous       Mask difficulty assessment: 1 - vent by mask    Final Airway Details       Final airway type: endotracheal airway       Successful airway: ETT - single  Endotracheal Airway Details        ETT size (mm): 7.0       Cuffed: yes       Successful intubation technique: direct laryngoscopy       DL Blade Type: Streeter 2       Grade View of Cords: 1       Adjucts: stylet       Position: Left       Measured from: lips       Secured at (cm): 21       Bite block used: None    Post intubation assessment        Placement verified by: capnometry, equal breath sounds and chest rise        Number of attempts at approach: 1       Number of other approaches attempted: 0       Secured with: pink tape       Ease of procedure: easy       Dentition: Intact and Unchanged    Medication(s) Administered   Medication Administration Time: 12/2/2022 1:37 PM

## 2022-12-03 ENCOUNTER — APPOINTMENT (OUTPATIENT)
Dept: PHYSICAL THERAPY | Facility: CLINIC | Age: 82
DRG: 032 | End: 2022-12-03
Attending: PHYSICIAN ASSISTANT
Payer: COMMERCIAL

## 2022-12-03 ENCOUNTER — APPOINTMENT (OUTPATIENT)
Dept: GENERAL RADIOLOGY | Facility: CLINIC | Age: 82
DRG: 032 | End: 2022-12-03
Attending: PHYSICIAN ASSISTANT
Payer: COMMERCIAL

## 2022-12-03 ENCOUNTER — APPOINTMENT (OUTPATIENT)
Dept: CT IMAGING | Facility: CLINIC | Age: 82
DRG: 032 | End: 2022-12-03
Attending: PHYSICIAN ASSISTANT
Payer: COMMERCIAL

## 2022-12-03 VITALS
BODY MASS INDEX: 23.55 KG/M2 | TEMPERATURE: 97.9 F | DIASTOLIC BLOOD PRESSURE: 60 MMHG | OXYGEN SATURATION: 96 % | HEIGHT: 62 IN | HEART RATE: 78 BPM | SYSTOLIC BLOOD PRESSURE: 123 MMHG | RESPIRATION RATE: 16 BRPM | WEIGHT: 128 LBS

## 2022-12-03 LAB
GLUCOSE BLDC GLUCOMTR-MCNC: 100 MG/DL (ref 70–99)
HGB BLD-MCNC: 11.3 G/DL (ref 11.7–15.7)

## 2022-12-03 PROCEDURE — 97161 PT EVAL LOW COMPLEX 20 MIN: CPT | Mod: GP

## 2022-12-03 PROCEDURE — 97116 GAIT TRAINING THERAPY: CPT | Mod: GP

## 2022-12-03 PROCEDURE — 74018 RADEX ABDOMEN 1 VIEW: CPT

## 2022-12-03 PROCEDURE — 250N000013 HC RX MED GY IP 250 OP 250 PS 637: Performed by: PHYSICIAN ASSISTANT

## 2022-12-03 PROCEDURE — 97530 THERAPEUTIC ACTIVITIES: CPT | Mod: GP

## 2022-12-03 PROCEDURE — 85018 HEMOGLOBIN: CPT | Performed by: PHYSICIAN ASSISTANT

## 2022-12-03 PROCEDURE — 36415 COLL VENOUS BLD VENIPUNCTURE: CPT | Performed by: PHYSICIAN ASSISTANT

## 2022-12-03 PROCEDURE — 70450 CT HEAD/BRAIN W/O DYE: CPT

## 2022-12-03 PROCEDURE — 250N000011 HC RX IP 250 OP 636: Performed by: PHYSICIAN ASSISTANT

## 2022-12-03 RX ORDER — AMOXICILLIN 250 MG
1 CAPSULE ORAL 2 TIMES DAILY PRN
Qty: 20 TABLET | Refills: 0 | Status: SHIPPED | OUTPATIENT
Start: 2022-12-03 | End: 2022-12-07

## 2022-12-03 RX ORDER — OXYCODONE HYDROCHLORIDE 5 MG/1
5 TABLET ORAL EVERY 6 HOURS PRN
Qty: 20 TABLET | Refills: 0 | Status: SHIPPED | OUTPATIENT
Start: 2022-12-03 | End: 2022-12-07

## 2022-12-03 RX ADMIN — CLINDAMYCIN PHOSPHATE 600 MG: 600 INJECTION, SOLUTION INTRAVENOUS at 10:38

## 2022-12-03 RX ADMIN — ACETAMINOPHEN 975 MG: 325 TABLET, FILM COATED ORAL at 03:35

## 2022-12-03 RX ADMIN — METOPROLOL SUCCINATE 25 MG: 25 TABLET, EXTENDED RELEASE ORAL at 08:03

## 2022-12-03 RX ADMIN — Medication 1000 UNITS: at 08:03

## 2022-12-03 RX ADMIN — OXYCODONE HYDROCHLORIDE 5 MG: 5 TABLET ORAL at 01:38

## 2022-12-03 RX ADMIN — Medication 400 UNITS: at 08:03

## 2022-12-03 RX ADMIN — CLINDAMYCIN PHOSPHATE 600 MG: 600 INJECTION, SOLUTION INTRAVENOUS at 03:35

## 2022-12-03 RX ADMIN — LEVOTHYROXINE SODIUM 50 MCG: 50 TABLET ORAL at 08:04

## 2022-12-03 RX ADMIN — LOSARTAN POTASSIUM 25 MG: 25 TABLET, FILM COATED ORAL at 08:04

## 2022-12-03 RX ADMIN — OXYCODONE HYDROCHLORIDE 5 MG: 5 TABLET ORAL at 09:27

## 2022-12-03 RX ADMIN — ACETAMINOPHEN 975 MG: 325 TABLET, FILM COATED ORAL at 11:57

## 2022-12-03 RX ADMIN — PANTOPRAZOLE SODIUM 40 MG: 40 TABLET, DELAYED RELEASE ORAL at 06:32

## 2022-12-03 ASSESSMENT — ACTIVITIES OF DAILY LIVING (ADL)
ADLS_ACUITY_SCORE: 25
ADLS_ACUITY_SCORE: 24
ADLS_ACUITY_SCORE: 24
ADLS_ACUITY_SCORE: 25
ADLS_ACUITY_SCORE: 25

## 2022-12-03 NOTE — PLAN OF CARE
Reason for Admission: POD 1  shunt    Cognitive/Mentation: A/Ox 4  Neuros/CMS: Intact ex numbness/tingling in BLE  VS: stable.   Tele: n/a.  GI: BS active x4, passing flatus. Continent.  : Voiding adequate amounts. Continent.  Pulmonary: LS clear throughout.  Pain: Headache 4-5/10, receiving scheduled tylenol and prn oxycodone.     Drains/Lines: n/a  Skin: incision, sutures approximated, HERON  Activity: Assist x 1 with GB/W.  Diet: regular with thin liquids. Takes pills whole with water.     Therapies recs: home with outpatient  Discharge: today to home with outpatient therapies. Family at bedside. Reviewed AVS. Meds with pt at discharge.

## 2022-12-03 NOTE — PROGRESS NOTES
12/03/22 1000   Appointment Info   Signing Clinician's Name / Credentials (PT) Jannette Mcallister, DEANN   Living Environment   People in Home spouse   Current Living Arrangements house   Home Accessibility stairs to enter home   Number of Stairs, Main Entrance 3   Stair Railings, Main Entrance railing on left side (ascending);railings safe and in good condition   Transportation Anticipated family or friend will provide   Self-Care   Usual Activity Tolerance fair   Current Activity Tolerance moderate   Regular Exercise No   Equipment Currently Used at Home walker, rolling   Fall history within last six months no   Activity/Exercise/Self-Care Comment Spouse provides 24 hour supervision and assist at baseline. Family reports CGA for ambulation at baseline and mild cognitive impairments.   General Information   Onset of Illness/Injury or Date of Surgery 12/02/22   Referring Physician Dr. Pillai   Patient/Family Therapy Goals Statement (PT) To go home   Pertinent History of Current Problem (include personal factors and/or comorbidities that impact the POC) Pt is an 82 year old female s/p shunt placement for hydrocephelus   Existing Precautions/Restrictions fall   Cognition   Affect/Mental Status (Cognition) WFL   Orientation Status (Cognition) oriented x 3   Pain Assessment   Patient Currently in Pain No   Range of Motion (ROM)   Range of Motion ROM is WFL   Strength (Manual Muscle Testing)   Strength Comments Right LE grossly 4/5, Left LE grossly 4+/5 (family reports this is baseline from prior back surgery)   Bed Mobility   Comment, (Bed Mobility) SBA   Transfers   Comment, (Transfers) CGA   Gait/Stairs (Locomotion)   Comment, (Gait/Stairs) 25' FWW decreased step length with shuffling gait pattern   Balance   Balance Comments Good in sitting, fair in standing   Clinical Impression   Criteria for Skilled Therapeutic Intervention Yes, treatment indicated   PT Diagnosis (PT) Impaired ambulation   Influenced by the following  impairments Decreased strength, decreased endurance, decreased balance   Functional limitations due to impairments Difficulty with bed mobility, transfers, ambulation, stari management   Clinical Presentation (PT Evaluation Complexity) Stable/Uncomplicated   Clinical Presentation Rationale VSS, pain controlled   Clinical Decision Making (Complexity) low complexity   Planned Therapy Interventions (PT) balance training;bed mobility training;gait training;patient/family education;transfer training;stair training;strengthening   Risk & Benefits of therapy have been explained evaluation/treatment results reviewed;care plan/treatment goals reviewed;risks/benefits reviewed;current/potential barriers reviewed;participants voiced agreement with care plan;spouse/significant other;patient;participants included;daughter;son   PT Total Evaluation Time   PT Eval, Low Complexity Minutes (82532) 10   Plan of Care Review   Plan of Care Reviewed With patient;spouse;son;daughter   Physical Therapy Goals   PT Frequency Daily   PT Predicted Duration/Target Date for Goal Attainment 12/05/22   PT Goals Bed Mobility;Transfers;Gait;Stairs   PT: Bed Mobility Independent;Supine to/from sit   PT: Transfers Modified independent;Sit to/from stand;Bed to/from chair;Assistive device   PT: Gait Supervision/stand-by assist;Rolling walker;150 feet   PT: Stairs Supervision/stand-by assist;3 stairs;Rail on left   Interventions   Interventions Quick Adds Gait Training;Therapeutic Activity;Therapeutic Procedure   Therapeutic Procedure/Exercise   Ther. Procedure: strength, endurance, ROM, flexibillity Minutes (47784) 3   Symptoms Noted During/After Treatment none   Treatment Detail/Skilled Intervention Pt performs the following LE strengthening exercises x 10 reps: ankle pumps, LAQs, marching.   Therapeutic Activity   Therapeutic Activities: dynamic activities to improve functional performance Minutes (46759) 12   Symptoms Noted During/After Treatment  Fatigue   Treatment Detail/Skilled Intervention Pt received supine in bed, agreeable to therapy, family present and very engaged with therapy. Pt educated on PT POC, role of PT in acute care setting, therapy goals and discharge recommendations. Pt performs bed mobility with SBA, cues for technique. Once sitting at EOB, able to scoot forward with SBA, good sitting balance at EOB. Pt performs sit to/from stand x 5 reps with CGA progressing to SBA with cues for hand placement and weight shift. Pt and family educated on discharge recommendations of home with 24 hour supervision and assist with all mobility, family in agreement. At end of therapy, pt supine in bed, all needs in reach, alarm on.   Gait Training   Gait Training Minutes (51404) 20   Symptoms Noted During/After Treatment (Gait Training) fatigue   Treatment Detail/Skilled Intervention Pt ambulates 150' x 2 with FWW with CGA progressing to SBA with cues for upright posture and proximity to FWW. Pt requires two standing rest breaks with ambulation. Pt up/down 4 steps with left handrail x 3 reps with CGA progressing to SBA with cues for sequencing.   PT Discharge Planning   PT Plan Progress independence and safety   PT Discharge Recommendation (DC Rec) home with outpatient physical therapy   PT Rationale for DC Rec At baseline, pt resides with spouse in a house with 3 steps to enter and all needs met on first floor. Pt and family report she required assist with mobility prior to admit. This date, pt requires SBA to CGA for mobility, which family is able to provide at discharge. Pt will benefit from SBA for ambulation with FWW and stair management. Pt will benefit from resumption of outpatient PT to further improve mobility and balance.   PT Brief overview of current status SBA   Total Session Time   Timed Code Treatment Minutes 35   Total Session Time (sum of timed and untimed services) 45

## 2022-12-03 NOTE — PLAN OF CARE
OT: Orders received. Chart reviewed and discussed withRN.  OT not indicated due to patient returning home this date with assist from .  No inpatient OT needs. Will complete orders.

## 2022-12-03 NOTE — PHARMACY-ADMISSION MEDICATION HISTORY
Pharmacy Medication History  Admission medication history interview status for the 12/2/2022  admission is complete. See EPIC admission navigator for prior to admission medications     Location of Interview: Patient room  Medication history sources: Patient and Surescripts    Significant changes made to the medication list:  Added systane  Adjusted vitamin D and vitamin E from PM to AM dosing  Adjusted omeprazole from daily --> every other day (per patient, fill history implies daily dosing)     In the past week, patient estimated taking medication this percent of the time: greater than 90%    Additional medication history information:   Patient is a reliable historian.     Medication reconciliation completed by provider prior to medication history? Yes    Time spent in this activity: 15 minutes    Medication Sig Last Dose Taking? Auth Provider   acetaminophen (TYLENOL) 500 MG tablet Take 500-1,000 mg by mouth every 6 hours as needed for mild pain 11/30/2022 at Unknown time Yes Reported, Patient   ALPRAZolam (XANAX) 0.25 MG tablet TAKE 1 TABLET (0.25 MG) BY MOUTH NIGHTLY AS NEEDED FOR ANXIETY 12/1/2022 at 2100 Yes Chuck Streeter PA-C   cholecalciferol 25 MCG (1000 UT) TABS Take 1,000 Units by mouth daily 12/1/2022 at 0800 Yes Reported, Patient   levothyroxine (SYNTHROID/LEVOTHROID) 50 MCG tablet TAKE ONE TABLET BY MOUTH once daily 12/1/2022 at 0800 Yes Chuck Streeter PA-C   losartan (COZAAR) 25 MG tablet Take 1 tablet (25 mg) by mouth daily 12/1/2022 at 1200 Yes May Claudio APRN CNP   metoprolol succinate ER (TOPROL XL) 25 MG 24 hr tablet Take 1 tablet (25 mg) by mouth daily 12/1/2022 at 1200 Yes May Claudio APRN CNP   omeprazole (PRILOSEC) 20 MG DR capsule TAKE ONE CAPSULE BY MOUTH ONCE DAILY  Patient taking differently: Take 20 mg by mouth every other day 12/1/2022 at 1200 Yes Chuck Streeter PA-C   polyethylene glycol-propylene glycol (SYSTANE ULTRA) 0.4-0.3 % SOLN ophthalmic  solution Place 1 drop into both eyes 3 times daily as needed for dry eyes Unknown Yes Unknown, Entered By History   simvastatin (ZOCOR) 20 MG tablet TAKE 1/2 TABLET BY MOUTH EVERY NIGHT  Patient taking differently: Take 10 mg by mouth At Bedtime 12/1/2022 at 2100 Yes Chuck Streeter PA-C   vitamin E (TOCOPHEROL) 400 units (180 mg) capsule Take 400 Units by mouth daily 12/1/2022 at 0800 Yes Reported, Patient       The information provided in this note is only as accurate as the sources available at the time of update(s)   Fatoumata Mensah, PharmD

## 2022-12-03 NOTE — DISCHARGE SUMMARY
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 Pillai    Exam:   Pt in bed. She appears comfortable and in no apparent distress, moving all extremities.   CN II-XII intact, alert and appropriate with conversation and following commands.   Bilateral upper and lower extremities with appropriate strength. DTR's WNL. Spine is non tender to palpation throughout. No's and Babinski sign neg. Sensation intact throughout. Acceptable ROM.   Calves soft and non-tender bilaterally.   Cranial dressing changed. Incision intact. Absent for edema, erythema or drainage.   She is ambulating with a steady gait and without assistive devices. Tolerating regular diet without nausea or vomiting. Pain managed with oral medication. Voiding without difficulty. She is on room air with O2 sats greater than 90%.     Disposition: Home     Milena Hamilton verbalizes understanding and is in agreement with discharge.     Done while pt still in hospital bed      Review of your medicines      START taking      Dose / Directions   oxyCODONE 5 MG tablet  Commonly known as: ROXICODONE      Dose: 5 mg  Take 1 tablet (5 mg) by mouth every 6 hours as needed for pain  Quantity: 20 tablet  Refills: 0     senna-docusate 8.6-50 MG tablet  Commonly known as: SENOKOT-S/PERICOLACE      Dose: 1 tablet  Take 1 tablet by mouth 2 times daily as needed for constipation  Quantity: 20 tablet  Refills: 0        CONTINUE these medicines which may have CHANGED, or have new prescriptions. If we are uncertain of the size of tablets/capsules you have at home, strength may be listed as something that might  have changed.      Dose / Directions   omeprazole 20 MG DR capsule  Commonly known as: priLOSEC  This may have changed:     how much to take    how to take this    when to take this    additional instructions  Used for: Gastroesophageal reflux disease, unspecified whether esophagitis present      TAKE ONE CAPSULE BY MOUTH ONCE DAILY  Quantity: 90 capsule  Refills: 3     simvastatin 20 MG tablet  Commonly known as: ZOCOR  This may have changed:     how much to take    how to take this    when to take this    additional instructions  Used for: Hyperlipidemia LDL goal <130      TAKE 1/2 TABLET BY MOUTH EVERY NIGHT  Quantity: 135 tablet  Refills: 3        CONTINUE these medicines which have NOT CHANGED      Dose / Directions   acetaminophen 500 MG tablet  Commonly known as: TYLENOL      Dose: 500-1,000 mg  Take 500-1,000 mg by mouth every 6 hours as needed for mild pain  Refills: 0     ALPRAZolam 0.25 MG tablet  Commonly known as: XANAX  Used for: Anxiety      Dose: 0.25 mg  TAKE 1 TABLET (0.25 MG) BY MOUTH NIGHTLY AS NEEDED FOR ANXIETY  Quantity: 30 tablet  Refills: 2     cholecalciferol 25 MCG (1000 UT) Tabs      Dose: 1,000 Units  Take 1,000 Units by mouth daily  Refills: 0     levothyroxine 50 MCG tablet  Commonly known as: SYNTHROID/LEVOTHROID  Used for: Acquired hypothyroidism      TAKE ONE TABLET BY MOUTH once daily  Quantity: 90 tablet  Refills: 3     losartan 25 MG tablet  Commonly known as: COZAAR  Used for: LBBB (left bundle branch block), Chronic systolic heart failure (H)      Dose: 25 mg  Take 1 tablet (25 mg) by mouth daily  Quantity: 90 tablet  Refills: 3     metoprolol succinate ER 25 MG 24 hr tablet  Commonly known as: TOPROL XL  Used for: Chronic systolic heart failure (H)      Dose: 25 mg  Take 1 tablet (25 mg) by mouth daily  Quantity: 90 tablet  Refills: 3     polyethylene glycol-propylene glycol 0.4-0.3 % Soln ophthalmic solution  Commonly known as: SYSTANE ULTRA      Dose: 1 drop  Place 1 drop  into both eyes 3 times daily as needed for dry eyes  Refills: 0     vitamin E 400 units (180 mg) capsule  Commonly known as: TOCOPHEROL      Dose: 400 Units  Take 400 Units by mouth daily  Refills: 0           Where to get your medicines      These medications were sent to [facility] [place], [place] - [address]  [address], [place] [address]    Phone: [phone]     oxyCODONE 5 MG tablet    senna-docusate 8.6-50 MG tablet          Discharge Procedure Orders   Reason for your hospital stay   Order Comments: Right Ventriculoperitoneal Shunt due to idiopathic normal pressure hydrocephalus.     Follow-up and recommended labs and tests    Order Comments: Your Neurosurgical follow up appointments have been scheduled. You may call [phone] to make, confirm or change your follow-up Neurosurgery appointment dates and/or times.     Activity   Order Comments: Please limit your lifting to no more that ten pounds and avoid excessive jostling and jarring activities.     Order Specific Question Answer Comments   Is discharge order? Yes      Wound care and dressings   Order Comments: Instructions to care for your wound at home: ice to area for comfort, keep wound clean and dry, and may get incision wet in shower but do not soak or scrub.     Diet   Order Comments: Follow this diet upon discharge: Advance to a regular diet as tolerated.     Order Specific Question Answer Comments   Is discharge order? Yes           Respectfully,   KENA Culver, PALyudmilaC

## 2022-12-03 NOTE — PLAN OF CARE
Reason for Admission: POD1  Shunt     Cognitive/Mentation: A&O x4  Neuros/CMS: Intact ex BLE N/T (baseline)  VS: Stable on RA   GI: BS active, + flatus. Continent.  : Voiding. Continent.  Pulmonary: LS clear.  Pain: Incision site, 5/10--oxycodone given once, scheduled Tylenol     Drains/Lines: L PIV SL  Skin: Intact ex R scalp incision, abdominal laparotomy sites  Activity: Assist x1 GB/W  Diet: Regular with thin liquids. Takes pills whole.      Therapies recs: Home  Discharge:  Today pending MD clearance       Aggression Stoplight Tool: Green     End of shift summary: No acute changes. CT and Xray completed/resulted.

## 2022-12-03 NOTE — PROGRESS NOTES
Pt here POD #0 from Right Ventriculoperitoneal. Pt arrived to the unit at 1700. A&O X4. Neuros intact with some baseline BLE numbness and tingling from prior surgery. VSS. Tele NSR. Regular diet, thin liquids. Takes pills whole with water. Up with Ax1 GB/W. Incisional pain managed with scheduled Tylenol. Right scalp incision site is HERON, no drainage. Three lap sites intact. Pt received PRN for bedtime anxiety. Pt scoring green on the Aggression Stop Light Tool. Plan for repeat CT scan 12/3. Discharge home pending CT results.

## 2022-12-03 NOTE — ANESTHESIA POSTPROCEDURE EVALUATION
Patient: Milena Hamilton    Procedure: Procedure(s):  Right Ventriculoperitoneal       Anesthesia Type:  General    Note:  Disposition: Admission   Postop Pain Control: Uneventful            Sign Out: Well controlled pain   PONV: No   Neuro/Psych: Uneventful            Sign Out: Acceptable/Baseline neuro status   Airway/Respiratory: Uneventful            Sign Out: Acceptable/Baseline resp. status   CV/Hemodynamics: Uneventful            Sign Out: Acceptable CV status; No obvious hypovolemia; No obvious fluid overload   Other NRE: NONE   DID A NON-ROUTINE EVENT OCCUR? No           Last vitals:  Vitals Value Taken Time   /81 12/02/22 1640   Temp     Pulse 71 12/02/22 1644   Resp 14 12/02/22 1644   SpO2 95 % 12/02/22 1646   Vitals shown include unvalidated device data.    Electronically Signed By: Dorothy Lozada MD  December 2, 2022  6:53 PM

## 2022-12-04 NOTE — PLAN OF CARE
Physical Therapy Discharge Summary    Reason for therapy discharge:    Discharged to home with outpatient therapy.    Progress towards therapy goal(s). See goals on Care Plan in [facility] electronic health record for goal details.  Goals partially met.  Barriers to achieving goals:   discharge on same date as initial evaluation.    Therapy recommendation(s):    Continued therapy is recommended.  Rationale/Recommendations:  To further increase independence with mobility.

## 2022-12-05 ENCOUNTER — PATIENT OUTREACH (OUTPATIENT)
Dept: CARE COORDINATION | Facility: CLINIC | Age: 82
End: 2022-12-05

## 2022-12-05 NOTE — PROGRESS NOTES
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 She appears comfortable and in no apparent distress, moving all extremities.   CN II-XII intact, alert and appropriate with conversation and following commands.   Bilateral upper and lower extremities with appropriate strength. DTR's WNL. Spine is non tender to palpation throughout. No's and Babinski sign neg. Sensation intact throughout. Acceptable ROM.   Calves soft and non-tender bilaterally.   Cranial dressing changed. Incision intact. Absent for edema, erythema or drainage.   She is ambulating with a steady gait and without assistive devices. Tolerating regular diet without nausea or vomiting. Pain managed with oral medication. Voiding without difficulty. She is on room air with O2 sats greater than 90%.     ASSESSMENT      Discharge Assessment  How are you doing now that you are home?: \"A little bit better today\"  How are your symptoms? (Red Flag symptoms escalate to triage hotline per guidelines): Improved  Do you feel your condition is stable enough to be safe at home until your provider visit?: Yes  Does the patient have their discharge instructions? : Yes  Does the patient have questions regarding their discharge instructions? : No  Were you started on any new medications or were there changes to any of your previous medications? : Yes  Does the patient have all of their medications?: Yes  Do " you have questions regarding any of your medications? : No  Do you have all of your needed medical supplies or equipment (DME)?  (i.e. oxygen tank, CPAP, cane, etc.): Yes  Discharge follow-up appointment scheduled within 14 calendar days? : Yes  Discharge Follow Up Appointment Date: 12/07/22  Discharge Follow Up Appointment Scheduled with?: Primary Care Provider    Post-op ([facility] CTA Only)  If the patient had a surgery or procedure, do they have any questions for a nurse?: No    PLAN                                                      Outpatient Plan:     Your Neurosurgical follow up appointments have been scheduled. You may call [phone] to make, confirm or change your follow-up Neurosurgery appointment dates and/or times.       Future Appointments   Date Time Provider Department [place]   12/7/2022 11:00 AM Chuck Streeter PA-C [facility]   12/14/2022  1:00 PM Nurse, [facility]   1/12/2023  1:40 PM Hira Pillai MD [facility] [place]   2/23/2023  1:40 PM Hira Pillai MD [facility] [place]         For any urgent concerns, please contact our 24 hour nurse triage line: [phone] ([phone])         YANCY Siegel  [phone]  [facility]

## 2022-12-06 ENCOUNTER — TELEPHONE (OUTPATIENT)
Dept: FAMILY MEDICINE | Facility: OTHER | Age: 82
End: 2022-12-06

## 2022-12-06 NOTE — TELEPHONE ENCOUNTER
Patient calling regarding some nausea after her surgery.      She had a procedure done 12/2/22 and is now having nausea.    Had patient follow up with her neurosurgeon regarding the nausea.    Jena Coreas RN  [facility] ~ Registered Nurse  Clinic Triage ~ [place] [facility] [place]  December 6, 2022

## 2022-12-07 ENCOUNTER — VIRTUAL VISIT (OUTPATIENT)
Dept: FAMILY MEDICINE | Facility: OTHER | Age: 82
End: 2022-12-07
Payer: COMMERCIAL

## 2022-12-07 DIAGNOSIS — G91.2 NPH (NORMAL PRESSURE HYDROCEPHALUS) (H): Primary | ICD-10-CM

## 2022-12-07 DIAGNOSIS — Z98.2 S/P VP SHUNT: ICD-10-CM

## 2022-12-07 DIAGNOSIS — R11.0 NAUSEA: ICD-10-CM

## 2022-12-07 PROCEDURE — 99214 OFFICE O/P EST MOD 30 MIN: CPT | Mod: 95 | Performed by: PHYSICIAN ASSISTANT

## 2022-12-07 RX ORDER — ONDANSETRON 4 MG/1
4 TABLET, ORALLY DISINTEGRATING ORAL EVERY 8 HOURS PRN
Qty: 12 TABLET | Refills: 0 | Status: SHIPPED | OUTPATIENT
Start: 2022-12-07 | End: 2023-02-09

## 2022-12-07 NOTE — PROGRESS NOTES
"Milena is a 82 year old who is being evaluated via a billable telephone visit.      What phone number would you like to be contacted at? [phone]  How would you like to obtain your AVS? Mail a copy    Assessment & Plan       ICD-10-CM    1. NPH (normal pressure hydrocephalus) (H)  G91.2       2. S/P  shunt  Z98.2       3. Nausea  R11.0 ondansetron (ZOFRAN ODT) 4 MG ODT tab          1-3. Patient is 5 days post-op for a right  shunt to treat her normal pressure hydrocephalus. Patient's NPH symptoms are improving including her balance and urinary frequency/incontinence. Concerned about post-operative head pain, muffled right sided hearing, and nausea as she felt this was not discussed with her surgeon. Counseled patient on post-operative cares and reassured her that these symptoms are expected and not concerning at this time as she is improving every day. Encouraged patient to rest and use supportive cares for her symptoms. Follow up sooner if symptoms worsen or do not continue to improve.  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 \"A little bit better today\"   How are your symptoms? (Red Flag symptoms escalate to triage hotline per guidelines) Improved   Do you feel your condition is stable enough to be safe at " home until your provider visit? Yes   Does the patient have their discharge instructions?  Yes   Does the patient have questions regarding their discharge instructions?  No   Were you started on any new medications or were there changes to any of your previous medications?  Yes   Does the patient have all of their medications? Yes   Do you have questions regarding any of your medications?  No   Do you have all of your needed medical supplies or equipment (DME)?  (i.e. oxygen tank, CPAP, cane, etc.) Yes   Discharge follow-up appointment scheduled within 14 calendar days?  Yes   Discharge Follow Up Appointment Date 12/7/2022   Discharge Follow Up Appointment Scheduled with? Primary Care Provider     Hospital Follow-up Visit:    Hospital/Nursing Home/IP Rehab Facility: [facility]  Date of Admission: 12/2/22  Date of Discharge: 12/3/22  Reason(s) for Admission:  (ventriculoperitoneal) shunt    Was your hospitalization related to COVID-19? No   Problems taking medications regularly:  None  Medication changes since discharge: None  Problems adhering to non-medication therapy:  None    Summary of hospitalization:  [facility] discharge summary reviewed  Diagnostic Tests/Treatments reviewed.  Follow up needed: neurosurgery  Other Healthcare Providers Involved in Patient s Care:         neurosurgery  Update since discharge: improved. She has head pain, muffled right sided hearing and nausea since surgery but states she is walking better and is not getting up at night to urinate. She is feeling a little bit better today but was not expecting these symptoms after surgery. She cannot eat much due to the nausea so she is requesting a medication to help with nausea. The oxycodone makes her feel sick so she is not taking this anymore. She denies any fevers, chills or constipation.     Plan of care communicated with patient  Med reconciliation completed with changes noted above.    "    Review of Systems   Constitutional, HEENT, cardiovascular, pulmonary, musculoskeletal, neuro, gi and gu systems are negative, except as otherwise noted.      Objective    Vitals - Patient Reported  Weight (Patient Reported): 55.8 kg (123 lb)  Height (Patient Reported): 156.2 cm (5' 1.5\")  BMI (Based on Pt Reported Ht/Wt): 22.86  Pain Score: Severe Pain (6)  Pain Loc: Head    Physical Exam   healthy, alert and no distress  PSYCH: Alert and oriented times 3; coherent speech, normal   rate and volume, able to articulate logical thoughts, able   to abstract reason, no tangential thoughts, no hallucinations   or delusions. Her affect is normal.  RESP: No cough, no audible wheezing, able to talk in full sentences  Remainder of exam unable to be completed due to telephone visits    Phone call duration: 9 minutes    "

## 2022-12-12 NOTE — OP NOTE
General Surgery Operative Note    PREOPERATIVE DIAGNOSIS:  Idiopathic normal pressure hydrocephalus (INPH) (H) [G91.2]    POSTOPERATIVE DIAGNOSIS: Same    PROCEDURE: Assistance of placement of ventriculoperitoneal shunt    ANESTHESIA:  General.    PREOPERATIVE MEDICATIONS: Ancef    SURGEON:  Ulices Stephens MD    ASSISTANT:  Munir Hahn PA-C.  Assistant was required owing to challenging exposure and need for retraction.    INDICATIONS: Patient was scheduled for  shunt placement with Dr. Pillai.  She had a history of previous abdominal surgery and we were asked to assist with the abdominal placement of the shunt    PROCEDURE:  The patient was taken to the operating suite and uneventfully endotracheally intubated.  The abdomen was prepped and draped in a sterile fashion.  Surgeon initiated timeout was acknowledged.  Dr. Pillai proceeded with image guided intracranial shunt placement.  I scrubbed in at the time that the catheter was directed to subcutaneously down toward the upper abdominal cavity.  I entered the abdomen in the left upper quadrant using a 5 mm Visiport technique.  1 additional 5 mm trocar was then placed under laparoscopic visualization.  The catheter was brought out just at the upper abdomen through the skin and then inserted into the abdomen with a tonsil.  I used an atraumatic grasper to carefully advance the catheter into the patient's pelvis.  There were minimal adhesions in this area.  Dr. Pillai then completed the procedure and ensured excellent shunt function.  The skin edges around the abdomen were reapproximated with 4-0 Vicryl and Steri-Strips.  The patient was uneventfully extubated, awakened and taken to the PACU in stable condition.  At the conclusion of the case, all lap and needle counts were correct.      ESTIMATED BLOOD LOSS: 5 mL    INTRAOPERATIVE FINDINGS: Good placement of intraperitoneal portion of  shunt.    Ulices Stephens MD, MD

## 2022-12-13 ENCOUNTER — TELEPHONE (OUTPATIENT)
Dept: NEUROSURGERY | Facility: CLINIC | Age: 82
End: 2022-12-13

## 2022-12-13 NOTE — TELEPHONE ENCOUNTER
Called patient to offer nurse visit be changed to Thursday 12/15 at [facility] (anytime)     We need to r/s and offered he this due to the weather. [place]

## 2022-12-15 ENCOUNTER — OFFICE VISIT (OUTPATIENT)
Dept: NEUROSURGERY | Facility: CLINIC | Age: 82
End: 2022-12-15
Payer: COMMERCIAL

## 2022-12-15 VITALS — DIASTOLIC BLOOD PRESSURE: 76 MMHG | OXYGEN SATURATION: 99 % | SYSTOLIC BLOOD PRESSURE: 163 MMHG | HEART RATE: 71 BPM

## 2022-12-15 DIAGNOSIS — Z98.890 S/P SPINAL SURGERY: Primary | ICD-10-CM

## 2022-12-15 PROCEDURE — 99207 PR NO CHARGE NURSE ONLY: CPT

## 2022-12-15 ASSESSMENT — PAIN SCALES - GENERAL: PAINLEVEL: SEVERE PAIN (6)

## 2022-12-15 NOTE — PROGRESS NOTES
Discussed patients symptoms with provider:  Stiff neck  Headaches  Heavy head  Muffled hearing   No sensitivity to light  No fevers    If headaches continue, provider would recommend a head CT to ensure stability.    Called patients to discuss to continue to monitor symptoms and give us updates     Confirmed with patient that she does not have sensitivity to light and no fevers.    She was advised to follow up if the headaches don't start to improve or to contact us if she develops any of the above symptoms.     She agrees

## 2022-12-15 NOTE — PATIENT INSTRUCTIONS
Instructions for Patient    Your Recovery  -Ventriculoperitoneal shunt surgery ( shunt surgery) helps control pressure in your brain by draining extra fluid out of your brain and into your belly. During  shunt surgery, the doctor placed two small tubes (catheters) and a valve under your skin.    -Your neck or belly may feel tender. You will probably feel tired, but you should not have much pain. For a few weeks after surgery, you may have headaches.    -It is common to feel some fluid moving around in your scalp. This will go away as your scalp heals. The area around the sutures or staples may feel tender for a week or more.     -The shunt will not limit your activities. There will be a lump on your head where the valve is. This lump may not show when your hair grows back. You may or may not feel the shunt underneath your skin.    -In some cases, your doctor may need to adjust your shunt valve so the right amount of fluid is draining. Watch for signs of infection or signs that the shunt is not working right. If the shunt gets infected or stops working well, it may need to be removed or replaced. Without problems, your shunt may be left in place for years.    Incision care  -Sutures were removed from your head today.  -Wash your incision areas daily with warm, soapy water, and gently pat them dry. Don't use hydrogen peroxide or alcohol, which can slow healing. Keep the areas clean and dry.   -No submerging incision in water such as pools, hot tubs,baths until incision is healed. Showers are fine.     Activity/Restrictions  -Rest when you feel tired. Getting enough sleep will help you recover.  -Do not touch the valve on your head.  -It is okay for you to lie on the side of your head with the shunt.  Restrictions: For 6 weeks, do not do any activity that may cause you to hit your head.  -Check with your doctor about when it is safe to travel by plane.    Diet   -The tube in your belly will not affect how you  digest food. You can eat as usual. If your stomach is upset, try bland, low-fat foods like plain rice, broiled chicken, toast, and yogurt.  -You may notice that your bowel movements are not regular right after your surgery. This is common. Try to avoid constipation and straining with bowel movements.    -Take a stool softener or fiber supplement every day. If you have not had a bowel movement after a couple of days, ask your doctor about taking a mild laxative.    Medicines   -Your doctor will tell you if and when you can restart your medicines. He or she will also give you instructions about taking any new medicines.  -Take pain medicines exactly as directed. May take tylenol. May resume NSAIDs (ibuprofen/advil, Aleve, motrin) 2 weeks post op.  -Do not take two or more pain medicines at the same time unless the doctor told you to. Many pain medicines have acetaminophen, which is Tylenol. Too much acetaminophen (Tylenol) can be harmful.  -If your doctor prescribed antibiotics, take them as directed. Do not stop taking them just because you feel better. You need to take the full course of antibiotics.    -Watch for signs of infection: redness, swelling, warmth, drainage, and fever of 101 degrees or higher. Notify clinic [phone].    Go to the nearest Emergency Room for Evaluation if you develop:    -new bumps on your head 3 to 5 days after surgery or the bumps get bigger after 2 weeks.  -You have trouble thinking clearly.  -You have a fever with a stiff neck or a severe headache.  -Swollen lymph nodes in your neck, armpits, or groin.  -You have any sudden vision changes.  -You have new or worse headaches.  -You are sleeping more than you are awake.  -You fall and hit your head.  -You have pain that does not get better after you take pain medicine.  -vomiting    - Follow up appointments: 6 and 12 week post op follow up visit with Dr Pillai. These have already been made for you.     [facility]  [place] [address]  [place] [address]  Telephone:  [phone]   Fax:  [phone]

## 2022-12-15 NOTE — NURSING NOTE
"Milena Hamilton is a 82 year old female who presents for:  Chief Complaint   Patient presents with     Follow Up     2 week nursing f/u        Initial Vitals:  BP (!) 163/76   Pulse 71   LMP  (LMP Unknown)   SpO2 99%  Estimated body mass index is 23.79 kg/m  as calculated from the following:    Height as of 12/2/22: 1.562 m (5' 1.5\").    Weight as of 12/2/22: 58.1 kg (128 lb).. There is no height or weight on file to calculate BSA. BP completed using cuff size: regular  Severe Pain (6)    Nursing Comments:     Hilda Randall MA  "

## 2022-12-15 NOTE — PROGRESS NOTES
Post-op Nurse Visit:    Patient seen today per the order of  Hira Pillai MD.   DOS: 12/2/22  Procedure: Right occipital ventriculoperitoneal shunt placement     Pain/Neuro Assessment  Pain in neck described as sore and stiff.   Numbness/tingling: n/a  Walking better per family. Urinary Incontinence improved   Strength: Denies weakness.   Reports her hearing is muffled bilaterally   Prior to surgery sx: Imbalance, chronic urinary incontinence and has had some cognitive changes.    Pain Relief Measures:  Tylenol: 2 tabs 3 times a day  Ice: as needed        Incision  Scalp Incision inspected. Edges well-approximated. No redness, swelling, drainage, or warmth noted.   Abdominal sites x3      Activity  Following restrictions    Falls: none  Patient is walking frequently without difficulty.   Legs examined in clinic; no redness, swelling, or warmth noted. Patient denies any pain in bilateral calves.     GI/  Patient's appetite is normal  Bowel/bladder problems? No. incontinence improved   Taking stool softeners? No     Refills/x-rays/return to work  Refills given at this appointment? No  Sent for x-rays after this appointment? No  Ordered future x-rays? No    All of patient's questions addressed today. Patient was instructed to call with any additional questions/concerns.     Routed muffled hearing symptoms to team

## 2023-01-11 ENCOUNTER — TELEPHONE (OUTPATIENT)
Dept: NEUROSURGERY | Facility: CLINIC | Age: 83
End: 2023-01-11

## 2023-01-11 DIAGNOSIS — Z98.2 S/P VP SHUNT: Primary | ICD-10-CM

## 2023-01-11 NOTE — TELEPHONE ENCOUNTER
Doing chart prep for her visit tomorrow and saw that she had some concerns post shunt placement. RN reached out to see how she has been doing since her nurse visit on 12/15/22. She states that she does still have some neck stiffness but that it has improved some. Her headaches are still occurring but are not as frequent. The head heaviness she was experiencing has also improved some. The biggest issue she continues to have is the muffled hearing. She says it sounds similar to when you get water in your ears after swimming. She denies any new vision changes, worsening lightheadedness, sensitivity to light or fevers.     Will forward to Dr Pillai to find out whether or not he would like her to have a CT scan prior to seeing him. If so, we may need to move her appt out which she understood.     ePnnie Medina RN Care Coordinator   Neurology/Neurosurgery/PM&R/ Pain Management

## 2023-01-11 NOTE — TELEPHONE ENCOUNTER
Hira Pillai MD  You 1 hour ago (10:48 AM)     LH  Yeah lets get a CT scan, I have heard of this with shunts sometimes though.  Nothing wrong, just a shunt issue.     Hira

## 2023-01-11 NOTE — TELEPHONE ENCOUNTER
The pt is scheduled for a Head CT scan without contrast at 12:20pm, she will then see Dr Pillai after that. The pt is aware and had no further questions.    Pennie Medina RN Care Coordinator   Neurology/Neurosurgery/PM&R/ Pain Management

## 2023-01-12 ENCOUNTER — OFFICE VISIT (OUTPATIENT)
Dept: NEUROSURGERY | Facility: CLINIC | Age: 83
End: 2023-01-12
Payer: COMMERCIAL

## 2023-01-12 ENCOUNTER — ANCILLARY PROCEDURE (OUTPATIENT)
Dept: CT IMAGING | Facility: CLINIC | Age: 83
End: 2023-01-12
Attending: STUDENT IN AN ORGANIZED HEALTH CARE EDUCATION/TRAINING PROGRAM
Payer: COMMERCIAL

## 2023-01-12 VITALS
DIASTOLIC BLOOD PRESSURE: 75 MMHG | SYSTOLIC BLOOD PRESSURE: 169 MMHG | BODY MASS INDEX: 23.65 KG/M2 | HEIGHT: 62 IN | HEART RATE: 65 BPM | WEIGHT: 128.5 LBS

## 2023-01-12 DIAGNOSIS — Z98.2 S/P VP SHUNT: ICD-10-CM

## 2023-01-12 DIAGNOSIS — G91.2 NPH (NORMAL PRESSURE HYDROCEPHALUS) (H): ICD-10-CM

## 2023-01-12 DIAGNOSIS — Z98.2 VP (VENTRICULOPERITONEAL) SHUNT STATUS: Primary | ICD-10-CM

## 2023-01-12 PROCEDURE — 70450 CT HEAD/BRAIN W/O DYE: CPT | Mod: GC | Performed by: RADIOLOGY

## 2023-01-12 PROCEDURE — 99024 POSTOP FOLLOW-UP VISIT: CPT | Performed by: STUDENT IN AN ORGANIZED HEALTH CARE EDUCATION/TRAINING PROGRAM

## 2023-01-12 NOTE — PROGRESS NOTES
"HPI:  82-year-old female status post right occipital  shunt for NPH.  Postoperatively she is noticing significant improvement in her walking and balance.  She had preoperative headaches that have also improved since the procedure as well.  She is doing very well overall.  Her only complaint at this point is muffled hearing in the right ear as well as some left-sided neck pain.  Both of these symptoms are slowly getting better.  Current Outpatient Medications   Medication     acetaminophen (TYLENOL) 500 MG tablet     ALPRAZolam (XANAX) 0.25 MG tablet     cholecalciferol 25 MCG (1000 UT) TABS     levothyroxine (SYNTHROID/LEVOTHROID) 50 MCG tablet     losartan (COZAAR) 25 MG tablet     metoprolol succinate ER (TOPROL XL) 25 MG 24 hr tablet     omeprazole (PRILOSEC) 20 MG DR capsule     ondansetron (ZOFRAN ODT) 4 MG ODT tab     polyethylene glycol-propylene glycol (SYSTANE ULTRA) 0.4-0.3 % SOLN ophthalmic solution     simvastatin (ZOCOR) 20 MG tablet     vitamin E (TOCOPHEROL) 400 units (180 mg) capsule     No current facility-administered medications for this visit.      Physical Exam:  Vital signs:      BP: (!) 169/75 Pulse: 65           Height: 156.2 cm (5' 1.5\") Weight: 58.3 kg (128 lb 8 oz)  Estimated body mass index is 23.89 kg/m  as calculated from the following:    Height as of this encounter: 1.562 m (5' 1.5\").    Weight as of this encounter: 58.3 kg (128 lb 8 oz).   Incision is well-healed.  Pupils are equal reactive to light extraocular movements are intact and face is symmetric.  No pronator drift.  Gait is normal.  Results Reviewed:  I personally viewed the images of a CT scan of the brain showing a small subdural collection on the right side without any significant mass-effect.  This is likely due to an ex vacuo effect from draining the ventricles.  Assessment:  82-year-old female status post  shunt for NPH  Plan:  Okay to advance activity as tolerated without any symptoms at this time.  We will have " her follow-up with me again in 6 weeks.  Symptoms have improvement prior to surgery and the extra-axial collection is small without any mass-effect.  Continue to monitor for any new symptoms    Hira Pillai MD

## 2023-01-12 NOTE — LETTER
"    1/12/2023         RE: Milena Hamilton  [address] [facility] [address]        Dear Colleague,    Thank you for referring your patient, Milena Hamilton, to the [facility] [place]. Please see a copy of my visit note below.    HPI:  82-year-old female status post right occipital  shunt for NPH.  Postoperatively she is noticing significant improvement in her walking and balance.  She had preoperative headaches that have also improved since the procedure as well.  She is doing very well overall.  Her only complaint at this point is muffled hearing in the right ear as well as some left-sided neck pain.  Both of these symptoms are slowly getting better.  Current Outpatient Medications   Medication     acetaminophen (TYLENOL) 500 MG tablet     ALPRAZolam (XANAX) 0.25 MG tablet     cholecalciferol 25 MCG (1000 UT) TABS     levothyroxine (SYNTHROID/LEVOTHROID) 50 MCG tablet     losartan (COZAAR) 25 MG tablet     metoprolol succinate ER (TOPROL XL) 25 MG 24 hr tablet     omeprazole (PRILOSEC) 20 MG DR capsule     ondansetron (ZOFRAN ODT) 4 MG ODT tab     polyethylene glycol-propylene glycol (SYSTANE ULTRA) 0.4-0.3 % SOLN ophthalmic solution     simvastatin (ZOCOR) 20 MG tablet     vitamin E (TOCOPHEROL) 400 units (180 mg) capsule     No current facility-administered medications for this visit.      Physical Exam:  Vital signs:      BP: (!) 169/75 Pulse: 65           Height: 156.2 cm (5' 1.5\") Weight: 58.3 kg (128 lb 8 oz)  Estimated body mass index is 23.89 kg/m  as calculated from the following:    Height as of this encounter: 1.562 m (5' 1.5\").    Weight as of this encounter: 58.3 kg (128 lb 8 oz).   Incision is well-healed.  Pupils are equal reactive to light extraocular movements are intact and face is symmetric.  No pronator drift.  Gait is normal.  Results Reviewed:  I personally viewed the images of a CT scan of the brain showing a small subdural collection on the right side " without any significant mass-effect.  This is likely due to an ex vacuo effect from draining the ventricles.  Assessment:  82-year-old female status post  shunt for NPH  Plan:  Okay to advance activity as tolerated without any symptoms at this time.  We will have her follow-up with me again in 6 weeks.  Symptoms have improvement prior to surgery and the extra-axial collection is small without any mass-effect.  Continue to monitor for any new symptoms    Hira Pillai MD      Again, thank you for allowing me to participate in the care of your patient.        Sincerely,        Hira Pillai MD

## 2023-01-24 ENCOUNTER — TELEPHONE (OUTPATIENT)
Dept: FAMILY MEDICINE | Facility: OTHER | Age: 83
End: 2023-01-24
Payer: COMMERCIAL

## 2023-01-24 NOTE — TELEPHONE ENCOUNTER
Forms/Letter Request    Type of form/letter: [employer] Prior Authorization Request Form - Additional information and signature required    Have you been seen for this request: N/A    Do we have the form/letter: Yes: Family Practice Forms Box     When is form/letter needed by: asap    How would you like the form/letter returned: Fax    Patient Notified form requests are processed in 3-5 business days:No    Please complete form and return to [phone]

## 2023-01-24 NOTE — TELEPHONE ENCOUNTER
Forms/Letter Request    Type of form/letter: [employer] Prior Authorization Request Form - Additional information and signature required    Have you been seen for this request: N/A    Do we have the form/letter: Yes: Family Practice Forms Box     When is form/letter needed by: asap    How would you like the form/letter returned: Fax    Patient Notified form requests are processed in 3-5 business days:No    Please complete form and return to [phone]

## 2023-01-25 NOTE — TELEPHONE ENCOUNTER
Prior Authorization Retail Medication Request    Medication/Dose: ALPRAZolam (XANAX) 0.25 MG tablet  [employer] Prior Authorization Request Form - Additional information and signature required  ICD code (if different than what is on RX):    Previously Tried and Failed:    Rationale:      Insurance Name:  [employer]  Insurance ID:  [id number]      Pharmacy Information (if different than what is on RX)  Name:    Phone:

## 2023-01-27 NOTE — TELEPHONE ENCOUNTER
Central Prior Authorization Team   Phone: [phone]    PA Initiation    Medication: [employer] Prior Authorization Request Form - Additional information and signature required  Insurance Company:    Pharmacy Filling the Rx: [facility] [place], [place] - [address] [place] [address]  Filling Pharmacy Phone: [phone]  Filling Pharmacy Fax: [phone]  Start Date: 1/27/2023    CONTACTED INSURANCE TO ANSWER QUESTIONS VIA PHONE.

## 2023-01-27 NOTE — TELEPHONE ENCOUNTER
Prior Authorization Approval    Authorization Effective Date: 12/25/2022  Authorization Expiration Date: 1/27/2024  Medication: Alprazolam - APPROVED  Approved Dose/Quantity:    Reference #: [id number]   Insurance Company:    Expected CoPay:       CoPay Card Available:      Foundation Assistance Needed:    Which Pharmacy is filling the prescription (Not needed for infusion/clinic administered): [facility] [place] [address] [place] [address]  Pharmacy Notified: Yes  Patient Notified: Yes  **Instructed pharmacy to notify patient when script is ready to /ship.**

## 2023-02-06 ENCOUNTER — NURSE TRIAGE (OUTPATIENT)
Dept: FAMILY MEDICINE | Facility: OTHER | Age: 83
End: 2023-02-06
Payer: COMMERCIAL

## 2023-02-06 DIAGNOSIS — R19.7 DIARRHEA, UNSPECIFIED TYPE: Primary | ICD-10-CM

## 2023-02-06 NOTE — TELEPHONE ENCOUNTER
Is this a 2nd Level Triage? YES, LICENSED PRACTITIONER REVIEW IS REQUIRED    SITUATION:   Calling to schedule a visit.   Dealing with sx since the end of June.   Been to ED -  08/24/22, 08/31/22, 10/03/22, 12/02/22 (shunt).     BACKGROUND:   AM the patient has a normal BM. Then after the morning the patient has runny stool that is painful.   Symptoms have been persistent through June.     Patient feels nauseated in the AM. No vomiting. Doesn't feel like she wants to eat breakfast.    Formed stool every morning.     Then an hr later she has watery diarrhea - does not see any blood, pus, or mucus. Watery stool is yellow. There is some odor. Usually occurs 1-2 times per day.     Abdominal Pain - Cramping pain, rectal pain, 7/10.   Pain is present on right lower abdomen while walking.     Yesterday the patient ate a salad with egg. She eats bananas, apples, oranges, and yogurt.     HOME TREATMENTS:  Preparation H - takes some of the pain away.     HEALTH HISTORY:  HX of hemorrhoids.      PATIENT REQUEST:   Requesting appointment.     PLAN:   Discussed when to go into the ER.     Next 5 appointments (look out 90 days)    Feb 09, 2023  2:30 PM  (Arrive by 2:10 PM)  Provider Visit with Chuck Streeter PA-C  [facility] ([facility] ) [address] [address]  [phone]   Feb 23, 2023  1:40 PM  Return Visit with Hira Pillai MD  [facility] [id number] [place] [address]  [phone]   Mar 09, 2023  1:30 PM  (Arrive by 1:10 PM)  Annual Wellness Visit with Chuck Streeter PA-C  [facility] ([facility] ) [address] [address]  [phone]            Routed to provider    Does the patient meet one of the following criteria for ADS visit consideration?  No        SHERRILL HardyN, RN, PHN  [place] [facility]  February 6, 2023    Reason for Disposition    MILD diarrhea (e.g., 1-3 or more stools than normal in past 24 hours) diarrhea without known cause and present > 7 days    Additional Information    Negative: Shock suspected (e.g., cold/pale/clammy skin, too weak to stand, low BP, rapid pulse)    Negative: Difficult to awaken or acting confused (e.g., disoriented, slurred speech)    Negative: Sounds like a life-threatening emergency to the triager    Negative: Vomiting also present and worse than the diarrhea    Negative: Blood in stool and without diarrhea    Negative: SEVERE abdominal pain (e.g., excruciating) and present > 1 hour    Negative: SEVERE abdominal pain and age > 60 years    Negative: Bloody, black, or tarry bowel movements (Exception: chronic-unchanged black-grey bowel movements and is taking iron pills or Pepto-Bismol)    Negative: SEVERE diarrhea (e.g., 7 or more times / day more than normal) and age > 60 years    Negative: Constant abdominal pain lasting > 2 hours    Negative: Drinking very little and has signs of dehydration (e.g., no urine > 12 hours, very dry mouth, very lightheaded)    Negative: Patient sounds very sick or weak to the triager    Negative: SEVERE diarrhea (e.g., 7 or more times / day more than normal) and present > 24 hours (1 day)    Negative: MODERATE diarrhea (e.g., 4-6 times / day more than normal) and present > 48 hours (2 days)    Negative: MODERATE diarrhea (e.g., 4-6 times / day more than normal) and age > 70 years    Negative: Abdominal pain (Exception: Pain clears completely with each passage of diarrhea stool.)    Negative: Fever > 101 F (38.3 C)    Negative: Blood in the stool    Negative: Mucus or pus in stool has been present > 2 days and diarrhea is more than mild    Negative: Weak immune system (e.g., HIV positive, cancer chemo, splenectomy, organ transplant, chronic steroids)    Negative:  Travel to a foreign country in past month    Negative: Recent antibiotic therapy (i.e., within last 2 months) and diarrhea present > 3 days since antibiotic was stopped    Negative: Recent hospitalization and diarrhea present > 3 days    Negative: Tube feedings (e.g., nasogastric, g-tube, j-tube)    Protocols used: DIARRHEA-A-OH

## 2023-02-06 NOTE — TELEPHONE ENCOUNTER
Recommend she see GI. Referral placed. Will also discuss further at upcoming visit.    Chuck Streeter PA-C

## 2023-02-07 ENCOUNTER — TELEPHONE (OUTPATIENT)
Dept: NURSING | Facility: CLINIC | Age: 83
End: 2023-02-07
Payer: COMMERCIAL

## 2023-02-07 NOTE — TELEPHONE ENCOUNTER
RN called to discuss further with the patient. LM for the patient to return call. If she does please inform her Amrik placed a GI referral was placed. The GI team will be calling to schedule her or she can call  [phone] to schedule. Amrik will discuss this further at her upcoming appointment.     Sent [employer] message.     JOS Hardy, RN, PHN  [place]  February 7, 2023

## 2023-02-07 NOTE — TELEPHONE ENCOUNTER
Pt called back. She was given the msg and phone # for GI. Pt  states that she does not know how to use MyChart yet.    Discussed fluid and electrolyte replacement, BRAT diet and foods to eat and avoid with diarrhea.    Pt is asking PCP if there is anything she can take OTC to help with this?    She has Metamucil gunnies and is wondering if she should be taking these? Or what PCP would suggest.    Please contact pt to advise.    Charu Montemayor, RN, BSN  [facility] Nurse Advisor 8:46 AM 2/7/2023

## 2023-02-07 NOTE — TELEPHONE ENCOUNTER
Left detailed message and read back verbatim the message Amrik Streeter wrote.    Closing encounter.    Jena Coreas RN  [facility] ~ Registered Nurse  Clinic Triage ~ [place] [facility] [place]  February 7, 2023

## 2023-02-08 ENCOUNTER — HOSPITAL ENCOUNTER (EMERGENCY)
Facility: CLINIC | Age: 83
Discharge: HOME | End: 2023-02-08
Attending: EMERGENCY MEDICINE | Admitting: EMERGENCY MEDICINE
Payer: COMMERCIAL

## 2023-02-08 ENCOUNTER — LAB (OUTPATIENT)
Dept: LAB | Facility: CLINIC | Age: 83
End: 2023-02-08
Payer: COMMERCIAL

## 2023-02-08 VITALS
TEMPERATURE: 98.2 F | HEIGHT: 61 IN | HEART RATE: 78 BPM | WEIGHT: 128 LBS | OXYGEN SATURATION: 97 % | SYSTOLIC BLOOD PRESSURE: 151 MMHG | BODY MASS INDEX: 24.17 KG/M2 | RESPIRATION RATE: 14 BRPM | DIASTOLIC BLOOD PRESSURE: 71 MMHG

## 2023-02-08 DIAGNOSIS — E03.9 ACQUIRED HYPOTHYROIDISM: ICD-10-CM

## 2023-02-08 DIAGNOSIS — K52.9 CHRONIC DIARRHEA: ICD-10-CM

## 2023-02-08 DIAGNOSIS — E86.0 DEHYDRATION: ICD-10-CM

## 2023-02-08 LAB
ALBUMIN SERPL BCG-MCNC: 4.6 G/DL (ref 3.5–5.2)
ALP SERPL-CCNC: 74 U/L (ref 35–104)
ALT SERPL W P-5'-P-CCNC: 13 U/L (ref 10–35)
ANION GAP SERPL CALCULATED.3IONS-SCNC: 10 MMOL/L (ref 7–15)
AST SERPL W P-5'-P-CCNC: 15 U/L (ref 10–35)
BASOPHILS # BLD AUTO: 0 10E3/UL (ref 0–0.2)
BASOPHILS NFR BLD AUTO: 1 %
BILIRUB SERPL-MCNC: 0.8 MG/DL
BUN SERPL-MCNC: 7.2 MG/DL (ref 8–23)
C DIFF TOX B STL QL: NEGATIVE
CALCIUM SERPL-MCNC: 9.6 MG/DL (ref 8.8–10.2)
CHLORIDE SERPL-SCNC: 101 MMOL/L (ref 98–107)
CREAT SERPL-MCNC: 0.67 MG/DL (ref 0.51–0.95)
DEPRECATED HCO3 PLAS-SCNC: 29 MMOL/L (ref 22–29)
EOSINOPHIL # BLD AUTO: 0.1 10E3/UL (ref 0–0.7)
EOSINOPHIL NFR BLD AUTO: 3 %
ERYTHROCYTE [DISTWIDTH] IN BLOOD BY AUTOMATED COUNT: 12.3 % (ref 10–15)
GFR SERPL CREATININE-BSD FRML MDRD: 87 ML/MIN/1.73M2
GLUCOSE SERPL-MCNC: 109 MG/DL (ref 70–99)
HCT VFR BLD AUTO: 41.5 % (ref 35–47)
HGB BLD-MCNC: 13.3 G/DL (ref 11.7–15.7)
IMM GRANULOCYTES # BLD: 0 10E3/UL
IMM GRANULOCYTES NFR BLD: 0 %
LIPASE SERPL-CCNC: 32 U/L (ref 13–60)
LYMPHOCYTES # BLD AUTO: 1.4 10E3/UL (ref 0.8–5.3)
LYMPHOCYTES NFR BLD AUTO: 37 %
MCH RBC QN AUTO: 31.1 PG (ref 26.5–33)
MCHC RBC AUTO-ENTMCNC: 32 G/DL (ref 31.5–36.5)
MCV RBC AUTO: 97 FL (ref 78–100)
MONOCYTES # BLD AUTO: 0.3 10E3/UL (ref 0–1.3)
MONOCYTES NFR BLD AUTO: 8 %
NEUTROPHILS # BLD AUTO: 1.8 10E3/UL (ref 1.6–8.3)
NEUTROPHILS NFR BLD AUTO: 51 %
NRBC # BLD AUTO: 0 10E3/UL
NRBC BLD AUTO-RTO: 0 /100
PLATELET # BLD AUTO: 219 10E3/UL (ref 150–450)
POTASSIUM SERPL-SCNC: 3.8 MMOL/L (ref 3.4–5.3)
PROT SERPL-MCNC: 7.2 G/DL (ref 6.4–8.3)
RBC # BLD AUTO: 4.27 10E6/UL (ref 3.8–5.2)
SODIUM SERPL-SCNC: 140 MMOL/L (ref 136–145)
WBC # BLD AUTO: 3.6 10E3/UL (ref 4–11)

## 2023-02-08 PROCEDURE — 87493 C DIFF AMPLIFIED PROBE: CPT | Mod: 59

## 2023-02-08 PROCEDURE — 87328 CRYPTOSPORIDIUM AG IA: CPT

## 2023-02-08 PROCEDURE — 87506 IADNA-DNA/RNA PROBE TQ 6-11: CPT

## 2023-02-08 PROCEDURE — 80053 COMPREHEN METABOLIC PANEL: CPT | Performed by: EMERGENCY MEDICINE

## 2023-02-08 PROCEDURE — 85025 COMPLETE CBC W/AUTO DIFF WBC: CPT | Performed by: EMERGENCY MEDICINE

## 2023-02-08 PROCEDURE — 99283 EMERGENCY DEPT VISIT LOW MDM: CPT | Mod: 25 | Performed by: EMERGENCY MEDICINE

## 2023-02-08 PROCEDURE — 87329 GIARDIA AG IA: CPT

## 2023-02-08 PROCEDURE — 96360 HYDRATION IV INFUSION INIT: CPT | Performed by: EMERGENCY MEDICINE

## 2023-02-08 PROCEDURE — 84443 ASSAY THYROID STIM HORMONE: CPT

## 2023-02-08 PROCEDURE — 99283 EMERGENCY DEPT VISIT LOW MDM: CPT | Performed by: EMERGENCY MEDICINE

## 2023-02-08 PROCEDURE — 36415 COLL VENOUS BLD VENIPUNCTURE: CPT | Performed by: EMERGENCY MEDICINE

## 2023-02-08 PROCEDURE — 258N000003 HC RX IP 258 OP 636: Performed by: EMERGENCY MEDICINE

## 2023-02-08 PROCEDURE — 83690 ASSAY OF LIPASE: CPT | Performed by: EMERGENCY MEDICINE

## 2023-02-08 RX ORDER — SODIUM CHLORIDE 9 MG/ML
INJECTION, SOLUTION INTRAVENOUS CONTINUOUS
Status: DISCONTINUED | OUTPATIENT
Start: 2023-02-08 | End: 2023-02-08 | Stop reason: HOSPADM

## 2023-02-08 RX ADMIN — SODIUM CHLORIDE 500 ML: 9 INJECTION, SOLUTION INTRAVENOUS at 08:14

## 2023-02-08 ASSESSMENT — ACTIVITIES OF DAILY LIVING (ADL): ADLS_ACUITY_SCORE: 37

## 2023-02-08 NOTE — DISCHARGE INSTRUCTIONS
-Please collect stool samples at home and return to the [facility] laboratory for testing    -Stool test results are available either through [employer] or by contacting your primary clinic provider.  Results usually take 2 to 4 days.    -Keep your scheduled appointment with gastroenterology for consultation.

## 2023-02-08 NOTE — ED PROVIDER NOTES
History     Chief Complaint   Patient presents with     Diarrhea     HPI  Milena Hamilton is a 82 year old female who presents with 5-6 months of diarrhea.  Loose stools averaging 3/day.  No blood or mucus in stools.    Weight is stable.   Stool screening September 2022 for viral and enteric pathogen panel was negative.  [place]  Well depth is 89 feet.  No previous screening for Giardia or Cryptosporidium.  No family members ill.  No travel prior to onset of diarrhea  No malabsorption concerns such as food intolerance in the past from dairy or gluten  No history for inflammatory bowel disease  Intermittent abdominal cramping        Allergies:  Allergies   Allergen Reactions     Azithromycin Diarrhea     Ciprofloxacin Other (See Comments)     Cipro, dizziness and abdominal pain     Gabapentin Dizziness and Visual Disturbance     Penicillins      stomach upset, diarrhea     Raspberry Hives     Sulfa Drugs Hives     hives     Augmentin Rash       Problem List:    Patient Active Problem List    Diagnosis Date Noted      (ventriculoperitoneal) shunt status 12/02/2022     Priority: Medium     Cellulitis of fifth toe of right foot 11/07/2022     Priority: Medium     Plantar fasciitis 11/07/2022     Priority: Medium     Right foot pain 11/07/2022     Priority: Medium     Chronic systolic heart failure (H) 09/14/2022     Priority: Medium     LBBB (left bundle branch block) 09/14/2022     Priority: Medium     Dysuria 11/24/2021     Priority: Medium     Basal cell carcinoma of nose 09/08/2021     Priority: Medium     History of malignant neoplasm of skin 09/08/2021     Priority: Medium     Skin changes due to chronic exposure to nonionizing radiation 09/08/2021     Priority: Medium     Renal insufficiency syndrome 09/04/2021     Priority: Medium     Formatting of this note might be different from the original.  Creatinine clearance 49       Lumbar radiculopathy 12/28/2018     Priority:  Medium     Scoliosis of lumbar spine, unspecified scoliosis type 12/12/2018     Priority: Medium     Chronic right-sided low back pain with right-sided sciatica 05/30/2018     Priority: Medium     Acquired hypothyroidism 04/28/2017     Priority: Medium     Gastroesophageal reflux disease, esophagitis presence not specified 04/28/2017     Priority: Medium     IMO Regulatory Load OCT 2020       Premature atrial beats 11/30/2016     Priority: Medium     HTN (hypertension) 07/10/2014     Priority: Medium     Raynaud's disease 06/01/2014     Priority: Medium     Health Care Home 12/30/2013     Priority: Medium     Status:  Closed  Care Coordinator:  Tasha Vasquez RN    See Letters for [facility] Care Plan             History of diverticulitis - s/p left hemicolectomy 11/30/2013     Priority: Medium     Diarrhea 11/30/2013     Priority: Medium     Chronic constipation 06/18/2013     Priority: Medium     Sacroiliac dysfunction 10/09/2012     Priority: Medium     Advanced directives, counseling/discussion 10/03/2011     Priority: Medium     Advance Directive Problem List Overview:   Name Relationship Phone    Primary Health Care Agent            Alternative Health Care Agent          Discussed advance care planning with patient; information given to patient to review. 10/3/2011        Gutierrez Willis DO  02/08/23 0955         DDD (degenerative disc disease), cervical 08/10/2011     Priority: Medium     Anxiety 10/24/2010     Priority: Medium     Hyperlipidemia 09/29/2010     Priority: Medium     Osteopenia 04/01/2010     Priority: Medium     recheck DEXA in 2 years       Gastritis 03/15/2010     Priority: Medium     Panic disorder without agoraphobia 10/08/2004     Priority: Medium     Tinnitus 09/13/2004     Priority: Medium     Problem list name updated by automated process. Provider to review       Family history of diabetes mellitus 09/09/2002     Priority: Medium     Lichen planus      Priority: Medium        Past  Medical History:    Past Medical History:   Diagnosis Date     Lichen planus      Osteopenia 4/2010     Panic disorder without agoraphobia      Pure hypercholesterolemia      Raynaud's disease 6/2014     Unspecified essential hypertension        Past Surgical History:    Past Surgical History:   Procedure Laterality Date     COLONOSCOPY  10/17/2007    normal, recheck in 10 years     COLONOSCOPY N/A 08/27/2014    Procedure: COLONOSCOPY;  Surgeon: aRffi Montiel MD;  Location: [facility]     COMBINED CYSTOSCOPY, INSERT CATHETER URETER Bilateral 10/13/2014    Procedure: COMBINED CYSTOSCOPY, INSERT CATHETER URETER;  Surgeon: Abdoulaye Odell MD;  Location: [facility]     EYE SURGERY  08/01/2001    right eye muscle repair     HC REMOVAL GALLBLADDER  02/09/2010    lap     HC REMOVAL OF OVARY/TUBE(S)      Salpingo-Oophorectomy, bilateral     HYSTERECTOMY, PAP NO LONGER INDICATED       INJECT EPIDURAL LUMBAR Right 04/10/2017    Procedure: INJECT EPIDURAL LUMBAR;  Surgeon: Raffi Wright MD;  Location: [facility]     INJECT EPIDURAL LUMBAR Right 02/27/2020    Procedure: Right Lumbar 4- Sacral 1 Epidural Steroid Injection;  Surgeon: Antonio Dasilva MD;  [facility]     INJECT EPIDURAL LUMBAR Right 01/29/2021    Procedure: INJECTION, SPINE,  sacral 1 EPIDURAL;  Surgeon: Antonio Dasilva MD;  [facility]     INJECT EPIDURAL TRANSFORAMINAL Right 06/09/2016    Procedure: INJECT EPIDURAL TRANSFORAMINAL;  Surgeon: Antonio Dasilva MD;  [facility]     INJECT EPIDURAL TRANSFORAMINAL Right 10/13/2016    Procedure: INJECT EPIDURAL TRANSFORAMINAL;  Surgeon: Antonio Dasilva MD;  [facility]     INJECT EPIDURAL TRANSFORAMINAL Right 06/18/2021    Procedure: Right L4-5 and right L5-S1 Transforaminal Epidural Steroid Injections with fluoroscopic guidance and contrast.;  Surgeon: Antonio Dasilva MD;  [facility]     INJECT JOINT SACROILIAC Right 04/14/2022    Procedure: Fluoroscopically-guided injection of the Bilateral  "sacroiliac joints with Bilateral pastor Sacroiliac joint ligaments infiltration over the sacrum;  Surgeon: Antonio Dasilva MD;  [facility]     LAPAROSCOPIC ASSISTED COLECTOMY LEFT (DESCENDING) N/A 10/13/2014    Procedure: LAPAROSCOPIC ASSISTED COLECTOMY LEFT (DESCENDING);  Surgeon: Raffi Montiel MD;  [facility]     LUMBAR SPINE SURGERY  2021    L2-Pelvis fusion with rods and screws     OPTICAL TRACKING SYSTEM IMPLANT SHUNT VENTRICULOPERITONEAL Right 2022    Procedure: Right Ventriculoperitoneal;  Surgeon: Hira Pillai MD;  Location: [facility]     ZZC APPENDECTOMY       ZZC NONSPECIFIC PROCEDURE      Right inferior oblique recession, 14 mm     ZZC VAG HYST,RMV TUBE/OVARY  1984    cervix removed       Family History:    Family History   Problem Relation Age of Onset     Diabetes Brother      Heart Disease Brother 50        AMI     Diabetes Brother      Heart Disease Brother         AMI, \"older\"     Cerebrovascular Disease Brother      Psychotic Disorder Brother         , alcohol     Cancer Sister         breast cancer x2     Hypertension No family hx of      Breast Cancer No family hx of      Ovarian Cancer No family hx of      Prostate Cancer No family hx of      Mental Illness No family hx of      Anesthesia Reaction No family hx of      Osteoporosis No family hx of      Obesity No family hx of      Other Cancer No family hx of      Depression No family hx of      Anxiety Disorder No family hx of      Mental Illness No family hx of      Substance Abuse No family hx of        Social History:  Marital Status:   [2]  Social History     Tobacco Use     Smoking status: Never     Passive exposure: Never     Smokeless tobacco: Never   Vaping Use     Vaping Use: Never used   Substance Use Topics     Alcohol use: No     Drug use: No        Medications:    acetaminophen (TYLENOL) 500 MG tablet  ALPRAZolam (XANAX) 0.25 MG tablet  cholecalciferol 25 MCG (1000 UT) TABS  levothyroxine " "(SYNTHROID/LEVOTHROID) 50 MCG tablet  losartan (COZAAR) 25 MG tablet  metoprolol succinate ER (TOPROL XL) 25 MG 24 hr tablet  omeprazole (PRILOSEC) 20 MG DR capsule  ondansetron (ZOFRAN ODT) 4 MG ODT tab  polyethylene glycol-propylene glycol (SYSTANE ULTRA) 0.4-0.3 % SOLN ophthalmic solution  simvastatin (ZOCOR) 20 MG tablet  vitamin E (TOCOPHEROL) 400 units (180 mg) capsule          Review of Systems   All other systems reviewed and are negative.      Physical Exam   Pulse: (!) 127  Temp: 98.2  F (36.8  C)  Resp: 14  Height: 154.9 cm (5' 1\")  Weight: 58.1 kg (128 lb)  SpO2: 96 %      Physical Exam  Vitals and nursing note reviewed.   Constitutional:       Appearance: She is not ill-appearing.      Comments: No jaundice.     HENT:      Head: Normocephalic.      Nose: Nose normal.   Eyes:      Conjunctiva/sclera: Conjunctivae normal.   Cardiovascular:      Rate and Rhythm: Normal rate.   Pulmonary:      Effort: Pulmonary effort is normal.   Abdominal:      General: Abdomen is flat. Bowel sounds are normal. There is no distension.      Tenderness: There is abdominal tenderness (Mild generalized tenderness). There is no guarding or rebound.   Skin:     General: Skin is warm.      Capillary Refill: Capillary refill takes less than 2 seconds.      Coloration: Skin is pale.      Findings: No rash.   Neurological:      General: No focal deficit present.      Mental Status: She is alert and oriented to person, place, and time.   Psychiatric:         Mood and Affect: Mood normal.         Behavior: Behavior normal.         ED Course                 Procedures                  Results for orders placed or performed during the hospital encounter of 02/08/23 (from the past 24 hour(s))   CBC with platelets differential    Narrative    The following orders were created for panel order CBC with platelets differential.  Procedure                               Abnormality         Status                     ---------                   " "            -----------         ------                     CBC with platelets and d...[[id number]]                                                   Please view results for these tests on the individual orders.     *Note: Due to a large number of results and/or encounters for the requested time period, some results have not been displayed. A complete set of results can be found in Results Review.       Medications   0.9% sodium chloride BOLUS (500 mLs Intravenous New Bag 2/8/23 0814)     Followed by   sodium chloride 0.9% infusion (has no administration in time range)       Assessments & Plan (with Medical Decision Making)  Milena Hamilton is a 82 year old female who presents with 5-6 months of diarrhea.  Loose stools averaging 3/day.  No blood or mucus in stools.    Weight is stable.   Stool screening September 2022 for viral and enteric pathogen panel was negative.  [place]  Well depth is 89 feet.  No previous screening for Giardia or Cryptosporidium.  No family members ill.  No travel prior to onset of diarrhea  No malabsorption concerns such as food intolerance in the past from dairy or gluten  No history for inflammatory bowel disease  Intermittent abdominal cramping  Pulse (!) 127   Temp 98.2  F (36.8  C) (Oral)   Resp 14   Ht 1.549 m (5' 1\")   Wt 58.1 kg (128 lb)   LMP  (LMP Unknown)   SpO2 96%   BMI 24.19 kg/m    No distress.  Slightly pale.  Is tachycardic with a pulse of 127 but does not appear septic.  She is not febrile.  Normotensive.  Dry mucous membranes.  Abdomen is nonsurgical.  Generalized discomfort but no guarding or rebound.  Plan:   CBC, CMP, lipase.  Collect stool either here or at home for enteric pathogen, viral panel, C. difficile toxin B, Giardia and cryptosporidia.  The last 2 were added because she is on well water.  Is scheduled for GI consult this month.  This is most likely infectious though we cannot rule out inflammatory or malabsorption.  9:54 " AM  Went through test results with patient.  She had a normal CBC, CMP and lipase.  No indication for CT, ultrasound imaging.  She will collect a stool sample at home for Giardia, cryptosporidia, viral panel, bacterial enteric pathogen panel.  Reviewed medical record notes she has had no colonoscopy in the last 5 years.           I have reviewed the nursing notes.    I have reviewed the findings, diagnosis, plan and need for follow up with the patient.          New Prescriptions    No medications on file       Final diagnoses:   Chronic diarrhea   Dehydration       2/8/2023   [facility] EMERGENCY DEPT

## 2023-02-08 NOTE — ED TRIAGE NOTES
Pt reports diarrhea for six months, stools soft in morning then liquid remainder of day, has been seen in clinic and ED previously but today has lower abdominal cramps causing 9/10 pain and has nausea x five days.      Triage Assessment     Row Name 02/08/23 0752       Triage Assessment (Adult)    Airway WDL WDL       Respiratory WDL    Respiratory WDL WDL       Skin Circulation/Temperature WDL    Skin Circulation/Temperature WDL WDL       Cardiac WDL    Cardiac WDL WDL       Peripheral/Neurovascular WDL    Peripheral Neurovascular WDL WDL       Cognitive/Neuro/Behavioral WDL    Cognitive/Neuro/Behavioral WDL WDL

## 2023-02-09 ENCOUNTER — OFFICE VISIT (OUTPATIENT)
Dept: FAMILY MEDICINE | Facility: OTHER | Age: 83
End: 2023-02-09
Payer: COMMERCIAL

## 2023-02-09 VITALS
OXYGEN SATURATION: 96 % | WEIGHT: 132 LBS | HEART RATE: 66 BPM | SYSTOLIC BLOOD PRESSURE: 130 MMHG | DIASTOLIC BLOOD PRESSURE: 54 MMHG | HEIGHT: 62 IN | RESPIRATION RATE: 18 BRPM | TEMPERATURE: 97.3 F | BODY MASS INDEX: 24.29 KG/M2

## 2023-02-09 DIAGNOSIS — I50.22 CHRONIC SYSTOLIC HEART FAILURE (H): ICD-10-CM

## 2023-02-09 DIAGNOSIS — G91.2 NPH (NORMAL PRESSURE HYDROCEPHALUS) (H): ICD-10-CM

## 2023-02-09 DIAGNOSIS — Z23 NEED FOR COVID-19 VACCINE: ICD-10-CM

## 2023-02-09 DIAGNOSIS — F41.9 ANXIETY: ICD-10-CM

## 2023-02-09 DIAGNOSIS — E03.9 ACQUIRED HYPOTHYROIDISM: ICD-10-CM

## 2023-02-09 DIAGNOSIS — R19.7 DIARRHEA, UNSPECIFIED TYPE: Primary | ICD-10-CM

## 2023-02-09 DIAGNOSIS — Z98.2 S/P VP SHUNT: ICD-10-CM

## 2023-02-09 DIAGNOSIS — H91.91 HEARING LOSS OF RIGHT EAR, UNSPECIFIED HEARING LOSS TYPE: ICD-10-CM

## 2023-02-09 LAB
C COLI+JEJUNI+LARI FUSA STL QL NAA+PROBE: NOT DETECTED
C PARVUM AG STL QL IA: NEGATIVE
EC STX1 GENE STL QL NAA+PROBE: NOT DETECTED
EC STX2 GENE STL QL NAA+PROBE: NOT DETECTED
G LAMBLIA AG STL QL IA: NEGATIVE
NOROV GI+II ORF1-ORF2 JNC STL QL NAA+PR: NOT DETECTED
RVA NSP5 STL QL NAA+PROBE: NOT DETECTED
SALMONELLA SP RPOD STL QL NAA+PROBE: NOT DETECTED
SHIGELLA SP+EIEC IPAH STL QL NAA+PROBE: NOT DETECTED
TSH SERPL DL<=0.005 MIU/L-ACNC: 2.26 UIU/ML (ref 0.3–4.2)
V CHOL+PARA RFBL+TRKH+TNAA STL QL NAA+PR: NOT DETECTED
Y ENTERO RECN STL QL NAA+PROBE: NOT DETECTED

## 2023-02-09 PROCEDURE — 91312 COVID-19 VACCINE BIVALENT BOOSTER 12+ (PFIZER): CPT | Performed by: PHYSICIAN ASSISTANT

## 2023-02-09 PROCEDURE — 99214 OFFICE O/P EST MOD 30 MIN: CPT | Performed by: PHYSICIAN ASSISTANT

## 2023-02-09 PROCEDURE — 0124A COVID-19 VACCINE BIVALENT BOOSTER 12+ (PFIZER): CPT | Performed by: PHYSICIAN ASSISTANT

## 2023-02-09 RX ORDER — ALPRAZOLAM 0.25 MG
0.25 TABLET ORAL
Qty: 30 TABLET | Refills: 2 | Status: SHIPPED | OUTPATIENT
Start: 2023-02-09 | End: 2023-07-11

## 2023-02-09 ASSESSMENT — PAIN SCALES - GENERAL: PAINLEVEL: NO PAIN (1)

## 2023-02-09 NOTE — PROGRESS NOTES
Assessment & Plan       ICD-10-CM    1. Diarrhea, unspecified type  R19.7       2. Hearing loss of right ear, unspecified hearing loss type  H91.91       3. NPH (normal pressure hydrocephalus) (H)  G91.2       4. S/P  shunt  Z98.2       5. Acquired hypothyroidism  E03.9 TSH with free T4 reflex      6. Need for COVID-19 vaccine  Z23 COVID-19 VACCINE BIVALENT BOOSTER 12+ (PFIZER)      7. Chronic systolic heart failure (H)  I50.22       8. Anxiety  F41.9 ALPRAZolam (XANAX) 0.25 MG tablet          1. Normal work up in the ED yesterday with normal labs including stool testing. Normal abdominal CT a few months ago. She is seeing GI next week and will continue with Imodium as needed in the meantime.    2. Decreased right sided hearing since her  shunt placement two months ago. Difficult to say if this is surgery related. I discussed referral to ENT and audiology but she declines at this time as symptoms are mild. She has a slight right TM serous effusion so recommend more frequent Flonase use.    3-4. She is overall doing very well since her  shunt placement with improvement gait, memory and urinary leakage.    5. TSH is stable. Continue current dose of levothyroxine.    6. Vaccine administered by MA.    7. Stable without diuretics. No leg swelling or weight changes.    8. Continue Xanax as needed.    Follow-up in 1 month as scheduled for Medicare wellness exam.    Chuck Streeter PA-C  [facility] OLIVIA Abbott is a 82 year old accompanied by her spouse, presenting for the following health issues:  Diarrhea      HPI     ED/UC Followup:    Facility:  [facility] Emergency Dept  Date of visit: 02/08/2023  Reason for visit: Diarrhea  Current Status: Patient states that diarrhea is still present but she has not had bowel movements as frequently and pain level during bowel movements has decreased.    She continues to experience 2-3 episodes of watery diarrhea daily for  "the past 6+ months. It was better for awhile but now has returned again. Her first stool in the morning if formed but subsequent stools are watery and yellow. No abdominal pain, nausea, vomiting, fevers or chills. She was seen in the ED yesterday with normal lab work up including normal stool studies. She has used Imodium intermittently with some benefit and plans to utilize it more frequently. She has an appointment with GI next week.    She is needing a refill of Xanax as she is using this most nights to help with anxiety and sleep.    She continues to do well since her  shunt placement with improved balance, memory and decreased urinary leakage.    She has noticed slight right sided hearing loss since her  shunt placement two months ago.    ED Summary    Assessments & Plan (with Medical Decision Making)  Milena Hamilton is a 82 year old female who presents with 5-6 months of diarrhea.  Loose stools averaging 3/day.  No blood or mucus in stools.    Weight is stable.   Stool screening September 2022 for viral and enteric pathogen panel was negative.  [place]  Well depth is 89 feet.  No previous screening for Giardia or Cryptosporidium.  No family members ill.  No travel prior to onset of diarrhea  No malabsorption concerns such as food intolerance in the past from dairy or gluten  No history for inflammatory bowel disease  Intermittent abdominal cramping  Pulse (!) 127   Temp 98.2  F (36.8  C) (Oral)   Resp 14   Ht 1.549 m (5' 1\")   Wt 58.1 kg (128 lb)   LMP  (LMP Unknown)   SpO2 96%   BMI 24.19 kg/m    No distress.  Slightly pale.  Is tachycardic with a pulse of 127 but does not appear septic.  She is not febrile.  Normotensive.  Dry mucous membranes.  Abdomen is nonsurgical.  Generalized discomfort but no guarding or rebound.  Plan:   CBC, CMP, lipase.  Collect stool either here or at home for enteric pathogen, viral panel, C. difficile toxin B, Giardia and " "cryptosporidia.  The last 2 were added because she is on well water.  Is scheduled for GI consult this month.  This is most likely infectious though we cannot rule out inflammatory or malabsorption.  9:54 AM  Went through test results with patient.  She had a normal CBC, CMP and lipase.  No indication for CT, ultrasound imaging.  She will collect a stool sample at home for Giardia, cryptosporidia, viral panel, bacterial enteric pathogen panel.  Reviewed medical record notes she has had no colonoscopy in the last 5 years.    Review of Systems   Constitutional, HEENT, cardiovascular, pulmonary, neuro, psych, gi and gu systems are negative, except as otherwise noted.      Objective    /54 (Cuff Size: Adult Regular)   Pulse 66   Temp 97.3  F (36.3  C) (Temporal)   Resp 18   Ht 1.575 m (5' 2\")   Wt 59.9 kg (132 lb)   LMP  (LMP Unknown)   SpO2 96%   BMI 24.14 kg/m    Body mass index is 24.14 kg/m .  Physical Exam   GENERAL: healthy, alert and no distress  ENT: small serous effusion of right TM. No erythema or bulging. Left canal and TM are clear. Normal gross hearing bilaterally.   RESP: lungs clear to auscultation - no rales, rhonchi or wheezes  CV: regular rate and rhythm, normal S1 S2, no S3 or S4, no murmur, click or rub, no peripheral edema and peripheral pulses strong  SKIN: Mild posterior scalp tenderness along the inferior aspect of the shunt. No erythema or swelling.  NEURO: Normal strength and tone, mentation intact and speech normal. Gait is stable.  PSYCH: mentation appears normal, affect normal/bright    Results for orders placed or performed in visit on 02/08/23 (from the past 48 hour(s))   C. difficile Toxin B PCR with reflex to C. difficile Antigen and Toxins A/B EIA    Specimen: Per Rectum; Stool   Result Value Ref Range    C Difficile Toxin B by PCR Negative Negative    Narrative    The [employer] Xpert C. difficile Assay, performed on the [employer]ument [employer], is a " qualitative in vitro diagnostic test for rapid detection of toxin B gene sequences from unformed (liquid or soft) stool specimens collected from patients suspected of having Clostridioides difficile infection (CDI). The test utilizes automated real-time polymerase chain reaction (PCR) to detect toxin gene sequences associated with toxin producing C. difficile. The Xpert C. difficile Assay is intended as an aid in the diagnosis of CDI.   Enteric Bacteria and Virus Panel by JACLYN Stool    Specimen: Per Rectum; Stool   Result Value Ref Range    Campylobacter group Not Detected Not Detected    Salmonella species Not Detected Not Detected    Shigella species Not Detected Not Detected    Vibrio group Not Detected Not Detected    Rotavirus Not Detected Not Detected    Shiga toxin 1 gene Not Detected Not Detected    Shiga toxin 2 gene Not Detected Not Detected    Norovirus I and II Not Detected Not Detected    Yersinia enterocolitica Not Detected Not Detected    Narrative    Testing performed by multiplexed, qualitative PCR using the [employer] Enteric Pathogens Nucleic Acid Test. Results should not be used as the sole basis for diagnosis, treatment or other patient management decisions. Positive results do not rule out co-infection with other organisms that are not detected by this test and may not be the sole or definitive cause of patient illness. Negative results in the setting of clinical illness compatible with gastroenteritis may be due to infection by pathogens that are not detected by this test or non-infectious causes such as ulcerative colitis, irritable bowel syndrome or Crohn's disease. Note: Shiga toxin producing E. coli (STEC) typically harbor one or both genes that encode for Shiga toxins 1 and 2.   Cryptosporidium and Giardia antigens    Specimen: Per Rectum; Stool   Result Value Ref Range    Cryptosporidium parvum antigen Negative Negative    Giardia lamblia antigen Negative Negative     *Note: Due to  a large number of results and/or encounters for the requested time period, some results have not been displayed. A complete set of results can be found in Results Review.

## 2023-02-09 NOTE — PATIENT INSTRUCTIONS
Follow up with GI as scheduled.  Your labs are normal.    Will recheck thyroid.    Follow-up in 1 month.

## 2023-02-13 ENCOUNTER — OFFICE VISIT (OUTPATIENT)
Dept: GASTROENTEROLOGY | Facility: CLINIC | Age: 83
End: 2023-02-13
Payer: COMMERCIAL

## 2023-02-13 VITALS
SYSTOLIC BLOOD PRESSURE: 122 MMHG | HEIGHT: 62 IN | WEIGHT: 131 LBS | DIASTOLIC BLOOD PRESSURE: 58 MMHG | BODY MASS INDEX: 24.11 KG/M2

## 2023-02-13 DIAGNOSIS — R19.7 DIARRHEA, UNSPECIFIED TYPE: ICD-10-CM

## 2023-02-13 DIAGNOSIS — Z98.890 HISTORY OF INTESTINAL SURGERY: Primary | ICD-10-CM

## 2023-02-13 PROCEDURE — 99214 OFFICE O/P EST MOD 30 MIN: CPT | Mod: 24 | Performed by: NURSE PRACTITIONER

## 2023-02-13 RX ORDER — CHOLESTYRAMINE 4 G/9G
4 POWDER, FOR SUSPENSION ORAL
Qty: 348 G | Refills: 3 | Status: SHIPPED | OUTPATIENT
Start: 2023-02-13 | End: 2023-06-23

## 2023-02-13 RX ORDER — LOPERAMIDE HYDROCHLORIDE 2 MG/1
2 TABLET ORAL DAILY PRN
Qty: 30 TABLET | Refills: 1 | Status: SHIPPED | OUTPATIENT
Start: 2023-02-13

## 2023-02-13 ASSESSMENT — PAIN SCALES - GENERAL: PAINLEVEL: MILD PAIN (2)

## 2023-02-13 NOTE — PROGRESS NOTES
"Gastroenterology CLINIC VISIT, NEW PATIENT    CC/REFERRING PROVIDER: Chuck Streeter  REASON FOR CONSULTATION: diarrhea    HPI: 82 year old female with PMH of GERD, gastritis, history of diverticulitis, (hemicolectomy 2014), cholecystectomy, ventriculoperitoneal shunt, and hypothyroidism, presenting to GI clinic for concerns of abdominal bloating and diarrhea. She is here today with her  of 62 year, who helps the patient with answering questions.  Patient stated that over the past 6 months, she has one \"normal\" stool in the morning, followed by 1-3 watery stools. Reported some mild bloating and cramping, resolved by defecation. Had incontinence episodes in the past, related to fecal urgency. No incontinence in the past few weeks.  She drinks decaffeinated coffee and drinks tea with honey. Spicy foods give her occasional heartburn. Denies problems with swallowing. Consumes bread and dairy, not aware of intolerance issue. Appetite is good. No weight loss.  Stated that she though that metoprolol causes her symptoms of diarrhea. So, she was working with her PCP on tapering down the dose from 100 mg to 25 mg a day. Had not noticed any changes in diarrhea or bloating. Had a few ER visit for the same complains. Interestingly, her lab work is unremarkable.         ROS: 10pt ROS performed and otherwise negative.    PAST MEDICAL HISTORY:  Past Medical History:   Diagnosis Date     Lichen planus     vulvar     Osteopenia 4/2010    recheck DEXA in 2 years     Panic disorder without agoraphobia      Pure hypercholesterolemia     low LDLs     Raynaud's disease 6/2014     Unspecified essential hypertension        PREVIOUS ABDOMINAL/GYNECOLOGIC SURGERIES:    Past Surgical History:   Procedure Laterality Date     COLONOSCOPY  10/17/2007    normal, recheck in 10 years     COLONOSCOPY N/A 08/27/2014    Procedure: COLONOSCOPY;  Surgeon: Raffi Montiel MD;  [facility]     COMBINED CYSTOSCOPY, INSERT CATHETER URETER " Bilateral 10/13/2014    Procedure: COMBINED CYSTOSCOPY, INSERT CATHETER URETER;  Surgeon: Abdoulaye Odell MD;  [facility]     EYE SURGERY  08/01/2001    right eye muscle repair     HC REMOVAL GALLBLADDER  02/09/2010    lap     HC REMOVAL OF OVARY/TUBE(S)      Salpingo-Oophorectomy, bilateral     HYSTERECTOMY, PAP NO LONGER INDICATED       INJECT EPIDURAL LUMBAR Right 04/10/2017    Procedure: INJECT EPIDURAL LUMBAR;  Surgeon: Raffi Wright MD;  [facility]     INJECT EPIDURAL LUMBAR Right 02/27/2020    Procedure: Right Lumbar 4- Sacral 1 Epidural Steroid Injection;  Surgeon: Antonio Dasilva MD;  [facility]     INJECT EPIDURAL LUMBAR Right 01/29/2021    Procedure: INJECTION, SPINE,  sacral 1 EPIDURAL;  Surgeon: Antonio Dasilva MD;  [facility]     INJECT EPIDURAL TRANSFORAMINAL Right 06/09/2016    Procedure: INJECT EPIDURAL TRANSFORAMINAL;  Surgeon: Antonio Dasilva MD;  [facility]     INJECT EPIDURAL TRANSFORAMINAL Right 10/13/2016    Procedure: INJECT EPIDURAL TRANSFORAMINAL;  Surgeon: Antonio Dasilva MD;  [facility]     INJECT EPIDURAL TRANSFORAMINAL Right 06/18/2021    Procedure: Right L4-5 and right L5-S1 Transforaminal Epidural Steroid Injections with fluoroscopic guidance and contrast.;  Surgeon: Antonio Dasilva MD;  [facility]     INJECT JOINT SACROILIAC Right 04/14/2022    Procedure: Fluoroscopically-guided injection of the Bilateral sacroiliac joints with Bilateral pastor Sacroiliac joint ligaments infiltration over the sacrum;  Surgeon: Antonio Dasilva MD;  [facility]     LAPAROSCOPIC ASSISTED COLECTOMY LEFT (DESCENDING) N/A 10/13/2014    Procedure: LAPAROSCOPIC ASSISTED COLECTOMY LEFT (DESCENDING);  Surgeon: Raffi Montiel MD;  [facility]     LUMBAR SPINE SURGERY  09/2021    L2-Pelvis fusion with rods and screws     OPTICAL TRACKING SYSTEM IMPLANT SHUNT VENTRICULOPERITONEAL Right 12/2/2022    Procedure: Right Ventriculoperitoneal;  Surgeon: Freya  Hira TRONCOSO MD;  [facility]: [facility]     ZZC APPENDECTOMY       ZZC NONSPECIFIC PROCEDURE      Right inferior oblique recession, 14 mm     ZZC VAG HYST,RMV TUBE/OVARY  01/01/1984    cervix removed         PERTINENT MEDICATIONS:  Current Outpatient Medications   Medication Sig Dispense Refill     acetaminophen (TYLENOL) 500 MG tablet Take 500-1,000 mg by mouth every 6 hours as needed for mild pain       ALPRAZolam (XANAX) 0.25 MG tablet Take 1 tablet (0.25 mg) by mouth nightly as needed for anxiety 30 tablet 2     cholecalciferol 25 MCG (1000 UT) TABS Take 1,000 Units by mouth daily       cholestyramine (QUESTRAN) 4 GM/DOSE powder Take 4 g by mouth daily (with dinner) 348 g 3     levothyroxine (SYNTHROID/LEVOTHROID) 50 MCG tablet TAKE ONE TABLET BY MOUTH once daily 90 tablet 3     loperamide (IMODIUM A-D) 2 MG tablet Take 1 tablet (2 mg) by mouth daily as needed for diarrhea Take one tablet after 2 or more loose stools. Wait for one hour and if still have loose stools, take half of the tablet. 30 tablet 1     losartan (COZAAR) 25 MG tablet Take 1 tablet (25 mg) by mouth daily 90 tablet 3     metoprolol succinate ER (TOPROL XL) 25 MG 24 hr tablet Take 1 tablet (25 mg) by mouth daily 90 tablet 3     omeprazole (PRILOSEC) 20 MG DR capsule TAKE ONE CAPSULE BY MOUTH ONCE DAILY 90 capsule 3     polyethylene glycol-propylene glycol (SYSTANE ULTRA) 0.4-0.3 % SOLN ophthalmic solution Place 1 drop into both eyes 3 times daily as needed for dry eyes       simvastatin (ZOCOR) 20 MG tablet TAKE 1/2 TABLET BY MOUTH EVERY NIGHT (Patient taking differently: Take 10 mg by mouth At Bedtime) 135 tablet 3     No other OTC/herbal/supplements reported by patient.    SOCIAL HISTORY:  Social History     Socioeconomic History     Marital status:      Spouse name: Not on file     Number of children: 4     Years of education: Not on file     Highest education level: Not on file   Occupational History     Occupation: dry cleaning   Tobacco Use  "    Smoking status: Never     Passive exposure: Never     Smokeless tobacco: Never   Vaping Use     Vaping Use: Never used   Substance and Sexual Activity     Alcohol use: No     Drug use: No     Sexual activity: Not Currently     Partners: Male     Birth control/protection: Surgical     Comment: not currently/hysterectomy   Other Topics Concern     Parent/sibling w/ CABG, MI or angioplasty before 65F 55M? No   Social History Narrative     Not on file     Social Determinants of Health     Financial Resource Strain: Not on file   Food Insecurity: Not on file   Transportation Needs: Not on file   Physical Activity: Not on file   Stress: Not on file   Social Connections: Not on file   Intimate Partner Violence: Not on file   Housing Stability: Not on file       FAMILY HISTORY:  Denies colon/panc/esophageal/other GI CA, no other Everett or other HPS-related Spencer. No IBD/celiac, no other AI/liver/thyroid disease.    Family History   Problem Relation Age of Onset     Diabetes Brother      Heart Disease Brother 50        AMI     Diabetes Brother      Heart Disease Brother         AMI, \"older\"     Cerebrovascular Disease Brother      Psychotic Disorder Brother         , alcohol     Cancer Sister         breast cancer x2     Hypertension No family hx of      Breast Cancer No family hx of      Ovarian Cancer No family hx of      Prostate Cancer No family hx of      Mental Illness No family hx of      Anesthesia Reaction No family hx of      Osteoporosis No family hx of      Obesity No family hx of      Other Cancer No family hx of      Depression No family hx of      Anxiety Disorder No family hx of      Mental Illness No family hx of      Substance Abuse No family hx of        PHYSICAL EXAMINATION:  Vitals reviewed  /58 (BP Location: Right arm, Patient Position: Sitting, Cuff Size: Adult Regular)   Ht 1.575 m (5' 2\")   Wt 59.4 kg (131 lb)   LMP  (LMP Unknown)   Breastfeeding No   BMI 23.96 kg/m      General: " Patient appears well in no acute distress.   Skin: No visualized rash or lesions on visualized skin  Eyes: EOMI, no erythema, sclera icterus or discharge noted  Resp: breathing comfortably without accessory muscle usage, speaking in full sentences, no cough  Lung sounds clear  Card: Regular and rhythmic S1 and S2. No murmur,gallop, or rub  Abdomen: Active bowel sounds X 4 quadrants. Soft to palpation with some tenderness in the RLQ, no guarding or rebound tenderness   MSK: Appears to have normal range of motion based on visualized movements. Needed assistance with transferring from a chair to exam table. Slow shuffled gait.  Neurologic: No apparent tremors, facial movements symmetric  Psych: affect normal, alert and oriented      PERTINENT STUDIES Reviewed in EMR    ASSESSMENT/PLAN:  82 year old female  presented  to GI clinic for complains of bowel movement frequency for the past 6 months. Stated that she has one formed stool every morning, followed by 1-3 loose or watery stools. Sometimes, has only one formed stool. Denies any red flag symptoms.  She is here today with her  who helps with answering questions. Both are hard of hearing but able to maintain proper communication with slow and louder speaking.  Reviewed extensive medical and surgical history. Noted that the patient has been complaining about change in stool pattern since her abdominal surgeries. She has history of left hemicolectomy and lysis of adhesions (2014), hysterectomy, appendectomy, and cholecystectomy (2010). Had several ER visits and was seen by different providers. Fiber supplement was recommended. Patient has metamucil at home, but has not been taking it.  I reviewed her last ER visit report from 2/8/23 and her laboratory tests- all negative including enteric panel, C.diff, Giardia, and cryptosporidia.Normal CBC, CMP, and lipase. Unremarkable abdominal CT scan in August and October of 2022. Normal colonoscopy in 2014 with exception  of diverticulosis.    Discussed differential diagnosis with the patient and her . High likelihood of IBS or malabsorption diarrhea-lactose intolerance, sensitivity to high FODMAP, bile salt induced diarrhea, short bowel, etc.  Recommended the following:  - Reduce dairy intake and abstain from honey;  - Start cholestyramine 4 gram with supper X one week. If not effective, increase the dose to 4 gram twice a day.  -Avoid skipping meals.  - Take imodium as needed: 2 mg after two loose stools. Wait for about one hour. If still has another loose stool, take 1 mg dose after each subsequent stool.  - Try low FODMAP diet for a few weeks.  - Try metamucil 5-10 gram in the morning.       ICD-10-CM    1. History of intestinal surgery  Z98.890 loperamide (IMODIUM A-D) 2 MG tablet      2. Diarrhea, unspecified type  R19.7 Adult GI  Referral - Consult Only     cholestyramine (QUESTRAN) 4 GM/DOSE powder     loperamide (IMODIUM A-D) 2 MG tablet          Patient verbalized understanding and appreciation of care provided. Stated that all of the questions were answered to her/his satisfaction.    Additional 25 minutes on the date of service was spent performing the following:   -Preparing to see the patient (eg, review of tests,providers progress notes, medical/surgical history,etc)   -Ordering medications, tests, or procedures   -Documenting clinical information in the electronic or other health record     RTC in one month.     Thank you for this consultation. It was a pleasure to participate in the care of this patient; please contact us with any further questions.    FORREST Walker, FNP-C  [facility]  Gastroenterology Department  [place], [place]    This note was created with Dragon voice recognition software, and while reviewed for accuracy, inadvertent minor typographic errors may occur. Please contact the provider if you have any questions.

## 2023-02-13 NOTE — LETTER
"    2/13/2023         RE: Milena Hamilton  [id number] [address] [address]        Dear Colleague,    Thank you for referring your patient, Milena Hamilton, to the [facility]. Please see a copy of my visit note below.    Gastroenterology CLINIC VISIT, NEW PATIENT    CC/REFERRING PROVIDER: Chuck Streeter  REASON FOR CONSULTATION: diarrhea    HPI: 82 year old female with PMH of GERD, gastritis, history of diverticulitis, (hemicolectomy 2014), cholecystectomy, ventriculoperitoneal shunt, and hypothyroidism, presenting to GI clinic for concerns of abdominal bloating and diarrhea. She is here today with her  of 62 year, who helps the patient with answering questions.  Patient stated that over the past 6 months, she has one \"normal\" stool in the morning, followed by 1-3 watery stools. Reported some mild bloating and cramping, resolved by defecation. Had incontinence episodes in the past, related to fecal urgency. No incontinence in the past few weeks.  She drinks decaffeinated coffee and drinks tea with honey. Spicy foods give her occasional heartburn. Denies problems with swallowing. Consumes bread and dairy, not aware of intolerance issue. Appetite is good. No weight loss.  Stated that she though that metoprolol causes her symptoms of diarrhea. So, she was working with her PCP on tapering down the dose from 100 mg to 25 mg a day. Had not noticed any changes in diarrhea or bloating. Had a few ER visit for the same complains. Interestingly, her lab work is unremarkable.         ROS: 10pt ROS performed and otherwise negative.    PAST MEDICAL HISTORY:  Past Medical History:   Diagnosis Date     Lichen planus     vulvar     Osteopenia 4/2010    recheck DEXA in 2 years     Panic disorder without agoraphobia      Pure hypercholesterolemia     low LDLs     Raynaud's disease 6/2014     Unspecified essential hypertension        PREVIOUS ABDOMINAL/GYNECOLOGIC SURGERIES:    Past Surgical " History:   Procedure Laterality Date     COLONOSCOPY  10/17/2007    normal, recheck in 10 years     COLONOSCOPY N/A 08/27/2014    Procedure: COLONOSCOPY;  Surgeon: Raffi Montiel MD;  [facility]     COMBINED CYSTOSCOPY, INSERT CATHETER URETER Bilateral 10/13/2014    Procedure: COMBINED CYSTOSCOPY, INSERT CATHETER URETER;  Surgeon: Abdoulaye Odell MD;  [facility]     EYE SURGERY  08/01/2001    right eye muscle repair     HC REMOVAL GALLBLADDER  02/09/2010    lap     HC REMOVAL OF OVARY/TUBE(S)      Salpingo-Oophorectomy, bilateral     HYSTERECTOMY, PAP NO LONGER INDICATED       INJECT EPIDURAL LUMBAR Right 04/10/2017    Procedure: INJECT EPIDURAL LUMBAR;  Surgeon: Raffi Wright MD;  [facility]     INJECT EPIDURAL LUMBAR Right 02/27/2020    Procedure: Right Lumbar 4- Sacral 1 Epidural Steroid Injection;  Surgeon: Antonio Dasilva MD;  [facility]     INJECT EPIDURAL LUMBAR Right 01/29/2021    Procedure: INJECTION, SPINE,  sacral 1 EPIDURAL;  Surgeon: Antonio Dasilva MD;  [facility]     INJECT EPIDURAL TRANSFORAMINAL Right 06/09/2016    Procedure: INJECT EPIDURAL TRANSFORAMINAL;  Surgeon: Antonio Dasilva MD;  [facility]     INJECT EPIDURAL TRANSFORAMINAL Right 10/13/2016    Procedure: INJECT EPIDURAL TRANSFORAMINAL;  Surgeon: Antonio Dasilva MD;  [facility]     INJECT EPIDURAL TRANSFORAMINAL Right 06/18/2021    Procedure: Right L4-5 and right L5-S1 Transforaminal Epidural Steroid Injections with fluoroscopic guidance and contrast.;  Surgeon: Antonio Dasilva MD;  [facility]     INJECT JOINT SACROILIAC Right 04/14/2022    Procedure: Fluoroscopically-guided injection of the Bilateral sacroiliac joints with Bilateral pastor Sacroiliac joint ligaments infiltration over the sacrum;  Surgeon: Antonio Dasilva MD;  [facility]     LAPAROSCOPIC ASSISTED COLECTOMY LEFT (DESCENDING) N/A 10/13/2014    Procedure: LAPAROSCOPIC ASSISTED COLECTOMY LEFT (DESCENDING);  Surgeon:  Raffi Montiel MD;  [facility]     LUMBAR SPINE SURGERY  09/2021    L2-Pelvis fusion with rods and screws     OPTICAL TRACKING SYSTEM IMPLANT SHUNT VENTRICULOPERITONEAL Right 12/2/2022    Procedure: Right Ventriculoperitoneal;  Surgeon: Hira Pillai MD;  [facility]     ZZC APPENDECTOMY       ZZ NONSPECIFIC PROCEDURE      Right inferior oblique recession, 14 mm     ZZC VAG HYST,RMV TUBE/OVARY  01/01/1984    cervix removed         PERTINENT MEDICATIONS:  Current Outpatient Medications   Medication Sig Dispense Refill     acetaminophen (TYLENOL) 500 MG tablet Take 500-1,000 mg by mouth every 6 hours as needed for mild pain       ALPRAZolam (XANAX) 0.25 MG tablet Take 1 tablet (0.25 mg) by mouth nightly as needed for anxiety 30 tablet 2     cholecalciferol 25 MCG (1000 UT) TABS Take 1,000 Units by mouth daily       cholestyramine (QUESTRAN) 4 GM/DOSE powder Take 4 g by mouth daily (with dinner) 348 g 3     levothyroxine (SYNTHROID/LEVOTHROID) 50 MCG tablet TAKE ONE TABLET BY MOUTH once daily 90 tablet 3     loperamide (IMODIUM A-D) 2 MG tablet Take 1 tablet (2 mg) by mouth daily as needed for diarrhea Take one tablet after 2 or more loose stools. Wait for one hour and if still have loose stools, take half of the tablet. 30 tablet 1     losartan (COZAAR) 25 MG tablet Take 1 tablet (25 mg) by mouth daily 90 tablet 3     metoprolol succinate ER (TOPROL XL) 25 MG 24 hr tablet Take 1 tablet (25 mg) by mouth daily 90 tablet 3     omeprazole (PRILOSEC) 20 MG DR capsule TAKE ONE CAPSULE BY MOUTH ONCE DAILY 90 capsule 3     polyethylene glycol-propylene glycol (SYSTANE ULTRA) 0.4-0.3 % SOLN ophthalmic solution Place 1 drop into both eyes 3 times daily as needed for dry eyes       simvastatin (ZOCOR) 20 MG tablet TAKE 1/2 TABLET BY MOUTH EVERY NIGHT (Patient taking differently: Take 10 mg by mouth At Bedtime) 135 tablet 3     No other OTC/herbal/supplements reported by patient.    SOCIAL HISTORY:  Social  "History     Socioeconomic History     Marital status:      Spouse name: Not on file     Number of children: 4     Years of education: Not on file     Highest education level: Not on file   Occupational History     Occupation: dry cleaning   Tobacco Use     Smoking status: Never     Passive exposure: Never     Smokeless tobacco: Never   Vaping Use     Vaping Use: Never used   Substance and Sexual Activity     Alcohol use: No     Drug use: No     Sexual activity: Not Currently     Partners: Male     Birth control/protection: Surgical     Comment: not currently/hysterectomy   Other Topics Concern     Parent/sibling w/ CABG, MI or angioplasty before 65F 55M? No   Social History Narrative     Not on file     Social Determinants of Health     Financial Resource Strain: Not on file   Food Insecurity: Not on file   Transportation Needs: Not on file   Physical Activity: Not on file   Stress: Not on file   Social Connections: Not on file   Intimate Partner Violence: Not on file   Housing Stability: Not on file       FAMILY HISTORY:  Denies colon/panc/esophageal/other GI CA, no other Everett or other HPS-related Spencer. No IBD/celiac, no other AI/liver/thyroid disease.    Family History   Problem Relation Age of Onset     Diabetes Brother      Heart Disease Brother 50        AMI     Diabetes Brother      Heart Disease Brother         AMI, \"older\"     Cerebrovascular Disease Brother      Psychotic Disorder Brother         , alcohol     Cancer Sister         breast cancer x2     Hypertension No family hx of      Breast Cancer No family hx of      Ovarian Cancer No family hx of      Prostate Cancer No family hx of      Mental Illness No family hx of      Anesthesia Reaction No family hx of      Osteoporosis No family hx of      Obesity No family hx of      Other Cancer No family hx of      Depression No family hx of      Anxiety Disorder No family hx of      Mental Illness No family hx of      Substance Abuse No family hx " "of        PHYSICAL EXAMINATION:  Vitals reviewed  /58 (BP Location: Right arm, Patient Position: Sitting, Cuff Size: Adult Regular)   Ht 1.575 m (5' 2\")   Wt 59.4 kg (131 lb)   LMP  (LMP Unknown)   Breastfeeding No   BMI 23.96 kg/m      General: Patient appears well in no acute distress.   Skin: No visualized rash or lesions on visualized skin  Eyes: EOMI, no erythema, sclera icterus or discharge noted  Resp: breathing comfortably without accessory muscle usage, speaking in full sentences, no cough  Lung sounds clear  Card: Regular and rhythmic S1 and S2. No murmur,gallop, or rub  Abdomen: Active bowel sounds X 4 quadrants. Soft to palpation with some tenderness in the RLQ, no guarding or rebound tenderness   MSK: Appears to have normal range of motion based on visualized movements. Needed assistance with transferring from a chair to exam table. Slow shuffled gait.  Neurologic: No apparent tremors, facial movements symmetric  Psych: affect normal, alert and oriented      PERTINENT STUDIES Reviewed in EMR    ASSESSMENT/PLAN:  82 year old female  presented  to GI clinic for complains of bowel movement frequency for the past 6 months. Stated that she has one formed stool every morning, followed by 1-3 loose or watery stools. Sometimes, has only one formed stool. Denies any red flag symptoms.  She is here today with her  who helps with answering questions. Both are hard of hearing but able to maintain proper communication with slow and louder speaking.  Reviewed extensive medical and surgical history. Noted that the patient has been complaining about change in stool pattern since her abdominal surgeries. She has history of left hemicolectomy and lysis of adhesions (2014), hysterectomy, appendectomy, and cholecystectomy (2010). Had several ER visits and was seen by different providers. Fiber supplement was recommended. Patient has metamucil at home, but has not been taking it.  I reviewed her last ER " visit report from 2/8/23 and her laboratory tests- all negative including enteric panel, C.diff, Giardia, and cryptosporidia.Normal CBC, CMP, and lipase. Unremarkable abdominal CT scan in August and October of 2022. Normal colonoscopy in 2014 with exception of diverticulosis.    Discussed differential diagnosis with the patient and her . High likelihood of IBS or malabsorption diarrhea-lactose intolerance, sensitivity to high FODMAP, bile salt induced diarrhea, short bowel, etc.  Recommended the following:  - Reduce dairy intake and abstain from honey;  - Start cholestyramine 4 gram with supper X one week. If not effective, increase the dose to 4 gram twice a day.  -Avoid skipping meals.  - Take imodium as needed: 2 mg after two loose stools. Wait for about one hour. If still has another loose stool, take 1 mg dose after each subsequent stool.  - Try low FODMAP diet for a few weeks.  - Try metamucil 5-10 gram in the morning.       ICD-10-CM    1. History of intestinal surgery  Z98.890 loperamide (IMODIUM A-D) 2 MG tablet      2. Diarrhea, unspecified type  R19.7 Adult GI  Referral - Consult Only     cholestyramine (QUESTRAN) 4 GM/DOSE powder     loperamide (IMODIUM A-D) 2 MG tablet          Patient verbalized understanding and appreciation of care provided. Stated that all of the questions were answered to her/his satisfaction.    Additional 25 minutes on the date of service was spent performing the following:   -Preparing to see the patient (eg, review of tests,providers progress notes, medical/surgical history,etc)   -Ordering medications, tests, or procedures   -Documenting clinical information in the electronic or other health record     RTC in one month.     Thank you for this consultation. It was a pleasure to participate in the care of this patient; please contact us with any further questions.    FORREST Walker, FNP-C  [facility]  Gastroenterology Department  [place], [place]    This  note was created with Dragon voice recognition software, and while reviewed for accuracy, inadvertent minor typographic errors may occur. Please contact the provider if you have any questions.      Again, thank you for allowing me to participate in the care of your patient.        Sincerely,        FORREST HARMON CNP

## 2023-02-13 NOTE — PATIENT INSTRUCTIONS
It was a pleasure taking care of you today.  I've included a brief summary of our discussion and care plan from today's visit below.  Please review this information with your primary care provider.  ______________________________________________________________________    My recommendations are summarized as follows:    Try to avoid honey, artificial sweeteners, and milk for one week and see if this helps your symptoms.    2. Start cholestyramine powder with 4 gram at supper X one week. If diarrhea persists, add another 4 gram dose at breakfast. OK to take it 3 times a day if needed, but stay on the lowest effective dose.    3. You can use imodium as needed. Take one tablet (2 mg) after 2 or more loose stools. Wait for one hour. If still having loose stools, take a half of the tablet. Caution: can cause constipation.    4. Please review low FODMAP diet below. There is a list of foods that could cause bloating and diarrhea.  Try to exclude a few of them at a time and see if this helps.    Return to GI Clinic in one month to review your progress.    ______________________________________________________________________    FODMAP:  The FODMAP term was coined by [place] researchers Mary Longoria and Jomar Pardo. They found that a low-FODMAP diet helped 75-85% of patients with irritable bowel syndrome or IBS.  Products containing lactose (dairy), fructose (fruit sugar),sweeteners (sorbitol, mannitol), fructans (a type of fiber found in wheat, onions, garlic and chicory root), and GOS (a type of fiber found in beans, hummus and soy milk) are examples of FODMAPs.  They can be poorly absorbed during the digestive process. They are rapidly fermented by the bacteria that live in your gut. They are capable of pulling fluid into the gut in a process called osmosis. The increased fluid load, along with the type and amount of gas produced, cause distension and motility changes, leading to bloating, abdominal pain,  diarrhea, and nausea. Symptoms are often delayed until hours after eating a high FODMAP meal or snack, because it takes time for FODMAPs to make their way through the stomach and into the intestines, where the effects occur. By reducing the overall dietary load of these carbohydrates, troublesome GI symptoms can be minimized or eliminated.     A low-FODMAP diet avoids foods containing certain sugars and certain fibers capable of causing diarrhea, constipation, gas, bloating and abdominal pain in people with IBS. At the same time, I would recommend not to exclude all of the FODMAPs foods completely from your diet, but instead, to use alternative foods from the same group. Try the diet for 4-6 weeks, if you have not noticed significant changes in your symptoms, stop the diet. Discuss further plan with your primary care provider or gastroenterology provider.           A high-fiber diet is a commonly recommended treatment for digestive problems, such as constipation, diarrhea, and hemorrhoids.   Most dietary fiber is not digested or absorbed, so it stays within the intestine where it modulates digestion of other foods and affects the consistency of stool. There are two types of fiber, each of which is thought to have its own benefits:  ?Soluble fiber consists of a group of substances that is made of carbohydrates and dissolves in water. Examples of foods that contain soluble fiber include fruits, oats, barley, and legumes (peas and beans).  ?Insoluble fiber comes from plant cell walls and does not dissolve in water. Examples of foods that contain insoluble fiber include wheat, rye, and other grains. Insoluble fiber (wheat bran, and some fruits and vegetables) has been recommended to treat digestive problems such as constipation, hemorrhoids, chronic diarrhea, and fecal incontinence.   ?Dietary fiber is the sum of all soluble and insoluble fiber.Fiber bulks the stool, making it softer and easier to pass. Fiber helps the  stool pass regularly, although it is not a laxative.   - Recommended daily dose of fiber is 25-35 gram. It is difficult to consume this amount of fiber from food alone. Therefore, I would suggest to take fiber supplement.  - Please start supplementation with a powdered soluble fiber. When used on a daily basis, this can help regulate the consistency of your stools.   - Metamucil (psyllium) and Citrucel are preferred examples. You can start with 1-2 teaspoons per day, with goal to gradually increase the dose  to 1 tablespoon daily. You can increase up to 1 tablespoon three times daily if needed.   - It is important to stay well-hydrated with use of fiber supplementation and to make sure that the fiber powder is well mixed with water as directed on the label.   - You may experience some bloating with initiation of fiber, which will improve over the first few weeks. We will evaluate the effect  of fiber in 3-6 months.   - Of note, many of the fiber products contain artificial sweeteners, which can cause bloating, gas, and diarrhea in those who may be sensitive to artificial sweeteners. If this is the case, I would recommend trying Metamucil Premium Blend (with Stevia), Metamucil 4-in-1 without Added Sweeteners, or [place]way (sweetened with Monk fruit extract).      ______________________________________________________________________    Who do I call with any questions after my visit?  Please be in touch if there are any further questions that arise following today's visit.  There are multiple ways to contact your gastroenterology care team.      During business hours, you may reach a Gastroenterology nurse at [phone], option 3.     To schedule or reschedule an appointment, please call [phone].   To schedule your imaging studies (CT, MRI, ultrasound)  call [phone] (or toll-free # [phone])  To schedule your lab work at [facility], please call [phone]    You can always  send a secure message through Superpedestrian.  Super[employer]rian messages are answered by your nurse or doctor typically within 24 hours.  Please allow extra time on weekends and holidays.      For urgent/emergent questions after business hours, you may reach the on-call GI Fellow by contacting the [facility]  at [phone].    In order for your refill to be processed in a timely fashion, it is your responsibility to ensure you follow the recommendations from your provider regarding your laboratory studies and follow up appointments.       How will I get the results of any tests ordered?    You will receive all of your results.  If you have signed up for [employer], any tests ordered at your visit will be available to you after your physician reviews them.  Typically this takes 1-2 weeks.  If there are urgent results that require a change in your care plan, your physician or nurse will call you to discuss the next steps.   What is Superped[employer]rian?  Superped[employer]rian is a secure way for you to access all of your healthcare records from the [facility].  It is a web based computer program, so you can sign on to it from any location.  It also allows you to send secure messages to your care team.  I recommend signing up for Superpedestrian access if you have not already done so and are comfortable with using a computer.    How to I schedule a follow-up visit?  If you did not schedule a follow-up visit today, please call [phone] to schedule a follow-up office visit.      Sincerely,  JOSE Walker,  [facility],  Division of Gastroenterology   ([facility])

## 2023-02-16 ENCOUNTER — NURSE TRIAGE (OUTPATIENT)
Dept: NURSING | Facility: CLINIC | Age: 83
End: 2023-02-16
Payer: COMMERCIAL

## 2023-02-16 NOTE — TELEPHONE ENCOUNTER
"Nurse Triage SBAR    Is this a 2nd Level Triage? YES, LICENSED PRACTITIONER REVIEW IS REQUIRED    Situation:    Patient with complaint of abdominal pain right lower quadrant, similar to pain she was evaluaated for on 2/13/23.    She has been following recommendations and has had a normal BM today, but the pain persists.   She would like feedback from provider team.  . Aminata Conroy.   Recommendation 2/13/23  Recommended the following:  - Reduce dairy intake and abstain from honey;  - Start cholestyramine 4 gram with supper X one week. If not effective, increase the dose to 4 gram twice a day.  -Avoid skipping meals.  - Take imodium as needed: 2 mg after two loose stools. Wait for about one hour. If still has another loose stool, take 1 mg dose after each subsequent stool.  - Try low FODMAP diet for a few weeks.  - Try metamucil 5-10 gram in the morning      Background:  Per 2/13/23 note: \"Discussed differential diagnosis with the patient and her . High likelihood of IBS or malabsorption diarrhea-lactose intolerance, sensitivity to high FODMAP, bile salt induced diarrhea, short bowel, etc.\"   Protocol Recommended Disposition/ Recommendation: Please advise    Routed to provider team    Does the patient meet one of the following criteria for ADS visit consideration? No    Reason for Disposition    MODERATE pain (e.g., interferes with normal activities that comes and goes (cramps) lasts > 24 hours  (Exception: Pain with Vomiting or Diarrhea - see that Protocol.)    Additional Information    Negative: Passed out (i.e., fainted, collapsed and was not responding)    Negative: Shock suspected (e.g., cold/pale/clammy skin, too weak to stand, low BP, rapid pulse)    Negative: Sounds like a life-threatening emergency to the triager    Negative: Chest pain    Negative: Pain is mainly in upper abdomen (if needed ask: 'is it mainly above the belly button?')    Negative: Abdominal pain and pregnant < 20 weeks    " Negative: Abdominal pain and pregnant 20 or more weeks    Negative: SEVERE abdominal pain (e.g., excruciating)    Negative: Vomiting red blood or black (coffee ground) material    Negative: Bloody, black, or tarry bowel movements  (Exception: Chronic-unchanged black-grey bowel movements and is taking iron pills or Pepto-Bismol.)    Negative: Constant abdominal pain lasting > 2 hours    Negative: Vomiting bile (green color)    Negative: Vomiting and abdomen looks much more swollen than usual    Negative: White of the eyes have turned yellow (i.e., jaundice)    Negative: Blood in urine (red, pink, or tea-colored)    Negative: Fever > 103 F (39.4 C)    Negative: Fever > 101 F (38.3 C) and over 60 years of age    Negative: Fever > 100.0 F (37.8 C) and has diabetes mellitus or a weak immune system (e.g., HIV positive, cancer chemotherapy, organ transplant, splenectomy, chronic steroids)    Negative: Fever > 100.0 F (37.8 C) and bedridden (e.g., nursing home patient, stroke, chronic illness, recovering from surgery)    Protocols used: ABDOMINAL PAIN - FEMALE-A-OH

## 2023-02-17 DIAGNOSIS — R10.31 ABDOMINAL PAIN, RIGHT LOWER QUADRANT: Primary | ICD-10-CM

## 2023-02-17 DIAGNOSIS — R19.7 DIARRHEA, UNSPECIFIED TYPE: Primary | ICD-10-CM

## 2023-02-17 DIAGNOSIS — Z98.890 HISTORY OF INTESTINAL SURGERY: ICD-10-CM

## 2023-02-17 DIAGNOSIS — R10.31 ABDOMINAL PAIN, RIGHT LOWER QUADRANT: ICD-10-CM

## 2023-02-17 RX ORDER — DICYCLOMINE HYDROCHLORIDE 10 MG/1
10 CAPSULE ORAL 3 TIMES DAILY PRN
Qty: 30 CAPSULE | Refills: 0 | Status: SHIPPED | OUTPATIENT
Start: 2023-02-17 | End: 2023-03-15

## 2023-02-21 ENCOUNTER — HOSPITAL ENCOUNTER (OUTPATIENT)
Dept: CT IMAGING | Facility: CLINIC | Age: 83
Discharge: HOME | End: 2023-02-21
Attending: NURSE PRACTITIONER | Admitting: NURSE PRACTITIONER
Payer: COMMERCIAL

## 2023-02-21 DIAGNOSIS — Z98.890 HISTORY OF INTESTINAL SURGERY: ICD-10-CM

## 2023-02-21 DIAGNOSIS — R19.7 DIARRHEA, UNSPECIFIED TYPE: ICD-10-CM

## 2023-02-21 DIAGNOSIS — R10.31 ABDOMINAL PAIN, RIGHT LOWER QUADRANT: ICD-10-CM

## 2023-02-21 PROCEDURE — 250N000011 HC RX IP 250 OP 636: Performed by: NURSE PRACTITIONER

## 2023-02-21 PROCEDURE — 74177 CT ABD & PELVIS W/CONTRAST: CPT

## 2023-02-21 PROCEDURE — 250N000009 HC RX 250: Performed by: NURSE PRACTITIONER

## 2023-02-21 RX ORDER — IOPAMIDOL 755 MG/ML
500 INJECTION, SOLUTION INTRAVASCULAR ONCE
Status: COMPLETED | OUTPATIENT
Start: 2023-02-21 | End: 2023-02-21

## 2023-02-21 RX ADMIN — IOPAMIDOL 64 ML: 755 INJECTION, SOLUTION INTRAVENOUS at 15:11

## 2023-02-21 RX ADMIN — SODIUM CHLORIDE 61 ML: 9 INJECTION, SOLUTION INTRAVENOUS at 15:10

## 2023-02-22 ENCOUNTER — TELEPHONE (OUTPATIENT)
Dept: GASTROENTEROLOGY | Facility: CLINIC | Age: 83
End: 2023-02-22
Payer: COMMERCIAL

## 2023-02-22 NOTE — TELEPHONE ENCOUNTER
[facility] Call Center    Phone Message    May a detailed message be left on voicemail: yes     Reason for Call: Other: Milena is calling in asking for a call back. She states that she would like to speak with her care team to go over the results of a recent CT that was ordered by Aminata Campoverde. Please call back as soon as possible to discuss.     Action Taken: Message routed to:  [facility] ([facility] GI    Travel Screening: Not Applicable

## 2023-02-23 ENCOUNTER — VIRTUAL VISIT (OUTPATIENT)
Dept: NEUROSURGERY | Facility: CLINIC | Age: 83
End: 2023-02-23
Payer: COMMERCIAL

## 2023-02-23 DIAGNOSIS — Z98.2 VP (VENTRICULOPERITONEAL) SHUNT STATUS: Primary | ICD-10-CM

## 2023-02-23 PROCEDURE — 99024 POSTOP FOLLOW-UP VISIT: CPT | Mod: VID | Performed by: STUDENT IN AN ORGANIZED HEALTH CARE EDUCATION/TRAINING PROGRAM

## 2023-02-23 NOTE — PROGRESS NOTES
Milena is a 82 year old who is being evaluated via a billable telephone visit.      What phone number would you like to be contacted at? [phone]  How would you like to obtain your AVS? Mail a copy    Distant Location (provider location):  Off-site  Phone call duration: 15 minutes

## 2023-02-23 NOTE — LETTER
"    2/23/2023         RE: Milena Hamilton  [address] [place] [address]        Dear Colleague,    Thank you for referring your patient, Milena Hamilton, to the [facility] [place]. Please see a copy of my visit note below.    Milena is a 82 year old who is being evaluated via a billable telephone visit.      What phone number would you like to be contacted at? [phone]  How would you like to obtain your AVS? Mail a copy    Distant Location (provider location):  Off-site  Phone call duration: 15 minutes        HPI:  83-year-old female status post right  shunt for NPH.  Preoperatively she had gait disturbance which is improved with a lumbar puncture and pre and post PT evaluation.  Postoperatively she is doing well without any complications from surgery.  She notes her balance and gait has been better post procedure that was preprocedure.  She is overall doing very well.  Current Outpatient Medications   Medication     acetaminophen (TYLENOL) 500 MG tablet     ALPRAZolam (XANAX) 0.25 MG tablet     cholecalciferol 25 MCG (1000 UT) TABS     cholestyramine (QUESTRAN) 4 GM/DOSE powder     levothyroxine (SYNTHROID/LEVOTHROID) 50 MCG tablet     loperamide (IMODIUM A-D) 2 MG tablet     losartan (COZAAR) 25 MG tablet     metoprolol succinate ER (TOPROL XL) 25 MG 24 hr tablet     omeprazole (PRILOSEC) 20 MG DR capsule     polyethylene glycol-propylene glycol (SYSTANE ULTRA) 0.4-0.3 % SOLN ophthalmic solution     dicyclomine (BENTYL) 10 MG capsule     simvastatin (ZOCOR) 20 MG tablet     No current facility-administered medications for this visit.      Physical Exam:  Vital signs:                         Estimated body mass index is 24.14 kg/m  as calculated from the following:    Height as of 2/13/23: 1.575 m (5' 2\").    Weight as of 5/6/23: 59.9 kg (132 lb).   No physical exam as this was a telephone visit.  Results Reviewed:  No new imaging to review " today.  Assessment:  83-year-old female status post  shunt for NPH doing well postoperatively.  Plan:  Okay to advance activity as tolerated going forward.  She does have an adjustable valve which is set at 4.  Can follow-up with neurosurgery as needed going forward.    Hira Pillai MD    Again, thank you for allowing me to participate in the care of your patient.        Sincerely,        Hira Pillai MD

## 2023-02-23 NOTE — TELEPHONE ENCOUNTER
Call placed to patient, results given.  Patient very concerned about the Hepatic Cysts that were found and is requesting more information about what could have caused this and what she needs to do about them.     Liz Richardson RN

## 2023-02-23 NOTE — TELEPHONE ENCOUNTER
----- Message from FORREST Walker CNP sent at 2/23/2023 12:11 PM CST -----    ----- Message -----  From: Alise Pedersen  Sent: 2/22/2023   8:30 AM CST  To: FORREST Walker CNP    Please contact the patient and let her know that she had no abnormal findings on CT scan to explain her symptoms- no signs of inflammation of the colon, obstruction, or masses.  Her symptoms are likely due to malabsorption (lactose intolerance, previous colon surgery) and/or IBS.  No changes to the plan of care. Will not order colonoscopy at this time.  Thank you,  JOSE Walker

## 2023-02-27 NOTE — TELEPHONE ENCOUNTER
Aminata Campoverde, FORREST CNP  You 12 minutes ago (8:45 AM)     TS  Patient has an accidental finding of multiple small liver cysts since several years ago. They are stable. Her GI symptoms are not related to the cysts. No specific follow-up is recommended.   Thank you,   JOSE Walker

## 2023-03-03 NOTE — TELEPHONE ENCOUNTER
LM to return call. Please advise patient of providers note below.   Marilee Grant, lindsey.....3/3/2023 at 10:43 AM    TS  Patient has an accidental finding of multiple small liver cysts since several years ago. They are stable. Her GI symptoms are not related to the cysts. No specific follow-up is recommended.   Thank you,   JOSE Walker

## 2023-03-15 ENCOUNTER — VIRTUAL VISIT (OUTPATIENT)
Dept: GASTROENTEROLOGY | Facility: CLINIC | Age: 83
End: 2023-03-15
Payer: COMMERCIAL

## 2023-03-15 DIAGNOSIS — R10.31 ABDOMINAL PAIN, RIGHT LOWER QUADRANT: ICD-10-CM

## 2023-03-15 DIAGNOSIS — R19.7 DIARRHEA, UNSPECIFIED TYPE: Primary | ICD-10-CM

## 2023-03-15 DIAGNOSIS — R14.0 ABDOMINAL BLOATING: ICD-10-CM

## 2023-03-15 PROCEDURE — 99213 OFFICE O/P EST LOW 20 MIN: CPT | Mod: 95 | Performed by: NURSE PRACTITIONER

## 2023-03-15 RX ORDER — DICYCLOMINE HYDROCHLORIDE 10 MG/1
10 CAPSULE ORAL 3 TIMES DAILY PRN
Qty: 30 CAPSULE | Refills: 4 | Status: SHIPPED | OUTPATIENT
Start: 2023-03-15 | End: 2024-05-24

## 2023-03-15 NOTE — PROGRESS NOTES
GASTROENTEROLOGY RETURN VIRTUAL VISIT    Milena is a 82 year old who is being evaluated via a billable telephone visit.      What phone number would you like to be contacted at?  [phone]    How would you like to obtain your AVS? MyChart      Vitals: No vitals were obtained today due to virtual visit.    Phone call duration: 12 minutes    Originating Location (pt. Location): Home  Distant Location (provider location):  Off-site    Mode of Communication:  Video Conference via [employer]    Physician has received verbal consent for a Video Visit from the patient? Yes    Gastroenterology RETURN VIRTUAL VISIT    CC/REFERRING PROVIDER: No ref. provider found  REASON FOR CONSULTATION: follow up    HPI: 82 year old female presenting to GI clinic for one month follow up on abdominal bloating and diarrhea. History of hemicolectomy and cholecystectomy. On her last visit, the patient was started on cholestyramine and fiber supplement. Recommended to take imodium as needed. Was educated on dietary changes and low FODMAP diet.  Reported taking cholestyramine daily and imodium as needed. Noted improvement in her diarrhea. Has been having 1-2 soft stools a day. No loose or watery stools. Still struggling with bloating.  Patient stated that she has been keeping a food diary for one month and found a few foods that probably cause bloating. She will try to avoid those. Asks for a prescription of Bentyl which helps her abdominal discomfort.  Patient also has a few questions on recent abdominal CT findings.     ROS: 10pt ROS performed and otherwise negative.    PAST MEDICAL HISTORY:  Past Medical History:   Diagnosis Date     Lichen planus     vulvar     Osteopenia 4/2010    recheck DEXA in 2 years     Panic disorder without agoraphobia      Pure hypercholesterolemia     low LDLs     Raynaud's disease 6/2014     Unspecified essential hypertension        PREVIOUS ABDOMINAL/GYNECOLOGIC SURGERIES:    Past Surgical History:    Procedure Laterality Date     COLONOSCOPY  10/17/2007    normal, recheck in 10 years     COLONOSCOPY N/A 08/27/2014    Procedure: COLONOSCOPY;  Surgeon: aRffi Montiel MD;  Location: [facility]     COMBINED CYSTOSCOPY, INSERT CATHETER URETER Bilateral 10/13/2014    Procedure: COMBINED CYSTOSCOPY, INSERT CATHETER URETER;  Surgeon: Abdoulaye Odell MD;  Location: [facility]     EYE SURGERY  08/01/2001    right eye muscle repair     HC REMOVAL GALLBLADDER  02/09/2010    lap     HC REMOVAL OF OVARY/TUBE(S)      Salpingo-Oophorectomy, bilateral     HYSTERECTOMY, PAP NO LONGER INDICATED       INJECT EPIDURAL LUMBAR Right 04/10/2017    Procedure: INJECT EPIDURAL LUMBAR;  Surgeon: Raffi Wright MD;  [facility]     INJECT EPIDURAL LUMBAR Right 02/27/2020    Procedure: Right Lumbar 4- Sacral 1 Epidural Steroid Injection;  Surgeon: Antonio Dasilva MD;  [facility]     INJECT EPIDURAL LUMBAR Right 01/29/2021    Procedure: INJECTION, SPINE,  sacral 1 EPIDURAL;  Surgeon: Antonio Dasilva MD;  [facility]     INJECT EPIDURAL TRANSFORAMINAL Right 06/09/2016    Procedure: INJECT EPIDURAL TRANSFORAMINAL;  Surgeon: Antonio Dasilva MD;  [facility]     INJECT EPIDURAL TRANSFORAMINAL Right 10/13/2016    Procedure: INJECT EPIDURAL TRANSFORAMINAL;  Surgeon: Antonio Dasilva MD;  [facility]     INJECT EPIDURAL TRANSFORAMINAL Right 06/18/2021    Procedure: Right L4-5 and right L5-S1 Transforaminal Epidural Steroid Injections with fluoroscopic guidance and contrast.;  Surgeon: Antonio Dasilva MD;  [facility]     INJECT JOINT SACROILIAC Right 04/14/2022    Procedure: Fluoroscopically-guided injection of the Bilateral sacroiliac joints with Bilateral pastor Sacroiliac joint ligaments infiltration over the sacrum;  Surgeon: Antonio Dasilva MD;  [facility]     LAPAROSCOPIC ASSISTED COLECTOMY LEFT (DESCENDING) N/A 10/13/2014    Procedure: LAPAROSCOPIC ASSISTED COLECTOMY LEFT (DESCENDING);  Surgeon: Dinesh  MD Raffi;  Location: [facility]     LUMBAR SPINE SURGERY  09/2021    L2-Pelvis fusion with rods and screws     OPTICAL TRACKING SYSTEM IMPLANT SHUNT VENTRICULOPERITONEAL Right 12/2/2022    Procedure: Right Ventriculoperitoneal;  Surgeon: Hira Pillai MD;  Location: [facility]     ZZC APPENDECTOMY       ZZC NONSPECIFIC PROCEDURE      Right inferior oblique recession, 14 mm     ZZC VAG HYST,RMV TUBE/OVARY  01/01/1984    cervix removed         PERTINENT MEDICATIONS:  Current Outpatient Medications   Medication Sig Dispense Refill     acetaminophen (TYLENOL) 500 MG tablet Take 500-1,000 mg by mouth every 6 hours as needed for mild pain       ALPRAZolam (XANAX) 0.25 MG tablet Take 1 tablet (0.25 mg) by mouth nightly as needed for anxiety 30 tablet 2     cholecalciferol 25 MCG (1000 UT) TABS Take 1,000 Units by mouth daily       cholestyramine (QUESTRAN) 4 GM/DOSE powder Take 4 g by mouth daily (with dinner) 348 g 3     dicyclomine (BENTYL) 10 MG capsule Take 1 capsule (10 mg) by mouth 3 times daily as needed (for abdominal cramping and pain, as needed) 30 capsule 0     levothyroxine (SYNTHROID/LEVOTHROID) 50 MCG tablet TAKE ONE TABLET BY MOUTH once daily 90 tablet 3     loperamide (IMODIUM A-D) 2 MG tablet Take 1 tablet (2 mg) by mouth daily as needed for diarrhea Take one tablet after 2 or more loose stools. Wait for one hour and if still have loose stools, take half of the tablet. 30 tablet 1     losartan (COZAAR) 25 MG tablet Take 1 tablet (25 mg) by mouth daily 90 tablet 3     metoprolol succinate ER (TOPROL XL) 25 MG 24 hr tablet Take 1 tablet (25 mg) by mouth daily 90 tablet 3     omeprazole (PRILOSEC) 20 MG DR capsule TAKE ONE CAPSULE BY MOUTH ONCE DAILY 90 capsule 3     polyethylene glycol-propylene glycol (SYSTANE ULTRA) 0.4-0.3 % SOLN ophthalmic solution Place 1 drop into both eyes 3 times daily as needed for dry eyes       simvastatin (ZOCOR) 20 MG tablet TAKE 1/2 TABLET BY MOUTH EVERY NIGHT (Patient taking  "differently: Take 10 mg by mouth At Bedtime) 135 tablet 3       No other OTC/herbal/supplements reported by patient.    SOCIAL HISTORY:  Social History     Socioeconomic History     Marital status:      Spouse name: Not on file     Number of children: 4     Years of education: Not on file     Highest education level: Not on file   Occupational History     Occupation: dry cleaning   Tobacco Use     Smoking status: Never     Passive exposure: Never     Smokeless tobacco: Never   Vaping Use     Vaping Use: Never used   Substance and Sexual Activity     Alcohol use: No     Drug use: No     Sexual activity: Not Currently     Partners: Male     Birth control/protection: Surgical     Comment: not currently/hysterectomy   Other Topics Concern     Parent/sibling w/ CABG, MI or angioplasty before 65F 55M? No   Social History Narrative     Not on file     Social Determinants of Health     Financial Resource Strain: Not on file   Food Insecurity: Not on file   Transportation Needs: Not on file   Physical Activity: Not on file   Stress: Not on file   Social Connections: Not on file   Intimate Partner Violence: Not on file   Housing Stability: Not on file       FAMILY HISTORY:  Denies colon/panc/esophageal/other GI CA, no other [place] or other HPS-related [place]. No IBD/celiac, no other AI/liver/thyroid disease.    Family History   Problem Relation Age of Onset     Diabetes Brother      Heart Disease Brother 50        AMI     Diabetes Brother      Heart Disease Brother         AMI, \"older\"     Cerebrovascular Disease Brother      Psychotic Disorder Brother         , alcohol     Cancer Sister         breast cancer x2     Hypertension No family hx of      Breast Cancer No family hx of      Ovarian Cancer No family hx of      Prostate Cancer No family hx of      Mental Illness No family hx of      Anesthesia Reaction No family hx of      Osteoporosis No family hx of      Obesity No family hx of      Other Cancer No family hx " of      Depression No family hx of      Anxiety Disorder No family hx of      Mental Illness No family hx of      Substance Abuse No family hx of        PHYSICAL EXAMINATION:  Vitals reviewed  LMP  (LMP Unknown)     General: Patient appears well in no acute distress.   Skin: No visualized rash or lesions on visualized skin  Eyes: EOMI, no erythema, sclera icterus or discharge noted  Resp: breathing comfortably without accessory muscle usage, speaking in full sentences, no cough  MSK: Appears to have normal range of motion based on visualized movements  Neurologic: No apparent tremors, facial movements symmetric  Psych: affect normal, alert and oriented      PERTINENT STUDIES Reviewed in EMR    ASSESSMENT/PLAN:  82 year old female  presented  to GI clinic for one month follow up on loose stools and abdominal bloating. History of GERD, gastritis, history of diverticulitis, (hemicolectomy 2014), cholecystectomy, ventriculoperitoneal shunt, and hypothyroidism.    Reported improvement in her diarrhea after cholestyramine was prescribed. Takes imodium as needed. Not certain if she started a fiber supplement ( does not recall the name or taking another powder). Asks for a prescription to continue dicyclomine which she found effective for bloating and abdominal discomfort. Willing to work on diet modification to reduce bloating.  We discussed her abdominal CT findings, suggestive for diverticulosis and stable hepatic cysts. No acute pathology.  I advised to start taking fiber supplement.      ICD-10-CM    1. Diarrhea, unspecified type  R19.7       2. Abdominal bloating  R14.0       3. Abdominal pain, right lower quadrant  R10.31 dicyclomine (BENTYL) 10 MG capsule          RTC as needed    Thank you for this consultation. It was a pleasure to participate in the care of this patient; please contact us with any further questions.    FORREST Walker, FNP-C  [facility]  Gastroenterology Department  [facility]  [place]    This note was created with Dragon voice recognition software, and while reviewed for accuracy, inadvertent minor typographic errors may occur. Please contact the provider if you have any questions.

## 2023-03-15 NOTE — PATIENT INSTRUCTIONS
It was a pleasure taking care of you today.  I've included a brief summary of our discussion and care plan from today's visit below.  Please review this information with your primary care provider.  ______________________________________________________________________    My recommendations are summarized as follows:    I am glad that your diarrhea had improved. Take loperamide (imodium) as needed for 2 or more loose stools a day.    2. Continue taking cholestyramine powder once pt twice a day.    3. Keep your food diary and pay attention to high FODMAP foods as they could cause bloating (see a table with low vs high FODMAP diet below).     4. Increase dietary fiber intake or take an over the counter fiber supplement for diarrhea and diverticulosis.    Return to GI Clinic on as needed basis   ______________________________________________________________________    BLOATING AND GAS  Some people feel that they pass too much gas or burp too frequently, both of which can be a source of embarrassment and discomfort. The average adult produces about one to three pints of gas each day, which is passed through the anus 14 to 23 times per day. Burping occasionally before or after meals is also normal.  The amount of gas produced by the body depends upon your diet and other individual factors. However, most people who complain of excessive gas do not produce more gas than the average person. Instead, they are more aware of normal amounts of gas. On the other hand, certain foods and medical conditions can cause you to make excessive amounts of gas.    There are two primary sources of intestinal gas: gas that is ingested (mostly swallowed air) and gas that is produced by bacteria in the colon.   Air swallowing is the major source of gas in the stomach. It is normal to swallow a small amount of air when eating and drinking and when swallowing saliva. You may swallow larger amounts of air when eating food rapidly, gulping  liquids, chewing gum, or smoking.     Bacterial production -- The colon normally provides a home for billions of harmless bacteria, some of which support the health of the bowel. Certain carbohydrates are incompletely digested by enzymes in the stomach and intestines, allowing bacteria to digest them. For example, cabbage, [place]wood sprouts, and broccoli contain raffinose, a carbohydrate that is poorly digested. These foods tend to cause more gas and flatulence because the raffinose is digested by bacteria once it reaches the colon. The by-products of this process include odorless gases, such as carbon dioxide, hydrogen, and methane. Minor components of gas have an unpleasant odor, including trace amounts of sulfur.  Some people are not able to digest certain carbohydrates. A classic example is lactose, the major sugar contained in dairy products . Thus, consuming large amounts of lactose may lead to increased gas production, along with cramping and diarrhea.  Starch and soluble fiber can also contribute increase gas. Potatoes, corn, noodles, and wheat produce gas, while rice does not. Soluble fiber (found in oat bran, peas and other legumes, beans, and most fruit) also causes gas. Some laxatives contain soluble fiber and may cause gas, particularly during the first few weeks of use.   Certain diseases can also cause excessive bloating and gas. For example, people with diabetes or scleroderma may, over time, have slowing in the activity of the small intestine. This can lead to bacterial overgrowth within the bowel, with poor digestion of carbohydrates and other nutrients. However, even in the absence of apparent disease, some people tend to harbor large numbers of bacteria in their small bowel and are prone to develop excessive gas.   Most people with gas and bloating do not need to have any testing. However, symptoms such as diarrhea, weight loss, abdominal pain, anemia, blood in the stool, lack of appetite, fever,  "or vomiting can be warning signs of a more serious problem; people with one or more of these symptoms usually require testing.     Several measures can help to reduce bothersome gas.   Chronic, repeated belching can occur if you swallow large amounts of air (ie, aerophagia). Aerophagia is typically an unconscious process, and is often associated with emotional stress. Treatment focuses on decreasing air swallowing by reducing anxiety, when it is considered to be a cause, as well as on eating slowly without gulping and avoiding carbonated beverages, chewing gum, and smoking.   Diet recommendations --   Certain foods contain specific carbohydrates called \"FODMAPs\" (fermentable oligo-, di-, and monosaccharides and polyols). FODMAPs are poorly absorbed and can result in bloating and gas production in some people. For more information and a list of foods, please check the https://App.io website      Avoid foods that appear to aggravate your symptoms. These may include milk and dairy products, certain fruits or vegetables, whole grains, artificial sweeteners, and/or carbonated beverages.If you are lactose intolerant, do not consume products that contain lactose;  you can use a lactose-digestive aid such as lactose-reduced milk or over-the-counter lactase supplement (eg, Lactaid tablets or liquid).    Over-the-counter medications -- Try an over-the-counter product that contains Simethicone, such as certain antacids (eg, Maalox Anti-Gas, Mylanta Gas, Gas-X, Phazyme). Also, you can try an over-the-counter product that contains activated charcoal (eg, CharcoCaps, CharcoAid) or Beano, which is an over-the-counter preparation that helps to breakdown certain complex carbohydrates. This treatment may be effective in reducing gas after eating beans or other vegetables that contain raffinose. Another option is  Pepto-Bismol to reduce the odor of unpleasant-smelling gas.    A high-fiber diet is a commonly recommended " treatment for digestive problems, such as constipation, diarrhea, and hemorrhoids.   Most dietary fiber is not digested or absorbed, so it stays within the intestine where it modulates digestion of other foods and affects the consistency of stool. There are two types of fiber, each of which is thought to have its own benefits:  ?Soluble fiber consists of a group of substances that is made of carbohydrates and dissolves in water. Examples of foods that contain soluble fiber include fruits, oats, barley, and legumes (peas and beans).  ?Insoluble fiber comes from plant cell walls and does not dissolve in water. Examples of foods that contain insoluble fiber include wheat, rye, and other grains. Insoluble fiber (wheat bran, and some fruits and vegetables) has been recommended to treat digestive problems such as constipation, hemorrhoids, chronic diarrhea, and fecal incontinence.   ?Dietary fiber is the sum of all soluble and insoluble fiber.Fiber bulks the stool, making it softer and easier to pass. Fiber helps the stool pass regularly, although it is not a laxative.   - Recommended daily dose of fiber is 25-35 gram. It is difficult to consume this amount of fiber from food alone. Therefore, I would suggest to take fiber supplement.  - Please start supplementation with a powdered soluble fiber. When used on a daily basis, this can help regulate the consistency of your stools.   - Metamucil (psyllium) and Citrucel are preferred examples. You can start with 1-2 teaspoons per day, with goal to gradually increase the dose  to 1 tablespoon daily. You can increase up to 1 tablespoon three times daily if needed.   - It is important to stay well-hydrated with use of fiber supplementation and to make sure that the fiber powder is well mixed with water as directed on the label.   - You may experience some bloating with initiation of fiber, which will improve over the first few weeks. We will evaluate the effect  of fiber in 3-6  months.   - Of note, many of the fiber products contain artificial sweeteners, which can cause bloating, gas, and diarrhea in those who may be sensitive to artificial sweeteners. If this is the case, I would recommend trying Metamucil Premium Blend (with Stevia), Metamucil 4-in-1 without Added Sweeteners, or Bellway (sweetened with Monk fruit extract).                 ______________________________________________________________________    Who do I call with any questions after my visit?  Please be in touch if there are any further questions that arise following today's visit.  There are multiple ways to contact your gastroenterology care team.      During business hours, you may reach a Gastroenterology nurse at [phone], option 3.     To schedule or reschedule an appointment, please call [phone].   To schedule your imaging studies (CT, MRI, ultrasound)  call [phone] (or toll-free # [phone])  To schedule your lab work at [facility], please call [phone]    You can always send a secure message through [employer].  [employer] messages are answered by your nurse or doctor typically within 24 hours.  Please allow extra time on weekends and holidays.      For urgent/emergent questions after business hours, you may reach the on-call GI Fellow by contacting the [facility]  at [phone].    In order for your refill to be processed in a timely fashion, it is your responsibility to ensure you follow the recommendations from your provider regarding your laboratory studies and follow up appointments.       How will I get the results of any tests ordered?    You will receive all of your results.  If you have signed up for [employer], any tests ordered at your visit will be available to you after your physician reviews them.  Typically this takes 1-2 weeks.  If there are urgent results that require a change in your care plan, your physician or nurse will call you  to discuss the next steps.   What is [employer]?  [employer]Current is a secure way for you to access all of your healthcare records from the [facility].  It is a web based computer program, so you can sign on to it from any location.  It also allows you to send secure messages to your care team.  I recommend signing up for [employer] access if you have not already done so and are comfortable with using a computer.    How to I schedule a follow-up visit?  If you did not schedule a follow-up visit today, please call [phone] to schedule a follow-up office visit.      Sincerely,  JOSE Walker,  [facility],  Division of Gastroenterology   ([facility])

## 2023-03-23 ENCOUNTER — NURSE TRIAGE (OUTPATIENT)
Dept: NURSING | Facility: CLINIC | Age: 83
End: 2023-03-23
Payer: COMMERCIAL

## 2023-03-23 NOTE — TELEPHONE ENCOUNTER
"Patient calling reporting right hip pain x 2 months.    Patient stating she initially had pain \"where my hip meets leg.\"     Denies injury. Stating she may have stretched to reach in past day.     Denies visible redness, swelling of right hip area.    Patient stating she was referred to gastro. Diarrhea has resolved. Reporting 1 bowel movement in past 24 hours.    Patient reporting pain is more on right hip bone. Increases with movement ambulation to a \"6\" on 1-10 pain scale.    Denies change in pain over past 1-2 months.     Disposition to see provider with in 3 days.    Patient declines scheduling at this time prefers to call back.    Inna Borrego RN  [facility]        Reason for Disposition    MODERATE pain (e.g., interferes with normal activities, limping) and present > 3 days    Additional Information    Negative: Looks like a broken bone or dislocated joint (e.g., crooked or deformed)    Negative: Sounds like a life-threatening emergency to the triager    Negative: Followed a hip injury    Negative: Leg pain is main symptom    Negative: Back pain radiating (shooting) into hip    Negative: SEVERE pain (e.g., excruciating, unable to do any normal activities) and fever    Negative: Can't stand (bear weight) or walk    Negative: Fever and red area (or area very tender to touch)    Negative: Patient sounds very sick or weak to the triager    Negative: SEVERE pain (e.g., excruciating, unable to do any normal activities)    Negative: Red area or streak > 2 inches (or 5 cm)    Negative: Painful rash with multiple small blisters grouped together (i.e., dermatomal distribution or 'band' or 'stripe')    Negative: Looks like a boil, infected sore, deep ulcer, or other infected rash (spreading redness, pus)    Negative: Localized rash is very painful (no fever)    Negative: Numbness in a leg or foot (i.e., loss of sensation)    Negative: Patient wants to be seen    Protocols used: HIP PAIN-A-OH      "

## 2023-04-05 ENCOUNTER — HOSPITAL ENCOUNTER (OUTPATIENT)
Dept: MAMMOGRAPHY | Facility: CLINIC | Age: 83
Discharge: HOME | End: 2023-04-05
Attending: PHYSICIAN ASSISTANT | Admitting: PHYSICIAN ASSISTANT
Payer: COMMERCIAL

## 2023-04-05 DIAGNOSIS — Z12.31 VISIT FOR SCREENING MAMMOGRAM: ICD-10-CM

## 2023-04-05 PROCEDURE — 77067 SCR MAMMO BI INCL CAD: CPT

## 2023-04-21 ENCOUNTER — NURSE TRIAGE (OUTPATIENT)
Dept: NURSING | Facility: CLINIC | Age: 83
End: 2023-04-21
Payer: COMMERCIAL

## 2023-04-21 NOTE — TELEPHONE ENCOUNTER
"Nurse Triage SBAR    Is this a 2nd Level Triage? YES, LICENSED PRACTITIONER REVIEW IS REQUIRED  Please call patient at ph. [phone]    Situation:     Patient calling reporting ongoing right sided abd pain, diarrhea.    Background:     See 3/15/23 Virtual Visit/Gastro    Assessment:     Patient reporting diarrhea started 10 months ago. Patient stating she is having no improvement in symptoms.  Stating she typically has 3 watery stools in the morning. Denies blood in stool.  Right lower abd pain. CT of Abd 2/21/23.  Reporting right lower quadrant pain \"8\" and constant this morning, reporting pain is improving now since taking Imodium. Stating it has been intermittent over past few months, usually improves throughout day.  Right lower quadrant tender to touch.  Abd bloating, nausea in mornings.  Taking fluids.  Patient is taking Imodium, fiber supplement and following dietary changes as advised on 3/15/23 Virtual Visit with no improvement.    Protocol Recommended Disposition:   See in ED/UCC or clinic with PCP approval.    Recommendation:     Please advise on working patient into appointment or where you prefer her to be seen.       Routed to provider    Does the patient meet one of the following criteria for ADS visit consideration? 16+ years old, with an [facility] PCP     TIP  Providers, please consider if this condition is appropriate for management at one of our Acute and Diagnostic Services sites.     If patient is a good candidate, please use dotphrase <dot>triageresponse and select Refer to ADS to document.    Reason for Disposition    Constant abdominal pain lasting > 2 hours    Additional Information    Negative: Shock suspected (e.g., cold/pale/clammy skin, too weak to stand, low BP, rapid pulse)    Negative: Difficult to awaken or acting confused (e.g., disoriented, slurred speech)    Negative: Sounds like a life-threatening emergency to the triager    Negative: Vomiting also present and worse than the " diarrhea    Negative: Blood in stool and without diarrhea    Negative: SEVERE abdominal pain (e.g., excruciating) and present > 1 hour    Negative: SEVERE abdominal pain and age > 60 years    Negative: Bloody, black, or tarry bowel movements (Exception: chronic-unchanged black-grey bowel movements and is taking iron pills or Pepto-Bismol)    Negative: SEVERE diarrhea (e.g., 7 or more times / day more than normal) and age > 60 years    Protocols used: DIARRHEA-A-OH

## 2023-04-21 NOTE — TELEPHONE ENCOUNTER
Called and relayed message from provider regarding medications. She has not been taking the Bentyl consistently and will try increasing this dose.     She is wondering if she needs to continue the cholestyramine powder in the evenings still. This was not prescribed by PCP - will defer to GI provider.     Thea MCCARTNEYN, RN  [facility]

## 2023-04-21 NOTE — TELEPHONE ENCOUNTER
These symptoms are chronic and ongoing so I do not feel an emergent appointment is needed. Certainly, if she has drastically worsening pain, vomiting, fevers, or chills she should be seen emergently. Did she try the Bentyl at all since the pain started? This was filled last month by her GI provider and can be taken up to 3 times per day and should help with the pain. Continue with Imodium as needed and I recommend a bland diet with a lot of clear liquids. She can try some Tylenol for the pain along with a heating pad. If not improving over the weekend, let me know.    Chuck Streeter PA-C

## 2023-04-24 NOTE — TELEPHONE ENCOUNTER
Aminata Campoverde, APRN CNP  You 20 minutes ago (10:05 AM)     TS  The patient can stop cholestyramine if her diarrhea symptoms had resolved. May take dicyclomine as needed for abdominal bloating and cramping.   Thank you,   Aminata Campoverde, CNP

## 2023-04-24 NOTE — TELEPHONE ENCOUNTER
Call placed, unable to leave message.  If patient returns call, she needs information below.     Thank You  Liz Richardson Rn

## 2023-04-25 ENCOUNTER — TELEPHONE (OUTPATIENT)
Dept: GASTROENTEROLOGY | Facility: CLINIC | Age: 83
End: 2023-04-25
Payer: COMMERCIAL

## 2023-04-25 NOTE — TELEPHONE ENCOUNTER
"Patient returned call, reporting that symptoms have continued and would like sooner appointment.  Patient reports that pain and cramping continue on the right lower side of the abdomen.  Comes and goes during the day, reports about 4 episodes daily that last about 30 minutes.  Pain is not improved by moving her bowels, just resolves on its own.  She reports having a \"normal\" stool in the morning, then having mushy looser stools during the afternoon.  She is using the Bentyl, this is semi-effective for pain.  She is also using the Questran.      Appointment has been made for patient.     Liz Richardson Rn   "

## 2023-04-25 NOTE — TELEPHONE ENCOUNTER
[facility] Call Center    Phone Message    May a detailed message be left on voicemail: yes     Reason for Call: Other:        Pt is requesting a call back to discuss their continuing abdomnal cramping and pain. P{t stated they have followed the discussed treatment plan.     Action Taken: Message routed to:  [facility]    Travel Screening: Not Applicable

## 2023-05-02 DIAGNOSIS — E78.5 HYPERLIPIDEMIA LDL GOAL <130: ICD-10-CM

## 2023-05-02 RX ORDER — SIMVASTATIN 20 MG
TABLET ORAL
Qty: 135 TABLET | Refills: 3 | Status: SHIPPED | OUTPATIENT
Start: 2023-05-02 | End: 2024-05-24

## 2023-05-02 NOTE — TELEPHONE ENCOUNTER
"Pending Prescriptions:                       Disp   Refills    simvastatin (ZOCOR) 20 MG tablet [Pharmacy*135 ta*3        Sig: TAKE ONE-HALF TABLET BY MOUTH EVERY NIGHT        Routing refill request to provider for review/approval because:    Statins Protocol Failed    Rerun Protocol (5/2/2023 9:46 AM)    LDL on file in past 12 months        Recent Labs   Lab Test 03/24/22  1451   LDL 96       No abnormal creatine kinase in past 12 months    No lab results found.        Recent (12 mo) or future (30 days) visit within the authorizing provider's specialty    Patient has had an office visit with the authorizing provider or a provider within the authorizing providers department within the previous 12 mos or has a future within next 30 days. See \"Patient Info\" tab in inbasket, or \"Choose Columns\" in Meds & Orders section of the refill encounter.         Medication is active on med list      Patient is age 18 or older      No active pregnancy on record      No positive pregnancy test in past 12 months        "

## 2023-05-06 ENCOUNTER — HOSPITAL ENCOUNTER (EMERGENCY)
Facility: CLINIC | Age: 83
Discharge: HOME | End: 2023-05-06
Attending: EMERGENCY MEDICINE | Admitting: EMERGENCY MEDICINE
Payer: COMMERCIAL

## 2023-05-06 ENCOUNTER — APPOINTMENT (OUTPATIENT)
Dept: GENERAL RADIOLOGY | Facility: CLINIC | Age: 83
End: 2023-05-06
Attending: EMERGENCY MEDICINE
Payer: COMMERCIAL

## 2023-05-06 ENCOUNTER — APPOINTMENT (OUTPATIENT)
Dept: CT IMAGING | Facility: CLINIC | Age: 83
End: 2023-05-06
Attending: EMERGENCY MEDICINE
Payer: COMMERCIAL

## 2023-05-06 VITALS
WEIGHT: 132 LBS | BODY MASS INDEX: 24.14 KG/M2 | TEMPERATURE: 98 F | OXYGEN SATURATION: 98 % | HEART RATE: 70 BPM | RESPIRATION RATE: 17 BRPM | SYSTOLIC BLOOD PRESSURE: 145 MMHG | DIASTOLIC BLOOD PRESSURE: 89 MMHG

## 2023-05-06 DIAGNOSIS — M54.9 ACUTE MID BACK PAIN: ICD-10-CM

## 2023-05-06 DIAGNOSIS — M25.512 ACUTE SHOULDER PAIN DUE TO TRAUMA, LEFT: ICD-10-CM

## 2023-05-06 DIAGNOSIS — G89.11 ACUTE SHOULDER PAIN DUE TO TRAUMA, LEFT: ICD-10-CM

## 2023-05-06 LAB
ANION GAP SERPL CALCULATED.3IONS-SCNC: 10 MMOL/L (ref 7–15)
BASOPHILS # BLD AUTO: 0 10E3/UL (ref 0–0.2)
BASOPHILS NFR BLD AUTO: 0 %
BUN SERPL-MCNC: 11.7 MG/DL (ref 8–23)
CALCIUM SERPL-MCNC: 9.2 MG/DL (ref 8.8–10.2)
CHLORIDE SERPL-SCNC: 104 MMOL/L (ref 98–107)
CREAT SERPL-MCNC: 0.68 MG/DL (ref 0.51–0.95)
DEPRECATED HCO3 PLAS-SCNC: 27 MMOL/L (ref 22–29)
EOSINOPHIL # BLD AUTO: 0.1 10E3/UL (ref 0–0.7)
EOSINOPHIL NFR BLD AUTO: 1 %
ERYTHROCYTE [DISTWIDTH] IN BLOOD BY AUTOMATED COUNT: 12.4 % (ref 10–15)
GFR SERPL CREATININE-BSD FRML MDRD: 86 ML/MIN/1.73M2
GLUCOSE SERPL-MCNC: 138 MG/DL (ref 70–99)
HCT VFR BLD AUTO: 38.7 % (ref 35–47)
HGB BLD-MCNC: 12.3 G/DL (ref 11.7–15.7)
IMM GRANULOCYTES # BLD: 0.1 10E3/UL
IMM GRANULOCYTES NFR BLD: 1 %
INR PPP: 1.01 (ref 0.85–1.15)
LYMPHOCYTES # BLD AUTO: 1.3 10E3/UL (ref 0.8–5.3)
LYMPHOCYTES NFR BLD AUTO: 18 %
MCH RBC QN AUTO: 31.1 PG (ref 26.5–33)
MCHC RBC AUTO-ENTMCNC: 31.8 G/DL (ref 31.5–36.5)
MCV RBC AUTO: 98 FL (ref 78–100)
MONOCYTES # BLD AUTO: 0.5 10E3/UL (ref 0–1.3)
MONOCYTES NFR BLD AUTO: 6 %
NEUTROPHILS # BLD AUTO: 5.7 10E3/UL (ref 1.6–8.3)
NEUTROPHILS NFR BLD AUTO: 74 %
NRBC # BLD AUTO: 0 10E3/UL
NRBC BLD AUTO-RTO: 0 /100
PLATELET # BLD AUTO: 172 10E3/UL (ref 150–450)
POTASSIUM SERPL-SCNC: 4 MMOL/L (ref 3.4–5.3)
RBC # BLD AUTO: 3.96 10E6/UL (ref 3.8–5.2)
SODIUM SERPL-SCNC: 141 MMOL/L (ref 136–145)
WBC # BLD AUTO: 7.6 10E3/UL (ref 4–11)

## 2023-05-06 PROCEDURE — 250N000011 HC RX IP 250 OP 636: Performed by: EMERGENCY MEDICINE

## 2023-05-06 PROCEDURE — 96376 TX/PRO/DX INJ SAME DRUG ADON: CPT | Performed by: EMERGENCY MEDICINE

## 2023-05-06 PROCEDURE — 85025 COMPLETE CBC W/AUTO DIFF WBC: CPT | Performed by: EMERGENCY MEDICINE

## 2023-05-06 PROCEDURE — 74170 CT ABD WO CNTRST FLWD CNTRST: CPT

## 2023-05-06 PROCEDURE — 99284 EMERGENCY DEPT VISIT MOD MDM: CPT | Performed by: EMERGENCY MEDICINE

## 2023-05-06 PROCEDURE — 250N000009 HC RX 250: Performed by: EMERGENCY MEDICINE

## 2023-05-06 PROCEDURE — 85610 PROTHROMBIN TIME: CPT | Performed by: EMERGENCY MEDICINE

## 2023-05-06 PROCEDURE — 99285 EMERGENCY DEPT VISIT HI MDM: CPT | Mod: 25 | Performed by: EMERGENCY MEDICINE

## 2023-05-06 PROCEDURE — 80048 BASIC METABOLIC PNL TOTAL CA: CPT | Performed by: EMERGENCY MEDICINE

## 2023-05-06 PROCEDURE — 73030 X-RAY EXAM OF SHOULDER: CPT | Mod: LT

## 2023-05-06 PROCEDURE — 96374 THER/PROPH/DIAG INJ IV PUSH: CPT | Mod: 59 | Performed by: EMERGENCY MEDICINE

## 2023-05-06 PROCEDURE — 36415 COLL VENOUS BLD VENIPUNCTURE: CPT | Performed by: EMERGENCY MEDICINE

## 2023-05-06 RX ORDER — FENTANYL CITRATE 50 UG/ML
25 INJECTION, SOLUTION INTRAMUSCULAR; INTRAVENOUS ONCE
Status: COMPLETED | OUTPATIENT
Start: 2023-05-06 | End: 2023-05-06

## 2023-05-06 RX ORDER — IOPAMIDOL 755 MG/ML
500 INJECTION, SOLUTION INTRAVASCULAR ONCE
Status: COMPLETED | OUTPATIENT
Start: 2023-05-06 | End: 2023-05-06

## 2023-05-06 RX ORDER — OXYCODONE HYDROCHLORIDE 5 MG/1
5 TABLET ORAL ONCE
Status: COMPLETED | OUTPATIENT
Start: 2023-05-06 | End: 2023-05-06

## 2023-05-06 RX ADMIN — FENTANYL CITRATE 25 MCG: 50 INJECTION INTRAMUSCULAR; INTRAVENOUS at 20:28

## 2023-05-06 RX ADMIN — SODIUM CHLORIDE 60 ML: 9 INJECTION, SOLUTION INTRAVENOUS at 21:17

## 2023-05-06 RX ADMIN — IOPAMIDOL 65 ML: 755 INJECTION, SOLUTION INTRAVENOUS at 21:17

## 2023-05-06 RX ADMIN — FENTANYL CITRATE 25 MCG: 50 INJECTION, SOLUTION INTRAMUSCULAR; INTRAVENOUS at 21:05

## 2023-05-06 ASSESSMENT — ACTIVITIES OF DAILY LIVING (ADL): ADLS_ACUITY_SCORE: 38

## 2023-05-07 NOTE — ED PROVIDER NOTES
History     Chief Complaint   Patient presents with     Fall     Arm Pain     Back Pain     HPI   Milena Hamilton is a 83 year old female with history of a  shunt, systolic heart failure, CKD, hemicolectomy, lumbar spine surgery, she is presenting the emergency department after ground-level fall 2 hours prior to arrival.  Patient states that she was in her home and twisted toward her  to read something and in the process fell onto her left side.  She has some ecchymosis over her left flank, and pain at her left shoulder.  She has pain with inspiration as well.  She denies spine pain at this time.    Allergies:  Allergies   Allergen Reactions     Azithromycin Diarrhea     Ciprofloxacin Other (See Comments)     Cipro, dizziness and abdominal pain     Gabapentin Dizziness and Visual Disturbance     Oxycodone Nausea and Vomiting     Penicillins      stomach upset, diarrhea     Raspberry Hives     Sulfa Antibiotics Hives     hives     Amoxicillin-Pot Clavulanate Rash       Problem List:    Patient Active Problem List    Diagnosis Date Noted      (ventriculoperitoneal) shunt status 12/02/2022     Priority: Medium     Cellulitis of fifth toe of right foot 11/07/2022     Priority: Medium     Plantar fasciitis 11/07/2022     Priority: Medium     Right foot pain 11/07/2022     Priority: Medium     Chronic systolic heart failure (H) 09/14/2022     Priority: Medium     LBBB (left bundle branch block) 09/14/2022     Priority: Medium     Dysuria 11/24/2021     Priority: Medium     Basal cell carcinoma of nose 09/08/2021     Priority: Medium     History of malignant neoplasm of skin 09/08/2021     Priority: Medium     Skin changes due to chronic exposure to nonionizing radiation 09/08/2021     Priority: Medium     Renal insufficiency syndrome 09/04/2021     Priority: Medium     Formatting of this note might be different from the original.  Creatinine clearance 49       Lumbar radiculopathy 12/28/2018     Priority:  Medium     Scoliosis of lumbar spine, unspecified scoliosis type 12/12/2018     Priority: Medium     Chronic right-sided low back pain with right-sided sciatica 05/30/2018     Priority: Medium     Acquired hypothyroidism 04/28/2017     Priority: Medium     Gastroesophageal reflux disease, esophagitis presence not specified 04/28/2017     Priority: Medium     IMO Regulatory Load OCT 2020       Premature atrial beats 11/30/2016     Priority: Medium     HTN (hypertension) 07/10/2014     Priority: Medium     Raynaud's disease 06/01/2014     Priority: Medium     Health Care Home 12/30/2013     Priority: Medium     Status:  Closed  Care Coordinator:  Tasha Vasquez RN    See Letters for [facility] Care Plan             History of diverticulitis - s/p left hemicolectomy 11/30/2013     Priority: Medium     Diarrhea 11/30/2013     Priority: Medium     Chronic constipation 06/18/2013     Priority: Medium     Sacroiliac dysfunction 10/09/2012     Priority: Medium     Advanced directives, counseling/discussion 10/03/2011     Priority: Medium     Advance Directive Problem List Overview:   Name Relationship Phone    Primary Health Care Agent            Alternative Health Care Agent          Discussed advance care planning with patient; information given to patient to review. 10/3/2011        Jose Hall MD  05/06/23 2206         DDD (degenerative disc disease), cervical 08/10/2011     Priority: Medium     Anxiety 10/24/2010     Priority: Medium     Hyperlipidemia 09/29/2010     Priority: Medium     Osteopenia 04/01/2010     Priority: Medium     recheck DEXA in 2 years       Gastritis 03/15/2010     Priority: Medium     Panic disorder without agoraphobia 10/08/2004     Priority: Medium     Tinnitus 09/13/2004     Priority: Medium     Problem list name updated by automated process. Provider to review       Family history of diabetes mellitus 09/09/2002     Priority: Medium     Lichen planus      Priority: Medium        Past Medical  History:    Past Medical History:   Diagnosis Date     Lichen planus      Osteopenia 4/2010     Panic disorder without agoraphobia      Pure hypercholesterolemia      Raynaud's disease 6/2014     Unspecified essential hypertension        Past Surgical History:    Past Surgical History:   Procedure Laterality Date     COLONOSCOPY  10/17/2007    normal, recheck in 10 years     COLONOSCOPY N/A 08/27/2014    Procedure: COLONOSCOPY;  Surgeon: Raffi Montiel MD;  Location: [facility]     COMBINED CYSTOSCOPY, INSERT CATHETER URETER Bilateral 10/13/2014    Procedure: COMBINED CYSTOSCOPY, INSERT CATHETER URETER;  Surgeon: Abdoulaye Odell MD;  Location[facility]     EYE SURGERY  08/01/2001    right eye muscle repair     HC REMOVAL GALLBLADDER  02/09/2010    lap     HC REMOVAL OF OVARY/TUBE(S)      Salpingo-Oophorectomy, bilateral     HYSTERECTOMY, PAP NO LONGER INDICATED       INJECT EPIDURAL LUMBAR Right 04/10/2017    Procedure: INJECT EPIDURAL LUMBAR;  Surgeon: Raffi Wright MD;  [facility]     INJECT EPIDURAL LUMBAR Right 02/27/2020    Procedure: Right Lumbar 4- Sacral 1 Epidural Steroid Injection;  Surgeon: Antonio Dasilva MD;  [facility]     INJECT EPIDURAL LUMBAR Right 01/29/2021    Procedure: INJECTION, SPINE,  sacral 1 EPIDURAL;  Surgeon: Antonio Dasilva MD;  [facility]     INJECT EPIDURAL TRANSFORAMINAL Right 06/09/2016    Procedure: INJECT EPIDURAL TRANSFORAMINAL;  Surgeon: Antonio Dasilva MD;  [facility]     INJECT EPIDURAL TRANSFORAMINAL Right 10/13/2016    Procedure: INJECT EPIDURAL TRANSFORAMINAL;  Surgeon: Antonio Dasilva MD;  [facility]     INJECT EPIDURAL TRANSFORAMINAL Right 06/18/2021    Procedure: Right L4-5 and right L5-S1 Transforaminal Epidural Steroid Injections with fluoroscopic guidance and contrast.;  Surgeon: Antonio Dasilva MD;  [facility]     INJECT JOINT SACROILIAC Right 04/14/2022    Procedure: Fluoroscopically-guided injection of the Bilateral sacroiliac  "joints with Bilateral pastor Sacroiliac joint ligaments infiltration over the sacrum;  Surgeon: Antonio Dasilva MD;  [facility]     LAPAROSCOPIC ASSISTED COLECTOMY LEFT (DESCENDING) N/A 10/13/2014    Procedure: LAPAROSCOPIC ASSISTED COLECTOMY LEFT (DESCENDING);  Surgeon: Raffi Montiel MD;  [facility]     LUMBAR SPINE SURGERY  2021    L2-Pelvis fusion with rods and screws     OPTICAL TRACKING SYSTEM IMPLANT SHUNT VENTRICULOPERITONEAL Right 2022    Procedure: Right Ventriculoperitoneal;  Surgeon: Hira Pillai MD;  Location: [facility]     ZZC APPENDECTOMY       ZZC NONSPECIFIC PROCEDURE      Right inferior oblique recession, 14 mm     ZZC VAG HYST,RMV TUBE/OVARY  1984    cervix removed       Family History:    Family History   Problem Relation Age of Onset     Diabetes Brother      Heart Disease Brother 50        AMI     Diabetes Brother      Heart Disease Brother         AMI, \"older\"     Cerebrovascular Disease Brother      Psychotic Disorder Brother         , alcohol     Cancer Sister         breast cancer x2     Hypertension No family hx of      Breast Cancer No family hx of      Ovarian Cancer No family hx of      Prostate Cancer No family hx of      Mental Illness No family hx of      Anesthesia Reaction No family hx of      Osteoporosis No family hx of      Obesity No family hx of      Other Cancer No family hx of      Depression No family hx of      Anxiety Disorder No family hx of      Mental Illness No family hx of      Substance Abuse No family hx of        Social History:  Marital Status:   [2]  Social History     Tobacco Use     Smoking status: Never     Passive exposure: Never     Smokeless tobacco: Never   Vaping Use     Vaping status: Never Used     Passive vaping exposure: Yes   Substance Use Topics     Alcohol use: No     Drug use: No        Medications:    acetaminophen (TYLENOL) 500 MG tablet  ALPRAZolam (XANAX) 0.25 MG tablet  cholecalciferol 25 MCG (1000 UT) " TABS  cholestyramine (QUESTRAN) 4 GM/DOSE powder  dicyclomine (BENTYL) 10 MG capsule  levothyroxine (SYNTHROID/LEVOTHROID) 50 MCG tablet  loperamide (IMODIUM A-D) 2 MG tablet  losartan (COZAAR) 25 MG tablet  metoprolol succinate ER (TOPROL XL) 25 MG 24 hr tablet  omeprazole (PRILOSEC) 20 MG DR capsule  polyethylene glycol-propylene glycol (SYSTANE ULTRA) 0.4-0.3 % SOLN ophthalmic solution  simvastatin (ZOCOR) 20 MG tablet          Review of Systems   All other systems reviewed and are negative.      Physical Exam   BP: (!) 176/92  Pulse: 78  Temp: 98  F (36.7  C)  Resp: 18  Weight: 59.9 kg (132 lb)  SpO2: 97 %      Physical Exam  Skin:               ED Course      83-year-old female not on anticoagulation presenting after ground-level fall, with pain at her left flank, left shoulder and left upper abdomen.  We will assess for rib fracture, shoulder fracture, or intra-abdominal hemorrhage with CT imaging.  We will also obtain basic laboratory studies and treat her pain in the interim.        ED Course as of 05/06/23 2205   Sat May 06, 2023   2107 Hemoglobin: 12.3   2107 GFR Estimate: 86   2115 XR Shoulder Left G/E 3 Views  No acute fracture or dislocation, there is severe arthritis within the AC joint.     No evidence of acute traumatic findings  CT chest abdomen pelvis, no rib fractures, no pneumothorax.  We will discharge patient with instructions for symptomatic management and follow-up with primary doctor within the next 1 week.  Fortunately the  shunt is intact as well.       Results for orders placed or performed during the hospital encounter of 05/06/23 (from the past 24 hour(s))   CBC with platelets differential    Narrative    The following orders were created for panel order CBC with platelets differential.  Procedure                               Abnormality         Status                     ---------                               -----------         ------                     CBC with platelets and  marquez..[[id number]]                      Final result                 Please view results for these tests on the individual orders.   Basic metabolic panel   Result Value Ref Range    Sodium 141 136 - 145 mmol/L    Potassium 4.0 3.4 - 5.3 mmol/L    Chloride 104 98 - 107 mmol/L    Carbon Dioxide (CO2) 27 22 - 29 mmol/L    Anion Gap 10 7 - 15 mmol/L    Urea Nitrogen 11.7 8.0 - 23.0 mg/dL    Creatinine 0.68 0.51 - 0.95 mg/dL    Calcium 9.2 8.8 - 10.2 mg/dL    Glucose 138 (H) 70 - 99 mg/dL    GFR Estimate 86 >60 mL/min/1.73m2   INR   Result Value Ref Range    INR 1.01 0.85 - 1.15   CBC with platelets and differential   Result Value Ref Range    WBC Count 7.6 4.0 - 11.0 10e3/uL    RBC Count 3.96 3.80 - 5.20 10e6/uL    Hemoglobin 12.3 11.7 - 15.7 g/dL    Hematocrit 38.7 35.0 - 47.0 %    MCV 98 78 - 100 fL    MCH 31.1 26.5 - 33.0 pg    MCHC 31.8 31.5 - 36.5 g/dL    RDW 12.4 10.0 - 15.0 %    Platelet Count 172 150 - 450 10e3/uL    % Neutrophils 74 %    % Lymphocytes 18 %    % Monocytes 6 %    % Eosinophils 1 %    % Basophils 0 %    % Immature Granulocytes 1 %    NRBCs per 100 WBC 0 <1 /100    Absolute Neutrophils 5.7 1.6 - 8.3 10e3/uL    Absolute Lymphocytes 1.3 0.8 - 5.3 10e3/uL    Absolute Monocytes 0.5 0.0 - 1.3 10e3/uL    Absolute Eosinophils 0.1 0.0 - 0.7 10e3/uL    Absolute Basophils 0.0 0.0 - 0.2 10e3/uL    Absolute Immature Granulocytes 0.1 <=0.4 10e3/uL    Absolute NRBCs 0.0 10e3/uL   XR Shoulder Left G/E 3 Views    Narrative    EXAM: XR SHOULDER LEFT G/E 3 VIEWS  LOCATION: [facility]  DATE/TIME: 5/6/2023 8:52 PM CDT    INDICATION: Shoulder pain.  COMPARISON: None.      Impression    IMPRESSION:     There is severe osseous demineralization, which decreases radiographic sensitivity for the detection of nondisplaced fractures.    No acute fracture or dislocation is identified. There is moderate left acromioclavicular degenerative arthrosis.    CT Chest Abdomen w Contrast    Narrative     EXAM: CT CHEST ABDOMEN W CONTRAST  LOCATION: [facility]  DATE/TIME: 5/6/2023 9:38 PM CDT    INDICATION: trauma, lower rib fractures? left  COMPARISON: None.  TECHNIQUE: CT scan of the chest and abdomen was performed following injection of IV contrast. Multiplanar reformats were obtained. Dose reduction techniques were used.   CONTRAST: 65 mL Isovue 370    FINDINGS:     LUNGS AND PLEURA: Minimal dependent atelectasis. No pleural effusion or pneumothorax.    MEDIASTINUM/AXILLAE: Normal.    CORONARY ARTERY CALCIFICATION: None.    HEPATOBILIARY: Few small hepatic cysts. No significant mass or bile duct dilatation. Cholecystectomy clips.    PANCREAS: Normal.    SPLEEN: Normal.    ADRENAL GLANDS: Normal.    KIDNEYS: Normal.    BOWEL: Postsurgical changes of the colon. Diverticulosis of the colon. No acute inflammatory change. No obstruction. No free air or free fluid.    LYMPH NODES: Normal.    VASCULATURE: No abdominal aortic aneurysm. Mild-to-moderate atherosclerotic calcification.    MUSCULOSKELETAL: No displaced rib fractures. Degenerative changes of the spine. L2 - ilium posterior spinal fusion hardware. Scoliosis. Ventriculoperitoneal shunt catheter.      Impression    IMPRESSION:  1.  No acute traumatic abnormality in the chest, abdomen, or pelvis.      *Note: Due to a large number of results and/or encounters for the requested time period, some results have not been displayed. A complete set of results can be found in Results Review.       Medications   fentaNYL (PF) (SUBLIMAZE) injection 25 mcg (25 mcg Intravenous $Given 5/6/23 2028)   sodium chloride 0.9 % bag 100mL for CT scan flush use (60 mLs Intravenous $Given 5/6/23 2117)   iopamidol (ISOVUE-370) solution 500 mL (65 mLs Intravenous $Given 5/6/23 2117)   fentaNYL (PF) (SUBLIMAZE) injection 25 mcg (25 mcg Intravenous $Given 5/6/23 2105)   oxyCODONE (ROXICODONE) tablet 5 mg (5 mg Oral Not Given 5/6/23 2107)       Assessments &  Plan (with Medical Decision Making)     I have reviewed the nursing notes.    I have reviewed the findings, diagnosis, plan and need for follow up with the patient.    New Prescriptions    No medications on file       Final diagnoses:   Acute shoulder pain due to trauma, left   Acute mid back pain       5/6/2023   [facility] EMERGENCY DEPT

## 2023-05-07 NOTE — ED TRIAGE NOTES
Pt presents with left arm and back pain after falling from standing this evening around 6:30pm.      Triage Assessment     Row Name 05/06/23 1934       Triage Assessment (Adult)    Airway WDL WDL       Respiratory WDL    Respiratory WDL WDL       Cardiac WDL    Cardiac WDL WDL

## 2023-05-15 NOTE — PROGRESS NOTES
"HPI:  83-year-old female status post right  shunt for NPH.  Preoperatively she had gait disturbance which is improved with a lumbar puncture and pre and post PT evaluation.  Postoperatively she is doing well without any complications from surgery.  She notes her balance and gait has been better post procedure that was preprocedure.  She is overall doing very well.  Current Outpatient Medications   Medication     acetaminophen (TYLENOL) 500 MG tablet     ALPRAZolam (XANAX) 0.25 MG tablet     cholecalciferol 25 MCG (1000 UT) TABS     cholestyramine (QUESTRAN) 4 GM/DOSE powder     levothyroxine (SYNTHROID/LEVOTHROID) 50 MCG tablet     loperamide (IMODIUM A-D) 2 MG tablet     losartan (COZAAR) 25 MG tablet     metoprolol succinate ER (TOPROL XL) 25 MG 24 hr tablet     omeprazole (PRILOSEC) 20 MG DR capsule     polyethylene glycol-propylene glycol (SYSTANE ULTRA) 0.4-0.3 % SOLN ophthalmic solution     dicyclomine (BENTYL) 10 MG capsule     simvastatin (ZOCOR) 20 MG tablet     No current facility-administered medications for this visit.      Physical Exam:  Vital signs:                         Estimated body mass index is 24.14 kg/m  as calculated from the following:    Height as of 2/13/23: 1.575 m (5' 2\").    Weight as of 5/6/23: 59.9 kg (132 lb).   No physical exam as this was a telephone visit.  Results Reviewed:  No new imaging to review today.  Assessment:  83-year-old female status post  shunt for NPH doing well postoperatively.  Plan:  Okay to advance activity as tolerated going forward.  She does have an adjustable valve which is set at 4.  Can follow-up with neurosurgery as needed going forward.    Hira Pillai MD  "

## 2023-05-16 ENCOUNTER — OFFICE VISIT (OUTPATIENT)
Dept: GASTROENTEROLOGY | Facility: CLINIC | Age: 83
End: 2023-05-16
Payer: COMMERCIAL

## 2023-05-16 VITALS
BODY MASS INDEX: 24.29 KG/M2 | HEIGHT: 62 IN | TEMPERATURE: 97.2 F | SYSTOLIC BLOOD PRESSURE: 116 MMHG | WEIGHT: 132 LBS | DIASTOLIC BLOOD PRESSURE: 72 MMHG

## 2023-05-16 DIAGNOSIS — R14.0 ABDOMINAL BLOATING: ICD-10-CM

## 2023-05-16 DIAGNOSIS — K90.89 BILE SALT-INDUCED DIARRHEA: Primary | ICD-10-CM

## 2023-05-16 DIAGNOSIS — Z98.890 HISTORY OF INTESTINAL SURGERY: ICD-10-CM

## 2023-05-16 PROCEDURE — 99214 OFFICE O/P EST MOD 30 MIN: CPT | Performed by: NURSE PRACTITIONER

## 2023-05-16 ASSESSMENT — PAIN SCALES - GENERAL: PAINLEVEL: MODERATE PAIN (5)

## 2023-05-16 NOTE — PROGRESS NOTES
Gastroenterology CLINIC VISIT, NEW PATIENT    CC/REFERRING PROVIDER: No ref. provider found  REASON FOR CONSULTATION: follow up    HPI: 83 year old female presenting/ was referred to GI clinic for complains of recurrence of   loose stools and abdominal bloating. Reported initial improvement in her diarrhea after cholestyramine was prescribed but she ran out of medication and did not refill it.  Stated that she still has formed stool in the morning, followed by a few loose stools. Develops diarrhea with fecal urgency after larger meals. Stated that due to limited mobility, she occasionally has incontinence episodes.  On her last visit,we discussed her abdominal CT findings, suggestive for diverticulosis and stable hepatic cysts. No acute pathology.  I advised to start taking fiber supplement but the patient is not taking it..  History of GERD, gastritis, history of diverticulitis, (hemicolectomy 2014), cholecystectomy, ventriculoperitoneal shunt, and hypothyroidism.      Reviewed her recent visit to ER visit on 5/6 after a fall at home. BOWEL: Postsurgical changes of the colon. Diverticulosis of the colon. No acute inflammatory change. No obstruction. No free air or free fluid.       ROS: 10pt ROS performed and otherwise negative.    PAST MEDICAL HISTORY:  Past Medical History:   Diagnosis Date     Lichen planus     vulvar     Osteopenia 4/2010    recheck DEXA in 2 years     Panic disorder without agoraphobia      Pure hypercholesterolemia     low LDLs     Raynaud's disease 6/2014     Unspecified essential hypertension        PREVIOUS ABDOMINAL/GYNECOLOGIC SURGERIES:    Past Surgical History:   Procedure Laterality Date     COLONOSCOPY  10/17/2007    normal, recheck in 10 years     COLONOSCOPY N/A 08/27/2014    Procedure: COLONOSCOPY;  Surgeon: Raffi Montiel MD;  [facility]     COMBINED CYSTOSCOPY, INSERT CATHETER URETER Bilateral 10/13/2014    Procedure: COMBINED CYSTOSCOPY, INSERT CATHETER URETER;   Surgeon: Abdoulaye Odell MD;  [facility]     EYE SURGERY  08/01/2001    right eye muscle repair     HC REMOVAL GALLBLADDER  02/09/2010    lap     HC REMOVAL OF OVARY/TUBE(S)      Salpingo-Oophorectomy, bilateral     HYSTERECTOMY, PAP NO LONGER INDICATED       INJECT EPIDURAL LUMBAR Right 04/10/2017    Procedure: INJECT EPIDURAL LUMBAR;  Surgeon: Raffi Wright MD;  [facility]     INJECT EPIDURAL LUMBAR Right 02/27/2020    Procedure: Right Lumbar 4- Sacral 1 Epidural Steroid Injection;  Surgeon: Antonio Dasilva MD;  [facility]     INJECT EPIDURAL LUMBAR Right 01/29/2021    Procedure: INJECTION, SPINE,  sacral 1 EPIDURAL;  Surgeon: Antonio Dasilva MD;  [facility]     INJECT EPIDURAL TRANSFORAMINAL Right 06/09/2016    Procedure: INJECT EPIDURAL TRANSFORAMINAL;  Surgeon: Antonio Dasilva MD;  [facility]     INJECT EPIDURAL TRANSFORAMINAL Right 10/13/2016    Procedure: INJECT EPIDURAL TRANSFORAMINAL;  Surgeon: Antonio Dasilva MD;  [facility]     INJECT EPIDURAL TRANSFORAMINAL Right 06/18/2021    Procedure: Right L4-5 and right L5-S1 Transforaminal Epidural Steroid Injections with fluoroscopic guidance and contrast.;  Surgeon: Antonio Dasilva MD;  [facility]     INJECT JOINT SACROILIAC Right 04/14/2022    Procedure: Fluoroscopically-guided injection of the Bilateral sacroiliac joints with Bilateral pastor Sacroiliac joint ligaments infiltration over the sacrum;  Surgeon: Antonio Dasilva MD;  [facility]     LAPAROSCOPIC ASSISTED COLECTOMY LEFT (DESCENDING) N/A 10/13/2014    Procedure: LAPAROSCOPIC ASSISTED COLECTOMY LEFT (DESCENDING);  Surgeon: Raffi Montiel MD;  [facility]     LUMBAR SPINE SURGERY  09/2021    L2-Pelvis fusion with rods and screws     OPTICAL TRACKING SYSTEM IMPLANT SHUNT VENTRICULOPERITONEAL Right 12/2/2022    Procedure: Right Ventriculoperitoneal;  Surgeon: Hira Pillai MD;  Location:  OR     [facility] APPENDECTOMY       [facility] NONSPECIFIC  PROCEDURE      Right inferior oblique recession, 14 mm     ZZC VAG HYST,RMV TUBE/OVARY  01/01/1984    cervix removed         PERTINENT MEDICATIONS:  Current Outpatient Medications   Medication Sig Dispense Refill     acetaminophen (TYLENOL) 500 MG tablet Take 500-1,000 mg by mouth every 6 hours as needed for mild pain       ALPRAZolam (XANAX) 0.25 MG tablet Take 1 tablet (0.25 mg) by mouth nightly as needed for anxiety 30 tablet 2     cholecalciferol 25 MCG (1000 UT) TABS Take 1,000 Units by mouth daily       cholestyramine (QUESTRAN) 4 GM/DOSE powder Take 4 g by mouth daily (with dinner) 348 g 3     dicyclomine (BENTYL) 10 MG capsule Take 1 capsule (10 mg) by mouth 3 times daily as needed (for abdominal cramping and pain, as needed) 30 capsule 4     levothyroxine (SYNTHROID/LEVOTHROID) 50 MCG tablet TAKE ONE TABLET BY MOUTH once daily 90 tablet 3     loperamide (IMODIUM A-D) 2 MG tablet Take 1 tablet (2 mg) by mouth daily as needed for diarrhea Take one tablet after 2 or more loose stools. Wait for one hour and if still have loose stools, take half of the tablet. 30 tablet 1     losartan (COZAAR) 25 MG tablet Take 1 tablet (25 mg) by mouth daily 90 tablet 3     metoprolol succinate ER (TOPROL XL) 25 MG 24 hr tablet Take 1 tablet (25 mg) by mouth daily 90 tablet 3     omeprazole (PRILOSEC) 20 MG DR capsule TAKE ONE CAPSULE BY MOUTH ONCE DAILY 90 capsule 3     polyethylene glycol-propylene glycol (SYSTANE ULTRA) 0.4-0.3 % SOLN ophthalmic solution Place 1 drop into both eyes 3 times daily as needed for dry eyes       simvastatin (ZOCOR) 20 MG tablet TAKE ONE-HALF TABLET BY MOUTH EVERY NIGHT 135 tablet 3       No other OTC/herbal/supplements reported by patient.    SOCIAL HISTORY:  Social History     Socioeconomic History     Marital status:      Spouse name: Not on file     Number of children: 4     Years of education: Not on file     Highest education level: Not on file   Occupational History     Occupation:  "dry cleaning   Tobacco Use     Smoking status: Never     Passive exposure: Never     Smokeless tobacco: Never   Vaping Use     Vaping status: Never Used     Passive vaping exposure: Yes   Substance and Sexual Activity     Alcohol use: No     Drug use: No     Sexual activity: Not Currently     Partners: Male     Birth control/protection: Surgical     Comment: not currently/hysterectomy   Other Topics Concern     Parent/sibling w/ CABG, MI or angioplasty before 65F 55M? No   Social History Narrative     Not on file     Social Determinants of Health     Financial Resource Strain: Not on file   Food Insecurity: Not on file   Transportation Needs: Not on file   Physical Activity: Not on file   Stress: Not on file   Social Connections: Not on file   Intimate Partner Violence: Not on file   Housing Stability: Not on file       FAMILY HISTORY:  Denies colon/panc/esophageal/other GI CA, no other Everett or other HPS-related Spencer. No IBD/celiac, no other AI/liver/thyroid disease.    Family History   Problem Relation Age of Onset     Diabetes Brother      Heart Disease Brother 50        AMI     Diabetes Brother      Heart Disease Brother         AMI, \"older\"     Cerebrovascular Disease Brother      Psychotic Disorder Brother         , alcohol     Cancer Sister         breast cancer x2     Hypertension No family hx of      Breast Cancer No family hx of      Ovarian Cancer No family hx of      Prostate Cancer No family hx of      Mental Illness No family hx of      Anesthesia Reaction No family hx of      Osteoporosis No family hx of      Obesity No family hx of      Other Cancer No family hx of      Depression No family hx of      Anxiety Disorder No family hx of      Mental Illness No family hx of      Substance Abuse No family hx of        PHYSICAL EXAMINATION:  Vitals reviewed  LMP  (LMP Unknown)     General: Patient appears well in no acute distress.   Skin: No visualized rash or lesions on visualized skin  Eyes: EOMI, no " erythema, sclera icterus or discharge noted  Resp: breathing comfortably without accessory muscle usage, speaking in full sentences, no cough. Lung sounds clear  Card: Regular and rhythmic S1 and S2. No gallop or rub. No murmur. No LE edema.  Abdomen: Active bowel sounds X 4 quadrants. Soft to palpation.  No guarding or rebound tenderness   MSK: Appears to have normal range of motion based on visualized movements  Neurologic: No apparent tremors, facial movements symmetric  Psych: affect normal, alert and oriented    PERTINENT STUDIES Reviewed in EMR    ASSESSMENT/PLAN:    83 year old female  presented  to GI clinic for a follow up on abdominal bloating and bouts of loose stools, that usually occur after meals. Suspected IBS mixed time with components of bile acids induced diarrhea. Started the patient on cholestyramine and she reported significant improvement in her symptoms initially. Now, after she ran out the medication, her symptoms recurred. I also suggested to take a fiber supplement, but the patient is not certain if she is taking it. She is somewhat a poor historian. She is here today with her  who participates in conversation.  Patient denies any red flag symptoms- no black stools or BRBPR, no weight loss, no fever or chills.  Had normal colonoscopy in 2014 and is not interested in a follow up study.   We had a long conversation about differential diagnosis and management of the patient's symptoms. Patient was educated that her recent CT scan was negative for any acute on chronic pathology. Normal appearance or  shunt. Cannot exclude adhesions from previous surgeries as possible cause of abdominal discomfort.  If pain persist, may consider CTA of abdomen. .Also, suggested to discuss with her PCP evaluation of possible musculoskeletal origin of right sided abdominal pain (from spine, right hip or right SI).  Recommended the following:  - Resume cholestyramine and take 2-4 gram every day, at  supper.  - Try a fiber supplement.  - Avoid dairy or switch to lactose free products.      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 History of intestinal surgery  Z98.890       3. Abdominal bloating  R14.0           Patient verbalized understanding and appreciation of care provided. Stated that all of the questions were answered to her/his satisfaction.    RTC in 12 months. Sooner if needed.    Thank you for this consultation. It was a pleasure to participate in the care of this patient; please contact us with any further questions.    FORREST Walker, FNP-C  [facility]  Gastroenterology Department  [place], [place]    This note was created with Dragon voice recognition software, and while reviewed for accuracy, inadvertent minor typographic errors may occur. Please contact the provider if you have any questions.

## 2023-05-16 NOTE — LETTER
5/16/2023         RE: Milena Hamilton  [id number] [facility] [address]        Dear Colleague,    Thank you for referring your patient, Milena Hamilton, to the [facility]. Please see a copy of my visit note below.    Gastroenterology CLINIC VISIT, NEW PATIENT    CC/REFERRING PROVIDER: No ref. provider found  REASON FOR CONSULTATION: follow up    HPI: 83 year old female presenting/ was referred to GI clinic for complains of recurrence of   loose stools and abdominal bloating. Reported initial improvement in her diarrhea after cholestyramine was prescribed but she ran out of medication and did not refill it.  Stated that she still has formed stool in the morning, followed by a few loose stools. Develops diarrhea with fecal urgency after larger meals. Stated that due to limited mobility, she occasionally has incontinence episodes.  On her last visit,we discussed her abdominal CT findings, suggestive for diverticulosis and stable hepatic cysts. No acute pathology.  I advised to start taking fiber supplement but the patient is not taking it..  History of GERD, gastritis, history of diverticulitis, (hemicolectomy 2014), cholecystectomy, ventriculoperitoneal shunt, and hypothyroidism.      Reviewed her recent visit to ER visit on 5/6 after a fall at home. BOWEL: Postsurgical changes of the colon. Diverticulosis of the colon. No acute inflammatory change. No obstruction. No free air or free fluid.       ROS: 10pt ROS performed and otherwise negative.    PAST MEDICAL HISTORY:  Past Medical History:   Diagnosis Date     Lichen planus     vulvar     Osteopenia 4/2010    recheck DEXA in 2 years     Panic disorder without agoraphobia      Pure hypercholesterolemia     low LDLs     Raynaud's disease 6/2014     Unspecified essential hypertension        PREVIOUS ABDOMINAL/GYNECOLOGIC SURGERIES:    Past Surgical History:   Procedure Laterality Date     COLONOSCOPY  10/17/2007    normal, recheck in 10  years     COLONOSCOPY N/A 08/27/2014    Procedure: COLONOSCOPY;  Surgeon: Raffi Montiel MD;  [facility]     COMBINED CYSTOSCOPY, INSERT CATHETER URETER Bilateral 10/13/2014    Procedure: COMBINED CYSTOSCOPY, INSERT CATHETER URETER;  Surgeon: Abdoulaye Odell MD;  [facility]     EYE SURGERY  08/01/2001    right eye muscle repair     HC REMOVAL GALLBLADDER  02/09/2010    lap     HC REMOVAL OF OVARY/TUBE(S)      Salpingo-Oophorectomy, bilateral     HYSTERECTOMY, PAP NO LONGER INDICATED       INJECT EPIDURAL LUMBAR Right 04/10/2017    Procedure: INJECT EPIDURAL LUMBAR;  Surgeon: Raffi Wright MD;  [facility]     INJECT EPIDURAL LUMBAR Right 02/27/2020    Procedure: Right Lumbar 4- Sacral 1 Epidural Steroid Injection;  Surgeon: Antonio Dasilva MD;  [facility]     INJECT EPIDURAL LUMBAR Right 01/29/2021    Procedure: INJECTION, SPINE,  sacral 1 EPIDURAL;  Surgeon: Antonio Dasilva MD;  [facility]     INJECT EPIDURAL TRANSFORAMINAL Right 06/09/2016    Procedure: INJECT EPIDURAL TRANSFORAMINAL;  Surgeon: Antonio Dasilva MD;  [facility]     INJECT EPIDURAL TRANSFORAMINAL Right 10/13/2016    Procedure: INJECT EPIDURAL TRANSFORAMINAL;  Surgeon: Antonio Dasilva MD;  [facility]     INJECT EPIDURAL TRANSFORAMINAL Right 06/18/2021    Procedure: Right L4-5 and right L5-S1 Transforaminal Epidural Steroid Injections with fluoroscopic guidance and contrast.;  Surgeon: Antonio Dasilva MD;  [facility]     INJECT JOINT SACROILIAC Right 04/14/2022    Procedure: Fluoroscopically-guided injection of the Bilateral sacroiliac joints with Bilateral pastor Sacroiliac joint ligaments infiltration over the sacrum;  Surgeon: Antonio Dasilva MD;  [facility]     LAPAROSCOPIC ASSISTED COLECTOMY LEFT (DESCENDING) N/A 10/13/2014    Procedure: LAPAROSCOPIC ASSISTED COLECTOMY LEFT (DESCENDING);  Surgeon: Raffi Montiel MD;  [facility]     LUMBAR SPINE SURGERY  09/2021    L2-Pelvis fusion  with rods and screws     OPTICAL TRACKING SYSTEM IMPLANT SHUNT VENTRICULOPERITONEAL Right 12/2/2022    Procedure: Right Ventriculoperitoneal;  Surgeon: Hira Pillai MD;  [facility]: [facility]     ZZC APPENDECTOMY       [facility] NONSPECIFIC PROCEDURE      Right inferior oblique recession, 14 mm     ZZC VAG HYST,RMV TUBE/OVARY  01/01/1984    cervix removed         PERTINENT MEDICATIONS:  Current Outpatient Medications   Medication Sig Dispense Refill     acetaminophen (TYLENOL) 500 MG tablet Take 500-1,000 mg by mouth every 6 hours as needed for mild pain       ALPRAZolam (XANAX) 0.25 MG tablet Take 1 tablet (0.25 mg) by mouth nightly as needed for anxiety 30 tablet 2     cholecalciferol 25 MCG (1000 UT) TABS Take 1,000 Units by mouth daily       cholestyramine (QUESTRAN) 4 GM/DOSE powder Take 4 g by mouth daily (with dinner) 348 g 3     dicyclomine (BENTYL) 10 MG capsule Take 1 capsule (10 mg) by mouth 3 times daily as needed (for abdominal cramping and pain, as needed) 30 capsule 4     levothyroxine (SYNTHROID/LEVOTHROID) 50 MCG tablet TAKE ONE TABLET BY MOUTH once daily 90 tablet 3     loperamide (IMODIUM A-D) 2 MG tablet Take 1 tablet (2 mg) by mouth daily as needed for diarrhea Take one tablet after 2 or more loose stools. Wait for one hour and if still have loose stools, take half of the tablet. 30 tablet 1     losartan (COZAAR) 25 MG tablet Take 1 tablet (25 mg) by mouth daily 90 tablet 3     metoprolol succinate ER (TOPROL XL) 25 MG 24 hr tablet Take 1 tablet (25 mg) by mouth daily 90 tablet 3     omeprazole (PRILOSEC) 20 MG DR capsule TAKE ONE CAPSULE BY MOUTH ONCE DAILY 90 capsule 3     polyethylene glycol-propylene glycol (SYSTANE ULTRA) 0.4-0.3 % SOLN ophthalmic solution Place 1 drop into both eyes 3 times daily as needed for dry eyes       simvastatin (ZOCOR) 20 MG tablet TAKE ONE-HALF TABLET BY MOUTH EVERY NIGHT 135 tablet 3       No other OTC/herbal/supplements reported by patient.    SOCIAL HISTORY:  Social  "History     Socioeconomic History     Marital status:      Spouse name: Not on file     Number of children: 4     Years of education: Not on file     Highest education level: Not on file   Occupational History     Occupation: dry cleaning   Tobacco Use     Smoking status: Never     Passive exposure: Never     Smokeless tobacco: Never   Vaping Use     Vaping status: Never Used     Passive vaping exposure: Yes   Substance and Sexual Activity     Alcohol use: No     Drug use: No     Sexual activity: Not Currently     Partners: Male     Birth control/protection: Surgical     Comment: not currently/hysterectomy   Other Topics Concern     Parent/sibling w/ CABG, MI or angioplasty before 65F 55M? No   Social History Narrative     Not on file     Social Determinants of Health     Financial Resource Strain: Not on file   Food Insecurity: Not on file   Transportation Needs: Not on file   Physical Activity: Not on file   Stress: Not on file   Social Connections: Not on file   Intimate Partner Violence: Not on file   Housing Stability: Not on file       FAMILY HISTORY:  Denies colon/panc/esophageal/other GI CA, no other Everett or other HPS-related Spencer. No IBD/celiac, no other AI/liver/thyroid disease.    Family History   Problem Relation Age of Onset     Diabetes Brother      Heart Disease Brother 50        AMI     Diabetes Brother      Heart Disease Brother         AMI, \"older\"     Cerebrovascular Disease Brother      Psychotic Disorder Brother         , alcohol     Cancer Sister         breast cancer x2     Hypertension No family hx of      Breast Cancer No family hx of      Ovarian Cancer No family hx of      Prostate Cancer No family hx of      Mental Illness No family hx of      Anesthesia Reaction No family hx of      Osteoporosis No family hx of      Obesity No family hx of      Other Cancer No family hx of      Depression No family hx of      Anxiety Disorder No family hx of      Mental Illness No family hx of "      Substance Abuse No family hx of        PHYSICAL EXAMINATION:  Vitals reviewed  LMP  (LMP Unknown)     General: Patient appears well in no acute distress.   Skin: No visualized rash or lesions on visualized skin  Eyes: EOMI, no erythema, sclera icterus or discharge noted  Resp: breathing comfortably without accessory muscle usage, speaking in full sentences, no cough. Lung sounds clear  Card: Regular and rhythmic S1 and S2. No gallop or rub. No murmur. No LE edema.  Abdomen: Active bowel sounds X 4 quadrants. Soft to palpation.  No guarding or rebound tenderness   MSK: Appears to have normal range of motion based on visualized movements  Neurologic: No apparent tremors, facial movements symmetric  Psych: affect normal, alert and oriented    PERTINENT STUDIES Reviewed in EMR    ASSESSMENT/PLAN:    83 year old female  presented  to GI clinic for a follow up on abdominal bloating and bouts of loose stools, that usually occur after meals. Suspected IBS mixed time with components of bile acids induced diarrhea. Started the patient on cholestyramine and she reported significant improvement in her symptoms initially. Now, after she ran out the medication, her symptoms recurred. I also suggested to take a fiber supplement, but the patient is not certain if she is taking it. She is somewhat a poor historian. She is here today with her  who participates in conversation.  Patient denies any red flag symptoms- no black stools or BRBPR, no weight loss, no fever or chills.  Had normal colonoscopy in 2014 and is not interested in a follow up study.   We had a long conversation about differential diagnosis and management of the patient's symptoms. Patient was educated that her recent CT scan was negative for any acute on chronic pathology. Normal appearance or  shunt. Cannot exclude adhesions from previous surgeries as possible cause of abdominal discomfort.  If pain persist, may consider CTA of abdomen. .Also,  suggested to discuss with her PCP evaluation of possible musculoskeletal origin of right sided abdominal pain (from spine, right hip or right SI).  Recommended the following:  - Resume cholestyramine and take 2-4 gram every day, at supper.  - Try a fiber supplement.  - Avoid dairy or switch to lactose free products.      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 History of intestinal surgery  Z98.890       3. Abdominal bloating  R14.0           Patient verbalized understanding and appreciation of care provided. Stated that all of the questions were answered to her/his satisfaction.    RTC in 12 months. Sooner if needed.    Thank you for this consultation. It was a pleasure to participate in the care of this patient; please contact us with any further questions.    FORREST Walker, FNP-C  [facility]  Gastroenterology Department  [place], [place]    This note was created with Dragon voice recognition software, and while reviewed for accuracy, inadvertent minor typographic errors may occur. Please contact the provider if you have any questions.      Again, thank you for allowing me to participate in the care of your patient.        Sincerely,        FORREST WALKER CNP

## 2023-05-16 NOTE — PATIENT INSTRUCTIONS
"It was a pleasure taking care of you today.  I've included a brief summary of our discussion and care plan from today's visit below.  Please review this information with your primary care provider.  ______________________________________________________________________    My recommendations are summarized as follows:    Resume Cholestyramine powder and take 2-4 gram every evening with supper.    2. Take imodium 1 mg dose as needed before eating out or before traveling to prevent diarrhea accidents.    3. Avoid dairy or take Lactaid tablets before consuming milk, sour cream, or ice cream. Use lactose free milk instead.    4.  Do not skip meals, eat small portion meals every 3-4 hours. Skipping meals will provoke diarrhea.    5. Talk to your doctor about possible referred pain to your right abdomen from back or right hip (degenerative disease?). Also, you can discuss with them your dizzy spells.    Return to GI Clinic in 12 months or sooner if needed to review your progress.    ______________________________________________________________________    Lactose intolerance  Lactose intolerance is a condition that makes it hard for your body to digest milk and foods made with milk (called dairy products). If you have lactose intolerance and you eat dairy products, you can get diarrhea, belly pain, and gas.  Lactose intolerance can affect anyone. But it is most common among , , and Black people.    In people who do not have lactose intolerance, the body makes a protein called an \"enzyme\" that breaks down lactose, the main form of sugar found in milk. In people who do have lactose intolerance, the body either does not make enough of the enzyme, or the enzyme does not work as well as it should. Also, some infections, such as you might get with food poisoning, can damage the enzyme. But if that happens, the problem usually goes away within a few weeks. Luckily, people with lactose intolerance can take an " enzyme supplement to help with their problem.      The symptoms happen only after you eat dairy foods. They can include:  ?Cramps or belly pain (usually around or below the belly button)  ?Bloating (feeling like your belly is full of air)  ?Gas  ?Diarrhea (often it is bulky, foamy, and watery)  ?Vomiting (this happens mostly in teens)    You can start by cutting down but not stopping foods you know contain dairy. Dairy foods should be consumed with meals. Dairy foods include milk, cream, ice cream, yogurt, cheese, and butter.   If you are really sensitive to dairy foods or lactose, you will also need to read the labels on everything you eat. Milk or lactose is sometimes added to foods you might not suspect, such as cereal, instant soups, and salad dressings.     There are many over-the-counter enzyme supplements to choose from, including Lactaid (tablets or liquid), Lactrase, LactAce, Dairy Ease, and Lactrol. You should take the supplement right before you start eating. If you forget, you can take it during the meal, but it might not work as well.  The important thing to know is that each product works a bit differently for each person. Plus, none of them can break down every last bit of lactose, so some people still have symptoms even with an enzyme supplement.       Bile salt induced diarrhea  When the gallbladder is removed, bile is no longer stored; it is instead released into the small intestine in a steady flow. This interrupts the normal loop by which bile acids are meant to move from the liver to the small intestine and then reabsorbed back into the blood and delivered to the liver. Because of this, the intestines are overloaded with bile that cannot be properly reabsorbed.  Excess bile, in turn, draws abnormally high levels of water and salts from the bloodstream into the intestine, causing diarrhea.  The interruption of the loop impacts the speed by which digestion normally occurs- the time it takes for  food to move through the gut and exit the body is accelerated, leading to watery and poorly formed stools.  If you have frequent loose stools, you may experience pain, discomfort, or burning in and around the anus. Diarrhea contains both bile and stomach acid, both of which are very irritating to the skin.   Baby wipes are a great way to gently clean without causing more irritation. You can always put them in the refrigerator for extra soothing.     ______________________________________________________________________    Who do I call with any questions after my visit?  Please be in touch if there are any further questions that arise following today's visit.  There are multiple ways to contact your gastroenterology care team.      During business hours, you may reach a Gastroenterology nurse at [phone], option 3.     To schedule or reschedule an appointment, please call [phone].   To schedule your imaging studies (CT, MRI, ultrasound)  call [phone] (or toll-free # [phone])  To schedule your lab work at [facility], please call [phone]    You can always send a secure message through [employer].  [employer] messages are answered by your nurse or doctor typically within 24 hours.  Please allow extra time on weekends and holidays.      For urgent/emergent questions after business hours, you may reach the on-call GI Fellow by contacting the [facility]  at [phone].    In order for your refill to be processed in a timely fashion, it is your responsibility to ensure you follow the recommendations from your provider regarding your laboratory studies and follow up appointments.       How will I get the results of any tests ordered?    You will receive all of your results.  If you have signed up for [employer], any tests ordered at your visit will be available to you after your physician reviews them.  Typically this takes 1-2 weeks.  If there are urgent  results that require a change in your care plan, your physician or nurse will call you to discuss the next steps.   What is [employer]?  [employer] is a secure way for you to access all of your healthcare records from the [facility].  It is a web based computer program, so you can sign on to it from any location.  It also allows you to send secure messages to your care team.  I recommend signing up for [employer] access if you have not already done so and are comfortable with using a computer.    How to I schedule a follow-up visit?  If you did not schedule a follow-up visit today, please call [phone] to schedule a follow-up office visit.      Sincerely,  JOSE Walker,  [facility],  Division of Gastroenterology   ([facility])

## 2023-05-18 ENCOUNTER — OFFICE VISIT (OUTPATIENT)
Dept: FAMILY MEDICINE | Facility: OTHER | Age: 83
End: 2023-05-18
Payer: COMMERCIAL

## 2023-05-18 VITALS
DIASTOLIC BLOOD PRESSURE: 62 MMHG | HEIGHT: 61 IN | TEMPERATURE: 97.9 F | SYSTOLIC BLOOD PRESSURE: 116 MMHG | BODY MASS INDEX: 25.11 KG/M2 | HEART RATE: 65 BPM | WEIGHT: 133 LBS | RESPIRATION RATE: 18 BRPM | OXYGEN SATURATION: 95 %

## 2023-05-18 DIAGNOSIS — R42 VERTIGO: ICD-10-CM

## 2023-05-18 DIAGNOSIS — M17.12 PRIMARY OSTEOARTHRITIS OF LEFT KNEE: ICD-10-CM

## 2023-05-18 DIAGNOSIS — G91.2 NPH (NORMAL PRESSURE HYDROCEPHALUS) (H): ICD-10-CM

## 2023-05-18 DIAGNOSIS — I44.7 LBBB (LEFT BUNDLE BRANCH BLOCK): ICD-10-CM

## 2023-05-18 DIAGNOSIS — F41.9 ANXIETY: ICD-10-CM

## 2023-05-18 DIAGNOSIS — I50.22 CHRONIC SYSTOLIC HEART FAILURE (H): ICD-10-CM

## 2023-05-18 DIAGNOSIS — E78.5 HYPERLIPIDEMIA LDL GOAL <130: ICD-10-CM

## 2023-05-18 DIAGNOSIS — E03.9 ACQUIRED HYPOTHYROIDISM: ICD-10-CM

## 2023-05-18 DIAGNOSIS — Z00.00 MEDICARE ANNUAL WELLNESS VISIT, SUBSEQUENT: Primary | ICD-10-CM

## 2023-05-18 LAB
CHOLEST SERPL-MCNC: 159 MG/DL
HDLC SERPL-MCNC: 82 MG/DL
LDLC SERPL CALC-MCNC: 64 MG/DL
NONHDLC SERPL-MCNC: 77 MG/DL
TRIGL SERPL-MCNC: 63 MG/DL

## 2023-05-18 PROCEDURE — 36415 COLL VENOUS BLD VENIPUNCTURE: CPT | Performed by: PHYSICIAN ASSISTANT

## 2023-05-18 PROCEDURE — 99214 OFFICE O/P EST MOD 30 MIN: CPT | Mod: 25 | Performed by: PHYSICIAN ASSISTANT

## 2023-05-18 PROCEDURE — 80061 LIPID PANEL: CPT | Performed by: PHYSICIAN ASSISTANT

## 2023-05-18 PROCEDURE — G0439 PPPS, SUBSEQ VISIT: HCPCS | Performed by: PHYSICIAN ASSISTANT

## 2023-05-18 RX ORDER — LEVOTHYROXINE SODIUM 50 UG/1
TABLET ORAL
Qty: 90 TABLET | Refills: 3 | Status: SHIPPED | OUTPATIENT
Start: 2023-05-18 | End: 2024-05-06

## 2023-05-18 RX ORDER — MECLIZINE HYDROCHLORIDE 25 MG/1
25 TABLET ORAL 3 TIMES DAILY PRN
Qty: 30 TABLET | Refills: 1 | Status: SHIPPED | OUTPATIENT
Start: 2023-05-18 | End: 2024-05-24

## 2023-05-18 RX ORDER — LOSARTAN POTASSIUM 25 MG/1
25 TABLET ORAL DAILY
Qty: 90 TABLET | Refills: 3 | Status: SHIPPED | OUTPATIENT
Start: 2023-05-18 | End: 2024-05-24

## 2023-05-18 RX ORDER — METOPROLOL SUCCINATE 25 MG/1
25 TABLET, EXTENDED RELEASE ORAL DAILY
Qty: 90 TABLET | Refills: 3 | Status: SHIPPED | OUTPATIENT
Start: 2023-05-18 | End: 2024-05-24

## 2023-05-18 ASSESSMENT — PAIN SCALES - GENERAL: PAINLEVEL: NO PAIN (0)

## 2023-05-18 ASSESSMENT — ACTIVITIES OF DAILY LIVING (ADL): CURRENT_FUNCTION: NO ASSISTANCE NEEDED

## 2023-05-18 NOTE — PROGRESS NOTES
"SUBJECTIVE:   Milena is a 83 year old who presents for Preventive Visit.      5/18/2023     1:26 PM   Additional Questions   Roomed by Maureen ROMO   Accompanied by : Ivan         5/18/2023     1:26 PM   Patient Reported Additional Medications   Patient reports taking the following new medications NA   Patient has been advised of split billing requirements and indicates understanding: Yes  Are you in the first 12 months of your Medicare coverage?  No    Healthy Habits:    In general, how would you rate your overall health?  Good    Frequency of exercise:  4-5 days/week    Duration of exercise:  15-30 minutes    Do you usually eat at least 4 servings of fruit and vegetables a day, include whole grains    & fiber and avoid regularly eating high fat or \"junk\" foods?  Yes    Taking medications regularly:  Yes    Barriers to taking medications:  Not applicable    Medication side effects:  None    Ability to successfully perform activities of daily living:  No assistance needed    Home Safety:  No safety concerns identified    Hearing Impairment:  Difficulty following a conversation in a noisy restaurant or crowded room and difficulty understanding soft or whispered speech    In the past 6 months, have you been bothered by leaking of urine?  No    In general, how would you rate your overall mental or emotional health?  Good      PHQ-2 Total Score:    Additional concerns today:  Yes    Overall, things have been going well since her back surgery and shunt placement. She does describe some right knee pain, worse in the mornings and with prolonged walking. She takes ibuprofen as needed with some benefit. No swelling or redness.    Have you ever done Advance Care Planning? (For example, a Health Directive, POLST, or a discussion with a medical provider or your loved ones about your wishes): Yes, advance care planning is on file.       Fall risk  Fallen 2 or more times in the past year?: Yes  Any fall with injury in the " past year?: Yes  Timed Up and Go Test (>13.5 is fall risk; contact physician) : 15    Cognitive Screening   1) Repeat 3 items (Leader, Season, Table)    2) Clock draw: NORMAL  3) 3 item recall: Recalls 2 objects   Results: NORMAL clock, 1-2 items recalled: COGNITIVE IMPAIRMENT LESS LIKELY    Mini-CogTM Copyright CLARISSA Anderson. Licensed by the author for use in [facility]; reprinted with permission (sarah@[facility]). All rights reserved.      Do you have sleep apnea, excessive snoring or daytime drowsiness?: no    Reviewed and updated as needed this visit by clinical staff   Tobacco  Allergies  Meds  Problems  Med Hx  Surg Hx  Fam Hx          Reviewed and updated as needed this visit by Provider  Tobacco  Allergies  Meds  Problems  Med Hx  Surg Hx  Fam Hx      Social History     Tobacco Use     Smoking status: Never     Passive exposure: Never     Smokeless tobacco: Never   Vaping Use     Vaping status: Never Used     Passive vaping exposure: Yes   Substance Use Topics     Alcohol use: No           3/24/2022     1:47 PM   Alcohol Use   Prescreen: >3 drinks/day or >7 drinks/week? No     Do you have a current opioid prescription? No  Do you use any other controlled substances or medications that are not prescribed by a provider? None      Hyperlipidemia Follow-Up      Are you regularly taking any medication or supplement to lower your cholesterol?   Yes- Simvastatin    Are you having muscle aches or other side effects that you think could be caused by your cholesterol lowering medication?  No    Hypertension Follow-up      Do you check your blood pressure regularly outside of the clinic? No     Are you following a low salt diet? Yes    Are your blood pressures ever more than 140 on the top number (systolic) OR more   than 90 on the bottom number (diastolic), for example 140/90? NA    Hypothyroidism Follow-up      Since last visit, patient describes the following symptoms: Weight stable, no hair loss,  "no skin changes, no constipation, no loose stools      Current providers sharing in care for this patient include:   Patient Care Team:  Chuck Streeter PA-C as PCP - General (Physician Assistant)  Chuck Streeter PA-C as Assigned PCP  Ulices Jett MD as Assigned Heart and Vascular Provider  Nathen Moon DPM as Assigned Surgical Provider  Aminata Campoverde APRN CNP as Nurse Practitioner  Hira Pillai MD as Assigned Neuroscience Provider    The following health maintenance items are reviewed in Epic and correct as of today:  Health Maintenance   Topic Date Due     DEXA  01/07/2023     MEDICARE ANNUAL WELLNESS VISIT  03/24/2023     LIPID  03/24/2023     URINE DRUG SCREEN  03/24/2023     COVID-19 Vaccine (6 - Pfizer series) 06/09/2023     TSH W/FREE T4 REFLEX  02/08/2024     BMP  05/06/2024     ANNUAL REVIEW OF HM ORDERS  05/18/2024     FALL RISK ASSESSMENT  05/18/2024     ADVANCE CARE PLANNING  05/18/2028     DTAP/TDAP/TD IMMUNIZATION (2 - Td or Tdap) 12/12/2028     PHQ-2 (once per calendar year)  Completed     INFLUENZA VACCINE  Completed     Pneumococcal Vaccine: 65+ Years  Completed     ZOSTER IMMUNIZATION  Completed     IPV IMMUNIZATION  Aged Out     MENINGITIS IMMUNIZATION  Aged Out       Mammogram Screening - Patient over age 75, has elected to continue with screening.  Pertinent mammograms are reviewed under the imaging tab.    Review of Systems  Constitutional, HEENT, cardiovascular, pulmonary, GI, , musculoskeletal, neuro, skin, endocrine and psych systems are negative, except as otherwise noted.    OBJECTIVE:   /62   Pulse 65   Temp 97.9  F (36.6  C) (Temporal)   Resp 18   Ht 5' 0.51\" (1.537 m)   Wt 133 lb (60.3 kg)   LMP  (LMP Unknown)   SpO2 95%   BMI 25.54 kg/m   Estimated body mass index is 25.54 kg/m  as calculated from the following:    Height as of this encounter: 5' 0.51\" (1.537 m).    Weight as of this encounter: 133 lb (60.3 kg).  Physical " Exam  GENERAL APPEARANCE: healthy, alert and no distress  EYES: Eyes grossly normal to inspection, PERRL and conjunctivae and sclerae normal  HENT: ear canals and TM's normal, nose and mouth without ulcers or lesions, oropharynx clear and oral mucous membranes moist  NECK: no adenopathy, no asymmetry, masses, or scars and thyroid normal to palpation  RESP: lungs clear to auscultation - no rales, rhonchi or wheezes  CV: regular rate and rhythm, normal S1 S2, no S3 or S4, no murmur, click or rub, no peripheral edema and peripheral pulses strong  ABDOMEN: soft, nontender, no hepatosplenomegaly, no masses and bowel sounds normal  MS: Mild tenderness over the right knee medical joint line. Full range of motion of extremities. No spinal tenderness.   SKIN: no suspicious lesions or rashes  NEURO: Normal strength and tone, sensory exam grossly normal, mentation intact and speech normal. Gait is stable.   PSYCH: mentation appears normal and affect normal/bright      ASSESSMENT / PLAN:       ICD-10-CM    1. Medicare annual wellness visit, subsequent  Z00.00       2. Acquired hypothyroidism  E03.9 levothyroxine (SYNTHROID/LEVOTHROID) 50 MCG tablet      3. Primary osteoarthritis of left knee  M17.12       4. NPH (normal pressure hydrocephalus) (H)  G91.2       5. Hyperlipidemia LDL goal <130  E78.5 Lipid panel reflex to direct LDL Non-fasting     Lipid panel reflex to direct LDL Non-fasting      6. LBBB (left bundle branch block)  I44.7 losartan (COZAAR) 25 MG tablet      7. Chronic systolic heart failure (H)  I50.22 losartan (COZAAR) 25 MG tablet     metoprolol succinate ER (TOPROL XL) 25 MG 24 hr tablet      8. Vertigo  R42 meclizine (ANTIVERT) 25 MG tablet      9. Anxiety  F41.9           1. Medicare wellness visit completed.    2. Continue levothyroxine. Thyroid labs stable in February.    3. Right knee pain consistent with osteoarthritis. She declines x-ray today. I recommend she continue with ibuprofen as needed and  "consider a daily glucosamine supplement. She should stay active and if worsening, can consider an x-ray and/or referral to orthopedics.    4. She continues to do well since shunt placement.    5. Updated non-fasting lipid panel ordered. Continue simvastatin.    6-7. Continue losartan and metoprolol. No recent symptoms of CHF.    8. She describes intermittent vertigo over the past few months and tried some of her 's meclizine which helped so will fill this to use as needed.    9. Continue Xanax nightly as needed for anxiety. She does not use this every night. She did not take one last night so will complete updated UDS at next visit.      Follow-up in 6 months for a recheck.     Patient has been advised of split billing requirements and indicates understanding: Yes      COUNSELING:  Reviewed preventive health counseling, as reflected in patient instructions       Regular exercise       Healthy diet/nutrition      BMI:   Estimated body mass index is 25.54 kg/m  as calculated from the following:    Height as of this encounter: 5' 0.51\" (1.537 m).    Weight as of this encounter: 133 lb (60.3 kg).         She reports that she has never smoked. She has never been exposed to tobacco smoke. She has never used smokeless tobacco.      Appropriate preventive services were discussed with this patient, including applicable screening as appropriate for cardiovascular disease, diabetes, osteopenia/osteoporosis, and glaucoma.  As appropriate for age/gender, discussed screening for colorectal cancer, prostate cancer, breast cancer, and cervical cancer. Checklist reviewing preventive services available has been given to the patient.    Reviewed patients plan of care and provided an AVS. The Basic Care Plan (routine screening as documented in Health Maintenance) for Milena meets the Care Plan requirement. This Care Plan has been established and reviewed with the Patient and spouse.      [facility] " [facility] OLIVIA JUDGE    Identified Health Risks:    I have reviewed Opioid Use Disorder and Substance Use Disorder risk factors and made any needed referrals.

## 2023-05-18 NOTE — PATIENT INSTRUCTIONS
Consider glucosamine for your knee pain.  Try to stay active. You use ice as needed.  If worsening, can order an x-ray.    Follow-up in 6 months.

## 2023-05-22 ENCOUNTER — TELEPHONE (OUTPATIENT)
Dept: NEUROSURGERY | Facility: CLINIC | Age: 83
End: 2023-05-22

## 2023-05-22 NOTE — TELEPHONE ENCOUNTER
Writer left detailed message requesting patient call back to discuss if there are new or worsening concerns prior to her follow-up with Dr. Pillai this Thursday 5/25. If so, additional imaging may need to be completed beforehand. My chart also sent.       Daria Miranda, RNCC  Neurology/Neurosurgery

## 2023-05-23 NOTE — TELEPHONE ENCOUNTER
Pt returned phone call.  Daria was in a meeting at the time Pt called.  Please try calling Pt back.  Thanks.

## 2023-05-23 NOTE — TELEPHONE ENCOUNTER
Pt denies any concerns with her shunt nor any symptoms at this time. She initially scheduled the follow-up to make sure her shunt settings are still appropriate. However, pt's spouse is having surgery this Friday and she would like to reschedule to end of June. Appt made on June 29th at 1:00pm.     Pt informed that clinic may call her again closer to her appt date to inquire if she is having any new concerns prior to the appt. If so, imaging may be needed. Pt verbalized understanding.       Daria Miranda, RNCC  Neurology/Neurosurgery

## 2023-05-24 NOTE — CONFIDENTIAL NOTE
A detailed message was left on her VM informing her of Dr Pillai's response below. She was encouraged to call with further questions or concerns.    Pennie Medina RN Care Coordinator   Neurology/Neurosurgery/PM&R/ Pain Management

## 2023-05-25 NOTE — TELEPHONE ENCOUNTER
[facility] Call Center    Phone Message    May a detailed message be left on voicemail: yes     Reason for Call: The patient is requesting a call to discuss if she will need imaging prior to her visit on 6/29/2023.  Please advise.  Thank you.     Action Taken: Message routed to:  Adult Clinics: Neurology p [id number]    Travel Screening: Not Applicable

## 2023-05-25 NOTE — TELEPHONE ENCOUNTER
Pt informed that no imaging needed prior to her appt on June 29th per Dr. Pillai. However, if she is having any symptoms which may be related to her shunt placement, then she may call back to further discuss and imaging may need to be ordered.          Daria Miranda, RNCC  Neurology/Neurosurgery

## 2023-06-17 ENCOUNTER — HOSPITAL ENCOUNTER (EMERGENCY)
Facility: CLINIC | Age: 83
Discharge: HOME | End: 2023-06-17
Attending: EMERGENCY MEDICINE | Admitting: EMERGENCY MEDICINE
Payer: COMMERCIAL

## 2023-06-17 VITALS
HEART RATE: 75 BPM | WEIGHT: 130.5 LBS | RESPIRATION RATE: 18 BRPM | BODY MASS INDEX: 25.06 KG/M2 | OXYGEN SATURATION: 95 % | DIASTOLIC BLOOD PRESSURE: 69 MMHG | TEMPERATURE: 98.2 F | SYSTOLIC BLOOD PRESSURE: 136 MMHG

## 2023-06-17 DIAGNOSIS — K52.9 CHRONIC DIARRHEA: ICD-10-CM

## 2023-06-17 LAB
ALBUMIN UR-MCNC: NEGATIVE MG/DL
ANION GAP SERPL CALCULATED.3IONS-SCNC: 10 MMOL/L (ref 7–15)
APPEARANCE UR: CLEAR
BASOPHILS # BLD AUTO: 0 10E3/UL (ref 0–0.2)
BASOPHILS NFR BLD AUTO: 1 %
BILIRUB UR QL STRIP: NEGATIVE
BUN SERPL-MCNC: 11.6 MG/DL (ref 8–23)
CALCIUM SERPL-MCNC: 9.3 MG/DL (ref 8.8–10.2)
CHLORIDE SERPL-SCNC: 104 MMOL/L (ref 98–107)
COLOR UR AUTO: NORMAL
CREAT SERPL-MCNC: 0.69 MG/DL (ref 0.51–0.95)
DEPRECATED HCO3 PLAS-SCNC: 27 MMOL/L (ref 22–29)
EOSINOPHIL # BLD AUTO: 0.1 10E3/UL (ref 0–0.7)
EOSINOPHIL NFR BLD AUTO: 1 %
ERYTHROCYTE [DISTWIDTH] IN BLOOD BY AUTOMATED COUNT: 12.5 % (ref 10–15)
GFR SERPL CREATININE-BSD FRML MDRD: 86 ML/MIN/1.73M2
GLUCOSE SERPL-MCNC: 109 MG/DL (ref 70–99)
GLUCOSE UR STRIP-MCNC: NEGATIVE MG/DL
HCT VFR BLD AUTO: 40.7 % (ref 35–47)
HGB BLD-MCNC: 12.8 G/DL (ref 11.7–15.7)
HGB UR QL STRIP: NEGATIVE
IMM GRANULOCYTES # BLD: 0 10E3/UL
IMM GRANULOCYTES NFR BLD: 0 %
KETONES UR STRIP-MCNC: NEGATIVE MG/DL
LEUKOCYTE ESTERASE UR QL STRIP: NEGATIVE
LYMPHOCYTES # BLD AUTO: 1.2 10E3/UL (ref 0.8–5.3)
LYMPHOCYTES NFR BLD AUTO: 22 %
MCH RBC QN AUTO: 30.5 PG (ref 26.5–33)
MCHC RBC AUTO-ENTMCNC: 31.4 G/DL (ref 31.5–36.5)
MCV RBC AUTO: 97 FL (ref 78–100)
MONOCYTES # BLD AUTO: 0.3 10E3/UL (ref 0–1.3)
MONOCYTES NFR BLD AUTO: 5 %
NEUTROPHILS # BLD AUTO: 4.1 10E3/UL (ref 1.6–8.3)
NEUTROPHILS NFR BLD AUTO: 71 %
NITRATE UR QL: NEGATIVE
NRBC # BLD AUTO: 0 10E3/UL
NRBC BLD AUTO-RTO: 0 /100
PH UR STRIP: 6 [PH] (ref 5–7)
PLATELET # BLD AUTO: 190 10E3/UL (ref 150–450)
POTASSIUM SERPL-SCNC: 3.8 MMOL/L (ref 3.4–5.3)
RBC # BLD AUTO: 4.19 10E6/UL (ref 3.8–5.2)
RBC URINE: <1 /HPF
SODIUM SERPL-SCNC: 141 MMOL/L (ref 136–145)
SP GR UR STRIP: 1 (ref 1–1.03)
UROBILINOGEN UR STRIP-MCNC: NORMAL MG/DL
WBC # BLD AUTO: 5.6 10E3/UL (ref 4–11)
WBC URINE: 1 /HPF

## 2023-06-17 PROCEDURE — 99283 EMERGENCY DEPT VISIT LOW MDM: CPT | Performed by: EMERGENCY MEDICINE

## 2023-06-17 PROCEDURE — 36415 COLL VENOUS BLD VENIPUNCTURE: CPT | Performed by: EMERGENCY MEDICINE

## 2023-06-17 PROCEDURE — 81001 URINALYSIS AUTO W/SCOPE: CPT | Performed by: EMERGENCY MEDICINE

## 2023-06-17 PROCEDURE — 80048 BASIC METABOLIC PNL TOTAL CA: CPT | Performed by: EMERGENCY MEDICINE

## 2023-06-17 PROCEDURE — 85025 COMPLETE CBC W/AUTO DIFF WBC: CPT | Performed by: EMERGENCY MEDICINE

## 2023-06-17 ASSESSMENT — ACTIVITIES OF DAILY LIVING (ADL): ADLS_ACUITY_SCORE: 37

## 2023-06-17 NOTE — ED PROVIDER NOTES
History     Chief Complaint   Patient presents with     Abdominal Pain     HPI  Milena Hamilton is a 83 year old female who presents for evaluation of ongoing diarrhea.  She states she has had this for years every time she eats.  No vomiting.  No stool today.  She has seen GI for this who recommended she avoid lactose.  She takes Imodium every day on average twice a day.  She has had multiple stool studies that have been negative.  CT abdomen which has been negative.    Allergies:  Allergies   Allergen Reactions     Azithromycin Diarrhea     Ciprofloxacin Other (See Comments)     Cipro, dizziness and abdominal pain     Gabapentin Dizziness and Visual Disturbance     Oxycodone Nausea and Vomiting     Penicillins      stomach upset, diarrhea     Raspberry Hives     Sulfa Antibiotics Hives     hives     Amoxicillin-Pot Clavulanate Rash       Problem List:    Patient Active Problem List    Diagnosis Date Noted      (ventriculoperitoneal) shunt status 12/02/2022     Priority: Medium     Cellulitis of fifth toe of right foot 11/07/2022     Priority: Medium     Plantar fasciitis 11/07/2022     Priority: Medium     Right foot pain 11/07/2022     Priority: Medium     Chronic systolic heart failure (H) 09/14/2022     Priority: Medium     LBBB (left bundle branch block) 09/14/2022     Priority: Medium     Dysuria 11/24/2021     Priority: Medium     Basal cell carcinoma of nose 09/08/2021     Priority: Medium     History of malignant neoplasm of skin 09/08/2021     Priority: Medium     Skin changes due to chronic exposure to nonionizing radiation 09/08/2021     Priority: Medium     Renal insufficiency syndrome 09/04/2021     Priority: Medium     Formatting of this note might be different from the original.  Creatinine clearance 49       Lumbar radiculopathy 12/28/2018     Priority: Medium     Scoliosis of lumbar spine, unspecified scoliosis type 12/12/2018     Priority: Medium     Chronic right-sided low back pain with  right-sided sciatica 05/30/2018     Priority: Medium     Acquired hypothyroidism 04/28/2017     Priority: Medium     Gastroesophageal reflux disease, esophagitis presence not specified 04/28/2017     Priority: Medium     IMO Regulatory Load OCT 2020       Premature atrial beats 11/30/2016     Priority: Medium     HTN (hypertension) 07/10/2014     Priority: Medium     Raynaud's disease 06/01/2014     Priority: Medium     Health Care Home 12/30/2013     Priority: Medium     Status:  Closed  Care Coordinator:  Tasha Vasquez RN    See Letters for [facility] Care Plan             History of diverticulitis - s/p left hemicolectomy 11/30/2013     Priority: Medium     Diarrhea 11/30/2013     Priority: Medium     Chronic constipation 06/18/2013     Priority: Medium     Sacroiliac dysfunction 10/09/2012     Priority: Medium     Advanced directives, counseling/discussion 10/03/2011     Priority: Medium     Advance Directive Problem List Overview:   Name Relationship Phone    Primary Health Care Agent            Alternative Health Care Agent          Discussed advance care planning with patient; information given to patient to review. 10/3/2011        Keshawn Doss MD  06/17/23 1112         DDD (degenerative disc disease), cervical 08/10/2011     Priority: Medium     Anxiety 10/24/2010     Priority: Medium     Hyperlipidemia 09/29/2010     Priority: Medium     Osteopenia 04/01/2010     Priority: Medium     recheck DEXA in 2 years       Gastritis 03/15/2010     Priority: Medium     Panic disorder without agoraphobia 10/08/2004     Priority: Medium     Tinnitus 09/13/2004     Priority: Medium     Problem list name updated by automated process. Provider to review       Family history of diabetes mellitus 09/09/2002     Priority: Medium     Lichen planus      Priority: Medium        Past Medical History:    Past Medical History:   Diagnosis Date     Lichen planus      Osteopenia 4/2010     Panic disorder without agoraphobia       Pure hypercholesterolemia      Raynaud's disease 6/2014     Unspecified essential hypertension        Past Surgical History:    Past Surgical History:   Procedure Laterality Date     COLONOSCOPY  10/17/2007    normal, recheck in 10 years     COLONOSCOPY N/A 08/27/2014    Procedure: COLONOSCOPY;  Surgeon: Raffi Montiel MD;  [facility]     COMBINED CYSTOSCOPY, INSERT CATHETER URETER Bilateral 10/13/2014    Procedure: COMBINED CYSTOSCOPY, INSERT CATHETER URETER;  Surgeon: Abdoulaye Odell MD;  [facility]     EYE SURGERY  08/01/2001    right eye muscle repair     HC REMOVAL GALLBLADDER  02/09/2010    lap     HC REMOVAL OF OVARY/TUBE(S)      Salpingo-Oophorectomy, bilateral     HYSTERECTOMY, PAP NO LONGER INDICATED       INJECT EPIDURAL LUMBAR Right 04/10/2017    Procedure: INJECT EPIDURAL LUMBAR;  Surgeon: Rfafi Wright MD;  [facility]     INJECT EPIDURAL LUMBAR Right 02/27/2020    Procedure: Right Lumbar 4- Sacral 1 Epidural Steroid Injection;  Surgeon: Antonio Dasilva MD;  [facility]     INJECT EPIDURAL LUMBAR Right 01/29/2021    Procedure: INJECTION, SPINE,  sacral 1 EPIDURAL;  Surgeon: Antonio Dasilva MD;  [facility]     INJECT EPIDURAL TRANSFORAMINAL Right 06/09/2016    Procedure: INJECT EPIDURAL TRANSFORAMINAL;  Surgeon: Antonio Dasilva MD;  [facility]     INJECT EPIDURAL TRANSFORAMINAL Right 10/13/2016    Procedure: INJECT EPIDURAL TRANSFORAMINAL;  Surgeon: Antonio Dasilva MD;  [facility]     INJECT EPIDURAL TRANSFORAMINAL Right 06/18/2021    Procedure: Right L4-5 and right L5-S1 Transforaminal Epidural Steroid Injections with fluoroscopic guidance and contrast.;  Surgeon: Antonio Dasilva MD;  [facility]     INJECT JOINT SACROILIAC Right 04/14/2022    Procedure: Fluoroscopically-guided injection of the Bilateral sacroiliac joints with Bilateral pastor Sacroiliac joint ligaments infiltration over the sacrum;  Surgeon: Antonio Dasilva MD;  [facility]      "LAPAROSCOPIC ASSISTED COLECTOMY LEFT (DESCENDING) N/A 10/13/2014    Procedure: LAPAROSCOPIC ASSISTED COLECTOMY LEFT (DESCENDING);  Surgeon: Raffi Montiel MD;  [facility]     LUMBAR SPINE SURGERY  2021    L2-Pelvis fusion with rods and screws     OPTICAL TRACKING SYSTEM IMPLANT SHUNT VENTRICULOPERITONEAL Right 2022    Procedure: Right Ventriculoperitoneal;  Surgeon: Hira Pillai MD;  Location: [facility]     ZZC APPENDECTOMY       ZZC NONSPECIFIC PROCEDURE      Right inferior oblique recession, 14 mm     ZZC VAG HYST,RMV TUBE/OVARY  1984    cervix removed       Family History:    Family History   Problem Relation Age of Onset     Diabetes Brother      Heart Disease Brother 50        AMI     Diabetes Brother      Heart Disease Brother         AMI, \"older\"     Cerebrovascular Disease Brother      Psychotic Disorder Brother         , alcohol     Cancer Sister         breast cancer x2     Hypertension No family hx of      Breast Cancer No family hx of      Ovarian Cancer No family hx of      Prostate Cancer No family hx of      Mental Illness No family hx of      Anesthesia Reaction No family hx of      Osteoporosis No family hx of      Obesity No family hx of      Other Cancer No family hx of      Depression No family hx of      Anxiety Disorder No family hx of      Mental Illness No family hx of      Substance Abuse No family hx of        Social History:  Marital Status:   [2]  Social History     Tobacco Use     Smoking status: Never     Passive exposure: Never     Smokeless tobacco: Never   Vaping Use     Vaping status: Never Used     Passive vaping exposure: Yes   Substance Use Topics     Alcohol use: No     Drug use: No        Medications:    acetaminophen (TYLENOL) 500 MG tablet  ALPRAZolam (XANAX) 0.25 MG tablet  cholecalciferol 25 MCG (1000 UT) TABS  cholestyramine (QUESTRAN) 4 GM/DOSE powder  dicyclomine (BENTYL) 10 MG capsule  levothyroxine (SYNTHROID/LEVOTHROID) 50 MCG " tablet  loperamide (IMODIUM A-D) 2 MG tablet  losartan (COZAAR) 25 MG tablet  meclizine (ANTIVERT) 25 MG tablet  metoprolol succinate ER (TOPROL XL) 25 MG 24 hr tablet  omeprazole (PRILOSEC) 20 MG DR capsule  polyethylene glycol-propylene glycol (SYSTANE ULTRA) 0.4-0.3 % SOLN ophthalmic solution  simvastatin (ZOCOR) 20 MG tablet          Review of Systems   Some urinary frequency and dysuria.  All other systems are reviewed and are negative      Physical Exam   BP: (!) 183/90  Pulse: 84  Temp: 97.8  F (36.6  C)  Resp: 18  Weight: 59.2 kg (130 lb 8 oz)  SpO2: 99 %      Physical Exam  Vitals and nursing note reviewed.   Constitutional:       General: She is not in acute distress.     Appearance: She is well-developed. She is not diaphoretic.   HENT:      Head: Normocephalic and atraumatic.   Eyes:      General: No scleral icterus.     Pupils: Pupils are equal, round, and reactive to light.   Cardiovascular:      Rate and Rhythm: Normal rate and regular rhythm.      Heart sounds: Normal heart sounds. No murmur heard.  Pulmonary:      Effort: No respiratory distress.      Breath sounds: No stridor. No wheezing or rales.   Abdominal:      Palpations: Abdomen is soft.      Tenderness: There is no abdominal tenderness.   Musculoskeletal:         General: No tenderness.      Cervical back: Normal range of motion and neck supple.   Skin:     General: Skin is warm and dry.      Coloration: Skin is not pale.      Findings: No erythema or rash.   Neurological:      Mental Status: She is alert.         ED Course                 Procedures                Results for orders placed or performed during the hospital encounter of 06/17/23 (from the past 24 hour(s))   UA with Microscopic reflex to Culture    Specimen: Urine, NOS   Result Value Ref Range    Color Urine Straw Colorless, Straw, Light Yellow, Yellow    Appearance Urine Clear Clear    Glucose Urine Negative Negative mg/dL    Bilirubin Urine Negative Negative    Ketones  Urine Negative Negative mg/dL    Specific Gravity Urine 1.004 1.003 - 1.035    Blood Urine Negative Negative    pH Urine 6.0 5.0 - 7.0    Protein Albumin Urine Negative Negative mg/dL    Urobilinogen Urine Normal Normal, 2.0 mg/dL    Nitrite Urine Negative Negative    Leukocyte Esterase Urine Negative Negative    RBC Urine <1 <=2 /HPF    WBC Urine 1 <=5 /HPF    Narrative    Urine Culture not indicated   CBC with platelets differential    Narrative    The following orders were created for panel order CBC with platelets differential.  Procedure                               Abnormality         Status                     ---------                               -----------         ------                     CBC with platelets and d...[[id number]]  Abnormal            Final result                 Please view results for these tests on the individual orders.   Basic metabolic panel   Result Value Ref Range    Sodium 141 136 - 145 mmol/L    Potassium 3.8 3.4 - 5.3 mmol/L    Chloride 104 98 - 107 mmol/L    Carbon Dioxide (CO2) 27 22 - 29 mmol/L    Anion Gap 10 7 - 15 mmol/L    Urea Nitrogen 11.6 8.0 - 23.0 mg/dL    Creatinine 0.69 0.51 - 0.95 mg/dL    Calcium 9.3 8.8 - 10.2 mg/dL    Glucose 109 (H) 70 - 99 mg/dL    GFR Estimate 86 >60 mL/min/1.73m2   CBC with platelets and differential   Result Value Ref Range    WBC Count 5.6 4.0 - 11.0 10e3/uL    RBC Count 4.19 3.80 - 5.20 10e6/uL    Hemoglobin 12.8 11.7 - 15.7 g/dL    Hematocrit 40.7 35.0 - 47.0 %    MCV 97 78 - 100 fL    MCH 30.5 26.5 - 33.0 pg    MCHC 31.4 (L) 31.5 - 36.5 g/dL    RDW 12.5 10.0 - 15.0 %    Platelet Count 190 150 - 450 10e3/uL    % Neutrophils 71 %    % Lymphocytes 22 %    % Monocytes 5 %    % Eosinophils 1 %    % Basophils 1 %    % Immature Granulocytes 0 %    NRBCs per 100 WBC 0 <1 /100    Absolute Neutrophils 4.1 1.6 - 8.3 10e3/uL    Absolute Lymphocytes 1.2 0.8 - 5.3 10e3/uL    Absolute Monocytes 0.3 0.0 - 1.3 10e3/uL    Absolute Eosinophils 0.1 0.0 -  0.7 10e3/uL    Absolute Basophils 0.0 0.0 - 0.2 10e3/uL    Absolute Immature Granulocytes 0.0 <=0.4 10e3/uL    Absolute NRBCs 0.0 10e3/uL     *Note: Due to a large number of results and/or encounters for the requested time period, some results have not been displayed. A complete set of results can be found in Results Review.       Medications - No data to display    Assessments & Plan (with Medical Decision Making)  83-year-old female with chronic diarrhea every time she eats she reports.  No signs of infectious etiology and previous labs.  Reviewed extensive chart including CT, notes, stool testing which were all negative.  She has seen GI in the past who recommended a lactose-free diet she reports.  Suspect she has some component of irritable bowel syndrome.  No signs of acute emergency medical condition.  Stable for discharge home.  Have recommended follow-up with her primary care and/or GI as needed     I have reviewed the nursing notes.    I have reviewed the findings, diagnosis, plan and need for follow up with the patient.          New Prescriptions    No medications on file       Final diagnoses:   Chronic diarrhea       6/17/2023   [facility] EMERGENCY DEPT

## 2023-06-22 NOTE — PROGRESS NOTES
Gastroenterology CLINIC VISIT, NEW PATIENT    CC/REFERRING PROVIDER: Aminata Campoverde  REASON FOR CONSULTATION: follow up    HPI: 83 year old female presenting for a follow up abdominal bloating and bouts of loose stools, that usually occur after meals. She is somewhat a poor historian; possible dementia? Needs further evaluation with her PCP. She is here today with her  who participates in conversation. Patient went to ER on 6/14 for abdominal cramping. Unremarkable lab work. No red flag symptoms.  Suspected IBS, mixed type with components of bile acids induced diarrhea and lactose intolerance. Started the patient on cholestyramine and she reported significant improvement in her symptoms initially. Stated that she does not like taste of it and not certain if it is working, so she stopped the medication.   I also suggested to take a fiber supplement, but the patient did not start it yet. Had normal colonoscopy in 2014 and is not interested in a follow up study.  Patient shared that she had one hard stool last night, followed by 2 or 3 loose stools. Same occurred when she was visiting her grandson. Stated that she is going to attend 2 graduations this month, asking what she can eat there.        ROS: 10pt ROS performed and otherwise negative.    PAST MEDICAL HISTORY:  Past Medical History:   Diagnosis Date     Lichen planus     vulvar     Osteopenia 4/2010    recheck DEXA in 2 years     Panic disorder without agoraphobia      Pure hypercholesterolemia     low LDLs     Raynaud's disease 6/2014     Unspecified essential hypertension        PREVIOUS ABDOMINAL/GYNECOLOGIC SURGERIES:    Past Surgical History:   Procedure Laterality Date     COLONOSCOPY  10/17/2007    normal, recheck in 10 years     COLONOSCOPY N/A 08/27/2014    Procedure: COLONOSCOPY;  Surgeon: Raffi Montiel MD;  [facility]     COMBINED CYSTOSCOPY, INSERT CATHETER URETER Bilateral 10/13/2014    Procedure: COMBINED CYSTOSCOPY, INSERT CATHETER  URETER;  Surgeon: Abdoulaye Odell MD;  [facility]     EYE SURGERY  08/01/2001    right eye muscle repair     HC REMOVAL GALLBLADDER  02/09/2010    lap     HC REMOVAL OF OVARY/TUBE(S)      Salpingo-Oophorectomy, bilateral     HYSTERECTOMY, PAP NO LONGER INDICATED       INJECT EPIDURAL LUMBAR Right 04/10/2017    Procedure: INJECT EPIDURAL LUMBAR;  Surgeon: Raffi Wright MD;  [facility]     INJECT EPIDURAL LUMBAR Right 02/27/2020    Procedure: Right Lumbar 4- Sacral 1 Epidural Steroid Injection;  Surgeon: Antonio Dasilva MD;  [facility]     INJECT EPIDURAL LUMBAR Right 01/29/2021    Procedure: INJECTION, SPINE,  sacral 1 EPIDURAL;  Surgeon: Antonio Dasilva MD;  [facility]     INJECT EPIDURAL TRANSFORAMINAL Right 06/09/2016    Procedure: INJECT EPIDURAL TRANSFORAMINAL;  Surgeon: Antonio Dasilva MD;  [facility]     INJECT EPIDURAL TRANSFORAMINAL Right 10/13/2016    Procedure: INJECT EPIDURAL TRANSFORAMINAL;  Surgeon: Antonio Dasilva MD;  [facility]     INJECT EPIDURAL TRANSFORAMINAL Right 06/18/2021    Procedure: Right L4-5 and right L5-S1 Transforaminal Epidural Steroid Injections with fluoroscopic guidance and contrast.;  Surgeon: Antonio Dasilva MD;  [facility]     INJECT JOINT SACROILIAC Right 04/14/2022    Procedure: Fluoroscopically-guided injection of the Bilateral sacroiliac joints with Bilateral pastor Sacroiliac joint ligaments infiltration over the sacrum;  Surgeon: Antonio Dasilva MD;  [facility]     LAPAROSCOPIC ASSISTED COLECTOMY LEFT (DESCENDING) N/A 10/13/2014    Procedure: LAPAROSCOPIC ASSISTED COLECTOMY LEFT (DESCENDING);  Surgeon: Raffi Montiel MD;  [facility]     LUMBAR SPINE SURGERY  09/2021    L2-Pelvis fusion with rods and screws     OPTICAL TRACKING SYSTEM IMPLANT SHUNT VENTRICULOPERITONEAL Right 12/2/2022    Procedure: Right Ventriculoperitoneal;  Surgeon: Hira Pillai MD;  Location:  OR     [facility] APPENDECTOMY       [facility]  NONSPECIFIC PROCEDURE      Right inferior oblique recession, 14 mm     ZZC VAG HYST,RMV TUBE/OVARY  01/01/1984    cervix removed         PERTINENT MEDICATIONS:  Current Outpatient Medications   Medication Sig Dispense Refill     acetaminophen (TYLENOL) 500 MG tablet Take 500-1,000 mg by mouth every 6 hours as needed for mild pain       ALPRAZolam (XANAX) 0.25 MG tablet Take 1 tablet (0.25 mg) by mouth nightly as needed for anxiety 30 tablet 2     cholecalciferol 25 MCG (1000 UT) TABS Take 1,000 Units by mouth daily       cholestyramine (QUESTRAN) 4 GM/DOSE powder Take 4 g by mouth daily (with dinner) 348 g 3     dicyclomine (BENTYL) 10 MG capsule Take 1 capsule (10 mg) by mouth 3 times daily as needed (for abdominal cramping and pain, as needed) 30 capsule 4     levothyroxine (SYNTHROID/LEVOTHROID) 50 MCG tablet TAKE ONE TABLET BY MOUTH once daily 90 tablet 3     loperamide (IMODIUM A-D) 2 MG tablet Take 1 tablet (2 mg) by mouth daily as needed for diarrhea Take one tablet after 2 or more loose stools. Wait for one hour and if still have loose stools, take half of the tablet. 30 tablet 1     losartan (COZAAR) 25 MG tablet Take 1 tablet (25 mg) by mouth daily 90 tablet 3     meclizine (ANTIVERT) 25 MG tablet Take 1 tablet (25 mg) by mouth 3 times daily as needed for dizziness 30 tablet 1     metoprolol succinate ER (TOPROL XL) 25 MG 24 hr tablet Take 1 tablet (25 mg) by mouth daily 90 tablet 3     omeprazole (PRILOSEC) 20 MG DR capsule TAKE ONE CAPSULE BY MOUTH ONCE DAILY 90 capsule 3     polyethylene glycol-propylene glycol (SYSTANE ULTRA) 0.4-0.3 % SOLN ophthalmic solution Place 1 drop into both eyes 3 times daily as needed for dry eyes       simvastatin (ZOCOR) 20 MG tablet TAKE ONE-HALF TABLET BY MOUTH EVERY NIGHT 135 tablet 3       No other OTC/herbal/supplements reported by patient.    SOCIAL HISTORY:  Social History     Socioeconomic History     Marital status:      Spouse name: Not on file     Number  "of children: 4     Years of education: Not on file     Highest education level: Not on file   Occupational History     Occupation: dry cleaning   Tobacco Use     Smoking status: Never     Passive exposure: Never     Smokeless tobacco: Never   Vaping Use     Vaping Use: Never used   Substance and Sexual Activity     Alcohol use: No     Drug use: No     Sexual activity: Not Currently     Partners: Male     Birth control/protection: Surgical     Comment: not currently/hysterectomy   Other Topics Concern     Parent/sibling w/ CABG, MI or angioplasty before 65F 55M? No   Social History Narrative     Not on file     Social Determinants of Health     Financial Resource Strain: Not on file   Food Insecurity: Not on file   Transportation Needs: Not on file   Physical Activity: Not on file   Stress: Not on file   Social Connections: Not on file   Intimate Partner Violence: Not on file   Housing Stability: Not on file       FAMILY HISTORY:  Denies colon/panc/esophageal/other GI CA, no other [place] or other HPS-related [place]. No IBD/celiac, no other AI/liver/thyroid disease.    Family History   Problem Relation Age of Onset     Diabetes Brother      Heart Disease Brother 50        AMI     Diabetes Brother      Heart Disease Brother         AMI, \"older\"     Cerebrovascular Disease Brother      Psychotic Disorder Brother         , alcohol     Cancer Sister         breast cancer x2     Hypertension No family hx of      Breast Cancer No family hx of      Ovarian Cancer No family hx of      Prostate Cancer No family hx of      Mental Illness No family hx of      Anesthesia Reaction No family hx of      Osteoporosis No family hx of      Obesity No family hx of      Other Cancer No family hx of      Depression No family hx of      Anxiety Disorder No family hx of      Mental Illness No family hx of      Substance Abuse No family hx of        PHYSICAL EXAMINATION:  Vitals reviewed  LMP  (LMP Unknown)     General: Patient appears well " in no acute distress.   Skin: No visualized rash or lesions on visualized skin  Eyes: EOMI, no erythema, sclera icterus or discharge noted  Resp: breathing comfortably without accessory muscle usage, speaking in full sentences, no cough.  MSK: Appears to have normal range of motion based on visualized movements  Neurologic: No apparent tremors, facial movements symmetric  Psych: affect normal, alert and oriented. Poor historian    PERTINENT STUDIES Reviewed in EMR    ASSESSMENT/PLAN:    83 year old female  presented  to GI clinic for a follow up on symptoms of mixed type IBS and lactose intolerance.Complains of intermittent symptoms of postprandial abdominal bloating,and bouts of hard stool followed by a few loose stools. Stated that she is frustrated that she cannot have normal bowel movement schedule. History of panic disorder with agoraphobia. Explained correlation between the patient's emotional state and her abdominal symptoms.  I reviewed her recent ER visit for abdominal cramping. Unremarkable exam and lab work. Explained to the patient chronicity of her condition and medications available to control her symptoms.Talked about possible chronic constipation with overflow diarrhea. Discussed bile salt induced diarrhea symptoms. Mentioned referral to colonoscopy for further evaluation but the patient is not interested.  She discontinued cholestyramine. Has not been taking Bentyl, has refills available.  Recommended the following:  - start taking 8-9 gram of Miralax every morning;  - Metamucil 4 gram with supper. Increase it to 8-9 gram in a week if tolerated.   - Dicyclomine as needed for abdominal cramping and pain.  - Loperamide 1/2 of the tablet after 2 or more loose stools as needed.  - Had an extensive conversation on lactose free diet. Answered numerous of patient's questions. Will place a referral to dietician.  - A print out on low FODMAP diet was provided.      ICD-10-CM    1. Irritable bowel syndrome  with both constipation and diarrhea  K58.2 polyethylene glycol (MIRALAX) 17 GM/Dose powder     psyllium (METAMUCIL/KONSYL) 58.6 % powder      2. Abdominal bloating  R14.0 polyethylene glycol (MIRALAX) 17 GM/Dose powder     psyllium (METAMUCIL/KONSYL) 58.6 % powder        Patient verbalized understanding and appreciation of care provided. Stated that all of the questions were answered to her/his satisfaction.  RTC in 3 months    Thank you for this consultation. It was a pleasure to participate in the care of this patient; please contact us with any further questions.    FORREST Walker, FNP-C  [facility]  Gastroenterology Department  [place], [place]    This note was created with Dragon voice recognition software, and while reviewed for accuracy, inadvertent minor typographic errors may occur. Please contact the provider if you have any questions.

## 2023-06-23 ENCOUNTER — OFFICE VISIT (OUTPATIENT)
Dept: GASTROENTEROLOGY | Facility: CLINIC | Age: 83
End: 2023-06-23
Attending: NURSE PRACTITIONER
Payer: COMMERCIAL

## 2023-06-23 ENCOUNTER — TELEPHONE (OUTPATIENT)
Dept: GASTROENTEROLOGY | Facility: CLINIC | Age: 83
End: 2023-06-23

## 2023-06-23 VITALS
HEIGHT: 61 IN | SYSTOLIC BLOOD PRESSURE: 118 MMHG | WEIGHT: 130 LBS | BODY MASS INDEX: 24.55 KG/M2 | TEMPERATURE: 97.5 F | DIASTOLIC BLOOD PRESSURE: 72 MMHG

## 2023-06-23 DIAGNOSIS — K58.2 IRRITABLE BOWEL SYNDROME WITH BOTH CONSTIPATION AND DIARRHEA: Primary | ICD-10-CM

## 2023-06-23 DIAGNOSIS — I12.9 HYPERTENSIVE CHRONIC KIDNEY DISEASE WITH STAGE 1 THROUGH STAGE 4 CHRONIC KIDNEY DISEASE, OR UNSPECIFIED CHRONIC KIDNEY DISEASE: ICD-10-CM

## 2023-06-23 DIAGNOSIS — R14.0 ABDOMINAL BLOATING: ICD-10-CM

## 2023-06-23 PROCEDURE — 99214 OFFICE O/P EST MOD 30 MIN: CPT | Performed by: NURSE PRACTITIONER

## 2023-06-23 RX ORDER — POLYETHYLENE GLYCOL 3350 17 G/17G
8 POWDER, FOR SOLUTION ORAL DAILY
Qty: 255 G | Refills: 2 | Status: SHIPPED | OUTPATIENT
Start: 2023-06-23 | End: 2024-05-24

## 2023-06-23 ASSESSMENT — PAIN SCALES - GENERAL: PAINLEVEL: NO PAIN (0)

## 2023-06-23 NOTE — TELEPHONE ENCOUNTER
Writer left detailed message requesting patient call back if she has any symptoms/conerns with her shunt. Otherwise, no repeat CT scan needed and she can see Dr. Pillai next week 6/29 as scheduled.       Daria Miranda, RNCC  Neurology/Neurosurgery

## 2023-06-23 NOTE — PATIENT INSTRUCTIONS
It was a pleasure taking care of you today.  I've included a brief summary of our discussion and care plan from today's visit below.  Please review this information with your primary care provider.  ______________________________________________________________________    My recommendations are summarized as follows:    Start metamucil (fiber supplement) with half of a teaspoon at supper. OK to increase the dose to one teaspoon in a week.    2. Take half of the scoop of Miralax in the morning, every day.    3. Use dicyclomine (Bentyl) as needed for abdominal cramping and pain. You have 3 more refills left, call your pharmacy for refill if needed.    4. Take Lactaid tablets before consuming dairy. With larger portions, you may need 2 tablet per a meal.    5. Please review and try low FODMAP diet for a few weeks (see below).    Return to GI Clinic in 3 months to review your progress.    ______________________________________________________________________  FODMAP:  The FODMAP term was coined by [place] researchers Mary Longoria and Jomar Pardo. They found that a low-FODMAP diet helped 75-85% of patients with irritable bowel syndrome or IBS.  Products containing lactose (dairy), fructose (fruit sugar),sweeteners (sorbitol, mannitol), fructans (a type of fiber found in wheat, onions, garlic and chicory root), and GOS (a type of fiber found in beans, hummus and soy milk) are examples of FODMAPs.  They can be poorly absorbed during the digestive process. They are rapidly fermented by the bacteria that live in your gut. They are capable of pulling fluid into the gut in a process called osmosis. The increased fluid load, along with the type and amount of gas produced, cause distension and motility changes, leading to bloating, abdominal pain, diarrhea, and nausea. Symptoms are often delayed until hours after eating a high FODMAP meal or snack, because it takes time for FODMAPs to make their way through the  stomach and into the intestines, where the effects occur. By reducing the overall dietary load of these carbohydrates, troublesome GI symptoms can be minimized or eliminated.     A low-FODMAP diet avoids foods containing certain sugars and certain fibers capable of causing diarrhea, constipation, gas, bloating and abdominal pain in people with IBS. At the same time, I would recommend not to exclude all of the FODMAPs foods completely from your diet, but instead, to use alternative foods from the same group. Try the diet for 4-6 weeks, if you have not noticed significant changes in your symptoms, stop the diet. Discuss further plan with your primary care provider or gastroenterology provider.           A high-fiber diet is a commonly recommended treatment for digestive problems, such as constipation, diarrhea, and hemorrhoids.   Most dietary fiber is not digested or absorbed, so it stays within the intestine where it modulates digestion of other foods and affects the consistency of stool. There are two types of fiber, each of which is thought to have its own benefits:  ?Soluble fiber consists of a group of substances that is made of carbohydrates and dissolves in water. Examples of foods that contain soluble fiber include fruits, oats, barley, and legumes (peas and beans).  ?Insoluble fiber comes from plant cell walls and does not dissolve in water. Examples of foods that contain insoluble fiber include wheat, rye, and other grains. Insoluble fiber (wheat bran, and some fruits and vegetables) has been recommended to treat digestive problems such as constipation, hemorrhoids, chronic diarrhea, and fecal incontinence.   ?Dietary fiber is the sum of all soluble and insoluble fiber.Fiber bulks the stool, making it softer and easier to pass. Fiber helps the stool pass regularly, although it is not a laxative.   - Recommended daily dose of fiber is 25-35 gram. It is difficult to consume this amount of fiber from food  "alone. Therefore, I would suggest to take fiber supplement.  - Please start supplementation with a powdered soluble fiber. When used on a daily basis, this can help regulate the consistency of your stools.   - Metamucil (psyllium) and Citrucel are preferred examples. You can start with 1-2 teaspoons per day, with goal to gradually increase the dose  to 1 tablespoon daily. You can increase up to 1 tablespoon three times daily if needed.   - It is important to stay well-hydrated with use of fiber supplementation and to make sure that the fiber powder is well mixed with water as directed on the label.   - You may experience some bloating with initiation of fiber, which will improve over the first few weeks. We will evaluate the effect  of fiber in 3-6 months.   - Of note, many of the fiber products contain artificial sweeteners, which can cause bloating, gas, and diarrhea in those who may be sensitive to artificial sweeteners. If this is the case, I would recommend trying Metamucil Premium Blend (with Stevia), Metamucil 4-in-1 without Added Sweeteners, or Bellway (sweetened with Monk fruit extract).        Irritable bowel syndrome (IBS)   Irritable bowel syndrome (IBS) is a chronic condition of the digestive system. Its primary symptoms are abdominal pain and changes in bowel habits (eg, constipation and/or diarrhea).  There are a number of theories about how and why irritable bowel syndrome (IBS) develops. Despite intensive research, the cause is not clear.   -One theory suggests that IBS is caused by abnormal contractions of the colon and intestines (hence the term \"spastic bowel,\" which has sometimes been used to describe IBS).   -Some people develop IBS after a severe gastrointestinal infection (eg, Salmonella or Campylobacter, or viruses). However, it is not clear how the infection triggers IBS to develop, and most people with IBS do not have a history of these infections.  -People with IBS who seek medical help " "are more likely to suffer from anxiety and stress than those who do not seek help. Stress and anxiety are known to affect the intestine; thus, it is likely that anxiety and stress worsen symptoms.    -Food intolerances are common in patients with IBS, raising the possibility that it is caused by food sensitivity or allergy. This theory has been difficult to prove, although it continues to be studied.   A number of foods are known to cause symptoms that mimic or aggravate IBS, including dairy products (which contain lactose), legumes (such as beans), and cruciferous vegetables (such as broccoli, cauliflower, [place] sprouts, and cabbage). These foods increase intestinal gas, which can cause cramps.    -Many researchers believe that IBS is caused by heightened sensitivity of the intestines. The medical term for this is \"visceral hyperalgesia.\" This theory proposes that nerves in the bowels are overactive in people with IBS, so that normal amounts of gas or movement are perceived as excessive and painful.     A person with irritable bowel syndrome may have frequent loose stools. Bowel movements usually occur during the daytime, and most often in the morning or after meals. Diarrhea is often preceded by a sense of extreme urgency and followed by a feeling of incomplete emptying. About one-half of people with IBS also notice mucous discharge with diarrhea. Diarrhea occurring during the night is very unusual with IBS.     Treatments are often given to reduce the pain and other symptoms of IBS, and it may be necessary to try more than one combination of treatments to find the one that is most helpful for you.  Diet:  The first step in treating IBS is usually to monitor your symptoms, daily bowel habits, and any other factors that may affect your bowels. This can help to identify factors that worsen symptoms in some people with IBS, such as lactose or other food intolerances and stress. Keeping a daily diary to track your " diet and bowel symptoms can be helpful.  Many clinicians recommend temporarily eliminating milk products, since lactose intolerance is common and can aggravate IBS or cause symptoms similar to IBS. The greatest concentration of lactose is found in milk and ice cream, although it is present in smaller quantities in yogurt, cottage and other cheeses.Try eliminating dairy for 2 weeks. If IBS symptoms improve, it is reasonable to continue avoiding lactose.   Many foods are only partially digested in the small intestines. When they reach the colon (large intestine), further digestion takes place, which may cause gas and cramps. Eliminating these foods temporarily is reasonable if gas or bloating is bothersome.  The most common gas-producing foods are legumes (such as beans) and cruciferous vegetables (such as cabbage, [place] sprouts, cauliflower, and broccoli). In addition, some people have trouble with onions, celery, carrots, raisins, bananas, apricots, prunes, sprouts, and wheat.    A bulk-forming fiber supplement, such as Psyllium, may also be recommended to increase fiber intake since it is difficult to consume enough fiber in the diet. Fiber supplements should be started at a low dose and increased slowly over several weeks to reduce the symptoms of excessive intestinal gas, which can occur in some people when beginning fiber therapy.     Some foods are easy to digest for people with IBS related diarrhea. These include the following: plain pasta or noodles, white rice. Boiled or baked potato. Whole white bread, Finnish bread, plain fish, plain chicken or turkey, soft-boiled eggs, rice or soy milk, cooked carrots,and cereals (plain Cornflakes, Rice Krispies, Corn or Rice Chex, or Cheerios).    Other approaches to management of IBS:  Stress and anxiety can worsen IBS in some people. The best approach for reducing stress and anxiety depends upon your situation and the severity of your symptoms.  Although many drugs  are available to treat the symptoms of IBS, these drugs do not cure the condition. They are mainly used to relieve symptoms.    ______________________________________________________________________    Who do I call with any questions after my visit?  Please be in touch if there are any further questions that arise following today's visit.  There are multiple ways to contact your gastroenterology care team.      During business hours, you may reach a Gastroenterology nurse at [phone], option 3.     To schedule or reschedule an appointment, please call [phone].   To schedule your imaging studies (CT, MRI, ultrasound)  call [phone] (or toll-free # [phone])  To schedule your lab work at [facility], please call [phone]    You can always send a secure message through [facility].  [facility] messages are answered by your nurse or doctor typically within 24 hours.  Please allow extra time on weekends and holidays.      For urgent/emergent questions after business hours, you may reach the on-call GI Fellow by contacting the [facility]  at [phone].    In order for your refill to be processed in a timely fashion, it is your responsibility to ensure you follow the recommendations from your provider regarding your laboratory studies and follow up appointments.       How will I get the results of any tests ordered?    You will receive all of your results.  If you have signed up for [facility], any tests ordered at your visit will be available to you after your physician reviews them.  Typically this takes 1-2 weeks.  If there are urgent results that require a change in your care plan, your physician or nurse will call you to discuss the next steps.   What is [facility]?  [facility] is a secure way for you to access all of your healthcare records from the [facility].  It is a web based computer program, so you can sign on to it from any location.  It also  allows you to send secure messages to your care team.  I recommend signing up for Clipboard access if you have not already done so and are comfortable with using a computer.    How to I schedule a follow-up visit?  If you did not schedule a follow-up visit today, please call [phone] to schedule a follow-up office visit.      Sincerely,  JOSE Walker,  [facility],  Division of Gastroenterology   ([facility])

## 2023-06-23 NOTE — LETTER
6/23/2023         RE: Milena Hamilton  [id number] [facility] [address] [facility] [address]        Dear Colleague,    Thank you for referring your patient, Milena Hamilton, to the [facility]. Please see a copy of my visit note below.    Gastroenterology CLINIC VISIT, NEW PATIENT    CC/REFERRING PROVIDER: Aminata Campoverde  REASON FOR CONSULTATION: follow up    HPI: 83 year old female presenting for a follow up abdominal bloating and bouts of loose stools, that usually occur after meals. She is somewhat a poor historian; possible dementia? Needs further evaluation with her PCP. She is here today with her  who participates in conversation. Patient went to ER on 6/14 for abdominal cramping. Unremarkable lab work. No red flag symptoms.  Suspected IBS, mixed type with components of bile acids induced diarrhea and lactose intolerance. Started the patient on cholestyramine and she reported significant improvement in her symptoms initially. Stated that she does not like taste of it and not certain if it is working, so she stopped the medication.   I also suggested to take a fiber supplement, but the patient did not start it yet. Had normal colonoscopy in 2014 and is not interested in a follow up study.  Patient shared that she had one hard stool last night, followed by 2 or 3 loose stools. Same occurred when she was visiting her grandson. Stated that she is going to attend 2 graduations this month, asking what she can eat there.        ROS: 10pt ROS performed and otherwise negative.    PAST MEDICAL HISTORY:  Past Medical History:   Diagnosis Date     Lichen planus     vulvar     Osteopenia 4/2010    recheck DEXA in 2 years     Panic disorder without agoraphobia      Pure hypercholesterolemia     low LDLs     Raynaud's disease 6/2014     Unspecified essential hypertension        PREVIOUS ABDOMINAL/GYNECOLOGIC SURGERIES:    Past Surgical History:   Procedure Laterality Date     COLONOSCOPY  10/17/2007    normal,  recheck in 10 years     COLONOSCOPY N/A 08/27/2014    Procedure: COLONOSCOPY;  Surgeon: Raffi Montiel MD;  Location: [facility]     COMBINED CYSTOSCOPY, INSERT CATHETER URETER Bilateral 10/13/2014    Procedure: COMBINED CYSTOSCOPY, INSERT CATHETER URETER;  Surgeon: Abdoulaye Odell MD;  Location: [facility]     EYE SURGERY  08/01/2001    right eye muscle repair     HC REMOVAL GALLBLADDER  02/09/2010    lap     HC REMOVAL OF OVARY/TUBE(S)      Salpingo-Oophorectomy, bilateral     HYSTERECTOMY, PAP NO LONGER INDICATED       INJECT EPIDURAL LUMBAR Right 04/10/2017    Procedure: INJECT EPIDURAL LUMBAR;  Surgeon: Raffi Wright MD;  [facility]     INJECT EPIDURAL LUMBAR Right 02/27/2020    Procedure: Right Lumbar 4- Sacral 1 Epidural Steroid Injection;  Surgeon: Antonio Dasilva MD;  [facility]     INJECT EPIDURAL LUMBAR Right 01/29/2021    Procedure: INJECTION, SPINE,  sacral 1 EPIDURAL;  Surgeon: Antonio Dasilva MD;  [facility]     INJECT EPIDURAL TRANSFORAMINAL Right 06/09/2016    Procedure: INJECT EPIDURAL TRANSFORAMINAL;  Surgeon: Antonio Dasilva MD;  [facility]     INJECT EPIDURAL TRANSFORAMINAL Right 10/13/2016    Procedure: INJECT EPIDURAL TRANSFORAMINAL;  Surgeon: Antonio Dasilva MD;  [facility]     INJECT EPIDURAL TRANSFORAMINAL Right 06/18/2021    Procedure: Right L4-5 and right L5-S1 Transforaminal Epidural Steroid Injections with fluoroscopic guidance and contrast.;  Surgeon: Antonio Dasilva MD;  [facility]     INJECT JOINT SACROILIAC Right 04/14/2022    Procedure: Fluoroscopically-guided injection of the Bilateral sacroiliac joints with Bilateral pastor Sacroiliac joint ligaments infiltration over the sacrum;  Surgeon: Antonio Dasilva MD;  [facility]     LAPAROSCOPIC ASSISTED COLECTOMY LEFT (DESCENDING) N/A 10/13/2014    Procedure: LAPAROSCOPIC ASSISTED COLECTOMY LEFT (DESCENDING);  Surgeon: Raffi Montiel MD;  [facility]     LUMBAR SPINE SURGERY  09/2021     L2-Pelvis fusion with rods and screws     OPTICAL TRACKING SYSTEM IMPLANT SHUNT VENTRICULOPERITONEAL Right 12/2/2022    Procedure: Right Ventriculoperitoneal;  Surgeon: Hira Pillai MD;  Location: [facility]     ZC APPENDECTOMY       ZC NONSPECIFIC PROCEDURE      Right inferior oblique recession, 14 mm     ZC VAG HYST,RMV TUBE/OVARY  01/01/1984    cervix removed         PERTINENT MEDICATIONS:  Current Outpatient Medications   Medication Sig Dispense Refill     acetaminophen (TYLENOL) 500 MG tablet Take 500-1,000 mg by mouth every 6 hours as needed for mild pain       ALPRAZolam (XANAX) 0.25 MG tablet Take 1 tablet (0.25 mg) by mouth nightly as needed for anxiety 30 tablet 2     cholecalciferol 25 MCG (1000 UT) TABS Take 1,000 Units by mouth daily       cholestyramine (QUESTRAN) 4 GM/DOSE powder Take 4 g by mouth daily (with dinner) 348 g 3     dicyclomine (BENTYL) 10 MG capsule Take 1 capsule (10 mg) by mouth 3 times daily as needed (for abdominal cramping and pain, as needed) 30 capsule 4     levothyroxine (SYNTHROID/LEVOTHROID) 50 MCG tablet TAKE ONE TABLET BY MOUTH once daily 90 tablet 3     loperamide (IMODIUM A-D) 2 MG tablet Take 1 tablet (2 mg) by mouth daily as needed for diarrhea Take one tablet after 2 or more loose stools. Wait for one hour and if still have loose stools, take half of the tablet. 30 tablet 1     losartan (COZAAR) 25 MG tablet Take 1 tablet (25 mg) by mouth daily 90 tablet 3     meclizine (ANTIVERT) 25 MG tablet Take 1 tablet (25 mg) by mouth 3 times daily as needed for dizziness 30 tablet 1     metoprolol succinate ER (TOPROL XL) 25 MG 24 hr tablet Take 1 tablet (25 mg) by mouth daily 90 tablet 3     omeprazole (PRILOSEC) 20 MG DR capsule TAKE ONE CAPSULE BY MOUTH ONCE DAILY 90 capsule 3     polyethylene glycol-propylene glycol (SYSTANE ULTRA) 0.4-0.3 % SOLN ophthalmic solution Place 1 drop into both eyes 3 times daily as needed for dry eyes       simvastatin (ZOCOR) 20 MG tablet TAKE  "ONE-HALF TABLET BY MOUTH EVERY NIGHT 135 tablet 3       No other OTC/herbal/supplements reported by patient.    SOCIAL HISTORY:  Social History     Socioeconomic History     Marital status:      Spouse name: Not on file     Number of children: 4     Years of education: Not on file     Highest education level: Not on file   Occupational History     Occupation: dry cleaning   Tobacco Use     Smoking status: Never     Passive exposure: Never     Smokeless tobacco: Never   Vaping Use     Vaping Use: Never used   Substance and Sexual Activity     Alcohol use: No     Drug use: No     Sexual activity: Not Currently     Partners: Male     Birth control/protection: Surgical     Comment: not currently/hysterectomy   Other Topics Concern     Parent/sibling w/ CABG, MI or angioplasty before 65F 55M? No   Social History Narrative     Not on file     Social Determinants of Health     Financial Resource Strain: Not on file   Food Insecurity: Not on file   Transportation Needs: Not on file   Physical Activity: Not on file   Stress: Not on file   Social Connections: Not on file   Intimate Partner Violence: Not on file   Housing Stability: Not on file       FAMILY HISTORY:  Denies colon/panc/esophageal/other GI CA, no other [place] or other HPS-related [place]. No IBD/celiac, no other AI/liver/thyroid disease.    Family History   Problem Relation Age of Onset     Diabetes Brother      Heart Disease Brother 50        AMI     Diabetes Brother      Heart Disease Brother         AMI, \"older\"     Cerebrovascular Disease Brother      Psychotic Disorder Brother         , alcohol     Cancer Sister         breast cancer x2     Hypertension No family hx of      Breast Cancer No family hx of      Ovarian Cancer No family hx of      Prostate Cancer No family hx of      Mental Illness No family hx of      Anesthesia Reaction No family hx of      Osteoporosis No family hx of      Obesity No family hx of      Other Cancer No family hx of      " Depression No family hx of      Anxiety Disorder No family hx of      Mental Illness No family hx of      Substance Abuse No family hx of        PHYSICAL EXAMINATION:  Vitals reviewed  LMP  (LMP Unknown)     General: Patient appears well in no acute distress.   Skin: No visualized rash or lesions on visualized skin  Eyes: EOMI, no erythema, sclera icterus or discharge noted  Resp: breathing comfortably without accessory muscle usage, speaking in full sentences, no cough.  MSK: Appears to have normal range of motion based on visualized movements  Neurologic: No apparent tremors, facial movements symmetric  Psych: affect normal, alert and oriented. Poor historian    PERTINENT STUDIES Reviewed in EMR    ASSESSMENT/PLAN:    83 year old female  presented  to GI clinic for a follow up on symptoms of mixed type IBS and lactose intolerance.Complains of intermittent symptoms of postprandial abdominal bloating,and bouts of hard stool followed by a few loose stools. Stated that she is frustrated that she cannot have normal bowel movement schedule. History of panic disorder with agoraphobia. Explained correlation between the patient's emotional state and her abdominal symptoms.  I reviewed her recent ER visit for abdominal cramping. Unremarkable exam and lab work. Explained to the patient chronicity of her condition and medications available to control her symptoms.Talked about possible chronic constipation with overflow diarrhea. Discussed bile salt induced diarrhea symptoms. Mentioned referral to colonoscopy for further evaluation but the patient is not interested.  She discontinued cholestyramine. Has not been taking Bentyl, has refills available.  Recommended the following:  - start taking 8-9 gram of Miralax every morning;  - Metamucil 4 gram with supper. Increase it to 8-9 gram in a week if tolerated.   - Dicyclomine as needed for abdominal cramping and pain.  - Loperamide 1/2 of the tablet after 2 or more loose stools as  needed.  - Had an extensive conversation on lactose free diet. Answered numerous of patient's questions. Will place a referral to dietician.  - A print out on low FODMAP diet was provided.      ICD-10-CM    1. Irritable bowel syndrome with both constipation and diarrhea  K58.2 polyethylene glycol (MIRALAX) 17 GM/Dose powder     psyllium (METAMUCIL/KONSYL) 58.6 % powder      2. Abdominal bloating  R14.0 polyethylene glycol (MIRALAX) 17 GM/Dose powder     psyllium (METAMUCIL/KONSYL) 58.6 % powder        Patient verbalized understanding and appreciation of care provided. Stated that all of the questions were answered to her/his satisfaction.  RTC in 3 months    Thank you for this consultation. It was a pleasure to participate in the care of this patient; please contact us with any further questions.    FORREST Walker, FNP-C  [facility]  Gastroenterology Department  [place], [place]    This note was created with Dragon voice recognition software, and while reviewed for accuracy, inadvertent minor typographic errors may occur. Please contact the provider if you have any questions.      Again, thank you for allowing me to participate in the care of your patient.        Sincerely,        FROREST WALKER CNP

## 2023-06-23 NOTE — TELEPHONE ENCOUNTER
[facility] Call Center    Phone Message    May a detailed message be left on voicemail: yes     Reason for Call: Other:     Pt is requesting a call back to discuss Kimberly Campoverde's recommendations in regards to food.    Action Taken: Message routed to:  [facility]    Travel Screening: Not Applicable

## 2023-06-26 NOTE — TELEPHONE ENCOUNTER
Call placed, message left to return call when able.   Looking for what kind of food questions she has.     Liz Richardson Rn

## 2023-06-28 ENCOUNTER — TELEPHONE (OUTPATIENT)
Dept: GASTROENTEROLOGY | Facility: CLINIC | Age: 83
End: 2023-06-28

## 2023-06-28 NOTE — TELEPHONE ENCOUNTER
Nutrition Education Scheduling Outreach #1:    Call to patient to schedule. Left message with phone number to call to schedule.    Plan for 2nd outreach attempt within 2 business days.    Kaela Stahl OnCall  Diabetes and Nutrition Scheduling

## 2023-06-29 ENCOUNTER — ANCILLARY PROCEDURE (OUTPATIENT)
Dept: CT IMAGING | Facility: CLINIC | Age: 83
End: 2023-06-29
Attending: STUDENT IN AN ORGANIZED HEALTH CARE EDUCATION/TRAINING PROGRAM
Payer: COMMERCIAL

## 2023-06-29 ENCOUNTER — OFFICE VISIT (OUTPATIENT)
Dept: NEUROSURGERY | Facility: CLINIC | Age: 83
End: 2023-06-29
Payer: COMMERCIAL

## 2023-06-29 VITALS
DIASTOLIC BLOOD PRESSURE: 79 MMHG | SYSTOLIC BLOOD PRESSURE: 157 MMHG | HEART RATE: 63 BPM | BODY MASS INDEX: 25.88 KG/M2 | WEIGHT: 134.8 LBS

## 2023-06-29 DIAGNOSIS — Z98.2 VP (VENTRICULOPERITONEAL) SHUNT STATUS: ICD-10-CM

## 2023-06-29 DIAGNOSIS — Z98.2 VP (VENTRICULOPERITONEAL) SHUNT STATUS: Primary | ICD-10-CM

## 2023-06-29 PROCEDURE — 70450 CT HEAD/BRAIN W/O DYE: CPT | Performed by: RADIOLOGY

## 2023-06-29 PROCEDURE — 99215 OFFICE O/P EST HI 40 MIN: CPT | Performed by: STUDENT IN AN ORGANIZED HEALTH CARE EDUCATION/TRAINING PROGRAM

## 2023-06-29 NOTE — LETTER
"    6/29/2023         RE: Milena Hamilton  [address] [address] [place] [id number]        Dear Colleague,    Thank you for referring your patient, Milena Hamilton, to the [facility] [place]. Please see a copy of my visit note below.    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  Postoperatively she noticed improvement in her gait.  Over the last month or so she has noticed a decrease in the improvement further back to baseline.  She is also noticed more difficulty with her memory.  She is also noticed some speech difficulty intermittently as well.  She denies any difficulty with fine motor tasks.  She denies any difficulty holding onto things.  She denies any significant headaches.  Current Outpatient Medications   Medication     acetaminophen (TYLENOL) 500 MG tablet     ALPRAZolam (XANAX) 0.25 MG tablet     cholecalciferol 25 MCG (1000 UT) TABS     dicyclomine (BENTYL) 10 MG capsule     levothyroxine (SYNTHROID/LEVOTHROID) 50 MCG tablet     loperamide (IMODIUM A-D) 2 MG tablet     losartan (COZAAR) 25 MG tablet     meclizine (ANTIVERT) 25 MG tablet     metoprolol succinate ER (TOPROL XL) 25 MG 24 hr tablet     omeprazole (PRILOSEC) 20 MG DR capsule     polyethylene glycol (MIRALAX) 17 GM/Dose powder     polyethylene glycol-propylene glycol (SYSTANE ULTRA) 0.4-0.3 % SOLN ophthalmic solution     psyllium (METAMUCIL/KONSYL) 58.6 % powder     simvastatin (ZOCOR) 20 MG tablet     No current facility-administered medications for this visit.      Physical Exam:  Vital signs:      BP: (!) 157/79 Pulse: 63             Weight: 61.1 kg (134 lb 12.8 oz)  Estimated body mass index is 25.88 kg/m  as calculated from the following:    Height as of 6/23/23: 1.537 m (5' 0.51\").    Weight as of this encounter: 61.1 kg (134 lb 12.8 oz).   She is full-strength in her bilateral upper and lower extremities.  Sensation is intact light touch throughout.  Pupils are equal " reactive to light extraocular movements are intact face symmetric.  The shunt pumps and refills.  It is set to 4.  Results Reviewed:  I personally viewed the images of a CT scan of the brain showing stable enlarged ventricles however there does appear to be possible increased transependymal flow in the horns of the lateral ventricles.  Assessment:  63-year-old female with history of NPH status post  shunt with worsening gait  Plan:  We discussed options including lowering the valve setting to increase the drainage from the  shunt versus continue with current status.  We discussed the risks including increase in subdural space and potential hygromas versus subdural hematoma.  These typically come on over a long period of time if they do and with her worsening symptoms and possible transependymal flow on the CT scan I believe it is reasonable to lower the shunt valve to 3.  After discussion she agreed to this and this was lowered to 3.  Should she have any new symptoms including headaches especially positional I instructed her to let me know.  Otherwise she can follow-up with me as needed going forward as well.    I spent 45 minutes reviewing the patient's chart, interviewing examining the patient as well as discussing diagnosis and treatment options.    Hira Pillai MD    Again, thank you for allowing me to participate in the care of your patient.        Sincerely,        Hira Pillai MD

## 2023-06-29 NOTE — PROGRESS NOTES
"HPI:  83-year-old female previously seen for placement of a right  shunt for NPH.  Postoperatively she noticed improvement in her gait.  Over the last month or so she has noticed a decrease in the improvement further back to baseline.  She is also noticed more difficulty with her memory.  She is also noticed some speech difficulty intermittently as well.  She denies any difficulty with fine motor tasks.  She denies any difficulty holding onto things.  She denies any significant headaches.  Current Outpatient Medications   Medication     acetaminophen (TYLENOL) 500 MG tablet     ALPRAZolam (XANAX) 0.25 MG tablet     cholecalciferol 25 MCG (1000 UT) TABS     dicyclomine (BENTYL) 10 MG capsule     levothyroxine (SYNTHROID/LEVOTHROID) 50 MCG tablet     loperamide (IMODIUM A-D) 2 MG tablet     losartan (COZAAR) 25 MG tablet     meclizine (ANTIVERT) 25 MG tablet     metoprolol succinate ER (TOPROL XL) 25 MG 24 hr tablet     omeprazole (PRILOSEC) 20 MG DR capsule     polyethylene glycol (MIRALAX) 17 GM/Dose powder     polyethylene glycol-propylene glycol (SYSTANE ULTRA) 0.4-0.3 % SOLN ophthalmic solution     psyllium (METAMUCIL/KONSYL) 58.6 % powder     simvastatin (ZOCOR) 20 MG tablet     No current facility-administered medications for this visit.      Physical Exam:  Vital signs:      BP: (!) 157/79 Pulse: 63             Weight: 61.1 kg (134 lb 12.8 oz)  Estimated body mass index is 25.88 kg/m  as calculated from the following:    Height as of 6/23/23: 1.537 m (5' 0.51\").    Weight as of this encounter: 61.1 kg (134 lb 12.8 oz).   She is full-strength in her bilateral upper and lower extremities.  Sensation is intact light touch throughout.  Pupils are equal reactive to light extraocular movements are intact face symmetric.  The shunt pumps and refills.  It is set to 4.  Results Reviewed:  I personally viewed the images of a CT scan of the brain showing stable enlarged ventricles however there does appear to be " possible increased transependymal flow in the horns of the lateral ventricles.  Assessment:  63-year-old female with history of NPH status post  shunt with worsening gait  Plan:  We discussed options including lowering the valve setting to increase the drainage from the  shunt versus continue with current status.  We discussed the risks including increase in subdural space and potential hygromas versus subdural hematoma.  These typically come on over a long period of time if they do and with her worsening symptoms and possible transependymal flow on the CT scan I believe it is reasonable to lower the shunt valve to 3.  After discussion she agreed to this and this was lowered to 3.  Should she have any new symptoms including headaches especially positional I instructed her to let me know.  Otherwise she can follow-up with me as needed going forward as well.    I spent 45 minutes reviewing the patient's chart, interviewing examining the patient as well as discussing diagnosis and treatment options.    Hira Pillai MD

## 2023-07-05 ENCOUNTER — TELEPHONE (OUTPATIENT)
Dept: GASTROENTEROLOGY | Facility: CLINIC | Age: 83
End: 2023-07-05
Payer: COMMERCIAL

## 2023-07-05 NOTE — TELEPHONE ENCOUNTER
[facility] Call Center    Phone Message    May a detailed message be left on voicemail: yes     Reason for Call: Other: Milena is calling in asking for a call back from her care team. Pt states that she had spoken with a dietician following her appt with Aminata Campoverde on 6/23 per her recommendation, and Pt was told that she has chronic kidney disease. Pt had not been told anything about this and would like to speak with her care team. Please call back as soon as possible to discuss.     Action Taken: Message routed to:  [facility] GI    Travel Screening: Not Applicable

## 2023-07-11 DIAGNOSIS — F41.9 ANXIETY: ICD-10-CM

## 2023-07-11 RX ORDER — ALPRAZOLAM 0.25 MG
0.25 TABLET ORAL
Qty: 30 TABLET | Refills: 2 | Status: SHIPPED | OUTPATIENT
Start: 2023-07-11 | End: 2023-10-18

## 2023-07-18 ENCOUNTER — VIRTUAL VISIT (OUTPATIENT)
Dept: GASTROENTEROLOGY | Facility: CLINIC | Age: 83
End: 2023-07-18
Payer: COMMERCIAL

## 2023-07-18 VITALS — DIASTOLIC BLOOD PRESSURE: 64 MMHG | SYSTOLIC BLOOD PRESSURE: 127 MMHG

## 2023-07-18 DIAGNOSIS — K58.2 IRRITABLE BOWEL SYNDROME WITH BOTH CONSTIPATION AND DIARRHEA: Primary | ICD-10-CM

## 2023-07-18 DIAGNOSIS — R14.0 ABDOMINAL BLOATING: ICD-10-CM

## 2023-07-18 DIAGNOSIS — Z98.890 HISTORY OF INTESTINAL SURGERY: ICD-10-CM

## 2023-07-18 PROCEDURE — 99207 PR NO CHARGE LOS: CPT | Mod: 93 | Performed by: DIETITIAN, REGISTERED

## 2023-07-18 PROCEDURE — 97802 MEDICAL NUTRITION INDIV IN: CPT | Mod: 93 | Performed by: DIETITIAN, REGISTERED

## 2023-07-18 ASSESSMENT — PAIN SCALES - GENERAL: PAINLEVEL: NO PAIN (0)

## 2023-07-18 NOTE — PATIENT INSTRUCTIONS
It was nice speaking with you today. Below are the nutrition recommendations we discussed at your visit.    Please let me know if you have any additional questions.    Nutrition Recommendations    Aim for gradually increasing fiber in your diet. A few ideas we discussed to help increase fiber based on your current meals include:    --Add some berries or other fruit into your cereal (or can have some fruit on the side along with cereal)    --Have a cereal that has some fiber such as the Cheerios which has more fiber than the rice krispies.    --At lunch have some vegetables and/or fruit along with your sandwich.    --You can use a whole grain bread for your sandwich (if you prefer a lower FODMAP bread, then can use sourdough bread).    Can use the low FODMAP diet list for general guidance on selecting some foods.     --Choose more of the low FODMAP foods as these are usually better tolerated and may cause less bloating.    --While trying to incorporate more fiber in your diet, can choose lower FODMAP fruits and vegetables.    3. Continue to drink at least 50-64 ounces or more of water per day.    4. Keep daily food and beverage journals and track any symptoms you experience to see if you can identify any specific foods or beverages causing symptoms for you.    Can follow up as needed.    If you would like to schedule a follow up appointment, please call [phone].    Thank you,    Nancy Welch, MS, RD, LD

## 2023-07-18 NOTE — NURSING NOTE
Is the patient currently in the state of [place]? YES    Visit mode:Telephone    If the visit is dropped, the patient can be reconnected by: VIDEO VISIT: Call  [phone]    Will anyone else be joining the visit? NO      How would you like to obtain your AVS? MyChart    Are changes needed to the allergy or medication list? NO    Reason for visit: Consult      Rossy GÓMEZ

## 2023-07-18 NOTE — LETTER
7/18/2023         RE: Milena Hamilton  [address] [address] [facility] [address]        Dear Colleague,    Thank you for referring your patient, Milena Hamilton, to the [facility] [place]. Please see a copy of my visit note below.    [facility] Out[facility] Medical Nutrition Therapy      Time Spent:  21 minutes  Session Type:  Initial   Referring Physician:  FORREST Walker CNP  Reason for RD Visit:   Irritable bowel syndrome with both constipation and diarrhea     Nutrition Assessment:  Patient is a 83 year old female with history that includes IBS with both constipation and diarrhea, abdominal bloating, GERD, diverticulitis s/p left hemicolectomy, renal insufficiency, heart failure, hypertension, hyperlipidemia, hypothroidism.    She stated that some days are better than other with symptoms. Stools varies and go back and forth between C and D. Reported that sometimes she has multiple very small stools that look like the size of elbow macaroni and has these stools about 20 times in a day. Has stomach bloating and pain. The pain has gotten a little better with using the fodmap foods list she got since meeting with GI. Spicy foods bother her stomach so avoids them. Otherwise she has not identified any other food triggers. She is loosely following the low fodmap list which she prefers versus following it strictly. She is a little confused though if she should choose the low or high fodmap foods. She also started miralax and 1/2 tsp metamucil since meeting with GI. She eats 3 meals per day and will have 1 cookie for a snack. She tries to drink a lot of water and goal is 64 oz per day but some days drinks less, ~50 oz. She is drinking an 8 oz cup of gatorade daily too. She is using lactose free milk in her cereal in the morning. If she eats a creamy soup or other lactose containing food, she take lactaid pill which she thinks helps.    Patient Active Problem List   Diagnosis     "Lichen planus    Family history of diabetes mellitus    Tinnitus    Panic disorder without agoraphobia    Gastritis    Osteopenia    Hyperlipidemia    Anxiety    DDD (degenerative disc disease), cervical    Advanced directives, counseling/discussion    Sacroiliac dysfunction    Chronic constipation    History of diverticulitis - s/p left hemicolectomy    Diarrhea    Health Care Home    Raynaud's disease    HTN (hypertension)    Premature atrial beats    Acquired hypothyroidism    Gastroesophageal reflux disease, esophagitis presence not specified    Chronic right-sided low back pain with right-sided sciatica    Scoliosis of lumbar spine, unspecified scoliosis type    Lumbar radiculopathy    Dysuria    Basal cell carcinoma of nose    Renal insufficiency syndrome    History of malignant neoplasm of skin    Skin changes due to chronic exposure to nonionizing radiation    Chronic systolic heart failure (H)    LBBB (left bundle branch block)    Cellulitis of fifth toe of right foot    Plantar fasciitis    Right foot pain     (ventriculoperitoneal) shunt status    Irritable bowel syndrome with both constipation and diarrhea     Height:   Ht Readings from Last 1 Encounters:   06/23/23 1.537 m (5' 0.51\")     Weight:  Wt Readings from Last 10 Encounters:   06/29/23 61.1 kg (134 lb 12.8 oz)   06/23/23 59 kg (130 lb)   06/17/23 59.2 kg (130 lb 8 oz)   05/18/23 60.3 kg (133 lb)   05/16/23 59.9 kg (132 lb)   05/06/23 59.9 kg (132 lb)   02/13/23 59.4 kg (131 lb)   02/09/23 59.9 kg (132 lb)   02/08/23 58.1 kg (128 lb)   01/12/23 58.3 kg (128 lb 8 oz)     BMI: Estimated body mass index is 25.88 kg/m  as calculated from the following:    Height as of 6/23/23: 1.537 m (5' 0.51\").    Weight as of 6/29/23: 61.1 kg (134 lb 12.8 oz).    Diet Recall:  (some usual/recent meals/snacks/beverages):  Meal Food    Breakfast Rice crispies or cheerios Cereal with lactose free milk   Lunch Chicken/turkey [place] with some starks (takes a lactose " pill) with chips   Dinner Yesterday: casserole chicken, broccoli, cream of chicken soup (took a lactose pill)   Snacks Cookie   Beverages 1 c hot black tea, tries to drink 64 oz sometimes 50 oz water, 8 oz gatorade some days has a sprite with lunch otherwise usually water   Alcohol Intake none      Labs:    Last Comprehensive Metabolic Panel:  Sodium   Date Value Ref Range Status   06/17/2023 141 136 - 145 mmol/L Final   01/14/2021 140 133 - 144 mmol/L Final     Potassium   Date Value Ref Range Status   06/17/2023 3.8 3.4 - 5.3 mmol/L Final   12/02/2022 4.0 3.4 - 5.3 mmol/L Final   01/14/2021 3.9 3.4 - 5.3 mmol/L Final     Chloride   Date Value Ref Range Status   06/17/2023 104 98 - 107 mmol/L Final   10/03/2022 109 94 - 109 mmol/L Final   01/14/2021 106 94 - 109 mmol/L Final     Carbon Dioxide   Date Value Ref Range Status   01/14/2021 32 20 - 32 mmol/L Final     Carbon Dioxide (CO2)   Date Value Ref Range Status   06/17/2023 27 22 - 29 mmol/L Final   10/03/2022 29 20 - 32 mmol/L Final     Anion Gap   Date Value Ref Range Status   06/17/2023 10 7 - 15 mmol/L Final   10/03/2022 4 3 - 14 mmol/L Final   01/14/2021 2 (L) 3 - 14 mmol/L Final     Glucose   Date Value Ref Range Status   06/17/2023 109 (H) 70 - 99 mg/dL Final   10/03/2022 98 70 - 99 mg/dL Final   01/14/2021 111 (H) 70 - 99 mg/dL Final     GLUCOSE BY METER POCT   Date Value Ref Range Status   12/03/2022 100 (H) 70 - 99 mg/dL Final     Urea Nitrogen   Date Value Ref Range Status   06/17/2023 11.6 8.0 - 23.0 mg/dL Final   10/03/2022 10 7 - 30 mg/dL Final   01/14/2021 15 7 - 30 mg/dL Final     Creatinine   Date Value Ref Range Status   06/17/2023 0.69 0.51 - 0.95 mg/dL Final   01/14/2021 0.62 0.52 - 1.04 mg/dL Final     GFR Estimate   Date Value Ref Range Status   06/17/2023 86 >60 mL/min/1.73m2 Final     Comment:     eGFR calculated using 2021 CKD-EPI equation.   02/15/2021 69 >60 mL/min/[1.73_m2] Final     Calcium   Date Value Ref Range Status   06/17/2023  9.3 8.8 - 10.2 mg/dL Final   01/14/2021 9.0 8.5 - 10.1 mg/dL Final     CBC RESULTS:   Recent Labs   Lab Test 06/17/23  1027   WBC 5.6   RBC 4.19   HGB 12.8   HCT 40.7   MCV 97   MCH 30.5   MCHC 31.4*   RDW 12.5          Pertinent Medications/vitamin and mineral supplements:     Current Outpatient Medications   Medication    acetaminophen (TYLENOL) 500 MG tablet    ALPRAZolam (XANAX) 0.25 MG tablet    cholecalciferol 25 MCG (1000 UT) TABS    dicyclomine (BENTYL) 10 MG capsule    levothyroxine (SYNTHROID/LEVOTHROID) 50 MCG tablet    loperamide (IMODIUM A-D) 2 MG tablet    losartan (COZAAR) 25 MG tablet    metoprolol succinate ER (TOPROL XL) 25 MG 24 hr tablet    omeprazole (PRILOSEC) 20 MG DR capsule    polyethylene glycol-propylene glycol (SYSTANE ULTRA) 0.4-0.3 % SOLN ophthalmic solution    psyllium (METAMUCIL/KONSYL) 58.6 % powder    simvastatin (ZOCOR) 20 MG tablet    meclizine (ANTIVERT) 25 MG tablet    polyethylene glycol (MIRALAX) 17 GM/Dose powder     No current facility-administered medications for this visit.     Food Allergies:  Raspberry    MALNUTRITION:  % Weight Loss:  None noted  % Intake:  No decreased intake noted  Subcutaneous Fat Loss:  None observed  Muscle Loss:  None observed  Fluid Retention:  None noted    Malnutrition Diagnosis: Patient does not meet two of the above criteria necessary for diagnosing malnutrition  In Context of:  Chronic illness or disease    Nutrition Prescription: Nutrition Education     Nutrition Intervention      Provided diet education for IBS with both C and D. Discussed some general IBS tips for mixed stools and also reviewed low FODMAP diet. Based on her preference discussed using the low fodmap list generally for making food choices but does not need to follow restrictive low FODMAP diet based on her preference. Encouraged her to increase fiber in her diet gradually and discussed some specific ideas based on her current diet recall and choices. Discussed food  journals and fluid recommendations.    Answered patient's questions. Patient verbalized understanding of education provided. See Goals below.     Educational Materials Provided:  Nutrition Care Manual: IBS nutrition therapy, Low FODMAP nutrition therapy and FODMAP food chart     Goals:    Aim for gradually increasing fiber in your diet. A few ideas we discussed to help increase fiber based on your current meals include:    --Add some berries or other fruit into your cereal (or can have some fruit on the side along with cereal)    --Have a cereal that has some fiber such as the Cheerios which has more fiber than the rice krispies.    --At lunch have some vegetables and/or fruit along with your sandwich.    --You can use a whole grain bread for your sandwich (if you prefer a lower FODMAP bread, then can use sourdough bread).    Can use the low FODMAP diet list for general guidance on selecting some foods.     --Choose more of the low FODMAP foods as these are usually better tolerated and may cause less bloating.    --While trying to incorporate more fiber in your diet, can choose lower FODMAP fruits and vegetables.    3. Continue to drink at least 50-64 ounces or more of water per day.    4. Keep daily food and beverage journals and track any symptoms you experience to see if you can identify any specific foods or beverages causing symptoms for you.    Nutrition Monitoring and Evaluation: Will monitor adherence to nutrition recommendations at future RD visits.     Further Medical Nutrition Therapy:  Follow-up as needed.    Patient was encouraged to contact RD with any further questions.        Again, thank you for allowing me to participate in the care of your patient.      Sincerely,    Nancy Welch RD

## 2023-07-18 NOTE — PROGRESS NOTES
Virtual Visit Details    Type of service:  Telephone Visit   Phone call duration: 21 minutes     [facility] Outpatient Medical Nutrition Therapy      Time Spent:  21 minutes  Session Type:  Initial   Referring Physician:  FORREST Walker CNP  Reason for RD Visit:   Irritable bowel syndrome with both constipation and diarrhea     Nutrition Assessment:  Patient is a 83 year old female with history that includes IBS with both constipation and diarrhea, abdominal bloating, GERD, diverticulitis s/p left hemicolectomy, renal insufficiency, heart failure, hypertension, hyperlipidemia, hypothroidism.    She stated that some days are better than other with symptoms. Stools varies and go back and forth between C and D. Reported that sometimes she has multiple very small stools that look like the size of elbow macaroni and has these stools about 20 times in a day. Has stomach bloating and pain. The pain has gotten a little better with using the fodmap foods list she got since meeting with GI. Spicy foods bother her stomach so avoids them. Otherwise she has not identified any other food triggers. She is loosely following the low fodmap list which she prefers versus following it strictly. She is a little confused though if she should choose the low or high fodmap foods. She also started miralax and 1/2 tsp metamucil since meeting with GI. She eats 3 meals per day and will have 1 cookie for a snack. She tries to drink a lot of water and goal is 64 oz per day but some days drinks less, ~50 oz. She is drinking an 8 oz cup of gatorade daily too. She is using lactose free milk in her cereal in the morning. If she eats a creamy soup or other lactose containing food, she take lactaid pill which she thinks helps.    Patient Active Problem List   Diagnosis     Lichen planus     Family history of diabetes mellitus     Tinnitus     Panic disorder without agoraphobia     Gastritis     Osteopenia     Hyperlipidemia     Anxiety      "DDD (degenerative disc disease), cervical     Advanced directives, counseling/discussion     Sacroiliac dysfunction     Chronic constipation     History of diverticulitis - s/p left hemicolectomy     Diarrhea     Health Care Home     Raynaud's disease     HTN (hypertension)     Premature atrial beats     Acquired hypothyroidism     Gastroesophageal reflux disease, esophagitis presence not specified     Chronic right-sided low back pain with right-sided sciatica     Scoliosis of lumbar spine, unspecified scoliosis type     Lumbar radiculopathy     Dysuria     Basal cell carcinoma of nose     Renal insufficiency syndrome     History of malignant neoplasm of skin     Skin changes due to chronic exposure to nonionizing radiation     Chronic systolic heart failure (H)     LBBB (left bundle branch block)     Cellulitis of fifth toe of right foot     Plantar fasciitis     Right foot pain      (ventriculoperitoneal) shunt status     Irritable bowel syndrome with both constipation and diarrhea     Height:   Ht Readings from Last 1 Encounters:   06/23/23 1.537 m (5' 0.51\")     Weight:  Wt Readings from Last 10 Encounters:   06/29/23 61.1 kg (134 lb 12.8 oz)   06/23/23 59 kg (130 lb)   06/17/23 59.2 kg (130 lb 8 oz)   05/18/23 60.3 kg (133 lb)   05/16/23 59.9 kg (132 lb)   05/06/23 59.9 kg (132 lb)   02/13/23 59.4 kg (131 lb)   02/09/23 59.9 kg (132 lb)   02/08/23 58.1 kg (128 lb)   01/12/23 58.3 kg (128 lb 8 oz)     BMI: Estimated body mass index is 25.88 kg/m  as calculated from the following:    Height as of 6/23/23: 1.537 m (5' 0.51\").    Weight as of 6/29/23: 61.1 kg (134 lb 12.8 oz).    Diet Recall:  (some usual/recent meals/snacks/beverages):  Meal Food    Breakfast Rice crispies or cheerios Cereal with lactose free milk   Lunch Chicken/turkey Igo with some starks (takes a lactose pill) with chips   Dinner Yesterday: casserole chicken, broccoli, cream of chicken soup (took a lactose pill)   Snacks Cookie "   Beverages 1 c hot black tea, tries to drink 64 oz sometimes 50 oz water, 8 oz gatorade some days has a sprite with lunch otherwise usually water   Alcohol Intake none      Labs:    Last Comprehensive Metabolic Panel:  Sodium   Date Value Ref Range Status   06/17/2023 141 136 - 145 mmol/L Final   01/14/2021 140 133 - 144 mmol/L Final     Potassium   Date Value Ref Range Status   06/17/2023 3.8 3.4 - 5.3 mmol/L Final   12/02/2022 4.0 3.4 - 5.3 mmol/L Final   01/14/2021 3.9 3.4 - 5.3 mmol/L Final     Chloride   Date Value Ref Range Status   06/17/2023 104 98 - 107 mmol/L Final   10/03/2022 109 94 - 109 mmol/L Final   01/14/2021 106 94 - 109 mmol/L Final     Carbon Dioxide   Date Value Ref Range Status   01/14/2021 32 20 - 32 mmol/L Final     Carbon Dioxide (CO2)   Date Value Ref Range Status   06/17/2023 27 22 - 29 mmol/L Final   10/03/2022 29 20 - 32 mmol/L Final     Anion Gap   Date Value Ref Range Status   06/17/2023 10 7 - 15 mmol/L Final   10/03/2022 4 3 - 14 mmol/L Final   01/14/2021 2 (L) 3 - 14 mmol/L Final     Glucose   Date Value Ref Range Status   06/17/2023 109 (H) 70 - 99 mg/dL Final   10/03/2022 98 70 - 99 mg/dL Final   01/14/2021 111 (H) 70 - 99 mg/dL Final     GLUCOSE BY METER POCT   Date Value Ref Range Status   12/03/2022 100 (H) 70 - 99 mg/dL Final     Urea Nitrogen   Date Value Ref Range Status   06/17/2023 11.6 8.0 - 23.0 mg/dL Final   10/03/2022 10 7 - 30 mg/dL Final   01/14/2021 15 7 - 30 mg/dL Final     Creatinine   Date Value Ref Range Status   06/17/2023 0.69 0.51 - 0.95 mg/dL Final   01/14/2021 0.62 0.52 - 1.04 mg/dL Final     GFR Estimate   Date Value Ref Range Status   06/17/2023 86 >60 mL/min/1.73m2 Final     Comment:     eGFR calculated using 2021 CKD-EPI equation.   02/15/2021 69 >60 mL/min/[1.73_m2] Final     Calcium   Date Value Ref Range Status   06/17/2023 9.3 8.8 - 10.2 mg/dL Final   01/14/2021 9.0 8.5 - 10.1 mg/dL Final     CBC RESULTS:   Recent Labs   Lab Test 06/17/23  1021    WBC 5.6   RBC 4.19   HGB 12.8   HCT 40.7   MCV 97   MCH 30.5   MCHC 31.4*   RDW 12.5          Pertinent Medications/vitamin and mineral supplements:     Current Outpatient Medications   Medication     acetaminophen (TYLENOL) 500 MG tablet     ALPRAZolam (XANAX) 0.25 MG tablet     cholecalciferol 25 MCG (1000 UT) TABS     dicyclomine (BENTYL) 10 MG capsule     levothyroxine (SYNTHROID/LEVOTHROID) 50 MCG tablet     loperamide (IMODIUM A-D) 2 MG tablet     losartan (COZAAR) 25 MG tablet     metoprolol succinate ER (TOPROL XL) 25 MG 24 hr tablet     omeprazole (PRILOSEC) 20 MG DR capsule     polyethylene glycol-propylene glycol (SYSTANE ULTRA) 0.4-0.3 % SOLN ophthalmic solution     psyllium (METAMUCIL/KONSYL) 58.6 % powder     simvastatin (ZOCOR) 20 MG tablet     meclizine (ANTIVERT) 25 MG tablet     polyethylene glycol (MIRALAX) 17 GM/Dose powder     No current facility-administered medications for this visit.     Food Allergies:  Raspberry    MALNUTRITION:  % Weight Loss:  None noted  % Intake:  No decreased intake noted  Subcutaneous Fat Loss:  None observed  Muscle Loss:  None observed  Fluid Retention:  None noted    Malnutrition Diagnosis: Patient does not meet two of the above criteria necessary for diagnosing malnutrition  In Context of:  Chronic illness or disease    Nutrition Prescription: Nutrition Education     Nutrition Intervention      Provided diet education for IBS with both C and D. Discussed some general IBS tips for mixed stools and also reviewed low FODMAP diet. Based on her preference discussed using the low fodmap list generally for making food choices but does not need to follow restrictive low FODMAP diet based on her preference. Encouraged her to increase fiber in her diet gradually and discussed some specific ideas based on her current diet recall and choices. Discussed food journals and fluid recommendations.    Answered patient's questions. Patient verbalized understanding of  education provided. See Goals below.     Educational Materials Provided:  Nutrition Care Manual: IBS nutrition therapy, Low FODMAP nutrition therapy and FODMAP food chart     Goals:    1. Aim for gradually increasing fiber in your diet. A few ideas we discussed to help increase fiber based on your current meals include:    --Add some berries or other fruit into your cereal (or can have some fruit on the side along with cereal)    --Have a cereal that has some fiber such as the Cheerios which has more fiber than the rice krispies.    --At lunch have some vegetables and/or fruit along with your sandwich.    --You can use a whole grain bread for your sandwich (if you prefer a lower FODMAP bread, then can use sourdough bread).    2. Can use the low FODMAP diet list for general guidance on selecting some foods.     --Choose more of the low FODMAP foods as these are usually better tolerated and may cause less bloating.    --While trying to incorporate more fiber in your diet, can choose lower FODMAP fruits and vegetables.    3. Continue to drink at least 50-64 ounces or more of water per day.    4. Keep daily food and beverage journals and track any symptoms you experience to see if you can identify any specific foods or beverages causing symptoms for you.    Nutrition Monitoring and Evaluation: Will monitor adherence to nutrition recommendations at future RD visits.     Further Medical Nutrition Therapy:  Follow-up as needed.    Patient was encouraged to contact RD with any further questions.    Nancy Welch MS, RD, LD

## 2023-09-04 NOTE — TELEPHONE ENCOUNTER
Call placed, message left to return call.  Patient needs message below from Aminata Campoverde NP.     Liz Richardson RN    Yes

## 2023-09-05 ENCOUNTER — TELEPHONE (OUTPATIENT)
Dept: GASTROENTEROLOGY | Facility: CLINIC | Age: 83
End: 2023-09-05
Payer: COMMERCIAL

## 2023-09-05 NOTE — TELEPHONE ENCOUNTER
"Spoke with patient who reports over the past 2 days she has had looser then normal stools, rectal pain, and dark stools.      Reports that she normally has some abdominal and rectal pain, however her pain has seemed worse over the past 2 days.  She does use Bentyl as needed for severe pain, and this is helpful.  She is unable to rate pain.  When asked about a location for pain she reports pain is all over inside, but usually worse at the rectum.     She states that she normally has loose stools, but they are looser then normal the past couple days.  She is not sure if this is related to diet.  Does feel that she is having a increase in stools, still going about 3 times daily and her first stool of the day is always diarrhea.     States that she did eat 1/2 cup of blueberries the past 2 days, she has started the FODMAP diet.  She is not sure if this is the cause of her dark colored stools.  Is not sure if the coloring was the \"skin\" of the blueberries or not.  Dark colored stools started yesterday evening and continued to today.     Patient denies any nausea or vomiting.    Lzi Richardson Rn   "

## 2023-09-05 NOTE — TELEPHONE ENCOUNTER
[facility] Call Center    Phone Message    May a detailed message be left on voicemail: yes     Reason for Call: Other: pt called in and she said her colon is sore, still having diarrhea -day five. Black in color. No fever. Feeling weak.      Action Taken: Message routed to:  [facility] GI    Travel Screening: Not Applicable

## 2023-09-06 DIAGNOSIS — R19.7 DIARRHEA, UNSPECIFIED TYPE: Primary | ICD-10-CM

## 2023-09-06 NOTE — TELEPHONE ENCOUNTER
Call placed, message left to return call when able.   Patient needs message below when she returns call.     Liz Richardson RN

## 2023-09-06 NOTE — TELEPHONE ENCOUNTER
Patient returned call.  She will do the stool samples and will call for appointment with lab close to her.  She does not want the cholestyramine at this time.  She will use the loperamide as needed for diarrhea.     Liz Richardson Rn

## 2023-09-06 NOTE — TELEPHONE ENCOUNTER
Aminata Campoverde APRN CNP   [facility] Pool5 minutes ago (9:12 AM)     TS  I ordered some stool studies including FOBT. If FOBT comes back positive for blood, will refer to colonoscopy.  Patient may take 1-2 mg of loperamide 2-4 times a day as needed for diarrhea. Would she be interested in re-starting cholestyramine powder or of a trial of colestipol tablet?  She can try Calmoseptine cream for rectal irritation (which is likely due to bile acid induced diarrhea as we discussed before).  Thank you,  Aminata Campoverde CNP, GI

## 2023-09-07 ENCOUNTER — APPOINTMENT (OUTPATIENT)
Dept: LAB | Facility: OTHER | Age: 83
End: 2023-09-07
Payer: COMMERCIAL

## 2023-09-11 ENCOUNTER — TELEPHONE (OUTPATIENT)
Dept: GASTROENTEROLOGY | Facility: CLINIC | Age: 83
End: 2023-09-11
Payer: COMMERCIAL

## 2023-09-11 NOTE — TELEPHONE ENCOUNTER
Patient informed of results.  States that she is using the imodium as needed.  Dark stools have improved and she has had some more normal stools.      Liz Richardson Rn

## 2023-09-11 NOTE — TELEPHONE ENCOUNTER
----- Message from FORREST Walker CNP sent at 9/8/2023  7:58 AM CDT -----  Please let the patient know that her stool test was negative for blood. Dark stools could be related to food she consumed (blueberries, blackberries, beets, etc) or Pepto-bismol.  There was no signs of infection on enteric panel.  Would suggest to restart cholestyramine, 2 gram at supper.  Thanks,  Aminata Campoverde CNP, GI

## 2023-09-29 ENCOUNTER — OFFICE VISIT (OUTPATIENT)
Dept: GASTROENTEROLOGY | Facility: CLINIC | Age: 83
End: 2023-09-29
Attending: NURSE PRACTITIONER
Payer: COMMERCIAL

## 2023-09-29 VITALS
WEIGHT: 132 LBS | SYSTOLIC BLOOD PRESSURE: 130 MMHG | DIASTOLIC BLOOD PRESSURE: 76 MMHG | HEART RATE: 68 BPM | BODY MASS INDEX: 24.92 KG/M2 | HEIGHT: 61 IN

## 2023-09-29 DIAGNOSIS — K58.2 IRRITABLE BOWEL SYNDROME WITH BOTH CONSTIPATION AND DIARRHEA: ICD-10-CM

## 2023-09-29 DIAGNOSIS — R10.31 ABDOMINAL PAIN, RIGHT LOWER QUADRANT: Primary | ICD-10-CM

## 2023-09-29 DIAGNOSIS — R14.0 ABDOMINAL BLOATING: ICD-10-CM

## 2023-09-29 PROCEDURE — 99214 OFFICE O/P EST MOD 30 MIN: CPT | Performed by: NURSE PRACTITIONER

## 2023-09-29 ASSESSMENT — PAIN SCALES - GENERAL: PAINLEVEL: NO PAIN (0)

## 2023-09-29 NOTE — PATIENT INSTRUCTIONS
"It was a pleasure taking care of you today.  I've included a brief summary of our discussion and care plan from today's visit below.  Please review this information with your primary care provider.  ______________________________________________________________________    My recommendations are summarized as follows:    Continue to take Metamucil half of a teaspoon with supper.  This should help to make your stools more solid and less frequent.    2.  Use dicyclomine (Bentyl) as needed for abdominal pain.  You can take 1 tablet up to 3 times a day if needed.    3.  Watch your diet and abstain foods that might cause upset stomach.  You found to have lactose intolerance.  Use Lactaid tablets or abstain.dairy to prevent symptoms.    4.  Please contact us if your right-sided abdominal pain becomes worse.  I will order CT scan for evaluation.  At this point, I believe the pain could be related to diverticulosis and abdominal bloating.  The key to management of pain is to maintain regular bowel movements.    Return to GI Clinic in 6 month to review your progress.    ______________________________________________________________________    Lactose intolerance  Lactose intolerance is a condition that makes it hard for your body to digest milk and foods made with milk (called dairy products). If you have lactose intolerance and you eat dairy products, you can get diarrhea, belly pain, and gas.  Lactose intolerance can affect anyone. But it is most common among , , and Black people.    In people who do not have lactose intolerance, the body makes a protein called an \"enzyme\" that breaks down lactose, the main form of sugar found in milk. In people who do have lactose intolerance, the body either does not make enough of the enzyme, or the enzyme does not work as well as it should. Also, some infections, such as you might get with food poisoning, can damage the enzyme. But if that happens, the problem " usually goes away within a few weeks. Luckily, people with lactose intolerance can take an enzyme supplement to help with their problem.      The symptoms happen only after you eat dairy foods. They can include:  ?Cramps or belly pain (usually around or below the belly button)  ?Bloating (feeling like your belly is full of air)  ?Gas  ?Diarrhea (often it is bulky, foamy, and watery)  ?Vomiting (this happens mostly in teens)    You can start by cutting down but not stopping foods you know contain dairy. Dairy foods should be consumed with meals. Dairy foods include milk, cream, ice cream, yogurt, cheese, and butter.   If you are really sensitive to dairy foods or lactose, you will also need to read the labels on everything you eat. Milk or lactose is sometimes added to foods you might not suspect, such as cereal, instant soups, and salad dressings.     There are many over-the-counter enzyme supplements to choose from, including Lactaid (tablets or liquid), Lactrase, LactAce, Dairy Ease, and Lactrol. You should take the supplement right before you start eating. If you forget, you can take it during the meal, but it might not work as well.  The important thing to know is that each product works a bit differently for each person. Plus, none of them can break down every last bit of lactose, so some people still have symptoms even with an enzyme supplement.       FODMAP:  The FODMAP term was coined by [place] researchers Mary Longoria and Jomar Pardo. They found that a low-FODMAP diet helped 75-85% of patients with irritable bowel syndrome or IBS.  Products containing lactose (dairy), fructose (fruit sugar),sweeteners (sorbitol, mannitol), fructans (a type of fiber found in wheat, onions, garlic and chicory root), and GOS (a type of fiber found in beans, hummus and soy milk) are examples of FODMAPs.  They can be poorly absorbed during the digestive process. They are rapidly fermented by the bacteria that live  in your gut. They are capable of pulling fluid into the gut in a process called osmosis. The increased fluid load, along with the type and amount of gas produced, cause distension and motility changes, leading to bloating, abdominal pain, diarrhea, and nausea. Symptoms are often delayed until hours after eating a high FODMAP meal or snack, because it takes time for FODMAPs to make their way through the stomach and into the intestines, where the effects occur. By reducing the overall dietary load of these carbohydrates, troublesome GI symptoms can be minimized or eliminated.     A low-FODMAP diet avoids foods containing certain sugars and certain fibers capable of causing diarrhea, constipation, gas, bloating and abdominal pain in people with IBS. At the same time, I would recommend not to exclude all of the FODMAPs foods completely from your diet, but instead, to use alternative foods from the same group. Try the diet for 4-6 weeks, if you have not noticed significant changes in your symptoms, stop the diet. Discuss further plan with your primary care provider or gastroenterology provider.            ______________________________________________________________________    Who do I call with any questions after my visit?  Please be in touch if there are any further questions that arise following today's visit.  There are multiple ways to contact your gastroenterology care team.      During business hours, you may reach a Gastroenterology nurse at [phone], option 3.     To schedule or reschedule an appointment, please call [phone].   To schedule your imaging studies (CT, MRI, ultrasound)  call [phone] (or toll-free # [phone])  To schedule your lab work at [facility], please call [phone]    You can always send a secure message through [employer].  [employer] messages are answered by your nurse or doctor typically within 24 hours.  Please allow extra time on weekends and  holidays.      For urgent/emergent questions after business hours, you may reach the on-call GI Fellow by contacting the [facility]  at [phone].    In order for your refill to be processed in a timely fashion, it is your responsibility to ensure you follow the recommendations from your provider regarding your laboratory studies and follow up appointments.       How will I get the results of any tests ordered?    You will receive all of your results.  If you have signed up for [employer], any tests ordered at your visit will be available to you after your physician reviews them.  Typically this takes 1-2 weeks.  If there are urgent results that require a change in your care plan, your physician or nurse will call you to discuss the next steps.   What is [employer]?  [employer] is a secure way for you to access all of your healthcare records from the [facility].  It is a web based computer program, so you can sign on to it from any location.  It also allows you to send secure messages to your care team.  I recommend signing up for [employer] access if you have not already done so and are comfortable with using a computer.    How to I schedule a follow-up visit?  If you did not schedule a follow-up visit today, please call [phone] to schedule a follow-up office visit.      Sincerely,  JOSE Walker,  [facility],  Division of Gastroenterology   ([facility])

## 2023-09-29 NOTE — PROGRESS NOTES
"Gastroenterology CLINIC VISIT, RETURN PATIENT    CC/REFERRING PROVIDER: Chuck Streeter   REASON FOR CONSULTATION: follow up    HPI: 83 year old female presented to GI clinic for follow up on abdominal bloating and bouts of loose stools, that usually occur after meals. She skips a day or two of bowel movements. May have a few hard stools next day.  At the same time, patient complains of having diarrhea, which she describes as 2-3 mushy or loose stools a day on her \"diarrhea days\".  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  Cannot exclude chronic constipation with overflow diarrhea.  Started the patient on cholestyramine and she reported significant improvement in her symptoms initially.  Then she said, she does not like taste of it and not certain if it is working, so she stopped the medication.    Recommended  to start taking 8-9 gram of Miralax every morning and Metamucil 4 gram with supper.  Patient said that she is not using Meralax, but takes 1/2 of teaspoon of Metamucil every evening, with supper. Takes prescribed  Dicyclomine as needed for abdominal cramping and pain.  She has not taken loperamide.  On her last visit, we had an extensive conversation on lactose free diet.  Patient uses Lactaid tablets before consumption of dairy products although per report, consumed dairy intake exceeds coverage provided by 1 tablet.    Today, the patient was excited to share that she has been having 1 formed soft stool every day, instead of 2-3 stools. Complains of intermittent mild RLQ pain after meals.   History of panic disorder with agoraphobia. Stated that she feels frustrated at times that her stools are \"no longer normal\". On her last visit, I explained chronicity of her condition and medications available to control her symptoms. Patient is a poor historian, having difficulty answering questions regarding her medications and take in her diet.  Suspect possible mild dementia, but " would recommend further evaluation by her primary care provider.    ROS: 10pt ROS performed and otherwise negative.    PAST MEDICAL HISTORY:  Past Medical History:   Diagnosis Date    Lichen planus     vulvar    Osteopenia 4/2010    recheck DEXA in 2 years    Panic disorder without agoraphobia     Pure hypercholesterolemia     low LDLs    Raynaud's disease 6/2014    Unspecified essential hypertension        PREVIOUS ABDOMINAL/GYNECOLOGIC SURGERIES:    Past Surgical History:   Procedure Laterality Date    COLONOSCOPY  10/17/2007    normal, recheck in 10 years    COLONOSCOPY N/A 08/27/2014    Procedure: COLONOSCOPY;  Surgeon: Raffi Montiel MD;  [facility]    COMBINED CYSTOSCOPY, INSERT CATHETER URETER Bilateral 10/13/2014    Procedure: COMBINED CYSTOSCOPY, INSERT CATHETER URETER;  Surgeon: Abdoulaye Odell MD;  [facility]    EYE SURGERY  08/01/2001    right eye muscle repair    HC REMOVAL GALLBLADDER  02/09/2010    lap    HC REMOVAL OF OVARY/TUBE(S)      Salpingo-Oophorectomy, bilateral    HYSTERECTOMY, PAP NO LONGER INDICATED      INJECT EPIDURAL LUMBAR Right 04/10/2017    Procedure: INJECT EPIDURAL LUMBAR;  Surgeon: Raffi Wright MD;  [facility]    INJECT EPIDURAL LUMBAR Right 02/27/2020    Procedure: Right Lumbar 4- Sacral 1 Epidural Steroid Injection;  Surgeon: Antonio Dasilva MD;  [facility]    INJECT EPIDURAL LUMBAR Right 01/29/2021    Procedure: INJECTION, SPINE,  sacral 1 EPIDURAL;  Surgeon: Antonio Dasilva MD;  [facility]    INJECT EPIDURAL TRANSFORAMINAL Right 06/09/2016    Procedure: INJECT EPIDURAL TRANSFORAMINAL;  Surgeon: Antonio Dasilva MD;  [facility]    INJECT EPIDURAL TRANSFORAMINAL Right 10/13/2016    Procedure: INJECT EPIDURAL TRANSFORAMINAL;  Surgeon: Antonio Dasilva MD;  [facility]    INJECT EPIDURAL TRANSFORAMINAL Right 06/18/2021    Procedure: Right L4-5 and right L5-S1 Transforaminal Epidural Steroid Injections with fluoroscopic guidance and  contrast.;  Surgeon: Antonio Dasilva MD;  [facility]    INJECT JOINT SACROILIAC Right 04/14/2022    Procedure: Fluoroscopically-guided injection of the Bilateral sacroiliac joints with Bilateral pastor Sacroiliac joint ligaments infiltration over the sacrum;  Surgeon: Antonio Dasilva MD;  [facility]    LAPAROSCOPIC ASSISTED COLECTOMY LEFT (DESCENDING) N/A 10/13/2014    Procedure: LAPAROSCOPIC ASSISTED COLECTOMY LEFT (DESCENDING);  Surgeon: Raffi Montiel MD;  [facility]    LUMBAR SPINE SURGERY  09/2021    L2-Pelvis fusion with rods and screws    OPTICAL TRACKING SYSTEM IMPLANT SHUNT VENTRICULOPERITONEAL Right 12/2/2022    Procedure: Right Ventriculoperitoneal;  Surgeon: Hira Pillai MD;  [facility]    ZZC APPENDECTOMY      ZZC NONSPECIFIC PROCEDURE      Right inferior oblique recession, 14 mm    ZZC VAG HYST,RMV TUBE/OVARY  01/01/1984    cervix removed         PERTINENT MEDICATIONS:  Current Outpatient Medications   Medication Sig Dispense Refill    acetaminophen (TYLENOL) 500 MG tablet Take 500-1,000 mg by mouth every 6 hours as needed for mild pain      ALPRAZolam (XANAX) 0.25 MG tablet TAKE 1 TABLET (0.25 MG) BY MOUTH NIGHTLY AS NEEDED FOR ANXIETY 30 tablet 2    cholecalciferol 25 MCG (1000 UT) TABS Take 1,000 Units by mouth daily      dicyclomine (BENTYL) 10 MG capsule Take 1 capsule (10 mg) by mouth 3 times daily as needed (for abdominal cramping and pain, as needed) 30 capsule 4    levothyroxine (SYNTHROID/LEVOTHROID) 50 MCG tablet TAKE ONE TABLET BY MOUTH once daily 90 tablet 3    loperamide (IMODIUM A-D) 2 MG tablet Take 1 tablet (2 mg) by mouth daily as needed for diarrhea Take one tablet after 2 or more loose stools. Wait for one hour and if still have loose stools, take half of the tablet. 30 tablet 1    losartan (COZAAR) 25 MG tablet Take 1 tablet (25 mg) by mouth daily 90 tablet 3    meclizine (ANTIVERT) 25 MG tablet Take 1 tablet (25 mg) by mouth 3 times daily as needed for  dizziness 30 tablet 1    metoprolol succinate ER (TOPROL XL) 25 MG 24 hr tablet Take 1 tablet (25 mg) by mouth daily 90 tablet 3    omeprazole (PRILOSEC) 20 MG DR capsule TAKE ONE CAPSULE BY MOUTH ONCE DAILY 90 capsule 3    polyethylene glycol (MIRALAX) 17 GM/Dose powder Take 8 g by mouth daily Take 1/2 of the scoop in the morning 255 g 2    polyethylene glycol-propylene glycol (SYSTANE ULTRA) 0.4-0.3 % SOLN ophthalmic solution Place 1 drop into both eyes 3 times daily as needed for dry eyes      psyllium (METAMUCIL/KONSYL) 58.6 % powder Take 4 g by mouth daily Take 1/2 of teaspoon with supper. OK to increase the dose to one teaspoon in a week 283 g 2    simvastatin (ZOCOR) 20 MG tablet TAKE ONE-HALF TABLET BY MOUTH EVERY NIGHT 135 tablet 3         SOCIAL HISTORY:  Social History     Socioeconomic History    Marital status:      Spouse name: Not on file    Number of children: 4    Years of education: Not on file    Highest education level: Not on file   Occupational History    Occupation: dry cleaning   Tobacco Use    Smoking status: Never     Passive exposure: Never    Smokeless tobacco: Never   Vaping Use    Vaping Use: Never used   Substance and Sexual Activity    Alcohol use: No    Drug use: No    Sexual activity: Not Currently     Partners: Male     Birth control/protection: Surgical     Comment: not currently/hysterectomy   Other Topics Concern    Parent/sibling w/ CABG, MI or angioplasty before 65F 55M? No   Social History Narrative    Not on file     Social Determinants of Health     Financial Resource Strain: Not on file   Food Insecurity: Not on file   Transportation Needs: Not on file   Physical Activity: Not on file   Stress: Not on file   Social Connections: Not on file   Interpersonal Safety: Not on file   Housing Stability: Not on file       FAMILY HISTORY:  Denies colon/panc/esophageal/other GI CA, no other Everett or other HPS-related Spencer. No IBD/celiac, no other AI/liver/thyroid  "disease.    Family History   Problem Relation Age of Onset    Diabetes Brother     Heart Disease Brother 50        AMI    Diabetes Brother     Heart Disease Brother         AMI, \"older\"    Cerebrovascular Disease Brother     Psychotic Disorder Brother         , alcohol    Cancer Sister         breast cancer x2    Hypertension No family hx of     Breast Cancer No family hx of     Ovarian Cancer No family hx of     Prostate Cancer No family hx of     Mental Illness No family hx of     Anesthesia Reaction No family hx of     Osteoporosis No family hx of     Obesity No family hx of     Other Cancer No family hx of     Depression No family hx of     Anxiety Disorder No family hx of     Mental Illness No family hx of     Substance Abuse No family hx of        PHYSICAL EXAMINATION:  Vitals reviewed  /76 (BP Location: Right arm, Patient Position: Sitting, Cuff Size: Adult Regular)   Pulse 68   Ht 1.537 m (5' 0.5\")   Wt 59.9 kg (132 lb)   LMP  (LMP Unknown)   Breastfeeding No   BMI 25.36 kg/m      General: Patient appears well in no acute distress.    Skin: No visualized rash or lesions on visualized skin  HEENT:    EOMI, no erythema, sclera icterus or discharge noted.  Mouth mucosa intact, pink, moist  No cervical or supraclavicular lymphadenopathy. Thyroid gland not enlarged.  Resp: breathing comfortably without accessory muscle usage, speaking in full sentences, no cough. Lung sounds clear  Card: Regular and rhythmic S1 and S2. No gallop or rub. No murmur.  No LE edema.  Abdomen: Active bowel sounds X 4 quadrants. Soft to palpation.  No guarding or rebound tenderness. Dickey's sign negative.  MSK: Appears to have normal range of motion based on visualized movements  Neurologic: No apparent tremors, facial movements symmetric  Psych: affect normal, alert and oriented      ASSESSMENT/PLAN:    ICD-10-CM    1. Abdominal pain, right lower quadrant  R10.31 Adult GI Clinic Follow-Up Order (Blank)      2. " "Irritable bowel syndrome with both constipation and diarrhea  K58.2 Adult GI Clinic Follow-Up Order (Blank)     Adult GI Clinic Follow-Up Order (Blank)      3. Abdominal bloating  R14.0 Adult GI Clinic Follow-Up Order (Blank)     Adult GI Clinic Follow-Up Order (Blank)         83 year old female  presented  to GI clinic with her  for a follow-up on postprandial abdominal bloating and episodes of \"diarrhea\", which patient describes as bouts of 2-3 loose stools a day.  Those episodes alternate with skipping a bowel movement for a day or 2 or having regular or hard stools.  Working diagnosis include IBS, mixed type, bile salt induced diarrhea, functional dyspepsia, or constipation with overflow diarrhea.  Patient has history of cholecystectomy and diverticulitis surgery.   Today, patient reported that since she started taking half of the teaspoon of Metamucil, her diarrhea symptoms had resolved.  Has been having 1 formed stool every day.  She still complains of lower abdominal pain, mainly on the right.  CT scan from May of this year showed diverticulosis and no acute abnormalities in the patient's abdomen.  Two weeks ago, patient called and reported having black stools.  Her FOBT came back negative for blood.  Patient stated that she \"overate blueberries\".  Recommended to continue Metamucil and low FODMAP diet.  Take dicyclomine as needed for abdominal cramping.  Adhere to lactose-free diet.    Patient verbalized understanding and appreciation of care provided. Stated that all of the questions were answered to her/his satisfaction.  RTC in 6 months    Thank you for this consultation. It was a pleasure to participate in the care of this patient; please contact us with any further questions.    FORREST Walker, FNP-C  [facility]  Gastroenterology Department  [place], [place]    This note was created with Dragon voice recognition software, and while reviewed for accuracy, inadvertent minor typographic " errors may occur. Please contact the provider if you have any questions.

## 2023-09-29 NOTE — LETTER
"    9/29/2023         RE: Milena Hamilton  [address] [facility] [address]        Dear Colleague,    Thank you for referring your patient, Milena Hamilton, to the [facility]. Please see a copy of my visit note below.    Gastroenterology CLINIC VISIT, RETURN PATIENT    CC/REFERRING PROVIDER: Chuck Streeter   REASON FOR CONSULTATION: follow up    HPI: 83 year old female presented to GI clinic for follow up on abdominal bloating and bouts of loose stools, that usually occur after meals. She skips a day or two of bowel movements. May have a few hard stools next day.  At the same time, patient complains of having diarrhea, which she describes as 2-3 mushy or loose stools a day on her \"diarrhea days\".  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  Cannot exclude chronic constipation with overflow diarrhea.  Started the patient on cholestyramine and she reported significant improvement in her symptoms initially.  Then she said, she does not like taste of it and not certain if it is working, so she stopped the medication.    Recommended  to start taking 8-9 gram of Miralax every morning and Metamucil 4 gram with supper.  Patient said that she is not using Meralax, but takes 1/2 of teaspoon of Metamucil every evening, with supper. Takes prescribed  Dicyclomine as needed for abdominal cramping and pain.  She has not taken loperamide.  On her last visit, we had an extensive conversation on lactose free diet.  Patient uses Lactaid tablets before consumption of dairy products although per report, consumed dairy intake exceeds coverage provided by 1 tablet.    Today, the patient was excited to share that she has been having 1 formed soft stool every day, instead of 2-3 stools. Complains of intermittent mild RLQ pain after meals.   History of panic disorder with agoraphobia. Stated that she feels frustrated at times that her stools are \"no longer normal\". On her last " visit, I explained chronicity of her condition and medications available to control her symptoms. Patient is a poor historian, having difficulty answering questions regarding her medications and take in her diet.  Suspect possible mild dementia, but would recommend further evaluation by her primary care provider.    ROS: 10pt ROS performed and otherwise negative.    PAST MEDICAL HISTORY:  Past Medical History:   Diagnosis Date     Lichen planus     vulvar     Osteopenia 4/2010    recheck DEXA in 2 years     Panic disorder without agoraphobia      Pure hypercholesterolemia     low LDLs     Raynaud's disease 6/2014     Unspecified essential hypertension        PREVIOUS ABDOMINAL/GYNECOLOGIC SURGERIES:    Past Surgical History:   Procedure Laterality Date     COLONOSCOPY  10/17/2007    normal, recheck in 10 years     COLONOSCOPY N/A 08/27/2014    Procedure: COLONOSCOPY;  Surgeon: Raffi Montiel MD;  Location:  GI     COMBINED CYSTOSCOPY, INSERT CATHETER URETER Bilateral 10/13/2014    Procedure: COMBINED CYSTOSCOPY, INSERT CATHETER URETER;  Surgeon: Abdoulaye Odell MD;  Location:  OR     EYE SURGERY  08/01/2001    right eye muscle repair     HC REMOVAL GALLBLADDER  02/09/2010    lap     HC REMOVAL OF OVARY/TUBE(S)      Salpingo-Oophorectomy, bilateral     HYSTERECTOMY, PAP NO LONGER INDICATED       INJECT EPIDURAL LUMBAR Right 04/10/2017    Procedure: INJECT EPIDURAL LUMBAR;  Surgeon: Raffi Wright MD;  [facility]     INJECT EPIDURAL LUMBAR Right 02/27/2020    Procedure: Right Lumbar 4- Sacral 1 Epidural Steroid Injection;  Surgeon: Antonio Dasilva MD;  [facility]     INJECT EPIDURAL LUMBAR Right 01/29/2021    Procedure: INJECTION, SPINE,  sacral 1 EPIDURAL;  Surgeon: Antonio Dasilva MD;  [facility]     INJECT EPIDURAL TRANSFORAMINAL Right 06/09/2016    Procedure: INJECT EPIDURAL TRANSFORAMINAL;  Surgeon: Antonio Dasilav MD;  [facility]     INJECT EPIDURAL TRANSFORAMINAL Right  10/13/2016    Procedure: INJECT EPIDURAL TRANSFORAMINAL;  Surgeon: Antonio Dasilva MD;  [facility]     INJECT EPIDURAL TRANSFORAMINAL Right 06/18/2021    Procedure: Right L4-5 and right L5-S1 Transforaminal Epidural Steroid Injections with fluoroscopic guidance and contrast.;  Surgeon: Antonio Dasilva MD;  [facility]     INJECT JOINT SACROILIAC Right 04/14/2022    Procedure: Fluoroscopically-guided injection of the Bilateral sacroiliac joints with Bilateral pastor Sacroiliac joint ligaments infiltration over the sacrum;  Surgeon: Antonio Dasilva MD;  [facility]     LAPAROSCOPIC ASSISTED COLECTOMY LEFT (DESCENDING) N/A 10/13/2014    Procedure: LAPAROSCOPIC ASSISTED COLECTOMY LEFT (DESCENDING);  Surgeon: Raffi Montiel MD;  [facility]     LUMBAR SPINE SURGERY  09/2021    L2-Pelvis fusion with rods and screws     OPTICAL TRACKING SYSTEM IMPLANT SHUNT VENTRICULOPERITONEAL Right 12/2/2022    Procedure: Right Ventriculoperitoneal;  Surgeon: Hira Pillai MD;  [facility]     ZZC APPENDECTOMY       ZZC NONSPECIFIC PROCEDURE      Right inferior oblique recession, 14 mm     ZZC VAG HYST,RMV TUBE/OVARY  01/01/1984    cervix removed         PERTINENT MEDICATIONS:  Current Outpatient Medications   Medication Sig Dispense Refill     acetaminophen (TYLENOL) 500 MG tablet Take 500-1,000 mg by mouth every 6 hours as needed for mild pain       ALPRAZolam (XANAX) 0.25 MG tablet TAKE 1 TABLET (0.25 MG) BY MOUTH NIGHTLY AS NEEDED FOR ANXIETY 30 tablet 2     cholecalciferol 25 MCG (1000 UT) TABS Take 1,000 Units by mouth daily       dicyclomine (BENTYL) 10 MG capsule Take 1 capsule (10 mg) by mouth 3 times daily as needed (for abdominal cramping and pain, as needed) 30 capsule 4     levothyroxine (SYNTHROID/LEVOTHROID) 50 MCG tablet TAKE ONE TABLET BY MOUTH once daily 90 tablet 3     loperamide (IMODIUM A-D) 2 MG tablet Take 1 tablet (2 mg) by mouth daily as needed for diarrhea Take one tablet after 2 or  more loose stools. Wait for one hour and if still have loose stools, take half of the tablet. 30 tablet 1     losartan (COZAAR) 25 MG tablet Take 1 tablet (25 mg) by mouth daily 90 tablet 3     meclizine (ANTIVERT) 25 MG tablet Take 1 tablet (25 mg) by mouth 3 times daily as needed for dizziness 30 tablet 1     metoprolol succinate ER (TOPROL XL) 25 MG 24 hr tablet Take 1 tablet (25 mg) by mouth daily 90 tablet 3     omeprazole (PRILOSEC) 20 MG DR capsule TAKE ONE CAPSULE BY MOUTH ONCE DAILY 90 capsule 3     polyethylene glycol (MIRALAX) 17 GM/Dose powder Take 8 g by mouth daily Take 1/2 of the scoop in the morning 255 g 2     polyethylene glycol-propylene glycol (SYSTANE ULTRA) 0.4-0.3 % SOLN ophthalmic solution Place 1 drop into both eyes 3 times daily as needed for dry eyes       psyllium (METAMUCIL/KONSYL) 58.6 % powder Take 4 g by mouth daily Take 1/2 of teaspoon with supper. OK to increase the dose to one teaspoon in a week 283 g 2     simvastatin (ZOCOR) 20 MG tablet TAKE ONE-HALF TABLET BY MOUTH EVERY NIGHT 135 tablet 3         SOCIAL HISTORY:  Social History     Socioeconomic History     Marital status:      Spouse name: Not on file     Number of children: 4     Years of education: Not on file     Highest education level: Not on file   Occupational History     Occupation: dry cleaning   Tobacco Use     Smoking status: Never     Passive exposure: Never     Smokeless tobacco: Never   Vaping Use     Vaping Use: Never used   Substance and Sexual Activity     Alcohol use: No     Drug use: No     Sexual activity: Not Currently     Partners: Male     Birth control/protection: Surgical     Comment: not currently/hysterectomy   Other Topics Concern     Parent/sibling w/ CABG, MI or angioplasty before 65F 55M? No   Social History Narrative     Not on file     Social Determinants of Health     Financial Resource Strain: Not on file   Food Insecurity: Not on file   Transportation Needs: Not on file   Physical  "Activity: Not on file   Stress: Not on file   Social Connections: Not on file   Interpersonal Safety: Not on file   Housing Stability: Not on file       FAMILY HISTORY:  Denies colon/panc/esophageal/other GI CA, no other Everett or other HPS-related Spencer. No IBD/celiac, no other AI/liver/thyroid disease.    Family History   Problem Relation Age of Onset     Diabetes Brother      Heart Disease Brother 50        AMI     Diabetes Brother      Heart Disease Brother         AMI, \"older\"     Cerebrovascular Disease Brother      Psychotic Disorder Brother         , alcohol     Cancer Sister         breast cancer x2     Hypertension No family hx of      Breast Cancer No family hx of      Ovarian Cancer No family hx of      Prostate Cancer No family hx of      Mental Illness No family hx of      Anesthesia Reaction No family hx of      Osteoporosis No family hx of      Obesity No family hx of      Other Cancer No family hx of      Depression No family hx of      Anxiety Disorder No family hx of      Mental Illness No family hx of      Substance Abuse No family hx of        PHYSICAL EXAMINATION:  Vitals reviewed  /76 (BP Location: Right arm, Patient Position: Sitting, Cuff Size: Adult Regular)   Pulse 68   Ht 1.537 m (5' 0.5\")   Wt 59.9 kg (132 lb)   LMP  (LMP Unknown)   Breastfeeding No   BMI 25.36 kg/m      General: Patient appears well in no acute distress.    Skin: No visualized rash or lesions on visualized skin  HEENT:    EOMI, no erythema, sclera icterus or discharge noted.  Mouth mucosa intact, pink, moist  No cervical or supraclavicular lymphadenopathy. Thyroid gland not enlarged.  Resp: breathing comfortably without accessory muscle usage, speaking in full sentences, no cough. Lung sounds clear  Card: Regular and rhythmic S1 and S2. No gallop or rub. No murmur.  No LE edema.  Abdomen: Active bowel sounds X 4 quadrants. Soft to palpation.  No guarding or rebound tenderness. Diceky's sign negative.  MSK: " "Appears to have normal range of motion based on visualized movements  Neurologic: No apparent tremors, facial movements symmetric  Psych: affect normal, alert and oriented      ASSESSMENT/PLAN:    ICD-10-CM    1. Abdominal pain, right lower quadrant  R10.31 Adult GI Clinic Follow-Up Order (Blank)      2. Irritable bowel syndrome with both constipation and diarrhea  K58.2 Adult GI Clinic Follow-Up Order (Blank)     Adult GI Clinic Follow-Up Order (Blank)      3. Abdominal bloating  R14.0 Adult GI Clinic Follow-Up Order (Blank)     Adult GI Clinic Follow-Up Order (Blank)         83 year old female  presented  to GI clinic with her  for a follow-up on postprandial abdominal bloating and episodes of \"diarrhea\", which patient describes as bouts of 2-3 loose stools a day.  Those episodes alternate with skipping a bowel movement for a day or 2 or having regular or hard stools.  Working diagnosis include IBS, mixed type, bile salt induced diarrhea, functional dyspepsia, or constipation with overflow diarrhea.  Patient has history of cholecystectomy and diverticulitis surgery.   Today, patient reported that since she started taking half of the teaspoon of Metamucil, her diarrhea symptoms had resolved.  Has been having 1 formed stool every day.  She still complains of lower abdominal pain, mainly on the right.  CT scan from May of this year showed diverticulosis and no acute abnormalities in the patient's abdomen.  Two weeks ago, patient called and reported having black stools.  Her FOBT came back negative for blood.  Patient stated that she \"overate blueberries\".  Recommended to continue Metamucil and low FODMAP diet.  Take dicyclomine as needed for abdominal cramping.  Adhere to lactose-free diet.    Patient verbalized understanding and appreciation of care provided. Stated that all of the questions were answered to her/his satisfaction.  RTC in 6 months    Thank you for this consultation. It was a pleasure to " participate in the care of this patient; please contact us with any further questions.    FORREST Walker, SHAKEEL-KARYNA  [facility]  Gastroenterology Department  [place], [place]    This note was created with Dragon voice recognition software, and while reviewed for accuracy, inadvertent minor typographic errors may occur. Please contact the provider if you have any questions.       Again, thank you for allowing me to participate in the care of your patient.        Sincerely,        FORREST WALKER CNP

## 2023-10-11 ENCOUNTER — TELEPHONE (OUTPATIENT)
Dept: FAMILY MEDICINE | Facility: OTHER | Age: 83
End: 2023-10-11
Payer: COMMERCIAL

## 2023-10-11 DIAGNOSIS — N81.10 FEMALE BLADDER PROLAPSE: Primary | ICD-10-CM

## 2023-10-11 NOTE — TELEPHONE ENCOUNTER
"S-(situation): Bladder prolapse    B-(background): Patient states it has been known that she has a weak bladder/pelvic floor for a while. Patient has had hysterectomy.    A-(assessment): Patient stated that for the last 6 months she has noticed her bladder has been coming out of her vagina but it is just now getting worse. She stated it hurts the worse when she is walking and she notices it everyday. Patient was unable to rate pain on a scale 1-10 but did state it was \"pretty high up there.\" Patient states she tries to fully empty her bladder and denies any bladder infection symptoms.     R-(recommendations): RN advised patient she should be seen by an OBGYN. RN reviewed red flag symptoms with patient and when to seek emergency care.     RN routing to PCP to advise on appropriate recommendations and timing of appointment.     "

## 2023-10-18 DIAGNOSIS — F41.9 ANXIETY: ICD-10-CM

## 2023-10-18 RX ORDER — ALPRAZOLAM 0.25 MG
0.25 TABLET ORAL
Qty: 30 TABLET | Refills: 0 | Status: SHIPPED | OUTPATIENT
Start: 2023-10-18 | End: 2023-11-16

## 2023-11-16 ENCOUNTER — OFFICE VISIT (OUTPATIENT)
Dept: FAMILY MEDICINE | Facility: OTHER | Age: 83
End: 2023-11-16
Payer: COMMERCIAL

## 2023-11-16 VITALS
BODY MASS INDEX: 25.36 KG/M2 | DIASTOLIC BLOOD PRESSURE: 58 MMHG | RESPIRATION RATE: 14 BRPM | OXYGEN SATURATION: 95 % | HEART RATE: 64 BPM | WEIGHT: 132 LBS | SYSTOLIC BLOOD PRESSURE: 128 MMHG | TEMPERATURE: 98.7 F

## 2023-11-16 DIAGNOSIS — E03.9 ACQUIRED HYPOTHYROIDISM: ICD-10-CM

## 2023-11-16 DIAGNOSIS — G56.02 CARPAL TUNNEL SYNDROME OF LEFT WRIST: Primary | ICD-10-CM

## 2023-11-16 DIAGNOSIS — F41.9 ANXIETY: ICD-10-CM

## 2023-11-16 PROCEDURE — 90480 ADMN SARSCOV2 VAC 1/ONLY CMP: CPT | Performed by: PHYSICIAN ASSISTANT

## 2023-11-16 PROCEDURE — 91320 SARSCV2 VAC 30MCG TRS-SUC IM: CPT | Performed by: PHYSICIAN ASSISTANT

## 2023-11-16 PROCEDURE — 99214 OFFICE O/P EST MOD 30 MIN: CPT | Performed by: PHYSICIAN ASSISTANT

## 2023-11-16 RX ORDER — ALPRAZOLAM 0.25 MG
0.25 TABLET ORAL
Qty: 30 TABLET | Refills: 0 | Status: SHIPPED | OUTPATIENT
Start: 2023-11-16 | End: 2023-12-28

## 2023-11-16 ASSESSMENT — ANXIETY QUESTIONNAIRES
7. FEELING AFRAID AS IF SOMETHING AWFUL MIGHT HAPPEN: SEVERAL DAYS
4. TROUBLE RELAXING: SEVERAL DAYS
1. FEELING NERVOUS, ANXIOUS, OR ON EDGE: MORE THAN HALF THE DAYS
5. BEING SO RESTLESS THAT IT IS HARD TO SIT STILL: SEVERAL DAYS
GAD7 TOTAL SCORE: 8
2. NOT BEING ABLE TO STOP OR CONTROL WORRYING: SEVERAL DAYS
GAD7 TOTAL SCORE: 8
3. WORRYING TOO MUCH ABOUT DIFFERENT THINGS: SEVERAL DAYS
IF YOU CHECKED OFF ANY PROBLEMS ON THIS QUESTIONNAIRE, HOW DIFFICULT HAVE THESE PROBLEMS MADE IT FOR YOU TO DO YOUR WORK, TAKE CARE OF THINGS AT HOME, OR GET ALONG WITH OTHER PEOPLE: SOMEWHAT DIFFICULT
6. BECOMING EASILY ANNOYED OR IRRITABLE: SEVERAL DAYS

## 2023-11-16 ASSESSMENT — PATIENT HEALTH QUESTIONNAIRE - PHQ9
10. IF YOU CHECKED OFF ANY PROBLEMS, HOW DIFFICULT HAVE THESE PROBLEMS MADE IT FOR YOU TO DO YOUR WORK, TAKE CARE OF THINGS AT HOME, OR GET ALONG WITH OTHER PEOPLE: SOMEWHAT DIFFICULT
SUM OF ALL RESPONSES TO PHQ QUESTIONS 1-9: 3
SUM OF ALL RESPONSES TO PHQ QUESTIONS 1-9: 3

## 2023-11-16 ASSESSMENT — PAIN SCALES - GENERAL: PAINLEVEL: NO PAIN (0)

## 2023-11-16 NOTE — PROGRESS NOTES
Assessment & Plan       ICD-10-CM    1. Carpal tunnel syndrome of left wrist  G56.02       2. Anxiety  F41.9 ALPRAZolam (XANAX) 0.25 MG tablet      3. Acquired hypothyroidism  E03.9            Patient was concerned for tingling in her left fingers and hand that occur multiple times per day for the past two months. She had a positive Tinel sign and Durkan test upon physical exam. Symptoms are consistent with carpal tunnel. Discussed with patient to buy and wear a fabric velcro hand split when sleeping at night. Discussed to keep her wrist at a neutral or slight extension position during the day if possible. She can wear the splint as needed during the day. Contact the clinic if not improving.    Patient states she is currently taking 1 tab of alprazolam per day to help control her anxiety which has been going well. No panic attacks since last visit. A refill of the medication was given. She will follow-up in 6 months. No signs of abuse and PDMP reviewed today.    3.   Continue current dose of levothyroxine.    Follow-up in 6 months for annual wellness exam.    ITALIA Ford-S2    SUE RecinosC  [facility] 83 [facility], presenting for the following health issues:  Recheck Medication (6 month follow up)        11/16/2023     1:28 PM   Additional Questions   Roomed by Yadira   Accompanied by Spouse         11/16/2023     1:28 PM   Patient Reported Additional Medications   Patient reports taking the following new medications none       History of Present Illness       Mental Health Follow-up:  Patient presents to follow-up on Anxiety.    Patient's anxiety since last visit has been:  No change  The patient is not having other symptoms associated with anxiety.  Any significant life events: No  Patient is feeling anxious or having panic attacks.  Patient has no concerns about alcohol or drug use.    Hyperlipidemia:  She presents for follow up of  hyperlipidemia.   She is taking medication to lower cholesterol. She is not having myalgia or other side effects to statin medications.    Hypertension: She presents for follow up of hypertension.  She does check blood pressure  regularly outside of the clinic. Outpatient blood pressures have not been over 140/90. She does not follow a low salt diet.     Hypothyroidism:     Since last visit, patient describes the following symptoms::  None    She eats 2-3 servings of fruits and vegetables daily.She consumes 0 sweetened beverage(s) daily.She exercises with enough effort to increase her heart rate 20 to 29 minutes per day.  She exercises with enough effort to increase her heart rate 7 days per week.   She is taking medications regularly.     Denies any panic attacks since last visit. She states the only time she feels anxious is when she hasn't heard from her daughter in awhile. Her daughter is currently going through some medical issues.     She states that about 2 months ago, she started to notice tingling in her left fingers and hand that comes and goes throughout the day. She states she notices it when holding her cup of tea or when she is preparing dinner. She denies any radiation up the arm or numbness. Denies any numbness or tingling in her right fingers or hand. She has been trying OTC nerve cream.      PHQ-9 score:        11/16/2023     1:28 PM   PHQ   PHQ-9 Total Score 3   Q9: Thoughts of better off dead/self-harm past 2 weeks Not at all         2/19/2019     1:25 PM 12/28/2020    12:04 PM 11/16/2023     1:25 PM   MARINO-7 SCORE   Total Score 0 (minimal anxiety)  8 (mild anxiety)   Total Score 0 3 8       Review of Systems   Constitutional, cardiovascular, pulmonary, neuro, psych, gi and gu systems are negative, except as otherwise noted.      Objective    /58   Pulse 64   Temp 98.7  F (37.1  C) (Temporal)   Resp 14   Wt 59.9 kg (132 lb)   LMP  (LMP Unknown)   SpO2 95%   BMI 25.36 kg/m    Body mass  index is 25.36 kg/m .  Physical Exam   GENERAL: healthy, alert and no distress  RESP: lungs clear to auscultation - no rales, rhonchi or wheezes  CV: regular rate and rhythm, normal S1 S2, no S3 or S4, no murmur, click or rub,   MS/NEURO: Positive Tinel sign and Durkan's test on the left wrist with no signs of thenar muscle atrophy. Normal strength and tone, mentation intact and speech normal. Cranial nerves II-XII are grossly intact. DTRs are 2+/4 throughout and symmetric. Gait is stable.   PSYCH: mentation appears normal, affect normal/bright

## 2023-11-16 NOTE — PATIENT INSTRUCTIONS
Try a Velcro wrist splint at night for the carpal tunnel/wrist tingling.  If worsening, let me know.    Continue current medications.    Follow-up in 6 months.

## 2023-11-29 ENCOUNTER — OFFICE VISIT (OUTPATIENT)
Dept: UROLOGY | Facility: CLINIC | Age: 83
End: 2023-11-29
Attending: PHYSICIAN ASSISTANT
Payer: COMMERCIAL

## 2023-11-29 VITALS
TEMPERATURE: 98 F | HEIGHT: 61 IN | SYSTOLIC BLOOD PRESSURE: 128 MMHG | BODY MASS INDEX: 24.92 KG/M2 | WEIGHT: 132 LBS | DIASTOLIC BLOOD PRESSURE: 64 MMHG

## 2023-11-29 DIAGNOSIS — K21.9 GASTROESOPHAGEAL REFLUX DISEASE, UNSPECIFIED WHETHER ESOPHAGITIS PRESENT: ICD-10-CM

## 2023-11-29 DIAGNOSIS — N81.10 FEMALE BLADDER PROLAPSE: ICD-10-CM

## 2023-11-29 PROCEDURE — 99203 OFFICE O/P NEW LOW 30 MIN: CPT | Performed by: UROLOGY

## 2023-11-29 ASSESSMENT — PAIN SCALES - GENERAL: PAINLEVEL: NO PAIN (0)

## 2023-11-29 NOTE — PROGRESS NOTES
CC:  Cystocele    HPI:  Milena Hamilton is a 83 year old year old female asked to be seen in consultation by Amrik Streeter for the above.  This problem has been going on for several years and has been getting worse lately.  It is associated with no urinary problems.  She has not had previous treatment for her condition but has been doing kegel exercises.  She has no bowel issues.  She does not need to reduce the prolapse.    The patient has no constipation or splinting.  .  She is not sexually active.   She notices a bulge in the vagina.  She has no neurological or balance problems     Obstetric Hx:  She is .  She is s/p hysterectomy.    Past Medical History:   Diagnosis Date    Lichen planus     vulvar    Osteopenia 2010    recheck DEXA in 2 years    Panic disorder without agoraphobia     Pure hypercholesterolemia     low LDLs    Raynaud's disease 2014    Unspecified essential hypertension        Past Surgical History:   Procedure Laterality Date    COLONOSCOPY  10/17/2007    normal, recheck in 10 years    COLONOSCOPY N/A 2014    Procedure: COLONOSCOPY;  Surgeon: Raffi Montiel MD;  Location: [facility]    COMBINED CYSTOSCOPY, INSERT CATHETER URETER Bilateral 10/13/2014    Procedure: COMBINED CYSTOSCOPY, INSERT CATHETER URETER;  Surgeon: Abdoulaye Odell MD;  Location: [facility]    EYE SURGERY  2001    right eye muscle repair    HC REMOVAL GALLBLADDER  2010    lap    HC REMOVAL OF OVARY/TUBE(S)      Salpingo-Oophorectomy, bilateral    HYSTERECTOMY, PAP NO LONGER INDICATED      INJECT EPIDURAL LUMBAR Right 04/10/2017    Procedure: INJECT EPIDURAL LUMBAR;  Surgeon: Raffi Wright MD;  [facility]    INJECT EPIDURAL LUMBAR Right 2020    Procedure: Right Lumbar 4- Sacral 1 Epidural Steroid Injection;  Surgeon: Antonio Dasilva MD;  [facility]    INJECT EPIDURAL LUMBAR Right 2021    Procedure: INJECTION, SPINE,  sacral 1 EPIDURAL;  Surgeon: Antonio Dasilva MD;   [facility]    INJECT EPIDURAL TRANSFORAMINAL Right 06/09/2016    Procedure: INJECT EPIDURAL TRANSFORAMINAL;  Surgeon: Antonio Dasilva MD;  [facility]    INJECT EPIDURAL TRANSFORAMINAL Right 10/13/2016    Procedure: INJECT EPIDURAL TRANSFORAMINAL;  Surgeon: Antonio Dasilva MD;  [facility]    INJECT EPIDURAL TRANSFORAMINAL Right 06/18/2021    Procedure: Right L4-5 and right L5-S1 Transforaminal Epidural Steroid Injections with fluoroscopic guidance and contrast.;  Surgeon: Antonio Dasilva MD;  [facility]    INJECT JOINT SACROILIAC Right 04/14/2022    Procedure: Fluoroscopically-guided injection of the Bilateral sacroiliac joints with Bilateral pastor Sacroiliac joint ligaments infiltration over the sacrum;  Surgeon: Antonio Dasilva MD;  [facility]    LAPAROSCOPIC ASSISTED COLECTOMY LEFT (DESCENDING) N/A 10/13/2014    Procedure: LAPAROSCOPIC ASSISTED COLECTOMY LEFT (DESCENDING);  Surgeon: Raffi Montiel MD;  [facility]    LUMBAR SPINE SURGERY  09/2021    L2-Pelvis fusion with rods and screws    OPTICAL TRACKING SYSTEM IMPLANT SHUNT VENTRICULOPERITONEAL Right 12/2/2022    Procedure: Right Ventriculoperitoneal;  Surgeon: Hira Pillai MD;  Location: [facility]    ZZC APPENDECTOMY      ZZC NONSPECIFIC PROCEDURE      Right inferior oblique recession, 14 mm    ZZC VAG HYST,RMV TUBE/OVARY  01/01/1984    cervix removed       Meds, Allergies, FHx and SHx reviewed per nurse's intake note.    ROS is negative on a 14 point scale, except for neg.  All other positive and pertinent information is mentioned in the HPI.    PEx:   Blood pressure 149/81, pulse 80, resp. rate 18, weight 193 lb 11.2 oz (87.862 kg), SpO2 97.00%.  Data Unavailable, There is no height on file to calculate BMI., 193 lbs 11.2 oz  Gen appearance:  Well groomed  HEENT:  EOMI, AT NC  Psych:  Normal Affect  Neuro:  A/O X 3  Skin:  Warm to touch  Resp:  No increased respiratory effort  Vasc:  RRR  lymph:  No LE edema  Abd:  Soft/NT,  ND, no palpable masses  Musk:  Full ROM in extremities  :  Normal external genitalia and introitus, mild atrophic changes          Urethra normal          Mild/mod cystocele with straining  Perineum WNL    RU:      Color Urine (no units)   Date Value   06/17/2023 Straw   07/17/2019 Yellow     Appearance Urine (no units)   Date Value   06/17/2023 Clear   07/17/2019 Clear     Glucose Urine (mg/dL)   Date Value   06/17/2023 Negative   07/17/2019 Negative     Bilirubin Urine (no units)   Date Value   06/17/2023 Negative   07/17/2019 Negative     Ketones Urine (mg/dL)   Date Value   06/17/2023 Negative   07/17/2019 Negative     Specific Gravity Urine (no units)   Date Value   06/17/2023 1.004   07/17/2019 1.015     pH Urine   Date Value   06/17/2023 6.0   07/17/2019 5.0 pH     Protein Albumin Urine (mg/dL)   Date Value   06/17/2023 Negative   07/17/2019 Negative     Urobilinogen Urine   Date Value   11/24/2021 0.2 E.U./dL   07/17/2019 0.2 EU/dL     Nitrite Urine (no units)   Date Value   06/17/2023 Negative   07/17/2019 Negative     Leukocyte Esterase Urine (no units)   Date Value   06/17/2023 Negative   07/17/2019 Small (A)       No images are attached to the encounter.    ASSESSMENT and PLAN:  This is a 83 year old  year old female with Grade 2-3 cystocele.  D  ifferent management options were discussed with the patient including observation, pessary, surgery  .  Patient will call me with decision    Thank you for allowing me to participate in Ms. Lopez's care.  I will keep you updated on her progress.    Dr. Ulices Holbrook

## 2024-03-27 NOTE — PROGRESS NOTES
[address]  [facility] [address]  Phone: [phone]  Primary Provider: Chuck Streeter  Pre-op Performing Provider: BJ MATTA      PREOPERATIVE EVALUATION:  Today's date: 6/14/2021    Milena Hamilton is a 81 year old female who presents for a preoperative evaluation.    Surgical Information:  Surgery/Procedure: Epidural injection in back  Surgery Location: [facility]  Surgeon: Dr. Dasilva  Surgery Date: 06/18/2021  Time of Surgery: 12:00 Pm  Where patient plans to recover: At home with family  Fax number for surgical facility: Note does not need to be faxed, will be available electronically in Epic.    Type of Anesthesia Anticipated: Epidural    Assessment & Plan     The proposed surgical procedure is considered LOW risk.    Preop general physical exam  SI (sacroiliac) joint dysfunction  - cyclobenzaprine (FLEXERIL) 5 MG tablet; Take 1 tablet (5 mg) by mouth nightly as needed for muscle spasms    Lumbar radiculopathy  - cyclobenzaprine (FLEXERIL) 5 MG tablet; Take 1 tablet (5 mg) by mouth nightly as needed for muscle spasms    DDD (degenerative disc disease), lumbar  - cyclobenzaprine (FLEXERIL) 5 MG tablet; Take 1 tablet (5 mg) by mouth nightly as needed for muscle spasms    Encounter for screening for other viral diseases  - Asymptomatic COVID-19 Virus (Coronavirus) by PCR        Risks and Recommendations:  The patient has the following additional risks and recommendations for perioperative complications:   - No identified additional risk factors other than previously addressed    Medication Instructions:   - ibuprofen (Advil, Motrin): HOLD 1 day before surgery.     RECOMMENDATION:  APPROVAL GIVEN to proceed with proposed procedure, without further diagnostic evaluation.        Subjective     HPI related to upcoming procedure: lumbar radiculopathy for the past 5 years that radiates into the left leg. She has to use a cane to  walk as the pain causes instability of her gait. Plan for LESI as the last one done 6 months ago was not effective. If this injection does not work, they have discussed possible surgical treatment.       Preop Questions 3/2/2018   1. Have you ever had a heart attack or stroke? No   3. Do you have chest pain with activity? No   4. Do you have a history of  heart failure? No   5. Do you currently have a cold, bronchitis or symptoms of other infection? No   6. Do you have a cough, shortness of breath, or wheezing? No   7. Do you or anyone in your family have previous history of blood clots? No   8. Do you or does anyone in your family have a serious bleeding problem such as prolonged bleeding following surgeries or cuts? No   9. Have you ever had problems with anemia or been told to take iron pills? No   10. Have you had any abnormal blood loss such as black, tarry or bloody stools, or abnormal vaginal bleeding? No   11. Have you ever had a blood transfusion? No   13. Have you or any of your relatives ever had problems with anesthesia? No   14. Do you have sleep apnea, excessive snoring or daytime drowsiness? No   15. Do you have any artifical heart valves or other implanted medical devices like a pacemaker, defibrillator, or continuous glucose monitor? No   16. Do you have artificial joints? No   18. Is there any chance that you may be pregnant? No     Health Care Directive:  Patient does not have a Health Care Directive or Living Will: Discussed advance care planning with patient; however, patient declined at this time.    Preoperative Review of :   reviewed - controlled substances reflected in medication list.      Status of Chronic Conditions:  See problem list for active medical problems.  Problems all longstanding and stable, except as noted/documented.  See ROS for pertinent symptoms related to these conditions.      Review of Systems  Constitutional, neuro, ENT, endocrine, pulmonary, cardiac,  gastrointestinal, genitourinary, musculoskeletal, integument and psychiatric systems are negative, except as otherwise noted.    Patient Active Problem List    Diagnosis Date Noted     Scoliosis of lumbar spine, unspecified scoliosis type 12/12/2018     Priority: Medium     Intractable vomiting 02/27/2018     Priority: Medium     Gastroenteritis 02/27/2018     Priority: Medium     After cataract, left eye 09/18/2017     Priority: Medium     Acquired hypothyroidism 04/28/2017     Priority: Medium     Gastroesophageal reflux disease, esophagitis presence not specified 04/28/2017     Priority: Medium     IMO Regulatory Load OCT 2020       Cortical age-related cataract 01/16/2017     Priority: Medium     Cortical senile cataract 01/16/2017     Priority: Medium     Hypothyroidism, unspecified type 11/30/2016     Priority: Medium     Premature atrial beats 11/30/2016     Priority: Medium     Community acquired pneumonia 05/08/2015     Priority: Medium     Benign skin lesion 03/06/2015     Priority: Medium     HTN (hypertension) 07/10/2014     Priority: Medium     Raynaud's disease 06/01/2014     Priority: Medium     Health Care Home 12/30/2013     Priority: Medium     Status:  Closed  Care Coordinator:  Tasha Vasquez RN    See Letters for [facility] Care Plan             History of diverticulitis - s/p left hemicolectomy 11/30/2013     Priority: Medium     Diarrhea 11/30/2013     Priority: Medium     Chronic constipation 06/18/2013     Priority: Medium     Sacroiliac dysfunction 10/09/2012     Priority: Medium     Advanced directives, counseling/discussion 10/03/2011     Priority: Medium     Advance Directive Problem List Overview:   Name Relationship Phone    Primary Health Care Agent            Alternative Health Care Agent          Discussed advance care planning with patient; information given to patient to review. 10/3/2011          DDD (degenerative disc disease), cervical 08/10/2011     Priority: Medium     Anxiety  10/24/2010     Priority: Medium     Hyperlipidemia 09/29/2010     Priority: Medium     Osteopenia 04/01/2010     Priority: Medium     recheck DEXA in 2 years       Gastritis 03/15/2010     Priority: Medium     Panic disorder without agoraphobia 10/08/2004     Priority: Medium     Tinnitus 09/13/2004     Priority: Medium     Problem list name updated by automated process. Provider to review       Family history of diabetes mellitus 09/09/2002     Priority: Medium     Lichen planus      Priority: Medium      Past Medical History:   Diagnosis Date     Lichen planus     vulvar     Osteopenia 4/2010    recheck DEXA in 2 years     Panic disorder without agoraphobia      Pure hypercholesterolemia     low LDLs     Raynaud's disease 6/2014     Unspecified essential hypertension      Past Surgical History:   Procedure Laterality Date     C APPENDECTOMY       C VAG HYST,RMV TUBE/OVARY  1984    cervix removed     COLONOSCOPY  10/17/2007    normal, recheck in 10 years     COLONOSCOPY N/A 8/27/2014    Procedure: COLONOSCOPY;  Surgeon: Raffi Montiel MD;  Location:  GI     COMBINED CYSTOSCOPY, INSERT CATHETER URETER Bilateral 10/13/2014    Procedure: COMBINED CYSTOSCOPY, INSERT CATHETER URETER;  Surgeon: Abdoulaye Odell MD;  Location:  OR     EYE SURGERY  8/01    right eye muscle repair     HC REMOVAL GALLBLADDER  2/9/2010    lap     HC REMOVAL OF OVARY/TUBE(S)      Salpingo-Oophorectomy, bilateral     HYSTERECTOMY, PAP NO LONGER INDICATED       INJECT EPIDURAL LUMBAR Right 4/10/2017    Procedure: INJECT EPIDURAL LUMBAR;  Surgeon: Raffi Wright MD;  [facility]     INJECT EPIDURAL LUMBAR Right 2/27/2020    Procedure: Right Lumbar 4- Sacral 1 Epidural Steroid Injection;  Surgeon: Antonio Dasilva MD;  [facility]     INJECT EPIDURAL LUMBAR Right 1/29/2021    Procedure: INJECTION, SPINE,  sacral 1 EPIDURAL;  Surgeon: Antonio Dasilva MD;  [facility]     INJECT EPIDURAL TRANSFORAMINAL Right 6/9/2016     Procedure: INJECT EPIDURAL TRANSFORAMINAL;  Surgeon: Antonio Dasilva MD;  [facility]     INJECT EPIDURAL TRANSFORAMINAL Right 10/13/2016    Procedure: INJECT EPIDURAL TRANSFORAMINAL;  Surgeon: Antonio Dasilva MD;  [facility]     LAPAROSCOPIC ASSISTED COLECTOMY LEFT (DESCENDING) N/A 10/13/2014    Procedure: LAPAROSCOPIC ASSISTED COLECTOMY LEFT (DESCENDING);  Surgeon: Raffi Montiel MD;  [facility]: [facility]     ZZC NONSPECIFIC PROCEDURE      Right inferior oblique recession, 14 mm     Current Outpatient Medications   Medication Sig Dispense Refill     ALPRAZolam (XANAX) 0.25 MG tablet TAKE ONE TABLET BY MOUTH AT BEDTIME AS NEEDED FOR ANXIETY 30 tablet 2     Ascorbic Acid (VITAMIN C PO)        ASPIRIN 81 MG OR TABS 1 TABLET DAILY       calcium carbonate-vitamin D (OSCAL W/D) 500-200 MG-UNIT tablet Take 1 tablet by mouth 2 times daily       cyclobenzaprine (FLEXERIL) 5 MG tablet Take 1 tablet (5 mg) by mouth nightly as needed for muscle spasms 30 tablet 1     Dentifrices (BIOTENE DRY MOUTH) PSTE Toothpaste and mouthwash       fish oil-omega-3 fatty acids 1000 MG capsule Take 2 g by mouth daily       ibuprofen (ADVIL/MOTRIN) 600 MG tablet Take 1 tablet (600 mg) by mouth every 6 hours as needed for moderate pain 100 tablet 3     levothyroxine (SYNTHROID/LEVOTHROID) 50 MCG tablet TAKE ONE TABLET BY MOUTH once daily 90 tablet 3     meclizine (ANTIVERT) 25 MG tablet TAKE ONE TABLET BY MOUTH THREE TIMES DAILY AS NEEDED for dizziness 30 tablet 1     metoprolol tartrate (LOPRESSOR) 25 MG tablet TAKE 1/2 TABLET (12.5 MG) BY MOUTH once daily 45 tablet 1     omeprazole (PRILOSEC) 20 MG DR capsule Take 1 capsule (20 mg) by mouth daily 90 capsule 3     simvastatin (ZOCOR) 20 MG tablet TAKE 1/2 TABLET (10mg) BY MOUTH every night AT BEDTIME 45 tablet 1     THERAPEUTIC MULTIVITAMIN OR 1 TABLET DAILY         Allergies   Allergen Reactions     Azithromycin Diarrhea     Ciprofloxacin Other (See Comments)     Cipro, dizziness  "and abdominal pain     Gabapentin Dizziness and Visual Disturbance     Penicillins      stomach upset, diarrhea     Sulfa Drugs      hives        Social History     Tobacco Use     Smoking status: Never Smoker     Smokeless tobacco: Never Used   Substance Use Topics     Alcohol use: No       History   Drug Use No         Objective     /68   Pulse 72   Temp 97.4  F (36.3  C) (Temporal)   Resp 16   Ht 1.575 m (5' 2\")   Wt 61.9 kg (136 lb 8 oz)   LMP  (LMP Unknown)   SpO2 98%   BMI 24.97 kg/m      Physical Exam    GENERAL APPEARANCE: healthy, alert and no distress     EYES: EOMI, PERRL     HENT: ear canals and TM's normal and nose and mouth without ulcers or lesions     NECK: no adenopathy, no asymmetry, masses, or scars and thyroid normal to palpation     RESP: lungs clear to auscultation - no rales, rhonchi or wheezes     CV: regular rates and rhythm, normal S1 S2, no S3 or S4 and no murmur, click or rub     MS: extremities normal- no gross deformities noted, no evidence of inflammation in joints, FROM in all extremities.     SKIN: no suspicious lesions or rashes     NEURO: Normal strength and tone, sensory exam grossly normal, mentation intact and speech normal     PSYCH: mentation appears normal. and affect normal/bright     LYMPHATICS: No cervical adenopathy    Recent Labs   Lab Test 01/14/21  1421 12/28/20  1203 09/11/19  1427   HGB 12.9  --  13.4     --  209    141 141   POTASSIUM 3.9 4.0 4.0   CR 0.62 0.67 0.83        Diagnostics:  Labs pending at this time.  Results will be reviewed when available.   No EKG required for low risk surgery (cataract, skin procedure, breast biopsy, etc).    Revised Cardiac Risk Index (RCRI):  The patient has the following serious cardiovascular risks for perioperative complications:   - No serious cardiac risks = 0 points     RCRI Interpretation: 0 points: Class I (very low risk - 0.4% complication rate)           Signed Electronically by: Lenka GARNER" FORREST Harkins CNP  Copy of this evaluation report is provided to requesting physician.       Stable

## 2024-04-05 ENCOUNTER — OFFICE VISIT (OUTPATIENT)
Dept: GASTROENTEROLOGY | Facility: CLINIC | Age: 84
End: 2024-04-05
Attending: NURSE PRACTITIONER
Payer: COMMERCIAL

## 2024-04-05 VITALS
SYSTOLIC BLOOD PRESSURE: 126 MMHG | HEIGHT: 61 IN | DIASTOLIC BLOOD PRESSURE: 60 MMHG | HEART RATE: 60 BPM | WEIGHT: 133 LBS | BODY MASS INDEX: 25.11 KG/M2

## 2024-04-05 DIAGNOSIS — K58.2 IRRITABLE BOWEL SYNDROME WITH BOTH CONSTIPATION AND DIARRHEA: ICD-10-CM

## 2024-04-05 DIAGNOSIS — R10.31 ABDOMINAL PAIN, RIGHT LOWER QUADRANT: ICD-10-CM

## 2024-04-05 DIAGNOSIS — R14.0 ABDOMINAL BLOATING: ICD-10-CM

## 2024-04-05 PROCEDURE — 99214 OFFICE O/P EST MOD 30 MIN: CPT | Performed by: NURSE PRACTITIONER

## 2024-04-05 RX ORDER — MULTIVITAMIN
1 TABLET ORAL DAILY
COMMUNITY

## 2024-04-05 RX ORDER — NAPROXEN SODIUM 220 MG
220 TABLET ORAL 2 TIMES DAILY WITH MEALS
COMMUNITY

## 2024-04-05 ASSESSMENT — PAIN SCALES - GENERAL: PAINLEVEL: NO PAIN (0)

## 2024-04-05 NOTE — PATIENT INSTRUCTIONS
It was a pleasure taking care of you today.  I've included a brief summary of our discussion and care plan from today's visit below.  Please review this information with your primary care provider.  ______________________________________________________________________    My recommendations are summarized as follows:    Continue to take metamucil with supper.     2. Take Miralax in the morning.    3. Stay on lactose free diet.    4. Take dicyclomine as need for abdominal cramping and pain.    Return to GI Clinic in 6 months  to review your progress.    ______________________________________________________________________    Constipation refers to a change in bowel habits, but it has varied meanings. Stools may be too hard or too small, difficult to pass, or infrequent (less than three times per week). People with constipation may also notice a frequent need to strain and a sense that the bowels are not empty.  Constipation is a very common problem. Each year more than 2.5 million Americans visit their healthcare provider for relief from this problem. Many factors can contribute to or cause constipation, although in most people, no single cause can be found. In general, constipation occurs more frequently as you get older.     Treatment for constipation includes changing some behaviors, eating foods high in fiber, and using medications if needed.  Behavior changes -- The bowels are most active following meals, and this is often the time when stools will pass most readily. If you ignore your body's signals to have a bowel movement, the signals become weaker and weaker over time. By paying close attention to these signals, you may have an easier time moving your bowels. Drinking a caffeine-containing beverage in the morning may also be helpful.  Increase fiber -- Increasing fiber in your diet may reduce or eliminate constipation. The recommended amount of dietary fiber is 20 to 35 grams of fiber per day. By reading  the product information panel on the side of the package, you can determine the number of grams of fiber per serving .Many fruits and vegetables can be particularly helpful in preventing and treating constipation .This is especially true of citrus fruits, prunes, and prune juice. Some breakfast cereals are also an excellent source of dietary fiber.  Start your day off with a high-fiber breakfast such as whole grain oatmeal sprinkled with  raisins, blueberries, pecans or macadamia nuts.   Slice up raw veggies (carrots, celery, bell peppers) and fruit (apples, pears) and keep  them in baggies for quick high-fiber snacks.   Munch on a small handful of nuts, such as peanuts, walnuts, and almonds.   Look for individually wrapped prunes in the grocery store for a quick fiber snack.   When buying frozen veggies, look for ones that have been  flash frozen.    Add half a cup of garbanzo or black beans or peas to your salad to add fiber, texture, and flavor.   EXAMPLES OF HIGH-FIBER FOODS   1 large apple or pear (5g)   1 cup Raisin Bran (5g)     cup raspberries (9g)   1 cup cooked broccoli (5g)   23 almonds (3.5g)   1 cup black beans (15g)   2 brazil nuts (2.5g)   1 cup peas (16g)    Anti-constipation fruit paste:  Ingredients  1 cup pitted prunes  1 cup raisins  1 cup pitted dates  1/2 cup orange juice  2/3 cup prune juice  Tip:  For even more fiber add 1 cup of natural wheat bran to the fruit mixture.  Directions (Makes 25 servings)  Combine all ingredients in a bowl and soak overnight in the refrigerator. Blend in  or  until smooth. Keep in the refrigerator (can be kept frozen in small containers).   Serving size: 2 tablespoons. Spread it on toast or eat from a spoon.  Caution: May cause bloating and abdominal cramping. Start with smaller portion and increase it gradually over a few weeks as tolerate.    Medications:  Bulk forming -- These include natural fiber and commercial fiber preparations such as  Psyllium (Konsyl; Metamucil; Perdiem), Methylcellulose (Citrucel),  or Wheat dextrin (Benefiber);  You should increase the dose of fiber supplements slowly to prevent gas and cramping, and you should always take the supplement with plenty of fluid.  2. Hyperosmolar medications  -- such as   ?Polyethylene glycol (MiraLAX, GlycoLax)  ? Lactulose  ? Sorbitol  Polyethylene glycol is generally preferred since it does not cause gas or bloating and is available without a prescription. Lactulose and sorbitol can produce gas and bloating. Sorbitol works as well as lactulose and is much less expensive.  3.Saline laxatives -- such as Milk of Magnesia and magnesium citrate (Evac-Q-Mag) act similarly to the hyperosmolar drugs.  4. Stimulant drugs -- include Senna (eg, Black Draught, Ex-Lax, Senokot) and Bisacodil (eg, Correctol, Doxidan, Dulcolax).     Constipation treatments to avoid:  ? Emollients - Emollient laxatives, principally mineral oil, soften stools by moisturizing them. However, other treatments have fewer risks and equal benefit.  ? Natural products - A wide variety of natural products are advertised for constipation. Some of them contain the active ingredients found in commercially available laxatives. However, their dose and purity may not be carefully controlled. Thus, these products are not generally recommended.  ? A variety of home-made enema preparations have been used throughout the years, such as soapsuds, hydrogen peroxide, and household detergents. These can be extremely irritating to the lining of the intestine and should be avoided.           ______________________________________________________________________    Who do I call with any questions after my visit?  Please be in touch if there are any further questions that arise following today's visit.  There are multiple ways to contact your gastroenterology care team.      During business hours, you may reach a Gastroenterology nurse at [phone],  option 3.     To schedule or reschedule an appointment, please call [phone].   To schedule your imaging studies (CT, MRI, ultrasound)  call [phone] (or toll-free # [phone])  To schedule your lab work at [facility], please call [phone]    You can always send a secure message through [employer].  [employer] messages are answered by your nurse or doctor typically within 24 hours.  Please allow extra time on weekends and holidays.      For urgent/emergent questions after business hours, you may reach the on-call GI Fellow by contacting the [facility]  at [phone].    In order for your refill to be processed in a timely fashion, it is your responsibility to ensure you follow the recommendations from your provider regarding your laboratory studies and follow up appointments.       How will I get the results of any tests ordered?    You will receive all of your results.  If you have signed up for [facility], any tests ordered at your visit will be available to you after your physician reviews them.  Typically this takes 1-2 weeks.  If there are urgent results that require a change in your care plan, your physician or nurse will call you to discuss the next steps.   What is [employer]?  [employer] [facility] is a secure way for you to access all of your healthcare records from the [facility].  It is a web based computer program, so you can sign on to it from any location.  It also allows you to send secure messages to your care team.  I recommend signing up for [employer] access if you have not already done so and are comfortable with using a computer.    How to I schedule a follow-up visit?  If you did not schedule a follow-up visit today, please call [phone] to schedule a follow-up office visit.      Sincerely,  JOSE Walker,  [facility],  Division of Gastroenterology   ([facility])

## 2024-04-05 NOTE — LETTER
"    4/5/2024         RE: Milena Hamilton  [address] [facility] [address]        Dear Colleague,    Thank you for referring your patient, Milena Hamilton, to the [facility]. Please see a copy of my visit note below.    Gastroenterology CLINIC VISIT, NEW/RETURN PATIENT    CC/REFERRING PROVIDER: Chuck Streeter   REASON FOR CONSULTATION: follow up    HPI: 83 year old female presented to GI clinic for follow up on postprandial abdominal bloating and episodes of \"diarrhea\", which patient describes as bouts of 2-3 loose stools a day.  Those episodes alternate with skipping a bowel movement for a day or 2. On most of week,  she is having regular or hard stools.  Working diagnosis include IBS, mixed type, bile salt induced diarrhea, functional dyspepsia, or constipation with overflow diarrhea.  Patient has history of cholecystectomy and diverticulitis surgery.     Patient said that since she started taking half of the teaspoon of Metamucil with supper, her stool frequency had improve.  She still complains of right lower abdominal pain. CT scan  in May 2023 showed diverticulosis and no acute abnormalities in the patient's abdomen. She takes dicyclomine as needed for abdominal cramping.  Trying to adhere to lactose-free diet.  Asking if she will be able to tolerate 1% milk . Said that some days, after breakfast (cereal with Lactaid milk), she has a formed stool, followed by one or 2 loose stools. Stools are brown or yellow in color.  No nausea, vomiting, weight loss, or abdominal bloating.    Copy of HPI from the visit on 9/29/2023  She skips a day or two of bowel movements. May have a few hard stools next day.  At the same time, patient complains of having diarrhea, which she describes as 2-3 mushy or loose stools a day on her \"diarrhea days\".  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  Cannot exclude chronic constipation with overflow diarrhea.  Started the " "patient on cholestyramine and she reported significant improvement in her symptoms initially.  Then she said, she does not like taste of it and not certain if it is working, so she stopped the medication.    Recommended  to start taking 8-9 gram of Miralax every morning and Metamucil 4 gram with supper.  Patient said that she is not using Meralax, but takes 1/2 of teaspoon of Metamucil every evening, with supper. Takes prescribed  Dicyclomine as needed for abdominal cramping and pain.  She has not taken loperamide.  On her last visit, we had an extensive conversation on lactose free diet.  Patient uses Lactaid tablets before consumption of dairy products although per report, consumed dairy intake exceeds coverage provided by 1 tablet.   Today, the patient was excited to share that she has been having 1 formed soft stool every day, instead of 2-3 stools. Complains of intermittent mild RLQ pain after meals.   History of panic disorder with agoraphobia. Stated that she feels frustrated at times that her stools are \"no longer normal\". On her last visit, I explained chronicity of her condition and medications available to control her symptoms. Patient is a poor historian, having difficulty answering questions regarding her medications and take in her diet.  Suspect possible mild dementia, but would recommend further evaluation by her primary care provider.      PREVIOUS ENDOSCOPY:  8/27/2014 Colonoscopy  Findings:       Perianal examination was normal. A few small-mouthed diverticula were        found in the proximal sigmoid colon, in the distal sigmoid colon and in        the proximal transverse colon.                                                                                    Impression:               - Diverticulosis proximal sigmoid colon, distal                             sigmoid colon and proximal transverse colon.                             - Mild diverticulosis distal sigmoid colon. There                 "             was no evidence of diverticular bleeding.     PERTINENT STUDIES Reviewed in EMR  5/6/2023 CT scan of abdomen/pelvis  FINDINGS:      LUNGS AND PLEURA: Minimal dependent atelectasis. No pleural effusion or pneumothorax.     MEDIASTINUM/AXILLAE: Normal.     CORONARY ARTERY CALCIFICATION: None.     HEPATOBILIARY: Few small hepatic cysts. No significant mass or bile duct dilatation. Cholecystectomy clips.     PANCREAS: Normal.     SPLEEN: Normal.     ADRENAL GLANDS: Normal.     KIDNEYS: Normal.     BOWEL: Postsurgical changes of the colon. Diverticulosis of the colon. No acute inflammatory change. No obstruction. No free air or free fluid.     LYMPH NODES: Normal.     VASCULATURE: No abdominal aortic aneurysm. Mild-to-moderate atherosclerotic calcification.     MUSCULOSKELETAL: No displaced rib fractures. Degenerative changes of the spine. L2 - ilium posterior spinal fusion hardware. Scoliosis. Ventriculoperitoneal shunt catheter.                                                                      IMPRESSION:  1.  No acute traumatic abnormality in the chest, abdomen, or pelvis.     ROS: 10pt ROS performed and otherwise negative.    PAST MEDICAL HISTORY:  Past Medical History:   Diagnosis Date     Lichen planus     vulvar     Osteopenia 4/2010    recheck DEXA in 2 years     Panic disorder without agoraphobia      Pure hypercholesterolemia     low LDLs     Raynaud's disease 6/2014     Unspecified essential hypertension        PREVIOUS ABDOMINAL/GYNECOLOGIC SURGERIES:  Past Surgical History:   Procedure Laterality Date     COLONOSCOPY  10/17/2007    normal, recheck in 10 years     COLONOSCOPY N/A 08/27/2014    Procedure: COLONOSCOPY;  Surgeon: Raffi Montiel MD;  Location:  GI     COMBINED CYSTOSCOPY, INSERT CATHETER URETER Bilateral 10/13/2014    Procedure: COMBINED CYSTOSCOPY, INSERT CATHETER URETER;  Surgeon: Abdoulaye Odell MD;  Location:  OR     EYE SURGERY  08/01/2001    right eye muscle  repair     HC REMOVAL GALLBLADDER  02/09/2010    lap     HC REMOVAL OF OVARY/TUBE(S)      Salpingo-Oophorectomy, bilateral     HYSTERECTOMY, PAP NO LONGER INDICATED       INJECT EPIDURAL LUMBAR Right 04/10/2017    Procedure: INJECT EPIDURAL LUMBAR;  Surgeon: Raffi Wright MD;  Location: [facility] OR     INJECT EPIDURAL LUMBAR Right 02/27/2020    Procedure: Right Lumbar 4- Sacral 1 Epidural Steroid Injection;  Surgeon: Antonio Dasilva MD;  Location: [facility]     INJECT EPIDURAL LUMBAR Right 01/29/2021    Procedure: INJECTION, SPINE,  sacral 1 EPIDURAL;  Surgeon: Antonio Dasilva MD;  Location: [facility]     INJECT EPIDURAL TRANSFORAMINAL Right 06/09/2016    Procedure: INJECT EPIDURAL TRANSFORAMINAL;  Surgeon: Antonio Dasilva MD;  Location: [facility]     INJECT EPIDURAL TRANSFORAMINAL Right 10/13/2016    Procedure: INJECT EPIDURAL TRANSFORAMINAL;  Surgeon: Antonio Dasilva MD;  Location: [facility]     INJECT EPIDURAL TRANSFORAMINAL Right 06/18/2021    Procedure: Right L4-5 and right L5-S1 Transforaminal Epidural Steroid Injections with fluoroscopic guidance and contrast.;  Surgeon: Antonio Dasilva MD;  Location: [facility]     INJECT JOINT SACROILIAC Right 04/14/2022    Procedure: Fluoroscopically-guided injection of the Bilateral sacroiliac joints with Bilateral pastor Sacroiliac joint ligaments infiltration over the sacrum;  Surgeon: Antonio Dasilva MD;  Location: [facility]     LAPAROSCOPIC ASSISTED COLECTOMY LEFT (DESCENDING) N/A 10/13/2014    Procedure: LAPAROSCOPIC ASSISTED COLECTOMY LEFT (DESCENDING);  Surgeon: Raffi Montiel MD;  Location: [facility]     LUMBAR SPINE SURGERY  09/2021    L2-Pelvis fusion with rods and screws     OPTICAL TRACKING SYSTEM IMPLANT SHUNT VENTRICULOPERITONEAL Right 12/2/2022    Procedure: Right Ventriculoperitoneal;  Surgeon: Hira Pillai MD;  Location: [facility] OR     ZZC APPENDECTOMY       ZZC NONSPECIFIC PROCEDURE      Right inferior oblique recession, 14 mm     ZZC VAG HYST,RMV TUBE/OVARY  01/01/1984     cervix removed         PERTINENT MEDICATIONS:  Current Outpatient Medications   Medication Sig Dispense Refill     acetaminophen (TYLENOL) 500 MG tablet Take 500-1,000 mg by mouth every 6 hours as needed for mild pain       ALPRAZolam (XANAX) 0.25 MG tablet TAKE 1 TABLET BY MOUTH NIGHTLY AS NEEDED FOR ANXIETY 90 tablet 0     dicyclomine (BENTYL) 10 MG capsule Take 1 capsule (10 mg) by mouth 3 times daily as needed (for abdominal cramping and pain, as needed) 30 capsule 4     levothyroxine (SYNTHROID/LEVOTHROID) 50 MCG tablet TAKE ONE TABLET BY MOUTH once daily 90 tablet 3     loperamide (IMODIUM A-D) 2 MG tablet Take 1 tablet (2 mg) by mouth daily as needed for diarrhea Take one tablet after 2 or more loose stools. Wait for one hour and if still have loose stools, take half of the tablet. 30 tablet 1     losartan (COZAAR) 25 MG tablet Take 1 tablet (25 mg) by mouth daily 90 tablet 3     metoprolol succinate ER (TOPROL XL) 25 MG 24 hr tablet Take 1 tablet (25 mg) by mouth daily 90 tablet 3     multivitamin w/minerals (MULTI-VITAMIN) tablet Take 1 tablet by mouth daily       naproxen sodium (ANAPROX) 220 MG tablet Take 220 mg by mouth 2 times daily (with meals)       omeprazole (PRILOSEC) 20 MG DR capsule TAKE ONE CAPSULE BY MOUTH ONCE DAILY 90 capsule 1     polyethylene glycol (MIRALAX) 17 GM/Dose powder Take 8 g by mouth daily Take 1/2 of the scoop in the morning 255 g 2     polyethylene glycol-propylene glycol (SYSTANE ULTRA) 0.4-0.3 % SOLN ophthalmic solution Place 1 drop into both eyes 3 times daily as needed for dry eyes       psyllium (METAMUCIL/KONSYL) 58.6 % powder Take 4 g by mouth daily Take 1/2 of teaspoon with supper. OK to increase the dose to one teaspoon in a week 283 g 2     simvastatin (ZOCOR) 20 MG tablet TAKE ONE-HALF TABLET BY MOUTH EVERY NIGHT 135 tablet 3     cholecalciferol 25 MCG (1000 UT) TABS Take 1,000 Units by mouth daily (Patient not taking: Reported on 4/5/2024)       meclizine  (ANTIVERT) 25 MG tablet Take 1 tablet (25 mg) by mouth 3 times daily as needed for dizziness (Patient not taking: Reported on 4/5/2024) 30 tablet 1         SOCIAL HISTORY:  Social History     Socioeconomic History     Marital status:      Spouse name: Not on file     Number of children: 4     Years of education: Not on file     Highest education level: Not on file   Occupational History     Occupation: dry cleaning   Tobacco Use     Smoking status: Never     Passive exposure: Never     Smokeless tobacco: Never   Vaping Use     Vaping Use: Never used   Substance and Sexual Activity     Alcohol use: No     Drug use: No     Sexual activity: Not Currently     Partners: Male     Birth control/protection: Surgical     Comment: not currently/hysterectomy   Other Topics Concern     Parent/sibling w/ CABG, MI or angioplasty before 65F 55M? No   Social History Narrative     Not on file     Social Determinants of Health     Financial Resource Strain: Low Risk  (11/16/2023)    Financial Resource Strain      Within the past 12 months, have you or your family members you live with been unable to get utilities (heat, electricity) when it was really needed?: No   Food Insecurity: Low Risk  (11/16/2023)    Food Insecurity      Within the past 12 months, did you worry that your food would run out before you got money to buy more?: No      Within the past 12 months, did the food you bought just not last and you didn t have money to get more?: No   Transportation Needs: Low Risk  (11/16/2023)    Transportation Needs      Within the past 12 months, has lack of transportation kept you from medical appointments, getting your medicines, non-medical meetings or appointments, work, or from getting things that you need?: No   Physical Activity: Not on file   Stress: Not on file   Social Connections: Not on file   Interpersonal Safety: Low Risk  (11/16/2023)    Interpersonal Safety      Do you feel physically and emotionally safe where  "you currently live?: Yes      Within the past 12 months, have you been hit, slapped, kicked or otherwise physically hurt by someone?: No      Within the past 12 months, have you been humiliated or emotionally abused in other ways by your partner or ex-partner?: No   Housing Stability: Low Risk  (2023)    Housing Stability      Do you have housing? : Yes      Are you worried about losing your housing?: No       FAMILY HISTORY:  Denies colon/panc/esophageal/other GI CA, no other [place] or other HPS-related [place]. No IBD/celiac, no other AI/liver/thyroid disease.    Family History   Problem Relation Age of Onset     Diabetes Brother      Heart Disease Brother 50        AMI     Diabetes Brother      Heart Disease Brother         AMI, \"older\"     Cerebrovascular Disease Brother      Psychotic Disorder Brother         , alcohol     Cancer Sister         breast cancer x2     Hypertension No family hx of      Breast Cancer No family hx of      Ovarian Cancer No family hx of      Prostate Cancer No family hx of      Mental Illness No family hx of      Anesthesia Reaction No family hx of      Osteoporosis No family hx of      Obesity No family hx of      Other Cancer No family hx of      Depression No family hx of      Anxiety Disorder No family hx of      Mental Illness No family hx of      Substance Abuse No family hx of        PHYSICAL EXAMINATION:  Vitals reviewed  /60 (BP Location: Right arm, Patient Position: Sitting, Cuff Size: Adult Regular)   Pulse 60   Ht 1.537 m (5' 0.5\")   Wt 60.3 kg (133 lb)   LMP  (LMP Unknown)   Breastfeeding No   BMI 25.55 kg/m      General: Patient appears well in no acute distress.    Skin: No visualized rash or lesions on visualized skin  HEENT:    EOMI, no erythema, sclera icterus or discharge noted.  Mouth mucosa intact, pink, moist  No cervical or supraclavicular lymphadenopathy. Thyroid gland not enlarged.  Resp: breathing comfortably without accessory muscle usage, " speaking in full sentences, no cough. Lung sounds clear  Card: Regular and rhythmic S1 and S2. No gallop or rub. No murmur.  No LE edema.  Abdomen: Active bowel sounds X 4 quadrants. Soft to palpation. Tenderness in the RLQ.  No guarding or rebound tenderness. Dickey's sign negative.  MSK: Appears to have normal range of motion based on visualized movements  Neurologic: No apparent tremors, facial movements symmetric  Psych: affect normal, alert and oriented      ASSESSMENT/PLAN:    ICD-10-CM    1. Irritable bowel syndrome with both constipation and diarrhea  K58.2 Adult GI Clinic Follow-Up Order (Blank)      2. Abdominal bloating  R14.0 Adult GI Clinic Follow-Up Order (Blank)      3. Abdominal pain, right lower quadrant  R10.31 Adult GI Clinic Follow-Up Order (Blank)         83 year old female  presented to GI clinic for a follow up on irregular stool pattern with occasional loose stools 1-2 a day, some abdominal bloating, and discomfort in the RLQ. It was felt that her symptoms are likely related to mixed type of functional bowel disease, lactose intolerance, and constipation with overflow diarrhea. Patient has history of cholecystectomy and diverticulitis surgery.  Ongoing symptoms, somewhat improved after patient started low lactose diet. She tried a few doses of cholestyramine but did not like the taste. Started metamucil with supper and noted improvement in consistency and frequency of stools.  CT scan from May 2023 showed diverticulosis and no acute abnormalities in the patient's abdomen. There was sigmoid diverticulosis on her last colonoscopy in 2014, no other abnormalities.  Suggested to continue metamucil and to resume Miralax in the morning. Take dicyclomine as needed for abdominal pain.    Patient asks if 1% milk contains any lactose. Education on lactose free diet was reinforced. She said that she take calcium with vitamin D and is afraid it causes diarrhea; would like to know why it was recommended by  another provider. Explained risks for osteopenia and benefits of calcium+vit D.  Educated that calcium usually causes constipation, not diarrhea.    Patient also shared that she developed acute swelling and pain in the right ring finger. No known injury or insect bite. Was seen at urgent care. Xrays negative for fracture. Noted swelling and tenderness of R. 4th finger PIP joint. Suggested to see her PCP for evaluation (overuse injury? Inflammatory arthritis? Gout?). Patient started taking NSAIDs.     Patient verbalized understanding and appreciation of care provided. Stated that all of the questions were answered to her/his satisfaction.  RTC in 6 months    Thank you for this consultation. It was a pleasure to participate in the care of this patient; please contact us with any further questions.    FORREST Walker, FNP-C  Division of Gastroenterology, Hepatology, and Nutrition  [facility], [place], [place]    This note was created with Dragon voice recognition software, and while reviewed for accuracy, inadvertent minor typographic errors may occur. Please contact the provider if you have any questions.       Again, thank you for allowing me to participate in the care of your patient.        Sincerely,        FORREST WALKER CNP

## 2024-04-05 NOTE — PROGRESS NOTES
"Gastroenterology CLINIC VISIT, NEW/RETURN PATIENT    CC/REFERRING PROVIDER: Chuck Streeter   REASON FOR CONSULTATION: follow up    HPI: 83 year old female presented to GI clinic for follow up on postprandial abdominal bloating and episodes of \"diarrhea\", which patient describes as bouts of 2-3 loose stools a day.  Those episodes alternate with skipping a bowel movement for a day or 2. On most of week,  she is having regular or hard stools.  Working diagnosis include IBS, mixed type, bile salt induced diarrhea, functional dyspepsia, or constipation with overflow diarrhea.  Patient has history of cholecystectomy and diverticulitis surgery.     Patient said that since she started taking half of the teaspoon of Metamucil with supper, her stool frequency had improve.  She still complains of right lower abdominal pain. CT scan  in May 2023 showed diverticulosis and no acute abnormalities in the patient's abdomen. She takes dicyclomine as needed for abdominal cramping.  Trying to adhere to lactose-free diet.  Asking if she will be able to tolerate 1% milk . Said that some days, after breakfast (cereal with Lactaid milk), she has a formed stool, followed by one or 2 loose stools. Stools are brown or yellow in color.  No nausea, vomiting, weight loss, or abdominal bloating.    Copy of HPI from the visit on 9/29/2023  She skips a day or two of bowel movements. May have a few hard stools next day.  At the same time, patient complains of having diarrhea, which she describes as 2-3 mushy or loose stools a day on her \"diarrhea days\".  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  Cannot exclude chronic constipation with overflow diarrhea.  Started the patient on cholestyramine and she reported significant improvement in her symptoms initially.  Then she said, she does not like taste of it and not certain if it is working, so she stopped the medication.    Recommended  to start taking 8-9 gram of " "Miralax every morning and Metamucil 4 gram with supper.  Patient said that she is not using Meralax, but takes 1/2 of teaspoon of Metamucil every evening, with supper. Takes prescribed  Dicyclomine as needed for abdominal cramping and pain.  She has not taken loperamide.  On her last visit, we had an extensive conversation on lactose free diet.  Patient uses Lactaid tablets before consumption of dairy products although per report, consumed dairy intake exceeds coverage provided by 1 tablet.   Today, the patient was excited to share that she has been having 1 formed soft stool every day, instead of 2-3 stools. Complains of intermittent mild RLQ pain after meals.   History of panic disorder with agoraphobia. Stated that she feels frustrated at times that her stools are \"no longer normal\". On her last visit, I explained chronicity of her condition and medications available to control her symptoms. Patient is a poor historian, having difficulty answering questions regarding her medications and take in her diet.  Suspect possible mild dementia, but would recommend further evaluation by her primary care provider.      PREVIOUS ENDOSCOPY:  8/27/2014 Colonoscopy  Findings:       Perianal examination was normal. A few small-mouthed diverticula were        found in the proximal sigmoid colon, in the distal sigmoid colon and in        the proximal transverse colon.                                                                                    Impression:               - Diverticulosis proximal sigmoid colon, distal                             sigmoid colon and proximal transverse colon.                             - Mild diverticulosis distal sigmoid colon. There                             was no evidence of diverticular bleeding.     PERTINENT STUDIES Reviewed in EMR  5/6/2023 CT scan of abdomen/pelvis  FINDINGS:      LUNGS AND PLEURA: Minimal dependent atelectasis. No pleural effusion or pneumothorax.   "   MEDIASTINUM/AXILLAE: Normal.     CORONARY ARTERY CALCIFICATION: None.     HEPATOBILIARY: Few small hepatic cysts. No significant mass or bile duct dilatation. Cholecystectomy clips.     PANCREAS: Normal.     SPLEEN: Normal.     ADRENAL GLANDS: Normal.     KIDNEYS: Normal.     BOWEL: Postsurgical changes of the colon. Diverticulosis of the colon. No acute inflammatory change. No obstruction. No free air or free fluid.     LYMPH NODES: Normal.     VASCULATURE: No abdominal aortic aneurysm. Mild-to-moderate atherosclerotic calcification.     MUSCULOSKELETAL: No displaced rib fractures. Degenerative changes of the spine. L2 - ilium posterior spinal fusion hardware. Scoliosis. Ventriculoperitoneal shunt catheter.                                                                      IMPRESSION:  1.  No acute traumatic abnormality in the chest, abdomen, or pelvis.     ROS: 10pt ROS performed and otherwise negative.    PAST MEDICAL HISTORY:  Past Medical History:   Diagnosis Date    Lichen planus     vulvar    Osteopenia 4/2010    recheck DEXA in 2 years    Panic disorder without agoraphobia     Pure hypercholesterolemia     low LDLs    Raynaud's disease 6/2014    Unspecified essential hypertension        PREVIOUS ABDOMINAL/GYNECOLOGIC SURGERIES:  Past Surgical History:   Procedure Laterality Date    COLONOSCOPY  10/17/2007    normal, recheck in 10 years    COLONOSCOPY N/A 08/27/2014    Procedure: COLONOSCOPY;  Surgeon: Raffi Montiel MD;  Location:  GI    COMBINED CYSTOSCOPY, INSERT CATHETER URETER Bilateral 10/13/2014    Procedure: COMBINED CYSTOSCOPY, INSERT CATHETER URETER;  Surgeon: Abdoulaye Odell MD;  Location:  OR    EYE SURGERY  08/01/2001    right eye muscle repair    HC REMOVAL GALLBLADDER  02/09/2010    lap    HC REMOVAL OF OVARY/TUBE(S)      Salpingo-Oophorectomy, bilateral    HYSTERECTOMY, PAP NO LONGER INDICATED      INJECT EPIDURAL LUMBAR Right 04/10/2017    Procedure: INJECT EPIDURAL LUMBAR;   Surgeon: Raffi Wright MD;  Location: [facility]    INJECT EPIDURAL LUMBAR Right 02/27/2020    Procedure: Right Lumbar 4- Sacral 1 Epidural Steroid Injection;  Surgeon: Antonio Dasilva MD;  Location: [facility]    INJECT EPIDURAL LUMBAR Right 01/29/2021    Procedure: INJECTION, SPINE,  sacral 1 EPIDURAL;  Surgeon: Antonio Dasilva MD;  Location: [facility]    INJECT EPIDURAL TRANSFORAMINAL Right 06/09/2016    Procedure: INJECT EPIDURAL TRANSFORAMINAL;  Surgeon: Antonio Dasilva MD;  Location[facility]    INJECT EPIDURAL TRANSFORAMINAL Right 10/13/2016    Procedure: INJECT EPIDURAL TRANSFORAMINAL;  Surgeon: Antonio Dasilva MD;  Location[facility]    INJECT EPIDURAL TRANSFORAMINAL Right 06/18/2021    Procedure: Right L4-5 and right L5-S1 Transforaminal Epidural Steroid Injections with fluoroscopic guidance and contrast.;  Surgeon: Antonio Dasilva MD;  Location: [facility]    INJECT JOINT SACROILIAC Right 04/14/2022    Procedure: Fluoroscopically-guided injection of the Bilateral sacroiliac joints with Bilateral pastor Sacroiliac joint ligaments infiltration over the sacrum;  Surgeon: Antonio Dasilva MD;  Location: [facility]    LAPAROSCOPIC ASSISTED COLECTOMY LEFT (DESCENDING) N/A 10/13/2014    Procedure: LAPAROSCOPIC ASSISTED COLECTOMY LEFT (DESCENDING);  Surgeon: Raffi Montiel MD;  Location: [facility]    LUMBAR SPINE SURGERY  09/2021    L2-Pelvis fusion with rods and screws    OPTICAL TRACKING SYSTEM IMPLANT SHUNT VENTRICULOPERITONEAL Right 12/2/2022    Procedure: Right Ventriculoperitoneal;  Surgeon: Hira Pillai MD;  Location: [facility] OR    ZZC APPENDECTOMY      ZZC NONSPECIFIC PROCEDURE      Right inferior oblique recession, 14 mm    ZZC VAG HYST,RMV TUBE/OVARY  01/01/1984    cervix removed         PERTINENT MEDICATIONS:  Current Outpatient Medications   Medication Sig Dispense Refill    acetaminophen (TYLENOL) 500 MG tablet Take 500-1,000 mg by mouth every 6 hours as needed for mild pain      ALPRAZolam (XANAX) 0.25 MG tablet TAKE 1  TABLET BY MOUTH NIGHTLY AS NEEDED FOR ANXIETY 90 tablet 0    dicyclomine (BENTYL) 10 MG capsule Take 1 capsule (10 mg) by mouth 3 times daily as needed (for abdominal cramping and pain, as needed) 30 capsule 4    levothyroxine (SYNTHROID/LEVOTHROID) 50 MCG tablet TAKE ONE TABLET BY MOUTH once daily 90 tablet 3    loperamide (IMODIUM A-D) 2 MG tablet Take 1 tablet (2 mg) by mouth daily as needed for diarrhea Take one tablet after 2 or more loose stools. Wait for one hour and if still have loose stools, take half of the tablet. 30 tablet 1    losartan (COZAAR) 25 MG tablet Take 1 tablet (25 mg) by mouth daily 90 tablet 3    metoprolol succinate ER (TOPROL XL) 25 MG 24 hr tablet Take 1 tablet (25 mg) by mouth daily 90 tablet 3    multivitamin w/minerals (MULTI-VITAMIN) tablet Take 1 tablet by mouth daily      naproxen sodium (ANAPROX) 220 MG tablet Take 220 mg by mouth 2 times daily (with meals)      omeprazole (PRILOSEC) 20 MG DR capsule TAKE ONE CAPSULE BY MOUTH ONCE DAILY 90 capsule 1    polyethylene glycol (MIRALAX) 17 GM/Dose powder Take 8 g by mouth daily Take 1/2 of the scoop in the morning 255 g 2    polyethylene glycol-propylene glycol (SYSTANE ULTRA) 0.4-0.3 % SOLN ophthalmic solution Place 1 drop into both eyes 3 times daily as needed for dry eyes      psyllium (METAMUCIL/KONSYL) 58.6 % powder Take 4 g by mouth daily Take 1/2 of teaspoon with supper. OK to increase the dose to one teaspoon in a week 283 g 2    simvastatin (ZOCOR) 20 MG tablet TAKE ONE-HALF TABLET BY MOUTH EVERY NIGHT 135 tablet 3    cholecalciferol 25 MCG (1000 UT) TABS Take 1,000 Units by mouth daily (Patient not taking: Reported on 4/5/2024)      meclizine (ANTIVERT) 25 MG tablet Take 1 tablet (25 mg) by mouth 3 times daily as needed for dizziness (Patient not taking: Reported on 4/5/2024) 30 tablet 1         SOCIAL HISTORY:  Social History     Socioeconomic History    Marital status:      Spouse name: Not on file    Number of  children: 4    Years of education: Not on file    Highest education level: Not on file   Occupational History    Occupation: dry cleaning   Tobacco Use    Smoking status: Never     Passive exposure: Never    Smokeless tobacco: Never   Vaping Use    Vaping Use: Never used   Substance and Sexual Activity    Alcohol use: No    Drug use: No    Sexual activity: Not Currently     Partners: Male     Birth control/protection: Surgical     Comment: not currently/hysterectomy   Other Topics Concern    Parent/sibling w/ CABG, MI or angioplasty before 65F 55M? No   Social History Narrative    Not on file     Social Determinants of Health     Financial Resource Strain: Low Risk  (11/16/2023)    Financial Resource Strain     Within the past 12 months, have you or your family members you live with been unable to get utilities (heat, electricity) when it was really needed?: No   Food Insecurity: Low Risk  (11/16/2023)    Food Insecurity     Within the past 12 months, did you worry that your food would run out before you got money to buy more?: No     Within the past 12 months, did the food you bought just not last and you didn t have money to get more?: No   Transportation Needs: Low Risk  (11/16/2023)    Transportation Needs     Within the past 12 months, has lack of transportation kept you from medical appointments, getting your medicines, non-medical meetings or appointments, work, or from getting things that you need?: No   Physical Activity: Not on file   Stress: Not on file   Social Connections: Not on file   Interpersonal Safety: Low Risk  (11/16/2023)    Interpersonal Safety     Do you feel physically and emotionally safe where you currently live?: Yes     Within the past 12 months, have you been hit, slapped, kicked or otherwise physically hurt by someone?: No     Within the past 12 months, have you been humiliated or emotionally abused in other ways by your partner or ex-partner?: No   Housing Stability: Low Risk   "(2023)    Housing Stability     Do you have housing? : Yes     Are you worried about losing your housing?: No       FAMILY HISTORY:  Denies colon/panc/esophageal/other GI CA, no other [place] or other HPS-related [place]. No IBD/celiac, no other AI/liver/thyroid disease.    Family History   Problem Relation Age of Onset    Diabetes Brother     Heart Disease Brother 50        AMI    Diabetes Brother     Heart Disease Brother         AMI, \"older\"    Cerebrovascular Disease Brother     Psychotic Disorder Brother         , alcohol    Cancer Sister         breast cancer x2    Hypertension No family hx of     Breast Cancer No family hx of     Ovarian Cancer No family hx of     Prostate Cancer No family hx of     Mental Illness No family hx of     Anesthesia Reaction No family hx of     Osteoporosis No family hx of     Obesity No family hx of     Other Cancer No family hx of     Depression No family hx of     Anxiety Disorder No family hx of     Mental Illness No family hx of     Substance Abuse No family hx of        PHYSICAL EXAMINATION:  Vitals reviewed  /60 (BP Location: Right arm, Patient Position: Sitting, Cuff Size: Adult Regular)   Pulse 60   Ht 1.537 m (5' 0.5\")   Wt 60.3 kg (133 lb)   LMP  (LMP Unknown)   Breastfeeding No   BMI 25.55 kg/m      General: Patient appears well in no acute distress.    Skin: No visualized rash or lesions on visualized skin  HEENT:    EOMI, no erythema, sclera icterus or discharge noted.  Mouth mucosa intact, pink, moist  No cervical or supraclavicular lymphadenopathy. Thyroid gland not enlarged.  Resp: breathing comfortably without accessory muscle usage, speaking in full sentences, no cough. Lung sounds clear  Card: Regular and rhythmic S1 and S2. No gallop or rub. No murmur.  No LE edema.  Abdomen: Active bowel sounds X 4 quadrants. Soft to palpation. Tenderness in the RLQ.  No guarding or rebound tenderness. Dickey's sign negative.  MSK: Appears to have normal range " of motion based on visualized movements  Neurologic: No apparent tremors, facial movements symmetric  Psych: affect normal, alert and oriented      ASSESSMENT/PLAN:    ICD-10-CM    1. Irritable bowel syndrome with both constipation and diarrhea  K58.2 Adult GI Clinic Follow-Up Order (Blank)      2. Abdominal bloating  R14.0 Adult GI Clinic Follow-Up Order (Blank)      3. Abdominal pain, right lower quadrant  R10.31 Adult GI Clinic Follow-Up Order (Blank)         83 year old female  presented to GI clinic for a follow up on irregular stool pattern with occasional loose stools 1-2 a day, some abdominal bloating, and discomfort in the RLQ. It was felt that her symptoms are likely related to mixed type of functional bowel disease, lactose intolerance, and constipation with overflow diarrhea. Patient has history of cholecystectomy and diverticulitis surgery.  Ongoing symptoms, somewhat improved after patient started low lactose diet. She tried a few doses of cholestyramine but did not like the taste. Started metamucil with supper and noted improvement in consistency and frequency of stools.  CT scan from May 2023 showed diverticulosis and no acute abnormalities in the patient's abdomen. There was sigmoid diverticulosis on her last colonoscopy in 2014, no other abnormalities.  Suggested to continue metamucil and to resume Miralax in the morning. Take dicyclomine as needed for abdominal pain.    Patient asks if 1% milk contains any lactose. Education on lactose free diet was reinforced. She said that she take calcium with vitamin D and is afraid it causes diarrhea; would like to know why it was recommended by another provider. Explained risks for osteopenia and benefits of calcium+vit D.  Educated that calcium usually causes constipation, not diarrhea.    Patient also shared that she developed acute swelling and pain in the right ring finger. No known injury or insect bite. Was seen at urgent care. Xrays negative for  fracture. Noted swelling and tenderness of R. 4th finger PIP joint. Suggested to see her PCP for evaluation (overuse injury? Inflammatory arthritis? Gout?). Patient started taking NSAIDs.     Patient verbalized understanding and appreciation of care provided. Stated that all of the questions were answered to her/his satisfaction.  RTC in 6 months    Thank you for this consultation. It was a pleasure to participate in the care of this patient; please contact us with any further questions.    FORREST Walker, FNP-C  Division of Gastroenterology, Hepatology, and Nutrition  [facility]  Specialty Care [facility], [place], [place]    This note was created with Dragon voice recognition software, and while reviewed for accuracy, inadvertent minor typographic errors may occur. Please contact the provider if you have any questions.

## 2024-04-18 ENCOUNTER — PATIENT OUTREACH (OUTPATIENT)
Dept: CARE COORDINATION | Facility: CLINIC | Age: 84
End: 2024-04-18
Payer: COMMERCIAL

## 2024-05-01 ENCOUNTER — HOSPITAL ENCOUNTER (OUTPATIENT)
Dept: MAMMOGRAPHY | Facility: CLINIC | Age: 84
Discharge: HOME | End: 2024-05-01
Attending: PHYSICIAN ASSISTANT | Admitting: PHYSICIAN ASSISTANT
Payer: COMMERCIAL

## 2024-05-01 DIAGNOSIS — Z12.31 VISIT FOR SCREENING MAMMOGRAM: ICD-10-CM

## 2024-05-01 PROCEDURE — 77063 BREAST TOMOSYNTHESIS BI: CPT

## 2024-05-02 ENCOUNTER — PATIENT OUTREACH (OUTPATIENT)
Dept: CARE COORDINATION | Facility: CLINIC | Age: 84
End: 2024-05-02
Payer: COMMERCIAL

## 2024-05-06 DIAGNOSIS — E03.9 ACQUIRED HYPOTHYROIDISM: ICD-10-CM

## 2024-05-06 RX ORDER — LEVOTHYROXINE SODIUM 50 UG/1
TABLET ORAL
Qty: 90 TABLET | Refills: 0 | Status: SHIPPED | OUTPATIENT
Start: 2024-05-06 | End: 2024-07-30

## 2024-05-21 DIAGNOSIS — K21.9 GASTROESOPHAGEAL REFLUX DISEASE, UNSPECIFIED WHETHER ESOPHAGITIS PRESENT: ICD-10-CM

## 2024-05-24 ENCOUNTER — OFFICE VISIT (OUTPATIENT)
Dept: FAMILY MEDICINE | Facility: OTHER | Age: 84
End: 2024-05-24
Payer: COMMERCIAL

## 2024-05-24 VITALS
OXYGEN SATURATION: 91 % | WEIGHT: 132 LBS | TEMPERATURE: 98 F | HEIGHT: 61 IN | DIASTOLIC BLOOD PRESSURE: 70 MMHG | BODY MASS INDEX: 24.92 KG/M2 | HEART RATE: 65 BPM | RESPIRATION RATE: 16 BRPM | SYSTOLIC BLOOD PRESSURE: 130 MMHG

## 2024-05-24 DIAGNOSIS — K21.9 GASTROESOPHAGEAL REFLUX DISEASE, UNSPECIFIED WHETHER ESOPHAGITIS PRESENT: ICD-10-CM

## 2024-05-24 DIAGNOSIS — G91.2 NPH (NORMAL PRESSURE HYDROCEPHALUS) (H): ICD-10-CM

## 2024-05-24 DIAGNOSIS — Z23 NEED FOR COVID-19 VACCINE: ICD-10-CM

## 2024-05-24 DIAGNOSIS — M85.89 OSTEOPENIA OF MULTIPLE SITES: ICD-10-CM

## 2024-05-24 DIAGNOSIS — I10 BENIGN HYPERTENSION: ICD-10-CM

## 2024-05-24 DIAGNOSIS — E03.9 ACQUIRED HYPOTHYROIDISM: ICD-10-CM

## 2024-05-24 DIAGNOSIS — I44.7 LBBB (LEFT BUNDLE BRANCH BLOCK): ICD-10-CM

## 2024-05-24 DIAGNOSIS — F41.9 ANXIETY: ICD-10-CM

## 2024-05-24 DIAGNOSIS — Z00.00 MEDICARE ANNUAL WELLNESS VISIT, SUBSEQUENT: Primary | ICD-10-CM

## 2024-05-24 DIAGNOSIS — I50.22 CHRONIC SYSTOLIC HEART FAILURE (H): ICD-10-CM

## 2024-05-24 DIAGNOSIS — E78.5 HYPERLIPIDEMIA LDL GOAL <130: ICD-10-CM

## 2024-05-24 LAB
ANION GAP SERPL CALCULATED.3IONS-SCNC: 11 MMOL/L (ref 7–15)
BUN SERPL-MCNC: 7.4 MG/DL (ref 8–23)
CALCIUM SERPL-MCNC: 9.6 MG/DL (ref 8.8–10.2)
CHLORIDE SERPL-SCNC: 103 MMOL/L (ref 98–107)
CHOLEST SERPL-MCNC: 178 MG/DL
CREAT SERPL-MCNC: 0.7 MG/DL (ref 0.51–0.95)
DEPRECATED HCO3 PLAS-SCNC: 27 MMOL/L (ref 22–29)
EGFRCR SERPLBLD CKD-EPI 2021: 85 ML/MIN/1.73M2
ERYTHROCYTE [DISTWIDTH] IN BLOOD BY AUTOMATED COUNT: 12.1 % (ref 10–15)
FASTING STATUS PATIENT QL REPORTED: YES
FASTING STATUS PATIENT QL REPORTED: YES
GLUCOSE SERPL-MCNC: 100 MG/DL (ref 70–99)
HCT VFR BLD AUTO: 42.7 % (ref 35–47)
HDLC SERPL-MCNC: 79 MG/DL
HGB BLD-MCNC: 13.3 G/DL (ref 11.7–15.7)
LDLC SERPL CALC-MCNC: 84 MG/DL
MCH RBC QN AUTO: 30.8 PG (ref 26.5–33)
MCHC RBC AUTO-ENTMCNC: 31.1 G/DL (ref 31.5–36.5)
MCV RBC AUTO: 99 FL (ref 78–100)
NONHDLC SERPL-MCNC: 99 MG/DL
PLATELET # BLD AUTO: 181 10E3/UL (ref 150–450)
POTASSIUM SERPL-SCNC: 4.6 MMOL/L (ref 3.4–5.3)
RBC # BLD AUTO: 4.32 10E6/UL (ref 3.8–5.2)
SODIUM SERPL-SCNC: 141 MMOL/L (ref 135–145)
TRIGL SERPL-MCNC: 74 MG/DL
TSH SERPL DL<=0.005 MIU/L-ACNC: 1.84 UIU/ML (ref 0.3–4.2)
WBC # BLD AUTO: 4.1 10E3/UL (ref 4–11)

## 2024-05-24 PROCEDURE — 90480 ADMN SARSCOV2 VAC 1/ONLY CMP: CPT | Performed by: PHYSICIAN ASSISTANT

## 2024-05-24 PROCEDURE — 80048 BASIC METABOLIC PNL TOTAL CA: CPT | Performed by: PHYSICIAN ASSISTANT

## 2024-05-24 PROCEDURE — 99214 OFFICE O/P EST MOD 30 MIN: CPT | Mod: 25 | Performed by: PHYSICIAN ASSISTANT

## 2024-05-24 PROCEDURE — 84443 ASSAY THYROID STIM HORMONE: CPT | Performed by: PHYSICIAN ASSISTANT

## 2024-05-24 PROCEDURE — 91320 SARSCV2 VAC 30MCG TRS-SUC IM: CPT | Performed by: PHYSICIAN ASSISTANT

## 2024-05-24 PROCEDURE — G0439 PPPS, SUBSEQ VISIT: HCPCS | Performed by: PHYSICIAN ASSISTANT

## 2024-05-24 PROCEDURE — 36415 COLL VENOUS BLD VENIPUNCTURE: CPT | Performed by: PHYSICIAN ASSISTANT

## 2024-05-24 PROCEDURE — 80061 LIPID PANEL: CPT | Performed by: PHYSICIAN ASSISTANT

## 2024-05-24 PROCEDURE — 85027 COMPLETE CBC AUTOMATED: CPT | Performed by: PHYSICIAN ASSISTANT

## 2024-05-24 RX ORDER — ALPRAZOLAM 0.25 MG
0.25 TABLET ORAL
Qty: 90 TABLET | Refills: 0 | Status: SHIPPED | OUTPATIENT
Start: 2024-05-24 | End: 2024-09-23

## 2024-05-24 RX ORDER — LOSARTAN POTASSIUM 25 MG/1
25 TABLET ORAL DAILY
Qty: 90 TABLET | Refills: 3 | Status: SHIPPED | OUTPATIENT
Start: 2024-05-24

## 2024-05-24 RX ORDER — METOPROLOL SUCCINATE 25 MG/1
25 TABLET, EXTENDED RELEASE ORAL DAILY
Qty: 90 TABLET | Refills: 3 | Status: SHIPPED | OUTPATIENT
Start: 2024-05-24

## 2024-05-24 RX ORDER — SIMVASTATIN 20 MG
TABLET ORAL
Qty: 135 TABLET | Refills: 3 | Status: SHIPPED | OUTPATIENT
Start: 2024-05-24

## 2024-05-24 NOTE — PATIENT INSTRUCTIONS
"Consider glucosamine for the joint pain.  Consider magnesium for cramping and sleep.  Stay well hydrated.    Follow-up in 6 months.    Preventive Care Advice   This is general advice we often give to help people stay healthy. Your care team may have specific advice just for you. Please talk to your care team about your own preventive care needs.  Lifestyle  Exercise at least 150 minutes each week (30 minutes a day, 5 days a week).  Do muscle strengthening activities 2 days a week. These help control your weight and prevent disease.  No smoking.  Wear sunscreen to prevent skin cancer.  Have your home tested for radon every 2 to 5 years. Radon is a colorless, odorless gas that can harm your lungs. To learn more, go to www.health.[facility].[place].us and search for \"Radon in Homes.\"  Keep guns unloaded and locked up in a safe place like a safe or gun vault, or, use a gun lock and hide the keys. Always lock away bullets separately. To learn more, visit [employer].[place].gov and search for \"safe gun storage.\"  Nutrition  Eat 5 or more servings of fruits and vegetables each day.  Try wheat bread, brown rice and whole grain pasta (instead of white bread, rice, and pasta).  Get enough calcium and vitamin D. Check the label on foods and aim for 100% of the RDA (recommended daily allowance).  Regular exams  Have a dental exam and cleaning every 6 months.  See your health care team every year to talk about:  Any changes in your health.  Any medicines your care team has prescribed.  Preventive care, family planning, and ways to prevent chronic diseases.  Shots (vaccines)   HPV shots (up to age 26), if you've never had them before.  Hepatitis B shots (up to age 59), if you've never had them before.  COVID-19 shot: Get this shot when it's due.  Flu shot: Get a flu shot every year.  Tetanus shot: Get a tetanus shot every 10 years.  Pneumococcal, hepatitis A, and RSV shots: Ask your care team if you need these based on your risk.  Shingles shot (for " age 50 and up).  General health tests  Diabetes screening:  Starting at age 35, Get screened for diabetes at least every 3 years.  If you are younger than age 35, ask your care team if you should be screened for diabetes.  Cholesterol test: At age 39, start having a cholesterol test every 5 years, or more often if advised.  Bone density scan (DEXA): At age 50, ask your care team if you should have this scan for osteoporosis (brittle bones).  Hepatitis C: Get tested at least once in your life.  Abdominal aortic aneurysm screening: Talk to your doctor about having this screening if you:  Have ever smoked; and  Are biologically male; and  Are between the ages of 65 and 75.  STIs (sexually transmitted infections)  Before age 24: Ask your care team if you should be screened for STIs.  After age 24: Get screened for STIs if you're at risk. You are at risk for STIs (including HIV) if:  You are sexually active with more than one person.  You don't use condoms every time.  You or a partner was diagnosed with a sexually transmitted infection.  If you are at risk for HIV, ask about PrEP medicine to prevent HIV.  Get tested for HIV at least once in your life, whether you are at risk for HIV or not.  Cancer screening tests  Cervical cancer screening: If you have a cervix, begin getting regular cervical cancer screening tests at age 21. Most people who have regular screenings with normal results can stop after age 65. Talk about this with your provider.  Breast cancer scan (mammogram): If you've ever had breasts, begin having regular mammograms starting at age 40. This is a scan to check for breast cancer.  Colon cancer screening: It is important to start screening for colon cancer at age 45.  Have a colonoscopy test every 10 years (or more often if you're at risk) Or, ask your provider about stool tests like a FIT test every year or Cologuard test every 3 years.  To learn more about your testing options, visit:  www.[employer]/574389.pdf.  For help making a decision, visit: shelia.raquel/jh59228.  Prostate cancer screening test: If you have a prostate and are age 55 to 69, ask your provider if you would benefit from a yearly prostate cancer screening test.  Lung cancer screening: If you are a current or former smoker age 50 to 80, ask your care team if ongoing lung cancer screenings are right for you.  For informational purposes only. Not to replace the advice of your health care provider. Copyright   2023 [facility]. All rights reserved. Clinically reviewed by the [facility] Transitions Program. [employer] 720321 - REV 04/24.

## 2024-05-24 NOTE — PROGRESS NOTES
Preventive Care Visit  [facility]  Chuck Streeter PA-C, Family Medicine  May 24, 2024      Assessment & Plan       ICD-10-CM    1. Medicare annual wellness visit, subsequent  Z00.00       2. Anxiety  F41.9 ALPRAZolam (XANAX) 0.25 MG tablet      3. Osteopenia of multiple sites  M85.89       4. Hyperlipidemia LDL goal <130  E78.5 Lipid panel reflex to direct LDL Fasting     simvastatin (ZOCOR) 20 MG tablet     Lipid panel reflex to direct LDL Fasting      5. Benign hypertension  I10 BASIC METABOLIC PANEL     CBC with Platelets     BASIC METABOLIC PANEL     CBC with Platelets      6. NPH (normal pressure hydrocephalus) (H)  G91.2       7. Chronic systolic heart failure (H)  I50.22 losartan (COZAAR) 25 MG tablet     metoprolol succinate ER (TOPROL XL) 25 MG 24 hr tablet      8. LBBB (left bundle branch block)  I44.7 losartan (COZAAR) 25 MG tablet      9. Gastroesophageal reflux disease, unspecified whether esophagitis present  K21.9 omeprazole (PRILOSEC) 20 MG DR capsule      10. Acquired hypothyroidism  E03.9 TSH with free T4 reflex     TSH with free T4 reflex      11. Need for COVID-19 vaccine  Z23 COVID-19 12+ (2023-24) (PFIZER)          1. Medicare wellness visit completed.    2. Continue Xanax nightly as needed for anxiety and sleep. PDMP reviewed without any concerning findings and she does not overuse this medication.    3. I discussed ordering an updated DEXA scan but she declines at this time. She will continue with a calcium + vitamin D supplement along with weight bearing exercise.    4. Updated lipid panel ordered. Continue with a healthy lifestyle.    5, 7-8. Blood pressure stable on losartan and metoprolol. No recent heart failure exacerbations or symptoms. She is euvolemic.    6. Her shunt continues to function well.    9. Continue omeprazole as directed.    10. Updated thyroid labs ordered and will fill levothyroxine after results are completed.    11. Vaccine administered by  "MA.    Follow-up in 6 months.            BMI  Estimated body mass index is 25.36 kg/m  as calculated from the following:    Height as of this encounter: 1.537 m (5' 0.5\").    Weight as of this encounter: 59.9 kg (132 lb).         Demond Abbott is a 84 year old, presenting for the following:  Recheck Medication and Physical        5/24/2024    11:00 AM   Additional Questions   Roomed by [facility] Directive  Updated and on file    History of Present Illness       Reason for visit:  Medicare wellness    She eats 2-3 servings of fruits and vegetables daily.She consumes 0 sweetened beverage(s) daily.She exercises with enough effort to increase her heart rate 9 or less minutes per day.  She exercises with enough effort to increase her heart rate 3 or less days per week.   She is taking medications regularly.      She has increased pain in her fingers and thinks it is arthritis. She has occasional finger swelling and is wondering what she can take.    Her left carpal tunnel has improved since using the nightly wrist brace.      Annual Wellness Visit     Patient has been advised of split billing requirements and indicates understanding: Yes     In general, how would you rate your overall physical health? good  Do you have a special diet?  Regular (no restrictions)      Do you see a dentist two times every year?  Yes  Have you been more tired than usual lately?  No  If you drink alcohol do you typically have >3 drinks per day or >7 drinks per week? No  Do you have a current opioid prescription? No  Do you use any other controlled substances or medications that are not prescribed by a provider? None        Social History     Tobacco Use    Smoking status: Never     Passive exposure: Never    Smokeless tobacco: Never   Vaping Use    Vaping status: Never Used   Substance Use Topics    Alcohol use: No    Drug use: No       Needs assistance for the following daily activities: no assistance needed  Which of " the following safety concerns are present in your home?  none identified   Do you (or your family members) have any concerns about your safety while driving?  No  Do you have any of the following hearing concerns?: No hearing concerns  In the past 6 months, have you been bothered by leaking of urine? No        11/16/2023   Social Factors   Worry food won't last until get money to buy more No   Food not last or not have enough money for food? No   Do you have housing?  Yes   Are you worried about losing your housing? No   Lack of transportation? No   Unable to get utilities (heat,electricity)? No          5/24/2024   Fall Risk   Fallen 2 or more times in the past year? No    No   Trouble with walking or balance? No    No          Today's PHQ-2 Score:       5/24/2024    11:05 AM   PHQ-2 ( 1999 Pfizer)   Q1: Little interest or pleasure in doing things 0   Q2: Feeling down, depressed or hopeless 0   PHQ-2 Score 0   Q1: Little interest or pleasure in doing things Not at all   Q2: Feeling down, depressed or hopeless Not at all   PHQ-2 Score 0           5/1/2024   LAST FHS-7 RESULTS   1st degree relative breast or ovarian cancer Yes   Any relative bilateral breast cancer No   Any male have breast cancer No   Any ONE woman have BOTH breast AND ovarian cancer No   Any woman with breast cancer before 50yrs Yes   2 or more relatives with breast AND/OR ovarian cancer Yes   2 or more relatives with breast AND/OR bowel cancer Yes       Mammogram Screening - After age 74- determine frequency with patient based on health status, life expectancy and patient goals  Continue yearly screening given strong family history      History of abnormal Pap smear: No - age 65 or older with adequate negative prior screening test results (3 consecutive negative cytology results, 2 consecutive negative cotesting results, or 2 consecutive negative HrHPV test results within 10 years, with the most recent test occurring within the recommended  screening interval for the test used)        9/24/2007    12:00 AM   PAP / HPV   PAP (Historical) NIL        Reviewed and updated as needed this visit by Provider  Tobacco  Allergies  Meds  Problems  Med Hx  Surg Hx  Fam Hx        Current providers sharing in care for this patient include:  Patient Care Team:  Chuck Streeter PA-C as PCP - General (Physician Assistant)  Chuck Streeter PA-C as Assigned PCP  Aminata Campoverde APRN CNP as Nurse Practitioner  Hira Pillai MD as Assigned Neuroscience Provider  Aminata Campoverde APRN CNP as Assigned Surgical Provider    The following health maintenance items are reviewed in Epic and correct as of today:  Health Maintenance   Topic Date Due    HF ACTION PLAN  Never done    DEXA  01/07/2023    URINE DRUG SCREEN  03/24/2023    ALT  02/08/2024    LIPID  05/18/2024    BMP  06/17/2024    MEDICARE ANNUAL WELLNESS VISIT  05/24/2025    ANNUAL REVIEW OF HM ORDERS  05/24/2025    FALL RISK ASSESSMENT  05/24/2025    CBC  05/24/2025    ADVANCE CARE PLANNING  05/18/2028    DTAP/TDAP/TD IMMUNIZATION (2 - Td or Tdap) 12/12/2028    TSH W/FREE T4 REFLEX  Completed    PHQ-2 (once per calendar year)  Completed    INFLUENZA VACCINE  Completed    Pneumococcal Vaccine: 65+ Years  Completed    ZOSTER IMMUNIZATION  Completed    RSV VACCINE (Pregnancy & 60+)  Completed    COVID-19 Vaccine  Completed    IPV IMMUNIZATION  Aged Out    HPV IMMUNIZATION  Aged Out    MENINGITIS IMMUNIZATION  Aged Out    RSV MONOCLONAL ANTIBODY  Aged Out       Appropriate preventive services were discussed with this patient, including applicable screening as appropriate for fall prevention, nutrition, physical activity, Tobacco-use cessation, weight loss and cognition.  Checklist reviewing preventive services available has been given to the patient.           5/24/2024   Mini Cog   Clock Draw Score 2 Normal   3 Item Recall 3 objects recalled   Mini Cog Total Score 5        Today's PHQ-2 Score:        "5/24/2024    11:05 AM   PHQ-2 ( 1999 Pfizer)   Q1: Little interest or pleasure in doing things 0   Q2: Feeling down, depressed or hopeless 0   PHQ-2 Score 0   Q1: Little interest or pleasure in doing things Not at all   Q2: Feeling down, depressed or hopeless Not at all   PHQ-2 Score 0       Social History     Tobacco Use    Smoking status: Never     Passive exposure: Never    Smokeless tobacco: Never   Vaping Use    Vaping status: Never Used   Substance Use Topics    Alcohol use: No    Drug use: No           5/1/2024   LAST FHS-7 RESULTS   1st degree relative breast or ovarian cancer Yes   Any relative bilateral breast cancer No   Any male have breast cancer No   Any ONE woman have BOTH breast AND ovarian cancer No   Any woman with breast cancer before 50yrs Yes   2 or more relatives with breast AND/OR ovarian cancer Yes   2 or more relatives with breast AND/OR bowel cancer Yes     Reviewed and updated as needed this visit by Provider   Tobacco  Allergies  Meds  Problems  Med Hx  Surg Hx  Fam Hx            Current providers sharing in care for this patient include:  Patient Care Team:  Chuck Streeter PA-C as PCP - General (Physician Assistant)  Chuck Streeter PA-C as Assigned PCP  Aminata Campoverde APRN CNP as Nurse Practitioner  Hira Pillai MD as Assigned Neuroscience Provider  Aminata Campoverde APRN CNP as Assigned Surgical Provider          Review of Systems  Constitutional, HEENT, cardiovascular, pulmonary, GI, , musculoskeletal, neuro, skin, endocrine and psych systems are negative, except as otherwise noted.     Objective    Exam  /70   Pulse 65   Temp 98  F (36.7  C) (Temporal)   Resp 16   Ht 1.537 m (5' 0.5\")   Wt 59.9 kg (132 lb)   LMP  (LMP Unknown)   SpO2 91%   BMI 25.36 kg/m     Estimated body mass index is 25.36 kg/m  as calculated from the following:    Height as of this encounter: 1.537 m (5' 0.5\").    Weight as of this encounter: 59.9 kg (132 lb).    Physical " Exam  GENERAL: alert and no distress  EYES: Eyes grossly normal to inspection, PERRL and conjunctivae and sclerae normal  HENT: ear canals partial occluded by cerumen and TM's normal, nose and mouth without ulcers or lesions  NECK: no adenopathy, no asymmetry, masses, or scars  RESP: lungs clear to auscultation - no rales, rhonchi or wheezes  CV: regular rate and rhythm, normal S1 S2, no S3 or S4, no murmur, click or rub, no peripheral edema  ABDOMEN: soft, nontender, no hepatosplenomegaly, no masses and bowel sounds normal  MS: no gross musculoskeletal defects noted, no edema  SKIN: no suspicious lesions or rashes  NEURO: Normal strength and tone, mentation intact and speech normal. Gait is stable.   PSYCH: mentation appears normal, affect normal/bright           Signed Electronically by: Chuck Streeter PA-C

## 2024-05-24 NOTE — PROGRESS NOTES
{PROVIDER CHARTING PREFERENCE:579421}    Demond Abbott is a 84 year old, presenting for the following health issues:  No chief complaint on file.    HPI     {MA/LPN/RN Pre-Provider Visit Orders- hCG/UA/Strep (Optional):821511}  {SUPERLIST (Optional):967769}  {additonal problems for provider to add (Optional):313485}    {ROS Picklists (Optional):814089}      Objective    LMP  (LMP Unknown)   There is no height or weight on file to calculate BMI.  Physical Exam   {Exam List (Optional):983159}    {Diagnostic Test Results (Optional):442330}        Signed Electronically by: Chuck Streeter PA-C  {Email feedback regarding this note to [email or web address]   :378761}

## 2024-07-26 NOTE — ED NOTES
The patient should avoid sleeping in the supine position as this appears to increase the severity of the obstructive sleep apnea. This may be achieved by using a commercially available positional device like backpack or wedge or by sewing an object (e.g. tennis balls) in the back of his night shirt.      No eating / drinking for 3 hours before going to bed.  Elevate head of bed 30 - 45 %     CPAP HABITUATION PROCEDURE     Ton Norton, Ph.D., [facility] and Grey Hernandez M.D.  [facility], [facility]     Some people have difficulty adjusting to CPAP/BiPAP/AutoCPAP.  This is not unusual or hard to understand: Breathing with CPAP is different from ordinary breathing, and this difference is aversive to some. The problem can be overcome, however, and the benefits CPAP confers are certainly worth the effort.  Below, you will find a simple and gradual way to get used to CPAP before you try to use it all night, every night.  The essence of this procedure is to relax and let breathing with CPAP become a habit.  It may take about 2 weeks, and involves the following:      CPAP while awake and comfortably seated, during the late evening.     CPAP in bed while attempting sleep at night.     If your discomfort is too great at any time, discontinue and attempt again later the same night, for the same amount of time.   You and your physician may alter the times and pressures if necessary.     If you find that it is very easy to get used to CPAP, you may start using it every night when you are comfortable enough to do so.  IMPORTANT REMINDER: If you have a cold or sinus congestion it is okay to miss a night or two of CPAP. Consider using antihistamines or decongestants to clear up your sinus congestion prior to sleeping.     DAYS  1-3   Start CPAP while awake and comfortably seated during the late evening, after having prepared for bed.  You may do this while watching television, listening to  Soap suds enema given.  Pt was able to hold approx 200 ml for 10 minutes.  States that she had a small amount out and mostly liquid.  MD informed.   music or reading. Use for 1 hour, then take off CPAP and go directly to bed to sleep     DAYS  4-6     Start CPAP when you go to bed and use for 1 hour, or until you fall asleep.  If your discomfort is too great at any time, discontinue and attempt again later the same night, for the same designated amount of time (1 hour).      DAYS  7-9     Increase time with CPAP to 2 hours a night.  If your discomfort is too great at any time, discontinue and attempt again later the same night, for the same designated amount of time (2 hours).      DAYS 10-12    Increase time with CPAP to 3 hours a night. If your discomfort is too great at any time, discontinue and attempt again later the same night, for the same designated amount of time (3 hours).      DAYS 13-15     Sleep the entire night with CPAP.      OPTIONAL: You may use Progressive Muscle Relaxation (PMR) to help put you at ease when using CPAP; do PMR twice each day, once in the morning or afternoon, and once in the evening just before using CPAP. You may do PMR prior to any attempt until you are comfortable with CPAP.        Continuous Positive Air Pressure (CPAP)  Continuous positive air pressure (CPAP) uses gentle air pressure to hold the airway open. CPAP is often the most effective treatment for sleep apnea and severe snoring. It works very well for many people. But keep in mind that it can take several adjustments before the setup is right for you.       How CPAP Works  CPAP is a small portable pump beside the bed. The pump sends air through a hose, which is held over your nose and/or mouth by a mask. Mild air pressure is gently pushed through your airway. The air pressure nudges sagging tissues aside. This widens the airway so you can breathe better. CPAP may be combined with other kinds of therapy for sleep apnea.       Types of Air Pressure Treatments  There are different types of CPAP. Your doctor or CPAP technician will help you decide which type is best for  you:  Basic CPAP keeps the pressure constant all night long.  A bilevel device (BiPAP) provides more pressure when you breathe in and less when you breathe out. A BiPAP machine also may be set to provide automatic breaths to maintain breathing if you stop breathing while sleeping.  An autoCPAP device automatically adjusts pressure throughout the night and in response to changes such as body position, sleep stage, and snoring.  © 4269-8172 [employer]. [address] [place] [address], [place] [address]. All rights reserved. This information is not intended as a substitute for professional medical care. Always follow your healthcare professional's instructions.        Snoring and Sleep Apnea: Notes for a Partner  Snoring and sleep apnea affect your life, as well as your partners. You can help in the treatment of the problem. Be supportive. Encourage your partner both to get treatment and to make the adjustments needed to follow through.       Adjusting to Changes  Your partners treatment may involve making changes to certain life habits. You can help your partner make and stick with these changes. For example:  Support and even join your partners exercise program.  Be supportive if your partner gets CPAP (continuous positive airway pressure). He or she may feel self-conscious at first. Remind your partner to expect adjustments to CPAP before it feels just right.  Consider joining a snoring and sleep apnea support group.  Go Along to See the Health Care Provider  You can give the health care provider the best account of your partners nighttime breathing and snoring patterns. Try to go along to health care providers appointments. If you cant go, write notes for your partner to give to the health care provider. Describe your partners snoring and sleep breathing patterns in detail.  Tips for Sleeping with a Snorer  Until treatment takes care of your partners snoring:  Try to go to bed first. It may help if  youre already asleep when your partner starts to snore.  Wear earplugs to bed. A fan or other source of background noise may also help drown out snoring.   © 7964-9910 [employer]. [address] [place] [address], [place] [address]. All rights reserved. This information is not intended as a substitute for professional medical care. Always follow your healthcare professional's instructions.        Continuous Positive Airway Pressure (CPAP)  Your health care provider has prescribed continuous positive airway pressure (CPAP) therapy for you. A CPAP device helps you breathe better at night. The device delivers air through your nose or mouth when you breathe in to keep your air passages open. CPAP is:  Used most often to treat sleep apnea and some other problems (Sleep apnea is a chronic condition with periods of sleep in which you briefly stop breathing.)  Safe and very effective, but it takes time to get used to the mask.   Your health care provider, nurse, or medical supplier will give you tips for wearing and caring for your CPAP device.  General guidelines  It's very important not to give up! It takes time to get used to wearing the mask at night.  Practice using your CPAP device during the day, especially whenever you take a nap.  Remember, there are several different types of masks. If you cant get used to your mask, ask your provider or medical supply company about trying another style.  If you have nasal stuffiness or dryness when using your CPAP device, talk with your provider or medical supply company. There are ways to lessen these problems. For example, your provider may recommend moistening nasal spray or the medical supply company may recommend a device with a humidifier.  The goal is to use your CPAP all night, every night, during all naps, and even when you travel.  Keep your mask clean. Wash it with soap and water. Be sure to rinse the mask and tubing well with water to remove any soap. Let them  air-dry thoroughly before using.  Make yourself comfortable when sleeping with CPAP. Try using extra pillows.  Work with your medical supply company so that you know how to correctly use your CPAP. Their representative will be able to help you:  Use the CPAP correctly  Troubleshoot any problems that come up  Learn to clean and maintain the device  Adjust to regular use of the CPAP  © 8871-5334 [employer]. [address] [place] [address], [place] [address]. All rights reserved. This information is not intended as a substitute for professional medical care. Always follow your healthcare professional's instructions.

## 2024-07-30 DIAGNOSIS — E03.9 ACQUIRED HYPOTHYROIDISM: ICD-10-CM

## 2024-07-30 RX ORDER — LEVOTHYROXINE SODIUM 50 UG/1
TABLET ORAL
Qty: 90 TABLET | Refills: 2 | Status: SHIPPED | OUTPATIENT
Start: 2024-07-30

## 2024-09-09 NOTE — H&P (VIEW-ONLY)
Milena is a 82 year old who is being evaluated via a billable telephone visit.      What phone number would you like to be contacted at? [phone]  How would you like to obtain your AVS? Mail a copy    Assessment & Plan       ICD-10-CM    1. Recurrent falls  R29.6    2. Imbalance  R26.89    3. 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 HTN, goal below 140/90  I10    5. Drug-induced diarrhea  K52.1          1-3. Recent fall again in the kitchen causing a scalp contusion. She was seen in the ED with a normal work up and no evidence of a concussion and is physically doing much better. She remains very nervous about falling again. 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 I am glad that we were finally able to get the lumbar puncture scheduled for next week with neurology follow up the week after. We discussed that the lumbar puncture is diagnostic but can also be therapeutic for a short period of time so she will monitor her balance, incontinence and mentation/memory very closely after the procedure. She will continue with her walker at all times.     4-5. Blood pressure elevated in the ED, likely due to anxiety and pain from the fall. Her BP at home has been running around 130/80. She has had diarrhea since increasing metoprolol XR to 100 mg and adding lisinopril 5 mg last month. She previously tolerated lower doses of metoprolol so this is unlikely causing the diarrhea so it is more likely the lisinopril. I recommend cutting lisinopril in half over the next 3-4 days and if diarrhea not improving, can stop it completely. Continue to monitor home BP closely and if rising, may need to add a different medication. She does have follow-up with cardiology in 2 weeks.       Chuck Streeter PA-C  [facility]    Demond Abbott is a 82 year old, presenting for the following health issues:  Fall and RECHECK (From emergency  "room visit on 6/26)      [facility]     ED/UC Followup:    Facility:  [facility]  Date of visit: 06/26  Reason for visit: fall  Current Status: \"Not the best\"    She is overall feeling well physically but is very nervous/scared about falling again. She is holding on to her walker very tightly when walking. She does not recall how she fell during these most recent episode or if she fainted or loss consciousness but she has not fallen since her ED visit. She denies any headaches, vision changes, or blurred vision. She is excited to finally get her lumbar puncture done next week and then see neurology the week after as she really wants the normal pressure hydrocephalus \"taken care of\" and treated if this is what she truly has.    She has been noting diarrhea since increasing her metoprolol and starting lisinopril last month with cardiology. She started a lower dose metoprolol 1 month prior and did not have symptoms so wants to know if it is the higher dose of metoprolol or the lisinopril. It is only 1 diarrhea stool per day and she states it is a yellow color. Mild abdominal cramping but no fevers, chills, nausea or vomiting.       ED Summary 6/26    Assessments & Plan (with Medical Decision Making)     Fall  Scalp abrasion  Scalp contusion      82 year old female presents for evaluation of a fall in her kitchen.  She struck the posterior aspect of her head.  Uncertain LOC.  She was alert when her family came to her aid.  Bleeding controlled with pressure.  She complains of posterior head discomfort which she rates 5 on a scale of 10.  Her daughter is present.  See [facility] for details.  On exam blood pressure 161/75, temperature 99.0, pulse 73, respiration 20, oxygen saturation 98% on room air.  Patient is able to transfer from the wheelchair to the bed without complication.  She follows commands appropriately.  GCS is 15 and normal.  She does have a posterior scalp wound without any active bleeding but there is a " contusion.  No sign of step-off.  I did immediately anesthetize the wound with 0.5% bupivacaine.  Neurologically intact.  Normal coordination.  No other sign of extremity or torso trauma.  No abdominal discomfort.  Cardiopulmonary exam normal.  See above for details.  EKG with left bundle branch block but no change.  No acute ST or T wave change.  IV was established.  Laboratory levels reassuring with comprehensive metabolic panel, troponin, and CBC all normal.  Hemoglobin stable at 12.7.  CT head with possible findings of normal pressure hydrocephalus with ventriculomegaly.  This was previously known with recent MRI on 6/9/2022 done by her PCP who is actively managing this concern.  No acute change.  No intracranial hemorrhage or skull fracture.  Cervical spine CT confirms no acute C-spine injury.  On final inspection of her scalp wound, she actually does not have any significant laceration.  The bleeding was coming from superficial abrasion.  She did not have any further bleeding here in the ED.  We washed her hair and washed upper scalp wound with normal saline.  Bacitracin ointment was applied.  Patient was neurologically intact at the end of her visit.  We were conversing well.  She states that her headache is down to 1 on a scale of 10.  She has a nonfocal neurological exam, and I think she is stable for discharge.  The potential normal pressure hydrocephalus with ventriculomegaly noted on CT is an ongoing finding.  Noted on previous CT and even noted on MRI within the past 3 weeks.  She has seen neurology already, and it appears she has a lumbar puncture with opening pressures scheduled for tomorrow which is an appropriate work-up for this concern.  Given that she does not have any acute neurological compromise, there is no indication for inpatient evaluation.  Discussed safety measures within the home.  Her daughter confirms that they have all the rugs out of the home, physical therapy has been in the home  evaluating safety measures, and the bathroom is set up with supportive devices as well.  Based on her final interview.  I do not think that she has suffered a concussion.  Signs and symptoms of concussion to monitor for were reviewed with daughter and the patient.  Indications for ED return reviewed.  Patient was in agreement with this plan and she was suitable for discharge.    Review of Systems   Constitutional, HEENT, cardiovascular, pulmonary, neuro, psych, gi and gu systems are negative, except as otherwise noted.      Objective         Vitals:  No vitals were obtained today due to virtual visit.    Physical Exam   healthy, alert and no distress  PSYCH: Alert and oriented times 3; coherent speech, normal   rate and volume, able to articulate logical thoughts, able   to abstract reason, no tangential thoughts, no hallucinations   or delusions  Her affect is normal  RESP: No cough, no audible wheezing, able to talk in full sentences  Remainder of exam unable to be completed due to telephone visits        Phone call duration: 12:30 minutes    .  ..   no

## 2024-09-23 DIAGNOSIS — F41.9 ANXIETY: ICD-10-CM

## 2024-09-23 RX ORDER — ALPRAZOLAM 0.25 MG
0.25 TABLET ORAL
Qty: 90 TABLET | Refills: 0 | Status: SHIPPED | OUTPATIENT
Start: 2024-09-23

## 2024-12-16 DIAGNOSIS — F41.9 ANXIETY: ICD-10-CM

## 2024-12-16 RX ORDER — ALPRAZOLAM 0.25 MG/1
0.25 TABLET ORAL
Qty: 90 TABLET | Refills: 0 | Status: SHIPPED | OUTPATIENT
Start: 2024-12-16

## 2024-12-21 ENCOUNTER — APPOINTMENT (OUTPATIENT)
Dept: CT IMAGING | Facility: CLINIC | Age: 84
End: 2024-12-21
Attending: PHYSICIAN ASSISTANT
Payer: COMMERCIAL

## 2024-12-21 ENCOUNTER — HOSPITAL ENCOUNTER (EMERGENCY)
Facility: CLINIC | Age: 84
Discharge: HOME | End: 2024-12-21
Attending: PHYSICIAN ASSISTANT | Admitting: PHYSICIAN ASSISTANT
Payer: COMMERCIAL

## 2024-12-21 VITALS
SYSTOLIC BLOOD PRESSURE: 155 MMHG | BODY MASS INDEX: 22.09 KG/M2 | DIASTOLIC BLOOD PRESSURE: 72 MMHG | TEMPERATURE: 98 F | WEIGHT: 117 LBS | OXYGEN SATURATION: 96 % | HEART RATE: 68 BPM | HEIGHT: 61 IN | RESPIRATION RATE: 18 BRPM

## 2024-12-21 DIAGNOSIS — F43.21 GRIEF REACTION: ICD-10-CM

## 2024-12-21 DIAGNOSIS — R19.7 DIARRHEA: ICD-10-CM

## 2024-12-21 LAB
ALBUMIN SERPL BCG-MCNC: 4.6 G/DL (ref 3.5–5.2)
ALP SERPL-CCNC: 59 U/L (ref 40–150)
ALT SERPL W P-5'-P-CCNC: 14 U/L (ref 0–50)
ANION GAP SERPL CALCULATED.3IONS-SCNC: 11 MMOL/L (ref 7–15)
AST SERPL W P-5'-P-CCNC: 14 U/L (ref 0–45)
BASOPHILS # BLD AUTO: 0 10E3/UL (ref 0–0.2)
BASOPHILS NFR BLD AUTO: 1 %
BILIRUB SERPL-MCNC: 0.3 MG/DL
BUN SERPL-MCNC: 8 MG/DL (ref 8–23)
CALCIUM SERPL-MCNC: 9.5 MG/DL (ref 8.8–10.4)
CHLORIDE SERPL-SCNC: 101 MMOL/L (ref 98–107)
CREAT SERPL-MCNC: 0.67 MG/DL (ref 0.51–0.95)
EGFRCR SERPLBLD CKD-EPI 2021: 86 ML/MIN/1.73M2
EOSINOPHIL # BLD AUTO: 0.1 10E3/UL (ref 0–0.7)
EOSINOPHIL NFR BLD AUTO: 1 %
ERYTHROCYTE [DISTWIDTH] IN BLOOD BY AUTOMATED COUNT: 12.8 % (ref 10–15)
GLUCOSE SERPL-MCNC: 110 MG/DL (ref 70–99)
HCO3 SERPL-SCNC: 27 MMOL/L (ref 22–29)
HCT VFR BLD AUTO: 38.8 % (ref 35–47)
HGB BLD-MCNC: 12.9 G/DL (ref 11.7–15.7)
IMM GRANULOCYTES # BLD: 0 10E3/UL
IMM GRANULOCYTES NFR BLD: 0 %
LIPASE SERPL-CCNC: 111 U/L (ref 13–60)
LYMPHOCYTES # BLD AUTO: 1.5 10E3/UL (ref 0.8–5.3)
LYMPHOCYTES NFR BLD AUTO: 35 %
MCH RBC QN AUTO: 31.5 PG (ref 26.5–33)
MCHC RBC AUTO-ENTMCNC: 33.2 G/DL (ref 31.5–36.5)
MCV RBC AUTO: 95 FL (ref 78–100)
MONOCYTES # BLD AUTO: 0.4 10E3/UL (ref 0–1.3)
MONOCYTES NFR BLD AUTO: 8 %
NEUTROPHILS # BLD AUTO: 2.3 10E3/UL (ref 1.6–8.3)
NEUTROPHILS NFR BLD AUTO: 55 %
NRBC # BLD AUTO: 0 10E3/UL
NRBC BLD AUTO-RTO: 0 /100
PLATELET # BLD AUTO: 213 10E3/UL (ref 150–450)
POTASSIUM SERPL-SCNC: 4 MMOL/L (ref 3.4–5.3)
PROT SERPL-MCNC: 7 G/DL (ref 6.4–8.3)
RBC # BLD AUTO: 4.09 10E6/UL (ref 3.8–5.2)
SODIUM SERPL-SCNC: 139 MMOL/L (ref 135–145)
WBC # BLD AUTO: 4.3 10E3/UL (ref 4–11)

## 2024-12-21 PROCEDURE — 85004 AUTOMATED DIFF WBC COUNT: CPT | Performed by: PHYSICIAN ASSISTANT

## 2024-12-21 PROCEDURE — 250N000009 HC RX 250: Performed by: PHYSICIAN ASSISTANT

## 2024-12-21 PROCEDURE — 258N000003 HC RX IP 258 OP 636: Performed by: PHYSICIAN ASSISTANT

## 2024-12-21 PROCEDURE — 96360 HYDRATION IV INFUSION INIT: CPT | Mod: 59

## 2024-12-21 PROCEDURE — 250N000011 HC RX IP 250 OP 636: Performed by: PHYSICIAN ASSISTANT

## 2024-12-21 PROCEDURE — 83690 ASSAY OF LIPASE: CPT | Performed by: PHYSICIAN ASSISTANT

## 2024-12-21 PROCEDURE — 99285 EMERGENCY DEPT VISIT HI MDM: CPT | Mod: 25

## 2024-12-21 PROCEDURE — 36415 COLL VENOUS BLD VENIPUNCTURE: CPT | Performed by: PHYSICIAN ASSISTANT

## 2024-12-21 PROCEDURE — 74177 CT ABD & PELVIS W/CONTRAST: CPT

## 2024-12-21 PROCEDURE — 99284 EMERGENCY DEPT VISIT MOD MDM: CPT | Performed by: PHYSICIAN ASSISTANT

## 2024-12-21 PROCEDURE — 80053 COMPREHEN METABOLIC PANEL: CPT | Performed by: PHYSICIAN ASSISTANT

## 2024-12-21 RX ORDER — IOPAMIDOL 755 MG/ML
500 INJECTION, SOLUTION INTRAVASCULAR ONCE
Status: COMPLETED | OUTPATIENT
Start: 2024-12-21 | End: 2024-12-21

## 2024-12-21 RX ADMIN — SODIUM CHLORIDE 1000 ML: 9 INJECTION, SOLUTION INTRAVENOUS at 18:54

## 2024-12-21 RX ADMIN — IOPAMIDOL 60 ML: 755 INJECTION, SOLUTION INTRAVENOUS at 19:50

## 2024-12-21 RX ADMIN — SODIUM CHLORIDE 60 ML: 9 INJECTION, SOLUTION INTRAVENOUS at 19:49

## 2024-12-21 ASSESSMENT — COLUMBIA-SUICIDE SEVERITY RATING SCALE - C-SSRS
6. HAVE YOU EVER DONE ANYTHING, STARTED TO DO ANYTHING, OR PREPARED TO DO ANYTHING TO END YOUR LIFE?: NO
1. IN THE PAST MONTH, HAVE YOU WISHED YOU WERE DEAD OR WISHED YOU COULD GO TO SLEEP AND NOT WAKE UP?: NO
2. HAVE YOU ACTUALLY HAD ANY THOUGHTS OF KILLING YOURSELF IN THE PAST MONTH?: NO

## 2024-12-21 ASSESSMENT — ACTIVITIES OF DAILY LIVING (ADL)
ADLS_ACUITY_SCORE: 55

## 2024-12-22 ENCOUNTER — TELEPHONE (OUTPATIENT)
Dept: FAMILY MEDICINE | Facility: OTHER | Age: 84
End: 2024-12-22
Payer: COMMERCIAL

## 2024-12-22 NOTE — ED PROVIDER NOTES
History     Chief Complaint   Patient presents with    Diarrhea    Generalized Weakness     HPI  Milena Hamilton is a 84 year old female who presents for evaluation of increased diarrhea over the past 2 weeks.  She has a history of chronic diarrhea, but this is worse than her baseline.  She is experiencing 3-4 very loose stools per day without blood or pus in them.  No melena.  Patient reports her appetite is decreased and she has lost weight.  Very stressed as her   unexpectedly 2 months ago.  She feels weak to the point that she is going out of time walking.  No falls.  She does have some abdominal discomfort which is more in the right lower quadrant.  Denies fevers or chills.  Denies any dysuria, frequency, urgency, flank pain, or gross hematuria.  No nausea or vomiting.  No URI symptoms.  Has used loperamide off-and-on without success recently.        Allergies:  Allergies   Allergen Reactions    Azithromycin Diarrhea    Ciprofloxacin Other (See Comments)     Cipro, dizziness and abdominal pain    Gabapentin Dizziness and Visual Disturbance    Oxycodone Nausea and Vomiting    Penicillins      stomach upset, diarrhea    Raspberry Hives    Sulfa Antibiotics Hives     hives    Amoxicillin-Pot Clavulanate Rash       Problem List:    Patient Active Problem List    Diagnosis Date Noted    NPH (normal pressure hydrocephalus) (H) 2024     Priority: Medium    Irritable bowel syndrome with both constipation and diarrhea 2023     Priority: Medium     (ventriculoperitoneal) shunt status 2022     Priority: Medium    Cellulitis of fifth toe of right foot 2022     Priority: Medium    Plantar fasciitis 2022     Priority: Medium    Right foot pain 2022     Priority: Medium    Chronic systolic heart failure (H) 2022     Priority: Medium    LBBB (left bundle branch block) 2022     Priority: Medium    Dysuria 2021     Priority: Medium    Basal cell carcinoma of  nose 09/08/2021     Priority: Medium    History of malignant neoplasm of skin 09/08/2021     Priority: Medium    Skin changes due to chronic exposure to nonionizing radiation 09/08/2021     Priority: Medium    Renal insufficiency syndrome 09/04/2021     Priority: Medium     Formatting of this note might be different from the original.  Creatinine clearance 49      Lumbar radiculopathy 12/28/2018     Priority: Medium    Scoliosis of lumbar spine, unspecified scoliosis type 12/12/2018     Priority: Medium    Chronic right-sided low back pain with right-sided sciatica 05/30/2018     Priority: Medium    Acquired hypothyroidism 04/28/2017     Priority: Medium    Gastroesophageal reflux disease, esophagitis presence not specified 04/28/2017     Priority: Medium     IMO Regulatory Load OCT 2020      Premature atrial beats 11/30/2016     Priority: Medium    HTN (hypertension) 07/10/2014     Priority: Medium    Raynaud's disease 06/01/2014     Priority: Medium    History of diverticulitis - s/p left hemicolectomy 11/30/2013     Priority: Medium    Diarrhea 11/30/2013     Priority: Medium    Chronic constipation 06/18/2013     Priority: Medium    Sacroiliac dysfunction 10/09/2012     Priority: Medium    DDD (degenerative disc disease), cervical 08/10/2011     Priority: Medium    Anxiety 10/24/2010     Priority: Medium    Hyperlipidemia 09/29/2010     Priority: Medium    Osteopenia 04/01/2010     Priority: Medium     recheck DEXA in 2 years      Gastritis 03/15/2010     Priority: Medium    Panic disorder without agoraphobia 10/08/2004     Priority: Medium    Tinnitus 09/13/2004     Priority: Medium     Problem list name updated by automated process. Provider to review      Family history of diabetes mellitus 09/09/2002     Priority: Medium    Lichen planus      Priority: Medium        Past Medical History:    Past Medical History:   Diagnosis Date    Lichen planus     Osteopenia 4/2010    Panic disorder without agoraphobia      Pure hypercholesterolemia     Raynaud's disease 6/2014    Unspecified essential hypertension        Past Surgical History:    Past Surgical History:   Procedure Laterality Date    COLONOSCOPY  10/17/2007    normal, recheck in 10 years    COLONOSCOPY N/A 08/27/2014    Procedure: COLONOSCOPY;  Surgeon: Raffi Montiel MD;  Location: [facility]    COMBINED CYSTOSCOPY, INSERT CATHETER URETER Bilateral 10/13/2014    Procedure: COMBINED CYSTOSCOPY, INSERT CATHETER URETER;  Surgeon: Abdoulaye Odell MD;  Location: [facility]    EYE SURGERY  08/01/2001    right eye muscle repair    HC REMOVAL GALLBLADDER  02/09/2010    lap    HC REMOVAL OF OVARY/TUBE(S)      Salpingo-Oophorectomy, bilateral    HYSTERECTOMY, PAP NO LONGER INDICATED      INJECT EPIDURAL LUMBAR Right 04/10/2017    Procedure: INJECT EPIDURAL LUMBAR;  Surgeon: Raffi Wright MD;  Location: [facility]    INJECT EPIDURAL LUMBAR Right 02/27/2020    Procedure: Right Lumbar 4- Sacral 1 Epidural Steroid Injection;  Surgeon: Antonio Dasilva MD;  Location: [facility]    INJECT EPIDURAL LUMBAR Right 01/29/2021    Procedure: INJECTION, SPINE,  sacral 1 EPIDURAL;  Surgeon: Antonio Dasilva MD;  Location: [facility]    INJECT EPIDURAL TRANSFORAMINAL Right 06/09/2016    Procedure: INJECT EPIDURAL TRANSFORAMINAL;  Surgeon: Antonio Dasilva MD;  Location: [facility]    INJECT EPIDURAL TRANSFORAMINAL Right 10/13/2016    Procedure: INJECT EPIDURAL TRANSFORAMINAL;  Surgeon: Antonio Dasilva MD;  [facility]    INJECT EPIDURAL TRANSFORAMINAL Right 06/18/2021    Procedure: Right L4-5 and right L5-S1 Transforaminal Epidural Steroid Injections with fluoroscopic guidance and contrast.;  Surgeon: Antonio Dasilva MD;  Location: [facility]    INJECT JOINT SACROILIAC Right 04/14/2022    Procedure: Fluoroscopically-guided injection of the Bilateral sacroiliac joints with Bilateral pastor Sacroiliac joint ligaments infiltration over the sacrum;  Surgeon: Antonio Dasilva MD;  Location: [facility]    LAPAROSCOPIC  "ASSISTED COLECTOMY LEFT (DESCENDING) N/A 10/13/2014    Procedure: LAPAROSCOPIC ASSISTED COLECTOMY LEFT (DESCENDING);  Surgeon: Raffi Montiel MD;  Location: [facility]    LUMBAR SPINE SURGERY  2021    L2-Pelvis fusion with rods and screws    OPTICAL TRACKING SYSTEM IMPLANT SHUNT VENTRICULOPERITONEAL Right 2022    Procedure: Right Ventriculoperitoneal;  Surgeon: Hira Pillai MD;  Location: [facility]    ZZC APPENDECTOMY      ZZC NONSPECIFIC PROCEDURE      Right inferior oblique recession, 14 mm    ZZC VAG HYST,RMV TUBE/OVARY  1984    cervix removed       Family History:    Family History   Problem Relation Age of Onset    Diabetes Brother     Heart Disease Brother 50        AMI    Diabetes Brother     Heart Disease Brother         AMI, \"older\"    Cerebrovascular Disease Brother     Psychotic Disorder Brother         , alcohol    Cancer Sister         breast cancer x2    Hypertension No family hx of     Breast Cancer No family hx of     Ovarian Cancer No family hx of     Prostate Cancer No family hx of     Mental Illness No family hx of     Anesthesia Reaction No family hx of     Osteoporosis No family hx of     Obesity No family hx of     Other Cancer No family hx of     Depression No family hx of     Anxiety Disorder No family hx of     Mental Illness No family hx of     Substance Abuse No family hx of        Social History:  Marital Status:   [2]  Social History     Tobacco Use    Smoking status: Never     Passive exposure: Never    Smokeless tobacco: Never   Vaping Use    Vaping status: Never Used   Substance Use Topics    Alcohol use: No    Drug use: No        Medications:    acetaminophen (TYLENOL) 500 MG tablet  ALPRAZolam (XANAX) 0.25 MG tablet  cholecalciferol 25 MCG (1000 UT) TABS  levothyroxine (SYNTHROID/LEVOTHROID) 50 MCG tablet  loperamide (IMODIUM A-D) 2 MG tablet  losartan (COZAAR) 25 MG tablet  metoprolol succinate ER (TOPROL XL) 25 MG 24 hr tablet  multivitamin w/minerals " "(MULTI-VITAMIN) tablet  naproxen sodium (ANAPROX) 220 MG tablet  omeprazole (PRILOSEC) 20 MG DR capsule  polyethylene glycol-propylene glycol (SYSTANE ULTRA) 0.4-0.3 % SOLN ophthalmic solution  psyllium (METAMUCIL/KONSYL) 58.6 % powder  simvastatin (ZOCOR) 20 MG tablet          Review of Systems   All other systems reviewed and are negative.      Physical Exam   BP: (!) 190/93  Pulse: 91  Temp: 98  F (36.7  C)  Resp: 19  Height: 154.9 cm (5' 1\")  Weight: 53.1 kg (117 lb)  SpO2: 98 %      Physical Exam  Vitals and nursing note reviewed.   Constitutional:       General: She is not in acute distress.     Appearance: She is not diaphoretic.   HENT:      Head: Normocephalic and atraumatic.      Right Ear: External ear normal.      Left Ear: External ear normal.      Nose: Nose normal. No congestion or rhinorrhea.      Mouth/Throat:      Mouth: Mucous membranes are moist.      Pharynx: No oropharyngeal exudate or posterior oropharyngeal erythema.   Eyes:      General: No scleral icterus.        Right eye: No discharge.         Left eye: No discharge.      Conjunctiva/sclera: Conjunctivae normal.      Pupils: Pupils are equal, round, and reactive to light.   Neck:      Thyroid: No thyromegaly.   Cardiovascular:      Rate and Rhythm: Normal rate and regular rhythm.      Heart sounds: Normal heart sounds. No murmur heard.  Pulmonary:      Effort: Pulmonary effort is normal. No respiratory distress.      Breath sounds: Normal breath sounds. No wheezing or rales.   Chest:      Chest wall: No tenderness.   Abdominal:      General: Bowel sounds are normal. There is no distension.      Palpations: Abdomen is soft. There is no mass.      Tenderness: There is abdominal tenderness (Right lower quadrant). There is no right CVA tenderness, left CVA tenderness, guarding or rebound.   Musculoskeletal:         General: No tenderness or deformity. Normal range of motion.      Cervical back: Normal range of motion and neck supple. "   Lymphadenopathy:      Cervical: No cervical adenopathy.   Skin:     General: Skin is warm and dry.      Capillary Refill: Capillary refill takes less than 2 seconds.      Findings: No erythema or rash.   Neurological:      Mental Status: She is alert and oriented to person, place, and time.      Cranial Nerves: No cranial nerve deficit.   Psychiatric:         Behavior: Behavior normal.         Thought Content: Thought content normal.         ED Course        Procedures              Critical Care time:  none              Results for orders placed or performed during the hospital encounter of 12/21/24 (from the past 24 hours)   CBC with platelets differential    Narrative    The following orders were created for panel order CBC with platelets differential.  Procedure                               Abnormality         Status                     ---------                               -----------         ------                     CBC with platelets and d...[[id number]]                      Final result                 Please view results for these tests on the individual orders.   Comprehensive metabolic panel   Result Value Ref Range    Sodium 139 135 - 145 mmol/L    Potassium 4.0 3.4 - 5.3 mmol/L    Carbon Dioxide (CO2) 27 22 - 29 mmol/L    Anion Gap 11 7 - 15 mmol/L    Urea Nitrogen 8.0 8.0 - 23.0 mg/dL    Creatinine 0.67 0.51 - 0.95 mg/dL    GFR Estimate 86 >60 mL/min/1.73m2    Calcium 9.5 8.8 - 10.4 mg/dL    Chloride 101 98 - 107 mmol/L    Glucose 110 (H) 70 - 99 mg/dL    Alkaline Phosphatase 59 40 - 150 U/L    AST 14 0 - 45 U/L    ALT 14 0 - 50 U/L    Protein Total 7.0 6.4 - 8.3 g/dL    Albumin 4.6 3.5 - 5.2 g/dL    Bilirubin Total 0.3 <=1.2 mg/dL   Lipase   Result Value Ref Range    Lipase 111 (H) 13 - 60 U/L   CBC with platelets and differential   Result Value Ref Range    WBC Count 4.3 4.0 - 11.0 10e3/uL    RBC Count 4.09 3.80 - 5.20 10e6/uL    Hemoglobin 12.9 11.7 - 15.7 g/dL    Hematocrit 38.8 35.0 - 47.0 %     MCV 95 78 - 100 fL    MCH 31.5 26.5 - 33.0 pg    MCHC 33.2 31.5 - 36.5 g/dL    RDW 12.8 10.0 - 15.0 %    Platelet Count 213 150 - 450 10e3/uL    % Neutrophils 55 %    % Lymphocytes 35 %    % Monocytes 8 %    % Eosinophils 1 %    % Basophils 1 %    % Immature Granulocytes 0 %    NRBCs per 100 WBC 0 <1 /100    Absolute Neutrophils 2.3 1.6 - 8.3 10e3/uL    Absolute Lymphocytes 1.5 0.8 - 5.3 10e3/uL    Absolute Monocytes 0.4 0.0 - 1.3 10e3/uL    Absolute Eosinophils 0.1 0.0 - 0.7 10e3/uL    Absolute Basophils 0.0 0.0 - 0.2 10e3/uL    Absolute Immature Granulocytes 0.0 <=0.4 10e3/uL    Absolute NRBCs 0.0 10e3/uL   CT Abdomen Pelvis w Contrast    Narrative    EXAM: CT ABDOMEN PELVIS W CONTRAST  LOCATION: [facility]  DATE: 12/21/2024    INDICATION: RLQ abdominal pain, diarrhea  COMPARISON: CT abdomen and pelvis 02/21/2023  TECHNIQUE: CT scan of the abdomen and pelvis was performed following injection of IV contrast. Multiplanar reformats were obtained. Dose reduction techniques were used.  CONTRAST: 60 mL Isovue 370    FINDINGS:   LOWER CHEST: Normal.    HEPATOBILIARY: A few small liver cysts. Cholecystectomy with dilatation of the common bile duct, unchanged.    PANCREAS: Normal.    SPLEEN: Normal.    ADRENAL GLANDS: Normal.    KIDNEYS/BLADDER: Normal. No stones or hydronephrosis.    BOWEL: Partial left colon resection. Several slightly distended fluid-filled loops of small bowel in the mid and left abdomen. Diverticula transverse colon, without evidence for diverticulitis. No evidence for colonic obstruction. Minimal fluid within   the colon. Appendectomy.    LYMPH NODES: Normal.    VASCULATURE: Atherosclerotic disease abdominal aorta and iliac arteries.    PELVIC ORGANS: Hysterectomy. Presumed  shunt tube coiled in the pelvis. No ascites.     MUSCULOSKELETAL: Scoliosis. Lumbosacral spinal fusion with pedicle screws and rods. Osteopenia.      Impression    IMPRESSION:   1.   "Appendectomy.  2.  Several fluid-filled slightly dilated loops of small bowel, nonspecific, but could represent an enteritis. No significant fluid in the colon.  3.  No other findings to account for the patient's symptoms.     *Note: Due to a large number of results and/or encounters for the requested time period, some results have not been displayed. A complete set of results can be found in Results Review.       Medications   sodium chloride 0.9% BOLUS 1,000 mL (0 mLs Intravenous Stopped 12/21/24 1950)   iopamidol (ISOVUE-370) solution 500 mL (60 mLs Intravenous $Given 12/21/24 1950)   sodium chloride 0.9 % bag 100mL for CT scan flush use (60 mLs Intravenous $Given 12/21/24 1949)       Assessments & Plan (with Medical Decision Making)     Diarrhea  Grief reaction     84 year old female presents for evaluation of increased diarrhea from her baseline over the past 2 weeks.  History of chronic diarrhea.  Decreased weight, decreased appetite, and generalized weakness worsening.  No fevers or chills.  No blood or pus in the stool.  No recent travel.  BP (!) 155/72   Pulse 68   Temp 98  F (36.7  C) (Oral)   Resp 18   Ht 1.549 m (5' 1\")   Wt 53.1 kg (117 lb)   LMP  (LMP Unknown)   SpO2 96%   BMI 22.11 kg/m     Generally healthy-appearing in no acute distress.  Appears anxious.  Tearful.  Oral mucous membranes are normal.  Neck supple without adenopathy.  Cardiopulmonary exam normal.  Abdomen with good bowel sounds.  No hepatosplenomegaly.  She does have some right lower quadrant abdominal tenderness.  No rebound or guarding.  No masses.  No hepatosplenomegaly.  Remainder of the exam is otherwise negative.  IV was established.  Given 1 L IV normal saline initially.  Laboratory levels display no acute abnormality with CBC, comprehensive metabolic panel, and lipase.  Lipase is 111 but the lipase is not 3 times normal and she does not have significant epigastric pain to suggest true pancreatitis.  She also does not " "have any inflammatory changes of the pancreas on her CT scan.  Findings suggestive of possible enteritis on CT scan but no colitis, diverticulitis, or other acute inflammatory/infectious etiology identified.  I performed independent review of the CT and agree with the findings.  Patient did feel much improved after the IV normal saline.  She did urinate 2 times.  She felt like she had more energy.  She did walk around the emergency department without any significant weakness.  We talked about obtaining a stool sample.  She states, \"I have done those so many times in the past \".  She does not have any infectious symptoms thankfully.  No febrile state.  It is unlikely that she has an infectious process, therefore we decided against stool study testing at home.  She does have a lot of stress.  She has having troubles dealing with her grief with her  passing 2 months ago.  She also has a significant history of anxiety.  She feels like she is depressed and wonders about medication for treatment.  Denies any suicidality.  We talked about counseling options.  She is active in the [employer], but she did not feel comfortable talking to her  about things.  I have referred her back to her primary care provider for more long-term management of her condition.  She will think about being open to counseling referral and/or medication.  I have sent Amrik Streeter PA-C a staff message in regards to our visit today hoping that he would be able to see her the end of this upcoming week to recheck her condition and discuss options for treatment.  In the interim, I have asked her to start Metamucil to try and bulk up the stool, initiate the brat diet for the next couple days along with sipping Gatorade for hydration, and using Imodium as needed per bottle directions.  ED return instructions reviewed with the patient in detail.  She was in agreement with this plan and was suitable for discharge.     I have reviewed the " nursing notes.    I have reviewed the findings, diagnosis, plan and need for follow up with the patient.           Medical Decision Making  The patient's presentation was of moderate complexity (an acute illness with systemic symptoms).    The patient's evaluation involved:  ordering and/or review of 3+ test(s) in this encounter (see separate area of note for details)    The patient's management necessitated moderate risk (prescription drug management including medications given in the ED) and moderate risk (IV contrast administration).        Discharge Medication List as of 12/21/2024  9:47 PM          Final diagnoses:   Diarrhea   Grief reaction       Disclaimer: This note consists of symbols derived from keyboarding, dictation and/or voice recognition software. As a result, there may be errors in the script that have gone undetected. Please consider this when interpreting information found in this chart.      12/21/2024   [facility] EMERGENCY DEPT       Neo Ghosh PA-C  12/21/24 [id number]

## 2024-12-22 NOTE — DISCHARGE INSTRUCTIONS
It was a pleasure working with you today!  I hope your condition improves rapidly!     Thankfully, all of your testing came back okay today.  Please try the Imodium for the diarrhea.  You can also take 1 dose of over-the-counter Metamucil powder daily.  Follow the directions on the bottle.  This can help bulk up the stool and decreased your diarrhea over the next week.  Make sure that you are sipping Gatorade throughout the day to help restore hydration and electrolytes.  Try more bulking foods initially such as the BRAT diet (banana, rice, applesauce, and toast).  Advance her diet as tolerated after that.  I did send Amrik Streeter a staff message regarding your visit today.  Call his schedulers Monday morning to see if you can get a recheck visit the end of this upcoming week.

## 2024-12-22 NOTE — TELEPHONE ENCOUNTER
I received a message from the ED about Milena's recent visit for diarrhea but she also still struggles with her mood since her 's death. Please use any open spot next month for a follow-up when I am back unless she wants to see another provider sooner. Thanks.    Amrik Streeter PA-C

## 2024-12-22 NOTE — ED TRIAGE NOTES
Pt comes in today along with her daughter c/o nausea, diarrhea, weakness and decreased appetite. Pt is teary eyed during triage. States that she lost her  2 months ago while here in the hospital.      Triage Assessment (Adult)       Row Name 12/21/24 1892          Triage Assessment    Airway WDL WDL        Respiratory WDL    Respiratory WDL WDL        Skin Circulation/Temperature WDL    Skin Circulation/Temperature WDL WDL        Cardiac WDL    Cardiac WDL X     Cardiac Rhythm ST        Peripheral/Neurovascular WDL    Peripheral Neurovascular WDL WDL        Cognitive/Neuro/Behavioral WDL    Cognitive/Neuro/Behavioral WDL X     Level of Consciousness alert     Arousal Level opens eyes spontaneously     Orientation oriented x 4     Speech clear     Mood/Behavior sad        Pupils (CN II)    Pupil PERRLA yes     Pupil Size Left 3 mm     Pupil Size Right 3 mm

## 2025-01-06 ENCOUNTER — OFFICE VISIT (OUTPATIENT)
Dept: FAMILY MEDICINE | Facility: OTHER | Age: 85
End: 2025-01-06
Payer: COMMERCIAL

## 2025-01-06 VITALS
SYSTOLIC BLOOD PRESSURE: 130 MMHG | WEIGHT: 118 LBS | HEIGHT: 61 IN | BODY MASS INDEX: 22.28 KG/M2 | HEART RATE: 74 BPM | RESPIRATION RATE: 16 BRPM | OXYGEN SATURATION: 99 % | DIASTOLIC BLOOD PRESSURE: 72 MMHG | TEMPERATURE: 97.4 F

## 2025-01-06 DIAGNOSIS — M10.9 ACUTE GOUT INVOLVING TOE OF RIGHT FOOT, UNSPECIFIED CAUSE: ICD-10-CM

## 2025-01-06 DIAGNOSIS — I50.22 CHRONIC SYSTOLIC HEART FAILURE (H): ICD-10-CM

## 2025-01-06 DIAGNOSIS — G91.2 IDIOPATHIC NORMAL PRESSURE HYDROCEPHALUS (H): ICD-10-CM

## 2025-01-06 DIAGNOSIS — F41.9 ANXIETY: Primary | ICD-10-CM

## 2025-01-06 DIAGNOSIS — R19.7 DIARRHEA, UNSPECIFIED TYPE: ICD-10-CM

## 2025-01-06 DIAGNOSIS — F43.21 GRIEF REACTION: ICD-10-CM

## 2025-01-06 PROCEDURE — 99214 OFFICE O/P EST MOD 30 MIN: CPT | Performed by: PHYSICIAN ASSISTANT

## 2025-01-06 RX ORDER — ESCITALOPRAM OXALATE 5 MG/1
5 TABLET ORAL DAILY
Qty: 30 TABLET | Refills: 5 | Status: SHIPPED | OUTPATIENT
Start: 2025-01-06

## 2025-01-06 ASSESSMENT — PAIN SCALES - GENERAL: PAINLEVEL_OUTOF10: NO PAIN (0)

## 2025-01-06 NOTE — PROGRESS NOTES
Assessment & Plan       ICD-10-CM    1. Anxiety  F41.9 escitalopram (LEXAPRO) 5 MG tablet      2. Grief reaction  F43.21 escitalopram (LEXAPRO) 5 MG tablet      3. Acute gout involving toe of right foot, unspecified cause  M10.9       4. Diarrhea, unspecified type  R19.7       5. Chronic systolic heart failure (H)  I50.22       6. Idiopathic normal pressure hydrocephalus (H)  G91.2           1-2. We discussed her continued grief and I recommend we start a daily medication for her mood. She has agreed to try Lexapro 5 mg daily. We discussed common side effects and that this can take 4-6 weeks to take full effect. She will continue Xanax as needed. She declines counseling at this time but has a good support system. Will plan on recheck in 6 weeks.    3. Acute gout of right great toe. She has tried ibuprofen and naproxen but these cause stomach upset. I recommend she cut back on the sweets and limit the red meats. Consider adding fish oil and increase water intake and also add as needed Tylenol. Symptoms have overall improved but she will contact the clinic if symptoms worsen again.    4. She has had some improvement since adding the fiber supplement. I recommend more protein in her diet as well (chicken, fish, pork, limited beef, cheese, etc) to help with weight gain and better nutrition.    5. She follows with cardiology and her last echo showed improved ejection fraction. She is euvolemic and does not require diuretics.    6.  shunt in placed.    Follow-up in 6 weeks.         MED REC REQUIRED{  Post Medication Reconciliation Status: discharge medications reconciled, continue medications without change      Demond Abbott is a 84 year old, presenting for the following health issues:   Er Follow up       1/6/2025     9:44 AM   Additional Questions   Roomed by Yarely PARSONS   Accompanied by Self     HPI     ED/UC Followup:    Facility:  [facility] Emergency room   Date of visit: 12/21/2024  Reason for  "visit: Diarrhea, Weakness  Current Status: Still having Diarrhea(loose), and still some weakness.      She has been using daily Metamucil which has helped somewhat with her diarrhea. However, she is still not gaining much weight. She has tried to eat increased sweets recently to gain wait but states this caused gout in her right great toe as it has been tender with some redness for the past few weeks, although pain is improving. She still experiences a lot of grief since her husbands death this past year but she has a good support system. She plans to sell her house and move into a smaller town home.     ED Summary    Assessments & Plan (with Medical Decision Making)     Diarrhea  Grief reaction      84 year old female presents for evaluation of increased diarrhea from her baseline over the past 2 weeks.  History of chronic diarrhea.  Decreased weight, decreased appetite, and generalized weakness worsening.  No fevers or chills.  No blood or pus in the stool.  No recent travel.  BP (!) 155/72   Pulse 68   Temp 98  F (36.7  C) (Oral)   Resp 18   Ht 1.549 m (5' 1\")   Wt 53.1 kg (117 lb)   LMP  (LMP Unknown)   SpO2 96%   BMI 22.11 kg/m     Generally healthy-appearing in no acute distress.  Appears anxious.  Tearful.  Oral mucous membranes are normal.  Neck supple without adenopathy.  Cardiopulmonary exam normal.  Abdomen with good bowel sounds.  No hepatosplenomegaly.  She does have some right lower quadrant abdominal tenderness.  No rebound or guarding.  No masses.  No hepatosplenomegaly.  Remainder of the exam is otherwise negative.  IV was established.  Given 1 L IV normal saline initially.  Laboratory levels display no acute abnormality with CBC, comprehensive metabolic panel, and lipase.  Lipase is 111 but the lipase is not 3 times normal and she does not have significant epigastric pain to suggest true pancreatitis.  She also does not have any inflammatory changes of the pancreas on her CT scan.  Findings " "suggestive of possible enteritis on CT scan but no colitis, diverticulitis, or other acute inflammatory/infectious etiology identified.  I performed independent review of the CT and agree with the findings.  Patient did feel much improved after the IV normal saline.  She did urinate 2 times.  She felt like she had more energy.  She did walk around the emergency department without any significant weakness.  We talked about obtaining a stool sample.  She states, \"I have done those so many times in the past \".  She does not have any infectious symptoms thankfully.  No febrile state.  It is unlikely that she has an infectious process, therefore we decided against stool study testing at home.  She does have a lot of stress.  She has having troubles dealing with her grief with her  passing 2 months ago.  She also has a significant history of anxiety.  She feels like she is depressed and wonders about medication for treatment.  Denies any suicidality.  We talked about counseling options.  She is active in the [employer], but she did not feel comfortable talking to her  about things.  I have referred her back to her primary care provider for more long-term management of her condition.  She will think about being open to counseling referral and/or medication.  I have sent Amrik Streeter PA-C a staff message in regards to our visit today hoping that he would be able to see her the end of this upcoming week to recheck her condition and discuss options for treatment.  In the interim, I have asked her to start Metamucil to try and bulk up the stool, initiate the brat diet for the next couple days along with sipping Gatorade for hydration, and using Imodium as needed per bottle directions.  ED return instructions reviewed with the patient in detail.  She was in agreement with this plan and was suitable for discharge.    Review of Systems  Constitutional, HEENT, cardiovascular, pulmonary, gi and gu systems are " "negative, except as otherwise noted.      Objective    /72   Pulse 74   Temp 97.4  F (36.3  C) (Temporal)   Resp 16   Ht 1.549 m (5' 1\")   Wt 53.5 kg (118 lb)   LMP  (LMP Unknown)   SpO2 99%   BMI 22.30 kg/m    Body mass index is 22.3 kg/m .  Physical Exam   GENERAL: alert and no distress  RESP: lungs clear to auscultation - no rales, rhonchi or wheezes  CV: regular rate and rhythm, normal S1 S2, no S3 or S4, no murmur, click or rub, no peripheral edema  MS: no gross musculoskeletal defects noted, no edema. Mild erythema of proximal right great toe without significant tenderness or swelling.   NEURO: Normal strength and tone, mentation intact and speech normal. Gait is stable.   PSYCH: mentation appears normal, affect normal/bright          Signed Electronically by: Chuck Streeter PA-C    "

## 2025-01-06 NOTE — PATIENT INSTRUCTIONS
If you do not tolerate naproxen, try Tylenol to help with pain.  Also increase the water and add daily fish oil.  Avoid excess red meat but eat more protein.    Will add Lexapro to help with mood.  Take this daily with breakfast.  It can take 4-6 weeks to take full effect.  If you have side effects, let me know.    Follow-up in 6 weeks.

## 2025-04-07 ENCOUNTER — OFFICE VISIT (OUTPATIENT)
Dept: URGENT CARE | Facility: URGENT CARE | Age: 85
End: 2025-04-07
Payer: COMMERCIAL

## 2025-04-07 VITALS
SYSTOLIC BLOOD PRESSURE: 144 MMHG | WEIGHT: 113 LBS | TEMPERATURE: 97.8 F | HEART RATE: 78 BPM | OXYGEN SATURATION: 99 % | BODY MASS INDEX: 21.35 KG/M2 | RESPIRATION RATE: 19 BRPM | DIASTOLIC BLOOD PRESSURE: 62 MMHG

## 2025-04-07 DIAGNOSIS — R19.7 DIARRHEA, UNSPECIFIED TYPE: Primary | ICD-10-CM

## 2025-04-07 DIAGNOSIS — R11.0 NAUSEA: ICD-10-CM

## 2025-04-07 DIAGNOSIS — K58.2 IRRITABLE BOWEL SYNDROME WITH BOTH CONSTIPATION AND DIARRHEA: ICD-10-CM

## 2025-04-07 LAB
ALBUMIN SERPL-MCNC: 4.2 G/DL (ref 3.4–5)
ALP SERPL-CCNC: 49 U/L (ref 40–150)
ALT SERPL W P-5'-P-CCNC: 18 U/L (ref 0–50)
ANION GAP SERPL CALCULATED.3IONS-SCNC: 8 MMOL/L (ref 3–14)
AST SERPL W P-5'-P-CCNC: 22 U/L (ref 0–45)
BILIRUB SERPL-MCNC: 1.1 MG/DL (ref 0.2–1.3)
BUN SERPL-MCNC: 11 MG/DL (ref 7–30)
CALCIUM SERPL-MCNC: 9.8 MG/DL (ref 8.5–10.1)
CHLORIDE BLD-SCNC: 105 MMOL/L (ref 94–109)
CO2 SERPL-SCNC: 30 MMOL/L (ref 20–32)
CREAT SERPL-MCNC: 1 MG/DL (ref 0.52–1.04)
EGFRCR SERPLBLD CKD-EPI 2021: 56 ML/MIN/1.73M2
ERYTHROCYTE [DISTWIDTH] IN BLOOD BY AUTOMATED COUNT: 12.2 % (ref 10–15)
ERYTHROCYTE [SEDIMENTATION RATE] IN BLOOD BY WESTERGREN METHOD: 8 MM/HR (ref 0–30)
GLUCOSE BLD-MCNC: 112 MG/DL (ref 70–99)
HCT VFR BLD AUTO: 42.5 % (ref 35–47)
HGB BLD-MCNC: 13.4 G/DL (ref 11.7–15.7)
MCH RBC QN AUTO: 31.1 PG (ref 26.5–33)
MCHC RBC AUTO-ENTMCNC: 31.5 G/DL (ref 31.5–36.5)
MCV RBC AUTO: 99 FL (ref 78–100)
PLATELET # BLD AUTO: 203 10E3/UL (ref 150–450)
POTASSIUM BLD-SCNC: 4.2 MMOL/L (ref 3.4–5.3)
PROT SERPL-MCNC: 7.2 G/DL (ref 6.8–8.8)
RBC # BLD AUTO: 4.31 10E6/UL (ref 3.8–5.2)
SODIUM SERPL-SCNC: 143 MMOL/L (ref 135–145)
WBC # BLD AUTO: 5.6 10E3/UL (ref 4–11)

## 2025-04-07 PROCEDURE — 80053 COMPREHEN METABOLIC PANEL: CPT | Performed by: FAMILY MEDICINE

## 2025-04-07 PROCEDURE — 3078F DIAST BP <80 MM HG: CPT | Performed by: FAMILY MEDICINE

## 2025-04-07 PROCEDURE — 85027 COMPLETE CBC AUTOMATED: CPT | Performed by: FAMILY MEDICINE

## 2025-04-07 PROCEDURE — 3077F SYST BP >= 140 MM HG: CPT | Performed by: FAMILY MEDICINE

## 2025-04-07 PROCEDURE — 36415 COLL VENOUS BLD VENIPUNCTURE: CPT | Performed by: FAMILY MEDICINE

## 2025-04-07 PROCEDURE — 85652 RBC SED RATE AUTOMATED: CPT | Performed by: FAMILY MEDICINE

## 2025-04-07 PROCEDURE — 99214 OFFICE O/P EST MOD 30 MIN: CPT | Performed by: FAMILY MEDICINE

## 2025-04-07 RX ORDER — ONDANSETRON 4 MG/1
4 TABLET, ORALLY DISINTEGRATING ORAL EVERY 8 HOURS PRN
COMMUNITY
Start: 2025-04-07

## 2025-04-07 RX ORDER — HYOSCYAMINE SULFATE 0.12 MG/1
0.12 TABLET SUBLINGUAL EVERY 4 HOURS PRN
Qty: 15 TABLET | Refills: 0 | Status: SHIPPED | OUTPATIENT
Start: 2025-04-07

## 2025-04-07 NOTE — RESULT ENCOUNTER NOTE
Milena, your other blood tests still look good EXCEPT your kidney function is decreased a little, likely from getting a little dehydrated with the diarrhea. Your electrolyte are all still normal.   I left you a voicemail on your phone.   It's important for you to stay well hydrated as we discussed and hopefully things will be improving in the next couple days. If not, please get those samples in.   Johana Cortes MD

## 2025-04-07 NOTE — PROGRESS NOTES
Assessment & Plan       ICD-10-CM    1. Diarrhea, unspecified type  R19.7 CBC with platelets     Comprehensive metabolic panel (BMP + Alb, Alk Phos, ALT, AST, Total. Bili, TP)     ESR: Erythrocyte sedimentation rate     CBC with platelets     Comprehensive metabolic panel (BMP + Alb, Alk Phos, ALT, AST, Total. Bili, TP)     ESR: Erythrocyte sedimentation rate     hyoscyamine (LEVSIN/SL) 0.125 MG sublingual tablet     Enteric Bacteria and Virus Panel by JACLYN Stool     Enteric Bacteria and Virus Panel by JACLYN Stool      2. Irritable bowel syndrome with both constipation and diarrhea  K58.2       3. Nausea  R11.0 ondansetron (ZOFRAN ODT) 4 MG ODT tab         I am not finding any significant sequelae from her diarrhea despite her weight loss.  Her labs are within reasonable normal limits except for her creatinine is up a little bit.  This is new.  Suspect mild dehydration although her BUN is not elevated.  Her sed rate is not high to suggest colitis or other acute inflammatory condition.  Her weight loss is reviewed.  She is down 20 pounds since last April.  she is only down 5 pounds since January and only 4 pounds since December.  I did order a stool culture but we will have her give this a day or 2 to see if things resolve spontaneously.  I am more suspicious that this is just a flareup of her IBS given her recent food intake and her move which is creating some stress for her.  I am going to try her on some hyoscyamine, see orders.  We discussed trial of that for her IBS.  We also discussed dietary changes to try to help slow things down.  Stressed the need to remain hydrated.  She may continue using her Imodium as needed but also needs to follow-up with her PCP for ongoing IBS care.    We also talked about healthy diet for the long-term and her need to stay stable with her weight.  We talked about scheduling meals and having healthy frozen dinners available to her so when she does not feel like cooking she can still  eat.  She will be living alone so no one there to force her to eat, but her daughters will be checking in on her regularly and encouraging good intake.    See patient instructions:    Patient Instructions   I'm not sure what's causing your diarrhea but I suspect your IBS is acting up. I'm giving you a new medication to try, called Levsin, Also known as Hyoscyamine.   Take 1 pill every 4 hours as needed for cramping or diarrhea. Start with 1 pill when you get home and see if it helps.   Also, avoid foods that make you go more, such as fresh fruits and vegetables and anything with sugar.   Try a bland diet with foods that tend to constipate you, such as white rice, white bread, cheese, bananas.   You need to stay hydrated, so even if it runs through you, Please drink plenty of fluids to stay well hydrated.      You may continue to use your imodium as well.         Johana Cortes MD  [facility]     Milena is a 84 year old female who presents to clinic today for the following health issues:  Chief Complaint   Patient presents with    Diarrhea     Diarrhea since Friday, has lost weight      HPI    See notes above.   She has ongoing diarrhea since Friday. She has a history of IBS with diarrhea, and has history of cholecystectomy, so this is not new, but it is persistent and she is losing weight. Her weight loss is more long term. She lost her  in October and she hasn't had much appetite. She is here with her daughter today.  They just moved her out of her own home and she is moving into a 55+ independent living community.  So this has been a bit of a change for her and somewhat stressful.  Earlier on Friday they did eat at [employer] and she had some chicken and tater's.  She also had a slice of pizza from the buffet earlier that day.  She typically does not tolerate pizza that well.  Nobody else that ate with her has been sick.  She has not had any  blood in the stool but some of her stools are very liquidy.  She has got a little bit of pain.  She has been taking Imodium, 3 yesterday and 1 today.  She had a little bit of nausea on Saturday but no vomiting.  She has not had a fever.  Her medication list is reviewed and up-to-date but she is not taking her Metamucil lately.  No other cold or illness symptoms.  No genitourinary complaints.    7 pt ROS is otherwise negative except as noted in HPI.      Objective    BP (!) 170/82   Pulse 78   Temp 97.8  F (36.6  C) (Oral)   Resp 19   Wt 51.3 kg (113 lb)   LMP  (LMP Unknown)   SpO2 99%   BMI 21.35 kg/m    Physical Exam   Vitals noted.  Patient alert, oriented, and in no acute distress.   Initial blood pressure was elevated but rechecked by me I got 144/62.  Neck:  Supple without lymphadenopathy, JVD or masses.   CV:  RRR without murmur.   Respiratory:  Lungs clear to auscultation bilaterally.   Abdomen:  Soft, no significant tenderness, nondistended with hyperactive bowel sounds and no masses or hepatosplenomegaly.     Orders Placed This Encounter   Procedures    CBC with platelets    Comprehensive metabolic panel (BMP + Alb, Alk Phos, ALT, AST, Total. Bili, TP)    ESR: Erythrocyte sedimentation rate      Results for orders placed or performed in visit on 04/07/25   CBC with platelets     Status: Normal   Result Value Ref Range    WBC Count 5.6 4.0 - 11.0 10e3/uL    RBC Count 4.31 3.80 - 5.20 10e6/uL    Hemoglobin 13.4 11.7 - 15.7 g/dL    Hematocrit 42.5 35.0 - 47.0 %    MCV 99 78 - 100 fL    MCH 31.1 26.5 - 33.0 pg    MCHC 31.5 31.5 - 36.5 g/dL    RDW 12.2 10.0 - 15.0 %    Platelet Count 203 150 - 450 10e3/uL   Comprehensive metabolic panel (BMP + Alb, Alk Phos, ALT, AST, Total. Bili, TP)     Status: Abnormal   Result Value Ref Range    Sodium 143 135 - 145 mmol/L    Potassium 4.2 3.4 - 5.3 mmol/L    Chloride 105 94 - 109 mmol/L    Carbon Dioxide (CO2) 30 20 - 32 mmol/L    Anion Gap 8 3 - 14 mmol/L    Urea  Nitrogen 11 7 - 30 mg/dL    Creatinine 1.00 0.52 - 1.04 mg/dL    GFR Estimate 56 (L) >60 mL/min/1.73m2    Calcium 9.8 8.5 - 10.1 mg/dL    Glucose 112 (H) 70 - 99 mg/dL    Alkaline Phosphatase 49 40 - 150 U/L    AST 22 0 - 45 U/L    ALT 18 0 - 50 U/L    Protein Total 7.2 6.8 - 8.8 g/dL    Albumin 4.2 3.4 - 5.0 g/dL    Bilirubin Total 1.1 0.2 - 1.3 mg/dL   ESR: Erythrocyte sedimentation rate     Status: Normal   Result Value Ref Range    Erythrocyte Sedimentation Rate 8 0 - 30 mm/hr      All lab results above reviewed with patient.

## 2025-04-07 NOTE — PROGRESS NOTES
Urgent Care Clinic Visit    Chief Complaint   Patient presents with    Diarrhea     Diarrhea since Friday, has lost weight               4/7/2025    12:52 PM   Additional Questions   Roomed by Iliana   Accompanied by Daughter Elisabeth

## 2025-04-07 NOTE — PATIENT INSTRUCTIONS
I'm not sure what's causing your diarrhea but I suspect your IBS is acting up. I'm giving you a new medication to try, called Levsin, Also known as Hyoscyamine.   Take 1 pill every 4 hours as needed for cramping or diarrhea. Start with 1 pill when you get home and see if it helps.   Also, avoid foods that make you go more, such as fresh fruits and vegetables and anything with sugar.   Try a bland diet with foods that tend to constipate you, such as white rice, white bread, cheese, bananas.   You need to stay hydrated, so even if it runs through you, Please drink plenty of fluids to stay well hydrated.      You may continue to use your imodium as well.

## 2025-04-24 ENCOUNTER — PATIENT OUTREACH (OUTPATIENT)
Dept: CARE COORDINATION | Facility: CLINIC | Age: 85
End: 2025-04-24
Payer: COMMERCIAL

## 2025-04-27 DIAGNOSIS — E03.9 ACQUIRED HYPOTHYROIDISM: ICD-10-CM

## 2025-04-28 DIAGNOSIS — F41.9 ANXIETY: ICD-10-CM

## 2025-04-28 RX ORDER — LEVOTHYROXINE SODIUM 50 UG/1
50 TABLET ORAL
Qty: 90 TABLET | Refills: 1 | Status: SHIPPED | OUTPATIENT
Start: 2025-04-28

## 2025-04-29 RX ORDER — ALPRAZOLAM 0.25 MG
0.25 TABLET ORAL
Qty: 90 TABLET | Refills: 0 | Status: SHIPPED | OUTPATIENT
Start: 2025-04-29

## 2025-05-08 ENCOUNTER — PATIENT OUTREACH (OUTPATIENT)
Dept: CARE COORDINATION | Facility: CLINIC | Age: 85
End: 2025-05-08
Payer: COMMERCIAL

## 2025-05-19 ENCOUNTER — HOSPITAL ENCOUNTER (OUTPATIENT)
Dept: MAMMOGRAPHY | Facility: CLINIC | Age: 85
Discharge: HOME | End: 2025-05-19
Attending: PHYSICIAN ASSISTANT | Admitting: PHYSICIAN ASSISTANT
Payer: COMMERCIAL

## 2025-05-19 DIAGNOSIS — Z12.31 VISIT FOR SCREENING MAMMOGRAM: ICD-10-CM

## 2025-05-19 PROCEDURE — 77063 BREAST TOMOSYNTHESIS BI: CPT

## 2025-05-24 ENCOUNTER — HEALTH MAINTENANCE LETTER (OUTPATIENT)
Age: 85
End: 2025-05-24

## 2025-05-29 ENCOUNTER — OFFICE VISIT (OUTPATIENT)
Dept: FAMILY MEDICINE | Facility: OTHER | Age: 85
End: 2025-05-29
Payer: COMMERCIAL

## 2025-05-29 VITALS
DIASTOLIC BLOOD PRESSURE: 70 MMHG | OXYGEN SATURATION: 96 % | BODY MASS INDEX: 21.52 KG/M2 | WEIGHT: 114 LBS | HEIGHT: 61 IN | SYSTOLIC BLOOD PRESSURE: 152 MMHG | RESPIRATION RATE: 20 BRPM | HEART RATE: 72 BPM | TEMPERATURE: 98.3 F

## 2025-05-29 DIAGNOSIS — E03.9 ACQUIRED HYPOTHYROIDISM: ICD-10-CM

## 2025-05-29 DIAGNOSIS — I10 HYPERTENSION, UNSPECIFIED TYPE: ICD-10-CM

## 2025-05-29 DIAGNOSIS — I44.7 LBBB (LEFT BUNDLE BRANCH BLOCK): ICD-10-CM

## 2025-05-29 DIAGNOSIS — R19.7 DIARRHEA, UNSPECIFIED TYPE: ICD-10-CM

## 2025-05-29 DIAGNOSIS — F43.20 GRIEF REACTION: ICD-10-CM

## 2025-05-29 DIAGNOSIS — F41.9 ANXIETY: ICD-10-CM

## 2025-05-29 DIAGNOSIS — R63.4 WEIGHT LOSS: ICD-10-CM

## 2025-05-29 DIAGNOSIS — I50.22 CHRONIC SYSTOLIC HEART FAILURE (H): ICD-10-CM

## 2025-05-29 DIAGNOSIS — E78.2 MIXED HYPERLIPIDEMIA: Primary | ICD-10-CM

## 2025-05-29 LAB
ANION GAP SERPL CALCULATED.3IONS-SCNC: 6 MMOL/L (ref 7–15)
BUN SERPL-MCNC: 11.3 MG/DL (ref 8–23)
CALCIUM SERPL-MCNC: 10 MG/DL (ref 8.8–10.4)
CHLORIDE SERPL-SCNC: 99 MMOL/L (ref 98–107)
CHOLEST SERPL-MCNC: 165 MG/DL
CREAT SERPL-MCNC: 0.67 MG/DL (ref 0.51–0.95)
EGFRCR SERPLBLD CKD-EPI 2021: 86 ML/MIN/1.73M2
FASTING STATUS PATIENT QL REPORTED: NO
FASTING STATUS PATIENT QL REPORTED: NO
GLUCOSE SERPL-MCNC: 99 MG/DL (ref 70–99)
HCO3 SERPL-SCNC: 33 MMOL/L (ref 22–29)
HDLC SERPL-MCNC: 89 MG/DL
LDLC SERPL CALC-MCNC: 63 MG/DL
NONHDLC SERPL-MCNC: 76 MG/DL
POTASSIUM SERPL-SCNC: 4.2 MMOL/L (ref 3.4–5.3)
SODIUM SERPL-SCNC: 138 MMOL/L (ref 135–145)
TRIGL SERPL-MCNC: 64 MG/DL
TSH SERPL DL<=0.005 MIU/L-ACNC: 1.22 UIU/ML (ref 0.3–4.2)

## 2025-05-29 RX ORDER — METOPROLOL SUCCINATE 25 MG/1
25 TABLET, EXTENDED RELEASE ORAL DAILY
Qty: 90 TABLET | Refills: 3 | Status: SHIPPED | OUTPATIENT
Start: 2025-05-29

## 2025-05-29 RX ORDER — LOSARTAN POTASSIUM 25 MG/1
25 TABLET ORAL DAILY
Qty: 90 TABLET | Refills: 3 | Status: SHIPPED | OUTPATIENT
Start: 2025-05-29

## 2025-05-29 RX ORDER — HYOSCYAMINE SULFATE 0.12 MG/1
0.12 TABLET SUBLINGUAL EVERY 4 HOURS PRN
Qty: 60 TABLET | Refills: 2 | Status: SHIPPED | OUTPATIENT
Start: 2025-05-29

## 2025-05-29 ASSESSMENT — PAIN SCALES - GENERAL: PAINLEVEL_OUTOF10: NO PAIN (0)

## 2025-05-29 NOTE — PATIENT INSTRUCTIONS
Decrease the escitalopram to 1/2 tablet daily for 1 week then 1/2 tablet every other day for 1 week then stop.    Monitor your blood pressure closely and if consistently above 140/90, let me know.    Will restart the hyoscyamine to use as needed for diarrhea.  Continue daily Imodium.    Will check updated labs today.    Add daily Boost or Ensure for extra protein/calories.  Eat high protein foods.

## 2025-05-29 NOTE — PROGRESS NOTES
Assessment & Plan       ICD-10-CM    1. Mixed hyperlipidemia  E78.2 Lipid panel reflex to direct LDL Non-fasting     Lipid panel reflex to direct LDL Non-fasting      2. Hypertension, unspecified type  I10 Basic metabolic panel  (Ca, Cl, CO2, Creat, Gluc, K, Na, BUN)     Basic metabolic panel  (Ca, Cl, CO2, Creat, Gluc, K, Na, BUN)      3. Anxiety  F41.9       4. Acquired hypothyroidism  E03.9 TSH with free T4 reflex     TSH with free T4 reflex      5. Grief reaction  F43.20       6. Weight loss  R63.4       7. Diarrhea, unspecified type  R19.7 hyoscyamine (LEVSIN/SL) 0.125 MG sublingual tablet      8. Chronic systolic heart failure (H)  I50.22 metoprolol succinate ER (TOPROL XL) 25 MG 24 hr tablet     losartan (COZAAR) 25 MG tablet      9. LBBB (left bundle branch block)  I44.7 losartan (COZAAR) 25 MG tablet          1. Updated lipid panel ordered. Continue simvastatin.    2. Her blood pressure is elevated during both readings today but she states it has been fairly normal at home. I recommend daily or every other day home monitoring and if consistently above 140/90, will increase losartan to 50 mg. I recommend an active lifestyle and low salt diet.    3, 5. Will wean the Lexapro to 2.5 mg for 1 week then 2.5 mg every other day for 1 week then she can stop. She has a good support system and is overall doing well.    4. Updated thyroid labs ordered to make sure she is not over replaced.    6. She is at 114 pounds and cannot gain much weight. I recommend Ensure or Boost shakes 1-2 times daily along with increased dietary protein. If she still has difficulties, she will let me know but this is not abnormal when grieving. No concerns for a malignant process at this time.    7. She had increased diarrhea last month and was given Levsin which works well as needed. Will refill to continue as needed alongside daily Imodium.    8-9. Continue losartan and metoprolol as directed.    Follow-up in 3 months.     Subjective  "  Milena is a 85 year old, presenting for the following health issues:  Weight Check and Thyroid Problem        5/29/2025     4:37 PM   Additional Questions   Roomed by Yarely SHANNON   Accompanied by Daughter- Selma     History of Present Illness       Reason for visit:  Weight and follow up on thyroid medication   She is taking medications regularly.        Medication Followup of Levothyroxine  Taking Medication as prescribed: yes  Side Effects:  None  Medication Helping Symptoms:  yes    She is still having a difficult time gaining weight. She has a poor appetite and was told by her children to try protein shakes.     She is still sad about her 's passing but has a good support system and does not feel she needs the Lexapro so would like to wean off of this.    She checks her blood pressure intermittently at home and it is usually normal.      Review of Systems  Constitutional, HEENT, cardiovascular, pulmonary, psych, gi and gu systems are negative, except as otherwise noted.      Objective    BP (!) 152/70   Pulse 72   Temp 98.3  F (36.8  C) (Temporal)   Resp 20   Ht 1.549 m (5' 0.98\")   Wt 51.7 kg (114 lb)   LMP  (LMP Unknown)   SpO2 96%   BMI 21.55 kg/m    Body mass index is 21.55 kg/m .  Physical Exam   GENERAL: alert and no distress  RESP: lungs clear to auscultation - no rales, rhonchi or wheezes  CV: regular rate and rhythm, normal S1 S2, no S3 or S4, no murmur, click or rub, no peripheral edema  NEURO: Normal strength and tone, mentation intact and speech normal. Gait is stable.   PSYCH: mentation appears normal, affect normal/bright          Signed Electronically by: Chuck Streeter PA-C    "

## 2025-06-19 ENCOUNTER — TELEPHONE (OUTPATIENT)
Dept: FAMILY MEDICINE | Facility: OTHER | Age: 85
End: 2025-06-19
Payer: COMMERCIAL

## 2025-06-19 NOTE — TELEPHONE ENCOUNTER
Patient calling back. She notes the following BP from the last few days:    126/73  129/67  131/73  136/84  151/92    Patient denies any symptoms and confirms she is still taking the 1 tablet (25 mg) of losartan daily. RN advised patient to continue on this dose and let us know if she is seeing consistent readings >140/90. Patient understood and agreed.     She requested the message be sent to PCP as JOS Wright, RN

## 2025-06-19 NOTE — TELEPHONE ENCOUNTER
Please call and see what her home blood pressures have been reading. If above 140/90 consistently, let me know and will increase her losartan to 50 mg and will need to schedule a nursing BP recheck within 3-4 weeks. Thanks.    Amrik Streeter PA-C

## 2025-06-19 NOTE — TELEPHONE ENCOUNTER
Attempt #1 to call.     RN has attempted to contact this patient by phone to return their call, but there is no response. RN left voicemail and requested return call to [facility] at [phone].     Upon call back please relay message from provider below.     Yumiko Paez, SHERRILLN, RN

## 2025-07-02 ENCOUNTER — TELEPHONE (OUTPATIENT)
Dept: FAMILY MEDICINE | Facility: OTHER | Age: 85
End: 2025-07-02
Payer: COMMERCIAL

## 2025-07-02 DIAGNOSIS — E78.5 HYPERLIPIDEMIA LDL GOAL <130: ICD-10-CM

## 2025-07-02 RX ORDER — SIMVASTATIN 10 MG
10 TABLET ORAL AT BEDTIME
Qty: 90 TABLET | Refills: 2 | Status: SHIPPED | OUTPATIENT
Start: 2025-07-02

## 2025-07-02 NOTE — TELEPHONE ENCOUNTER
Patient Quality Outreach    Patient is due for the following:   Physical Annual Wellness Visit    Action(s) Taken:   Patient has upcoming appointment, these items will be addressed at that time.    Type of outreach:    Chart review performed, no outreach needed.    Questions for provider review:    None         Yarely Patino, [facility]  Chart routed to None.

## 2025-07-05 ENCOUNTER — HEALTH MAINTENANCE LETTER (OUTPATIENT)
Age: 85
End: 2025-07-05

## 2025-08-28 ENCOUNTER — OFFICE VISIT (OUTPATIENT)
Dept: FAMILY MEDICINE | Facility: OTHER | Age: 85
End: 2025-08-28
Payer: COMMERCIAL

## 2025-08-28 VITALS
SYSTOLIC BLOOD PRESSURE: 138 MMHG | HEART RATE: 68 BPM | DIASTOLIC BLOOD PRESSURE: 62 MMHG | RESPIRATION RATE: 20 BRPM | BODY MASS INDEX: 21.71 KG/M2 | TEMPERATURE: 97.9 F | HEIGHT: 61 IN | WEIGHT: 115 LBS | OXYGEN SATURATION: 98 %

## 2025-08-28 DIAGNOSIS — R19.7 DIARRHEA, UNSPECIFIED TYPE: ICD-10-CM

## 2025-08-28 DIAGNOSIS — Z00.00 MEDICARE ANNUAL WELLNESS VISIT, SUBSEQUENT: Primary | ICD-10-CM

## 2025-08-28 DIAGNOSIS — H61.22 IMPACTED CERUMEN OF LEFT EAR: ICD-10-CM

## 2025-08-28 DIAGNOSIS — F41.9 ANXIETY: ICD-10-CM

## 2025-08-28 DIAGNOSIS — M85.89 OSTEOPENIA OF MULTIPLE SITES: ICD-10-CM

## 2025-08-28 RX ORDER — HYOSCYAMINE SULFATE 0.12 MG/1
0.12 TABLET SUBLINGUAL EVERY 4 HOURS PRN
Qty: 60 TABLET | Refills: 2 | Status: SHIPPED | OUTPATIENT
Start: 2025-08-28

## 2025-08-28 SDOH — HEALTH STABILITY: PHYSICAL HEALTH: ON AVERAGE, HOW MANY DAYS PER WEEK DO YOU ENGAGE IN MODERATE TO STRENUOUS EXERCISE (LIKE A BRISK WALK)?: 5 DAYS

## 2025-08-28 SDOH — HEALTH STABILITY: PHYSICAL HEALTH: ON AVERAGE, HOW MANY MINUTES DO YOU ENGAGE IN EXERCISE AT THIS LEVEL?: 20 MIN

## 2025-08-28 ASSESSMENT — PAIN SCALES - GENERAL: PAINLEVEL_OUTOF10: MILD PAIN (1)

## (undated) DEVICE — SYR 05ML LL W/O NDL

## (undated) DEVICE — PREP CHLORAPREP 26ML TINTED ORANGE  260815

## (undated) DEVICE — NDL ECLIPSE 18GA 1.5"

## (undated) DEVICE — ESU GROUND PAD UNIVERSAL W/O CORD

## (undated) DEVICE — NDL INSUFFLATION 120MM VERRES 172015

## (undated) DEVICE — SU MONOCRYL 4-0 PS-2 18" UND Y496G

## (undated) DEVICE — SYR 10ML LL W/O NDL

## (undated) DEVICE — DRAPE POUCH INSTRUMENT 1018

## (undated) DEVICE — DRSG BANDAID 1X3" FABRIC

## (undated) DEVICE — TUBE FEEDING NEOCONNECT POLY ENFIT 8FR 40CM PFTS8.0P-NC

## (undated) DEVICE — SHUNT TUNNELER SHEATH 61CM NT901124

## (undated) DEVICE — NDL ECLIPSE 22GA 1.5"

## (undated) DEVICE — ENDO SCOPE WARMER LF TM500

## (undated) DEVICE — MARKER SPHERES PASSIVE MEDT PACK 5 8801075

## (undated) DEVICE — TOOL DISSECT MIDAS MR8 9CM BALL 6MM DIA MR8-9BA60

## (undated) DEVICE — BLADE KNIFE SURG 11 371111

## (undated) DEVICE — SHUNT CATH PERITIONEAL STD 43551: Type: IMPLANTABLE DEVICE | Site: CRANIAL | Status: NON-FUNCTIONAL

## (undated) DEVICE — TRAY PROCEDURE SUPPORT PAIN MANAGEMENT 332114

## (undated) DEVICE — SPONGE COTTONOID 1/2X3" 80-1407

## (undated) DEVICE — PREP SKIN SCRUB TRAY 4461A

## (undated) DEVICE — LINEN TOWEL PACK X5 5464

## (undated) DEVICE — TUBING EXTENSION SET MICROBORE 8.2" LL 7N8310

## (undated) DEVICE — TUBING IV EXTENSION SET 34"

## (undated) DEVICE — PACK UNIVERSAL SPLIT 29131

## (undated) DEVICE — NDL SPINAL 25GA 4.69" QUINCKE 405234

## (undated) DEVICE — SHUNT STYLET 23CM AXIEM NAVIGATION SYSTEM 9735428

## (undated) DEVICE — NDL BLUNT 19GA 1.5"

## (undated) DEVICE — SOL WATER IRRIG 1000ML BOTTLE 2F7114

## (undated) DEVICE — DRSG TELFA ISLAND 4X10"

## (undated) DEVICE — PACK SPINE SM CUSTOM SNE15SSFSK

## (undated) DEVICE — SPONGE RAY-TEC 4X8" 7318

## (undated) DEVICE — GLOVE PROTEXIS W/NEU-THERA 7.5  2D73TE75

## (undated) DEVICE — SU VICRYL 2-0 CT-2 CR 8X18" J726D

## (undated) DEVICE — STPL SKIN 35W ROTATING HEAD PRW35

## (undated) DEVICE — SPONGE BALL KERLIX ROUND XL W/O STRING LATEX 4935

## (undated) DEVICE — PEN MARKING SKIN

## (undated) DEVICE — SPONGE SURGIFOAM 100 1974

## (undated) DEVICE — SYR 03ML LL W/O NDL

## (undated) DEVICE — ESU CORD MONOPOLAR 10'  E0510

## (undated) DEVICE — SPINOCAN

## (undated) DEVICE — LABEL MEDICATION SYSTEM  3304

## (undated) DEVICE — NDL SPINAL 22GA 3.5" QUINCKE 405181

## (undated) DEVICE — ESU CLEANER TIP 31142717

## (undated) DEVICE — DRAPE IOBAN INCISE 36X23" 6651EZ

## (undated) DEVICE — ESU CORD BIPOLAR 12' E0512

## (undated) DEVICE — TUBING SUCTION SOFT 20'X3/16" 0036570

## (undated) DEVICE — SUCTION MANIFOLD NEPTUNE SGL

## (undated) DEVICE — PREP DURAPREP 26ML APL 8630

## (undated) DEVICE — CODMAN® CERTAS® PLUS PROGRAMMABLE VALVE INLINE SMALL VALVE ONLY WITH SIPHONGUARD®
Type: IMPLANTABLE DEVICE | Site: CRANIAL | Status: NON-FUNCTIONAL
Brand: CODMAN CERTAS® PLUS

## (undated) DEVICE — TUBING SUCTION MEDI-VAC SOFT 3/16"X20' N520A

## (undated) DEVICE — DRAPE SHEET REV FOLD 3/4 9349

## (undated) DEVICE — GUIDEWIRE ZIPWIRE STR STIFF .035"X150CM M006630222B0

## (undated) DEVICE — SUCTION FRAZIER 12FR W/HANDLE K73

## (undated) DEVICE — NDL SPINAL 22GA 5" QUINCKE 405148

## (undated) DEVICE — GLOVE PROTEXIS W/NEU-THERA 8.0  2D73TE80

## (undated) DEVICE — POINTER TRACER AXIEM 9733423

## (undated) DEVICE — BLADE CLIPPER 4412A

## (undated) DEVICE — Device

## (undated) DEVICE — TRACKER PATIENT NON-INVASIVE AXIEM 9734887

## (undated) DEVICE — GLOVE GAMMEX DERMAPRENE ULTRA SZ 8 LF 8516

## (undated) DEVICE — ENDO TROCAR FIRST ENTRY KII FIOS Z-THRD 05X100MM CTF03

## (undated) DEVICE — SU SILK 2-0 TIE 24" SA75H

## (undated) DEVICE — SU VICRYL 3-0 SH 27" J316H

## (undated) RX ORDER — FENTANYL CITRATE 0.05 MG/ML
INJECTION, SOLUTION INTRAMUSCULAR; INTRAVENOUS
Status: DISPENSED
Start: 2022-12-02

## (undated) RX ORDER — GINSENG 100 MG
CAPSULE ORAL
Status: DISPENSED
Start: 2022-12-02

## (undated) RX ORDER — PROPOFOL 10 MG/ML
INJECTION, EMULSION INTRAVENOUS
Status: DISPENSED
Start: 2022-12-02

## (undated) RX ORDER — EPHEDRINE SULFATE 50 MG/ML
INJECTION, SOLUTION INTRAMUSCULAR; INTRAVENOUS; SUBCUTANEOUS
Status: DISPENSED
Start: 2022-12-02

## (undated) RX ORDER — CLINDAMYCIN PHOSPHATE 900 MG/50ML
INJECTION, SOLUTION INTRAVENOUS
Status: DISPENSED
Start: 2022-12-02

## (undated) RX ORDER — ONDANSETRON 2 MG/ML
INJECTION INTRAMUSCULAR; INTRAVENOUS
Status: DISPENSED
Start: 2022-12-02

## (undated) RX ORDER — LIDOCAINE HYDROCHLORIDE 10 MG/ML
INJECTION, SOLUTION EPIDURAL; INFILTRATION; INTRACAUDAL; PERINEURAL
Status: DISPENSED
Start: 2022-08-17

## (undated) RX ORDER — DEXAMETHASONE SODIUM PHOSPHATE 4 MG/ML
INJECTION, SOLUTION INTRA-ARTICULAR; INTRALESIONAL; INTRAMUSCULAR; INTRAVENOUS; SOFT TISSUE
Status: DISPENSED
Start: 2022-12-02

## (undated) RX ORDER — BUPIVACAINE HYDROCHLORIDE AND EPINEPHRINE 5; 5 MG/ML; UG/ML
INJECTION, SOLUTION EPIDURAL; INTRACAUDAL; PERINEURAL
Status: DISPENSED
Start: 2022-12-02

## (undated) RX ORDER — FENTANYL CITRATE 50 UG/ML
INJECTION, SOLUTION INTRAMUSCULAR; INTRAVENOUS
Status: DISPENSED
Start: 2022-12-02